# Patient Record
Sex: FEMALE | Race: OTHER | HISPANIC OR LATINO | URBAN - METROPOLITAN AREA
[De-identification: names, ages, dates, MRNs, and addresses within clinical notes are randomized per-mention and may not be internally consistent; named-entity substitution may affect disease eponyms.]

---

## 2018-07-14 ENCOUNTER — EMERGENCY (EMERGENCY)
Facility: HOSPITAL | Age: 73
LOS: 1 days | Discharge: HOME | End: 2018-07-14
Attending: EMERGENCY MEDICINE
Payer: COMMERCIAL

## 2018-07-14 VITALS
DIASTOLIC BLOOD PRESSURE: 106 MMHG | OXYGEN SATURATION: 98 % | TEMPERATURE: 98 F | SYSTOLIC BLOOD PRESSURE: 200 MMHG | HEART RATE: 71 BPM | RESPIRATION RATE: 20 BRPM | WEIGHT: 164.02 LBS | HEIGHT: 61 IN

## 2018-07-14 VITALS
RESPIRATION RATE: 16 BRPM | SYSTOLIC BLOOD PRESSURE: 164 MMHG | HEART RATE: 73 BPM | DIASTOLIC BLOOD PRESSURE: 69 MMHG | OXYGEN SATURATION: 99 %

## 2018-07-14 DIAGNOSIS — Z95.0 PRESENCE OF CARDIAC PACEMAKER: Chronic | ICD-10-CM

## 2018-07-14 DIAGNOSIS — Z90.710 ACQUIRED ABSENCE OF BOTH CERVIX AND UTERUS: Chronic | ICD-10-CM

## 2018-07-14 DIAGNOSIS — Z95.810 PRESENCE OF AUTOMATIC (IMPLANTABLE) CARDIAC DEFIBRILLATOR: Chronic | ICD-10-CM

## 2018-07-14 DIAGNOSIS — Z90.49 ACQUIRED ABSENCE OF OTHER SPECIFIED PARTS OF DIGESTIVE TRACT: Chronic | ICD-10-CM

## 2018-07-14 LAB
ALBUMIN SERPL ELPH-MCNC: 3.4 G/DL — LOW (ref 3.5–5)
ALP SERPL-CCNC: 155 U/L — HIGH (ref 40–120)
ALT FLD-CCNC: 30 U/L DA — SIGNIFICANT CHANGE UP (ref 10–60)
ANION GAP SERPL CALC-SCNC: 6 MMOL/L — SIGNIFICANT CHANGE UP (ref 5–17)
AST SERPL-CCNC: 27 U/L — SIGNIFICANT CHANGE UP (ref 10–40)
BASOPHILS # BLD AUTO: 0 K/UL — SIGNIFICANT CHANGE UP (ref 0–0.2)
BASOPHILS NFR BLD AUTO: 0.3 % — SIGNIFICANT CHANGE UP (ref 0–2)
BILIRUB SERPL-MCNC: 0.4 MG/DL — SIGNIFICANT CHANGE UP (ref 0.2–1.2)
BUN SERPL-MCNC: 28 MG/DL — HIGH (ref 7–18)
CALCIUM SERPL-MCNC: 9.2 MG/DL — SIGNIFICANT CHANGE UP (ref 8.4–10.5)
CHLORIDE SERPL-SCNC: 103 MMOL/L — SIGNIFICANT CHANGE UP (ref 96–108)
CO2 SERPL-SCNC: 29 MMOL/L — SIGNIFICANT CHANGE UP (ref 22–31)
CREAT SERPL-MCNC: 1.27 MG/DL — SIGNIFICANT CHANGE UP (ref 0.5–1.3)
EOSINOPHIL # BLD AUTO: 0.1 K/UL — SIGNIFICANT CHANGE UP (ref 0–0.5)
EOSINOPHIL NFR BLD AUTO: 1.1 % — SIGNIFICANT CHANGE UP (ref 0–6)
GLUCOSE SERPL-MCNC: 210 MG/DL — HIGH (ref 70–99)
HCT VFR BLD CALC: 42.7 % — SIGNIFICANT CHANGE UP (ref 34.5–45)
HGB BLD-MCNC: 14 G/DL — SIGNIFICANT CHANGE UP (ref 11.5–15.5)
LIDOCAIN IGE QN: 172 U/L — SIGNIFICANT CHANGE UP (ref 73–393)
LYMPHOCYTES # BLD AUTO: 23.7 % — SIGNIFICANT CHANGE UP (ref 13–44)
LYMPHOCYTES # BLD AUTO: 3.1 K/UL — SIGNIFICANT CHANGE UP (ref 1–3.3)
MCHC RBC-ENTMCNC: 28.9 PG — SIGNIFICANT CHANGE UP (ref 27–34)
MCHC RBC-ENTMCNC: 32.8 GM/DL — SIGNIFICANT CHANGE UP (ref 32–36)
MCV RBC AUTO: 88 FL — SIGNIFICANT CHANGE UP (ref 80–100)
MONOCYTES # BLD AUTO: 0.7 K/UL — SIGNIFICANT CHANGE UP (ref 0–0.9)
MONOCYTES NFR BLD AUTO: 5.2 % — SIGNIFICANT CHANGE UP (ref 2–14)
NEUTROPHILS # BLD AUTO: 9.1 K/UL — HIGH (ref 1.8–7.4)
NEUTROPHILS NFR BLD AUTO: 69.7 % — SIGNIFICANT CHANGE UP (ref 43–77)
PLATELET # BLD AUTO: 329 K/UL — SIGNIFICANT CHANGE UP (ref 150–400)
POTASSIUM SERPL-MCNC: 4.7 MMOL/L — SIGNIFICANT CHANGE UP (ref 3.5–5.3)
POTASSIUM SERPL-SCNC: 4.7 MMOL/L — SIGNIFICANT CHANGE UP (ref 3.5–5.3)
PROT SERPL-MCNC: 8.4 G/DL — HIGH (ref 6–8.3)
RBC # BLD: 4.85 M/UL — SIGNIFICANT CHANGE UP (ref 3.8–5.2)
RBC # FLD: 12.2 % — SIGNIFICANT CHANGE UP (ref 10.3–14.5)
SODIUM SERPL-SCNC: 138 MMOL/L — SIGNIFICANT CHANGE UP (ref 135–145)
TROPONIN I SERPL-MCNC: <0.015 NG/ML — SIGNIFICANT CHANGE UP (ref 0–0.04)
WBC # BLD: 13 K/UL — HIGH (ref 3.8–10.5)
WBC # FLD AUTO: 13 K/UL — HIGH (ref 3.8–10.5)

## 2018-07-14 PROCEDURE — 99284 EMERGENCY DEPT VISIT MOD MDM: CPT

## 2018-07-14 RX ORDER — HYDRALAZINE HCL 50 MG
10 TABLET ORAL ONCE
Qty: 0 | Refills: 0 | Status: COMPLETED | OUTPATIENT
Start: 2018-07-14 | End: 2018-07-14

## 2018-07-14 RX ORDER — ONDANSETRON 8 MG/1
4 TABLET, FILM COATED ORAL ONCE
Qty: 0 | Refills: 0 | Status: COMPLETED | OUTPATIENT
Start: 2018-07-14 | End: 2018-07-14

## 2018-07-14 RX ADMIN — Medication 10 MILLIGRAM(S): at 23:04

## 2018-07-14 RX ADMIN — ONDANSETRON 4 MILLIGRAM(S): 8 TABLET, FILM COATED ORAL at 23:04

## 2018-07-14 NOTE — ED PROVIDER NOTE - PSH
Artificial cardiac pacemaker    Cardiac defibrillator in place    H/O: hysterectomy    History of cholecystectomy

## 2018-07-14 NOTE — ED ADULT TRIAGE NOTE - CHIEF COMPLAINT QUOTE
pt stated her blood pressure is high denies dizziness c/o abd pain with nausea and vomiting and diarrhea

## 2018-07-14 NOTE — ED PROVIDER NOTE - OBJECTIVE STATEMENT
71 y/o F pt with a PMHx of CAD, CHF, DM, and HTN and a PSHX of a Hysterectomy, Cholecystectomy, Pacemaker, and Defibrillator presents to ED c/o n/v/d and abdominal pain x3 days. Pt also notes chills and dizziness on the first day of onset. Per pt, pt had a salad at home and felt abdominal pain afterwards and vomited. Pt denies CP, dysuria, hematuria, HA or any other symptoms. Pt is currently taking: Aspirin 81mg, Atorvastatin 40mg, Citalopram 10mg, Clopidogrel 75mg, Docusate Sodium 50mg, Felodipin 5mg, Furosemide 20 mg, Glimepiride 4mg, Insulin Aspart 100unit/ml, Ipratropium 17mcg, Lisinopril 20mg, and Metoprolol 100mg., Allergies: Penicillin (rash) & Codeine (rash)

## 2018-07-14 NOTE — ED PROVIDER NOTE - MEDICAL DECISION MAKING DETAILS
73 y/o F pt presents to ED c/o n/v/d and abdominal pain x3 days. Abdomen tender on exam. Will check labs, EKG, CT A/P, UA. Pt also with severely elevated blood pressure, will give IV Hydralazine.

## 2018-07-14 NOTE — ED ADULT NURSE NOTE - OBJECTIVE STATEMENT
Patient complain of elevated blood pressure, ate a salad on Thursday and had vomiting and diarrhea, no diarrhea today, experiencing abdominal pain with some relief with Maalox

## 2018-07-14 NOTE — ED PROVIDER NOTE - PMH
CAD (coronary artery disease)    CHF (congestive heart failure)    DM (diabetes mellitus)    HTN (hypertension)

## 2018-07-15 LAB
APPEARANCE UR: CLEAR — SIGNIFICANT CHANGE UP
BILIRUB UR-MCNC: NEGATIVE — SIGNIFICANT CHANGE UP
COLOR SPEC: YELLOW — SIGNIFICANT CHANGE UP
DIFF PNL FLD: ABNORMAL
GLUCOSE UR QL: NEGATIVE — SIGNIFICANT CHANGE UP
KETONES UR-MCNC: NEGATIVE — SIGNIFICANT CHANGE UP
LEUKOCYTE ESTERASE UR-ACNC: ABNORMAL
NITRITE UR-MCNC: POSITIVE
PH UR: 7 — SIGNIFICANT CHANGE UP (ref 5–8)
PROT UR-MCNC: 100
SP GR SPEC: 1.01 — SIGNIFICANT CHANGE UP (ref 1.01–1.02)
UROBILINOGEN FLD QL: NEGATIVE — SIGNIFICANT CHANGE UP

## 2018-07-15 PROCEDURE — 96374 THER/PROPH/DIAG INJ IV PUSH: CPT | Mod: XU

## 2018-07-15 PROCEDURE — 93005 ELECTROCARDIOGRAM TRACING: CPT

## 2018-07-15 PROCEDURE — 80053 COMPREHEN METABOLIC PANEL: CPT

## 2018-07-15 PROCEDURE — 99284 EMERGENCY DEPT VISIT MOD MDM: CPT | Mod: 25

## 2018-07-15 PROCEDURE — 96375 TX/PRO/DX INJ NEW DRUG ADDON: CPT

## 2018-07-15 PROCEDURE — 74177 CT ABD & PELVIS W/CONTRAST: CPT | Mod: 26

## 2018-07-15 PROCEDURE — 87086 URINE CULTURE/COLONY COUNT: CPT

## 2018-07-15 PROCEDURE — 82962 GLUCOSE BLOOD TEST: CPT

## 2018-07-15 PROCEDURE — 81001 URINALYSIS AUTO W/SCOPE: CPT

## 2018-07-15 PROCEDURE — 84484 ASSAY OF TROPONIN QUANT: CPT

## 2018-07-15 PROCEDURE — 74177 CT ABD & PELVIS W/CONTRAST: CPT

## 2018-07-15 PROCEDURE — 83690 ASSAY OF LIPASE: CPT

## 2018-07-15 PROCEDURE — 85027 COMPLETE CBC AUTOMATED: CPT

## 2018-07-15 RX ORDER — CEFUROXIME AXETIL 250 MG
1 TABLET ORAL
Qty: 14 | Refills: 0
Start: 2018-07-15 | End: 2018-07-21

## 2018-07-15 RX ORDER — ONDANSETRON 8 MG/1
1 TABLET, FILM COATED ORAL
Qty: 6 | Refills: 0
Start: 2018-07-15 | End: 2018-07-16

## 2018-07-15 RX ORDER — METOCLOPRAMIDE HCL 10 MG
10 TABLET ORAL ONCE
Qty: 0 | Refills: 0 | Status: COMPLETED | OUTPATIENT
Start: 2018-07-15 | End: 2018-07-15

## 2018-07-15 RX ORDER — CEFTRIAXONE 500 MG/1
1 INJECTION, POWDER, FOR SOLUTION INTRAMUSCULAR; INTRAVENOUS ONCE
Qty: 0 | Refills: 0 | Status: COMPLETED | OUTPATIENT
Start: 2018-07-15 | End: 2018-07-15

## 2018-07-15 RX ADMIN — CEFTRIAXONE 100 GRAM(S): 500 INJECTION, POWDER, FOR SOLUTION INTRAMUSCULAR; INTRAVENOUS at 03:03

## 2018-07-15 RX ADMIN — Medication 10 MILLIGRAM(S): at 03:06

## 2018-07-16 LAB
CULTURE RESULTS: SIGNIFICANT CHANGE UP
SPECIMEN SOURCE: SIGNIFICANT CHANGE UP

## 2019-10-12 ENCOUNTER — EMERGENCY (EMERGENCY)
Facility: HOSPITAL | Age: 74
LOS: 1 days | Discharge: HOME | End: 2019-10-12
Attending: EMERGENCY MEDICINE
Payer: MEDICARE

## 2019-10-12 VITALS
TEMPERATURE: 98 F | OXYGEN SATURATION: 98 % | HEART RATE: 66 BPM | SYSTOLIC BLOOD PRESSURE: 166 MMHG | RESPIRATION RATE: 20 BRPM | DIASTOLIC BLOOD PRESSURE: 64 MMHG

## 2019-10-12 VITALS
SYSTOLIC BLOOD PRESSURE: 203 MMHG | HEART RATE: 90 BPM | WEIGHT: 134.92 LBS | OXYGEN SATURATION: 99 % | TEMPERATURE: 98 F | HEIGHT: 61 IN | DIASTOLIC BLOOD PRESSURE: 93 MMHG | RESPIRATION RATE: 18 BRPM

## 2019-10-12 DIAGNOSIS — Z95.810 PRESENCE OF AUTOMATIC (IMPLANTABLE) CARDIAC DEFIBRILLATOR: Chronic | ICD-10-CM

## 2019-10-12 DIAGNOSIS — Z95.0 PRESENCE OF CARDIAC PACEMAKER: Chronic | ICD-10-CM

## 2019-10-12 DIAGNOSIS — Z90.710 ACQUIRED ABSENCE OF BOTH CERVIX AND UTERUS: Chronic | ICD-10-CM

## 2019-10-12 DIAGNOSIS — Z90.49 ACQUIRED ABSENCE OF OTHER SPECIFIED PARTS OF DIGESTIVE TRACT: Chronic | ICD-10-CM

## 2019-10-12 PROBLEM — I50.9 HEART FAILURE, UNSPECIFIED: Chronic | Status: ACTIVE | Noted: 2018-07-27

## 2019-10-12 PROBLEM — I10 ESSENTIAL (PRIMARY) HYPERTENSION: Chronic | Status: ACTIVE | Noted: 2018-07-27

## 2019-10-12 LAB
ANION GAP SERPL CALC-SCNC: 5 MMOL/L — SIGNIFICANT CHANGE UP (ref 5–17)
APTT BLD: 41.7 SEC — HIGH (ref 27.5–36.3)
BASOPHILS # BLD AUTO: 0.05 K/UL — SIGNIFICANT CHANGE UP (ref 0–0.2)
BASOPHILS NFR BLD AUTO: 0.4 % — SIGNIFICANT CHANGE UP (ref 0–2)
BUN SERPL-MCNC: 32 MG/DL — HIGH (ref 7–18)
CALCIUM SERPL-MCNC: 9.6 MG/DL — SIGNIFICANT CHANGE UP (ref 8.4–10.5)
CHLORIDE SERPL-SCNC: 102 MMOL/L — SIGNIFICANT CHANGE UP (ref 96–108)
CO2 SERPL-SCNC: 28 MMOL/L — SIGNIFICANT CHANGE UP (ref 22–31)
CREAT SERPL-MCNC: 1.26 MG/DL — SIGNIFICANT CHANGE UP (ref 0.5–1.3)
EOSINOPHIL # BLD AUTO: 0.24 K/UL — SIGNIFICANT CHANGE UP (ref 0–0.5)
EOSINOPHIL NFR BLD AUTO: 2 % — SIGNIFICANT CHANGE UP (ref 0–6)
GLUCOSE SERPL-MCNC: 225 MG/DL — HIGH (ref 70–99)
HCT VFR BLD CALC: 38.5 % — SIGNIFICANT CHANGE UP (ref 34.5–45)
HGB BLD-MCNC: 12.8 G/DL — SIGNIFICANT CHANGE UP (ref 11.5–15.5)
IMM GRANULOCYTES NFR BLD AUTO: 0.8 % — SIGNIFICANT CHANGE UP (ref 0–1.5)
INR BLD: 1.06 RATIO — SIGNIFICANT CHANGE UP (ref 0.88–1.16)
LYMPHOCYTES # BLD AUTO: 18.3 % — SIGNIFICANT CHANGE UP (ref 13–44)
LYMPHOCYTES # BLD AUTO: 2.16 K/UL — SIGNIFICANT CHANGE UP (ref 1–3.3)
MCHC RBC-ENTMCNC: 29.4 PG — SIGNIFICANT CHANGE UP (ref 27–34)
MCHC RBC-ENTMCNC: 33.2 GM/DL — SIGNIFICANT CHANGE UP (ref 32–36)
MCV RBC AUTO: 88.3 FL — SIGNIFICANT CHANGE UP (ref 80–100)
MONOCYTES # BLD AUTO: 0.79 K/UL — SIGNIFICANT CHANGE UP (ref 0–0.9)
MONOCYTES NFR BLD AUTO: 6.7 % — SIGNIFICANT CHANGE UP (ref 2–14)
NEUTROPHILS # BLD AUTO: 8.48 K/UL — HIGH (ref 1.8–7.4)
NEUTROPHILS NFR BLD AUTO: 71.8 % — SIGNIFICANT CHANGE UP (ref 43–77)
NRBC # BLD: 0 /100 WBCS — SIGNIFICANT CHANGE UP (ref 0–0)
PLATELET # BLD AUTO: 343 K/UL — SIGNIFICANT CHANGE UP (ref 150–400)
POTASSIUM SERPL-MCNC: 4.4 MMOL/L — SIGNIFICANT CHANGE UP (ref 3.5–5.3)
POTASSIUM SERPL-SCNC: 4.4 MMOL/L — SIGNIFICANT CHANGE UP (ref 3.5–5.3)
PROTHROM AB SERPL-ACNC: 11.8 SEC — SIGNIFICANT CHANGE UP (ref 10–12.9)
RBC # BLD: 4.36 M/UL — SIGNIFICANT CHANGE UP (ref 3.8–5.2)
RBC # FLD: 14 % — SIGNIFICANT CHANGE UP (ref 10.3–14.5)
SODIUM SERPL-SCNC: 135 MMOL/L — SIGNIFICANT CHANGE UP (ref 135–145)
TROPONIN I SERPL-MCNC: <0.015 NG/ML — SIGNIFICANT CHANGE UP (ref 0–0.04)
WBC # BLD: 11.81 K/UL — HIGH (ref 3.8–10.5)
WBC # FLD AUTO: 11.81 K/UL — HIGH (ref 3.8–10.5)

## 2019-10-12 PROCEDURE — 84484 ASSAY OF TROPONIN QUANT: CPT

## 2019-10-12 PROCEDURE — 85730 THROMBOPLASTIN TIME PARTIAL: CPT

## 2019-10-12 PROCEDURE — 70450 CT HEAD/BRAIN W/O DYE: CPT

## 2019-10-12 PROCEDURE — 99284 EMERGENCY DEPT VISIT MOD MDM: CPT

## 2019-10-12 PROCEDURE — 70450 CT HEAD/BRAIN W/O DYE: CPT | Mod: 26

## 2019-10-12 PROCEDURE — 85610 PROTHROMBIN TIME: CPT

## 2019-10-12 PROCEDURE — 93005 ELECTROCARDIOGRAM TRACING: CPT

## 2019-10-12 PROCEDURE — 85027 COMPLETE CBC AUTOMATED: CPT

## 2019-10-12 PROCEDURE — 80048 BASIC METABOLIC PNL TOTAL CA: CPT

## 2019-10-12 PROCEDURE — 99053 MED SERV 10PM-8AM 24 HR FAC: CPT

## 2019-10-12 PROCEDURE — 71046 X-RAY EXAM CHEST 2 VIEWS: CPT | Mod: 26

## 2019-10-12 PROCEDURE — 99284 EMERGENCY DEPT VISIT MOD MDM: CPT | Mod: 25

## 2019-10-12 PROCEDURE — 36415 COLL VENOUS BLD VENIPUNCTURE: CPT

## 2019-10-12 PROCEDURE — 71046 X-RAY EXAM CHEST 2 VIEWS: CPT

## 2019-10-12 RX ORDER — ACETAMINOPHEN 500 MG
975 TABLET ORAL ONCE
Refills: 0 | Status: COMPLETED | OUTPATIENT
Start: 2019-10-12 | End: 2019-10-12

## 2019-10-12 RX ORDER — OXYMETAZOLINE HYDROCHLORIDE 0.5 MG/ML
1 SPRAY NASAL ONCE
Refills: 0 | Status: COMPLETED | OUTPATIENT
Start: 2019-10-12 | End: 2019-10-12

## 2019-10-12 RX ADMIN — OXYMETAZOLINE HYDROCHLORIDE 1 SPRAY(S): 0.5 SPRAY NASAL at 09:19

## 2019-10-12 RX ADMIN — Medication 975 MILLIGRAM(S): at 08:33

## 2019-10-12 NOTE — ED ADULT NURSE NOTE - OBJECTIVE STATEMENT
nose bleed from 0430- 0730, with c/o HTN chest pain radiating to upper back. Received metoprolol 100mg and hydralazine 50mg from daughter.

## 2019-10-12 NOTE — ED PROVIDER NOTE - CARE PROVIDER_API CALL
Dejuan Pearson)  Otolaryngology  [address] [place] [address], [address]  [place] [address]  Phone: [phone]  Fax: [phone]  Follow Up Time:

## 2019-10-12 NOTE — ED PROVIDER NOTE - OBJECTIVE STATEMENT
73 y/o female with PMHx of DM, CAD (on Plavix and ASA), HTN, pacemaker, defibrillator presents to the ED c/o epistaxis x this morning. Pt notes sudden onset of epistaxis from mamadou nares, L greater than R. Pt in ED with bleeding now resolved. Pt last took Plavix and ASA x yesterday. Pt in ED also with diffuse HA and concern for elevated BP; pt last took metoprolol and hydralazine x this morning. Pt also with chest pain near pacemaker x 1 hour ago, now resolved in ED. Pt denies fever, shortness of breath, or any other complaints. Pt allergic to penicillin and codeine (rash).

## 2019-10-12 NOTE — ED PROVIDER NOTE - PATIENT PORTAL LINK FT
You can access the FollowMyHealth Patient Portal offered by [facility] by registering at the following website: [email or web address]. By joining [facility]’s FollowMyHealth portal, you will also be able to view your health information using other applications (apps) compatible with our system.

## 2019-10-12 NOTE — ED ADULT TRIAGE NOTE - CHIEF COMPLAINT QUOTE
pt started with bleeding from left nose this morning . pt on Plavix and aspirin . pt c/o left back pain

## 2019-10-12 NOTE — ED PROVIDER NOTE - NSFOLLOWUPINSTRUCTIONS_ED_ALL_ED_FT
Nosebleed, Adult  A nosebleed is when blood comes out of the nose. Nosebleeds are common. Usually, they are not a sign of a serious condition.  Nosebleeds can happen if a small blood vessel in your nose starts to bleed or if the lining of your nose (mucous membrane) cracks. They are commonly caused by:  Allergies.Colds.Picking your nose.Blowing your nose too hard.An injury from sticking an object into your nose or getting hit in the nose.Dry or cold air.Less common causes of nosebleeds include:  Toxic fumes.Something abnormal in the nose or in the air-filled spaces in the bones of the face (sinuses).Growths in the nose, such as polyps.Medicines or conditions that cause blood to clot slowly.Certain illnesses or procedures that irritate or dry out the nasal passages.Follow these instructions at home:  When you have a nosebleed:     Image   Sit down and tilt your head slightly forward.Use a clean towel or tissue to pinch your nostrils under the bony part of your nose. After 10 minutes, let go of your nose and see if bleeding starts again. Do not release pressure before that time. If there is still bleeding, repeat the pinching and holding for 10 minutes until the bleeding stops.Do not place tissues or gauze in the nose to stop bleeding.Avoid lying down and avoid tilting your head backward. That may make blood collect in the throat and cause gagging or coughing.Use a nasal spray decongestant to help with a nosebleed as told by your health care provider.Do not use petroleum jelly or mineral oil in your nose. It can drip into your lungs.After a nosebleed:     Avoid blowing your nose or sniffing for a number of hours.Avoid straining, lifting, or bending at the waist for several days. You may resume other normal activities as you are able.Use saline spray or a humidifier as told by your health care provider.Aspirin and blood thinners make bleeding more likely. If you are prescribed these medicines and you suffer from nosebleeds:  Ask your health care provider if you should stop taking the medicines or if you should adjust the dose.Do not stop taking medicines that your health care provider has recommended unless told by your health care provider.If your nosebleed was caused by dry mucous membranes, use over-the-counter saline nasal spray or gel. This will keep the mucous membranes moist and allow them to heal. If you must use a lubricant:  Choose one that is water-soluble.Use only as much as you need and use it only as often as needed.Do not lie down until several hours after you use it.Contact a health care provider if:  You have a fever.You get nosebleeds often or more often than usual.You bruise very easily.You have a nosebleed from having something stuck in your nose.You have bleeding in your mouth.You vomit or cough up brown material.You have a nosebleed after you start a new medicine.Get help right away if:  You have a nosebleed after a fall or a head injury.Your nosebleed does not go away after 20 minutes.You feel dizzy or weak.You have unusual bleeding from other parts of your body.You have unusual bruising on other parts of your body.You become sweaty.You vomit blood.This information is not intended to replace advice given to you by your health care provider. Make sure you discuss any questions you have with your health care provider.

## 2019-10-12 NOTE — ED PROVIDER NOTE - PROGRESS NOTE DETAILS
NAD, resting in bed, states headache resolved, BP improved, labs wnl, to contnue prn vaseline to nares, may use Afrin, not more than 3d explained,  return prec for worsening bleeding if not subsided after holding pressure, fu with ENT.

## 2019-10-12 NOTE — ED PROVIDER NOTE - CLINICAL SUMMARY MEDICAL DECISION MAKING FREE TEXT BOX
74 F on Plavix/ASA with epistaxis x today. Pt also with HA and chest pain. In ED, pt well appearing and bleeding resolved. Will eval with labs, CT head, treat pain and reassess.

## 2019-10-12 NOTE — ED PROVIDER NOTE - CRANIAL NERVE AND PUPILLARY EXAM
central vision intact/gag reflex intact/tongue is midline/cranial nerves 2-12 intact/corneal reflex intact/central and peripheral vision intact/extra-ocular movements intact/peripheral vision intact/cough reflex intact

## 2020-05-30 ENCOUNTER — INPATIENT (INPATIENT)
Facility: HOSPITAL | Age: 75
LOS: 19 days | Discharge: HOME | DRG: 981 | End: 2020-06-19
Attending: INTERNAL MEDICINE | Admitting: INTERNAL MEDICINE
Payer: MEDICARE

## 2020-05-30 VITALS
SYSTOLIC BLOOD PRESSURE: 187 MMHG | WEIGHT: 147.05 LBS | TEMPERATURE: 97 F | DIASTOLIC BLOOD PRESSURE: 109 MMHG | HEART RATE: 114 BPM | RESPIRATION RATE: 19 BRPM

## 2020-05-30 DIAGNOSIS — R10.9 UNSPECIFIED ABDOMINAL PAIN: ICD-10-CM

## 2020-05-30 DIAGNOSIS — Z29.9 ENCOUNTER FOR PROPHYLACTIC MEASURES, UNSPECIFIED: ICD-10-CM

## 2020-05-30 DIAGNOSIS — I25.10 ATHEROSCLEROTIC HEART DISEASE OF NATIVE CORONARY ARTERY WITHOUT ANGINA PECTORIS: ICD-10-CM

## 2020-05-30 DIAGNOSIS — I50.9 HEART FAILURE, UNSPECIFIED: ICD-10-CM

## 2020-05-30 DIAGNOSIS — N32.89 OTHER SPECIFIED DISORDERS OF BLADDER: ICD-10-CM

## 2020-05-30 DIAGNOSIS — E11.9 TYPE 2 DIABETES MELLITUS WITHOUT COMPLICATIONS: ICD-10-CM

## 2020-05-30 DIAGNOSIS — Z95.0 PRESENCE OF CARDIAC PACEMAKER: Chronic | ICD-10-CM

## 2020-05-30 DIAGNOSIS — R11.10 VOMITING, UNSPECIFIED: ICD-10-CM

## 2020-05-30 DIAGNOSIS — Z90.49 ACQUIRED ABSENCE OF OTHER SPECIFIED PARTS OF DIGESTIVE TRACT: Chronic | ICD-10-CM

## 2020-05-30 DIAGNOSIS — I73.9 PERIPHERAL VASCULAR DISEASE, UNSPECIFIED: ICD-10-CM

## 2020-05-30 DIAGNOSIS — Z90.710 ACQUIRED ABSENCE OF BOTH CERVIX AND UTERUS: Chronic | ICD-10-CM

## 2020-05-30 DIAGNOSIS — K29.00 ACUTE GASTRITIS WITHOUT BLEEDING: ICD-10-CM

## 2020-05-30 DIAGNOSIS — I10 ESSENTIAL (PRIMARY) HYPERTENSION: ICD-10-CM

## 2020-05-30 DIAGNOSIS — Z95.5 PRESENCE OF CORONARY ANGIOPLASTY IMPLANT AND GRAFT: Chronic | ICD-10-CM

## 2020-05-30 DIAGNOSIS — Z95.810 PRESENCE OF AUTOMATIC (IMPLANTABLE) CARDIAC DEFIBRILLATOR: Chronic | ICD-10-CM

## 2020-05-30 LAB
ALBUMIN SERPL ELPH-MCNC: 3 G/DL — LOW (ref 3.5–5)
ALP SERPL-CCNC: 311 U/L — HIGH (ref 40–120)
ALT FLD-CCNC: 75 U/L DA — HIGH (ref 10–60)
ANION GAP SERPL CALC-SCNC: 7 MMOL/L — SIGNIFICANT CHANGE UP (ref 5–17)
APPEARANCE UR: CLEAR — SIGNIFICANT CHANGE UP
AST SERPL-CCNC: 47 U/L — HIGH (ref 10–40)
BASOPHILS # BLD AUTO: 0.04 K/UL — SIGNIFICANT CHANGE UP (ref 0–0.2)
BASOPHILS NFR BLD AUTO: 0.3 % — SIGNIFICANT CHANGE UP (ref 0–2)
BILIRUB SERPL-MCNC: 0.4 MG/DL — SIGNIFICANT CHANGE UP (ref 0.2–1.2)
BILIRUB UR-MCNC: NEGATIVE — SIGNIFICANT CHANGE UP
BUN SERPL-MCNC: 30 MG/DL — HIGH (ref 7–18)
CALCIUM SERPL-MCNC: 9.8 MG/DL — SIGNIFICANT CHANGE UP (ref 8.4–10.5)
CHLORIDE SERPL-SCNC: 101 MMOL/L — SIGNIFICANT CHANGE UP (ref 96–108)
CO2 SERPL-SCNC: 28 MMOL/L — SIGNIFICANT CHANGE UP (ref 22–31)
COLOR SPEC: YELLOW — SIGNIFICANT CHANGE UP
CREAT SERPL-MCNC: 1.11 MG/DL — SIGNIFICANT CHANGE UP (ref 0.5–1.3)
DIFF PNL FLD: ABNORMAL
EOSINOPHIL # BLD AUTO: 0.03 K/UL — SIGNIFICANT CHANGE UP (ref 0–0.5)
EOSINOPHIL NFR BLD AUTO: 0.2 % — SIGNIFICANT CHANGE UP (ref 0–6)
GLUCOSE BLDC GLUCOMTR-MCNC: 196 MG/DL — HIGH (ref 70–99)
GLUCOSE BLDC GLUCOMTR-MCNC: 243 MG/DL — HIGH (ref 70–99)
GLUCOSE SERPL-MCNC: 206 MG/DL — HIGH (ref 70–99)
GLUCOSE UR QL: 100 MG/DL
HCT VFR BLD CALC: 35.9 % — SIGNIFICANT CHANGE UP (ref 34.5–45)
HGB BLD-MCNC: 11.5 G/DL — SIGNIFICANT CHANGE UP (ref 11.5–15.5)
IMM GRANULOCYTES NFR BLD AUTO: 0.7 % — SIGNIFICANT CHANGE UP (ref 0–1.5)
KETONES UR-MCNC: ABNORMAL
LEUKOCYTE ESTERASE UR-ACNC: NEGATIVE — SIGNIFICANT CHANGE UP
LIDOCAIN IGE QN: 75 U/L — SIGNIFICANT CHANGE UP (ref 73–393)
LYMPHOCYTES # BLD AUTO: 1.54 K/UL — SIGNIFICANT CHANGE UP (ref 1–3.3)
LYMPHOCYTES # BLD AUTO: 12.6 % — LOW (ref 13–44)
MCHC RBC-ENTMCNC: 28.8 PG — SIGNIFICANT CHANGE UP (ref 27–34)
MCHC RBC-ENTMCNC: 32 GM/DL — SIGNIFICANT CHANGE UP (ref 32–36)
MCV RBC AUTO: 89.8 FL — SIGNIFICANT CHANGE UP (ref 80–100)
MONOCYTES # BLD AUTO: 0.62 K/UL — SIGNIFICANT CHANGE UP (ref 0–0.9)
MONOCYTES NFR BLD AUTO: 5.1 % — SIGNIFICANT CHANGE UP (ref 2–14)
NEUTROPHILS # BLD AUTO: 9.88 K/UL — HIGH (ref 1.8–7.4)
NEUTROPHILS NFR BLD AUTO: 81.1 % — HIGH (ref 43–77)
NITRITE UR-MCNC: NEGATIVE — SIGNIFICANT CHANGE UP
NRBC # BLD: 0 /100 WBCS — SIGNIFICANT CHANGE UP (ref 0–0)
PH UR: 6 — SIGNIFICANT CHANGE UP (ref 5–8)
PLATELET # BLD AUTO: 381 K/UL — SIGNIFICANT CHANGE UP (ref 150–400)
POTASSIUM SERPL-MCNC: 4.7 MMOL/L — SIGNIFICANT CHANGE UP (ref 3.5–5.3)
POTASSIUM SERPL-SCNC: 4.7 MMOL/L — SIGNIFICANT CHANGE UP (ref 3.5–5.3)
PROT SERPL-MCNC: 8.9 G/DL — HIGH (ref 6–8.3)
PROT UR-MCNC: 100
RBC # BLD: 4 M/UL — SIGNIFICANT CHANGE UP (ref 3.8–5.2)
RBC # FLD: 13.6 % — SIGNIFICANT CHANGE UP (ref 10.3–14.5)
SARS-COV-2 RNA SPEC QL NAA+PROBE: SIGNIFICANT CHANGE UP
SODIUM SERPL-SCNC: 136 MMOL/L — SIGNIFICANT CHANGE UP (ref 135–145)
SP GR SPEC: 1.01 — SIGNIFICANT CHANGE UP (ref 1.01–1.02)
UROBILINOGEN FLD QL: NEGATIVE — SIGNIFICANT CHANGE UP
WBC # BLD: 12.2 K/UL — HIGH (ref 3.8–10.5)
WBC # FLD AUTO: 12.2 K/UL — HIGH (ref 3.8–10.5)

## 2020-05-30 PROCEDURE — 74177 CT ABD & PELVIS W/CONTRAST: CPT | Mod: 26

## 2020-05-30 PROCEDURE — 99285 EMERGENCY DEPT VISIT HI MDM: CPT

## 2020-05-30 RX ORDER — LIDOCAINE 4 G/100G
10 CREAM TOPICAL ONCE
Refills: 0 | Status: COMPLETED | OUTPATIENT
Start: 2020-05-30 | End: 2020-05-30

## 2020-05-30 RX ORDER — LISINOPRIL 2.5 MG/1
40 TABLET ORAL DAILY
Refills: 0 | Status: DISCONTINUED | OUTPATIENT
Start: 2020-05-30 | End: 2020-06-02

## 2020-05-30 RX ORDER — ATORVASTATIN CALCIUM 80 MG/1
40 TABLET, FILM COATED ORAL AT BEDTIME
Refills: 0 | Status: DISCONTINUED | OUTPATIENT
Start: 2020-05-30 | End: 2020-06-09

## 2020-05-30 RX ORDER — ONDANSETRON 8 MG/1
4 TABLET, FILM COATED ORAL THREE TIMES A DAY
Refills: 0 | Status: DISCONTINUED | OUTPATIENT
Start: 2020-05-30 | End: 2020-05-31

## 2020-05-30 RX ORDER — ASPIRIN/CALCIUM CARB/MAGNESIUM 324 MG
81 TABLET ORAL DAILY
Refills: 0 | Status: DISCONTINUED | OUTPATIENT
Start: 2020-05-30 | End: 2020-06-04

## 2020-05-30 RX ORDER — FAMOTIDINE 10 MG/ML
20 INJECTION INTRAVENOUS ONCE
Refills: 0 | Status: COMPLETED | OUTPATIENT
Start: 2020-05-30 | End: 2020-05-30

## 2020-05-30 RX ORDER — METOPROLOL TARTRATE 50 MG
50 TABLET ORAL
Refills: 0 | Status: DISCONTINUED | OUTPATIENT
Start: 2020-05-30 | End: 2020-05-31

## 2020-05-30 RX ORDER — DEXTROSE 50 % IN WATER 50 %
25 SYRINGE (ML) INTRAVENOUS ONCE
Refills: 0 | Status: DISCONTINUED | OUTPATIENT
Start: 2020-05-30 | End: 2020-05-30

## 2020-05-30 RX ORDER — DEXTROSE 50 % IN WATER 50 %
15 SYRINGE (ML) INTRAVENOUS ONCE
Refills: 0 | Status: DISCONTINUED | OUTPATIENT
Start: 2020-05-30 | End: 2020-05-30

## 2020-05-30 RX ORDER — SODIUM CHLORIDE 9 MG/ML
1000 INJECTION INTRAMUSCULAR; INTRAVENOUS; SUBCUTANEOUS ONCE
Refills: 0 | Status: COMPLETED | OUTPATIENT
Start: 2020-05-30 | End: 2020-05-30

## 2020-05-30 RX ORDER — SODIUM CHLORIDE 9 MG/ML
1000 INJECTION, SOLUTION INTRAVENOUS
Refills: 0 | Status: DISCONTINUED | OUTPATIENT
Start: 2020-05-30 | End: 2020-05-30

## 2020-05-30 RX ORDER — LORATADINE 10 MG/1
10 TABLET ORAL DAILY
Refills: 0 | Status: DISCONTINUED | OUTPATIENT
Start: 2020-05-30 | End: 2020-06-03

## 2020-05-30 RX ORDER — FUROSEMIDE 40 MG
20 TABLET ORAL
Refills: 0 | Status: DISCONTINUED | OUTPATIENT
Start: 2020-05-30 | End: 2020-05-31

## 2020-05-30 RX ORDER — HYDRALAZINE HCL 50 MG
50 TABLET ORAL THREE TIMES A DAY
Refills: 0 | Status: DISCONTINUED | OUTPATIENT
Start: 2020-05-30 | End: 2020-06-01

## 2020-05-30 RX ORDER — SODIUM CHLORIDE 9 MG/ML
1000 INJECTION, SOLUTION INTRAVENOUS
Refills: 0 | Status: DISCONTINUED | OUTPATIENT
Start: 2020-05-30 | End: 2020-06-01

## 2020-05-30 RX ORDER — METOCLOPRAMIDE HCL 10 MG
10 TABLET ORAL ONCE
Refills: 0 | Status: COMPLETED | OUTPATIENT
Start: 2020-05-30 | End: 2020-05-30

## 2020-05-30 RX ORDER — PANTOPRAZOLE SODIUM 20 MG/1
40 TABLET, DELAYED RELEASE ORAL
Refills: 0 | Status: DISCONTINUED | OUTPATIENT
Start: 2020-05-30 | End: 2020-05-31

## 2020-05-30 RX ORDER — DEXTROSE 50 % IN WATER 50 %
12.5 SYRINGE (ML) INTRAVENOUS ONCE
Refills: 0 | Status: DISCONTINUED | OUTPATIENT
Start: 2020-05-30 | End: 2020-05-30

## 2020-05-30 RX ORDER — ENOXAPARIN SODIUM 100 MG/ML
40 INJECTION SUBCUTANEOUS DAILY
Refills: 0 | Status: DISCONTINUED | OUTPATIENT
Start: 2020-05-30 | End: 2020-06-04

## 2020-05-30 RX ORDER — INSULIN LISPRO 100/ML
VIAL (ML) SUBCUTANEOUS
Refills: 0 | Status: DISCONTINUED | OUTPATIENT
Start: 2020-05-30 | End: 2020-06-04

## 2020-05-30 RX ORDER — ONDANSETRON 8 MG/1
4 TABLET, FILM COATED ORAL ONCE
Refills: 0 | Status: COMPLETED | OUTPATIENT
Start: 2020-05-30 | End: 2020-05-30

## 2020-05-30 RX ORDER — CLOPIDOGREL BISULFATE 75 MG/1
75 TABLET, FILM COATED ORAL DAILY
Refills: 0 | Status: DISCONTINUED | OUTPATIENT
Start: 2020-05-30 | End: 2020-06-04

## 2020-05-30 RX ORDER — GLUCAGON INJECTION, SOLUTION 0.5 MG/.1ML
1 INJECTION, SOLUTION SUBCUTANEOUS ONCE
Refills: 0 | Status: DISCONTINUED | OUTPATIENT
Start: 2020-05-30 | End: 2020-05-30

## 2020-05-30 RX ORDER — INSULIN GLARGINE 100 [IU]/ML
20 INJECTION, SOLUTION SUBCUTANEOUS AT BEDTIME
Refills: 0 | Status: DISCONTINUED | OUTPATIENT
Start: 2020-05-30 | End: 2020-06-04

## 2020-05-30 RX ADMIN — SODIUM CHLORIDE 1000 MILLILITER(S): 9 INJECTION INTRAMUSCULAR; INTRAVENOUS; SUBCUTANEOUS at 07:54

## 2020-05-30 RX ADMIN — SODIUM CHLORIDE 1000 MILLILITER(S): 9 INJECTION INTRAMUSCULAR; INTRAVENOUS; SUBCUTANEOUS at 10:00

## 2020-05-30 RX ADMIN — Medication 10 MILLIGRAM(S): at 17:54

## 2020-05-30 RX ADMIN — ONDANSETRON 4 MILLIGRAM(S): 8 TABLET, FILM COATED ORAL at 07:53

## 2020-05-30 RX ADMIN — LISINOPRIL 40 MILLIGRAM(S): 2.5 TABLET ORAL at 15:00

## 2020-05-30 RX ADMIN — Medication 30 MILLILITER(S): at 14:05

## 2020-05-30 RX ADMIN — Medication 50 MILLIGRAM(S): at 22:33

## 2020-05-30 RX ADMIN — ONDANSETRON 4 MILLIGRAM(S): 8 TABLET, FILM COATED ORAL at 10:48

## 2020-05-30 RX ADMIN — Medication 20 MILLIGRAM(S): at 17:18

## 2020-05-30 RX ADMIN — LIDOCAINE 10 MILLILITER(S): 4 CREAM TOPICAL at 08:53

## 2020-05-30 RX ADMIN — Medication 2: at 17:17

## 2020-05-30 RX ADMIN — ATORVASTATIN CALCIUM 40 MILLIGRAM(S): 80 TABLET, FILM COATED ORAL at 22:33

## 2020-05-30 RX ADMIN — INSULIN GLARGINE 20 UNIT(S): 100 INJECTION, SOLUTION SUBCUTANEOUS at 22:33

## 2020-05-30 RX ADMIN — FAMOTIDINE 20 MILLIGRAM(S): 10 INJECTION INTRAVENOUS at 08:52

## 2020-05-30 RX ADMIN — Medication 50 MILLIGRAM(S): at 17:18

## 2020-05-30 RX ADMIN — Medication 30 MILLILITER(S): at 08:53

## 2020-05-30 RX ADMIN — ONDANSETRON 4 MILLIGRAM(S): 8 TABLET, FILM COATED ORAL at 17:46

## 2020-05-30 RX ADMIN — SODIUM CHLORIDE 55 MILLILITER(S): 9 INJECTION, SOLUTION INTRAVENOUS at 22:35

## 2020-05-30 NOTE — H&P ADULT - ASSESSMENT
75 YO female from home, H/O CAD/DM/HTN/PVD/hysterectomy/cholecystectomy ,with AICD has come to ED with intractable nausea and vomiting for 2 days, likely due to pill induced esophagitis/gastritis. Patient reports that it was sudden in onset, 7/10, burning & pressure like pain, non-radiating , associated with nausea and projectile vomiting. Patient also reports significant weight( not sure how much) recently and decrease in appetite. CT abdomen/pelvis shows bladder wall thickening and pt has history of Stage1 endometrial cancer,   Patient also has high blood pressure because she missed her medications  Admitting patient for management of  Acute Gastritis  Intractable abdominal pain  hypertension  weight loss/ suspected malignancy workup

## 2020-05-30 NOTE — CONSULT NOTE ADULT - ASSESSMENT
74 y.o. F with incidental finding of bladder wall irregularity    -No acute urological intervention indicated at present time  -Recommend urine cytology  -Outpatient f/u with Dr. Taylor after discharge for further workup 74 y.o. F with incidental finding of bladder wall irregularity    -No acute urological intervention indicated at present time  -Recommend urine cytology  -Recommend CT urogram while inpatient  -Outpatient f/u with Dr. Taylor after discharge for further workup 74 y.o. F with incidental finding of bladder wall irregularity  -CT images reviewed  -labs reviewed  -Discussed with PA staff  -Urine cytology  -CT urogram  -Outpatient f/u with Dr. Taylor after discharge for further workup with cystoscopy  -Discussed the importance of follow up as well as possibility of malignancy with the patient  -Follow up with Dr. Taylor after discharge by calling [phone] or [phone] to schedule an appointment.   [address] [facility] [address]  [place] [address]

## 2020-05-30 NOTE — H&P ADULT - PROBLEM SELECTOR PLAN 5
CT abdomen shows bladder wall thickening  reports weight loss and loss of appetite recently, Patient has history of Stage 1 endometrial cancer and hysterectomy  brother also had cancer and she is not sure about which one CT abdomen shows bladder wall thickening  reports weight loss and loss of appetite recently, Patient has history of Stage 1 endometrial cancer and hysterectomy  brother also had cancer and she is not sure about which one  patient will benefit from cystoscopy  will consult urology for their recommendations

## 2020-05-30 NOTE — H&P ADULT - PROBLEM SELECTOR PLAN 2
patient endorses moderate to severe abdominal pain  partially relieved with oral Protonix  UA negative, No fevers  More lilely GI etiology  No fevers  Will f/u UCx  PT evaluation

## 2020-05-30 NOTE — H&P ADULT - PROBLEM SELECTOR PLAN 4
Patient takes Levemir 30mg at night and 10Units premeals  Blood glucose is high  Will start sliding scale and Lantus 20Units  Will increase as needed  Diabetic diet  f/u A1C

## 2020-05-30 NOTE — H&P ADULT - NSHPPHYSICALEXAM_GEN_ALL_CORE
ICU Vital Signs Last 24 Hrs  T(C): 36.7 (30 May 2020 13:44), Max: 37.1 (30 May 2020 11:21)  T(F): 98.1 (30 May 2020 13:44), Max: 98.8 (30 May 2020 11:21)  HR: 100 (30 May 2020 14:28) (94 - 114)  BP: 191/88 (30 May 2020 14:28) (157/81 - 191/88)  BP(mean): --  ABP: --  ABP(mean): --  RR: 19 (30 May 2020 13:44) (19 - 27)  SpO2: 94% (30 May 2020 13:44) (94% - 99%)

## 2020-05-30 NOTE — H&P ADULT - PROBLEM SELECTOR PLAN 7
H/o CAD and PCI in 2009,  patient has both AICD and pacemaker  EKG shows paced Rythm  No active issues  Will continue aspirin , Plavix

## 2020-05-30 NOTE — H&P ADULT - NSICDXPASTMEDICALHX_GEN_ALL_CORE_FT
PAST MEDICAL HISTORY:  CAD (coronary artery disease)     CHF (congestive heart failure)     DM (diabetes mellitus)     HTN (hypertension)     PVD (peripheral vascular disease)

## 2020-05-30 NOTE — CONSULT NOTE ADULT - SUBJECTIVE AND OBJECTIVE BOX
Patient is a 74y old  Female who presents with a chief complaint of Intractable nausea and vomiting (30 May 2020 14:56)      HPI  Called see and eval 74y.o. Female w/PMH significant for as below for bladder irregularity on CT. Pt admitted for gastritis secondary to abx usage. Pt was started on abx outpt after getting wound infection s/p L hallux toe amp 2020. Pt was complaining on abd pain associated with nausea and vomiting. CT abd/pelvis was performed which showed incidental finding of multiple bladder irregularity, suggesting TCC. Pt denies any current and history of urinary issues, such as dysuria, hematuria, kidney stones. Does admit to recent weight loss and NOELLE.     PAST MEDICAL & SURGICAL HISTORY:  PVD (peripheral vascular disease)  CAD (coronary artery disease)  CHF (congestive heart failure)  HTN (hypertension)  DM (diabetes mellitus)  Status post coronary artery stent placement  H/O: hysterectomy  Cardiac defibrillator in place  Artificial cardiac pacemaker  History of cholecystectomy      MEDICATIONS  (STANDING):  aspirin  chewable 81 milliGRAM(s) Oral daily  atorvastatin 40 milliGRAM(s) Oral at bedtime  clopidogrel Tablet 75 milliGRAM(s) Oral daily  dextrose 5%. 1000 milliLiter(s) (50 mL/Hr) IV Continuous <Continuous>  dextrose 50% Injectable 12.5 Gram(s) IV Push once  dextrose 50% Injectable 25 Gram(s) IV Push once  dextrose 50% Injectable 25 Gram(s) IV Push once  enoxaparin Injectable 40 milliGRAM(s) SubCutaneous daily  furosemide    Tablet 20 milliGRAM(s) Oral two times a day  hydrALAZINE 50 milliGRAM(s) Oral three times a day  insulin glargine Injectable (LANTUS) 20 Unit(s) SubCutaneous at bedtime  insulin lispro (HumaLOG) corrective regimen sliding scale   SubCutaneous three times a day before meals  levoFLOXacin IVPB      lisinopril 40 milliGRAM(s) Oral daily  loratadine 10 milliGRAM(s) Oral daily  metoprolol tartrate 50 milliGRAM(s) Oral two times a day  pantoprazole    Tablet 40 milliGRAM(s) Oral before breakfast    MEDICATIONS  (PRN):  aluminum hydroxide/magnesium hydroxide/simethicone Suspension 30 milliLiter(s) Oral every 4 hours PRN Dyspepsia  dextrose 40% Gel 15 Gram(s) Oral once PRN Blood Glucose LESS THAN 70 milliGRAM(s)/deciliter  glucagon  Injectable 1 milliGRAM(s) IntraMuscular once PRN Glucose LESS THAN 70 milligrams/deciliter  ondansetron Injectable 4 milliGRAM(s) IV Push three times a day PRN Nausea and/or Vomiting      Allergies    codeine (Rash)  penicillin (Rash)    Vital Signs Last 24 Hrs  T(C): 37.2 (30 May 2020 15:47), Max: 37.2 (30 May 2020 15:47)  T(F): 98.9 (30 May 2020 15:47), Max: 98.9 (30 May 2020 15:47)  HR: 94 (30 May 2020 15:47) (94 - 114)  BP: 159/78 (30 May 2020 15:47) (157/81 - 191/88)  BP(mean): --  RR: 18 (30 May 2020 15:47) (18 - 27)  SpO2: 94% (30 May 2020 15:47) (94% - 99%)    Physical:  Gen: A&Ox3. NAD  Abd: Soft ND, NT. No masses palpated. Well healed kocher and pfannenstiel scars.    LABS:                        11.5   12.20 )-----------( 381      ( 30 May 2020 07:50 )             35.9              05-30    136  |  101  |  30<H>  ----------------------------<  206<H>  4.7   |  28  |  1.11    Ca    9.8      30 May 2020 07:50    TPro  8.9<H>  /  Alb  3.0<L>  /  TBili  0.4  /  DBili  x   /  AST  47<H>  /  ALT  75<H>  /  AlkPhos  311<H>                Urinalysis Basic - ( 30 May 2020 11:45 )    Color: Yellow / Appearance: Clear / S.010 / pH: x  Gluc: x / Ketone: Trace  / Bili: Negative / Urobili: Negative   Blood: x / Protein: 100 / Nitrite: Negative   Leuk Esterase: Negative / RBC: 0-2 /HPF / WBC 0-2 /HPF   Sq Epi: x / Non Sq Epi: Few /HPF / Bacteria: Few /HPF    RADIOLOGY & ADDITIONAL STUDIES:  < from: CT Abdomen and Pelvis w/ IV Cont (20 @ 09:23) >  Both kidneys show normal morphology without urolithiasis or hydronephrosis.     THE URINARY BLADDER SHOWS MULTIPLE PERIPHERAL SOFT TISSUE NODULES/CONCERNING FOR TCC MALIGNANCY. FURTHER INVESTIGATION RECOMMENDED. NODULES WERE NOT PRESENT ON PRIOR CT SCAN 7/15/2018.    Status post hysterectomy.    There are no retroperitoneal masses or abnormal lymphadenopathy.  The retroperitoneal vascular structures are normal.     < end of copied text > Patient is a 74y old  Female who presents with a chief complaint of Intractable nausea and vomiting (30 May 2020 14:56)      HPI  Called to see and eval 74y.o. Female w/PMH significant for as below for bladder irregularity on CT. Pt admitted for gastritis secondary to abx usage. Pt was started on abx outpt after getting wound infection s/p L hallux toe amp 2020. Pt was complaining of abd pain associated with nausea and vomiting. CT abd/pelvis was performed which showed incidental finding of multiple bladder irregularities, suggesting TCC. Pt denies any current and history of urinary issues, such as dysuria, hematuria, kidney stones. Does admit to recent weight loss and NOELLE.     PAST MEDICAL & SURGICAL HISTORY:  PVD (peripheral vascular disease)  CAD (coronary artery disease)  CHF (congestive heart failure)  HTN (hypertension)  DM (diabetes mellitus)  Status post coronary artery stent placement  H/O: hysterectomy  Cardiac defibrillator in place  Artificial cardiac pacemaker  History of cholecystectomy    Family History: No family history of  malignancy  Social History: Does not smoke    ROS: All others negative except as noted above.      MEDICATIONS  (STANDING):  aspirin  chewable 81 milliGRAM(s) Oral daily  atorvastatin 40 milliGRAM(s) Oral at bedtime  clopidogrel Tablet 75 milliGRAM(s) Oral daily  dextrose 5%. 1000 milliLiter(s) (50 mL/Hr) IV Continuous <Continuous>  dextrose 50% Injectable 12.5 Gram(s) IV Push once  dextrose 50% Injectable 25 Gram(s) IV Push once  dextrose 50% Injectable 25 Gram(s) IV Push once  enoxaparin Injectable 40 milliGRAM(s) SubCutaneous daily  furosemide    Tablet 20 milliGRAM(s) Oral two times a day  hydrALAZINE 50 milliGRAM(s) Oral three times a day  insulin glargine Injectable (LANTUS) 20 Unit(s) SubCutaneous at bedtime  insulin lispro (HumaLOG) corrective regimen sliding scale   SubCutaneous three times a day before meals  levoFLOXacin IVPB      lisinopril 40 milliGRAM(s) Oral daily  loratadine 10 milliGRAM(s) Oral daily  metoprolol tartrate 50 milliGRAM(s) Oral two times a day  pantoprazole    Tablet 40 milliGRAM(s) Oral before breakfast    MEDICATIONS  (PRN):  aluminum hydroxide/magnesium hydroxide/simethicone Suspension 30 milliLiter(s) Oral every 4 hours PRN Dyspepsia  dextrose 40% Gel 15 Gram(s) Oral once PRN Blood Glucose LESS THAN 70 milliGRAM(s)/deciliter  glucagon  Injectable 1 milliGRAM(s) IntraMuscular once PRN Glucose LESS THAN 70 milligrams/deciliter  ondansetron Injectable 4 milliGRAM(s) IV Push three times a day PRN Nausea and/or Vomiting      Allergies    codeine (Rash)  penicillin (Rash)    Vital Signs Last 24 Hrs  T(C): 37.2 (30 May 2020 15:47), Max: 37.2 (30 May 2020 15:47)  T(F): 98.9 (30 May 2020 15:47), Max: 98.9 (30 May 2020 15:47)  HR: 94 (30 May 2020 15:47) (94 - 114)  BP: 159/78 (30 May 2020 15:47) (157/81 - 191/88)  BP(mean): --  RR: 18 (30 May 2020 15:47) (18 - 27)  SpO2: 94% (30 May 2020 15:47) (94% - 99%)    Physical:  Gen: A&Ox3. NAD  Abd: Soft ND, NT. No masses palpated. Well healed kocher and pfannenstiel scars.    LABS:                        11.5   12.20 )-----------( 381      ( 30 May 2020 07:50 )             35.9              05-30    136  |  101  |  30<H>  ----------------------------<  206<H>  4.7   |  28  |  1.11    Ca    9.8      30 May 2020 07:50    TPro  8.9<H>  /  Alb  3.0<L>  /  TBili  0.4  /  DBili  x   /  AST  47<H>  /  ALT  75<H>  /  AlkPhos  311<H>  05-30              Urinalysis Basic - ( 30 May 2020 11:45 )    Color: Yellow / Appearance: Clear / S.010 / pH: x  Gluc: x / Ketone: Trace  / Bili: Negative / Urobili: Negative   Blood: x / Protein: 100 / Nitrite: Negative   Leuk Esterase: Negative / RBC: 0-2 /HPF / WBC 0-2 /HPF   Sq Epi: x / Non Sq Epi: Few /HPF / Bacteria: Few /HPF    RADIOLOGY & ADDITIONAL STUDIES:  < from: CT Abdomen and Pelvis w/ IV Cont (20 @ 09:23) >  Both kidneys show normal morphology without urolithiasis or hydronephrosis.     THE URINARY BLADDER SHOWS MULTIPLE PERIPHERAL SOFT TISSUE NODULES/CONCERNING FOR TCC MALIGNANCY. FURTHER INVESTIGATION RECOMMENDED. NODULES WERE NOT PRESENT ON PRIOR CT SCAN 7/15/2018.    Status post hysterectomy.    There are no retroperitoneal masses or abnormal lymphadenopathy.  The retroperitoneal vascular structures are normal.     < end of copied text >

## 2020-05-30 NOTE — H&P ADULT - PROBLEM SELECTOR PLAN 9
IMPROVE VTE Individual Risk Assessment  RISK                                                                Points  [  ] Previous VTE                                                  3  [  ] Thrombophilia                                               2  [  ] Lower limb paralysis                                      2        (unable to hold up >15 seconds)    [  ] Current Cancer                                              2         (within 6 months)  [x  ] Immobilization > 24 hrs                                1  [  ] ICU/CCU stay > 24 hours                              1  [  x] Age > 60                                                      1    IMPROVE VTE Score 2  Lovenox 40mg SQ geoffrey;y

## 2020-05-30 NOTE — H&P ADULT - PROBLEM SELECTOR PLAN 8
H/o CHF but clinically patient is not in fluid overload  No peripheral edema  Patient takes furosemide 20mg daily twice  will follow BNP regardless H/o CHF but clinically patient is not in fluid overload  No peripheral edema  Patient takes furosemide 20mg daily twice  will follow BNP   f/u echo

## 2020-05-30 NOTE — H&P ADULT - PROBLEM SELECTOR PLAN 6
H/o PVD and popliteal stent  s/p left big toe amputation in May  recently antibiotics changed from clindamycin to Levaquin that led to epigastric pain.  Will continue IV antibiotics as WBC count is slightly elevated and patient has to complete antibiotic course yet  WBC count slightly elevated  wound is dry and clean  f/u outpatient with vascular surgery  PT evaluation

## 2020-05-30 NOTE — ED PROVIDER NOTE - CLINICAL SUMMARY MEDICAL DECISION MAKING FREE TEXT BOX
73yo F w hx of CAD/HTN/DM, s/p hysterectomy and cholecystectomy, in the ER for upper abdominal pains and vomiting since last night. States she had a L toe amputation beginning of May, was treated w Clindamycin, changed yesterday to Levaquin as oper Vasc Sx. States stx started 1 hr after taking Levaquin and Oxycodone. Exam w mild TTP to upper abdomen, mild mucosae, min erythema around base of L toe, packing in place. Will get CT abdomen, GI cocktail, labs, reevaluate. If w/u neg, likely DC

## 2020-05-30 NOTE — H&P ADULT - PROBLEM SELECTOR PLAN 3
Pt takes Lisinopril 40mg, toprol Xl 100mg, hydralazine 50mg TID   BP is running on higher side as patient missed her medications and also received a bolus in ED  Will start home medications with parameters   f/u lipid profile, TG  Dash diet

## 2020-05-30 NOTE — H&P ADULT - PROBLEM SELECTOR PLAN 1
- patient comes with acute onset of nausea, epigastric pain and vomiting x 2 days  - Non radiating, 7/10 ,burning and pressure like pain  - She has been taking antibiotics for almost three weeks  - Symptoms aggravate with food and partially relieved with omeprazole  - patient has also history of diabetes  - DDx include acute pill induced esophagitis & gastritis or gastropathy(uncontrolled DM)  - s/p Famotidine IV, zofran and Malox in Ed  - pt also received 1 bolus in ED  - Will start PPI, zofran Q4 PRN , can also add reglan as it increase motility (in case of gastroparesis) and helps with nausea  - Avoid NSAIDs and spicy food

## 2020-05-30 NOTE — ED PROVIDER NOTE - CARE PLAN
Principal Discharge DX:	Intractable vomiting, presence of nausea not specified, unspecified vomiting type  Secondary Diagnosis:	Intractable abdominal pain

## 2020-05-30 NOTE — H&P ADULT - HISTORY OF PRESENT ILLNESS
73 YO female from home, H/O CAD/DM/HTN/PVD/hysterectomy/cholecystectomy ,with AICD comes to ED with intractable nausea and vomiting for 2 days. Patient states that she recently had left big toe amputation in the beginning of May and she was taking antibiotics. On Friday, her antibiotics were changed from clindamycin to Levaquin and after she took antibiotics along with percocet, she started having severe pain in epigastric region. Patient reports that it was sudden in onset, 7/10, burning & pressure like pain, non-radiating , associated with nausea,, bitter taste in mouth and projectile vomiting. She was not able to tolerate food and it aggravated her symptoms. Patient also reports significant weight( not sure how much) recently and decrease in appetite.   patient denies fever, cough, SOB, constipation, dysuria, headache, chest pain, orthopnea, back pain, burning sensation in extremities.    ED course; Patient is in mild distress due to nausea but otherwise stable  Vitals:     /88  SpO2 99% on RA    CT abdomen/pelvis:   NEW MULTI BLADDER WALL FOCAL AREAS OF SOFT TISSUE THICKENING CONCERNING FOR BLADDER CARCINOMA/TRANSITIONAL CELL CARCINOMA. No hydronephrosis.  Stomach not fully distended and gastric pathology cannot be excluded.  Status post cholecystectomy and hysterectomy.  Remaining abdominal pelvic viscera unremarkable.   No evidence of colitis..

## 2020-05-30 NOTE — H&P ADULT - NSHPSOCIALHISTORY_GEN_ALL_CORE
Used to smoke 1 cigarette a day 36 years ago  No Alcohol or drugs  Retired and lives with family, No sick contacts

## 2020-05-30 NOTE — H&P ADULT - NSHPREVIEWOFSYSTEMS_GEN_ALL_CORE
.  CONSTITUTIONAL: No weakness, fevers or chills  EYES/ENT: No visual changes;  No vertigo or throat pain   NECK: No pain or stiffness  RESPIRATORY: No cough, wheezing, hemoptysis; No shortness of breath  CARDIOVASCULAR: No chest pain or palpitations  GASTROINTESTINAL: epigastric pain. nausea & vomiting, No hematemesis; No diarrhea or constipation. No melena or hematochezia.  GENITOURINARY: No dysuria, frequency or hematuria  NEUROLOGICAL: No numbness or weakness  SKIN: No itching, burning, rashes, or lesions   All other review of systems is negative unless indicated above.

## 2020-05-30 NOTE — ED PROVIDER NOTE - PHYSICAL EXAMINATION
Well appearing, in midl distress from pain/nausea  Moist mucosae  Pink conjunctivae  Lungs clear  Cardiac w RRR, no JVD  Abdomen soft/mild TTP to epigastric area and LUQ  No CVAT  No pedal edema, no calf TTP  AAOx3, no gross neuro deficits

## 2020-05-30 NOTE — ED PROVIDER NOTE - PROGRESS NOTE DETAILS
Pt still symptomatic, mild tachy, nauseous. CT w poss bladder CA, will admit for symptomatic treatment and further w/u Pt reexamined at bedside, still symptomatic, nauseous, mild abdom pains. CT w thickening of the urinary bladder wall, possible malignancy. Will admit her for intractable pain and nausea, possible further w/u r/o bladder CA. Spoke w Dr Robertson/MAR, agree w plan

## 2020-05-30 NOTE — H&P ADULT - NSICDXPASTSURGICALHX_GEN_ALL_CORE_FT
PAST SURGICAL HISTORY:  Artificial cardiac pacemaker     Cardiac defibrillator in place     H/O: hysterectomy     History of cholecystectomy     Status post coronary artery stent placement

## 2020-05-30 NOTE — ED ADULT NURSE NOTE - OBJECTIVE STATEMENT
presents with c/o generalized abd pain nausea and vomiting sense this morning no fever/chills noted. Pt noted with 1st L toe amputation dry ulcer to wound bed. presents with c/o generalized abd pain nausea and vomiting sense this morning no fever/chills noted. Pt noted with 1st L toe amputation  sutures and necrotic crust  wound bed.

## 2020-05-30 NOTE — ED PROVIDER NOTE - OBJECTIVE STATEMENT
75yo F w hx of CAD/HTN/DM, s/p hysterectomy and cholecystectomy, in the ER for upper abdominal pains and vomiting since last night. States she had a L toe amputation beginning of May, was treated w Clindamycin, changed yesterday to Levaquin as per Vasc Sx. States stx started 1 hr after taking Levaquin and Oxycodone. Denies fevers/chills, any other stx.

## 2020-05-31 LAB
24R-OH-CALCIDIOL SERPL-MCNC: 20.6 NG/ML — LOW (ref 30–80)
A1C WITH ESTIMATED AVERAGE GLUCOSE RESULT: 8.1 % — HIGH (ref 4–5.6)
ALBUMIN SERPL ELPH-MCNC: 3 G/DL — LOW (ref 3.5–5)
ALP SERPL-CCNC: 294 U/L — HIGH (ref 40–120)
ALT FLD-CCNC: 114 U/L DA — HIGH (ref 10–60)
ANION GAP SERPL CALC-SCNC: 9 MMOL/L — SIGNIFICANT CHANGE UP (ref 5–17)
AST SERPL-CCNC: 64 U/L — HIGH (ref 10–40)
BASOPHILS # BLD AUTO: 0.05 K/UL — SIGNIFICANT CHANGE UP (ref 0–0.2)
BASOPHILS NFR BLD AUTO: 0.3 % — SIGNIFICANT CHANGE UP (ref 0–2)
BILIRUB SERPL-MCNC: 0.4 MG/DL — SIGNIFICANT CHANGE UP (ref 0.2–1.2)
BUN SERPL-MCNC: 27 MG/DL — HIGH (ref 7–18)
CALCIUM SERPL-MCNC: 9.6 MG/DL — SIGNIFICANT CHANGE UP (ref 8.4–10.5)
CHLORIDE SERPL-SCNC: 101 MMOL/L — SIGNIFICANT CHANGE UP (ref 96–108)
CHOLEST SERPL-MCNC: 169 MG/DL — SIGNIFICANT CHANGE UP (ref 10–199)
CO2 SERPL-SCNC: 27 MMOL/L — SIGNIFICANT CHANGE UP (ref 22–31)
CREAT SERPL-MCNC: 1.3 MG/DL — SIGNIFICANT CHANGE UP (ref 0.5–1.3)
CULTURE RESULTS: SIGNIFICANT CHANGE UP
EOSINOPHIL # BLD AUTO: 0.01 K/UL — SIGNIFICANT CHANGE UP (ref 0–0.5)
EOSINOPHIL NFR BLD AUTO: 0.1 % — SIGNIFICANT CHANGE UP (ref 0–6)
ESTIMATED AVERAGE GLUCOSE: 186 MG/DL — HIGH (ref 68–114)
FOLATE SERPL-MCNC: 12.9 NG/ML — SIGNIFICANT CHANGE UP
GLUCOSE BLDC GLUCOMTR-MCNC: 143 MG/DL — HIGH (ref 70–99)
GLUCOSE BLDC GLUCOMTR-MCNC: 157 MG/DL — HIGH (ref 70–99)
GLUCOSE BLDC GLUCOMTR-MCNC: 167 MG/DL — HIGH (ref 70–99)
GLUCOSE BLDC GLUCOMTR-MCNC: 167 MG/DL — HIGH (ref 70–99)
GLUCOSE SERPL-MCNC: 142 MG/DL — HIGH (ref 70–99)
HCT VFR BLD CALC: 33.2 % — LOW (ref 34.5–45)
HCV AB S/CO SERPL IA: 0.17 S/CO — SIGNIFICANT CHANGE UP (ref 0–0.99)
HCV AB SERPL-IMP: SIGNIFICANT CHANGE UP
HDLC SERPL-MCNC: 59 MG/DL — SIGNIFICANT CHANGE UP
HGB BLD-MCNC: 10.6 G/DL — LOW (ref 11.5–15.5)
IMM GRANULOCYTES NFR BLD AUTO: 0.7 % — SIGNIFICANT CHANGE UP (ref 0–1.5)
LIPID PNL WITH DIRECT LDL SERPL: 93 MG/DL — SIGNIFICANT CHANGE UP
LYMPHOCYTES # BLD AUTO: 1.97 K/UL — SIGNIFICANT CHANGE UP (ref 1–3.3)
LYMPHOCYTES # BLD AUTO: 13 % — SIGNIFICANT CHANGE UP (ref 13–44)
MAGNESIUM SERPL-MCNC: 2.3 MG/DL — SIGNIFICANT CHANGE UP (ref 1.6–2.6)
MCHC RBC-ENTMCNC: 29.2 PG — SIGNIFICANT CHANGE UP (ref 27–34)
MCHC RBC-ENTMCNC: 31.9 GM/DL — LOW (ref 32–36)
MCV RBC AUTO: 91.5 FL — SIGNIFICANT CHANGE UP (ref 80–100)
MONOCYTES # BLD AUTO: 1.14 K/UL — HIGH (ref 0–0.9)
MONOCYTES NFR BLD AUTO: 7.5 % — SIGNIFICANT CHANGE UP (ref 2–14)
NEUTROPHILS # BLD AUTO: 11.86 K/UL — HIGH (ref 1.8–7.4)
NEUTROPHILS NFR BLD AUTO: 78.4 % — HIGH (ref 43–77)
NRBC # BLD: 0 /100 WBCS — SIGNIFICANT CHANGE UP (ref 0–0)
NT-PROBNP SERPL-SCNC: 9383 PG/ML — HIGH (ref 0–125)
PHOSPHATE SERPL-MCNC: 2.6 MG/DL — SIGNIFICANT CHANGE UP (ref 2.5–4.5)
PLATELET # BLD AUTO: 367 K/UL — SIGNIFICANT CHANGE UP (ref 150–400)
POTASSIUM SERPL-MCNC: 4.6 MMOL/L — SIGNIFICANT CHANGE UP (ref 3.5–5.3)
POTASSIUM SERPL-SCNC: 4.6 MMOL/L — SIGNIFICANT CHANGE UP (ref 3.5–5.3)
PROT SERPL-MCNC: 7.8 G/DL — SIGNIFICANT CHANGE UP (ref 6–8.3)
RBC # BLD: 3.63 M/UL — LOW (ref 3.8–5.2)
RBC # FLD: 14.2 % — SIGNIFICANT CHANGE UP (ref 10.3–14.5)
SODIUM SERPL-SCNC: 137 MMOL/L — SIGNIFICANT CHANGE UP (ref 135–145)
SPECIMEN SOURCE: SIGNIFICANT CHANGE UP
TOTAL CHOLESTEROL/HDL RATIO MEASUREMENT: 2.9 RATIO — LOW (ref 3.3–7.1)
TRIGL SERPL-MCNC: 87 MG/DL — SIGNIFICANT CHANGE UP (ref 10–149)
VIT B12 SERPL-MCNC: 533 PG/ML — SIGNIFICANT CHANGE UP (ref 232–1245)
WBC # BLD: 15.14 K/UL — HIGH (ref 3.8–10.5)
WBC # FLD AUTO: 15.14 K/UL — HIGH (ref 3.8–10.5)

## 2020-05-31 RX ORDER — METOCLOPRAMIDE HCL 10 MG
5 TABLET ORAL EVERY 6 HOURS
Refills: 0 | Status: DISCONTINUED | OUTPATIENT
Start: 2020-05-31 | End: 2020-06-01

## 2020-05-31 RX ORDER — METOPROLOL TARTRATE 50 MG
5 TABLET ORAL EVERY 6 HOURS
Refills: 0 | Status: DISCONTINUED | OUTPATIENT
Start: 2020-05-31 | End: 2020-06-01

## 2020-05-31 RX ORDER — PANTOPRAZOLE SODIUM 20 MG/1
40 TABLET, DELAYED RELEASE ORAL
Refills: 0 | Status: DISCONTINUED | OUTPATIENT
Start: 2020-05-31 | End: 2020-06-01

## 2020-05-31 RX ORDER — FUROSEMIDE 40 MG
20 TABLET ORAL
Refills: 0 | Status: DISCONTINUED | OUTPATIENT
Start: 2020-05-31 | End: 2020-06-01

## 2020-05-31 RX ORDER — HYDROMORPHONE HYDROCHLORIDE 2 MG/ML
0.5 INJECTION INTRAMUSCULAR; INTRAVENOUS; SUBCUTANEOUS ONCE
Refills: 0 | Status: DISCONTINUED | OUTPATIENT
Start: 2020-05-31 | End: 2020-05-31

## 2020-05-31 RX ADMIN — ONDANSETRON 4 MILLIGRAM(S): 8 TABLET, FILM COATED ORAL at 06:45

## 2020-05-31 RX ADMIN — Medication 50 MILLIGRAM(S): at 04:45

## 2020-05-31 RX ADMIN — ONDANSETRON 4 MILLIGRAM(S): 8 TABLET, FILM COATED ORAL at 09:54

## 2020-05-31 RX ADMIN — PANTOPRAZOLE SODIUM 40 MILLIGRAM(S): 20 TABLET, DELAYED RELEASE ORAL at 05:56

## 2020-05-31 RX ADMIN — Medication 20 MILLIGRAM(S): at 05:56

## 2020-05-31 RX ADMIN — Medication 30 MILLILITER(S): at 09:55

## 2020-05-31 RX ADMIN — Medication 5 MILLIGRAM(S): at 22:50

## 2020-05-31 RX ADMIN — Medication 30 MILLILITER(S): at 23:44

## 2020-05-31 RX ADMIN — Medication 5 MILLIGRAM(S): at 18:24

## 2020-05-31 RX ADMIN — LISINOPRIL 40 MILLIGRAM(S): 2.5 TABLET ORAL at 05:56

## 2020-05-31 RX ADMIN — Medication 1: at 12:04

## 2020-05-31 RX ADMIN — INSULIN GLARGINE 20 UNIT(S): 100 INJECTION, SOLUTION SUBCUTANEOUS at 22:29

## 2020-05-31 RX ADMIN — Medication 20 MILLIGRAM(S): at 18:04

## 2020-05-31 RX ADMIN — PANTOPRAZOLE SODIUM 40 MILLIGRAM(S): 20 TABLET, DELAYED RELEASE ORAL at 17:30

## 2020-05-31 RX ADMIN — Medication 5 MILLIGRAM(S): at 13:26

## 2020-05-31 RX ADMIN — HYDROMORPHONE HYDROCHLORIDE 0.5 MILLIGRAM(S): 2 INJECTION INTRAMUSCULAR; INTRAVENOUS; SUBCUTANEOUS at 09:52

## 2020-05-31 RX ADMIN — Medication 1: at 17:30

## 2020-05-31 NOTE — PROGRESS NOTE ADULT - SUBJECTIVE AND OBJECTIVE BOX
INTERVAL HPI/OVERNIGHT EVENTS:  Patient  seen at bedside,events noticed for overnight  VITAL SIGNS:  T(F): 99.2 (20 @ 05:01)  HR: 87 (20 @ 10:25)  BP: 139/83 (20 @ 10:25)  RR: 18 (20 @ 05:01)  SpO2: 92% (20 @ 10:25)  Wt(kg): --    PHYSICAL EXAM:  awake,no acute issues  Constitutional:  Eyes:  ENMT:perrla  Neck:  Respiratory:clear  Cardiovascular:s1 s2,m-none  Gastrointestinal:soft,bs pos  Extremities:mild edema,varicosity  Vascular:  Neurological:no focal deficit  Musculoskeletal:    MEDICATIONS  (STANDING):  aspirin  chewable 81 milliGRAM(s) Oral daily  atorvastatin 40 milliGRAM(s) Oral at bedtime  clopidogrel Tablet 75 milliGRAM(s) Oral daily  enoxaparin Injectable 40 milliGRAM(s) SubCutaneous daily  furosemide    Tablet 20 milliGRAM(s) Oral two times a day  hydrALAZINE 50 milliGRAM(s) Oral three times a day  insulin glargine Injectable (LANTUS) 20 Unit(s) SubCutaneous at bedtime  insulin lispro (HumaLOG) corrective regimen sliding scale   SubCutaneous three times a day before meals  lactated ringers. 1000 milliLiter(s) (55 mL/Hr) IV Continuous <Continuous>  levoFLOXacin IVPB 500 milliGRAM(s) IV Intermittent every 24 hours  levoFLOXacin IVPB      lisinopril 40 milliGRAM(s) Oral daily  loratadine 10 milliGRAM(s) Oral daily  metoprolol tartrate 50 milliGRAM(s) Oral two times a day  pantoprazole  Injectable 40 milliGRAM(s) IV Push two times a day    MEDICATIONS  (PRN):  aluminum hydroxide/magnesium hydroxide/simethicone Suspension 30 milliLiter(s) Oral every 4 hours PRN Dyspepsia  metoclopramide Injectable 5 milliGRAM(s) IV Push every 6 hours PRN nausea  ondansetron Injectable 4 milliGRAM(s) IV Push three times a day PRN Nausea and/or Vomiting      Allergies    codeine (Rash)  penicillin (Rash)    Intolerances        LABS:                        10.6   15.14 )-----------( 367      ( 31 May 2020 07:02 )             33.2         137  |  101  |  27<H>  ----------------------------<  142<H>  4.6   |  27  |  1.30    Ca    9.6      31 May 2020 07:02  Phos  2.6       Mg     2.3         TPro  7.8  /  Alb  3.0<L>  /  TBili  0.4  /  DBili  x   /  AST  64<H>  /  ALT  114<H>  /  AlkPhos  294<H>        Urinalysis Basic - ( 30 May 2020 11:45 )    Color: Yellow / Appearance: Clear / S.010 / pH: x  Gluc: x / Ketone: Trace  / Bili: Negative / Urobili: Negative   Blood: x / Protein: 100 / Nitrite: Negative   Leuk Esterase: Negative / RBC: 0-2 /HPF / WBC 0-2 /HPF   Sq Epi: x / Non Sq Epi: Few /HPF / Bacteria: Few /HPF       Assessment:  · Assessment		  75 YO female from home, H/O CAD/DM/HTN/PVD/hysterectomy/cholecystectomy ,with AICD has come to ED with intractable nausea and vomiting for 2 days, likely due to pill induced esophagitis/gastritis. Patient reports that it was sudden in onset, 7/10, burning & pressure like pain, non-radiating , associated with nausea and projectile vomiting. Patient also reports significant weight( not sure how much) recently and decrease in appetite. CT abdomen/pelvis shows bladder wall thickening and pt has history of Stage1 endometrial cancer,   Patient also has high blood pressure because she missed her medications  Admitting patient for management of  Acute Gastritis  Intractable abdominal pain  hypertension  weight loss/ suspected malignancy workup       Problem/Plan - 1:  ·  Problem: Gastritis, acute.  Plan: - patient comes with acute onset of nausea, epigastric pain and vomiting x 2 day  - DDx include acute pill induced esophagitis & gastritis or gastropathy(uncontrolled DM)  - s/p Famotidine IV, zofran  - Will start PPI, zofran Q4 PRN , can also add reglan as it increase motility (in case of gastroparesis) and helps with nausea  - Avoid NSAIDs and spicy food.      Problem/Plan - 2:  ·  Problem: Intractable abdominal pain.  Plan: patient endorses moderate to severe abdominal pain  partially relieved with oral Protonix  More lilely GI etiology  No fevers  Will f/u UCx  PT evaluation.      Problem/Plan - 3:  ·  Problem: HTN (hypertension).  Plan: Pt takes Lisinopril 40mg, toprol Xl 100mg, hydralazine 50mg TID   BP is running on higher side as patient missed her medications and also received a bolus in ED  Will start home medications with parameters   f/u lipid profile, TG  Dash diet.      Problem/Plan - 4:  ·  Problem: DM (diabetes mellitus).  Plan: Patient takes Levemir 30mg at night and 10Units premeals  Blood glucose is high  Will start sliding scale and Lantus 20Units  Will increase as needed  Diabetic diet  f/u A1C.      Problem/Plan - 5:  ·  Problem: Bladder wall thickening.  Plan: CT abdomen shows bladder wall thickening  reports weight loss and loss of appetite recently, Patient has history of Stage 1 endometrial cancer and hysterectomy  brother also had cancer and she is not sure about which one  patient will benefit from cystoscopy  will consult urology for their recommendations.      Problem/Plan - 6:  Problem: PVD (peripheral vascular disease). Plan: H/o PVD and popliteal stent  s/p left big toe amputation in May  recently antibiotics changed from clindamycin to Levaquin that led to epigastric pain.  Will continue IV antibiotics as WBC count is slightly elevated and patient has to complete antibiotic course yet  WBC count slightly elevated  wound is dry and clean  f/u outpatient with vascular surgery  PT evaluation.     Problem/Plan - 7:  ·  Problem: CAD (coronary artery disease).  Plan: H/o CAD and PCI in ,  patient has both AICD and pacemaker  EKG shows paced Rythm  No active issues  Will continue aspirin , Plavix.      Problem/Plan - 8:  ·  Problem: CHF (congestive heart failure).  Plan: H/o CHF but clinically patient is not in fluid overload  No peripheral edema  Patient takes furosemide 20mg daily twice  will follow BNP   f/u echo.     Problem/Plan - 9:  ·  Problem: Prophylactic measure.  Plan: IMPROVE VTE Individual Risk Assessment  RISK                                                                Points  [  ] Previous VTE                                                  3  [  ] Thrombophilia                                               2  [  ] Lower limb paralysis                                      2        (unable to hold up >15 seconds)    [  ] Current Cancer                                              2         (within 6 months)  [x  ] Immobilization > 24 hrs                                1  [  ] ICU/CCU stay > 24 hours                              1  [  x] Age > 60                                                      1    IMPROVE VTE Score 2  Lovenox 40mg SQ geoffrey;y.

## 2020-06-01 ENCOUNTER — TRANSCRIPTION ENCOUNTER (OUTPATIENT)
Age: 75
End: 2020-06-01

## 2020-06-01 ENCOUNTER — RESULT REVIEW (OUTPATIENT)
Age: 75
End: 2020-06-01

## 2020-06-01 LAB
ALBUMIN SERPL ELPH-MCNC: 2.6 G/DL — LOW (ref 3.5–5)
ALP SERPL-CCNC: 326 U/L — HIGH (ref 40–120)
ALT FLD-CCNC: 129 U/L DA — HIGH (ref 10–60)
ANION GAP SERPL CALC-SCNC: 10 MMOL/L — SIGNIFICANT CHANGE UP (ref 5–17)
AST SERPL-CCNC: 57 U/L — HIGH (ref 10–40)
BASOPHILS # BLD AUTO: 0.02 K/UL — SIGNIFICANT CHANGE UP (ref 0–0.2)
BASOPHILS NFR BLD AUTO: 0.1 % — SIGNIFICANT CHANGE UP (ref 0–2)
BILIRUB SERPL-MCNC: 0.6 MG/DL — SIGNIFICANT CHANGE UP (ref 0.2–1.2)
BUN SERPL-MCNC: 28 MG/DL — HIGH (ref 7–18)
CALCIUM SERPL-MCNC: 9.3 MG/DL — SIGNIFICANT CHANGE UP (ref 8.4–10.5)
CHLORIDE SERPL-SCNC: 101 MMOL/L — SIGNIFICANT CHANGE UP (ref 96–108)
CO2 SERPL-SCNC: 26 MMOL/L — SIGNIFICANT CHANGE UP (ref 22–31)
CREAT SERPL-MCNC: 1.18 MG/DL — SIGNIFICANT CHANGE UP (ref 0.5–1.3)
EOSINOPHIL # BLD AUTO: 0 K/UL — SIGNIFICANT CHANGE UP (ref 0–0.5)
EOSINOPHIL NFR BLD AUTO: 0 % — SIGNIFICANT CHANGE UP (ref 0–6)
GLUCOSE BLDC GLUCOMTR-MCNC: 139 MG/DL — HIGH (ref 70–99)
GLUCOSE BLDC GLUCOMTR-MCNC: 179 MG/DL — HIGH (ref 70–99)
GLUCOSE BLDC GLUCOMTR-MCNC: 191 MG/DL — HIGH (ref 70–99)
GLUCOSE BLDC GLUCOMTR-MCNC: 242 MG/DL — HIGH (ref 70–99)
GLUCOSE SERPL-MCNC: 146 MG/DL — HIGH (ref 70–99)
HCT VFR BLD CALC: 32.9 % — LOW (ref 34.5–45)
HGB BLD-MCNC: 10.5 G/DL — LOW (ref 11.5–15.5)
IMM GRANULOCYTES NFR BLD AUTO: 0.8 % — SIGNIFICANT CHANGE UP (ref 0–1.5)
LYMPHOCYTES # BLD AUTO: 13.8 % — SIGNIFICANT CHANGE UP (ref 13–44)
LYMPHOCYTES # BLD AUTO: 2.15 K/UL — SIGNIFICANT CHANGE UP (ref 1–3.3)
MCHC RBC-ENTMCNC: 29 PG — SIGNIFICANT CHANGE UP (ref 27–34)
MCHC RBC-ENTMCNC: 31.9 GM/DL — LOW (ref 32–36)
MCV RBC AUTO: 90.9 FL — SIGNIFICANT CHANGE UP (ref 80–100)
MONOCYTES # BLD AUTO: 1.24 K/UL — HIGH (ref 0–0.9)
MONOCYTES NFR BLD AUTO: 8 % — SIGNIFICANT CHANGE UP (ref 2–14)
NEUTROPHILS # BLD AUTO: 12.01 K/UL — HIGH (ref 1.8–7.4)
NEUTROPHILS NFR BLD AUTO: 77.3 % — HIGH (ref 43–77)
NRBC # BLD: 0 /100 WBCS — SIGNIFICANT CHANGE UP (ref 0–0)
PLATELET # BLD AUTO: 376 K/UL — SIGNIFICANT CHANGE UP (ref 150–400)
POTASSIUM SERPL-MCNC: 3.8 MMOL/L — SIGNIFICANT CHANGE UP (ref 3.5–5.3)
POTASSIUM SERPL-SCNC: 3.8 MMOL/L — SIGNIFICANT CHANGE UP (ref 3.5–5.3)
PROT SERPL-MCNC: 8.2 G/DL — SIGNIFICANT CHANGE UP (ref 6–8.3)
RBC # BLD: 3.62 M/UL — LOW (ref 3.8–5.2)
RBC # FLD: 14.2 % — SIGNIFICANT CHANGE UP (ref 10.3–14.5)
SODIUM SERPL-SCNC: 137 MMOL/L — SIGNIFICANT CHANGE UP (ref 135–145)
WBC # BLD: 15.54 K/UL — HIGH (ref 3.8–10.5)
WBC # FLD AUTO: 15.54 K/UL — HIGH (ref 3.8–10.5)

## 2020-06-01 PROCEDURE — 99223 1ST HOSP IP/OBS HIGH 75: CPT

## 2020-06-01 PROCEDURE — 74177 CT ABD & PELVIS W/CONTRAST: CPT | Mod: 26

## 2020-06-01 PROCEDURE — 99222 1ST HOSP IP/OBS MODERATE 55: CPT

## 2020-06-01 PROCEDURE — 88112 CYTOPATH CELL ENHANCE TECH: CPT | Mod: 26

## 2020-06-01 RX ORDER — PANTOPRAZOLE SODIUM 20 MG/1
40 TABLET, DELAYED RELEASE ORAL
Refills: 0 | Status: DISCONTINUED | OUTPATIENT
Start: 2020-06-01 | End: 2020-06-01

## 2020-06-01 RX ORDER — METOCLOPRAMIDE HCL 10 MG
10 TABLET ORAL EVERY 8 HOURS
Refills: 0 | Status: COMPLETED | OUTPATIENT
Start: 2020-06-01 | End: 2020-06-02

## 2020-06-01 RX ORDER — LANOLIN ALCOHOL/MO/W.PET/CERES
5 CREAM (GRAM) TOPICAL AT BEDTIME
Refills: 0 | Status: DISCONTINUED | OUTPATIENT
Start: 2020-06-01 | End: 2020-06-03

## 2020-06-01 RX ORDER — HYDRALAZINE HCL 50 MG
75 TABLET ORAL THREE TIMES A DAY
Refills: 0 | Status: DISCONTINUED | OUTPATIENT
Start: 2020-06-01 | End: 2020-06-02

## 2020-06-01 RX ORDER — PANTOPRAZOLE SODIUM 20 MG/1
40 TABLET, DELAYED RELEASE ORAL DAILY
Refills: 0 | Status: DISCONTINUED | OUTPATIENT
Start: 2020-06-01 | End: 2020-06-09

## 2020-06-01 RX ORDER — METOCLOPRAMIDE HCL 10 MG
10 TABLET ORAL EVERY 8 HOURS
Refills: 0 | Status: DISCONTINUED | OUTPATIENT
Start: 2020-06-01 | End: 2020-06-01

## 2020-06-01 RX ORDER — CEFEPIME 1 G/1
2000 INJECTION, POWDER, FOR SOLUTION INTRAMUSCULAR; INTRAVENOUS EVERY 12 HOURS
Refills: 0 | Status: DISCONTINUED | OUTPATIENT
Start: 2020-06-02 | End: 2020-06-04

## 2020-06-01 RX ORDER — CEFEPIME 1 G/1
2000 INJECTION, POWDER, FOR SOLUTION INTRAMUSCULAR; INTRAVENOUS ONCE
Refills: 0 | Status: COMPLETED | OUTPATIENT
Start: 2020-06-01 | End: 2020-06-01

## 2020-06-01 RX ORDER — SODIUM CHLORIDE 9 MG/ML
1000 INJECTION, SOLUTION INTRAVENOUS
Refills: 0 | Status: DISCONTINUED | OUTPATIENT
Start: 2020-06-01 | End: 2020-06-02

## 2020-06-01 RX ORDER — CHOLECALCIFEROL (VITAMIN D3) 125 MCG
2000 CAPSULE ORAL DAILY
Refills: 0 | Status: DISCONTINUED | OUTPATIENT
Start: 2020-06-01 | End: 2020-06-03

## 2020-06-01 RX ORDER — CEFEPIME 1 G/1
INJECTION, POWDER, FOR SOLUTION INTRAMUSCULAR; INTRAVENOUS
Refills: 0 | Status: DISCONTINUED | OUTPATIENT
Start: 2020-06-01 | End: 2020-06-04

## 2020-06-01 RX ORDER — METOPROLOL TARTRATE 50 MG
50 TABLET ORAL
Refills: 0 | Status: DISCONTINUED | OUTPATIENT
Start: 2020-06-01 | End: 2020-06-01

## 2020-06-01 RX ORDER — METOPROLOL TARTRATE 50 MG
5 TABLET ORAL EVERY 6 HOURS
Refills: 0 | Status: DISCONTINUED | OUTPATIENT
Start: 2020-06-01 | End: 2020-06-02

## 2020-06-01 RX ORDER — METOPROLOL TARTRATE 50 MG
25 TABLET ORAL
Refills: 0 | Status: DISCONTINUED | OUTPATIENT
Start: 2020-06-01 | End: 2020-06-01

## 2020-06-01 RX ADMIN — Medication 50 MILLIGRAM(S): at 05:47

## 2020-06-01 RX ADMIN — PANTOPRAZOLE SODIUM 40 MILLIGRAM(S): 20 TABLET, DELAYED RELEASE ORAL at 05:48

## 2020-06-01 RX ADMIN — Medication 30 MILLILITER(S): at 06:47

## 2020-06-01 RX ADMIN — Medication 10 MILLIGRAM(S): at 20:01

## 2020-06-01 RX ADMIN — Medication 110 MILLIGRAM(S): at 18:31

## 2020-06-01 RX ADMIN — INSULIN GLARGINE 20 UNIT(S): 100 INJECTION, SOLUTION SUBCUTANEOUS at 21:40

## 2020-06-01 RX ADMIN — Medication 10 MILLIGRAM(S): at 10:56

## 2020-06-01 RX ADMIN — Medication 75 MILLIGRAM(S): at 15:02

## 2020-06-01 RX ADMIN — Medication 20 MILLIGRAM(S): at 05:47

## 2020-06-01 RX ADMIN — Medication 5 MILLIGRAM(S): at 11:43

## 2020-06-01 RX ADMIN — Medication 30 MILLILITER(S): at 20:01

## 2020-06-01 RX ADMIN — PANTOPRAZOLE SODIUM 40 MILLIGRAM(S): 20 TABLET, DELAYED RELEASE ORAL at 11:41

## 2020-06-01 RX ADMIN — Medication 10 MILLIGRAM(S): at 17:22

## 2020-06-01 RX ADMIN — CLOPIDOGREL BISULFATE 75 MILLIGRAM(S): 75 TABLET, FILM COATED ORAL at 11:42

## 2020-06-01 RX ADMIN — Medication 5 MILLIGRAM(S): at 17:23

## 2020-06-01 RX ADMIN — LISINOPRIL 40 MILLIGRAM(S): 2.5 TABLET ORAL at 05:47

## 2020-06-01 RX ADMIN — CEFEPIME 25 MILLIGRAM(S): 1 INJECTION, POWDER, FOR SOLUTION INTRAMUSCULAR; INTRAVENOUS at 17:22

## 2020-06-01 RX ADMIN — Medication 1: at 17:23

## 2020-06-01 RX ADMIN — Medication 5 MILLIGRAM(S): at 05:48

## 2020-06-01 NOTE — PROGRESS NOTE ADULT - ASSESSMENT
73 YO female from home, H/O CAD/DM/HTN/PVD/hysterectomy/cholecystectomy ,with AICD has come to ED with intractable nausea and vomiting for 2 days, likely due to pill induced esophagitis/gastritis. Patient reports that it was sudden in onset, 7/10, burning & pressure like pain, non-radiating , associated with nausea and projectile vomiting. Patient also reports significant weight( not sure how much) recently and decrease in appetite. CT abdomen/pelvis shows bladder wall thickening and pt has history of Stage1 endometrial cancer,   Patient also has high blood pressure because she missed her medications  Admitting patient for management of  Acute Gastritis  Intractable abdominal pain  hypertension  weight loss/ suspected malignancy workup

## 2020-06-01 NOTE — PROGRESS NOTE ADULT - PROBLEM SELECTOR PLAN 5
Patient takes Levemir 30mg at night and 10Units premeals  Blood glucose is high  c/w sliding scale and Lantus 20Units HS  Diabetic diet  Hb A1C: 8.1%

## 2020-06-01 NOTE — CHART NOTE - NSCHARTNOTEFT_GEN_A_CORE
pt's daughter Ankita Keating ([place]) [phone] called the unit. I updated CT abd/pelvis w/ IV cont result, still there is a suspicion for bladder cancer but definitive diagnosis cannot be given until cystoscopy and biopsy done.   The daughter is concerned about her wound care as gauze is inserted in the amputation area and the pt was supposed to visit her vascular Dr. Melgar, [place] [phone] tomorrow for removal of gauze.  Podiatry Dr. Yo is aware and awaiting for her evaluation.

## 2020-06-01 NOTE — DISCHARGE NOTE PROVIDER - NSDCCPCAREPLAN_GEN_ALL_CORE_FT
PRINCIPAL DISCHARGE DIAGNOSIS  Diagnosis: Stroke  Assessment and Plan of Treatment: Stroke      SECONDARY DISCHARGE DIAGNOSES  Diagnosis: Osteomyelitis of left foot, unspecified type  Assessment and Plan of Treatment: Osteomyelitis of left foot, unspecified type    Diagnosis: PVD (peripheral vascular disease)  Assessment and Plan of Treatment: PVD (peripheral vascular disease)    Diagnosis: Abnormal CT of the abdomen  Assessment and Plan of Treatment: CT abdomen showed bladder wall thickening concern for bladder cancer. Urine cytology was negative for malignancy cells. Please follow up with urologist Dr. Taylor and get cystoscopy.    Diagnosis: Gastritis, acute  Assessment and Plan of Treatment: Gastritis, acute    Diagnosis: CHF (congestive heart failure)  Assessment and Plan of Treatment: CHF (congestive heart failure)    Diagnosis: CAD (coronary artery disease)  Assessment and Plan of Treatment: CAD (coronary artery disease)    Diagnosis: Intractable abdominal pain  Assessment and Plan of Treatment: PRINCIPAL DISCHARGE DIAGNOSIS  Diagnosis: Stroke  Assessment and Plan of Treatment:   Pt kept having n/v, neuro Dr. Moncada was consulted and CT Angio Head and neck w/ IV Cont was performed. It showed focal mild (less than 50%) narrowing at the origin of right internal carotid artery,   Short segment Moderate (50-70%) narrowing distal left common carotid artery. Also severe narrowing of the terminal segment of distal left internal carotid artery.  The intracranial vertebral and basilar arteries are patent. Calcified atherosclerotic plaques results in narrowing of bilateral V4 segments of distal vertebral arteries.   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 Pt was placed on heparin drip.      SECONDARY DISCHARGE DIAGNOSES  Diagnosis: Osteomyelitis of left foot, unspecified type  Assessment and Plan of Treatment: ID Dr. Reagan was consulted, IVF was started as leukocytosis can be from dehydration. Reglan IV, zofran IV PRN and benadryl IV PRN was started. Podiatry Dr. Yo was consulted for wound care after amputation site.  Leukocytosis got worse and CT foot left was performed as pt cannot get MRI due to AICD. It showed soft tissue ulcer at the stump extending to the head of the 1st metatarsal, with cortical irregularity and   destructive changes at the head of the 1st metatarsal consistent with osteomyelitis. and abscess. IV abx was changed to cefepime, then doxy + zosyn, then doxy + meropenem. Pt went to surgery for I&D on 6/9.  Vascular was consulted for PVD and pt went for surgery for stent replacement and possible more proximal amputation on 6/9.    Diagnosis: PVD (peripheral vascular disease)  Assessment and Plan of Treatment: PVD (peripheral vascular disease)    Diagnosis: Abnormal CT of the abdomen  Assessment and Plan of Treatment: CT abdomen showed bladder wall thickening concern for bladder cancer. Urine cytology was negative for malignancy cells. Please follow up with urologist Dr. Taylor and get cystoscopy.    Diagnosis: Gastritis, acute  Assessment and Plan of Treatment: Please continue with pantoprazole daily in the morning and follow up with your primary care doctor and GI doctor.    Diagnosis: CHF (congestive heart failure)  Assessment and Plan of Treatment: Transthoracic Echocardiogram EF 50-55%, Diastolic function incompletely assessed. There is mild tricuspid regurgitation and mild pulmonic regurgitation. Please continue with statin and beta blocker and follow up with your cardiologist.    Diagnosis: CAD (coronary artery disease)  Assessment and Plan of Treatment: CAD (coronary artery disease)    Diagnosis: Intractable abdominal pain  Assessment and Plan of Treatment: PRINCIPAL DISCHARGE DIAGNOSIS  Diagnosis: Stroke  Assessment and Plan of Treatment: CT Angio Head and neck performed.   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   You were started on anticoagulation.   Continue with Eliquis as prescribed by your doctor.  Maintain safe environment.        SECONDARY DISCHARGE DIAGNOSES  Diagnosis: Abnormal CT of the abdomen  Assessment and Plan of Treatment: CT abdomen showed bladder wall thickening concern for bladder cancer. Urine cytology was negative for malignancy cells. Please follow up with urologist Dr. Taylor and get cystoscopy.    Diagnosis: Gastritis, acute  Assessment and Plan of Treatment: Please continue with pantoprazole daily in the morning and follow up with your primary care doctor and GI doctor.    Diagnosis: PVD (peripheral vascular disease)  Assessment and Plan of Treatment: You had angiogram .and debridement of left foot wound.   Continue with medications as prescribed.   Keep wound clean , dry, and intact.  Follow-up with Outpatient Podiatrist Dr. Yo. Call for appointment.    Diagnosis: CAD (coronary artery disease)  Assessment and Plan of Treatment: Coronary artery disease is a condition where the arteries the supply the heart muscle gets clogged with fatty deposits & puts you at risk for a heart attack. Continue with medications as prescribed.   Call your doctor if you have any new pain, pressure, or discomfort in the center of your chest, pain, tingling or discomfort in arms, back, neck, jaw, or stomach, shortness of breath, nausea, vomiting, burping or heartburn, sweating, cold and clammy skin, racing or abnormal heartbeat for more than 10 minutes or if they keep coming & going.  Call 911 and do not tr to get to hospital by care  You can help yourself with lifestyle changes (quitting smoking if you smoke), eat lots of fruits & vegetables & low fat dairy products, not a lot of meat & fatty foods, walk or some form of physical activity most days of the week, lose weight if you are overweight  Take your cardiac medication as prescribed to lower cholesterol, to lower blood pressure, aspirin to prevent blood clots, and diabetes control  Make sure to keep appointments with doctor for cardiac follow up care      Diagnosis: CHF (congestive heart failure)  Assessment and Plan of Treatment: Transthoracic Echocardiogram EF 50-55%, Diastolic function incompletely assessed. There is mild tricuspid regurgitation and mild pulmonic regurgitation. Please continue with statin and beta blocker and follow up with your cardiologist.    Diagnosis: Osteomyelitis of left foot, unspecified type  Assessment and Plan of Treatment: Continue with Rocephin and doxycycline as prescribed.  Keep wound dressing clean, dry, intact.   Follow-up with your PMD within 1 week.  Follow-up outpatient with Podiatrist Dr. Yo within 1week. . Call for appointment.    Diagnosis: DM (diabetes mellitus)  Assessment and Plan of Treatment: HgA1C this admission 8.1  Make sure you get your HgA1c checked every three months.  If you take oral diabetes medications, check your blood glucose two times a day.  If you take insulin, check your blood glucose before meals and at bedtime.  It's important not to skip any meals.  Keep a log of your blood glucose results and always take it with you to your doctor appointments.  Keep a list of your current medications including injectables and over the counter medications and bring this medication list with you to all your doctor appointments.  If you have not seen your ophthalmologist this year call for appointment.  Check your feet daily for redness, sores, or openings. Do not self treat. If no improvement in two days call your primary care physician for an appointment.  Low blood sugar (hypoglycemia) is a blood sugar below 70mg/dl. Check your blood sugar if you feel signs/symptoms of hypoglycemia. If your blood sugar is below 70 take 15 grams of carbohydrates (ex 4 oz of apple juice, 3-4 glucose tablets, or 4-6 oz of regular soda) wait 15 minutes and repeat blood sugar to make sure it comes up above 70.  If your blood sugar is above 70 and you are due for a meal, have a meal.  If you are not due for a meal have a snack.  This snack helps keeps your blood sugar at a safe range.      Diagnosis: HTN (hypertension)  Assessment and Plan of Treatment: Low salt diet  Activity as tolerated.  Take all medication as prescribed.  Follow up with your medical doctor for routine blood pressure monitoring at your next visit.  Notify your doctor if you have any of the following symptoms:   Dizziness, Lightheadedness, Blurry vision, Headache, Chest pain, Shortness of breath      Diagnosis: Intractable abdominal pain  Assessment and Plan of Treatment: due to mild gastritis.  Continue with medication as prescribed .  Avoid spicy and fried foods.  Follow-up with your primary care physician within 1 week. Call for appointment. PRINCIPAL DISCHARGE DIAGNOSIS  Diagnosis: Stroke  Assessment and Plan of Treatment: CT Angio Head and neck performed.   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   You were started on anticoagulation.   Continue with Eliquis as prescribed by your doctor.  Maintain safe environment.   Continue medications and  physical therapy as instructed.   Follow-up with your primary care physician within 1 week. Call for appointment.        SECONDARY DISCHARGE DIAGNOSES  Diagnosis: Osteomyelitis of left foot, unspecified type  Assessment and Plan of Treatment: Continue with Rocephin and doxycycline as prescribed. Have your liver function enzymes monitored weekly while on these antibiotics.  Keep wound dressing clean, dry, intact.   Follow-up with your PMD within 1 week.  Follow-up outpatient with Podiatrist Dr. Yo within 1week. . Call for appointment.    Diagnosis: Gastritis, acute  Assessment and Plan of Treatment: Please continue with pantoprazole daily in the morning and follow up with your primary care doctor and GI doctor.    Diagnosis: PVD (peripheral vascular disease)  Assessment and Plan of Treatment: You had angiogram .and debridement of left foot wound.   Continue with medications as prescribed.   Keep wound clean , dry, and intact.  Follow-up with Outpatient Podiatrist Dr. Yo. Call for appointment.    Diagnosis: Abnormal CT of the abdomen  Assessment and Plan of Treatment: CT abdomen showed bladder wall thickening concern for bladder cancer. Urine cytology was negative for malignancy cells. Please follow up with urologist Dr. Taylor and get cystoscopy.    Diagnosis: CAD (coronary artery disease)  Assessment and Plan of Treatment: Coronary artery disease is a condition where the arteries the supply the heart muscle gets clogged with fatty deposits & puts you at risk for a heart attack. Continue with medications as prescribed.   Call your doctor if you have any new pain, pressure, or discomfort in the center of your chest, pain, tingling or discomfort in arms, back, neck, jaw, or stomach, shortness of breath, nausea, vomiting, burping or heartburn, sweating, cold and clammy skin, racing or abnormal heartbeat for more than 10 minutes or if they keep coming & going.  Call 911 and do not tr to get to hospital by care  You can help yourself with lifestyle changes (quitting smoking if you smoke), eat lots of fruits & vegetables & low fat dairy products, not a lot of meat & fatty foods, walk or some form of physical activity most days of the week, lose weight if you are overweight  Take your cardiac medication as prescribed to lower cholesterol, to lower blood pressure, aspirin to prevent blood clots, and diabetes control  Make sure to keep appointments with doctor for cardiac follow up care      Diagnosis: CHF (congestive heart failure)  Assessment and Plan of Treatment: Transthoracic Echocardiogram EF 50-55%, Diastolic function incompletely assessed. There is mild tricuspid regurgitation and mild pulmonic regurgitation. Please continue with statin and beta blocker and follow up with your cardiologist.    Diagnosis: DM (diabetes mellitus)  Assessment and Plan of Treatment: HgA1C this admission 8.1  Make sure you get your HgA1c checked every three months.  If you take oral diabetes medications, check your blood glucose two times a day.  If you take insulin, check your blood glucose before meals and at bedtime.  It's important not to skip any meals.  Keep a log of your blood glucose results and always take it with you to your doctor appointments.  Keep a list of your current medications including injectables and over the counter medications and bring this medication list with you to all your doctor appointments.  If you have not seen your ophthalmologist this year call for appointment.  Check your feet daily for redness, sores, or openings. Do not self treat. If no improvement in two days call your primary care physician for an appointment.  Low blood sugar (hypoglycemia) is a blood sugar below 70mg/dl. Check your blood sugar if you feel signs/symptoms of hypoglycemia. If your blood sugar is below 70 take 15 grams of carbohydrates (ex 4 oz of apple juice, 3-4 glucose tablets, or 4-6 oz of regular soda) wait 15 minutes and repeat blood sugar to make sure it comes up above 70.  If your blood sugar is above 70 and you are due for a meal, have a meal.  If you are not due for a meal have a snack.  This snack helps keeps your blood sugar at a safe range.      Diagnosis: HTN (hypertension)  Assessment and Plan of Treatment: Low salt diet  Activity as tolerated.  Take all medication as prescribed.  Follow up with your medical doctor for routine blood pressure monitoring at your next visit.  Notify your doctor if you have any of the following symptoms:   Dizziness, Lightheadedness, Blurry vision, Headache, Chest pain, Shortness of breath      Diagnosis: Intractable abdominal pain  Assessment and Plan of Treatment: due to mild gastritis.  Continue with medication as prescribed .  Avoid spicy and fried foods.  Follow-up with your primary care physician within 1 week. Call for appointment.

## 2020-06-01 NOTE — PROGRESS NOTE ADULT - PROBLEM SELECTOR PLAN 1
- patient comes with acute onset of nausea, epigastric pain and vomiting x 2 days  - Non radiating, 7/10 ,burning and pressure like pain  - She has been taking antibiotics for almost three weeks  - Symptoms aggravate with food and partially relieved with omeprazole  - patient has also history of diabetes  - DDx include acute pill induced esophagitis & gastritis or gastropathy(uncontrolled DM)  - s/p Famotidine IV, zofran and Malox in Ed  - pt also received 1 bolus in ED  - c/w PPI, zofran Q4 PRN , reglan PRN for nausea  - Avoid NSAIDs and spicy food

## 2020-06-01 NOTE — DISCHARGE NOTE PROVIDER - HOSPITAL COURSE
75 YO female from home, H/O CAD/DM/HTN/PVD/hysterectomy/cholecystectomy ,with AICD comes to ED with intractable nausea and vomiting for 2 days. Patient states that she recently had left big toe amputation in the beginning of May and she was taking antibiotics. On Friday, her antibiotics were changed from clindamycin to Levaquin and after she took antibiotics along with percocet, she started having severe pain in epigastric region. Patient reports that it was sudden in onset, 7/10, burning & pressure like pain, non-radiating , associated with nausea,, bitter taste in mouth and projectile vomiting. She was not able to tolerate food and it aggravated her symptoms. Patient also reports significant weight( not sure how much) recently and decrease in appetite.     patient denies fever, cough, SOB, constipation, dysuria, headache, chest pain, orthopnea, back pain, burning sensation in extremities.    ED course; Patient is in mild distress due to nausea but otherwise stable    Vitals: , /88, SpO2 99% on RA        CT abdomen/pelvis showed  NEW MULTI BLADDER WALL FOCAL AREAS OF SOFT TISSUE THICKENING CONCERNING FOR BLADDER CARCINOMA/TRANSITIONAL CELL CARCINOMA. Stomach not fully distended and gastric pathology cannot be excluded.     Pt was admitted for gastritis and malignancy work up.        Urology was consulted and urine cytology was sent and CT urogram ( CT Abdomen and Pelvis w/ IV Cont) was performed, which showed Small bilateral pleural effusions, Multiple nodular peripheral filling defect in the urinary bladder measuring up to 1.3 cm, suspicious for transitional cell carcinomas, stable mild dilatation of the appendix measuring up to 8 mm in caliber. No evidence for periappendiceal stranding. Given its stability, mucocele of the appendix is considered. Acute appendicitis is possible but less likely.        Transthoracic Echocardiogram EF 50-55%, Diastolic function incompletely assessed. There is mild tricuspid regurgitation and mild pulmonic regurgitation.        Pt was started on levaquine but WBC went up, ID Dr. Reagan was consulted and IVF was started as leukocytosis can be from dehydration. Reglan IV was started. Podiatry Dr. Yo was consulted for wound care after amputation site. 73 YO female from home, H/O CAD/DM/HTN/PVD/hysterectomy/cholecystectomy ,with AICD comes to ED with intractable nausea and vomiting for 2 days. Patient states that she recently had left big toe amputation in the beginning of May and she was taking antibiotics. On Friday, her antibiotics were changed from clindamycin to Levaquin and after she took antibiotics along with percocet, she started having severe pain in epigastric region. Patient reports that it was sudden in onset, 7/10, burning & pressure like pain, non-radiating , associated with nausea,, bitter taste in mouth and projectile vomiting. She was not able to tolerate food and it aggravated her symptoms. Patient also reports significant weight( not sure how much) recently and decrease in appetite.     patient denies fever, cough, SOB, constipation, dysuria, headache, chest pain, orthopnea, back pain, burning sensation in extremities.    ED course; Patient is in mild distress due to nausea but otherwise stable    Vitals: , /88, SpO2 99% on RA        CT abdomen/pelvis showed  NEW MULTI BLADDER WALL FOCAL AREAS OF SOFT TISSUE THICKENING CONCERNING FOR BLADDER CARCINOMA/TRANSITIONAL CELL CARCINOMA. Stomach not fully distended and gastric pathology cannot be excluded.     Pt was admitted for n/v and malignancy work up.        Urology was consulted and urine cytology was sent and CT urogram ( CT Abdomen and Pelvis w/ IV Cont) was performed, which showed Small bilateral pleural effusions, Multiple nodular peripheral filling defect in the urinary bladder measuring up to 1.3 cm, suspicious for transitional cell carcinomas, stable mild dilatation of the appendix measuring up to 8 mm in caliber. No evidence for periappendiceal stranding. Given its stability, mucocele of the appendix is considered. Acute appendicitis is possible but less likely. Urine cytology was negative for malignancy cells.        Transthoracic Echocardiogram EF 50-55%, Diastolic function incompletely assessed. There is mild tricuspid regurgitation and mild pulmonic regurgitation.        Pt was started on levaquine but WBC went up, ID Dr. Reagan was consulted, IVF was started as leukocytosis can be from dehydration. Reglan IV, zofran IV PRN and benadryl IV PRN was started. Podiatry Dr. Yo was consulted for wound care after amputation site.    Leukocytosis got worse and CT foot left was performed as pt cannot get MRI due to AICD. It showed soft tissue ulcer at the stump extending to the head of the 1st metatarsal, with cortical irregularity and     destructive changes at the head of the 1st metatarsal consistent with osteomyelitis. and abscess. IV abx was changed to cefepime, then doxy + zosyn. Pt went to surgery for I&D on --------.        Vascular was consulted for PVD and pt went for surgery for stent replacement and possible more proximal amputation on 6/9. 73 YO female from home, H/O CAD/DM/HTN/PVD/hysterectomy/cholecystectomy ,with AICD comes to ED with intractable nausea and vomiting for 2 days. Patient states that she recently had left big toe amputation in the beginning of May and she was taking antibiotics. On Friday, her antibiotics were changed from clindamycin to Levaquin and after she took antibiotics along with percocet, she started having severe pain in epigastric region. Patient reports that it was sudden in onset, 7/10, burning & pressure like pain, non-radiating , associated with nausea,, bitter taste in mouth and projectile vomiting. She was not able to tolerate food and it aggravated her symptoms. Patient also reports significant weight( not sure how much) recently and decrease in appetite.     patient denies fever, cough, SOB, constipation, dysuria, headache, chest pain, orthopnea, back pain, burning sensation in extremities.    ED course; Patient is in mild distress due to nausea but otherwise stable    Vitals: , /88, SpO2 99% on RA        CT abdomen/pelvis showed  NEW MULTI BLADDER WALL FOCAL AREAS OF SOFT TISSUE THICKENING CONCERNING FOR BLADDER CARCINOMA/TRANSITIONAL CELL CARCINOMA. Stomach not fully distended and gastric pathology cannot be excluded.     Pt was admitted for n/v and malignancy work up.        Urology was consulted and urine cytology was sent and CT urogram ( CT Abdomen and Pelvis w/ IV Cont) was performed, which showed Small bilateral pleural effusions, Multiple nodular peripheral filling defect in the urinary bladder measuring up to 1.3 cm, suspicious for transitional cell carcinomas, stable mild dilatation of the appendix measuring up to 8 mm in caliber. No evidence for periappendiceal stranding. Given its stability, mucocele of the appendix is considered. Acute appendicitis is possible but less likely. Urine cytology was negative for malignancy cells.        Transthoracic Echocardiogram EF 50-55%, Diastolic function incompletely assessed. There is mild tricuspid regurgitation and mild pulmonic regurgitation.        Pt was started on levaquine but WBC went up, ID Dr. Raegan was consulted, IVF was started as leukocytosis can be from dehydration. Reglan IV, zofran IV PRN and benadryl IV PRN was started. Podiatry Dr. Yo was consulted for wound care after amputation site.    Leukocytosis got worse and CT foot left was performed as pt cannot get MRI due to AICD. It showed soft tissue ulcer at the stump extending to the head of the 1st metatarsal, with cortical irregularity and     destructive changes at the head of the 1st metatarsal consistent with osteomyelitis. and abscess. IV abx was changed to cefepime, then doxy + zosyn, then doxy + meropenem. Pt went to surgery for I&D on 6/9.    Vascular was consulted for PVD and pt went for surgery for stent replacement and possible more proximal amputation on 6/9.         Pt kept having n/v, neuro Dr. Moncada was consulted and CT Angio Head and neck w/ IV Cont was performed. It showed focal mild (less than 50%) narrowing at the origin of right internal carotid artery,     Short segment Moderate (50-70%) narrowing distal left common carotid artery. Also severe narrowing of the terminal segment of distal left internal carotid artery.    The intracranial vertebral and basilar arteries are patent. Calcified atherosclerotic plaques results in narrowing of bilateral V4 segments of distal vertebral arteries.     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 Pt was placed on heparin drip.            ---Last update 6/9/20---- 73 YO female from home, H/O CAD/DM/HTN/PVD/hysterectomy/cholecystectomy ,with AICD comes to ED with intractable nausea and vomiting for 2 days. Patient states that she recently had left big toe amputation in the beginning of May and she was taking antibiotics. On Friday, her antibiotics were changed from clindamycin to Levaquin and after she took antibiotics along with percocet, she started having severe pain in epigastric region. Patient reports that it was sudden in onset, 7/10, burning & pressure like pain, non-radiating , associated with nausea,, bitter taste in mouth and projectile vomiting. She was not able to tolerate food and it aggravated her symptoms. Patient also reports significant weight( not sure how much) recently and decrease in appetite.     patient denies fever, cough, SOB, constipation, dysuria, headache, chest pain, orthopnea, back pain, burning sensation in extremities.    ED course; Patient is in mild distress due to nausea but otherwise stable    Vitals: , /88, SpO2 99% on RA        CT abdomen/pelvis showed  NEW MULTI BLADDER WALL FOCAL AREAS OF SOFT TISSUE THICKENING CONCERNING FOR BLADDER CARCINOMA/TRANSITIONAL CELL CARCINOMA. Stomach not fully distended and gastric pathology cannot be excluded.     Pt was admitted for n/v and malignancy work up.        Urology was consulted and urine cytology was sent and CT urogram ( CT Abdomen and Pelvis w/ IV Cont) was performed, which showed Small bilateral pleural effusions, Multiple nodular peripheral filling defect in the urinary bladder measuring up to 1.3 cm, suspicious for transitional cell carcinomas, stable mild dilatation of the appendix measuring up to 8 mm in caliber. No evidence for periappendiceal stranding. Given its stability, mucocele of the appendix is considered. Acute appendicitis is possible but less likely. Urine cytology was negative for malignancy cells.        Transthoracic Echocardiogram EF 50-55%, Diastolic function incompletely assessed. There is mild tricuspid regurgitation and mild pulmonic regurgitation.        Pt was started on levaquine but WBC went up, ID Dr. Reagan was consulted, IVF was started as leukocytosis can be from dehydration. Reglan IV, zofran IV PRN and benadryl IV PRN was started. Podiatry Dr. Yo was consulted for wound care after amputation site.    Leukocytosis got worse and CT foot left was performed as pt cannot get MRI due to AICD. It showed soft tissue ulcer at the stump extending to the head of the 1st metatarsal, with cortical irregularity and     destructive changes at the head of the 1st metatarsal consistent with osteomyelitis. and abscess. IV abx was changed to cefepime, then doxy + zosyn, then doxy + meropenem. Pt went to surgery for I&D on 6/9.    Vascular was consulted for PVD . Pt underwent surgery for stent replacement.             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 Neuro Dr. Moncada was consulted and CT Angio Head and neck w/ IV Cont was performed. It showed focal mild (less than 50%) narrowing at the origin of right internal carotid artery, Short segment Moderate (50-70%) narrowing distal left common carotid artery. Also severe narrowing of the terminal segment of distal left internal carotid artery.    The intracranial vertebral and basilar arteries are patent. Calcified atherosclerotic plaques results in narrowing of bilateral V4 segments of distal vertebral arteries.     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 Pt was placed on heparin drip and changed to Eliquis.     Gastritis                 ---Last update 6/9/20---- 75 YO female from home, H/O CAD/DM/HTN/PVD/hysterectomy/cholecystectomy ,with AICD comes to ED with intractable nausea and vomiting for 2 days. Patient states that she recently had left big toe amputation in the beginning of May and she was taking antibiotics. On Friday, her antibiotics were changed from clindamycin to Levaquin and after she took antibiotics along with percocet, she started having severe pain in epigastric region. Patient reports that it was sudden in onset, 7/10, burning & pressure like pain, non-radiating , associated with nausea,, bitter taste in mouth and projectile vomiting. She was not able to tolerate food and it aggravated her symptoms. Patient also reports significant weight( not sure how much) recently and decrease in appetite.     patient denies fever, cough, SOB, constipation, dysuria, headache, chest pain, orthopnea, back pain, burning sensation in extremities.    ED course; Patient is in mild distress due to nausea but otherwise stable    Vitals: , /88, SpO2 99% on RA        CT abdomen/pelvis showed  NEW MULTI BLADDER WALL FOCAL AREAS OF SOFT TISSUE THICKENING CONCERNING FOR BLADDER CARCINOMA/TRANSITIONAL CELL CARCINOMA. Stomach not fully distended and gastric pathology cannot be excluded.     Pt was admitted for n/v and malignancy work up.        Urology was consulted and urine cytology was sent and CT urogram ( CT Abdomen and Pelvis w/ IV Cont) was performed, which showed Small bilateral pleural effusions, Multiple nodular peripheral filling defect in the urinary bladder measuring up to 1.3 cm, suspicious for transitional cell carcinomas, stable mild dilatation of the appendix measuring up to 8 mm in caliber. No evidence for periappendiceal stranding. Given its stability, mucocele of the appendix is considered. Acute appendicitis is possible but less likely. Urine cytology was negative for malignancy cells.        Transthoracic Echocardiogram EF 50-55%, Diastolic function incompletely assessed. There is mild tricuspid regurgitation and mild pulmonic regurgitation.        Pt was started on levaquine but WBC went up, ID Dr. Reagan was consulted, IVF was started as leukocytosis can be from dehydration. Reglan IV, zofran IV PRN and benadryl IV PRN was started. Podiatry Dr. Yo was consulted for wound care after amputation site.    Leukocytosis got worse and CT foot left was performed as pt cannot get MRI due to AICD. It showed soft tissue ulcer at the stump extending to the head of the 1st metatarsal, with cortical irregularity and     destructive changes at the head of the 1st metatarsal consistent with osteomyelitis. and abscess. IV abx was changed to cefepime, then doxy + zosyn, then doxy + meropenem. Pt went to surgery for I&D on 6/9.    Vascular was consulted for PVD . Pt underwent surgery for stent replacement. Pt s/p amptuation of left great toe for gangrene with postop collection and overlying cellulitis as wel as OM. Followed by Podiatry. Keep dressing clean, dry and intact. Wound culture grew Staph aureus(MSSA) s/p left stent. Pt with underlying PVD. 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 s/p I & D of left foot as wool as partial amputation of left first MT. Pathology consistent with acute OM. Antibiotics changed to Rocephin and Doxy for 6 weeks (thru July 26th.) Pt s/p PICC placement on 6/18.         Pt found with new onset AF . Cardiology on board.   Neuro Dr. Moncada was consulted and CT Angio Head and neck w/ IV Cont was performed. It showed focal mild (less than 50%) narrowing at the origin of right internal carotid artery, Short segment Moderate (50-70%) narrowing distal left common carotid artery. Also severe narrowing of the terminal segment of distal left internal carotid artery.    The intracranial vertebral and basilar arteries are patent. Calcified atherosclerotic plaques results in narrowing of bilateral V4 segments of distal vertebral arteries.     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 Pt was placed on heparin drip and changed to Eliquis.     Gastritis for which EGD performed on 6/15 which showed mild gastritits and healing duodenal ulcer , no active bleeding. GI on board.        Pt is cleared by attending for discharge to home. Continue with antibiotics as prescribed.  Follow-up with PMD within 1 week. F/u with Podiatrist Dr. Yo.         Please refer to pt's medical record for full hospital course. 73 YO female from home, H/O CAD/DM/HTN/PVD/hysterectomy/cholecystectomy ,with AICD comes to ED with intractable nausea and vomiting for 2 days. Patient states that she recently had left big toe amputation in the beginning of May and she was taking antibiotics. On Friday, her antibiotics were changed from clindamycin to Levaquin and after she took antibiotics along with percocet, she started having severe pain in epigastric region. Patient reports that it was sudden in onset, 7/10, burning & pressure like pain, non-radiating , associated with nausea,, bitter taste in mouth and projectile vomiting. She was not able to tolerate food and it aggravated her symptoms. Patient also reports significant weight( not sure how much) recently and decrease in appetite.     patient denies fever, cough, SOB, constipation, dysuria, headache, chest pain, orthopnea, back pain, burning sensation in extremities.    ED course; Patient is in mild distress due to nausea but otherwise stable    Vitals: , /88, SpO2 99% on RA        CT abdomen/pelvis showed  NEW MULTI BLADDER WALL FOCAL AREAS OF SOFT TISSUE THICKENING CONCERNING FOR BLADDER CARCINOMA/TRANSITIONAL CELL CARCINOMA. Stomach not fully distended and gastric pathology cannot be excluded.     Pt was admitted for n/v and malignancy work up.        Urology was consulted and urine cytology was sent and CT urogram ( CT Abdomen and Pelvis w/ IV Cont) was performed, which showed Small bilateral pleural effusions, Multiple nodular peripheral filling defect in the urinary bladder measuring up to 1.3 cm, suspicious for transitional cell carcinomas, stable mild dilatation of the appendix measuring up to 8 mm in caliber. No evidence for periappendiceal stranding. Given its stability, mucocele of the appendix is considered. Acute appendicitis is possible but less likely. Urine cytology was negative for malignancy cells.        Transthoracic Echocardiogram EF 50-55%, Diastolic function incompletely assessed. There is mild tricuspid regurgitation and mild pulmonic regurgitation.        Pt was started on levaquine but WBC went up, ID Dr. Reagan was consulted, IVF was started as leukocytosis can be from dehydration. Reglan IV, zofran IV PRN and benadryl IV PRN was started. Podiatry Dr. Yo was consulted for wound care after amputation site.    Leukocytosis got worse and CT foot left was performed as pt cannot get MRI due to AICD. It showed soft tissue ulcer at the stump extending to the head of the 1st metatarsal, with cortical irregularity and     destructive changes at the head of the 1st metatarsal consistent with osteomyelitis. and abscess. IV abx was changed to cefepime, then doxy + zosyn, then doxy + meropenem. Pt went to surgery for I&D on 6/9.    Vascular was consulted for PVD . Pt underwent surgery for stent replacement. Pt s/p amptuation of left great toe for gangrene with postop collection and overlying cellulitis as wel as OM. Followed by Podiatry. Keep dressing clean, dry and intact. Wound culture grew Staph aureus(MSSA) s/p left stent. Pt with underlying PVD. 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 s/p I & D of left foot as wool as partial amputation of left first MT. Pathology consistent with acute OM. Antibiotics changed to Rocephin and Doxy for 6 weeks (thru July 26th.) Pt s/p PICC placement on 6/18.         Pt found with new onset AF . Cardiology on board.   Neuro Dr. Moncada was consulted and CT Angio Head and neck w/ IV Cont was performed. It showed focal mild (less than 50%) narrowing at the origin of right internal carotid artery, Short segment Moderate (50-70%) narrowing distal left common carotid artery. Also severe narrowing of the terminal segment of distal left internal carotid artery.    The intracranial vertebral and basilar arteries are patent. Calcified atherosclerotic plaques results in narrowing of bilateral V4 segments of distal vertebral arteries.     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 Pt was placed on heparin drip and changed to Eliquis.     Gastritis for which EGD performed on 6/15 which showed mild gastritits and healing duodenal ulcer , no active bleeding. GI on board.        Pt is cleared by attending for discharge to home. Continue with antibiotics as prescribed.  Follow-up with PMD within 1 week. F/u with Podiatrist Dr. Yo . Follow-up with Podiatrist reqarding hyperbaric treatment.         Please refer to pt's medical record for full hospital course. 75 YO female from home, H/O CAD/DM/HTN/PVD/hysterectomy/cholecystectomy ,with AICD comes to ED with intractable nausea and vomiting for 2 days. Patient states that she recently had left big toe amputation in the beginning of May and she was taking antibiotics. On Friday, her antibiotics were changed from clindamycin to Levaquin and after she took antibiotics along with percocet, she started having severe pain in epigastric region. Patient reports that it was sudden in onset, 7/10, burning & pressure like pain, non-radiating , associated with nausea,, bitter taste in mouth and projectile vomiting. She was not able to tolerate food and it aggravated her symptoms. Patient also reports significant weight( not sure how much) recently and decrease in appetite.     patient denies fever, cough, SOB, constipation, dysuria, headache, chest pain, orthopnea, back pain, burning sensation in extremities.    ED course; Patient is in mild distress due to nausea but otherwise stable    Vitals: , /88, SpO2 99% on RA        CT abdomen/pelvis showed  NEW MULTI BLADDER WALL FOCAL AREAS OF SOFT TISSUE THICKENING CONCERNING FOR BLADDER CARCINOMA/TRANSITIONAL CELL CARCINOMA. Stomach not fully distended and gastric pathology cannot be excluded.     Pt was admitted for n/v and malignancy work up.        Urology was consulted and urine cytology was sent and CT urogram ( CT Abdomen and Pelvis w/ IV Cont) was performed, which showed Small bilateral pleural effusions, Multiple nodular peripheral filling defect in the urinary bladder measuring up to 1.3 cm, suspicious for transitional cell carcinomas, stable mild dilatation of the appendix measuring up to 8 mm in caliber. No evidence for periappendiceal stranding. Given its stability, mucocele of the appendix is considered. Acute appendicitis is possible but less likely. Urine cytology was negative for malignancy cells.        Transthoracic Echocardiogram EF 50-55%, Diastolic function incompletely assessed. There is mild tricuspid regurgitation and mild pulmonic regurgitation.        Pt was started on levaquine but WBC went up, ID Dr. Reagan was consulted, IVF was started as leukocytosis can be from dehydration. Reglan IV, zofran IV PRN and benadryl IV PRN was started. Podiatry Dr. Yo was consulted for wound care after amputation site.    Leukocytosis got worse and CT foot left was performed as pt cannot get MRI due to AICD. It showed soft tissue ulcer at the stump extending to the head of the 1st metatarsal, with cortical irregularity and     destructive changes at the head of the 1st metatarsal consistent with osteomyelitis. and abscess. IV abx was changed to cefepime, then doxy + zosyn, then doxy + meropenem. Pt went to surgery for I&D on 6/9.    Vascular was consulted for PVD . Pt underwent surgery for stent replacement. Pt s/p amptuation of left great toe for gangrene with postop collection and overlying cellulitis as wel as OM. Followed by Podiatry. Keep dressing clean, dry and intact. Wound culture grew Staph aureus(MSSA) s/p left stent. Pt with underlying PVD. 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 s/p I & D of left foot as wool as partial amputation of left first MT. Pathology consistent with acute OM. Antibiotics changed to Rocephin and Doxy for 6 weeks (thru July 26th.) Pt s/p PICC placement on 6/18.         Pt found with new onset AF . Cardiology on board.   Neuro Dr. Moncada was consulted and CT Angio Head and neck w/ IV Cont was performed. It showed focal mild (less than 50%) narrowing at the origin of right internal carotid artery, Short segment Moderate (50-70%) narrowing distal left common carotid artery. Also severe narrowing of the terminal segment of distal left internal carotid artery.    The intracranial vertebral and basilar arteries are patent. Calcified atherosclerotic plaques results in narrowing of bilateral V4 segments of distal vertebral arteries.     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 Pt was placed on heparin drip and changed to Eliquis.     Gastritis for which EGD performed on 6/15 which showed mild gastritits and healing duodenal ulcer , no active bleeding. GI on board.        Pt developed uncontrolled HTN with -190's, without chest pain, SOB. HTN now controlled after increasing Metoprolol  ER to  100mg once daily. Follow-up with PMD.     Pt with leukocytosis , uptrended to 13.14 from 11.54 , likely reactive from previous day  when PICC inserted. Pt without symptoms of infection. Follow-up with PMD.         Pt is cleared by attending for discharge to home. Continue with IV rocephin thru July 26th. Follow-up with PMD , Podiatrist Dr. Yo. Hyperbaric treatment as per Dr. Yo.            Pt is cleared by attending for discharge to home. Continue with antibiotics as prescribed.  Follow-up with PMD within 1 week. F/u with Podiatrist Dr. Yo . Follow-up with Podiatrist reqarding hyperbaric treatment.         Please refer to pt's medical record for full hospital course.

## 2020-06-01 NOTE — PROGRESS NOTE ADULT - PROBLEM SELECTOR PLAN 6
H/o PVD and popliteal stent  s/p left big toe amputation in May 2020  recently antibiotics changed from clindamycin to Levaquin that led to epigastric pain.  c/w IV antibiotics as WBC count is slightly elevated and patient has to complete antibiotic course yet  WBC count slightly elevated  wound is dry and clean  f/u outpatient with vascular surgery  PT: home PT

## 2020-06-01 NOTE — DISCHARGE NOTE PROVIDER - NSDCMRMEDTOKEN_GEN_ALL_CORE_FT
aspirin 81 mg oral tablet, chewable: 1 tab(s) orally once a day  atorvastatin 40 mg oral tablet: 1 tab(s) orally once a day  Ceftin 250 mg oral tablet: 1 tab(s) orally 2 times a day   clindamycin 300 mg oral capsule: 1 cap(s) orally every 6 hours  clopidogrel 75 mg oral tablet: 1 tab(s) orally once a day  fexofenadine 180 mg oral tablet: 1 tab(s) orally once a day  furosemide 20 mg oral tablet: 1 tab(s) orally 2 times a day  glimepiride 4 mg oral tablet: 1 tab(s) orally 2 times a day  hydrALAZINE 50 mg oral tablet: 1 tab(s) orally 3 times a day  Levemir FlexPen 100 units/mL subcutaneous solution: 30 unit(s) subcutaneous once a day (at bedtime)  lisinopril 40 mg oral tablet: 1 tab(s) orally once a day  Metoprolol Succinate  mg oral tablet, extended release: 1 tab(s) orally once a day  NovoLOG FlexPen 100 units/mL injectable solution: 10 unit(s) injectable 3 times a day with meals  omeprazole 40 mg oral delayed release capsule: 1 cap(s) orally once a day  oxycodone-acetaminophen 5 mg-325 mg oral tablet: 1 tab(s) orally every 8 hours, As Needed  Zofran 4 mg oral tablet: 1 tab(s) orally 3 times a day aspirin 81 mg oral tablet, chewable: 1 tab(s) orally once a day  atorvastatin 40 mg oral tablet: 1 tab(s) orally once a day  Ceftin 250 mg oral tablet: 1 tab(s) orally 2 times a day   cefTRIAXone 2 g intravenous injection: 2 gram(s) intravenous every 24 hours  PLEASE ADMINISTER LAST DOSE ON 7/11.  PLEASE REMOVE PICC LINE UPON COMPLETION OF ANTIBIOTICS.  clindamycin 300 mg oral capsule: 1 cap(s) orally every 6 hours  clopidogrel 75 mg oral tablet: 1 tab(s) orally once a day  doxycycline hyclate 100 mg oral capsule: 1 cap(s) orally 2 times a day   fexofenadine 180 mg oral tablet: 1 tab(s) orally once a day  furosemide 20 mg oral tablet: 1 tab(s) orally 2 times a day  glimepiride 4 mg oral tablet: 1 tab(s) orally 2 times a day  hydrALAZINE 50 mg oral tablet: 1 tab(s) orally 3 times a day  Levemir FlexPen 100 units/mL subcutaneous solution: 30 unit(s) subcutaneous once a day (at bedtime)  lisinopril 40 mg oral tablet: 1 tab(s) orally once a day  Metoprolol Succinate  mg oral tablet, extended release: 1 tab(s) orally once a day  Normal Saline Flush 0.9% injectable solution: 10 milliliter(s) injectable 2 times a day MDD:PLEASE FLUSH PICC LINE PRE AND POST INFUSION  NovoLOG FlexPen 100 units/mL injectable solution: 10 unit(s) injectable 3 times a day with meals  omeprazole 40 mg oral delayed release capsule: 1 cap(s) orally once a day  oxycodone-acetaminophen 5 mg-325 mg oral tablet: 1 tab(s) orally every 8 hours, As Needed  Zofran 4 mg oral tablet: 1 tab(s) orally 3 times a day acetaminophen 325 mg oral tablet: 2 tab(s) orally every 6 hours, As needed, Mild Pain (1 - 3), Moderate Pain (4 - 6)  aspirin 81 mg oral tablet, chewable: 1 tab(s) orally once a day  atorvastatin 40 mg oral tablet: 1 tab(s) orally once a day  cefTRIAXone 2 g intravenous injection: 2 gram(s) intravenous every 24 hours  PLEASE ADMINISTER LAST DOSE ON 7/11.  PLEASE REMOVE PICC LINE UPON COMPLETION OF ANTIBIOTICS.  clopidogrel 75 mg oral tablet: 1 tab(s) orally once a day  doxycycline hyclate 100 mg oral capsule: 1 cap(s) orally 2 times a day   fexofenadine 180 mg oral tablet: 1 tab(s) orally once a day  furosemide 20 mg oral tablet: 1 tab(s) orally 2 times a day  gabapentin 100 mg oral capsule: 1 cap(s) orally every 12 hours  glimepiride 4 mg oral tablet: 1 tab(s) orally 2 times a day  hydrALAZINE 100 mg oral tablet: 1 tab(s) orally 3 times a day  lactobacillus acidophilus oral capsule: 1 cap(s) orally 3 times a day   Levemir FlexPen 100 units/mL subcutaneous solution: 30 unit(s) subcutaneous once a day (at bedtime)  lisinopril 40 mg oral tablet: 1 tab(s) orally once a day  Metoprolol Succinate  mg oral tablet, extended release: 1 tab(s) orally once a day  Normal Saline Flush 0.9% injectable solution: 10 milliliter(s) injectable 2 times a day MDD:PLEASE FLUSH PICC LINE PRE AND POST INFUSION  NovoLOG FlexPen 100 units/mL injectable solution: 10 unit(s) injectable 3 times a day with meals  omeprazole 40 mg oral delayed release capsule: 1 cap(s) orally once a day  oxycodone-acetaminophen 5 mg-325 mg oral tablet: 1 tab(s) orally every 8 hours, As Needed  senna oral tablet: 2 tab(s) orally once a day (at bedtime)  Zofran 4 mg oral tablet: 1 tab(s) orally 3 times a day acetaminophen 325 mg oral tablet: 2 tab(s) orally every 6 hours, As needed, Mild Pain (1 - 3), Moderate Pain (4 - 6)  apixaban 5 mg oral tablet: 1 tab(s) orally every 12 hours  atorvastatin 40 mg oral tablet: 1 tab(s) orally once a day  cefTRIAXone 2 g intravenous injection: 2 gram(s) intravenous every 24 hours  PLEASE ADMINISTER LAST DOSE ON 7/26.  PLEASE REMOVE PICC LINE UPON COMPLETION OF ANTIBIOTICS.  doxycycline hyclate 100 mg oral capsule: 1 cap(s) orally 2 times a day   fexofenadine 180 mg oral tablet: 1 tab(s) orally once a day  furosemide 20 mg oral tablet: 1 tab(s) orally 2 times a day  gabapentin 100 mg oral capsule: 1 cap(s) orally every 12 hours  glimepiride 4 mg oral tablet: 1 tab(s) orally 2 times a day  hydrALAZINE 100 mg oral tablet: 1 tab(s) orally 3 times a day  lactobacillus acidophilus oral capsule: 1 cap(s) orally 3 times a day   Levemir FlexPen 100 units/mL subcutaneous solution: 30 unit(s) subcutaneous once a day (at bedtime)  lisinopril 40 mg oral tablet: 1 tab(s) orally once a day  Metoprolol Succinate  mg oral tablet, extended release: 1 tab(s) orally once a day  Normal Saline Flush 0.9% injectable solution: 10 milliliter(s) injectable 2 times a day MDD:PLEASE FLUSH PICC LINE PRE AND POST INFUSION  NovoLOG FlexPen 100 units/mL injectable solution: 10 unit(s) injectable 3 times a day with meals  omeprazole 40 mg oral delayed release capsule: 1 cap(s) orally once a day  oxycodone-acetaminophen 5 mg-325 mg oral tablet: 1 tab(s) orally every 8 hours, As Needed  senna oral tablet: 2 tab(s) orally once a day (at bedtime)  Zofran 4 mg oral tablet: 1 tab(s) orally 3 times a day acetaminophen 325 mg oral tablet: 2 tab(s) orally every 6 hours, As needed, Mild Pain (1 - 3), Moderate Pain (4 - 6)  apixaban 5 mg oral tablet: 1 tab(s) orally every 12 hours  atorvastatin 40 mg oral tablet: 1 tab(s) orally once a day  cefTRIAXone 2 g intravenous injection: 2 gram(s) intravenous every 24 hours  PLEASE ADMINISTER LAST DOSE ON 7/26.  PLEASE REMOVE PICC LINE UPON COMPLETION OF ANTIBIOTICS.  doxycycline hyclate 100 mg oral capsule: 1 cap(s) orally 2 times a day   fexofenadine 180 mg oral tablet: 1 tab(s) orally once a day  furosemide 20 mg oral tablet: 1 tab(s) orally 2 times a day  gabapentin 100 mg oral capsule: 1 cap(s) orally every 12 hours  glimepiride 4 mg oral tablet: 1 tab(s) orally 2 times a day  hydrALAZINE 100 mg oral tablet: 1 tab(s) orally 3 times a day  lactobacillus acidophilus oral capsule: 1 cap(s) orally once a day   lactobacillus acidophilus oral capsule: 1 cap(s) orally 3 times a day   Levemir FlexPen 100 units/mL subcutaneous solution: 30 unit(s) subcutaneous once a day (at bedtime)  lisinopril 40 mg oral tablet: 1 tab(s) orally once a day  Metoprolol Succinate  mg oral tablet, extended release: 1 tab(s) orally once a day  Normal Saline Flush 0.9% injectable solution: 10 milliliter(s) injectable 2 times a day MDD:PLEASE FLUSH PICC LINE PRE AND POST INFUSION  NovoLOG FlexPen 100 units/mL injectable solution: 10 unit(s) injectable 3 times a day with meals  omeprazole 40 mg oral delayed release capsule: 1 cap(s) orally once a day  senna oral tablet: 2 tab(s) orally once a day (at bedtime)  traMADol 50 mg oral tablet: 0.5 tab(s) orally every 8 hours, As Needed -Severe Pain (7 - 10) MDD:1.5 tab daily  Weekly bloodwork: liver function test. : Results to Infectious Disease Dr. Reagan (O) [phone]., (Fax) [phone]   Zofran 4 mg oral tablet: 1 tab(s) orally 3 times a day acetaminophen 325 mg oral tablet: 2 tab(s) orally every 6 hours, As needed, Mild Pain (1 - 3), Moderate Pain (4 - 6)  apixaban 5 mg oral tablet: 1 tab(s) orally every 12 hours  atorvastatin 40 mg oral tablet: 1 tab(s) orally once a day  cefTRIAXone 2 g intravenous injection: 2 gram(s) intravenous every 24 hours  PLEASE ADMINISTER LAST DOSE ON 7/26.  PLEASE REMOVE PICC LINE UPON COMPLETION OF ANTIBIOTICS.  doxycycline hyclate 100 mg oral capsule: 1 cap(s) orally 2 times a day   fexofenadine 180 mg oral tablet: 1 tab(s) orally once a day  furosemide 20 mg oral tablet: 1 tab(s) orally 2 times a day  gabapentin 100 mg oral capsule: 1 cap(s) orally every 12 hours  glimepiride 4 mg oral tablet: 1 tab(s) orally 2 times a day  hydrALAZINE 100 mg oral tablet: 1 tab(s) orally 3 times a day  lactobacillus acidophilus oral capsule: 1 cap(s) orally once a day   lactobacillus acidophilus oral capsule: 1 cap(s) orally 3 times a day   Levemir FlexPen 100 units/mL subcutaneous solution: 30 unit(s) subcutaneous once a day (at bedtime)  lisinopril 40 mg oral tablet: 1 tab(s) orally once a day  Metoprolol Succinate  mg oral tablet, extended release: 1 tab(s) orally once a day  Normal Saline Flush 0.9% injectable solution: 10 milliliter(s) injectable 2 times a day MDD:PLEASE FLUSH PICC LINE PRE AND POST INFUSION  NovoLOG FlexPen 100 units/mL injectable solution: 10 unit(s) injectable 3 times a day with meals  omeprazole 40 mg oral delayed release capsule: 1 cap(s) orally once a day  senna oral tablet: 2 tab(s) orally once a day (at bedtime)  traMADol 50 mg oral tablet: 0.5 tab(s) orally every 8 hours, As Needed -Severe Pain (7 - 10) MDD:1.5 tab daily  Weekly bloodwork: liver function test. : Results to Infectious Disease Dr. Reagan (O) [phone]., (Fax) [phone]   Zofran 4 mg oral tablet: 1 tab(s) orally 3 times a day acetaminophen 325 mg oral tablet: 2 tab(s) orally every 6 hours, As needed, Mild Pain (1 - 3), Moderate Pain (4 - 6)  apixaban 5 mg oral tablet: 1 tab(s) orally every 12 hours  atorvastatin 40 mg oral tablet: 1 tab(s) orally once a day  cefTRIAXone 2 g intravenous injection: 2 gram(s) intravenous every 24 hours  PLEASE ADMINISTER LAST DOSE ON 7/26.  PLEASE REMOVE PICC LINE UPON COMPLETION OF ANTIBIOTICS.  doxycycline hyclate 100 mg oral capsule: 1 cap(s) orally 2 times a day   fexofenadine 180 mg oral tablet: 1 tab(s) orally once a day  furosemide 20 mg oral tablet: 1 tab(s) orally 2 times a day  gabapentin 100 mg oral capsule: 1 cap(s) orally every 12 hours  glimepiride 4 mg oral tablet: 1 tab(s) orally 2 times a day  hydrALAZINE 100 mg oral tablet: 1 tab(s) orally 3 times a day  lactobacillus acidophilus oral capsule: 1 cap(s) orally once a day   lactobacillus acidophilus oral capsule: 1 cap(s) orally 3 times a day   Levemir FlexPen 100 units/mL subcutaneous solution: 30 unit(s) subcutaneous once a day (at bedtime)  lisinopril 40 mg oral tablet: 1 tab(s) orally once a day  Metoprolol Succinate  mg oral tablet, extended release: 1 tab(s) orally once a day  Normal Saline Flush 0.9% injectable solution: 10 milliliter(s) injectable 2 times a day MDD:PLEASE FLUSH PICC LINE PRE AND POST INFUSION  NovoLOG FlexPen 100 units/mL injectable solution: 10 unit(s) injectable 3 times a day with meals  omeprazole 40 mg oral delayed release capsule: 1 cap(s) orally once a day  senna oral tablet: 2 tab(s) orally once a day (at bedtime)  traMADol 50 mg oral tablet: 0.5 tab(s) orally every 8 hours, As Needed -Severe Pain (7 - 10) MDD:1.5 tab daily  Weekly bloodwork: liver function test. : Results to Infectious Disease Dr. Reagan (O) [phone]., (Fax) [phone]   Zofran 4 mg oral tablet: 1 tab(s) orally 3 times a day

## 2020-06-01 NOTE — DISCHARGE NOTE PROVIDER - NSDCFUADDAPPT_GEN_ALL_CORE_FT
Follow-up with your primary care physician within 1 week. Call for appointment.  Follow-up outpatient with Podiatrist Dr. Yo within 1 week. Call for appointment. Follow-up with your primary care physician within 1 week. Call for appointment.  Follow-up outpatient with Podiatrist Dr. Yo within 1 week. Follow-up with Podiatrist regarding hyperbaric treatment. Call for appointment. Follow-up with your primary care physician within 1 week. Call for appointment.  Follow-up outpatient with Podiatrist Dr. Yo within 1 week. Follow-up with Podiatrist regarding hyperbaric treatment. Call for appointment.    Have your liver function enzymes monitored weekly while antibiotics. Results to Infectious Disease Dr. Reagan.

## 2020-06-01 NOTE — PROGRESS NOTE ADULT - SUBJECTIVE AND OBJECTIVE BOX
Patient is a 74y old  Female who presents with a chief complaint of Intractable nausea and vomiting (31 May 2020 12:19)      INTERVAL HPI/OVERNIGHT EVENTS:    - pt still has n/v, vomited this morning. No appetite.  - Pt denied seeing blood in urine before admission  - pt has post nasal drip sensation after having cataract surgery. When she sleeps on right side down, she feels liquid coming out from R ear.      REVIEW OF SYSTEMS:  CONSTITUTIONAL: No fever, (+)weight loss, no fatigue  EYES: No eye pain, visual disturbances, or discharge  ENMT:  No difficulty hearing, tinnitus, vertigo; No sinus or throat pain (+) liquid drip sensation in R ear  NECK: No pain or stiffness  BREASTS: No pain, masses, or nipple discharge  RESPIRATORY: No cough, wheezing, chills or hemoptysis; No shortness of breath  CARDIOVASCULAR: No chest pain, palpitations, dizziness, or leg swelling  GASTROINTESTINAL: No abdominal (+) epigastric pain, better than before.  (+) nausea, vomiting, or hematemesis; No diarrhea or constipation. No melena or hematochezia.  GENITOURINARY: No dysuria, frequency, hematuria, or incontinence  NEUROLOGICAL: No headaches, memory loss, loss of strength, numbness, or tremors  SKIN: No itching, burning, rashes, or lesions   LYMPH NODES: No enlarged glands  ENDOCRINE: No heat or cold intolerance; No hair loss  MUSCULOSKELETAL: No joint pain or swelling; No muscle, back, or extremity pain  HEME/LYMPH: No easy bruising, or bleeding gums  ALLERY AND IMMUNOLOGIC: No hives or eczema    FAMILY HISTORY:    Vital Signs Last 24 Hrs  T(C): 37.1 (01 Jun 2020 05:02), Max: 37.1 (01 Jun 2020 05:02)  T(F): 98.7 (01 Jun 2020 05:02), Max: 98.7 (01 Jun 2020 05:02)  HR: 115 (01 Jun 2020 05:02) (87 - 115)  BP: 169/90 (01 Jun 2020 05:02) (139/83 - 169/90)  BP(mean): --  RR: 18 (01 Jun 2020 05:02) (18 - 18)  SpO2: 93% (01 Jun 2020 05:02) (92% - 97%)      PHYSICAL EXAM:  GENERAL: NAD, well-groomed  HEAD:  Atraumatic, Normocephalic  EYES: EOMI, PERRLA, conjunctiva and sclera clear, (+) conjunctiva anemic   ENMT: No tonsillar erythema, exudates, or enlargement; Moist mucous membranes, No lesions  NECK: Supple, No JVD, Normal thyroid  NERVOUS SYSTEM:  Alert & Oriented X3, Good concentration; Motor Strength 5/5 B/L upper and lower extremities  CHEST/LUNG: Clear to percussion bilaterally; No rales, rhonchi, wheezing, or rubs  HEART: Regular rate, tachycardia, No murmurs, rubs, or gallops  ABDOMEN: Soft, (+)tender on palpation to epigastric area, Nondistended; Bowel sounds present  EXTREMITIES:  2+ Peripheral Pulses, No clubbing, cyanosis, or edema (+) s/p L toe amputation  LYMPH: No lymphadenopathy noted  SKIN: No rashes or lesions    Consultant(s) Notes Reviewed:  [x ] YES  [ ] NO  Care Discussed with Consultants/Other Providers [ x] YES  [ ] NO    LABS:        RADIOLOGY & ADDITIONAL TESTS:    Imaging Personally Reviewed:  [ ] YES  [ ] NO  aluminum hydroxide/magnesium hydroxide/simethicone Suspension 30 milliLiter(s) Oral every 4 hours PRN  aspirin  chewable 81 milliGRAM(s) Oral daily  atorvastatin 40 milliGRAM(s) Oral at bedtime  cholecalciferol 2000 Unit(s) Oral daily  clopidogrel Tablet 75 milliGRAM(s) Oral daily  enoxaparin Injectable 40 milliGRAM(s) SubCutaneous daily  furosemide   Injectable 20 milliGRAM(s) IV Push two times a day  hydrALAZINE 50 milliGRAM(s) Oral three times a day  insulin glargine Injectable (LANTUS) 20 Unit(s) SubCutaneous at bedtime  insulin lispro (HumaLOG) corrective regimen sliding scale   SubCutaneous three times a day before meals  lactated ringers. 1000 milliLiter(s) IV Continuous <Continuous>  levoFLOXacin IVPB 500 milliGRAM(s) IV Intermittent every 24 hours  levoFLOXacin IVPB      lisinopril 40 milliGRAM(s) Oral daily  loratadine 10 milliGRAM(s) Oral daily  metoclopramide Injectable 5 milliGRAM(s) IV Push every 6 hours PRN  metoprolol tartrate Injectable 5 milliGRAM(s) IV Push every 6 hours PRN  pantoprazole  Injectable 40 milliGRAM(s) IV Push two times a day      HEALTH ISSUES - PROBLEM Dx:  Prophylactic measure: Prophylactic measure  CHF (congestive heart failure): CHF (congestive heart failure)  CAD (coronary artery disease): CAD (coronary artery disease)  PVD (peripheral vascular disease): PVD (peripheral vascular disease)  Bladder wall thickening: Bladder wall thickening  DM (diabetes mellitus): DM (diabetes mellitus)  HTN (hypertension): HTN (hypertension)  Intractable abdominal pain: Intractable abdominal pain  Gastritis, acute: Gastritis, acute Patient is a 74y old  Female who presents with a chief complaint of Intractable nausea and vomiting (31 May 2020 12:19)      INTERVAL HPI/OVERNIGHT EVENTS:    - pt still has n/v, vomited this morning. No appetite.  - Pt denied seeing blood in urine before admission  - pt has post nasal drip sensation after having cataract surgery. When she sleeps on right side down, she feels liquid coming out from R ear.      REVIEW OF SYSTEMS:  CONSTITUTIONAL: No fever, (+)weight loss, no fatigue  EYES: No eye pain, visual disturbances, or discharge  ENMT:  No difficulty hearing, tinnitus, vertigo; No sinus or throat pain (+) liquid drip sensation in R ear  NECK: No pain or stiffness  BREASTS: No pain, masses, or nipple discharge  RESPIRATORY: No cough, wheezing, chills or hemoptysis; No shortness of breath  CARDIOVASCULAR: No chest pain, palpitations, dizziness, or leg swelling  GASTROINTESTINAL: No abdominal (+) epigastric pain, better than before.  (+) nausea, vomiting, or hematemesis; No diarrhea or constipation. No melena or hematochezia.  GENITOURINARY: No dysuria, frequency, hematuria, or incontinence  NEUROLOGICAL: No headaches, memory loss, loss of strength, numbness, or tremors  SKIN: No itching, burning, rashes, or lesions   LYMPH NODES: No enlarged glands  ENDOCRINE: No heat or cold intolerance; No hair loss  MUSCULOSKELETAL: No joint pain or swelling; No muscle, back, or extremity pain  HEME/LYMPH: No easy bruising, or bleeding gums  ALLERY AND IMMUNOLOGIC: No hives or eczema    FAMILY HISTORY:    Vital Signs Last 24 Hrs  T(C): 37.1 (01 Jun 2020 05:02), Max: 37.1 (01 Jun 2020 05:02)  T(F): 98.7 (01 Jun 2020 05:02), Max: 98.7 (01 Jun 2020 05:02)  HR: 115 (01 Jun 2020 05:02) (87 - 115)  BP: 169/90 (01 Jun 2020 05:02) (139/83 - 169/90)  BP(mean): --  RR: 18 (01 Jun 2020 05:02) (18 - 18)  SpO2: 93% (01 Jun 2020 05:02) (92% - 97%)      PHYSICAL EXAM:  GENERAL: NAD, well-groomed  HEAD:  Atraumatic, Normocephalic  EYES: EOMI, PERRLA, conjunctiva and sclera clear, (+) conjunctiva anemic   ENMT: No tonsillar erythema, exudates, or enlargement; Moist mucous membranes, No lesions  NECK: Supple, No JVD, Normal thyroid  NERVOUS SYSTEM:  Alert & Oriented X3, Good concentration; Motor Strength 5/5 B/L upper and lower extremities  CHEST/LUNG: Clear to percussion bilaterally; No rales, rhonchi, wheezing, or rubs  HEART: Regular rate, tachycardia, No murmurs, rubs, or gallops  ABDOMEN: Soft, (+)tender on palpation to epigastric area, Nondistended; Bowel sounds present  EXTREMITIES:  2+ Peripheral Pulses, No clubbing, cyanosis, or edema (+) s/p L toe amputation  LYMPH: No lymphadenopathy noted  SKIN: No rashes or lesions    Consultant(s) Notes Reviewed:  [x ] YES  [ ] NO  Care Discussed with Consultants/Other Providers [ x] YES  [ ] NO    LABS:        RADIOLOGY & ADDITIONAL TESTS:    < from: Transthoracic Echocardiogram (06.01.20 @ 09:12) >  CONCLUSIONS:  1. Mitral annular calcification, and calcified mitral  leaflets with normal diastolic opening. Moderate mitral  regurgitation.  2. Calcified trileaflet aortic valve with normal opening.  3. Aortic Root: 2.6 cm.  4. Mildlydilated left atrium.  LA volume index = 37 cc/m2.  5. Normal left ventricular internal dimensions and wall  thicknesses.  6. Normal Left Ventricular Systolic Function,  (EF =  50-55%)  7. Diastolic function incompletely assessed.  8. Normal right atrium.  9. Normal right ventricular size with decreased RV systolic  function (TAPSE 1.3cm, tissue doppler .08m/s) . A device  lead is visualized in the right heart.  10. Unable to estimate RVSP.  11. There is mild tricuspid regurgitation.  12. There ismild pulmonic regurgitation.  13. Normal pericardium with no pericardial effusion.    < end of copied text >    < from: CT Abdomen and Pelvis w/ IV Cont (06.01.20 @ 10:35) >  Findings: Limited sections through the lung bases demonstrate small bilateral pleural effusions, new on the right and slightly increased on the left. Small bilateral atelectasis.    No evidence for a calculus in the kidneys or ureters. No hydronephrosis. Small hypodense lesion in the left kidney, too small to characterize. No definite enhancing renal mass is identified. No suspicious filling defect is identified in the renal pelvises or ureters. Multiple nodular peripheral filling defect are seen in the urinary bladder measuring upto 1.3 cm, suspicious for transitional cell carcinomas. Cystoscopy is recommended for further evaluation.    Stable cholecystectomy clips. Small hypodense area in the left hepatic lobe adjacent to the falciform ligament, likely due to focal fatty infiltration. Allowing for the noncontrast technique, the common bile duct, pancreas, spleen and adrenals appear unremarkable.    Stable mild dilatation of the appendix measuring up to 8 mm in caliber. No evidence for periappendiceal stranding. No bowel obstruction.    Trace perihepatic ascites. No evidence for free air, or enlarged lymph node.    The uterus is not visualized, probably surgically absent.    Impression:    Small bilateral pleural effusions.    Multiple nodular peripheral filling defect in the urinary bladder measuring up to 1.3 cm, suspicious for transitional cell carcinomas. Cystoscopy is recommended for further evaluation.    Stable mild dilatation of the appendix measuring up to 8 mm in caliber. No evidence for periappendiceal stranding. Given its stability, mucocele of the appendix is considered. Acute appendicitis is possible but less likely due to no interval development of periappendiceal stranding.    Trace perihepatic ascites.      < end of copied text >      Imaging Personally Reviewed:  [ ] YES  [ ] NO  aluminum hydroxide/magnesium hydroxide/simethicone Suspension 30 milliLiter(s) Oral every 4 hours PRN  aspirin  chewable 81 milliGRAM(s) Oral daily  atorvastatin 40 milliGRAM(s) Oral at bedtime  cholecalciferol 2000 Unit(s) Oral daily  clopidogrel Tablet 75 milliGRAM(s) Oral daily  enoxaparin Injectable 40 milliGRAM(s) SubCutaneous daily  furosemide   Injectable 20 milliGRAM(s) IV Push two times a day  hydrALAZINE 50 milliGRAM(s) Oral three times a day  insulin glargine Injectable (LANTUS) 20 Unit(s) SubCutaneous at bedtime  insulin lispro (HumaLOG) corrective regimen sliding scale   SubCutaneous three times a day before meals  lactated ringers. 1000 milliLiter(s) IV Continuous <Continuous>  levoFLOXacin IVPB 500 milliGRAM(s) IV Intermittent every 24 hours  levoFLOXacin IVPB      lisinopril 40 milliGRAM(s) Oral daily  loratadine 10 milliGRAM(s) Oral daily  metoclopramide Injectable 5 milliGRAM(s) IV Push every 6 hours PRN  metoprolol tartrate Injectable 5 milliGRAM(s) IV Push every 6 hours PRN  pantoprazole  Injectable 40 milliGRAM(s) IV Push two times a day      HEALTH ISSUES - PROBLEM Dx:  Prophylactic measure: Prophylactic measure  CHF (congestive heart failure): CHF (congestive heart failure)  CAD (coronary artery disease): CAD (coronary artery disease)  PVD (peripheral vascular disease): PVD (peripheral vascular disease)  Bladder wall thickening: Bladder wall thickening  DM (diabetes mellitus): DM (diabetes mellitus)  HTN (hypertension): HTN (hypertension)  Intractable abdominal pain: Intractable abdominal pain  Gastritis, acute: Gastritis, acute

## 2020-06-01 NOTE — PROGRESS NOTE ADULT - PROBLEM SELECTOR PLAN 3
CT abdomen shows bladder wall thickening  reports weight loss and loss of appetite recently, Patient has history of Stage 1 endometrial cancer and hysterectomy  brother also had cancer and she is not sure about which one  urology consulted: juan f f/u w/   - f/u urine cytology

## 2020-06-01 NOTE — PROGRESS NOTE ADULT - PROBLEM SELECTOR PLAN 7
H/o CAD and PCI in 2009,  patient has both AICD and pacemaker  EKG shows paced Rythm  No active issues  continue aspirin , Plavix, BB, statin, ACE

## 2020-06-01 NOTE — PROGRESS NOTE ADULT - PROBLEM SELECTOR PLAN 2
patient endorses moderate to severe abdominal pain  partially relieved with oral Protonix  UA negative, No fevers  More likely GI etiology  UCx: -ve  F/u CT abd w/ IV cont, CT liver and pancrea

## 2020-06-01 NOTE — DISCHARGE NOTE PROVIDER - PROVIDER TOKENS
PROVIDER:[TOKEN:[[id number]],FOLLOWUP:[2 weeks]] PROVIDER:[TOKEN:[[id number]],FOLLOWUP:[2 weeks]],PROVIDER:[TOKEN:[[id number]]],PROVIDER:[TOKEN:[[id number]]] PROVIDER:[TOKEN:[[id number]],FOLLOWUP:[2 weeks]],PROVIDER:[TOKEN:[[id number]]],PROVIDER:[TOKEN:[[id number]]],FREE:[LAST:[Dr. Chico Jeffries],FIRST:[PMD],PHONE:[phone]],FAX:[(   )    -]]

## 2020-06-01 NOTE — CONSULT NOTE ADULT - ASSESSMENT
A/P  PVD /PAD  DM  ulcer   dehiscence surgical wound   history of gangrene left hallux   positive probe to bone     P  pt seen and eval   treatment options discussed with pt and her daughter Ankita Keating ([place]) [phone]   contacted pts vascular surgeon  Dr. Tripp [phone] -waiting for call back   explained to daughter pt has non healed wound which is infected and with possible OM   and long term wound care/HBO vs revisional surgery discussed   wound cleaned with NS , gauze packing changed   pt will need full work up   will discuss with daughter and primary team tomorrow to finalize the plan of treatment A/P  PVD /PAD  DM  ulcer   dehiscence surgical wound   history of gangrene left hallux   positive probe to bone   leukocytosis   P  pt seen and eval   treatment options discussed with pt and her daughter Ankita Keating ([facility]) [phone]   contacted pts vascular surgeon  Dr. Tripp [phone] -waiting for call back   explained to daughter pt has non healed wound which is infected and with possible OM /abscess   and long term wound care/HBO vs revisional surgery discussed   wound cleaned with NS , gauze packing changed   pt will need full work up   vascular-follow   ESTRELLA/PVR   CT (can not have MRI ?) r/o abscess/OM  will discuss with daughter and primary team tomorrow to finalize the plan of treatment   thank you for this referral please contact with any patient related questions @[phone]

## 2020-06-01 NOTE — CONSULT NOTE ADULT - ASSESSMENT
73 YO female from home, H/O CAD/DM/HTN/PVD/hysterectomy/cholecystectomy ,with AICD comes to ED with intractable nausea and vomiting for 2 days. Patient states that she recently had left big toe amputation in the beginning of May and she was taking antibiotics. On Friday, her antibiotics were changed from clindamycin to Levaquin and after she took antibiotics along with percocet, she started having severe pain in epigastric region. Patient reports that it was sudden in onset, 7/10, burning & pressure like pain, non-radiating , associated with nausea,, bitter taste in mouth and projectile vomiting. She was not able to tolerate food and it aggravated her symptoms. Patient also reports significant weight( not sure how much) recently and decrease in appetite. patient denies fever, cough, SOB, constipation, dysuria, headache, chest pain, orthopnea, back pain, burning sensation in extremities.c/o pain over left big toe stump which is not resolving despite pain meds . afebrile on adm , covid neg. ID called for persistant leucocytosis and appr ab     # leucocytosis multifactorial ? left first toe stump underlying collection vs sec to transitional bladder ca /covid neg     # nausea/vomiting/abd discomfort most likley sec to oral ab she was taking     plan  blood cx times 2   rpt covid testing   ESR/CRP  ct of left first stump since cannot get MRI   cont doxy and cefipime   podiatry f/u   arterial dopplers /pvr/sandra   add probiotics   monitor for diarrhea       thnx will f/u   d/w intern dr diaz

## 2020-06-01 NOTE — PROGRESS NOTE ADULT - PROBLEM SELECTOR PLAN 8
H/o CHF but clinically patient is not in fluid overload  No peripheral edema  Patient takes furosemide 20mg daily twice  BNP: elevated at 9383  f/u echo

## 2020-06-01 NOTE — DISCHARGE NOTE PROVIDER - NSDCHHCONTACT_GEN_ALL_CORE_FT
CBC As certified below, I, or a nurse practitioner or physician assistant working with me, had a face-to-face encounter that meets the physician face-to-face encounter requirements.

## 2020-06-01 NOTE — PROGRESS NOTE ADULT - PROBLEM SELECTOR PLAN 4
Pt takes Lisinopril 40mg, toprol Xl 100mg, hydralazine 50mg TID   BP is running on higher side as patient missed her medications and also received a bolus in ED  c/w home medications with parameters   lipid profile, TG WNL  Dash diet  - monitor BP

## 2020-06-01 NOTE — DISCHARGE NOTE PROVIDER - NSDCHHNEEDSERVICE_GEN_ALL_CORE
Central venous access care/Rehabilitation services/Wound care and assessment/Medication teaching and assessment/Observation and assessment/Teaching and training

## 2020-06-01 NOTE — CONSULT NOTE ADULT - SUBJECTIVE AND OBJECTIVE BOX
HPI:  73 YO female from home, H/O CAD/DM/HTN/PVD/hysterectomy/cholecystectomy ,with AICD comes to ED with intractable nausea and vomiting for 2 days. Patient states that she recently had left big toe amputation in the beginning of May and she was taking antibiotics. On Friday, her antibiotics were changed from clindamycin to Levaquin and after she took antibiotics along with percocet, she started having severe pain in epigastric region. Patient reports that it was sudden in onset, 7/10, burning & pressure like pain, non-radiating , associated with nausea,, bitter taste in mouth and projectile vomiting. She was not able to tolerate food and it aggravated her symptoms. Patient also reports significant weight( not sure how much) recently and decrease in appetite. patient denies fever, cough, SOB, constipation, dysuria, headache, chest pain, orthopnea, back pain, burning sensation in extremities.c/o pain over left big toe stump which is not resolving despite pain meds . afebrile on adm , covid neg. ID called for persistant leucocytosis and appr ab     ED course; Patient is in mild distress due to nausea but otherwise stable  Vitals:     /88  SpO2 99% on RA    CT abdomen/pelvis:   NEW MULTI BLADDER WALL FOCAL AREAS OF SOFT TISSUE THICKENING CONCERNING FOR BLADDER CARCINOMA/TRANSITIONAL CELL CARCINOMA. No hydronephrosis.  Stomach not fully distended and gastric pathology cannot be excluded.  Status post cholecystectomy and hysterectomy.  Remaining abdominal pelvic viscera unremarkable.   No evidence of colitis.. (30 May 2020 14:56)      PAST MEDICAL & SURGICAL HISTORY:  PVD (peripheral vascular disease)  CAD (coronary artery disease)  CHF (congestive heart failure)  HTN (hypertension)  DM (diabetes mellitus)  Status post coronary artery stent placement  H/O: hysterectomy  Cardiac defibrillator in place  Artificial cardiac pacemaker  History of cholecystectomy      codeine (Rash)  penicillin (Rash) 40 yrs ago     meds  aluminum hydroxide/magnesium hydroxide/simethicone Suspension 30 milliLiter(s) Oral every 4 hours PRN  aspirin  chewable 81 milliGRAM(s) Oral daily  atorvastatin 40 milliGRAM(s) Oral at bedtime  cefepime   IVPB      cholecalciferol 2000 Unit(s) Oral daily  clopidogrel Tablet 75 milliGRAM(s) Oral daily  doxycycline IVPB 100 milliGRAM(s) IV Intermittent every 12 hours  enoxaparin Injectable 40 milliGRAM(s) SubCutaneous daily  hydrALAZINE 75 milliGRAM(s) Oral three times a day  insulin glargine Injectable (LANTUS) 20 Unit(s) SubCutaneous at bedtime  insulin lispro (HumaLOG) corrective regimen sliding scale   SubCutaneous three times a day before meals  lactated ringers. 1000 milliLiter(s) IV Continuous <Continuous>  lisinopril 40 milliGRAM(s) Oral daily  loratadine 10 milliGRAM(s) Oral daily  metoclopramide Injectable 10 milliGRAM(s) IV Push every 8 hours  metoprolol tartrate Injectable 5 milliGRAM(s) IV Push every 6 hours  pantoprazole  Injectable 40 milliGRAM(s) IV Push daily      Social Hx: no smoking no etoh     FAMILY HISTORY: non contributory         ROS  [  ] UNABLE TO ELICIT    General:  [--  ] Fever   [ -] Malaise    Skin: swelling of left first stump with pain     HEENT:  [ - ] Sore Throat  [-] Photophobia	    Chest: [ - ] SOB  [ - ] Cough     Cardiovascular: [ - ] CP	no pnd no orthopnea     Gastrointestinal: [ - ] Abd pain   [ x ] N    [ x ] V   [ - ] Diarrhea	    Genitourinary: [-  ] Polyuria   [ - ] Urgency   [ - ] Dysuria    [ - ]  Hematuria	    Musculoskeletal:  [ - ] Back Pain	    Neurological: [-  ]Dizziness  [ - ]Visual Disturbance  [ - ]Headaches      PHYSICAL EXAM:    Vital Signs Last 24 Hrs  T(C): 36.9 (01 Jun 2020 13:45), Max: 37.1 (01 Jun 2020 05:02)  T(F): 98.5 (01 Jun 2020 13:45), Max: 98.7 (01 Jun 2020 05:02)  HR: 105 (01 Jun 2020 17:40) (97 - 115)  BP: 156/83 (01 Jun 2020 17:40) (156/83 - 189/93)  BP(mean): --  RR: 18 (01 Jun 2020 13:45) (18 - 18)  SpO2: 95% (01 Jun 2020 13:45) (93% - 95%)    Constitutional: awake alert oriented times 3   [place] SCLERA anicteric EOMI   LUNGS clear   CVS s1 s2 aud no murmurs   ABDOMEN soft non tender no HSM   NEUROLOGY  no defecits   SKIN left first toe stump swelling tender no d/c /dried wound   EXTREMITIES no edema no cyanosis         LABS/DIAGNOSTIC TESTS                          10.5   15.54 )-----------( 376      ( 01 Jun 2020 05:53 )             32.9     WBC Count: 15.54 K/uL (06-01 @ 05:53)  WBC Count: 15.14 K/uL (05-31 @ 07:02)  WBC Count: 12.20 K/uL (05-30 @ 07:50)      06-01    137  |  101  |  28<H>  ----------------------------<  146<H>  3.8   |  26  |  1.18    Ca    9.3      01 Jun 2020 05:53  Phos  2.6     05-31  Mg     2.3     05-31    TPro  8.2  /  Alb  2.6<L>  /  TBili  0.6  /  DBili  x   /  AST  57<H>  /  ALT  129<H>  /  AlkPhos  326<H>  06-01          LIVER FUNCTIONS - ( 01 Jun 2020 05:53 )  Alb: 2.6 g/dL / Pro: 8.2 g/dL / ALK PHOS: 326 U/L / ALT: 129 U/L DA / AST: 57 U/L / GGT: x                 LACTATE: not done       Culture Results:   <10,000 CFU/mL Normal Urogenital Shannan (05-30 @ 16:36)        RADIOLOGY  < from: CT Abdomen and Pelvis w/ IV Cont (06.01.20 @ 10:35) >  Impression:    Small bilateral pleural effusions.    Multiple nodular peripheral filling defect in the urinary bladder measuring up to 1.3 cm, suspicious for transitional cell carcinomas. Cystoscopy is recommended for further evaluation.    Stable mild dilatation of the appendix measuring up to 8 mm in caliber. No evidence for periappendiceal stranding. Given its stability, mucocele of the appendix is considered. Acute appendicitis is possible but less likely due to no interval development of periappendiceal stranding.    Trace perihepatic ascites.                    OPAL BENAVIDES M.D., ATTENDING RADIOLOGIST  This document has been electronically signed. Jun 1 2020 11:10AM    < end of copied text >

## 2020-06-01 NOTE — DISCHARGE NOTE PROVIDER - CARE PROVIDER_API CALL
Jose Taylor J  UROLOGY  [address] [address] [place] [address]  Phone: [phone]  Fax: [phone]  Follow Up Time: 2 weeks Jose Taylor J  UROLOGY  [address] [address] [place] [address]  Phone: [phone]  Fax: [phone]  Follow Up Time: 2 weeks    Rylee Yo  PODIATRIC MEDICINE  [address] [place] [address]   [place] [address]  Phone: [phone]  Fax: [phone]  Follow Up Time:     Sidra Reagan  INTERNAL MEDICINE  [address] [place] [address]  Phone: [phone]  Fax: [phone]  Follow Up Time: Jose Taylor J  UROLOGY  [address] [address] [place] [address]  Phone: [phone]  Fax: [phone]  Follow Up Time: 2 weeks    Rylee Yo  PODIATRIC MEDICINE  [address] [place] [address]   [place] [address]  Phone: [phone]  Fax: [phone]  Follow Up Time:     Sidra Reagan  INTERNAL MEDICINE  [address] [place] [address]  Phone: [phone]  Fax: [phone]  Follow Up Time:     Dr. Chico Jeffries, PMD  Phone: [phone]  Fax: (   )    -  Follow Up Time:

## 2020-06-01 NOTE — CONSULT NOTE ADULT - SUBJECTIVE AND OBJECTIVE BOX
History of Present Illness: 	  73 YO female from home, H/O CAD/DM/HTN/PVD/hysterectomy/cholecystectomy ,with AICD comes to ED with intractable nausea and vomiting for 2 days. Patient states that she recently had left big toe amputation in the beginning of May and she was taking antibiotics. On Friday, her antibiotics were changed from clindamycin to Levaquin and after she took antibiotics along with percocet, she started having severe pain in epigastric region. Patient reports that it was sudden in onset, 7/10, burning & pressure like pain, non-radiating , associated with nausea,, bitter taste in mouth and projectile vomiting. She was not able to tolerate food and it aggravated her symptoms. Patient also reports significant weight( not sure how much) recently and decrease in appetite.   patient denies fever, cough, SOB, constipation, dysuria, headache, chest pain, orthopnea, back pain, burning sensation in extremities.  Vital Signs Last 24 Hrs  T(C): 36.9 (01 Jun 2020 13:45), Max: 37.1 (01 Jun 2020 05:02)  T(F): 98.5 (01 Jun 2020 13:45), Max: 98.7 (01 Jun 2020 05:02)  HR: 107 (01 Jun 2020 13:45) (97 - 115)  BP: 175/82 (01 Jun 2020 13:45) (156/85 - 189/93)  BP(mean): --  RR: 18 (01 Jun 2020 13:45) (18 - 18)  SpO2: 95% (01 Jun 2020 13:45) (93% - 97%)                        10.5   15.54 )-----------( 376      ( 01 Jun 2020 05:53 )             32.9   WBC Count: 15.54 K/uL (06-01 @ 05:53)  WBC Count: 15.14 K/uL (05-31 @ 07:02)  WBC Count: 12.20 K/uL (05-30 @ 07:50)  MEDICATIONS  (STANDING):  aspirin  chewable 81 milliGRAM(s) Oral daily  atorvastatin 40 milliGRAM(s) Oral at bedtime  cefepime   IVPB      cholecalciferol 2000 Unit(s) Oral daily  clopidogrel Tablet 75 milliGRAM(s) Oral daily  doxycycline IVPB 100 milliGRAM(s) IV Intermittent every 12 hours  enoxaparin Injectable 40 milliGRAM(s) SubCutaneous daily  hydrALAZINE 75 milliGRAM(s) Oral three times a day  insulin glargine Injectable (LANTUS) 20 Unit(s) SubCutaneous at bedtime  insulin lispro (HumaLOG) corrective regimen sliding scale   SubCutaneous three times a day before meals  lactated ringers. 1000 milliLiter(s) (60 mL/Hr) IV Continuous <Continuous>  lisinopril 40 milliGRAM(s) Oral daily  loratadine 10 milliGRAM(s) Oral daily  metoclopramide Injectable 10 milliGRAM(s) IV Push every 8 hours  metoprolol tartrate Injectable 5 milliGRAM(s) IV Push every 6 hours  pantoprazole  Injectable 40 milliGRAM(s) IV Push daily    MEDICATIONS  (PRN):  aluminum hydroxide/magnesium hydroxide/simethicone Suspension 30 milliLiter(s) Oral every 4 hours PRN Dyspepsia    pPAST MEDICAL & SURGICAL HISTORY:  PVD (peripheral vascular disease)  CAD (coronary artery disease)  CHF (congestive heart failure)  HTN (hypertension)  DM (diabetes mellitus)  Status post coronary artery stent placement  H/O: hysterectomy  Cardiac defibrillator in place  Artificial cardiac pacemaker  History of cholecystectomy  Allergies    codeine (Rash)  penicillin (Rash)    Intolerances    exam focused LE   left foot sp halux History of Present Illness: 	  75 YO female from home, H/O CAD/DM/HTN/PVD/hysterectomy/cholecystectomy ,with AICD comes to ED with intractable nausea and vomiting for 2 days. Patient states that she recently had left big toe amputation in the beginning of May and she was taking antibiotics. On Friday, her antibiotics were changed from clindamycin to Levaquin and after she took antibiotics along with percocet, she started having severe pain in epigastric region. Patient reports that it was sudden in onset, 7/10, burning & pressure like pain, non-radiating , associated with nausea,, bitter taste in mouth and projectile vomiting. She was not able to tolerate food and it aggravated her symptoms. Patient also reports significant weight( not sure how much) recently and decrease in appetite.   patient denies fever, cough, SOB, constipation, dysuria, headache, chest pain, orthopnea, back pain, burning sensation in extremities.  Vital Signs Last 24 Hrs  T(C): 36.9 (01 Jun 2020 13:45), Max: 37.1 (01 Jun 2020 05:02)  T(F): 98.5 (01 Jun 2020 13:45), Max: 98.7 (01 Jun 2020 05:02)  HR: 107 (01 Jun 2020 13:45) (97 - 115)  BP: 175/82 (01 Jun 2020 13:45) (156/85 - 189/93)  BP(mean): --  RR: 18 (01 Jun 2020 13:45) (18 - 18)  SpO2: 95% (01 Jun 2020 13:45) (93% - 97%)                        10.5   15.54 )-----------( 376      ( 01 Jun 2020 05:53 )             32.9   WBC Count: 15.54 K/uL (06-01 @ 05:53)  WBC Count: 15.14 K/uL (05-31 @ 07:02)  WBC Count: 12.20 K/uL (05-30 @ 07:50)  MEDICATIONS  (STANDING):  aspirin  chewable 81 milliGRAM(s) Oral daily  atorvastatin 40 milliGRAM(s) Oral at bedtime  cefepime   IVPB      cholecalciferol 2000 Unit(s) Oral daily  clopidogrel Tablet 75 milliGRAM(s) Oral daily  doxycycline IVPB 100 milliGRAM(s) IV Intermittent every 12 hours  enoxaparin Injectable 40 milliGRAM(s) SubCutaneous daily  hydrALAZINE 75 milliGRAM(s) Oral three times a day  insulin glargine Injectable (LANTUS) 20 Unit(s) SubCutaneous at bedtime  insulin lispro (HumaLOG) corrective regimen sliding scale   SubCutaneous three times a day before meals  lactated ringers. 1000 milliLiter(s) (60 mL/Hr) IV Continuous <Continuous>  lisinopril 40 milliGRAM(s) Oral daily  loratadine 10 milliGRAM(s) Oral daily  metoclopramide Injectable 10 milliGRAM(s) IV Push every 8 hours  metoprolol tartrate Injectable 5 milliGRAM(s) IV Push every 6 hours  pantoprazole  Injectable 40 milliGRAM(s) IV Push daily    MEDICATIONS  (PRN):  aluminum hydroxide/magnesium hydroxide/simethicone Suspension 30 milliLiter(s) Oral every 4 hours PRN Dyspepsia    pPAST MEDICAL & SURGICAL HISTORY:  PVD (peripheral vascular disease)  CAD (coronary artery disease)  CHF (congestive heart failure)  HTN (hypertension)  DM (diabetes mellitus)  Status post coronary artery stent placement  H/O: hysterectomy  Cardiac defibrillator in place  Artificial cardiac pacemaker  History of cholecystectomy  Allergies    codeine (Rash)  penicillin (Rash)    Intolerances  pt seen and evaluated at bedside , nervous about pain in her foot , but tolerated exam and dressing change very well   exam focused LE   left foot sp halux amputation march 3 ? ( as per patient )  there is an open on the distal surgical site in the area of 1 st met head    wound packed with gauze strip, no active drainage , no odor     wound is deep , probes to bone +,  mostly fibrotic base , hyperkeratotic border   DP/PT -0-1/2 out of 4   TG -WNL   CRF -1-2 sec x4   there is slight erythema ascending to the level of the 1st met base   over all skin is very dry/shiny, no pedal hair growth   senastion to light touch +  pt is able yo move her lower extremities

## 2020-06-02 DIAGNOSIS — T87.89 OTHER COMPLICATIONS OF AMPUTATION STUMP: ICD-10-CM

## 2020-06-02 LAB
ALBUMIN SERPL ELPH-MCNC: 2.6 G/DL — LOW (ref 3.5–5)
ALP SERPL-CCNC: 336 U/L — HIGH (ref 40–120)
ALT FLD-CCNC: 112 U/L DA — HIGH (ref 10–60)
ANION GAP SERPL CALC-SCNC: 11 MMOL/L — SIGNIFICANT CHANGE UP (ref 5–17)
AST SERPL-CCNC: 41 U/L — HIGH (ref 10–40)
BASOPHILS # BLD AUTO: 0.02 K/UL — SIGNIFICANT CHANGE UP (ref 0–0.2)
BASOPHILS NFR BLD AUTO: 0.1 % — SIGNIFICANT CHANGE UP (ref 0–2)
BILIRUB SERPL-MCNC: 0.7 MG/DL — SIGNIFICANT CHANGE UP (ref 0.2–1.2)
BUN SERPL-MCNC: 29 MG/DL — HIGH (ref 7–18)
CALCIUM SERPL-MCNC: 9.2 MG/DL — SIGNIFICANT CHANGE UP (ref 8.4–10.5)
CANCER AG125 SERPL-ACNC: 50 U/ML — HIGH
CANCER AG19-9 SERPL-ACNC: 23 U/ML — SIGNIFICANT CHANGE UP
CEA SERPL-MCNC: <0.6 NG/ML — SIGNIFICANT CHANGE UP (ref 0–3.8)
CHLORIDE SERPL-SCNC: 96 MMOL/L — SIGNIFICANT CHANGE UP (ref 96–108)
CO2 SERPL-SCNC: 29 MMOL/L — SIGNIFICANT CHANGE UP (ref 22–31)
CREAT SERPL-MCNC: 1.18 MG/DL — SIGNIFICANT CHANGE UP (ref 0.5–1.3)
CRP SERPL-MCNC: 11.22 MG/DL — HIGH (ref 0–0.4)
EOSINOPHIL # BLD AUTO: 0.02 K/UL — SIGNIFICANT CHANGE UP (ref 0–0.5)
EOSINOPHIL NFR BLD AUTO: 0.1 % — SIGNIFICANT CHANGE UP (ref 0–6)
ERYTHROCYTE [SEDIMENTATION RATE] IN BLOOD: 86 MM/HR — HIGH (ref 0–20)
FERRITIN SERPL-MCNC: 268 NG/ML — HIGH (ref 15–150)
GLUCOSE BLDC GLUCOMTR-MCNC: 141 MG/DL — HIGH (ref 70–99)
GLUCOSE BLDC GLUCOMTR-MCNC: 142 MG/DL — HIGH (ref 70–99)
GLUCOSE BLDC GLUCOMTR-MCNC: 179 MG/DL — HIGH (ref 70–99)
GLUCOSE BLDC GLUCOMTR-MCNC: 186 MG/DL — HIGH (ref 70–99)
GLUCOSE SERPL-MCNC: 172 MG/DL — HIGH (ref 70–99)
HCT VFR BLD CALC: 33.8 % — LOW (ref 34.5–45)
HGB BLD-MCNC: 10.7 G/DL — LOW (ref 11.5–15.5)
IMM GRANULOCYTES NFR BLD AUTO: 0.8 % — SIGNIFICANT CHANGE UP (ref 0–1.5)
IRON SATN MFR SERPL: 14 % — LOW (ref 15–50)
IRON SATN MFR SERPL: 33 UG/DL — LOW (ref 40–150)
LACTATE SERPL-SCNC: 2.4 MMOL/L — HIGH (ref 0.7–2)
LACTATE SERPL-SCNC: 3.2 MMOL/L — HIGH (ref 0.7–2)
LYMPHOCYTES # BLD AUTO: 14.6 % — SIGNIFICANT CHANGE UP (ref 13–44)
LYMPHOCYTES # BLD AUTO: 2.25 K/UL — SIGNIFICANT CHANGE UP (ref 1–3.3)
MAGNESIUM SERPL-MCNC: 2.5 MG/DL — SIGNIFICANT CHANGE UP (ref 1.6–2.6)
MCHC RBC-ENTMCNC: 28.8 PG — SIGNIFICANT CHANGE UP (ref 27–34)
MCHC RBC-ENTMCNC: 31.7 GM/DL — LOW (ref 32–36)
MCV RBC AUTO: 90.9 FL — SIGNIFICANT CHANGE UP (ref 80–100)
MONOCYTES # BLD AUTO: 1.28 K/UL — HIGH (ref 0–0.9)
MONOCYTES NFR BLD AUTO: 8.3 % — SIGNIFICANT CHANGE UP (ref 2–14)
NEUTROPHILS # BLD AUTO: 11.75 K/UL — HIGH (ref 1.8–7.4)
NEUTROPHILS NFR BLD AUTO: 76.1 % — SIGNIFICANT CHANGE UP (ref 43–77)
NON-GYNECOLOGICAL CYTOLOGY STUDY: SIGNIFICANT CHANGE UP
NRBC # BLD: 0 /100 WBCS — SIGNIFICANT CHANGE UP (ref 0–0)
PHOSPHATE SERPL-MCNC: 2.6 MG/DL — SIGNIFICANT CHANGE UP (ref 2.5–4.5)
PLATELET # BLD AUTO: 308 K/UL — SIGNIFICANT CHANGE UP (ref 150–400)
POTASSIUM SERPL-MCNC: 3.4 MMOL/L — LOW (ref 3.5–5.3)
POTASSIUM SERPL-SCNC: 3.4 MMOL/L — LOW (ref 3.5–5.3)
PROCALCITONIN SERPL-MCNC: 0.17 NG/ML — HIGH (ref 0.02–0.1)
PROT SERPL-MCNC: 7.8 G/DL — SIGNIFICANT CHANGE UP (ref 6–8.3)
RBC # BLD: 3.72 M/UL — LOW (ref 3.8–5.2)
RBC # FLD: 14.5 % — SIGNIFICANT CHANGE UP (ref 10.3–14.5)
SODIUM SERPL-SCNC: 136 MMOL/L — SIGNIFICANT CHANGE UP (ref 135–145)
TIBC SERPL-MCNC: 234 UG/DL — LOW (ref 250–450)
UIBC SERPL-MCNC: 201 UG/DL — SIGNIFICANT CHANGE UP (ref 110–370)
WBC # BLD: 15.44 K/UL — HIGH (ref 3.8–10.5)
WBC # FLD AUTO: 15.44 K/UL — HIGH (ref 3.8–10.5)

## 2020-06-02 PROCEDURE — 99233 SBSQ HOSP IP/OBS HIGH 50: CPT

## 2020-06-02 PROCEDURE — 73700 CT LOWER EXTREMITY W/O DYE: CPT | Mod: 26,LT

## 2020-06-02 RX ORDER — HYDRALAZINE HCL 50 MG
5 TABLET ORAL ONCE
Refills: 0 | Status: COMPLETED | OUTPATIENT
Start: 2020-06-02 | End: 2020-06-02

## 2020-06-02 RX ORDER — METOPROLOL TARTRATE 50 MG
5 TABLET ORAL EVERY 6 HOURS
Refills: 0 | Status: DISCONTINUED | OUTPATIENT
Start: 2020-06-02 | End: 2020-06-03

## 2020-06-02 RX ORDER — HYDROMORPHONE HYDROCHLORIDE 2 MG/ML
0.5 INJECTION INTRAMUSCULAR; INTRAVENOUS; SUBCUTANEOUS EVERY 6 HOURS
Refills: 0 | Status: DISCONTINUED | OUTPATIENT
Start: 2020-06-02 | End: 2020-06-04

## 2020-06-02 RX ORDER — POTASSIUM CHLORIDE 20 MEQ
10 PACKET (EA) ORAL EVERY 4 HOURS
Refills: 0 | Status: COMPLETED | OUTPATIENT
Start: 2020-06-02 | End: 2020-06-02

## 2020-06-02 RX ORDER — HYDRALAZINE HCL 50 MG
5 TABLET ORAL EVERY 8 HOURS
Refills: 0 | Status: DISCONTINUED | OUTPATIENT
Start: 2020-06-02 | End: 2020-06-03

## 2020-06-02 RX ORDER — SODIUM CHLORIDE 9 MG/ML
1000 INJECTION INTRAMUSCULAR; INTRAVENOUS; SUBCUTANEOUS
Refills: 0 | Status: DISCONTINUED | OUTPATIENT
Start: 2020-06-02 | End: 2020-06-04

## 2020-06-02 RX ORDER — SODIUM CHLORIDE 9 MG/ML
500 INJECTION INTRAMUSCULAR; INTRAVENOUS; SUBCUTANEOUS ONCE
Refills: 0 | Status: COMPLETED | OUTPATIENT
Start: 2020-06-02 | End: 2020-06-02

## 2020-06-02 RX ORDER — METOCLOPRAMIDE HCL 10 MG
10 TABLET ORAL EVERY 8 HOURS
Refills: 0 | Status: DISCONTINUED | OUTPATIENT
Start: 2020-06-02 | End: 2020-06-02

## 2020-06-02 RX ORDER — VANCOMYCIN HCL 1 G
1000 VIAL (EA) INTRAVENOUS EVERY 12 HOURS
Refills: 0 | Status: DISCONTINUED | OUTPATIENT
Start: 2020-06-02 | End: 2020-06-04

## 2020-06-02 RX ORDER — ONDANSETRON 8 MG/1
4 TABLET, FILM COATED ORAL EVERY 8 HOURS
Refills: 0 | Status: DISCONTINUED | OUTPATIENT
Start: 2020-06-02 | End: 2020-06-03

## 2020-06-02 RX ORDER — ACETAMINOPHEN 500 MG
1000 TABLET ORAL ONCE
Refills: 0 | Status: COMPLETED | OUTPATIENT
Start: 2020-06-02 | End: 2020-06-02

## 2020-06-02 RX ORDER — HYDROMORPHONE HYDROCHLORIDE 2 MG/ML
0.25 INJECTION INTRAMUSCULAR; INTRAVENOUS; SUBCUTANEOUS EVERY 6 HOURS
Refills: 0 | Status: DISCONTINUED | OUTPATIENT
Start: 2020-06-02 | End: 2020-06-04

## 2020-06-02 RX ORDER — LACTOBACILLUS ACIDOPHILUS 100MM CELL
1 CAPSULE ORAL
Refills: 0 | Status: DISCONTINUED | OUTPATIENT
Start: 2020-06-02 | End: 2020-06-09

## 2020-06-02 RX ADMIN — PANTOPRAZOLE SODIUM 40 MILLIGRAM(S): 20 TABLET, DELAYED RELEASE ORAL at 11:09

## 2020-06-02 RX ADMIN — CEFEPIME 25 MILLIGRAM(S): 1 INJECTION, POWDER, FOR SOLUTION INTRAMUSCULAR; INTRAVENOUS at 05:43

## 2020-06-02 RX ADMIN — SODIUM CHLORIDE 60 MILLILITER(S): 9 INJECTION, SOLUTION INTRAVENOUS at 09:35

## 2020-06-02 RX ADMIN — Medication 5 MILLIGRAM(S): at 07:46

## 2020-06-02 RX ADMIN — Medication 10 MILLIGRAM(S): at 10:39

## 2020-06-02 RX ADMIN — Medication 5 MILLIGRAM(S): at 11:09

## 2020-06-02 RX ADMIN — Medication 400 MILLIGRAM(S): at 10:39

## 2020-06-02 RX ADMIN — SODIUM CHLORIDE 60 MILLILITER(S): 9 INJECTION INTRAMUSCULAR; INTRAVENOUS; SUBCUTANEOUS at 10:50

## 2020-06-02 RX ADMIN — Medication 250 MILLIGRAM(S): at 17:40

## 2020-06-02 RX ADMIN — Medication 5 MILLIGRAM(S): at 22:16

## 2020-06-02 RX ADMIN — CEFEPIME 25 MILLIGRAM(S): 1 INJECTION, POWDER, FOR SOLUTION INTRAMUSCULAR; INTRAVENOUS at 17:01

## 2020-06-02 RX ADMIN — Medication 5 MILLIGRAM(S): at 17:01

## 2020-06-02 RX ADMIN — Medication 10 MILLIGRAM(S): at 05:08

## 2020-06-02 RX ADMIN — Medication 1 TABLET(S): at 17:01

## 2020-06-02 RX ADMIN — INSULIN GLARGINE 20 UNIT(S): 100 INJECTION, SOLUTION SUBCUTANEOUS at 22:12

## 2020-06-02 RX ADMIN — HYDROMORPHONE HYDROCHLORIDE 0.25 MILLIGRAM(S): 2 INJECTION INTRAMUSCULAR; INTRAVENOUS; SUBCUTANEOUS at 19:03

## 2020-06-02 RX ADMIN — Medication 5 MILLIGRAM(S): at 13:07

## 2020-06-02 RX ADMIN — Medication 30 MILLILITER(S): at 09:29

## 2020-06-02 RX ADMIN — ATORVASTATIN CALCIUM 40 MILLIGRAM(S): 80 TABLET, FILM COATED ORAL at 22:11

## 2020-06-02 RX ADMIN — ONDANSETRON 4 MILLIGRAM(S): 8 TABLET, FILM COATED ORAL at 13:40

## 2020-06-02 RX ADMIN — Medication 5 MILLIGRAM(S): at 05:01

## 2020-06-02 RX ADMIN — Medication 110 MILLIGRAM(S): at 05:02

## 2020-06-02 RX ADMIN — Medication 100 MILLIEQUIVALENT(S): at 09:35

## 2020-06-02 RX ADMIN — SODIUM CHLORIDE 1000 MILLILITER(S): 9 INJECTION INTRAMUSCULAR; INTRAVENOUS; SUBCUTANEOUS at 09:26

## 2020-06-02 RX ADMIN — Medication 5 MILLIGRAM(S): at 22:11

## 2020-06-02 RX ADMIN — Medication 1: at 08:00

## 2020-06-02 RX ADMIN — SODIUM CHLORIDE 75 MILLILITER(S): 9 INJECTION INTRAMUSCULAR; INTRAVENOUS; SUBCUTANEOUS at 16:30

## 2020-06-02 RX ADMIN — ONDANSETRON 4 MILLIGRAM(S): 8 TABLET, FILM COATED ORAL at 23:15

## 2020-06-02 RX ADMIN — Medication 30 MILLILITER(S): at 17:01

## 2020-06-02 RX ADMIN — Medication 100 MILLIEQUIVALENT(S): at 08:18

## 2020-06-02 NOTE — PROGRESS NOTE ADULT - SUBJECTIVE AND OBJECTIVE BOX
Patient is a 74y old  Female who presents with a chief complaint of Intractable nausea and vomiting (01 Jun 2020 21:11)      INTERVAL HPI/OVERNIGHT EVENTS:      REVIEW OF SYSTEMS:  CONSTITUTIONAL: No fever, weight loss, or fatigue  EYES: No eye pain, visual disturbances, or discharge  ENMT:  No difficulty hearing, tinnitus, vertigo; No sinus or throat pain  NECK: No pain or stiffness  BREASTS: No pain, masses, or nipple discharge  RESPIRATORY: No cough, wheezing, chills or hemoptysis; No shortness of breath  CARDIOVASCULAR: No chest pain, palpitations, dizziness, or leg swelling  GASTROINTESTINAL: No abdominal or epigastric pain. No nausea, vomiting, or hematemesis; No diarrhea or constipation. No melena or hematochezia.  GENITOURINARY: No dysuria, frequency, hematuria, or incontinence  NEUROLOGICAL: No headaches, memory loss, loss of strength, numbness, or tremors  SKIN: No itching, burning, rashes, or lesions   LYMPH NODES: No enlarged glands  ENDOCRINE: No heat or cold intolerance; No hair loss  MUSCULOSKELETAL: No joint pain or swelling; No muscle, back, or extremity pain  HEME/LYMPH: No easy bruising, or bleeding gums  ALLERY AND IMMUNOLOGIC: No hives or eczema    FAMILY HISTORY:    Vital Signs Last 24 Hrs  T(C): 36.7 (02 Jun 2020 05:04), Max: 36.9 (01 Jun 2020 13:45)  T(F): 98 (02 Jun 2020 05:04), Max: 98.5 (01 Jun 2020 13:45)  HR: 95 (02 Jun 2020 05:04) (88 - 107)  BP: 170/94 (02 Jun 2020 05:04) (156/83 - 189/93)  BP(mean): --  RR: 16 (02 Jun 2020 05:04) (16 - 18)  SpO2: 95% (02 Jun 2020 05:04) (93% - 95%)    06-01-20 @ 07:01  -  06-02-20 @ 07:00  --------------------------------------------------------  IN: 0 mL / OUT: 2 mL / NET: -2 mL        PHYSICAL EXAM:  GENERAL: NAD, well-groomed, well-developed  HEAD:  Atraumatic, Normocephalic  EYES: EOMI, PERRLA, conjunctiva and sclera clear  ENMT: No tonsillar erythema, exudates, or enlargement; Moist mucous membranes, Good dentition, No lesions  NECK: Supple, No JVD, Normal thyroid  NERVOUS SYSTEM:  Alert & Oriented X3, Good concentration; Motor Strength 5/5 B/L upper and lower extremities; DTRs 2+ intact and symmetric  CHEST/LUNG: Clear to percussion bilaterally; No rales, rhonchi, wheezing, or rubs  HEART: Regular rate and rhythm; No murmurs, rubs, or gallops  ABDOMEN: Soft, Nontender, Nondistended; Bowel sounds present  EXTREMITIES:  2+ Peripheral Pulses, No clubbing, cyanosis, or edema  LYMPH: No lymphadenopathy noted  SKIN: No rashes or lesions    Consultant(s) Notes Reviewed:  [x ] YES  [ ] NO  Care Discussed with Consultants/Other Providers [ x] YES  [ ] NO    LABS:        RADIOLOGY & ADDITIONAL TESTS:    Imaging Personally Reviewed:  [ ] YES  [ ] NO  aluminum hydroxide/magnesium hydroxide/simethicone Suspension 30 milliLiter(s) Oral every 4 hours PRN  aspirin  chewable 81 milliGRAM(s) Oral daily  atorvastatin 40 milliGRAM(s) Oral at bedtime  cefepime   IVPB 2000 milliGRAM(s) IV Intermittent every 12 hours  cefepime   IVPB      cholecalciferol 2000 Unit(s) Oral daily  clopidogrel Tablet 75 milliGRAM(s) Oral daily  doxycycline IVPB 100 milliGRAM(s) IV Intermittent every 12 hours  enoxaparin Injectable 40 milliGRAM(s) SubCutaneous daily  hydrALAZINE 75 milliGRAM(s) Oral three times a day  insulin glargine Injectable (LANTUS) 20 Unit(s) SubCutaneous at bedtime  insulin lispro (HumaLOG) corrective regimen sliding scale   SubCutaneous three times a day before meals  lactated ringers. 1000 milliLiter(s) IV Continuous <Continuous>  lisinopril 40 milliGRAM(s) Oral daily  loratadine 10 milliGRAM(s) Oral daily  melatonin 5 milliGRAM(s) Oral at bedtime  metoprolol tartrate Injectable 5 milliGRAM(s) IV Push every 6 hours  pantoprazole  Injectable 40 milliGRAM(s) IV Push daily  potassium chloride  10 mEq/100 mL IVPB 10 milliEquivalent(s) IV Intermittent every 4 hours      HEALTH ISSUES - PROBLEM Dx:  Prophylactic measure: Prophylactic measure  CHF (congestive heart failure): CHF (congestive heart failure)  CAD (coronary artery disease): CAD (coronary artery disease)  PVD (peripheral vascular disease): PVD (peripheral vascular disease)  Bladder wall thickening: Bladder wall thickening  DM (diabetes mellitus): DM (diabetes mellitus)  HTN (hypertension): HTN (hypertension)  Intractable abdominal pain: Intractable abdominal pain  Gastritis, acute: Gastritis, acute Patient is a 74y old  Female who presents with a chief complaint of Intractable nausea and vomiting (01 Jun 2020 21:11)      INTERVAL HPI/OVERNIGHT EVENTS:    - pt is still having nausea, states having vomiting a bit, but it's better. Pt is not tolerated for any PO meds or diet.  - Pt has pain on stump site on L great toe  - Explained that she needs CT of the foot, pt agreed.      REVIEW OF SYSTEMS:  CONSTITUTIONAL: No fever, (+)weight loss, no fatigue  EYES: No eye pain, visual disturbances, or discharge  ENMT:  No difficulty hearing, tinnitus, vertigo; No sinus or throat pain (+) liquid drip sensation in R ear  NECK: No pain or stiffness  BREASTS: No pain, masses, or nipple discharge  RESPIRATORY: No cough, wheezing, chills or hemoptysis; No shortness of breath  CARDIOVASCULAR: No chest pain, palpitations, dizziness, or leg swelling  GASTROINTESTINAL: No abdominal (+) epigastric pain, better than before.  (+) nausea, vomiting, or hematemesis; No diarrhea or constipation. No melena or hematochezia.  GENITOURINARY: No dysuria, frequency, hematuria, or incontinence  NEUROLOGICAL: No headaches, memory loss, loss of strength, numbness, or tremors  SKIN: No itching, burning, rashes, or lesions   LYMPH NODES: No enlarged glands  ENDOCRINE: No heat or cold intolerance; No hair loss  MUSCULOSKELETAL: No joint pain or swelling; No muscle, back, or extremity pain  HEME/LYMPH: No easy bruising, or bleeding gums  ALLERY AND IMMUNOLOGIC: No hives or eczema    FAMILY HISTORY:    Vital Signs Last 24 Hrs  T(C): 36.7 (02 Jun 2020 05:04), Max: 36.9 (01 Jun 2020 13:45)  T(F): 98 (02 Jun 2020 05:04), Max: 98.5 (01 Jun 2020 13:45)  HR: 95 (02 Jun 2020 05:04) (88 - 107)  BP: 170/94 (02 Jun 2020 05:04) (156/83 - 189/93)  BP(mean): --  RR: 16 (02 Jun 2020 05:04) (16 - 18)  SpO2: 95% (02 Jun 2020 05:04) (93% - 95%)    06-01-20 @ 07:01  -  06-02-20 @ 07:00  --------------------------------------------------------  IN: 0 mL / OUT: 2 mL / NET: -2 mL      PHYSICAL EXAM:  GENERAL: NAD, well-groomed  HEAD:  Atraumatic, Normocephalic  EYES: EOMI, PERRLA, conjunctiva and sclera clear, (+) conjunctiva anemic   ENMT: No tonsillar erythema, exudates, or enlargement; Moist mucous membranes, No lesions  NECK: Supple, No JVD, Normal thyroid  NERVOUS SYSTEM:  Alert & Oriented X3, Good concentration; Motor Strength 5/5 B/L upper and lower extremities  CHEST/LUNG: Clear to percussion bilaterally; No rales, rhonchi, wheezing, or rubs  HEART: Regular rate, tachycardia, No murmurs, rubs, or gallops  ABDOMEN: Soft, (+)tender on palpation to epigastric area, Nondistended; Bowel sounds present  EXTREMITIES:  2+ Peripheral Pulses, No clubbing, cyanosis, or edema (+) s/p L toe amputation  LYMPH: No lymphadenopathy noted  SKIN: No rashes or lesions      Consultant(s) Notes Reviewed:  [x ] YES  [ ] NO  Care Discussed with Consultants/Other Providers [ x] YES  [ ] NO    LABS:        RADIOLOGY & ADDITIONAL TESTS:    < from: CT Abdomen and Pelvis w/ IV Cont (06.01.20 @ 10:35) >  Findings: Limited sections through the lung bases demonstrate small bilateral pleural effusions, new on the right and slightly increased on the left. Small bilateral atelectasis.    No evidence for a calculus in the kidneys or ureters. No hydronephrosis. Small hypodense lesion in the left kidney, too small to characterize. No definite enhancing renal mass is identified. No suspicious filling defect is identified in the renal pelvises or ureters. Multiple nodular peripheral filling defect are seen in the urinary bladder measuring upto 1.3 cm, suspicious for transitional cell carcinomas. Cystoscopy is recommended for further evaluation.    Stable cholecystectomy clips. Small hypodense area in the left hepatic lobe adjacent to the falciform ligament, likely due to focal fatty infiltration. Allowing for the noncontrast technique, the common bile duct, pancreas, spleen and adrenals appear unremarkable.    Stable mild dilatation of the appendix measuring up to 8 mm in caliber. No evidence for periappendiceal stranding. No bowel obstruction.    Trace perihepatic ascites. No evidence for free air, or enlarged lymph node.    The uterus is not visualized, probably surgically absent.    Impression:    Small bilateral pleural effusions.    Multiple nodular peripheral filling defect in the urinary bladder measuring up to 1.3 cm, suspicious for transitional cell carcinomas. Cystoscopy is recommended for further evaluation.    Stable mild dilatation of the appendix measuring up to 8 mm in caliber. No evidence for periappendiceal stranding. Given its stability, mucocele of the appendix is considered. Acute appendicitis is possible but less likely due to no interval development of periappendiceal stranding.    Trace perihepatic ascites.      < end of copied text >    < from: Transthoracic Echocardiogram (06.01.20 @ 09:12) >  CONCLUSIONS:  1. Mitral annular calcification, and calcified mitral  leaflets with normal diastolic opening. Moderate mitral  regurgitation.  2. Calcified trileaflet aortic valve with normal opening.  3. Aortic Root: 2.6 cm.  4. Mildlydilated left atrium.  LA volume index = 37 cc/m2.  5. Normal left ventricular internal dimensions and wall  thicknesses.  6. Normal Left Ventricular Systolic Function,  (EF =  50-55%)  7. Diastolic function incompletely assessed.  8. Normal right atrium.  9. Normal right ventricular size with decreased RV systolic  function (TAPSE 1.3cm, tissue doppler .08m/s) . A device  lead is visualized in the right heart.  10. Unable to estimate RVSP.  11. There is mild tricuspid regurgitation.  12. There ismild pulmonic regurgitation.  13. Normal pericardium with no pericardial effusion.      < end of copied text >        Imaging Personally Reviewed:  [ ] YES  [ ] NO  aluminum hydroxide/magnesium hydroxide/simethicone Suspension 30 milliLiter(s) Oral every 4 hours PRN  aspirin  chewable 81 milliGRAM(s) Oral daily  atorvastatin 40 milliGRAM(s) Oral at bedtime  cefepime   IVPB 2000 milliGRAM(s) IV Intermittent every 12 hours  cefepime   IVPB      cholecalciferol 2000 Unit(s) Oral daily  clopidogrel Tablet 75 milliGRAM(s) Oral daily  doxycycline IVPB 100 milliGRAM(s) IV Intermittent every 12 hours  enoxaparin Injectable 40 milliGRAM(s) SubCutaneous daily  hydrALAZINE 75 milliGRAM(s) Oral three times a day  insulin glargine Injectable (LANTUS) 20 Unit(s) SubCutaneous at bedtime  insulin lispro (HumaLOG) corrective regimen sliding scale   SubCutaneous three times a day before meals  lactated ringers. 1000 milliLiter(s) IV Continuous <Continuous>  lisinopril 40 milliGRAM(s) Oral daily  loratadine 10 milliGRAM(s) Oral daily  melatonin 5 milliGRAM(s) Oral at bedtime  metoprolol tartrate Injectable 5 milliGRAM(s) IV Push every 6 hours  pantoprazole  Injectable 40 milliGRAM(s) IV Push daily  potassium chloride  10 mEq/100 mL IVPB 10 milliEquivalent(s) IV Intermittent every 4 hours      HEALTH ISSUES - PROBLEM Dx:  Prophylactic measure: Prophylactic measure  CHF (congestive heart failure): CHF (congestive heart failure)  CAD (coronary artery disease): CAD (coronary artery disease)  PVD (peripheral vascular disease): PVD (peripheral vascular disease)  Bladder wall thickening: Bladder wall thickening  DM (diabetes mellitus): DM (diabetes mellitus)  HTN (hypertension): HTN (hypertension)  Intractable abdominal pain: Intractable abdominal pain  Gastritis, acute: Gastritis, acute

## 2020-06-02 NOTE — CHART NOTE - NSCHARTNOTEFT_GEN_A_CORE
Upon Nutritional Assessment by the Registered Dietitian your patient was determined to meet criteria / has evidence of the following diagnosis/diagnoses:          [ ]  Mild Protein Calorie Malnutrition        [ ]  Moderate Protein Calorie Malnutrition        [ X ] Severe Protein Calorie Malnutrition        [ ] Unspecified Protein Calorie Malnutrition        [ ] Underweight / BMI <19        [ ] Morbid Obesity / BMI > 40      Findings as based on:  •  Comprehensive nutrition assessment and consultation  •  Calorie counts (nutrient intake analysis)  •  Food acceptance and intake status from observations by staff  •  Follow up  •  Patient education  •  Intervention secondary to interdisciplinary rounds  •   concerns      Treatment:    The following diet has been recommended: Advance diet or consider nutritional supplement if Clear liquid diet prolonged as medically feasible       PROVIDER Section:     By signing this assessment you are acknowledging and agree with the diagnosis/diagnoses assigned by the Registered Dietitian    Comments:

## 2020-06-02 NOTE — PROGRESS NOTE ADULT - SUBJECTIVE AND OBJECTIVE BOX
Vital Signs Last 24 Hrs  T(C): 36.8 (02 Jun 2020 10:45), Max: 36.8 (02 Jun 2020 10:45)  T(F): 98.2 (02 Jun 2020 10:45), Max: 98.2 (02 Jun 2020 10:45)  HR: 106 (02 Jun 2020 16:37) (88 - 106)  BP: 165/95 (02 Jun 2020 16:37) (156/83 - 182/74)  BP(mean): --  RR: 20 (02 Jun 2020 12:51) (16 - 20)  SpO2: 95% (02 Jun 2020 12:51) (95% - 95%)                        10.7   15.44 )-----------( 308      ( 02 Jun 2020 05:46 )             33.8   C-Reactive Protein, Serum: 11.22 mg/dL (06-02-20 @ 15:05)  WBC Count: 15.44 K/uL (06-02 @ 05:46)  WBC Count: 15.54 K/uL (06-01 @ 05:53)  WBC Count: 15.14 K/uL (05-31 @ 07:02)  WBC Count: 12.20 K/uL (05-30 @ 07:50)  Ferritin, Serum: 268 ng/mL (06-02-20 @ 10:02)  COVID-19 PCR: NotDetec (30 May 2020 11:51)  06-02    136  |  96  |  29<H>  ----------------------------<  172<H>  3.4<L>   |  29  |  1.18    Ca    9.2      02 Jun 2020 05:46  Phos  2.6     06-02  Mg     2.5     06-02    TPro  7.8  /  Alb  2.6<L>  /  TBili  0.7  /  DBili  x   /  AST  41<H>  /  ALT  112<H>  /  AlkPhos  336<H>  06-02 follow up on left foot wound     Vital Signs Last 24 Hrs  T(C): 36.8 (02 Jun 2020 10:45), Max: 36.8 (02 Jun 2020 10:45)  T(F): 98.2 (02 Jun 2020 10:45), Max: 98.2 (02 Jun 2020 10:45)  HR: 106 (02 Jun 2020 16:37) (88 - 106)  BP: 165/95 (02 Jun 2020 16:37) (156/83 - 182/74)  BP(mean): --  RR: 20 (02 Jun 2020 12:51) (16 - 20)  SpO2: 95% (02 Jun 2020 12:51) (95% - 95%)                        10.7   15.44 )-----------( 308      ( 02 Jun 2020 05:46 )             33.8   C-Reactive Protein, Serum: 11.22 mg/dL (06-02-20 @ 15:05)  WBC Count: 15.44 K/uL (06-02 @ 05:46)  WBC Count: 15.54 K/uL (06-01 @ 05:53)  WBC Count: 15.14 K/uL (05-31 @ 07:02)  WBC Count: 12.20 K/uL (05-30 @ 07:50)  Ferritin, Serum: 268 ng/mL (06-02-20 @ 10:02)  COVID-19 PCR: NotDetec (30 May 2020 11:51)  06-02    136  |  96  |  29<H>  ----------------------------<  172<H>  3.4<L>   |  29  |  1.18    Ca    9.2      02 Jun 2020 05:46  Phos  2.6     06-02  Mg     2.5     06-02    TPro  7.8  /  Alb  2.6<L>  /  TBili  0.7  /  DBili  x   /  AST  41<H>  /  ALT  112<H>  /  AlkPhos  336<H>  06-02  Sedimentation Rate, Erythrocyte: 86 mm/Hr (06-02-20 @ 13:48)  CAPILLARY BLOOD GLUCOSE      POCT Blood Glucose.: 179 mg/dL (02 Jun 2020 22:06)  POCT Blood Glucose.: 142 mg/dL (02 Jun 2020 17:03)  POCT Blood Glucose.: 141 mg/dL (02 Jun 2020 11:18)  POCT Blood Glucose.: 186 mg/dL (02 Jun 2020 07:51)    MEDICATIONS  (STANDING):  aluminum hydroxide/magnesium hydroxide/simethicone Suspension 30 milliLiter(s) Oral every 6 hours  aspirin  chewable 81 milliGRAM(s) Oral daily  atorvastatin 40 milliGRAM(s) Oral at bedtime  cefepime   IVPB 2000 milliGRAM(s) IV Intermittent every 12 hours  cefepime   IVPB      cholecalciferol 2000 Unit(s) Oral daily  clopidogrel Tablet 75 milliGRAM(s) Oral daily  enoxaparin Injectable 40 milliGRAM(s) SubCutaneous daily  hydrALAZINE Injectable 5 milliGRAM(s) IV Push every 8 hours  insulin glargine Injectable (LANTUS) 20 Unit(s) SubCutaneous at bedtime  insulin lispro (HumaLOG) corrective regimen sliding scale   SubCutaneous three times a day before meals  lactobacillus acidophilus 1 Tablet(s) Oral three times a day with meals  loratadine 10 milliGRAM(s) Oral daily  melatonin 5 milliGRAM(s) Oral at bedtime  metoprolol tartrate Injectable 5 milliGRAM(s) IV Push every 6 hours  pantoprazole  Injectable 40 milliGRAM(s) IV Push daily  sodium chloride 0.9%. 1000 milliLiter(s) (75 mL/Hr) IV Continuous <Continuous>  vancomycin  IVPB 1000 milliGRAM(s) IV Intermittent every 12 hours    MEDICATIONS  (PRN):  HYDROmorphone  Injectable 0.25 milliGRAM(s) IV Push every 6 hours PRN Moderate Pain (4 - 6)  HYDROmorphone  Injectable 0.5 milliGRAM(s) IV Push every 6 hours PRN Severe Pain (7 - 10)  ondansetron Injectable 4 milliGRAM(s) IV Push every 8 hours PRN Nausea and/or Vomiting    Allergies    codeine (Rash)  penicillin (Rash)    Intolerances      PAST MEDICAL & SURGICAL HISTORY:  PVD (peripheral vascular disease)  CAD (coronary artery disease)  CHF (congestive heart failure)  HTN (hypertension)  DM (diabetes mellitus)  Status post coronary artery stent placement  H/O: hysterectomy  Cardiac defibrillator in place  Artificial cardiac pacemaker  History of cholecystectomy  < from: CT Foot No Cont, Left (06.02.20 @ 19:00) >  ****PRELIMINARY REPORT******    ******PRELIMINARY REPORT******          EXAM:  CT FOOT ONLY LT                            PROCEDURE DATE:  06/02/2020    ******PRELIMINARY REPORT******    ******PRELIMINARY REPORT******              INTERPRETATION: [facility] RADIOLOGIST PRELIMINARY REPORT    PROCEDURE INFORMATION:   Exam: CT Left Lower Extremity Without Contrast, Foot   Exam date and time: 6/2/2020 6:59 PM   Age: 74 years old   Clinical indication: Other: R/O om     TECHNIQUE:   Imaging protocol: CT of the Left lower extremity without contrast was   performed. Exam focused on the foot.   3D rendering: MIP and/or 3D reconstructed images were created by the   technologist.     COMPARISON:   No relevant prior studies available.     FINDINGS:   Bones/joints: Previous amputation 1st toe at the MTP joint. Soft tissue ulcer   at the stump extending to the head of the 1st metatarsal, with cortical   irregularity and destructive changes at the head of the 1st metatarsal   consistent with osteomyelitis.   Soft tissues: Ill-defined possible developing fluid collection or abscess   plantar surface of the foot superficial to the sesamoids, and adjacent to the   soft tissue ulcer approximately 19 x 8 by 28 mm.   Vasculature: Small vessel atherosclerotic calcification.     IMPRESSION:   1. Previous amputation 1st toe at the MTP joint. Soft tissue ulcer at the stump   extending to the head of the 1st metatarsal, with cortical irregularity and   destructive changes at the head of the 1st metatarsal consistent with   osteomyelitis.   2. Ill-defined possible developing fluid collection or abscess plantar surface   of the foot superficial to the sesamoids, and adjacent to the soft tissue ulcer   approximately 19 x 8 by 28 mm.          ******PRELIMINARY REPORT******    ******PRELIMINARY REPORT******              SANJAY MAYS M.D.;[facility] RADIOLOGIST          < end of copied text >            Intolerances  pt seen and evaluated at bedside , reports nausea , not feeling well   less nervous about pain in her foot , but tolerated exam and dressing change very well   exam focused LE   left foot sp halux amputation May 3  ( as per patient )  there is an open on the distal surgical site in the area of 1 st met head    wound packed with gauze strip, no active drainage , no odor     wound is deep , probes to bone +,  mostly fibrotic base exposed tendons with possible calcifications /bone fragments ??  , hyperkeratotic border , there is no bleeding during exam an d wound exploration /cleaning   DP/PT -0-1/2 out of 4   TG -WNL   CRF -1-2 sec x4   there is slight erythema ascending to the level of the 1st met base , and plantar submet 1 head , the area is slightly tender , pt also reports throbbing pain in this area   over all skin is very dry/shiny, no pedal hair growth   senastion to light touch +  pt is able yo move her lower extremities

## 2020-06-02 NOTE — PROGRESS NOTE ADULT - PROBLEM SELECTOR PLAN 4
Pt takes Lisinopril 40mg, toprol Xl 100mg, hydralazine 50mg TID   BP is running on higher side as patient missed her medications and also received a bolus in ED  c/w home medications with parameters   lipid profile, TG WNL  Dash diet  - monitor BP recently antibiotics changed from clindamycin to Levaquin that led to epigastric pain.  WBC remains high 15k, possible infection on stump site  - ABX switched to cefepime + doxycycline 6/1-  - f/u CT L foot to r/o OM ( pt cannot get MRI dur to AICD)  Podiatry Dr. Yo  ID Dr. Reagan recently antibiotics changed from clindamycin to Levaquin that led to epigastric pain.  WBC remains high 15k, possible infection on stump site  - ABX switched to cefepime + doxycycline 6/1-  - lactate 2.4, procalcitonin 0.17  - f/u CT L foot to r/o OM ( pt cannot get MRI dur to AICD)   f/u repeat lactate  Podiatry Dr. Yo  ID Dr. Reagan

## 2020-06-02 NOTE — PROGRESS NOTE ADULT - PROBLEM SELECTOR PLAN 8
H/o CHF but clinically patient is not in fluid overload  No peripheral edema  Patient takes furosemide 20mg daily twice  BNP: elevated at 9383  f/u echo H/o CAD and PCI in 2009,  patient has both AICD and pacemaker  EKG shows paced Rythm  No active issues  continue aspirin , Plavix, BB, statin, ACE - pt is not tolerated to PO meds

## 2020-06-02 NOTE — PROGRESS NOTE ADULT - PROBLEM SELECTOR PLAN 1
- patient comes with acute onset of nausea, epigastric pain and vomiting x 2 days  - Non radiating, 7/10 ,burning and pressure like pain  - She has been taking antibiotics for almost three weeks  - Symptoms aggravate with food and partially relieved with omeprazole  - patient has also history of diabetes  - DDx include acute pill induced esophagitis & gastritis or gastropathy(uncontrolled DM)  - s/p Famotidine IV, zofran and Malox in Ed  - pt also received 1 bolus in ED  - c/w PPI, zofran Q4 PRN , reglan PRN for nausea  - Avoid NSAIDs and spicy food - patient comes with acute onset of nausea, epigastric pain and vomiting x 2 days  - Non radiating, 7/10 ,burning and pressure like pain  - She has been taking antibiotics for almost three weeks  - Symptoms aggravate with food and partially relieved with omeprazole  - patient has also history of diabetes  - DDx include acute pill induced esophagitis & gastritis or gastropathy(uncontrolled DM)  - s/p Famotidine IV, zofran and Malox in Ed  - pt also received 1 bolus in ED  - c/w PPI, Reglan q8h , maalox q6h  - Avoid NSAIDs and spicy food

## 2020-06-02 NOTE — PROGRESS NOTE ADULT - PROBLEM SELECTOR PLAN 9
IMPROVE VTE Individual Risk Assessment  RISK                                                                Points  [  ] Previous VTE                                                  3  [  ] Thrombophilia                                               2  [  ] Lower limb paralysis                                      2        (unable to hold up >15 seconds)    [  ] Current Cancer                                              2         (within 6 months)  [x  ] Immobilization > 24 hrs                                1  [  ] ICU/CCU stay > 24 hours                              1  [  x] Age > 60                                                      1    IMPROVE VTE Score 2  Lovenox 40mg SQ geoffrey;y H/o CHF but clinically patient is not in fluid overload  No peripheral edema  Patient takes furosemide 20mg daily twice  BNP: elevated at 9383  echo: EF 50-55%, mild TR, UT  - c/w lasix 20mg IV BID

## 2020-06-02 NOTE — PROGRESS NOTE ADULT - SUBJECTIVE AND OBJECTIVE BOX
Subjective: still with some nausea and vomiting but is improving. pt does not want rpt covid test since she has had the tests before her surgery which was neg . no fevers . for ct of left first stump      PHYSICAL EXAM:    Vital Signs Last 24 Hrs  T(C): 36.8 (02 Jun 2020 10:45), Max: 36.8 (02 Jun 2020 10:45)  T(F): 98.2 (02 Jun 2020 10:45), Max: 98.2 (02 Jun 2020 10:45)  HR: 97 (02 Jun 2020 19:08) (88 - 106)  BP: 135/67 (02 Jun 2020 19:08) (135/67 - 182/74)  BP(mean): --  RR: 20 (02 Jun 2020 12:51) (16 - 20)  SpO2: 95% (02 Jun 2020 12:51) (95% - 95%)    Constitutional: awake alert oriented times 3   [place] SCLERA anicteric EOMI   LUNGS clear   CVS s1 s2 aud no murmurs   ABDOMEN soft non tender no HSM   NEUROLOGY  no defecits   SKIN left first toe stump swelling tender no d/c /dried wound   EXTREMITIES no edema no cyanosis         LABS/DIAGNOSTIC TESTS                        10.7   15.44 )-----------( 308      ( 02 Jun 2020 05:46 )             33.8     06-02    136  |  96  |  29<H>  ----------------------------<  172<H>  3.4<L>   |  29  |  1.18    Ca    9.2      02 Jun 2020 05:46  Phos  2.6     06-02  Mg     2.5     06-02    TPro  7.8  /  Alb  2.6<L>  /  TBili  0.7  /  DBili  x   /  AST  41<H>  /  ALT  112<H>  /  AlkPhos  336<H>  06-02    Sedimentation Rate, Erythrocyte (06.02.20 @ 13:48)    Sedimentation Rate, Erythrocyte: 86 mm/Hr    C-Reactive Protein, Serum (06.02.20 @ 15:05)    C-Reactive Protein, Serum: 11.22 mg/dL          meds  aluminum hydroxide/magnesium hydroxide/simethicone Suspension 30 milliLiter(s) Oral every 6 hours  aspirin  chewable 81 milliGRAM(s) Oral daily  atorvastatin 40 milliGRAM(s) Oral at bedtime  cefepime   IVPB 2000 milliGRAM(s) IV Intermittent every 12 hours  cefepime   IVPB      cholecalciferol 2000 Unit(s) Oral daily  clopidogrel Tablet 75 milliGRAM(s) Oral daily  enoxaparin Injectable 40 milliGRAM(s) SubCutaneous daily  hydrALAZINE Injectable 5 milliGRAM(s) IV Push every 8 hours  HYDROmorphone  Injectable 0.25 milliGRAM(s) IV Push every 6 hours PRN  HYDROmorphone  Injectable 0.5 milliGRAM(s) IV Push every 6 hours PRN  insulin glargine Injectable (LANTUS) 20 Unit(s) SubCutaneous at bedtime  insulin lispro (HumaLOG) corrective regimen sliding scale   SubCutaneous three times a day before meals  lactobacillus acidophilus 1 Tablet(s) Oral three times a day with meals  loratadine 10 milliGRAM(s) Oral daily  melatonin 5 milliGRAM(s) Oral at bedtime  metoprolol tartrate Injectable 5 milliGRAM(s) IV Push every 6 hours  ondansetron Injectable 4 milliGRAM(s) IV Push every 8 hours PRN  pantoprazole  Injectable 40 milliGRAM(s) IV Push daily  sodium chloride 0.9%. 1000 milliLiter(s) IV Continuous <Continuous>  vancomycin  IVPB 1000 milliGRAM(s) IV Intermittent every 12 hours        CULTURES  Culture Results:   <10,000 CFU/mL Normal Urogenital Shannan (05-30 @ 16:36)        RADIOLOGY  no new xrays

## 2020-06-02 NOTE — PROGRESS NOTE ADULT - PROBLEM SELECTOR PLAN 2
patient endorses moderate to severe abdominal pain  partially relieved with oral Protonix  UA negative, No fevers  More likely GI etiology  UCx: -ve  F/u CT abd w/ IV cont, CT liver and pancrea patient endorses moderate to severe abdominal pain  partially relieved with oral Protonix  UA negative, No fevers  More likely GI etiology  UCx: -ve  CT abd w/ IV cont: Small bilateral pleural effusions. Multiple nodular peripheral filling defect in the urinary bladder measuring up to 1.3 cm, suspicious for transitional cell carcinomas.

## 2020-06-02 NOTE — PROGRESS NOTE ADULT - PROBLEM SELECTOR PLAN 7
H/o CAD and PCI in 2009,  patient has both AICD and pacemaker  EKG shows paced Rythm  No active issues  continue aspirin , Plavix, BB, statin, ACE Patient takes Levemir 30mg at night and 10Units pre-meals  c/w sliding scale and Lantus 20Units HS  Diabetic diet  Hb A1C: 8.1%  - monitor BS

## 2020-06-02 NOTE — DIETITIAN INITIAL EVALUATION ADULT. - DIET TYPE
Advance diet or consider nutritional supplement if Clear liquid diet prolonged as medically feasible/renal replacement pts:no protein restr,no conc K & phos, low sodium Advance diet or consider nutritional supplement if Clear liquid diet prolonged as medically feasible

## 2020-06-02 NOTE — DIETITIAN INITIAL EVALUATION ADULT. - PERTINENT LABORATORY DATA
06-02 Na136 mmol/L Glu 172 mg/dL<H> K+ 3.4 mmol/L<L> Cr  1.18 mg/dL BUN 29 mg/dL<H>   06-02 Phos 2.6 mg/dL   06-02 Alb 2.6 g/dL<L>       05-31 Chol 169 mg/dL LDL 93 mg/dL HDL 59 mg/dL Trig 87 mg/dL

## 2020-06-02 NOTE — PROGRESS NOTE ADULT - PROBLEM SELECTOR PLAN 6
H/o PVD and popliteal stent  s/p left big toe amputation in May 2020  recently antibiotics changed from clindamycin to Levaquin that led to epigastric pain.  c/w IV antibiotics as WBC count is slightly elevated and patient has to complete antibiotic course yet  WBC count slightly elevated  wound is dry and clean  f/u outpatient with vascular surgery  PT: home PT Pt takes Lisinopril 40mg, toprol Xl 100mg, hydralazine 50mg TID   BP is running on higher side as patient missed her medications and also received a bolus in ED  lipid profile, TG WNL  - pt is not tolerated for PO med.   - c/w hydralazine 5mg IV TID until pt can take PO meds  - monitor BP

## 2020-06-02 NOTE — PROGRESS NOTE ADULT - PROBLEM SELECTOR PLAN 5
Patient takes Levemir 30mg at night and 10Units premeals  Blood glucose is high  c/w sliding scale and Lantus 20Units HS  Diabetic diet  Hb A1C: 8.1% H/o PVD and popliteal stent  s/p left big toe amputation in May 2020  f/u ESTRELLA  f/u outpatient with vascular surgery  PT: home PT

## 2020-06-02 NOTE — DIETITIAN INITIAL EVALUATION ADULT. - NS FNS REASON FOR WEIGHT CHANG
fluid loss/altered GI function with acute gastritis, nausea/vomiting, intractable abdominal pain/decreased po intake

## 2020-06-02 NOTE — DIETITIAN INITIAL EVALUATION ADULT. - PROBLEM/PLAN-3
Shirley Breaux Patient Age: 42 year old  MESSAGE:   Patient states she has burning and urgency while urinating. Patient would like to know if a UTI screen can be ordered. Please advise.     WEIGHT AND HEIGHT:   Wt Readings from Last 1 Encounters:   08/14/17 60.8 kg (134 lb)     Ht Readings from Last 1 Encounters:   04/27/16 5' 3\" (1.6 m)     BMI Readings from Last 1 Encounters:   08/14/17 23.74 kg/m²       ALLERGIES: not on file.  No current outpatient medications on file.     No current facility-administered medications for this visit.      PHARMACY to use: n/a          Pharmacy preference(s) on file: No Pharmacies Listed    CALL BACK INFO: Ok to leave response (including medical information) on answering machine  ROUTING: Patient's physician/staff        PCP: No primary care provider on file.         INS: No billing information found for this encounter.   PATIENT ADDRESS:  [address] [place] [address]   DISPLAY PLAN FREE TEXT

## 2020-06-02 NOTE — PROGRESS NOTE ADULT - ASSESSMENT
A/P  PVD /PAD  DM  ulcer   dehiscence surgical wound   history of gangrene left hallux   positive probe to bone   leukocytosis   OM with possible abscess formation   P  pt seen and eval   treatment options discussed with pt   contacted pts vascular surgeon  Dr. Tripp [phone] -still waiting for call back   explained to daughter yesterday :  pt has non healed wound which is infected and with possible OM /abscess   wound cleaned with NS , gauze packing changed   pt will need full work up   vascular-follow   ESTRELLA/PVR   recommended vascular consult   CT reports supports clinical findings   pt needs wound debridement /  more proximal amputation   when medically optimized/stable and evaluated by vascular   deep cultures send -follow up   continue antibiotics as per id -id consult appreciated   continue wound packing with gauze strip   flash with Dakin's   thank you for this referral please contact with any patient related questions @[phone] A/P  PVD /PAD  DM  ulcer   dehiscence surgical wound   history of gangrene left hallux   positive probe to bone   leukocytosis   OM with possible abscess formation   P  pt seen and eval   treatment options discussed with pt   contacted pts vascular surgeon  Dr. Tripp [phone] -still waiting for call back   explained to daughter yesterday :  pt has non healed wound which is infected and with possible OM /abscess   wound cleaned with NS , gauze packing changed   pt will need full work up   vascular-follow   ESTRELLA/PVR   recommended vascular consult   CT reports supports clinical findings   pt needs wound debridement /  more proximal amputation   when medically optimized/stable and evaluated by vascular /cleared by medicine and cardiology   deep cultures send -follow up   continue antibiotics as per id -id consult appreciated   continue wound packing with gauze strip   flash with Dakin's   thank you for this referral please contact with any patient related questions @[phone]

## 2020-06-02 NOTE — DIETITIAN INITIAL EVALUATION ADULT. - ETIOLOGY
inadequate intake with altered GI function of acute gastritis, nausea/vomiting, intractable abdominal pain

## 2020-06-02 NOTE — DIETITIAN INITIAL EVALUATION ADULT. - PERTINENT MEDS FT
MEDICATIONS  (STANDING):  aspirin  chewable 81 milliGRAM(s) Oral daily  atorvastatin 40 milliGRAM(s) Oral at bedtime  cefepime   IVPB 2000 milliGRAM(s) IV Intermittent every 12 hours  cefepime   IVPB      cholecalciferol 2000 Unit(s) Oral daily  clopidogrel Tablet 75 milliGRAM(s) Oral daily  doxycycline IVPB 100 milliGRAM(s) IV Intermittent every 12 hours  enoxaparin Injectable 40 milliGRAM(s) SubCutaneous daily  hydrALAZINE 75 milliGRAM(s) Oral three times a day  insulin glargine Injectable (LANTUS) 20 Unit(s) SubCutaneous at bedtime  insulin lispro (HumaLOG) corrective regimen sliding scale   SubCutaneous three times a day before meals  lactated ringers. 1000 milliLiter(s) (60 mL/Hr) IV Continuous <Continuous>  lisinopril 40 milliGRAM(s) Oral daily  loratadine 10 milliGRAM(s) Oral daily  melatonin 5 milliGRAM(s) Oral at bedtime  metoprolol tartrate Injectable 5 milliGRAM(s) IV Push every 6 hours  pantoprazole  Injectable 40 milliGRAM(s) IV Push daily

## 2020-06-02 NOTE — DIETITIAN INITIAL EVALUATION ADULT. - NS FNS WEIGHT CHANGE REASON
unintentional/quantitity of wt loss questionable; Wt change in-house also may due to scale/fluid changes   Wt kfqv=990.2 lb 5/31/2020; 134.7 lb 6/1/2020; 125.8 lb 6/2/2020

## 2020-06-02 NOTE — PROGRESS NOTE ADULT - ASSESSMENT
75 YO female from home, H/O CAD/DM/HTN/PVD/hysterectomy/cholecystectomy ,with AICD comes to ED with intractable nausea and vomiting for 2 days. Patient states that she recently had left big toe amputation in the beginning of May and she was taking antibiotics. On Friday, her antibiotics were changed from clindamycin to Levaquin and after she took antibiotics along with percocet, she started having severe pain in epigastric region. Patient reports that it was sudden in onset, 7/10, burning & pressure like pain, non-radiating , associated with nausea,, bitter taste in mouth and projectile vomiting. She was not able to tolerate food and it aggravated her symptoms. Patient also reports significant weight( not sure how much) recently and decrease in appetite. patient denies fever, cough, SOB, constipation, dysuria, headache, chest pain, orthopnea, back pain, burning sensation in extremities.c/o pain over left big toe stump which is not resolving despite pain meds . afebrile on adm , covid neg. ID called for persistant leucocytosis and appr ab     # leucocytosis multifactorial ? left first toe stump underlying collection vs sec to transitional bladder ca /covid neg     # nausea/vomiting/abd discomfort most likley sec to oral ab she was taking     # elevated LFT ? unclear etiology    plan  blood cx times 2   rpt covid testing ( pt does not want to have it )  ct of left first stump since cannot get MRI   cont cefipime . d/c doxy as LFT elevated . change to vancomycin   vanco levels and adjust   podiatry f/u   arterial dopplers /pvr/sandra   add probiotics   monitor for diarrhea   GI EVAL OF ELEVATED LFT       thnx will f/u   d/w intern dr diaz

## 2020-06-02 NOTE — DIETITIAN INITIAL EVALUATION ADULT. - OTHER INFO
Pt alert, oriented, lives home PTA, high suspicious for COVID19, in airborne/contact isolation room, Unable to conduct a face to face interview due to limited contact restrictions related to pt's medical condition and isolation precautions, spoke to pt over phone ( bedside # [id number]), well communicated, vomiting/spitting up after taking some liquid breakfast today per nursing; nausea/vomiting x 2d PTA, unable to tolerate food, significant wt loss ( unclear details), decreased appetite recently per H&P; NPO/Clear Liquid diet x 2 to 3d in-house; s/p big toe amputation 05/2020 Pt alert, oriented, lives home PTA, high suspicious for COVID19, in airborne/contact isolation room, Unable to conduct a face to face interview due to limited contact restrictions related to pt's medical condition and isolation precautions, spoke to pt over phone ( bedside # [id number]), well communicated;  s/p big toe amputation 05/2020, nausea/vomiting x 2d PTA, unable to tolerate food, significant wt loss x 2 to 3 wks from 147 lb to current hh=103.8 lb 6/2/2020 ( see below ), decreased appetite with nausea/vomiting; NPO/Clear Liquid diet x 2 to 3d in-house; vomiting/spitting up after taking some liquid breakfast today per nursing; Discussed with RN Pt alert, oriented, lives home PTA, high suspicious for COVID19, in airborne/contact isolation room, Unable to conduct a face to face interview due to limited contact restrictions related to pt's medical condition and isolation precautions, spoke to pt over phone ( bedside # [id number]), well communicated, but interpreted after some information obtained as pt had to talk to RN, attempted to re-contact pt for a couple of times via phone, pt not answering;  s/p big toe amputation 05/2020, nausea/vomiting x 2d PTA, unable to tolerate food, significant wt loss x 2 to 3 wks from 147 lb to current vo=002.8 lb 6/2/2020 ( see below ), decreased appetite with nausea/vomiting; NPO/Clear Liquid diet x 2 to 3d in-house; vomiting/spitting up after taking some liquid breakfast today per nursing; Discussed with RN Pt alert, oriented, lives home PTA, high suspicious for COVID19, in airborne/contact isolation room, Unable to conduct a face to face interview due to limited contact restrictions related to pt's medical condition and isolation precautions, spoke to pt over phone ( bedside # [id number]), well communicated, but interpreted after some information obtained as pt had to talk to RN, attempted to re-contact pt for a couple of times via phone, pt not answering;  s/p big toe amputation 05/2020, nausea/vomiting x 2d PTA, unable to tolerate food, significant wt loss x 2 to 3 wks from 147 lb to current ik=482.8 lb 6/2/2020 ( see below ), decreased appetite with nausea/vomiting; NPO/Clear Liquid diet x 2 to 3d in-house; vomiting/spitting up after taking some liquid breakfast today per nursing; Discussed with RN/MD

## 2020-06-03 DIAGNOSIS — M86.9 OSTEOMYELITIS, UNSPECIFIED: ICD-10-CM

## 2020-06-03 DIAGNOSIS — R11.2 NAUSEA WITH VOMITING, UNSPECIFIED: ICD-10-CM

## 2020-06-03 LAB
ALBUMIN SERPL ELPH-MCNC: 2.7 G/DL — LOW (ref 3.5–5)
ALP SERPL-CCNC: 314 U/L — HIGH (ref 40–120)
ALT FLD-CCNC: 100 U/L DA — HIGH (ref 10–60)
ANION GAP SERPL CALC-SCNC: 11 MMOL/L — SIGNIFICANT CHANGE UP (ref 5–17)
AST SERPL-CCNC: 35 U/L — SIGNIFICANT CHANGE UP (ref 10–40)
BASOPHILS # BLD AUTO: 0.02 K/UL — SIGNIFICANT CHANGE UP (ref 0–0.2)
BASOPHILS NFR BLD AUTO: 0.1 % — SIGNIFICANT CHANGE UP (ref 0–2)
BILIRUB SERPL-MCNC: 0.7 MG/DL — SIGNIFICANT CHANGE UP (ref 0.2–1.2)
BUN SERPL-MCNC: 32 MG/DL — HIGH (ref 7–18)
CALCIUM SERPL-MCNC: 9.1 MG/DL — SIGNIFICANT CHANGE UP (ref 8.4–10.5)
CHLORIDE SERPL-SCNC: 99 MMOL/L — SIGNIFICANT CHANGE UP (ref 96–108)
CO2 SERPL-SCNC: 26 MMOL/L — SIGNIFICANT CHANGE UP (ref 22–31)
CREAT SERPL-MCNC: 1.12 MG/DL — SIGNIFICANT CHANGE UP (ref 0.5–1.3)
EOSINOPHIL # BLD AUTO: 0.01 K/UL — SIGNIFICANT CHANGE UP (ref 0–0.5)
EOSINOPHIL NFR BLD AUTO: 0.1 % — SIGNIFICANT CHANGE UP (ref 0–6)
GLUCOSE BLDC GLUCOMTR-MCNC: 125 MG/DL — HIGH (ref 70–99)
GLUCOSE BLDC GLUCOMTR-MCNC: 130 MG/DL — HIGH (ref 70–99)
GLUCOSE BLDC GLUCOMTR-MCNC: 135 MG/DL — HIGH (ref 70–99)
GLUCOSE BLDC GLUCOMTR-MCNC: 207 MG/DL — HIGH (ref 70–99)
GLUCOSE SERPL-MCNC: 136 MG/DL — HIGH (ref 70–99)
HCT VFR BLD CALC: 32.2 % — LOW (ref 34.5–45)
HGB BLD-MCNC: 10.3 G/DL — LOW (ref 11.5–15.5)
IMM GRANULOCYTES NFR BLD AUTO: 1 % — SIGNIFICANT CHANGE UP (ref 0–1.5)
LACTATE SERPL-SCNC: 1.4 MMOL/L — SIGNIFICANT CHANGE UP (ref 0.7–2)
LYMPHOCYTES # BLD AUTO: 1.66 K/UL — SIGNIFICANT CHANGE UP (ref 1–3.3)
LYMPHOCYTES # BLD AUTO: 11.6 % — LOW (ref 13–44)
MAGNESIUM SERPL-MCNC: 2.6 MG/DL — SIGNIFICANT CHANGE UP (ref 1.6–2.6)
MCHC RBC-ENTMCNC: 28.9 PG — SIGNIFICANT CHANGE UP (ref 27–34)
MCHC RBC-ENTMCNC: 32 GM/DL — SIGNIFICANT CHANGE UP (ref 32–36)
MCV RBC AUTO: 90.4 FL — SIGNIFICANT CHANGE UP (ref 80–100)
MONOCYTES # BLD AUTO: 0.97 K/UL — HIGH (ref 0–0.9)
MONOCYTES NFR BLD AUTO: 6.8 % — SIGNIFICANT CHANGE UP (ref 2–14)
NEUTROPHILS # BLD AUTO: 11.51 K/UL — HIGH (ref 1.8–7.4)
NEUTROPHILS NFR BLD AUTO: 80.4 % — HIGH (ref 43–77)
NRBC # BLD: 0 /100 WBCS — SIGNIFICANT CHANGE UP (ref 0–0)
PHOSPHATE SERPL-MCNC: 2.7 MG/DL — SIGNIFICANT CHANGE UP (ref 2.5–4.5)
PLATELET # BLD AUTO: 416 K/UL — HIGH (ref 150–400)
POTASSIUM SERPL-MCNC: 3.4 MMOL/L — LOW (ref 3.5–5.3)
POTASSIUM SERPL-SCNC: 3.4 MMOL/L — LOW (ref 3.5–5.3)
PROT SERPL-MCNC: 7.9 G/DL — SIGNIFICANT CHANGE UP (ref 6–8.3)
RBC # BLD: 3.56 M/UL — LOW (ref 3.8–5.2)
RBC # FLD: 14.8 % — HIGH (ref 10.3–14.5)
SODIUM SERPL-SCNC: 136 MMOL/L — SIGNIFICANT CHANGE UP (ref 135–145)
WBC # BLD: 14.32 K/UL — HIGH (ref 3.8–10.5)
WBC # FLD AUTO: 14.32 K/UL — HIGH (ref 3.8–10.5)

## 2020-06-03 PROCEDURE — 99232 SBSQ HOSP IP/OBS MODERATE 35: CPT

## 2020-06-03 PROCEDURE — 99222 1ST HOSP IP/OBS MODERATE 55: CPT

## 2020-06-03 RX ORDER — ONDANSETRON 8 MG/1
4 TABLET, FILM COATED ORAL EVERY 6 HOURS
Refills: 0 | Status: DISCONTINUED | OUTPATIENT
Start: 2020-06-03 | End: 2020-06-04

## 2020-06-03 RX ORDER — METOPROLOL TARTRATE 50 MG
7.5 TABLET ORAL EVERY 6 HOURS
Refills: 0 | Status: DISCONTINUED | OUTPATIENT
Start: 2020-06-03 | End: 2020-06-07

## 2020-06-03 RX ORDER — METOCLOPRAMIDE HCL 10 MG
10 TABLET ORAL EVERY 8 HOURS
Refills: 0 | Status: DISCONTINUED | OUTPATIENT
Start: 2020-06-03 | End: 2020-06-04

## 2020-06-03 RX ORDER — METOCLOPRAMIDE HCL 10 MG
10 TABLET ORAL ONCE
Refills: 0 | Status: COMPLETED | OUTPATIENT
Start: 2020-06-03 | End: 2020-06-03

## 2020-06-03 RX ORDER — SODIUM HYPOCHLORITE 0.125 %
1 SOLUTION, NON-ORAL MISCELLANEOUS DAILY
Refills: 0 | Status: DISCONTINUED | OUTPATIENT
Start: 2020-06-03 | End: 2020-06-09

## 2020-06-03 RX ORDER — POTASSIUM CHLORIDE 20 MEQ
10 PACKET (EA) ORAL
Refills: 0 | Status: COMPLETED | OUTPATIENT
Start: 2020-06-03 | End: 2020-06-03

## 2020-06-03 RX ORDER — HYDRALAZINE HCL 50 MG
10 TABLET ORAL EVERY 8 HOURS
Refills: 0 | Status: DISCONTINUED | OUTPATIENT
Start: 2020-06-03 | End: 2020-06-08

## 2020-06-03 RX ADMIN — Medication 5 MILLIGRAM(S): at 12:06

## 2020-06-03 RX ADMIN — Medication 5 MILLIGRAM(S): at 05:34

## 2020-06-03 RX ADMIN — Medication 250 MILLIGRAM(S): at 17:12

## 2020-06-03 RX ADMIN — Medication 10 MILLIGRAM(S): at 16:08

## 2020-06-03 RX ADMIN — CEFEPIME 25 MILLIGRAM(S): 1 INJECTION, POWDER, FOR SOLUTION INTRAMUSCULAR; INTRAVENOUS at 17:12

## 2020-06-03 RX ADMIN — Medication 5 MILLIGRAM(S): at 00:54

## 2020-06-03 RX ADMIN — CEFEPIME 25 MILLIGRAM(S): 1 INJECTION, POWDER, FOR SOLUTION INTRAMUSCULAR; INTRAVENOUS at 05:34

## 2020-06-03 RX ADMIN — Medication 5 MILLIGRAM(S): at 05:36

## 2020-06-03 RX ADMIN — Medication 7.5 MILLIGRAM(S): at 17:17

## 2020-06-03 RX ADMIN — Medication 30 MILLILITER(S): at 00:54

## 2020-06-03 RX ADMIN — PANTOPRAZOLE SODIUM 40 MILLIGRAM(S): 20 TABLET, DELAYED RELEASE ORAL at 12:06

## 2020-06-03 RX ADMIN — Medication 1 APPLICATION(S): at 15:52

## 2020-06-03 RX ADMIN — Medication 10 MILLIGRAM(S): at 15:51

## 2020-06-03 RX ADMIN — INSULIN GLARGINE 20 UNIT(S): 100 INJECTION, SOLUTION SUBCUTANEOUS at 21:58

## 2020-06-03 RX ADMIN — Medication 10 MILLIGRAM(S): at 00:54

## 2020-06-03 RX ADMIN — Medication 250 MILLIGRAM(S): at 05:34

## 2020-06-03 RX ADMIN — ENOXAPARIN SODIUM 40 MILLIGRAM(S): 100 INJECTION SUBCUTANEOUS at 12:06

## 2020-06-03 RX ADMIN — Medication 10 MILLIGRAM(S): at 21:57

## 2020-06-03 RX ADMIN — Medication 2: at 08:16

## 2020-06-03 RX ADMIN — Medication 10 MILLIGRAM(S): at 21:58

## 2020-06-03 NOTE — CONSULT NOTE ADULT - SUBJECTIVE AND OBJECTIVE BOX
Pt with hx left toe amp now with osteo at left foot surgical site.  Pt states she has had Left lower extremity stents placed recently at [facility].  Pt states she doesnt ambulate much, uses wheelchair and walker.  Presently no pain.  Denies claudication and rest pain.    ICU Vital Signs Last 24 Hrs  T(C): 36.9 (03 Jun 2020 14:20), Max: 36.9 (02 Jun 2020 21:20)  T(F): 98.5 (03 Jun 2020 14:20), Max: 98.5 (02 Jun 2020 21:20)  HR: 87 (03 Jun 2020 14:20) (87 - 106)  BP: 175/77 (03 Jun 2020 14:20) (135/67 - 179/90)  BP(mean): --  ABP: --  ABP(mean): --  RR: 18 (03 Jun 2020 14:20) (18 - 18)  SpO2: 98% (03 Jun 2020 14:20) (96% - 99%)                                         10.3   14.32 )-----------( 416      ( 03 Jun 2020 05:51 )             32.2   06-03    136  |  99  |  32<H>  ----------------------------<  136<H>  3.4<L>   |  26  |  1.12    Ca    9.1      03 Jun 2020 05:51  Phos  2.7     06-03  Mg     2.6     06-03    TPro  7.9  /  Alb  2.7<L>  /  TBili  0.7  /  DBili  x   /  AST  35  /  ALT  100<H>  /  AlkPhos  314<H>  06-03      MEDICATIONS  (STANDING):  aluminum hydroxide/magnesium hydroxide/simethicone Suspension 30 milliLiter(s) Oral every 6 hours  aspirin  chewable 81 milliGRAM(s) Oral daily  atorvastatin 40 milliGRAM(s) Oral at bedtime  cefepime   IVPB 2000 milliGRAM(s) IV Intermittent every 12 hours  cefepime   IVPB      cholecalciferol 2000 Unit(s) Oral daily  clopidogrel Tablet 75 milliGRAM(s) Oral daily  enoxaparin Injectable 40 milliGRAM(s) SubCutaneous daily  hydrALAZINE Injectable 5 milliGRAM(s) IV Push every 8 hours  insulin glargine Injectable (LANTUS) 20 Unit(s) SubCutaneous at bedtime  insulin lispro (HumaLOG) corrective regimen sliding scale   SubCutaneous three times a day before meals  lactobacillus acidophilus 1 Tablet(s) Oral three times a day with meals  loratadine 10 milliGRAM(s) Oral daily  melatonin 5 milliGRAM(s) Oral at bedtime  metoprolol tartrate Injectable 5 milliGRAM(s) IV Push every 6 hours  pantoprazole  Injectable 40 milliGRAM(s) IV Push daily  sodium chloride 0.9%. 1000 milliLiter(s) (75 mL/Hr) IV Continuous <Continuous>  vancomycin  IVPB 1000 milliGRAM(s) IV Intermittent every 12 hours    MEDICATIONS  (PRN):  HYDROmorphone  Injectable 0.25 milliGRAM(s) IV Push every 6 hours PRN Moderate Pain (4 - 6)  HYDROmorphone  Injectable 0.5 milliGRAM(s) IV Push every 6 hours PRN Severe Pain (7 - 10)  ondansetron Injectable 4 milliGRAM(s) IV Push every 8 hours PRN Nausea and/or Vomiting    Allergies    codeine (Rash)  penicillin (Rash)    Intolerances      PAST MEDICAL & SURGICAL HISTORY:  PVD (peripheral vascular disease)  CAD (coronary artery disease)  CHF (congestive heart failure)  HTN (hypertension)  DM (diabetes mellitus)  Status post coronary artery stent placement  H/O: hysterectomy  Cardiac defibrillator in place  Artificial cardiac pacemaker  History of cholecystectomy  < from: CT Foot No Cont, Left (06.02.20 @ 19:00) >  ****PRELIMINARY REPORT******    ******PRELIMINARY REPORT******          EXAM:  CT FOOT ONLY LT                            PROCEDURE DATE:  06/02/2020    ******PRELIMINARY REPORT******    ******PRELIMINARY REPORT******              INTERPRETATION: [facility] RADIOLOGIST PRELIMINARY REPORT    PROCEDURE INFORMATION:   Exam: CT Left Lower Extremity Without Contrast, Foot   Exam date and time: 6/2/2020 6:59 PM   Age: 74 years old   Clinical indication: Other: R/O om     TECHNIQUE:   Imaging protocol: CT of the Left lower extremity without contrast was   performed. Exam focused on the foot.   3D rendering: MIP and/or 3D reconstructed images were created by the   technologist.     COMPARISON:   No relevant prior studies available.     FINDINGS:   Bones/joints: Previous amputation 1st toe at the MTP joint. Soft tissue ulcer   at the stump extending to the head of the 1st metatarsal, with cortical   irregularity and destructive changes at the head of the 1st metatarsal   consistent with osteomyelitis.   Soft tissues: Ill-defined possible developing fluid collection or abscess   plantar surface of the foot superficial to the sesamoids, and adjacent to the   soft tissue ulcer approximately 19 x 8 by 28 mm.   Vasculature: Small vessel atherosclerotic calcification.     IMPRESSION:   1. Previous amputation 1st toe at the MTP joint. Soft tissue ulcer at the stump   extending to the head of the 1st metatarsal, with cortical irregularity and   destructive changes at the head of the 1st metatarsal consistent with   osteomyelitis.   2. Ill-defined possible developing fluid collection or abscess plantar surface   of the foot superficial to the sesamoids, and adjacent to the soft tissue ulcer   approximately 19 x 8 by 28 mm.          ******PRELIMINARY REPORT******    ******PRELIMINARY REPORT******              SANJAY MAYS M.D.;[facility] RADIOLOGIST          < end of copied text >       left foot sp halux amputation May 3  ( as per patient )  open on the distal surgical site in the area of 1 st met head    wound packed with gauze strip, no active drainage , no odor     wound is deep , probes to bone +,  mostly fibrotic base exposed tendons with possible calcifications /bone fragments ??  , hyperkeratotic border , there is no bleeding during exam an d wound exploration /cleaning   DP/PT -non-palpable  TG -WNL   CRF -1-2 sec x4   there is slight erythema ascending to the level of the 1st met base , and plantar submet 1 head , the area is slightly tender , pt also reports throbbing pain in this area   over all skin is very dry/shiny, no pedal hair growth   senastion to light touch +  pt is able yo move her lower extremities     Assessment and Plan:   · Assessment		  A/P  PVD /PAD  DM  ulcer   dehiscence surgical wound   history of gangrene left hallux   leukocytosis   OM with possible abscess formation   recent vascular stent? LLE at [facility]?    ESTRELLA/PVR pending,   pt planned for wound debridement /  more proximal amputation   when medically optimized/stable and evaluated by vascular /cleared by medicine and cardiology   continue antibiotics as per id -id consult appreciated   local wound care per podiatry  please obtain records from [facility] vascular surgeon  will determine need for further imaging after above info obtained

## 2020-06-03 NOTE — PROGRESS NOTE ADULT - PROBLEM SELECTOR PLAN 4
recently antibiotics changed from clindamycin to Levaquin that led to epigastric pain.  WBC remains high 15k, possible infection on stump site  - ABX switched to cefepime + doxycycline 6/1-  - lactate 2.4, procalcitonin 0.17  - f/u CT L foot to r/o OM ( pt cannot get MRI dur to AICD)   f/u repeat lactate  Podiatry Dr. Yo  ID Dr. Reagan H/o PVD and popliteal stent  s/p left big toe amputation in May 2020  f/u ESTRELLA  f/u outpatient with vascular surgery  PT: home PT  Vascular is following

## 2020-06-03 NOTE — PROGRESS NOTE ADULT - PROBLEM SELECTOR PLAN 6
Pt takes Lisinopril 40mg, toprol Xl 100mg, hydralazine 50mg TID   BP is running on higher side as patient missed her medications and also received a bolus in ED  lipid profile, TG WNL  - pt is not tolerated for PO med.   - c/w hydralazine 5mg IV TID until pt can take PO meds  - monitor BP Pt takes Lisinopril 40mg, toprol Xl 100mg, hydralazine 50mg TID   BP is running on higher side as patient missed her medications and also received a bolus in ED  lipid profile, TG WNL  - pt is not tolerated for PO med.   - c/w hydralazine increased to 10mg IV TID until pt can take PO meds  - monitor BP Pt takes Lisinopril 40mg, toprol Xl 100mg, hydralazine 50mg TID   BP is running on higher side as patient missed her medications and also received a bolus in ED  lipid profile, TG WNL  - pt is not tolerated for PO med.   - c/w hydralazine increased to 10mg IV TID, lopressor IV 7.5mg q6h until pt can take PO meds  - monitor BP

## 2020-06-03 NOTE — CONSULT NOTE ADULT - SUBJECTIVE AND OBJECTIVE BOX
Patient is a 74y old  Female who presents with a chief complaint of Intractable nausea and vomiting (2020 15:37)    75 YO female from home, H/O CAD/DM/HTN/PVD/hysterectomy/cholecystectomy ,with AICD comes to ED with intractable nausea and vomiting for 2 days. Patient states that she recently had left big toe amputation in the beginning of May and she was taking antibiotics. On Friday, her antibiotics were changed from clindamycin to Levaquin and after she took antibiotics along with percocet, she started having severe pain in epigastric region. Patient reports that it was sudden in onset, 7/10, burning & pressure like pain, non-radiating , associated with nausea,, bitter taste in mouth and projectile vomiting. She was not able to tolerate food and it aggravated her symptoms. Patient also reports significant weight( not sure how much) recently and decrease in appetite.     REVIEW OF SYSTEMS  Constitutional:   No fever, no fatigue, no pallor, no night sweats, no weight loss.  HEENT:   No eye pain, no vision changes, no icterus, no mouth ulcers.  Respiratory:   No shortness of breath, no cough, no respiratory distress.   Cardiovascular:   No chest pain, no palpitations.   Gastrointestinal: No abdominal pain, + nausea, no vomiting , no diarrheal no constipation, no hematochezia, no melena.  Skin:   No rashes, no jaundice, no eczema.   Musculoskeletal:   No joint pain, no swelling, no myalgia.   Neurologic:   No headache, no seizure, no weakness.   Genitourinary:   No dysuria, no decreased urine output.  Psychiatric:  No depression, no anxiety,   Endocrine:   No thyroid disease, no diabetes.  Heme/Lymphatic:   No anemia, no blood transfusions, no lymph node enlargement, no bleeding, no bruising.  ___________________________________________________________________________________________  Allergies    codeine (Rash)  penicillin (Rash)    Intolerances      MEDICATIONS  (STANDING):  aluminum hydroxide/magnesium hydroxide/simethicone Suspension 30 milliLiter(s) Oral every 6 hours  aspirin  chewable 81 milliGRAM(s) Oral daily  atorvastatin 40 milliGRAM(s) Oral at bedtime  cefepime   IVPB 2000 milliGRAM(s) IV Intermittent every 12 hours  cefepime   IVPB      cholecalciferol 2000 Unit(s) Oral daily  clopidogrel Tablet 75 milliGRAM(s) Oral daily  Dakins Solution - 1/4 Strength 1 Application(s) Topical daily  enoxaparin Injectable 40 milliGRAM(s) SubCutaneous daily  hydrALAZINE Injectable 10 milliGRAM(s) IV Push every 8 hours  insulin glargine Injectable (LANTUS) 20 Unit(s) SubCutaneous at bedtime  insulin lispro (HumaLOG) corrective regimen sliding scale   SubCutaneous three times a day before meals  lactobacillus acidophilus 1 Tablet(s) Oral three times a day with meals  loratadine 10 milliGRAM(s) Oral daily  melatonin 5 milliGRAM(s) Oral at bedtime  metoclopramide Injectable 10 milliGRAM(s) IV Push every 8 hours  metoprolol tartrate Injectable 5 milliGRAM(s) IV Push every 6 hours  pantoprazole  Injectable 40 milliGRAM(s) IV Push daily  sodium chloride 0.9%. 1000 milliLiter(s) (75 mL/Hr) IV Continuous <Continuous>  vancomycin  IVPB 1000 milliGRAM(s) IV Intermittent every 12 hours    MEDICATIONS  (PRN):  HYDROmorphone  Injectable 0.25 milliGRAM(s) IV Push every 6 hours PRN Moderate Pain (4 - 6)  HYDROmorphone  Injectable 0.5 milliGRAM(s) IV Push every 6 hours PRN Severe Pain (7 - 10)  ondansetron Injectable 4 milliGRAM(s) IV Push every 6 hours PRN Nausea and/or Vomiting      PAST MEDICAL & SURGICAL HISTORY:  PVD (peripheral vascular disease)  CAD (coronary artery disease)  CHF (congestive heart failure)  HTN (hypertension)  DM (diabetes mellitus)  Status post coronary artery stent placement  H/O: hysterectomy  Cardiac defibrillator in place  Artificial cardiac pacemaker  History of cholecystectomy    FAMILY HISTORY:    Social History: No hsitory of : Tobacco use, IVDA, EToH  ______________________________________________________________________________________    PHYSICAL EXAM    Daily     Daily Weight in k.5 (2020 05:34)  BMI: 27.8 (05-30 @ 06:59)  Change in Weight:  Vital Signs Last 24 Hrs  T(C): 36.9 (2020 14:20), Max: 36.9 (2020 21:20)  T(F): 98.5 (2020 14:20), Max: 98.5 (2020 21:20)  HR: 87 (2020 14:20) (87 - 106)  BP: 175/77 (2020 14:20) (135/67 - 179/90)  BP(mean): --  RR: 18 (2020 14:20) (18 - 18)  SpO2: 98% (2020 14:20) (96% - 99%)    General:  Well developed, well nourished, alert and active, no pallor, NAD.  HEENT:    Normal appearance of conjunctiva, ears, nose, lips, oropharynx, and oral mucosa, anicteric.  Neck:  No masses, no asymmetry.  Lymph Nodes:  No lymphadenopathy.   Cardiovascular:  RRR normal S1/S2, no murmur.  Respiratory:  CTA B/L, normal respiratory effort.   Abdominal:   soft, no masses or tenderness, normoactive BS, NT/ND, no HSM.  Extremities:   No clubbing or cyanosis, normal capillary refill, no edema.   Skin:   No rash, jaundice, lesions, eczema.   Musculoskeletal:  No joint swelling, erythema or tenderness.   Neuro: No focal deficits.   Other:   _______________________________________________________________________________________________  Lab Results:                          10.3   14.32 )-----------( 416      ( 2020 05:51 )             32.2     06-03    136  |  99  |  32<H>  ----------------------------<  136<H>  3.4<L>   |  26  |  1.12    Ca    9.1      2020 05:51  Phos  2.7       Mg     2.6         TPro  7.9  /  Alb  2.7<L>  /  TBili  0.7  /  DBili  x   /  AST  35  /  ALT  100<H>  /  AlkPhos  314<H>      LIVER FUNCTIONS - ( 2020 05:51 )  Alb: 2.7 g/dL / Pro: 7.9 g/dL / ALK PHOS: 314 U/L / ALT: 100 U/L DA / AST: 35 U/L / GGT: x                   Stool Results:          RADIOLOGY RESULTS:  < from: CT Abdomen and Pelvis w/ IV Cont (20 @ 10:35) >    EXAM:  CT ABDOMEN AND PELVIS IC                            PROCEDURE DATE:  2020          INTERPRETATION:  CT of the abdomen and pelvis without and with IV contrast (CT urogram)    Indication: Urinary bladder wall thickening.    Technique: Axial multidetector CT images of the abdomen and pelvis are acquired without and with IV contrast (90 cc Omnipaque-350 administered, 10 cc discarded) according to our CT urogram protocol.     Comparison: 2020.    Findings: Limited sections through the lung bases demonstrate small bilateral pleural effusions, new on the right and slightly increased on the left. Small bilateral atelectasis.    No evidence for a calculus in the kidneys or ureters. No hydronephrosis. Small hypodense lesion in the left kidney, too small to characterize. No definite enhancing renal mass is identified. No suspicious filling defect is identified in the renal pelvises or ureters. Multiple nodular peripheral filling defect are seen in the urinary bladder measuring upto 1.3 cm, suspicious for transitional cell carcinomas. Cystoscopy is recommended for further evaluation.    Stable cholecystectomy clips. Small hypodense area in the left hepatic lobe adjacent to the falciform ligament, likely due to focal fatty infiltration. Allowing for the noncontrast technique, the common bile duct, pancreas, spleen and adrenals appear unremarkable.    Stable mild dilatation of the appendix measuring up to 8 mm in caliber. No evidence for periappendiceal stranding. No bowel obstruction.    Trace perihepatic ascites. No evidence for free air, or enlarged lymph node.    The uterus is not visualized, probably surgically absent.    Impression:    Small bilateral pleural effusions.    Multiple nodular peripheral filling defect in the urinary bladder measuring up to 1.3 cm, suspicious for transitional cell carcinomas. Cystoscopy is recommended for further evaluation.    Stable mild dilatation of the appendix measuring up to 8 mm in caliber. No evidence for periappendiceal stranding. Given its stability, mucocele of the appendix is considered. Acute appendicitis is possible but less likely due to no interval development of periappendiceal stranding.    Trace perihepatic ascites.                    OPAL BENAVIDES M.D., ATTENDING RADIOLOGIST  This document has been electronically signed. 2020 11:10AM                < end of copied text >    SURGICAL PATHOLOGY:

## 2020-06-03 NOTE — PROGRESS NOTE ADULT - PROBLEM SELECTOR PLAN 2
patient endorses moderate to severe abdominal pain  partially relieved with oral Protonix  UA negative, No fevers  More likely GI etiology  UCx: -ve  CT abd w/ IV cont: Small bilateral pleural effusions. Multiple nodular peripheral filling defect in the urinary bladder measuring up to 1.3 cm, suspicious for transitional cell carcinomas. recently antibiotics changed from clindamycin to Levaquin that led to epigastric pain.  WBC remains high 15k, possible infection on stump site  - ABX switched to cefepime + doxycycline 6/1-  - lactate 2.4, procalcitonin 0.17  - CT L foot shows OM ( pt cannot get MRI dur to AICD)   repeat lactate: trended down  Podiatry Dr. Yo  ID Dr. Reagan

## 2020-06-03 NOTE — PROGRESS NOTE ADULT - ASSESSMENT
73 YO female from home, H/O CAD/DM/HTN/PVD/hysterectomy/cholecystectomy ,with AICD comes to ED with intractable nausea and vomiting for 2 days. Patient states that she recently had left big toe amputation in the beginning of May and she was taking antibiotics. On Friday, her antibiotics were changed from clindamycin to Levaquin and after she took antibiotics along with percocet, she started having severe pain in epigastric region. Patient reports that it was sudden in onset, 7/10, burning & pressure like pain, non-radiating , associated with nausea,, bitter taste in mouth and projectile vomiting. She was not able to tolerate food and it aggravated her symptoms. Patient also reports significant weight( not sure how much) recently and decrease in appetite. patient denies fever, cough, SOB, constipation, dysuria, headache, chest pain, orthopnea, back pain, burning sensation in extremities.c/o pain over left big toe stump which is not resolving despite pain meds . afebrile on adm , covid neg. ID called for persistant leucocytosis and appr ab     # s/p amputation of left great toe for gangrene with post op collection and overlying cellulits as well as underlying OM /wound cx pos for staph aureus /s/p left leg stent at [facility] as per pt     # nausea/vomiting/abd discomfort most likley sec to oral ab she was taking     # elevated LFT ? unclear etiology    plan  blood cx times 2   rpt covid testing ( pt does not want to have it )  cont cefipime and  vancomycin   vanco levels and adjust   podiatry f/u   arterial dopplers /pvr/sandra   add probiotics   monitor for diarrhea   GI f/u /u/s of abdomen       thnx will f/u   d/w intern dr diaz

## 2020-06-03 NOTE — PROGRESS NOTE ADULT - SUBJECTIVE AND OBJECTIVE BOX
f/u for left foot ulcer   73 YO female from home, H/O CAD/DM/HTN/PVD/hysterectomy/cholecystectomy ,with AICD comes to ED with intractable nausea and vomiting for 2 days. Patient states that she recently had left big toe amputation in the beginning of May and she was taking antibiotics. On Friday, her antibiotics were changed from clindamycin to Levaquin and after she took antibiotics along with percocet, she started having severe pain in epigastric region. Patient reports that it was sudden in onset, 7/10, burning & pressure like pain, non-radiating , associated with nausea,, bitter taste in mouth and projectile vomiting. She was not able to tolerate food and it aggravated her symptoms. Patient also reports significant weight( not sure how much) recently and decrease in appetite.     f/u for left foot wound   Vital Signs Last 24 Hrs  T(C): 36.9 (03 Jun 2020 14:20), Max: 36.9 (02 Jun 2020 21:20)  T(F): 98.5 (03 Jun 2020 14:20), Max: 98.5 (02 Jun 2020 21:20)  HR: 87 (03 Jun 2020 14:20) (87 - 100)  BP: 175/77 (03 Jun 2020 14:20) (169/92 - 179/90)  BP(mean): --  RR: 18 (03 Jun 2020 14:20) (18 - 18)  SpO2: 98% (03 Jun 2020 14:20) (96% - 99%)                        10.3   14.32 )-----------( 416      ( 03 Jun 2020 05:51 )             32.2   WBC Count: 14.32 K/uL (06-03 @ 05:51)  WBC Count: 15.44 K/uL (06-02 @ 05:46)  WBC Count: 15.54 K/uL (06-01 @ 05:53)  WBC Count: 15.14 K/uL (05-31 @ 07:02)  WBC Count: 12.20 K/uL (05-30 @ 07:50)    Procalcitonin, Serum (06.02.20 @ 09:59)    Procalcitonin, Serum: 0.17: Procalcitonin (PCT) Interpretation (ng/mL) - Diagnosis of systemic  bacterial infection/sepsis  PCT < 0.5: Systemic infection (sepsis) is not likely and risk for  progression to severe systemic infection is low. Local bacterial  infection is possible. If early sepsis is suspected clinically, PCT  should be re-assessed in 6-24 hours.  PCT >/= 0.5 but < 2.0: Systemic infection (sepsis) is possible, but other  conditions are known to elevate PCT as well. Moderate risk for  progression to severe systemic infection. The patient should be closely  monitored both clinically and by re-assessing PCT within 6-24 hours.  PCT >/= 2.0 but < 10.0: Systemic infection (sepsis) is likely, unless  other causes are known. High risk of progression to severe systemic  infection (severe sepsis/septic shock).  PCT >/= 10.0: Important systemic inflammatory response, almost  exclusively due to severe bacterial sepsis or septic shock. High  likelihood of severe sepsis or septic shock. ng/mL  < from: CT Foot No Cont, Left (06.02.20 @ 19:00) >  EXAM:  CT FOOT ONLY LT                            PROCEDURE DATE:  06/02/2020          INTERPRETATION:      Exam: CT Left Lower Extremity Without Contrast, Foot   Exam date and time: 6/2/2020 6:59 PM   Age: 74 years old   Clinical indication: Status post amputation of left big toe, wound, evaluate for osteomyelitis.    TECHNIQUE:   Imaging protocol: CT of the Left lower extremity without contrast was   performed. Exam focused on the foot.   3D rendering: 3-D reformatted images were performed concepcion independent workstation.  COMPARISON:   No relevant prior studies available.     FINDINGS:   Bones/joints: Previous amputation of the 1st toe at the MTP joint. Soft tissue ulcer overlying the distal aspect of the first metatarsal  extending to thehead of the 1st metatarsal, with cortical   irregularity and destructive changes at the head of the 1st metatarsal   consistent with osteomyelitis. Small corticated ossific densities adjacent to the distal aspect of the medial malleolus, likely related to old trauma.  Soft tissues: Ill-defined possible developing fluid collection or abscess at the  plantar surface of the foot superficial to the sesamoids, and adjacent to the   soft tissue ulcer measuring approximately 19 x 8 by 28 mm., evaluationlimited without intravenous contrast. Small fluid around the flexor hallucis longus tendon.  Vasculature: Small vessel atherosclerotic calcification.     IMPRESSION:   1. Previous amputation 1st toe at the MTP joint. Soft tissue ulcer at the stump   extending to the head of the 1st metatarsal, with cortical irregularity and   destructive changes at the head of the 1st metatarsal consistent with   osteomyelitis.   2. Ill-defined possible developing fluid collection or abscess plantar surface   of the foot superficial to the sesamoids, and adjacent to the soft tissue ulcer   approximately 19 x 8 by 28 mm., evaluation limited without intravenous contrast.                ALMA CASTANO M.D., ATTENDING RADIOLOGIST  This document has been electronically signed. Uli  3 2020  8:59AM          < end of copied text >    t seen and evaluated at bedside , reports nausea , not feeling well   seems very lethargic , tolerated exam and dressing change very well   exam focused LE   left foot sp halux amputation May 3  ( as per patient )  there is an open on the distal surgical site in the area of 1 st met head    wound packed with gauze strip, no active drainage , no odor     wound is deep, tracking along flexor tendon? probes to abscess area as per ct , but no pus or active drainage  , probes to bone +,  mostly fibrotic base exposed tendons with possible calcifications /bone fragments ??  , hyperkeratotic border , with minimal area of darker dry  skin,   there is absolutely no bleeding during exam and wound exploration /cleaning   DP/PT -0-1/2 out of 4   TG -WNL   CRF -1-2 sec x4   there is slight erythema ascending to the level of the 1st met base , and plantar submet 1 head-looks more wide spread , the area is slightly tender , pt also reports throbbing pain in this area   over all skin is very dry/shiny, no pedal hair growth   sensation to light touch +   pt is able yo move her lower extremities

## 2020-06-03 NOTE — PROGRESS NOTE ADULT - PROBLEM SELECTOR PLAN 7
Patient takes Levemir 30mg at night and 10Units pre-meals  c/w sliding scale and Lantus 20Units HS  Diabetic diet  Hb A1C: 8.1%  - monitor BS

## 2020-06-03 NOTE — PROGRESS NOTE ADULT - PROBLEM SELECTOR PLAN 3
CT abdomen shows bladder wall thickening  reports weight loss and loss of appetite recently, Patient has history of Stage 1 endometrial cancer and hysterectomy  brother also had cancer and she is not sure about which one  urology consulted: juan f f/u w/   - f/u urine cytology - patient comes with acute onset of nausea, epigastric pain and vomiting x 2 days  - Non radiating, 7/10 ,burning and pressure like pain  - She has been taking antibiotics for almost three weeks  - Symptoms aggravate with food and partially relieved with omeprazole  - patient has also history of diabetes  - DDx include acute pill induced esophagitis & gastritis or gastropathy(uncontrolled DM)  - s/p Famotidine IV, zofran and Malox in Ed  - pt also received 1 bolus in ED  - c/w PPI, Reglan q8h , maalox q6h, zofran q6h PRN  - Avoid NSAIDs and spicy food  GI Dr. Matute

## 2020-06-03 NOTE — PROGRESS NOTE ADULT - SUBJECTIVE AND OBJECTIVE BOX
Patient is a 74y old  Female who presents with a chief complaint of Intractable nausea and vomiting (03 Jun 2020 11:10)      INTERVAL HPI/OVERNIGHT EVENTS:      REVIEW OF SYSTEMS:  CONSTITUTIONAL: No fever, weight loss, or fatigue  EYES: No eye pain, visual disturbances, or discharge  ENMT:  No difficulty hearing, tinnitus, vertigo; No sinus or throat pain  NECK: No pain or stiffness  BREASTS: No pain, masses, or nipple discharge  RESPIRATORY: No cough, wheezing, chills or hemoptysis; No shortness of breath  CARDIOVASCULAR: No chest pain, palpitations, dizziness, or leg swelling  GASTROINTESTINAL: No abdominal or epigastric pain. No nausea, vomiting, or hematemesis; No diarrhea or constipation. No melena or hematochezia.  GENITOURINARY: No dysuria, frequency, hematuria, or incontinence  NEUROLOGICAL: No headaches, memory loss, loss of strength, numbness, or tremors  SKIN: No itching, burning, rashes, or lesions   LYMPH NODES: No enlarged glands  ENDOCRINE: No heat or cold intolerance; No hair loss  MUSCULOSKELETAL: No joint pain or swelling; No muscle, back, or extremity pain  HEME/LYMPH: No easy bruising, or bleeding gums  ALLERY AND IMMUNOLOGIC: No hives or eczema    FAMILY HISTORY:    Vital Signs Last 24 Hrs  T(C): 36.4 (03 Jun 2020 05:34), Max: 36.9 (02 Jun 2020 21:20)  T(F): 97.6 (03 Jun 2020 05:34), Max: 98.5 (02 Jun 2020 21:20)  HR: 89 (03 Jun 2020 05:34) (89 - 106)  BP: 169/92 (03 Jun 2020 05:34) (135/67 - 179/90)  BP(mean): --  RR: 18 (03 Jun 2020 05:34) (18 - 20)  SpO2: 96% (03 Jun 2020 05:34) (95% - 99%)      PHYSICAL EXAM:  GENERAL: NAD, well-groomed, well-developed  HEAD:  Atraumatic, Normocephalic  EYES: EOMI, PERRLA, conjunctiva and sclera clear  ENMT: No tonsillar erythema, exudates, or enlargement; Moist mucous membranes, Good dentition, No lesions  NECK: Supple, No JVD, Normal thyroid  NERVOUS SYSTEM:  Alert & Oriented X3, Good concentration; Motor Strength 5/5 B/L upper and lower extremities; DTRs 2+ intact and symmetric  CHEST/LUNG: Clear to percussion bilaterally; No rales, rhonchi, wheezing, or rubs  HEART: Regular rate and rhythm; No murmurs, rubs, or gallops  ABDOMEN: Soft, Nontender, Nondistended; Bowel sounds present  EXTREMITIES:  2+ Peripheral Pulses, No clubbing, cyanosis, or edema  LYMPH: No lymphadenopathy noted  SKIN: No rashes or lesions    Consultant(s) Notes Reviewed:  [x ] YES  [ ] NO  Care Discussed with Consultants/Other Providers [ x] YES  [ ] NO    LABS:        RADIOLOGY & ADDITIONAL TESTS:    Imaging Personally Reviewed:  [ ] YES  [ ] NO  aluminum hydroxide/magnesium hydroxide/simethicone Suspension 30 milliLiter(s) Oral every 6 hours  aspirin  chewable 81 milliGRAM(s) Oral daily  atorvastatin 40 milliGRAM(s) Oral at bedtime  cefepime   IVPB 2000 milliGRAM(s) IV Intermittent every 12 hours  cefepime   IVPB      cholecalciferol 2000 Unit(s) Oral daily  clopidogrel Tablet 75 milliGRAM(s) Oral daily  Dakins Solution - 1/4 Strength 1 Application(s) Topical daily  enoxaparin Injectable 40 milliGRAM(s) SubCutaneous daily  hydrALAZINE Injectable 10 milliGRAM(s) IV Push every 8 hours  HYDROmorphone  Injectable 0.25 milliGRAM(s) IV Push every 6 hours PRN  HYDROmorphone  Injectable 0.5 milliGRAM(s) IV Push every 6 hours PRN  insulin glargine Injectable (LANTUS) 20 Unit(s) SubCutaneous at bedtime  insulin lispro (HumaLOG) corrective regimen sliding scale   SubCutaneous three times a day before meals  lactobacillus acidophilus 1 Tablet(s) Oral three times a day with meals  loratadine 10 milliGRAM(s) Oral daily  melatonin 5 milliGRAM(s) Oral at bedtime  metoclopramide Injectable 10 milliGRAM(s) IV Push every 8 hours  metoprolol tartrate Injectable 5 milliGRAM(s) IV Push every 6 hours  pantoprazole  Injectable 40 milliGRAM(s) IV Push daily  potassium chloride  10 mEq/100 mL IVPB 10 milliEquivalent(s) IV Intermittent every 2 hours  sodium chloride 0.9%. 1000 milliLiter(s) IV Continuous <Continuous>  vancomycin  IVPB 1000 milliGRAM(s) IV Intermittent every 12 hours      HEALTH ISSUES - PROBLEM Dx:  Stump pain: Stump pain  Prophylactic measure: Prophylactic measure  CHF (congestive heart failure): CHF (congestive heart failure)  CAD (coronary artery disease): CAD (coronary artery disease)  PVD (peripheral vascular disease): PVD (peripheral vascular disease)  Bladder wall thickening: Bladder wall thickening  DM (diabetes mellitus): DM (diabetes mellitus)  HTN (hypertension): HTN (hypertension)  Intractable abdominal pain: Intractable abdominal pain  Gastritis, acute: Gastritis, acute Patient is a 74y old  Female who presents with a chief complaint of Intractable nausea and vomiting (03 Jun 2020 11:10)      INTERVAL HPI/OVERNIGHT EVENTS:    - pt is having n/v, not improving  - Pt denied L foot pain at this time  - Talked with her daughter Sahara, explained about CT foot result and told that we are awaiting for GI recommendation.      REVIEW OF SYSTEMS:  CONSTITUTIONAL: No fever, (+)weight loss, no fatigue  EYES: No eye pain, visual disturbances, or discharge  ENMT:  No difficulty hearing, tinnitus, vertigo; No sinus or throat pain (+) liquid drip sensation in R ear  NECK: No pain or stiffness  BREASTS: No pain, masses, or nipple discharge  RESPIRATORY: No cough, wheezing, chills or hemoptysis; No shortness of breath  CARDIOVASCULAR: No chest pain, palpitations, dizziness, or leg swelling  GASTROINTESTINAL: No abdominal (+) epigastric pain, better than before.  (+) nausea, vomiting, or hematemesis; No diarrhea or constipation. No melena or hematochezia.  GENITOURINARY: No dysuria, frequency, hematuria, or incontinence  NEUROLOGICAL: No headaches, memory loss, loss of strength, numbness, or tremors  SKIN: No itching, burning, rashes, or lesions   LYMPH NODES: No enlarged glands  ENDOCRINE: No heat or cold intolerance; No hair loss  MUSCULOSKELETAL: No joint pain or swelling; No muscle, back, or extremity pain  HEME/LYMPH: No easy bruising, or bleeding gums  ALLERY AND IMMUNOLOGIC: No hives or eczema    FAMILY HISTORY:    Vital Signs Last 24 Hrs  T(C): 36.4 (03 Jun 2020 05:34), Max: 36.9 (02 Jun 2020 21:20)  T(F): 97.6 (03 Jun 2020 05:34), Max: 98.5 (02 Jun 2020 21:20)  HR: 89 (03 Jun 2020 05:34) (89 - 106)  BP: 169/92 (03 Jun 2020 05:34) (135/67 - 179/90)  BP(mean): --  RR: 18 (03 Jun 2020 05:34) (18 - 20)  SpO2: 96% (03 Jun 2020 05:34) (95% - 99%)      PHYSICAL EXAM:  GENERAL: NAD, well-groomed  HEAD:  Atraumatic, Normocephalic  EYES: EOMI, PERRLA, conjunctiva and sclera clear, (+) conjunctiva anemic   ENMT: No tonsillar erythema, exudates, or enlargement; Moist mucous membranes, No lesions  NECK: Supple, No JVD, Normal thyroid  NERVOUS SYSTEM:  Alert & Oriented X3, Good concentration; Motor Strength 5/5 B/L upper and lower extremities  CHEST/LUNG: Clear to percussion bilaterally; No rales, rhonchi, wheezing, or rubs  HEART: Regular rate, tachycardia, No murmurs, rubs, or gallops  ABDOMEN: Soft, (+)tender on palpation to epigastric area, Nondistended; Bowel sounds present  EXTREMITIES:  2+ Peripheral Pulses, No clubbing, cyanosis, or edema (+) s/p L toe amputation  LYMPH: No lymphadenopathy noted  SKIN: No rashes or lesions    Consultant(s) Notes Reviewed:  [x ] YES  [ ] NO  Care Discussed with Consultants/Other Providers [ x] YES  [ ] NO    LABS:        RADIOLOGY & ADDITIONAL TESTS:    < from: CT Foot No Cont, Left (06.02.20 @ 19:00) >  FINDINGS:   Bones/joints: Previous amputation of the 1st toe at the MTP joint. Soft tissue ulcer overlying the distal aspect of the first metatarsal  extending to thehead of the 1st metatarsal, with cortical   irregularity and destructive changes at the head of the 1st metatarsal   consistent with osteomyelitis. Small corticated ossific densities adjacent to the distal aspect of the medial malleolus, likely related to old trauma.  Soft tissues: Ill-defined possible developing fluid collection or abscess at the  plantar surface of the foot superficial to the sesamoids, and adjacent to the   soft tissue ulcer measuring approximately 19 x 8 by 28 mm., evaluationlimited without intravenous contrast. Small fluid around the flexor hallucis longus tendon.  Vasculature: Small vessel atherosclerotic calcification.     IMPRESSION:   1. Previous amputation 1st toe at the MTP joint. Soft tissue ulcer at the stump   extending to the head of the 1st metatarsal, with cortical irregularity and   destructive changes at the head of the 1st metatarsal consistent with   osteomyelitis.   2. Ill-defined possible developing fluid collection or abscess plantar surface   of the foot superficial to the sesamoids, and adjacent to the soft tissue ulcer   approximately 19 x 8 by 28 mm., evaluation limited without intravenous contrast.      < end of copied text >    Cytopathology - Non Gyn Report (06.01.20 @ 15:36)    Cytopathology - Non Gyn Report:   ACCESSION No:  [id number]    DARRION MELENDEZ                      1        Cytopathology Report            Specimen(s) Submitted  URINE      Clinical History  CT abdominal shows bladder nodules/soft tissue thickness; concern  for malignancy.      Gross Description  Received: 110 ml of yellow fluid in CytoLyt  Prepared: 1 ThinPrep slide      Final Diagnosis  URINE  NEGATIVE FOR HIGH GRADE UROTHELIAL CARCINOMA    Specimen consists mainly of mature squamous epithelial cells and  rare benign urothelial cells in a background of  mild acute inflammation.    Screened by: Kandi Torres CT(ASCP)  Verified by: Candy Thompson MD  (Electronic Signature)  Reported on: 06/02/20 14:54 EDT, [facility]. [address] [address]  Phone: [phone]   Fax: [phone]  Cytology technical processing performed at [address] [place] [id number]  _________________________________________________________________          Imaging Personally Reviewed:  [ ] YES  [ ] NO  aluminum hydroxide/magnesium hydroxide/simethicone Suspension 30 milliLiter(s) Oral every 6 hours  aspirin  chewable 81 milliGRAM(s) Oral daily  atorvastatin 40 milliGRAM(s) Oral at bedtime  cefepime   IVPB 2000 milliGRAM(s) IV Intermittent every 12 hours  cefepime   IVPB      cholecalciferol 2000 Unit(s) Oral daily  clopidogrel Tablet 75 milliGRAM(s) Oral daily  Dakins Solution - 1/4 Strength 1 Application(s) Topical daily  enoxaparin Injectable 40 milliGRAM(s) SubCutaneous daily  hydrALAZINE Injectable 10 milliGRAM(s) IV Push every 8 hours  HYDROmorphone  Injectable 0.25 milliGRAM(s) IV Push every 6 hours PRN  HYDROmorphone  Injectable 0.5 milliGRAM(s) IV Push every 6 hours PRN  insulin glargine Injectable (LANTUS) 20 Unit(s) SubCutaneous at bedtime  insulin lispro (HumaLOG) corrective regimen sliding scale   SubCutaneous three times a day before meals  lactobacillus acidophilus 1 Tablet(s) Oral three times a day with meals  loratadine 10 milliGRAM(s) Oral daily  melatonin 5 milliGRAM(s) Oral at bedtime  metoclopramide Injectable 10 milliGRAM(s) IV Push every 8 hours  metoprolol tartrate Injectable 5 milliGRAM(s) IV Push every 6 hours  pantoprazole  Injectable 40 milliGRAM(s) IV Push daily  potassium chloride  10 mEq/100 mL IVPB 10 milliEquivalent(s) IV Intermittent every 2 hours  sodium chloride 0.9%. 1000 milliLiter(s) IV Continuous <Continuous>  vancomycin  IVPB 1000 milliGRAM(s) IV Intermittent every 12 hours      HEALTH ISSUES - PROBLEM Dx:  Stump pain: Stump pain  Prophylactic measure: Prophylactic measure  CHF (congestive heart failure): CHF (congestive heart failure)  CAD (coronary artery disease): CAD (coronary artery disease)  PVD (peripheral vascular disease): PVD (peripheral vascular disease)  Bladder wall thickening: Bladder wall thickening  DM (diabetes mellitus): DM (diabetes mellitus)  HTN (hypertension): HTN (hypertension)  Intractable abdominal pain: Intractable abdominal pain  Gastritis, acute: Gastritis, acute

## 2020-06-03 NOTE — PROGRESS NOTE ADULT - ASSESSMENT
A/P  PVD /PAD  DM  ulcer   dehiscence surgical wound   history of gangrene left hallux   positive probe to bone   leukocytosis   OM with possible abscess formation left     pt seen and eval   treatment options discussed with pt   contacted pts vascular surgeon  Dr. Ramón Chery [phone] -discuss pt with him today  call back   explained to daughter:  pt has non healed wound which is infected and with possible OM /abscess   wound cleaned with Dakin's  , gauze packing changed   CT scan  reports supports clinical findings   pt needs wound debridement /  more proximal amputation   when medically optimized/stable and evaluated by vascular /cleared by medicine and cardiology   deep cultures send -follow up   continue antibiotics as per id -id   continue wound packing with gauze strip   vascular consult appreciated follow up testes

## 2020-06-03 NOTE — PROGRESS NOTE ADULT - ASSESSMENT
75 YO female from home, H/O CAD/DM/HTN/PVD/hysterectomy/cholecystectomy ,with AICD has come to ED with intractable nausea and vomiting for 2 days, likely due to pill induced esophagitis/gastritis. Patient reports that it was sudden in onset, 7/10, burning & pressure like pain, non-radiating , associated with nausea and projectile vomiting. Patient also reports significant weight( not sure how much) recently and decrease in appetite. CT abdomen/pelvis shows bladder wall thickening and pt has history of Stage1 endometrial cancer,   Patient also has high blood pressure because she missed her medications  Admitting patient for management of  Acute Gastritis  Intractable abdominal pain  hypertension  weight loss/ suspected malignancy workup 73 YO female from home, H/O CAD/DM/HTN/PVD/hysterectomy/cholecystectomy ,with AICD has come to ED with intractable nausea and vomiting for 2 days, likely due to pill induced esophagitis/gastritis. Patient reports that it was sudden in onset, 7/10, burning & pressure like pain, non-radiating , associated with nausea and projectile vomiting. Patient also reports significant weight( not sure how much) recently and decrease in appetite. CT abdomen/pelvis shows bladder wall thickening and pt has history of Stage1 endometrial cancer,   Patient also has high blood pressure because she missed her medications  Admitting patient for management of  Acute Gastritis  Intractable abdominal pain  hypertension  weight loss/ suspected malignancy workup    DCd vholecalciferol, melatonin, loratadine until pt can tolerate PO meds

## 2020-06-03 NOTE — CONSULT NOTE ADULT - ASSESSMENT
Intractable Nausea and vomiting  Many things can be contributing.   ? Antibiotics induced   ?  2/2 to blood pressure     Plan:  Nausea and vomiting   Continue antiemetics    Reglan would appear ok to continue despite prolonged QTC given history of AICD and pacemaker (seems to be helping)   Better BP control   Consider CT head (patient is a vasculopath with elevated BP ?  a intracranial process)  US of the abdomen   Consider surgical consult for appendicial findings on CT scan   Will follow closely   Advanced care planning was discussed with patient and family.  Advanced care planning forms were reviewed and discussed.  Risks, benefits and alternatives of gastroenterologic procedures were discussed in detail and all questions were answered.

## 2020-06-03 NOTE — PROGRESS NOTE ADULT - SUBJECTIVE AND OBJECTIVE BOX
Subjective: says she still has nausea and vomiting . seen by vascular, who says she may further vascular intervention. also seen by podiatry , wound cx done which shows staph, for further debridemnt of wound and may need further amputation . ct of foot pos for collection over plantar aspect of stump       PHYSICAL EXAM:    Vital Signs Last 24 Hrs  T(C): 36.7 (03 Jun 2020 21:34), Max: 36.9 (03 Jun 2020 14:20)  T(F): 98.1 (03 Jun 2020 21:34), Max: 98.5 (03 Jun 2020 14:20)  HR: 110 (03 Jun 2020 21:34) (87 - 110)  BP: 152/75 (03 Jun 2020 21:34) (152/75 - 175/77)  BP(mean): --  RR: 18 (03 Jun 2020 21:34) (18 - 18)  SpO2: 96% (03 Jun 2020 21:34) (96% - 98%)    Constitutional: awake alert oriented times 3   [place] SCLERA anicteric EOMI   LUNGS clear   CVS s1 s2 aud no murmurs   ABDOMEN soft non tender no HSM   NEUROLOGY  no defecits   SKIN left first toe stump swelling tender with some open wound probing to bone as per podiatry   /dried wound /erythema plantar aspect   EXTREMITIES no edema no cyanosis         LABS/DIAGNOSTIC TESTS                        10.3   14.32 )-----------( 416      ( 03 Jun 2020 05:51 )             32.2     06-03    136  |  99  |  32<H>  ----------------------------<  136<H>  3.4<L>   |  26  |  1.12    Ca    9.1      03 Jun 2020 05:51  Phos  2.7     06-03  Mg     2.6     06-03    TPro  7.9  /  Alb  2.7<L>  /  TBili  0.7  /  DBili  x   /  AST  35  /  ALT  100<H>  /  AlkPhos  314<H>  06-03          meds   aluminum hydroxide/magnesium hydroxide/simethicone Suspension 30 milliLiter(s) Oral every 6 hours  aspirin  chewable 81 milliGRAM(s) Oral daily  atorvastatin 40 milliGRAM(s) Oral at bedtime  cefepime   IVPB 2000 milliGRAM(s) IV Intermittent every 12 hours  cefepime   IVPB      clopidogrel Tablet 75 milliGRAM(s) Oral daily  Dakins Solution - 1/4 Strength 1 Application(s) Topical daily  enoxaparin Injectable 40 milliGRAM(s) SubCutaneous daily  hydrALAZINE Injectable 10 milliGRAM(s) IV Push every 8 hours  HYDROmorphone  Injectable 0.25 milliGRAM(s) IV Push every 6 hours PRN  HYDROmorphone  Injectable 0.5 milliGRAM(s) IV Push every 6 hours PRN  insulin glargine Injectable (LANTUS) 20 Unit(s) SubCutaneous at bedtime  insulin lispro (HumaLOG) corrective regimen sliding scale   SubCutaneous three times a day before meals  lactobacillus acidophilus 1 Tablet(s) Oral three times a day with meals  metoclopramide Injectable 10 milliGRAM(s) IV Push every 8 hours  metoprolol tartrate Injectable 7.5 milliGRAM(s) IV Push every 6 hours  ondansetron Injectable 4 milliGRAM(s) IV Push every 6 hours PRN  pantoprazole  Injectable 40 milliGRAM(s) IV Push daily  sodium chloride 0.9%. 1000 milliLiter(s) IV Continuous <Continuous>  vancomycin  IVPB 1000 milliGRAM(s) IV Intermittent every 12 hours        CULTURES  Culture Results:   Few Staphylococcus aureus (06-03 @ 00:34)  Culture Results: blood  No growth to date. (06-02 @ 14:30)  Culture Results: blood  No growth to date. (06-02 @ 14:30)  Culture Results: urine   <10,000 CFU/mL Normal Urogenital Shannan (05-30 @ 16:36)        RADIOLOGY  < from: CT Foot No Cont, Left (06.02.20 @ 19:00) >  IMPRESSION:   1. Previous amputation 1st toe at the MTP joint. Soft tissue ulcer at the stump   extending to the head of the 1st metatarsal, with cortical irregularity and   destructive changes at the head of the 1st metatarsal consistent with   osteomyelitis.   2. Ill-defined possible developing fluid collection or abscess plantar surface   of the foot superficial to the sesamoids, and adjacent to the soft tissue ulcer   approximately 19 x 8 by 28 mm., evaluation limited without intravenous contrast.      < from: CT Abdomen and Pelvis w/ IV Cont (06.01.20 @ 10:35) >  Impression:    Small bilateral pleural effusions.    Multiple nodular peripheral filling defect in the urinary bladder measuring up to 1.3 cm, suspicious for transitional cell carcinomas. Cystoscopy is recommended for further evaluation.    Stable mild dilatation of the appendix measuring up to 8 mm in caliber. No evidence for periappendiceal stranding. Given its stability, mucocele of the appendix is considered. Acute appendicitis is possible but less likely due to no interval development of periappendiceal stranding.    Trace perihepatic ascites.                    OPAL BENAVIDES M.D., ATTENDING RADIOLOGIST  This document has been electronically signed. Jun 1 2020 11:10AM    < end of copied text >            ALMA CASTANO M.D., ATTENDING RADIOLOGIST  This document has been electronically signed. Uli  3 2020  8:59AM    < end of copied text >

## 2020-06-03 NOTE — PROGRESS NOTE ADULT - PROBLEM SELECTOR PLAN 9
H/o CHF but clinically patient is not in fluid overload  No peripheral edema  Patient takes furosemide 20mg daily twice  BNP: elevated at 9383  echo: EF 50-55%, mild TR, [place]  - c/w lasix 20mg IV BID H/o CHF but clinically patient is not in fluid overload  No peripheral edema  Patient takes furosemide 20mg daily twice  BNP: elevated at 9383  echo: EF 50-55%, mild TR, [place]  - holding lasix 20mg IV as pt is not eating and dehydrated

## 2020-06-03 NOTE — PROGRESS NOTE ADULT - PROBLEM SELECTOR PLAN 1
- patient comes with acute onset of nausea, epigastric pain and vomiting x 2 days  - Non radiating, 7/10 ,burning and pressure like pain  - She has been taking antibiotics for almost three weeks  - Symptoms aggravate with food and partially relieved with omeprazole  - patient has also history of diabetes  - DDx include acute pill induced esophagitis & gastritis or gastropathy(uncontrolled DM)  - s/p Famotidine IV, zofran and Malox in Ed  - pt also received 1 bolus in ED  - c/w PPI, Reglan q8h , maalox q6h  - Avoid NSAIDs and spicy food patient endorses moderate to severe abdominal pain, n/v since 5/29 Fri  partially relieved with oral Protonix  UA negative, No fevers, UCx: -ve  More likely GI etiology  CT abd w/ IV cont: Small bilateral pleural effusions. Multiple nodular peripheral filling defect in the urinary bladder measuring up to 1.3 cm, suspicious for transitional cell carcinomas.  GI Dr. Matute patient endorses moderate to severe abdominal pain, n/v since 5/29 Fri  partially relieved with oral Protonix  UA negative, No fevers, UCx: -ve  More likely GI etiology  CT abd w/ IV cont: Small bilateral pleural effusions. Multiple nodular peripheral filling defect in the urinary bladder measuring up to 1.3 cm, suspicious for transitional cell carcinomas.  - c/w PPI, Reglan q8h (pt had QTc prolongation, but will continue as pt has AICD and it works better) , maalox q6h, zofran q6h PRN  GI Dr. Matute

## 2020-06-03 NOTE — PROGRESS NOTE ADULT - PROBLEM SELECTOR PLAN 8
H/o CAD and PCI in 2009,  patient has both AICD and pacemaker  EKG shows paced Rythm  No active issues  continue aspirin , Plavix, BB, statin, ACE - pt is not tolerated to PO meds

## 2020-06-03 NOTE — PROGRESS NOTE ADULT - PROBLEM SELECTOR PLAN 5
H/o PVD and popliteal stent  s/p left big toe amputation in May 2020  f/u ESTRELLA  f/u outpatient with vascular surgery  PT: home PT CT abdomen shows bladder wall thickening  reports weight loss and loss of appetite recently, Patient has history of Stage 1 endometrial cancer and hysterectomy  brother also had cancer and she is not sure about which one  - urine cytology: NEGATIVE FOR HIGH GRADE UROTHELIAL CARCINOMA  urology consulted: outpt f/u w/

## 2020-06-03 NOTE — CHART NOTE - NSCHARTNOTEFT_GEN_A_CORE
Assessment:       Nutrition reassessment for follow-up of pt re-contact. Initial Nutrition Assessment done 2020, unable to interview further ( see document). Chart reviewed, pt visited today, no more on isolation, feeling nausea, s/p vomiting/spitting up, GI consult pending;     Factors impacting intake: [ ] none [ ] nausea  [ ] vomiting [ ] diarrhea [ ] constipation  [ ]chewing problems [ ] swallowing issues  [ X ] other: altered GI function with acute gastritis, intractable nausea/vomiting    Diet Prescription: Diet, Mechanical Soft:   Consistent Carbohydrate {Evening Snacks}  DASH/TLC {Sodium & Cholesterol Restricted}  Supplement Feeding Modality:  Oral  Ensure Enlive Cans or Servings Per Day:  1       Frequency:  Three Times a day (20 @ 15:52)    Intake: diet advanced to solid by MD, only able to tolerate small amount of food, with nausea/vomiting; limited information obtained on food tolerance/intolerance or preferences    Daily Weight in k.5 (2020 05:34)  Weight in k.6 (2020 09:49)  Weight in k.1 (2020 05:04)  Weight in k.1 (2020 05:02)  Weight in k.9 (31 May 2020 00:34)    % Weight Change: fluctuated, may due to scale/fluid variance     Pertinent Medications: MEDICATIONS  (STANDING):  aluminum hydroxide/magnesium hydroxide/simethicone Suspension 30 milliLiter(s) Oral every 6 hours  aspirin  chewable 81 milliGRAM(s) Oral daily  atorvastatin 40 milliGRAM(s) Oral at bedtime  cefepime   IVPB 2000 milliGRAM(s) IV Intermittent every 12 hours  cefepime   IVPB      cholecalciferol 2000 Unit(s) Oral daily  clopidogrel Tablet 75 milliGRAM(s) Oral daily  Dakins Solution - 1/4 Strength 1 Application(s) Topical daily  enoxaparin Injectable 40 milliGRAM(s) SubCutaneous daily  hydrALAZINE Injectable 10 milliGRAM(s) IV Push every 8 hours  insulin glargine Injectable (LANTUS) 20 Unit(s) SubCutaneous at bedtime  insulin lispro (HumaLOG) corrective regimen sliding scale   SubCutaneous three times a day before meals  lactobacillus acidophilus 1 Tablet(s) Oral three times a day with meals  loratadine 10 milliGRAM(s) Oral daily  melatonin 5 milliGRAM(s) Oral at bedtime  metoclopramide Injectable 10 milliGRAM(s) IV Push every 8 hours  metoprolol tartrate Injectable 5 milliGRAM(s) IV Push every 6 hours  pantoprazole  Injectable 40 milliGRAM(s) IV Push daily  potassium chloride  10 mEq/100 mL IVPB 10 milliEquivalent(s) IV Intermittent every 2 hours  sodium chloride 0.9%. 1000 milliLiter(s) (75 mL/Hr) IV Continuous <Continuous>  vancomycin  IVPB 1000 milliGRAM(s) IV Intermittent every 12 hours    MEDICATIONS  (PRN):  HYDROmorphone  Injectable 0.25 milliGRAM(s) IV Push every 6 hours PRN Moderate Pain (4 - 6)  HYDROmorphone  Injectable 0.5 milliGRAM(s) IV Push every 6 hours PRN Severe Pain (7 - 10)    Pertinent Labs:  Na136 mmol/L Glu 136 mg/dL<H> K+ 3.4 mmol/L<L> Cr  1.12 mg/dL BUN 32 mg/dL<H>  Phos 2.7 mg/dL  Alb 2.7 g/dL<L>  Chol 169 mg/dL LDL 93 mg/dL HDL 59 mg/dL Trig 87 mg/dL     CAPILLARY BLOOD GLUCOSE    POCT Blood Glucose.: 130 mg/dL (2020 11:17)  POCT Blood Glucose.: 207 mg/dL (2020 08:03)  POCT Blood Glucose.: 179 mg/dL (2020 22:06)  POCT Blood Glucose.: 142 mg/dL (2020 17:03)    Skin: skin wounds     Estimated Needs:   [ X ] no change since previous assessment  [ ] recalculated:     Previous Nutrition Diagnosis:   [ X ] Malnutrition ( SEVERE )    Nutrition Diagnosis is [ X ] ongoing  [ ] Improving   [ ] resolved [ ] not applicable     New Nutrition Diagnosis: [ X ] not applicable       Interventions:   Recommend  [ ] Change Diet To:  [ X ] Nutrition Supplement: change supplement to Glucerna Shake 1can tid as medically feasible (660kcal, 30g protein)  d/c Ensure Enlive   [ ] Nutrition Support  [ X ] Other: Discussed with MD/RN, pending GI consult                    Provide food choices within diet Rx as available/updated     Monitoring and Evaluation:   [ X ] PO intake [ x ] Tolerance to diet prescription [ x ] weights [ x ] labs[ x ] follow up per protocol  [ ] other: Assessment:       Nutrition reassessment for follow-up of pt re-contact. Initial Nutrition Assessment done 2020, unable to interview further ( see document). Chart reviewed, pt visited today, no more on isolation, feeling nausea, s/p vomiting/spitting up, GI consult pending;     Factors impacting intake: [ ] none [ ] nausea  [ ] vomiting [ ] diarrhea [ ] constipation  [ ]chewing problems [ ] swallowing issues  [ X ] other: altered GI function with acute gastritis, intractable nausea/vomiting    Diet Prescription: Diet, Mechanical Soft:   Consistent Carbohydrate {Evening Snacks}  DASH/TLC {Sodium & Cholesterol Restricted}  Supplement Feeding Modality:  Oral  Ensure Enlive Cans or Servings Per Day:  1       Frequency:  Three Times a day (20 @ 15:52)    Intake: diet advanced to solid by MD, only able to tolerate small amount of food, with nausea/vomiting on/off, "Service Recovery" provided, no specific food tolerance/intolerance or preferences at present, willing to try Glucerna Shake     Daily Weight in k.5 (2020 05:34)  Weight in k.6 (2020 09:49)  Weight in k.1 (2020 05:04)  Weight in k.1 (2020 05:02)  Weight in k.9 (31 May 2020 00:34)    % Weight Change: fluctuated, may due to scale/fluid variance     Pertinent Medications: MEDICATIONS  (STANDING):  aluminum hydroxide/magnesium hydroxide/simethicone Suspension 30 milliLiter(s) Oral every 6 hours  aspirin  chewable 81 milliGRAM(s) Oral daily  atorvastatin 40 milliGRAM(s) Oral at bedtime  cefepime   IVPB 2000 milliGRAM(s) IV Intermittent every 12 hours  cefepime   IVPB      cholecalciferol 2000 Unit(s) Oral daily  clopidogrel Tablet 75 milliGRAM(s) Oral daily  Dakins Solution - 1/4 Strength 1 Application(s) Topical daily  enoxaparin Injectable 40 milliGRAM(s) SubCutaneous daily  hydrALAZINE Injectable 10 milliGRAM(s) IV Push every 8 hours  insulin glargine Injectable (LANTUS) 20 Unit(s) SubCutaneous at bedtime  insulin lispro (HumaLOG) corrective regimen sliding scale   SubCutaneous three times a day before meals  lactobacillus acidophilus 1 Tablet(s) Oral three times a day with meals  loratadine 10 milliGRAM(s) Oral daily  melatonin 5 milliGRAM(s) Oral at bedtime  metoclopramide Injectable 10 milliGRAM(s) IV Push every 8 hours  metoprolol tartrate Injectable 5 milliGRAM(s) IV Push every 6 hours  pantoprazole  Injectable 40 milliGRAM(s) IV Push daily  potassium chloride  10 mEq/100 mL IVPB 10 milliEquivalent(s) IV Intermittent every 2 hours  sodium chloride 0.9%. 1000 milliLiter(s) (75 mL/Hr) IV Continuous <Continuous>  vancomycin  IVPB 1000 milliGRAM(s) IV Intermittent every 12 hours    MEDICATIONS  (PRN):  HYDROmorphone  Injectable 0.25 milliGRAM(s) IV Push every 6 hours PRN Moderate Pain (4 - 6)  HYDROmorphone  Injectable 0.5 milliGRAM(s) IV Push every 6 hours PRN Severe Pain (7 - 10)    Pertinent Labs:  Na136 mmol/L Glu 136 mg/dL<H> K+ 3.4 mmol/L<L> Cr  1.12 mg/dL BUN 32 mg/dL<H>  Phos 2.7 mg/dL  Alb 2.7 g/dL<L> 05-31 Chol 169 mg/dL LDL 93 mg/dL HDL 59 mg/dL Trig 87 mg/dL     CAPILLARY BLOOD GLUCOSE    POCT Blood Glucose.: 130 mg/dL (2020 11:17)  POCT Blood Glucose.: 207 mg/dL (2020 08:03)  POCT Blood Glucose.: 179 mg/dL (2020 22:06)  POCT Blood Glucose.: 142 mg/dL (2020 17:03)    Skin: skin wounds     Estimated Needs:   [ X ] no change since previous assessment  [ ] recalculated:     Previous Nutrition Diagnosis:   [ X ] Malnutrition ( SEVERE )    Nutrition Diagnosis is [ X ] ongoing  [ ] Improving   [ ] resolved [ ] not applicable     New Nutrition Diagnosis: [ X ] not applicable       Interventions:   Recommend  [ ] Change Diet To:  [ X ] Nutrition Supplement: change supplement to Glucerna Shake 1can tid as medically feasible (660kcal, 30g protein)  d/c Ensure Enlive   [ ] Nutrition Support  [ X ] Other: Discussed with MD/RN, pending GI consult                    Provide food choices within diet Rx as available/updated     Monitoring and Evaluation:   [ X ] PO intake [ x ] Tolerance to diet prescription [ x ] weights [ x ] labs[ x ] follow up per protocol  [ ] other:

## 2020-06-04 LAB
-  AMPICILLIN/SULBACTAM: SIGNIFICANT CHANGE UP
-  CEFAZOLIN: SIGNIFICANT CHANGE UP
-  CLINDAMYCIN: SIGNIFICANT CHANGE UP
-  ERYTHROMYCIN: SIGNIFICANT CHANGE UP
-  GENTAMICIN: SIGNIFICANT CHANGE UP
-  OXACILLIN: SIGNIFICANT CHANGE UP
-  PENICILLIN: SIGNIFICANT CHANGE UP
-  RIFAMPIN: SIGNIFICANT CHANGE UP
-  TETRACYCLINE: SIGNIFICANT CHANGE UP
-  TRIMETHOPRIM/SULFAMETHOXAZOLE: SIGNIFICANT CHANGE UP
-  VANCOMYCIN: SIGNIFICANT CHANGE UP
ALBUMIN SERPL ELPH-MCNC: 2.5 G/DL — LOW (ref 3.5–5)
ALP SERPL-CCNC: 301 U/L — HIGH (ref 40–120)
ALT FLD-CCNC: 79 U/L DA — HIGH (ref 10–60)
ANION GAP SERPL CALC-SCNC: 8 MMOL/L — SIGNIFICANT CHANGE UP (ref 5–17)
APTT BLD: 32.6 SEC — SIGNIFICANT CHANGE UP (ref 27.5–36.3)
AST SERPL-CCNC: 24 U/L — SIGNIFICANT CHANGE UP (ref 10–40)
BASOPHILS # BLD AUTO: 0.03 K/UL — SIGNIFICANT CHANGE UP (ref 0–0.2)
BASOPHILS NFR BLD AUTO: 0.2 % — SIGNIFICANT CHANGE UP (ref 0–2)
BILIRUB SERPL-MCNC: 0.6 MG/DL — SIGNIFICANT CHANGE UP (ref 0.2–1.2)
BUN SERPL-MCNC: 29 MG/DL — HIGH (ref 7–18)
CALCIUM SERPL-MCNC: 8.9 MG/DL — SIGNIFICANT CHANGE UP (ref 8.4–10.5)
CHLORIDE SERPL-SCNC: 99 MMOL/L — SIGNIFICANT CHANGE UP (ref 96–108)
CO2 SERPL-SCNC: 28 MMOL/L — SIGNIFICANT CHANGE UP (ref 22–31)
CREAT SERPL-MCNC: 1.26 MG/DL — SIGNIFICANT CHANGE UP (ref 0.5–1.3)
EOSINOPHIL # BLD AUTO: 0.1 K/UL — SIGNIFICANT CHANGE UP (ref 0–0.5)
EOSINOPHIL NFR BLD AUTO: 0.6 % — SIGNIFICANT CHANGE UP (ref 0–6)
GLUCOSE BLDC GLUCOMTR-MCNC: 116 MG/DL — HIGH (ref 70–99)
GLUCOSE BLDC GLUCOMTR-MCNC: 124 MG/DL — HIGH (ref 70–99)
GLUCOSE BLDC GLUCOMTR-MCNC: 154 MG/DL — HIGH (ref 70–99)
GLUCOSE BLDC GLUCOMTR-MCNC: 89 MG/DL — SIGNIFICANT CHANGE UP (ref 70–99)
GLUCOSE SERPL-MCNC: 87 MG/DL — SIGNIFICANT CHANGE UP (ref 70–99)
HCT VFR BLD CALC: 33 % — LOW (ref 34.5–45)
HGB BLD-MCNC: 10.5 G/DL — LOW (ref 11.5–15.5)
IMM GRANULOCYTES NFR BLD AUTO: 1.1 % — SIGNIFICANT CHANGE UP (ref 0–1.5)
INR BLD: 1.38 RATIO — HIGH (ref 0.88–1.16)
LYMPHOCYTES # BLD AUTO: 12.6 % — LOW (ref 13–44)
LYMPHOCYTES # BLD AUTO: 2.27 K/UL — SIGNIFICANT CHANGE UP (ref 1–3.3)
MAGNESIUM SERPL-MCNC: 2.7 MG/DL — HIGH (ref 1.6–2.6)
MCHC RBC-ENTMCNC: 28.8 PG — SIGNIFICANT CHANGE UP (ref 27–34)
MCHC RBC-ENTMCNC: 31.8 GM/DL — LOW (ref 32–36)
MCV RBC AUTO: 90.4 FL — SIGNIFICANT CHANGE UP (ref 80–100)
METHOD TYPE: SIGNIFICANT CHANGE UP
MONOCYTES # BLD AUTO: 1.27 K/UL — HIGH (ref 0–0.9)
MONOCYTES NFR BLD AUTO: 7 % — SIGNIFICANT CHANGE UP (ref 2–14)
NEUTROPHILS # BLD AUTO: 14.21 K/UL — HIGH (ref 1.8–7.4)
NEUTROPHILS NFR BLD AUTO: 78.5 % — HIGH (ref 43–77)
NRBC # BLD: 0 /100 WBCS — SIGNIFICANT CHANGE UP (ref 0–0)
PHOSPHATE SERPL-MCNC: 2.2 MG/DL — LOW (ref 2.5–4.5)
PLATELET # BLD AUTO: 486 K/UL — HIGH (ref 150–400)
POTASSIUM SERPL-MCNC: 3.4 MMOL/L — LOW (ref 3.5–5.3)
POTASSIUM SERPL-SCNC: 3.4 MMOL/L — LOW (ref 3.5–5.3)
PROT SERPL-MCNC: 7.7 G/DL — SIGNIFICANT CHANGE UP (ref 6–8.3)
PROTHROM AB SERPL-ACNC: 15.7 SEC — HIGH (ref 10–12.9)
RBC # BLD: 3.65 M/UL — LOW (ref 3.8–5.2)
RBC # FLD: 14.7 % — HIGH (ref 10.3–14.5)
SODIUM SERPL-SCNC: 135 MMOL/L — SIGNIFICANT CHANGE UP (ref 135–145)
VANCOMYCIN TROUGH SERPL-MCNC: 17.5 UG/ML — SIGNIFICANT CHANGE UP (ref 10–20)
WBC # BLD: 18.07 K/UL — HIGH (ref 3.8–10.5)
WBC # FLD AUTO: 18.07 K/UL — HIGH (ref 3.8–10.5)

## 2020-06-04 PROCEDURE — 70498 CT ANGIOGRAPHY NECK: CPT | Mod: 26

## 2020-06-04 PROCEDURE — 99233 SBSQ HOSP IP/OBS HIGH 50: CPT

## 2020-06-04 PROCEDURE — 99223 1ST HOSP IP/OBS HIGH 75: CPT

## 2020-06-04 PROCEDURE — 70450 CT HEAD/BRAIN W/O DYE: CPT | Mod: 26,59

## 2020-06-04 PROCEDURE — 73552 X-RAY EXAM OF FEMUR 2/>: CPT | Mod: 26,LT

## 2020-06-04 PROCEDURE — 70496 CT ANGIOGRAPHY HEAD: CPT | Mod: 26

## 2020-06-04 PROCEDURE — 76705 ECHO EXAM OF ABDOMEN: CPT | Mod: 26

## 2020-06-04 RX ORDER — ONDANSETRON 8 MG/1
4 TABLET, FILM COATED ORAL EVERY 8 HOURS
Refills: 0 | Status: DISCONTINUED | OUTPATIENT
Start: 2020-06-04 | End: 2020-06-09

## 2020-06-04 RX ORDER — DIPHENHYDRAMINE HCL 50 MG
15 CAPSULE ORAL EVERY 6 HOURS
Refills: 0 | Status: DISCONTINUED | OUTPATIENT
Start: 2020-06-04 | End: 2020-06-04

## 2020-06-04 RX ORDER — CEFAZOLIN SODIUM 1 G
2000 VIAL (EA) INJECTION ONCE
Refills: 0 | Status: COMPLETED | OUTPATIENT
Start: 2020-06-04 | End: 2020-06-04

## 2020-06-04 RX ORDER — POTASSIUM CHLORIDE 20 MEQ
10 PACKET (EA) ORAL ONCE
Refills: 0 | Status: COMPLETED | OUTPATIENT
Start: 2020-06-04 | End: 2020-06-04

## 2020-06-04 RX ORDER — DIPHENHYDRAMINE HCL 50 MG
20 CAPSULE ORAL ONCE
Refills: 0 | Status: COMPLETED | OUTPATIENT
Start: 2020-06-04 | End: 2020-06-04

## 2020-06-04 RX ORDER — INSULIN GLARGINE 100 [IU]/ML
10 INJECTION, SOLUTION SUBCUTANEOUS AT BEDTIME
Refills: 0 | Status: DISCONTINUED | OUTPATIENT
Start: 2020-06-04 | End: 2020-06-09

## 2020-06-04 RX ORDER — HEPARIN SODIUM 5000 [USP'U]/ML
INJECTION INTRAVENOUS; SUBCUTANEOUS
Qty: 25000 | Refills: 0 | Status: DISCONTINUED | OUTPATIENT
Start: 2020-06-04 | End: 2020-06-09

## 2020-06-04 RX ORDER — CEFAZOLIN SODIUM 1 G
2000 VIAL (EA) INJECTION EVERY 8 HOURS
Refills: 0 | Status: DISCONTINUED | OUTPATIENT
Start: 2020-06-05 | End: 2020-06-05

## 2020-06-04 RX ORDER — POTASSIUM PHOSPHATE, MONOBASIC POTASSIUM PHOSPHATE, DIBASIC 236; 224 MG/ML; MG/ML
30 INJECTION, SOLUTION INTRAVENOUS ONCE
Refills: 0 | Status: COMPLETED | OUTPATIENT
Start: 2020-06-04 | End: 2020-06-04

## 2020-06-04 RX ORDER — SODIUM CHLORIDE 9 MG/ML
1000 INJECTION INTRAMUSCULAR; INTRAVENOUS; SUBCUTANEOUS
Refills: 0 | Status: DISCONTINUED | OUTPATIENT
Start: 2020-06-04 | End: 2020-06-04

## 2020-06-04 RX ORDER — DIPHENHYDRAMINE HCL 50 MG
25 CAPSULE ORAL EVERY 6 HOURS
Refills: 0 | Status: DISCONTINUED | OUTPATIENT
Start: 2020-06-04 | End: 2020-06-04

## 2020-06-04 RX ORDER — CEFAZOLIN SODIUM 1 G
VIAL (EA) INJECTION
Refills: 0 | Status: DISCONTINUED | OUTPATIENT
Start: 2020-06-04 | End: 2020-06-05

## 2020-06-04 RX ORDER — INSULIN LISPRO 100/ML
VIAL (ML) SUBCUTANEOUS EVERY 6 HOURS
Refills: 0 | Status: DISCONTINUED | OUTPATIENT
Start: 2020-06-04 | End: 2020-06-06

## 2020-06-04 RX ADMIN — Medication 7.5 MILLIGRAM(S): at 17:07

## 2020-06-04 RX ADMIN — ENOXAPARIN SODIUM 40 MILLIGRAM(S): 100 INJECTION SUBCUTANEOUS at 11:56

## 2020-06-04 RX ADMIN — Medication 10 MILLIGRAM(S): at 22:24

## 2020-06-04 RX ADMIN — HYDROMORPHONE HYDROCHLORIDE 0.25 MILLIGRAM(S): 2 INJECTION INTRAMUSCULAR; INTRAVENOUS; SUBCUTANEOUS at 08:36

## 2020-06-04 RX ADMIN — HEPARIN SODIUM 1500 UNIT(S)/HR: 5000 INJECTION INTRAVENOUS; SUBCUTANEOUS at 21:17

## 2020-06-04 RX ADMIN — Medication 250 MILLIGRAM(S): at 17:07

## 2020-06-04 RX ADMIN — Medication 10 MILLIGRAM(S): at 14:18

## 2020-06-04 RX ADMIN — Medication 10 MILLIGRAM(S): at 05:49

## 2020-06-04 RX ADMIN — Medication 7.5 MILLIGRAM(S): at 11:57

## 2020-06-04 RX ADMIN — HEPARIN SODIUM 1200 UNIT(S)/HR: 5000 INJECTION INTRAVENOUS; SUBCUTANEOUS at 20:52

## 2020-06-04 RX ADMIN — PANTOPRAZOLE SODIUM 40 MILLIGRAM(S): 20 TABLET, DELAYED RELEASE ORAL at 11:56

## 2020-06-04 RX ADMIN — Medication 250 MILLIGRAM(S): at 07:38

## 2020-06-04 RX ADMIN — Medication 7.5 MILLIGRAM(S): at 23:07

## 2020-06-04 RX ADMIN — INSULIN GLARGINE 10 UNIT(S): 100 INJECTION, SOLUTION SUBCUTANEOUS at 22:00

## 2020-06-04 RX ADMIN — Medication 20 MILLIGRAM(S): at 14:17

## 2020-06-04 RX ADMIN — Medication 100 MILLIEQUIVALENT(S): at 18:33

## 2020-06-04 RX ADMIN — CEFEPIME 25 MILLIGRAM(S): 1 INJECTION, POWDER, FOR SOLUTION INTRAMUSCULAR; INTRAVENOUS at 17:07

## 2020-06-04 RX ADMIN — Medication 7.5 MILLIGRAM(S): at 00:55

## 2020-06-04 RX ADMIN — ATORVASTATIN CALCIUM 40 MILLIGRAM(S): 80 TABLET, FILM COATED ORAL at 22:25

## 2020-06-04 RX ADMIN — CEFEPIME 25 MILLIGRAM(S): 1 INJECTION, POWDER, FOR SOLUTION INTRAMUSCULAR; INTRAVENOUS at 05:50

## 2020-06-04 RX ADMIN — Medication 7.5 MILLIGRAM(S): at 05:49

## 2020-06-04 RX ADMIN — POTASSIUM PHOSPHATE, MONOBASIC POTASSIUM PHOSPHATE, DIBASIC 83.33 MILLIMOLE(S): 236; 224 INJECTION, SOLUTION INTRAVENOUS at 10:25

## 2020-06-04 RX ADMIN — Medication 100 MILLIGRAM(S): at 21:03

## 2020-06-04 NOTE — PROGRESS NOTE ADULT - PROBLEM SELECTOR PLAN 1
patient endorses moderate to severe abdominal pain, n/v since 5/29 Fri  partially relieved with oral Protonix  UA negative, No fevers, UCx: -ve  More likely GI etiology  CT abd w/ IV cont: Small bilateral pleural effusions. Multiple nodular peripheral filling defect in the urinary bladder measuring up to 1.3 cm, suspicious for transitional cell carcinomas.  - c/w PPI, Reglan q8h (pt had QTc prolongation, but will continue as pt has AICD and it works better) , maalox q6h, zofran q6h PRN  GI Dr. Matute patient endorses moderate to severe abdominal pain, n/v since 5/29 Fri  partially relieved with oral Protonix  UA negative, No fevers, UCx: -ve  More likely GI etiology  CT abd w/ IV cont: Small bilateral pleural effusions. Multiple nodular peripheral filling defect in the urinary bladder measuring up to 1.3 cm, suspicious for transitional cell carcinomas.  - c/w PPI, Reglan q8h (pt had QTc prolongation, but will continue as pt has AICD and it works better) , zofran q6h PRN  - starting benadryl 25mg IV q6h PRN for n/v  - plan for EGD tmrw  GI Dr. Matute

## 2020-06-04 NOTE — CONSULT NOTE ADULT - SUBJECTIVE AND OBJECTIVE BOX
Patient is a 74y old  Female who presents with a chief complaint of Intractable nausea and vomiting (04 Jun 2020 16:09)      HPI:  75 YO female from home, H/O CAD/DM/HTN/PVD/hysterectomy/cholecystectomy ,with AICD comes to ED with intractable nausea and vomiting for 2 days. Now has develped intractable vertigo and admits headache in the back of the head   PAST MEDICAL & SURGICAL HISTORY:  PVD (peripheral vascular disease)  CAD (coronary artery disease)  CHF (congestive heart failure)  HTN (hypertension)  DM (diabetes mellitus)  Status post coronary artery stent placement  H/O: hysterectomy  Cardiac defibrillator in place  Artificial cardiac pacemaker  History of cholecystectomy      FAMILY HISTORY:        Social Hx:  Nonsmoker, no drug or alcohol use    MEDICATIONS  (STANDING):  aspirin  chewable 81 milliGRAM(s) Oral daily  atorvastatin 40 milliGRAM(s) Oral at bedtime  cefepime   IVPB 2000 milliGRAM(s) IV Intermittent every 12 hours  cefepime   IVPB      clopidogrel Tablet 75 milliGRAM(s) Oral daily  Dakins Solution - 1/4 Strength 1 Application(s) Topical daily  enoxaparin Injectable 40 milliGRAM(s) SubCutaneous daily  hydrALAZINE Injectable 10 milliGRAM(s) IV Push every 8 hours  insulin glargine Injectable (LANTUS) 20 Unit(s) SubCutaneous at bedtime  insulin lispro (HumaLOG) corrective regimen sliding scale   SubCutaneous three times a day before meals  lactobacillus acidophilus 1 Tablet(s) Oral three times a day with meals  metoprolol tartrate Injectable 7.5 milliGRAM(s) IV Push every 6 hours  pantoprazole  Injectable 40 milliGRAM(s) IV Push daily  sodium chloride 0.9%. 1000 milliLiter(s) (60 mL/Hr) IV Continuous <Continuous>  vancomycin  IVPB 1000 milliGRAM(s) IV Intermittent every 12 hours       Allergies    codeine (Rash)  penicillin (Rash)    Intolerances        ROS: Pertinent positives in HPI, all other ROS were reviewed and are negative.      Vital Signs Last 24 Hrs  T(C): 36.6 (04 Jun 2020 14:25), Max: 36.8 (04 Jun 2020 05:28)  T(F): 97.9 (04 Jun 2020 14:25), Max: 98.2 (04 Jun 2020 05:28)  HR: 100 (04 Jun 2020 15:20) (97 - 113)  BP: 165/80 (04 Jun 2020 15:20) (152/75 - 174/94)  BP(mean): --  RR: 18 (04 Jun 2020 14:25) (18 - 18)  SpO2: 95% (04 Jun 2020 14:25) (95% - 96%)        Constitutional: awake and alert.  HEENT: PERRLA, EOMI,   Neck: Supple.  Respiratory: Breath sounds are clear bilaterally  Cardiovascular: S1 and S2, regular  rhythm  Gastrointestinal: soft, nontender  Extremities:  no edema  Vascular: Carotid Bruit - no  Musculoskeletal: no joint swelling/tenderness, no abnormal movements  Skin: No rashes    Neurological exam:  HF: A x O x 3. Appropriately interactive, normal affect. Speech fluent, No Aphasia or paraphasic errors. Naming /repetition intact   CN: [place], EOMI left gaze induced left beating nystagmus, VFF, facial sensation normal, no NLFD, tongue midline, Palate moves equally, SCM equal bilaterally  Motor: No pronator drift, Strength 5/5 in all 4 ext, normal bulk and tone, no tremor, rigidity or bradykinesia.    Sens: Intact to light touch / PP/ VS/ JS    Reflexes: Symmetric and normal . BJ 2+, BR 2+, KJ 2+, AJ 2+, downgoing toes b/l  Coord: Left FNFA, dysmetria, PRINCESS intact   Gait/Balance: unablet    NIHSS: 2    1A: Level of consciousness       0= Alert; keenly responsive   1B: Ask month and age       0= Both questions right   1C: "Blink eyes" and "Squeeze Hands"       0= Performs both  2: Horizontal EOMs       0= Normal   3: Visual fields       0= No visual loss  4: Facial palsy (use grimace if obtunded)       0= Normal symmetry   5A: Left arm motor drift (count out loud and use fingers to show count)       0= No drift x 10 seconds   5B: Right arm motor drift       0= No drift x 10 seconds  6A: Left leg motor drift       0= No drift x 10 seconds  6B: Right leg motor drift       0= No drift x 10 seconds    7: Limb ataxia (FNF/heel-shin)       +2= Ataxia in 2 limbs  8: Sensation       0= Normal, no sensory loss  9: Language/aphasia- describe the scene (on caridad); name the items; read the sentences (on caridad)       0= Normal, no aphasia  10: Dysarthria- read the words       0= Normal  11: Extinction/inattention       0= No abnormality             Labs:                        10.5   18.07 )-----------( 486      ( 04 Jun 2020 06:44 )             33.0     06-04    135  |  99  |  29<H>  ----------------------------<  87  3.4<L>   |  28  |  1.26    Ca    8.9      04 Jun 2020 06:44  Phos  2.2     06-04  Mg     2.7     06-04    TPro  7.7  /  Alb  2.5<L>  /  TBili  0.6  /  DBili  x   /  AST  24  /  ALT  79<H>  /  AlkPhos  301<H>  06-04      05-31 Chol 169 LDL 93 HDL 59 Trig 87      Radiology report:  - CT head:  < from: CT Head No Cont (06.04.20 @ 15:55) >  XAM:  CT BRAIN                            PROCEDURE DATE:  06/04/2020          INTERPRETATION:  CLINICAL STATEMENT: Nausea vomiting    TECHNIQUE: CT of the head was performed without IV contrast.  RAPID artificial intelligence was utilized for thepreliminary evaluation of intracranial hemorrhage.    COMPARISON: CT head 10/12/2019    FINDINGS:  There is mild diffuse parenchymal volume loss. There are areas of low attenuation in the periventricular white matter likely related to mild chronic microvascular ischemic changes.    There is no acute intracranial hemorrhage, parenchymal mass, mass effect or midline shift. There is no acute territorial infarct. There is no hydrocephalus. Partial empty sella    The cranium is intact. The visualized paranasal sinuses are well-aerated.    IMPRESSION:  No acute intracranial hemorrhage or acute territorial infarct.  If symptoms persist, follow-up MRI exam recommended.                SALOMÓN GARCÍA M.D., ATTENDING RADIOLOGIST  This document has been electronically signed. Jun 4 2020  4:05PM              < end of copied text >    - MRI brain  - MRA head/carotids  - EEG    A/P:    - No IV tpa given because…  - ASA/ PLavix  - Statin  - Dysphagia screen  - DVT prophylaxia  - MRI/A brain-carotids  - PT eval  - Speech/swallow eval    Total Critical Care Time spent:

## 2020-06-04 NOTE — PROGRESS NOTE ADULT - PROBLEM SELECTOR PLAN 9
H/o CHF but clinically patient is not in fluid overload  No peripheral edema  Patient takes furosemide 20mg daily twice  BNP: elevated at 9383  echo: EF 50-55%, mild TR, [place]  - holding lasix 20mg IV as pt is not eating and dehydrated

## 2020-06-04 NOTE — CHART NOTE - NSCHARTNOTEFT_GEN_A_CORE
Pt has LEFT PICA stroke  disscussed with Dr. Moncada and pt started on iv heparin low dose drip with no bolus and   keep APTT between 40-45 as per Dr. Moncada  aspiration precaution, seizure precautions  seizure precaustion  neuro check q 4  tele monitor

## 2020-06-04 NOTE — PROGRESS NOTE ADULT - SUBJECTIVE AND OBJECTIVE BOX
Patient is a 74y old  Female who presents with a chief complaint of Intractable nausea and vomiting (03 Jun 2020 22:06)      INTERVAL HPI/OVERNIGHT EVENTS:      REVIEW OF SYSTEMS:  CONSTITUTIONAL: No fever, weight loss, or fatigue  EYES: No eye pain, visual disturbances, or discharge  ENMT:  No difficulty hearing, tinnitus, vertigo; No sinus or throat pain  NECK: No pain or stiffness  BREASTS: No pain, masses, or nipple discharge  RESPIRATORY: No cough, wheezing, chills or hemoptysis; No shortness of breath  CARDIOVASCULAR: No chest pain, palpitations, dizziness, or leg swelling  GASTROINTESTINAL: No abdominal or epigastric pain. No nausea, vomiting, or hematemesis; No diarrhea or constipation. No melena or hematochezia.  GENITOURINARY: No dysuria, frequency, hematuria, or incontinence  NEUROLOGICAL: No headaches, memory loss, loss of strength, numbness, or tremors  SKIN: No itching, burning, rashes, or lesions   LYMPH NODES: No enlarged glands  ENDOCRINE: No heat or cold intolerance; No hair loss  MUSCULOSKELETAL: No joint pain or swelling; No muscle, back, or extremity pain  HEME/LYMPH: No easy bruising, or bleeding gums  ALLERY AND IMMUNOLOGIC: No hives or eczema    FAMILY HISTORY:    Vital Signs Last 24 Hrs  T(C): 36.8 (04 Jun 2020 05:28), Max: 36.9 (03 Jun 2020 14:20)  T(F): 98.2 (04 Jun 2020 05:28), Max: 98.5 (03 Jun 2020 14:20)  HR: 113 (04 Jun 2020 05:28) (87 - 113)  BP: 166/78 (04 Jun 2020 05:28) (152/75 - 175/77)  BP(mean): --  RR: 18 (04 Jun 2020 05:28) (18 - 18)  SpO2: 95% (04 Jun 2020 05:28) (95% - 98%)      PHYSICAL EXAM:  GENERAL: NAD, well-groomed, well-developed  HEAD:  Atraumatic, Normocephalic  EYES: EOMI, PERRLA, conjunctiva and sclera clear  ENMT: No tonsillar erythema, exudates, or enlargement; Moist mucous membranes, Good dentition, No lesions  NECK: Supple, No JVD, Normal thyroid  NERVOUS SYSTEM:  Alert & Oriented X3, Good concentration; Motor Strength 5/5 B/L upper and lower extremities; DTRs 2+ intact and symmetric  CHEST/LUNG: Clear to percussion bilaterally; No rales, rhonchi, wheezing, or rubs  HEART: Regular rate and rhythm; No murmurs, rubs, or gallops  ABDOMEN: Soft, Nontender, Nondistended; Bowel sounds present  EXTREMITIES:  2+ Peripheral Pulses, No clubbing, cyanosis, or edema  LYMPH: No lymphadenopathy noted  SKIN: No rashes or lesions    Consultant(s) Notes Reviewed:  [x ] YES  [ ] NO  Care Discussed with Consultants/Other Providers [ x] YES  [ ] NO    LABS:        RADIOLOGY & ADDITIONAL TESTS:    Imaging Personally Reviewed:  [ ] YES  [ ] NO  aspirin  chewable 81 milliGRAM(s) Oral daily  atorvastatin 40 milliGRAM(s) Oral at bedtime  cefepime   IVPB 2000 milliGRAM(s) IV Intermittent every 12 hours  cefepime   IVPB      clopidogrel Tablet 75 milliGRAM(s) Oral daily  Dakins Solution - 1/4 Strength 1 Application(s) Topical daily  enoxaparin Injectable 40 milliGRAM(s) SubCutaneous daily  hydrALAZINE Injectable 10 milliGRAM(s) IV Push every 8 hours  HYDROmorphone  Injectable 0.25 milliGRAM(s) IV Push every 6 hours PRN  HYDROmorphone  Injectable 0.5 milliGRAM(s) IV Push every 6 hours PRN  insulin glargine Injectable (LANTUS) 20 Unit(s) SubCutaneous at bedtime  insulin lispro (HumaLOG) corrective regimen sliding scale   SubCutaneous three times a day before meals  lactobacillus acidophilus 1 Tablet(s) Oral three times a day with meals  metoclopramide Injectable 10 milliGRAM(s) IV Push every 8 hours  metoprolol tartrate Injectable 7.5 milliGRAM(s) IV Push every 6 hours  ondansetron Injectable 4 milliGRAM(s) IV Push every 6 hours PRN  pantoprazole  Injectable 40 milliGRAM(s) IV Push daily  sodium chloride 0.9%. 1000 milliLiter(s) IV Continuous <Continuous>  vancomycin  IVPB 1000 milliGRAM(s) IV Intermittent every 12 hours      HEALTH ISSUES - PROBLEM Dx:  Osteomyelitis of left foot, unspecified type: Osteomyelitis of left foot, unspecified type  Intractable nausea and vomiting: Intractable nausea and vomiting  Stump pain: Stump pain  Prophylactic measure: Prophylactic measure  CHF (congestive heart failure): CHF (congestive heart failure)  CAD (coronary artery disease): CAD (coronary artery disease)  PVD (peripheral vascular disease): PVD (peripheral vascular disease)  Bladder wall thickening: Bladder wall thickening  DM (diabetes mellitus): DM (diabetes mellitus)  HTN (hypertension): HTN (hypertension)  Intractable abdominal pain: Intractable abdominal pain  Gastritis, acute: Gastritis, acute Patient is a 74y old  Female who presents with a chief complaint of Intractable nausea and vomiting (03 Jun 2020 22:06)      INTERVAL HPI/OVERNIGHT EVENTS:    - pt is still having n/v.  - daughter called the unit, asking about plan for EGD. Daughter's number was passed to GI doctor and he will call the daughter.  - As per daughter, pt had vertigo for a long time.      REVIEW OF SYSTEMS:  CONSTITUTIONAL: No fever, (+)weight loss, no fatigue  EYES: No eye pain, visual disturbances, or discharge  ENMT:  No difficulty hearing, tinnitus, vertigo; No sinus or throat pain (+) liquid drip sensation in R ear  NECK: No pain or stiffness  BREASTS: No pain, masses, or nipple discharge  RESPIRATORY: No cough, wheezing, chills or hemoptysis; No shortness of breath  CARDIOVASCULAR: No chest pain, palpitations, dizziness, or leg swelling  GASTROINTESTINAL: No abdominal (+) epigastric pain, better than before.  (+) nausea, vomiting, or hematemesis; No diarrhea or constipation. No melena or hematochezia.  GENITOURINARY: No dysuria, frequency, hematuria, or incontinence  NEUROLOGICAL: No headaches, memory loss, loss of strength, numbness, or tremors  SKIN: No itching, burning, rashes, or lesions   LYMPH NODES: No enlarged glands  ENDOCRINE: No heat or cold intolerance; No hair loss  MUSCULOSKELETAL: No joint pain or swelling; No muscle, back, or extremity pain  HEME/LYMPH: No easy bruising, or bleeding gums  ALLERY AND IMMUNOLOGIC: No hives or eczema    FAMILY HISTORY:    Vital Signs Last 24 Hrs  T(C): 36.8 (04 Jun 2020 05:28), Max: 36.9 (03 Jun 2020 14:20)  T(F): 98.2 (04 Jun 2020 05:28), Max: 98.5 (03 Jun 2020 14:20)  HR: 113 (04 Jun 2020 05:28) (87 - 113)  BP: 166/78 (04 Jun 2020 05:28) (152/75 - 175/77)  BP(mean): --  RR: 18 (04 Jun 2020 05:28) (18 - 18)  SpO2: 95% (04 Jun 2020 05:28) (95% - 98%)      PHYSICAL EXAM:  GENERAL: NAD, well-groomed  HEAD:  Atraumatic, Normocephalic  EYES: EOMI, PERRLA, conjunctiva and sclera clear, (+) conjunctiva anemic   ENMT: No tonsillar erythema, exudates, or enlargement; Moist mucous membranes, No lesions  NECK: Supple, No JVD, Normal thyroid  NERVOUS SYSTEM:  Alert & Oriented X3, Good concentration; Motor Strength 5/5 B/L upper and lower extremities  CHEST/LUNG: Clear to percussion bilaterally; No rales, rhonchi, wheezing, or rubs  HEART: Regular rate, tachycardia, No murmurs, rubs, or gallops  ABDOMEN: Soft, (+)tender on palpation to epigastric area, Nondistended; Bowel sounds present  EXTREMITIES:  2+ Peripheral Pulses, No clubbing, cyanosis, or edema (+) s/p L toe amputation  LYMPH: No lymphadenopathy noted  SKIN: No rashes or lesions        Consultant(s) Notes Reviewed:  [x ] YES  [ ] NO  Care Discussed with Consultants/Other Providers [ x] YES  [ ] NO    LABS:        RADIOLOGY & ADDITIONAL TESTS:    Imaging Personally Reviewed:  [ ] YES  [ ] NO  aspirin  chewable 81 milliGRAM(s) Oral daily  atorvastatin 40 milliGRAM(s) Oral at bedtime  cefepime   IVPB 2000 milliGRAM(s) IV Intermittent every 12 hours  cefepime   IVPB      clopidogrel Tablet 75 milliGRAM(s) Oral daily  Dakins Solution - 1/4 Strength 1 Application(s) Topical daily  enoxaparin Injectable 40 milliGRAM(s) SubCutaneous daily  hydrALAZINE Injectable 10 milliGRAM(s) IV Push every 8 hours  HYDROmorphone  Injectable 0.25 milliGRAM(s) IV Push every 6 hours PRN  HYDROmorphone  Injectable 0.5 milliGRAM(s) IV Push every 6 hours PRN  insulin glargine Injectable (LANTUS) 20 Unit(s) SubCutaneous at bedtime  insulin lispro (HumaLOG) corrective regimen sliding scale   SubCutaneous three times a day before meals  lactobacillus acidophilus 1 Tablet(s) Oral three times a day with meals  metoclopramide Injectable 10 milliGRAM(s) IV Push every 8 hours  metoprolol tartrate Injectable 7.5 milliGRAM(s) IV Push every 6 hours  ondansetron Injectable 4 milliGRAM(s) IV Push every 6 hours PRN  pantoprazole  Injectable 40 milliGRAM(s) IV Push daily  sodium chloride 0.9%. 1000 milliLiter(s) IV Continuous <Continuous>  vancomycin  IVPB 1000 milliGRAM(s) IV Intermittent every 12 hours      HEALTH ISSUES - PROBLEM Dx:  Osteomyelitis of left foot, unspecified type: Osteomyelitis of left foot, unspecified type  Intractable nausea and vomiting: Intractable nausea and vomiting  Stump pain: Stump pain  Prophylactic measure: Prophylactic measure  CHF (congestive heart failure): CHF (congestive heart failure)  CAD (coronary artery disease): CAD (coronary artery disease)  PVD (peripheral vascular disease): PVD (peripheral vascular disease)  Bladder wall thickening: Bladder wall thickening  DM (diabetes mellitus): DM (diabetes mellitus)  HTN (hypertension): HTN (hypertension)  Intractable abdominal pain: Intractable abdominal pain  Gastritis, acute: Gastritis, acute

## 2020-06-04 NOTE — PROGRESS NOTE ADULT - SUBJECTIVE AND OBJECTIVE BOX
Subjective: continues with nausea. and some vomiting .seen by GI , for U/S abd with EGD . no fevers , somewhat tachycardic , no fevers       PHYSICAL EXAM:    Vital Signs Last 24 Hrs  T(C): 36.6 (04 Jun 2020 14:25), Max: 36.8 (04 Jun 2020 05:28)  T(F): 97.9 (04 Jun 2020 14:25), Max: 98.2 (04 Jun 2020 05:28)  HR: 100 (04 Jun 2020 15:20) (97 - 113)  BP: 165/80 (04 Jun 2020 15:20) (152/75 - 174/94)  BP(mean): --  RR: 18 (04 Jun 2020 14:25) (18 - 18)  SpO2: 95% (04 Jun 2020 14:25) (95% - 96%)    Constitutional: awake alert oriented times 3   [place] SCLERA anicteric EOMI   LUNGS clear   CVS s1 s2 aud no murmurs   ABDOMEN soft non tender no HSM   NEUROLOGY  no defecits   SKIN left first toe stump swelling tender with some open wound probing to bone as per podiatry   /dried wound /erythema plantar aspect   EXTREMITIES no edema no cyanosis         LABS/DIAGNOSTIC TESTS                        10.5   18.07 )-----------( 486      ( 04 Jun 2020 06:44 )             33.0     06-04    135  |  99  |  29<H>  ----------------------------<  87  3.4<L>   |  28  |  1.26    Ca    8.9      04 Jun 2020 06:44  Phos  2.2     06-04  Mg     2.7     06-04    TPro  7.7  /  Alb  2.5<L>  /  TBili  0.6  /  DBili  x   /  AST  24  /  ALT  79<H>  /  AlkPhos  301<H>  06-04      Vancomycin Level, Trough (06.04.20 @ 06:44)    Vancomycin Level, Trough: 17.5: Vancomycin trough levels should be rapidly reached and maintained at  15-20 ug/ml for life threatening MRSA  infections such as sepsis, endocarditis, osteomyelitis and pneumonia. A  first trough level should be drawn  before the 3rd or 4th dose.  Risk of renal toxicity is increased for levels >15 ug/ml, in patients on  other nephrotoxic drugs, who are  hemodynamically unstable, have unstable renal function, or are on  Vancomycin therapy for >14 days. Renal function with  creatinine levels should be monitored for those patients. ug/mL        meds  aspirin  chewable 81 milliGRAM(s) Oral daily  atorvastatin 40 milliGRAM(s) Oral at bedtime  cefepime   IVPB 2000 milliGRAM(s) IV Intermittent every 12 hours  cefepime   IVPB      clopidogrel Tablet 75 milliGRAM(s) Oral daily  Dakins Solution - 1/4 Strength 1 Application(s) Topical daily  diphenhydrAMINE   Injectable 15 milliGRAM(s) IV Push every 6 hours PRN  enoxaparin Injectable 40 milliGRAM(s) SubCutaneous daily  hydrALAZINE Injectable 10 milliGRAM(s) IV Push every 8 hours  HYDROmorphone  Injectable 0.25 milliGRAM(s) IV Push every 6 hours PRN  HYDROmorphone  Injectable 0.5 milliGRAM(s) IV Push every 6 hours PRN  insulin glargine Injectable (LANTUS) 20 Unit(s) SubCutaneous at bedtime  insulin lispro (HumaLOG) corrective regimen sliding scale   SubCutaneous three times a day before meals  lactobacillus acidophilus 1 Tablet(s) Oral three times a day with meals  metoprolol tartrate Injectable 7.5 milliGRAM(s) IV Push every 6 hours  pantoprazole  Injectable 40 milliGRAM(s) IV Push daily  sodium chloride 0.9%. 1000 milliLiter(s) IV Continuous <Continuous>  vancomycin  IVPB 1000 milliGRAM(s) IV Intermittent every 12 hours        CULTURES  Culture Results: blood  No growth to date. (06-03 @ 08:52)  Culture Results: wound  Few Staphylococcus aureus (06-03 @ 00:34)( MSSA)  Culture Results: blood  No growth to date. (06-02 @ 14:30)  Culture Results: blood  No growth to date. (06-02 @ 14:30)  Culture Results: urine   <10,000 CFU/mL Normal Urogenital Shannan (05-30 @ 16:36)        RADIOLOGY  < from: US Abdomen Limited (06.04.20 @ 16:11) >  IMPRESSION:   Liver demonstrates some areas of increased echogenicity which may represent heterogeneous fatty infiltration.  Status post cholecystectomy    < from: CT Head No Cont (06.04.20 @ 15:55) >  IMPRESSION:  No acute intracranial hemorrhage or acute territorial infarct.  If symptoms persist, follow-up MRI exam recommended.      < from: Xray Femur 2 Views, Left (06.04.20 @ 11:04) >  IMPRESSION: Left popliteal stent. No femoral stent.    Thank you for this referral.    < end of copied text >

## 2020-06-04 NOTE — PROGRESS NOTE ADULT - PROBLEM SELECTOR PLAN 3
- patient comes with acute onset of nausea, epigastric pain and vomiting x 2 days  - Non radiating, 7/10 ,burning and pressure like pain  - She has been taking antibiotics for almost three weeks  - Symptoms aggravate with food and partially relieved with omeprazole  - patient has also history of diabetes  - DDx include acute pill induced esophagitis & gastritis or gastropathy(uncontrolled DM)  - s/p Famotidine IV, zofran and Malox in Ed  - pt also received 1 bolus in ED  - c/w PPI, Reglan q8h , maalox q6h, zofran q6h PRN  - Avoid NSAIDs and spicy food  GI Dr. Matute - patient comes with acute onset of nausea, epigastric pain and vomiting x 2 days  - Non radiating, 7/10 ,burning and pressure like pain  - She has been taking antibiotics for almost three weeks  - Symptoms aggravate with food and partially relieved with omeprazole  - patient has also history of diabetes  - DDx include acute pill induced esophagitis & gastritis or gastropathy(uncontrolled DM)  - s/p Famotidine IV, zofran and Malox in Ed  - pt also received 1 bolus in ED  - c/w PPI, Reglan q8h , benadryl 25mg IV q6h PRN  - Avoid NSAIDs and spicy food  - Plan for EGD tmrw  GI Dr. Matute

## 2020-06-04 NOTE — PROGRESS NOTE ADULT - PROBLEM SELECTOR PLAN 2
recently antibiotics changed from clindamycin to Levaquin that led to epigastric pain.  WBC remains high 15k, possible infection on stump site  - ABX switched to cefepime + doxycycline 6/1-  - lactate 2.4, procalcitonin 0.17  - CT L foot shows OM ( pt cannot get MRI dur to AICD)   repeat lactate: trended down  Podiatry Dr. Yo  ID Dr. Reagan

## 2020-06-04 NOTE — CONSULT NOTE ADULT - ASSESSMENT
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   Discontinue aspirin when heparin infusion is intiated  CT angiogram stroke protocol

## 2020-06-04 NOTE — PROGRESS NOTE ADULT - ASSESSMENT
Intractable Nausea and vomiting  Many things can be contributing.   ? Antibiotics induced   ?  2/2 to blood pressure     Plan:  Nausea and vomiting   Continue antiemetics    Reglan would appear ok to continue despite prolonged QTC given history of AICD and pacemaker (seems to be helping)   Better BP control   Consider CT head (patient is a vasculopath with elevated BP ?  a intracranial process)  US of the abdomen EGD tomorrow to rule out gastric etiology         Will follow closely   Advanced care planning was discussed with patient and family.  Advanced care planning forms were reviewed and discussed.  Risks, benefits and alternatives of gastroenterologic procedures were discussed in detail and all questions were answered.

## 2020-06-04 NOTE — PROGRESS NOTE ADULT - PROBLEM SELECTOR PLAN 5
CT abdomen shows bladder wall thickening  reports weight loss and loss of appetite recently, Patient has history of Stage 1 endometrial cancer and hysterectomy  brother also had cancer and she is not sure about which one  - urine cytology: NEGATIVE FOR HIGH GRADE UROTHELIAL CARCINOMA  urology consulted: outpt f/u w/

## 2020-06-04 NOTE — PROGRESS NOTE ADULT - SUBJECTIVE AND OBJECTIVE BOX
Subjective:   Still with nausea     Objective:    MEDICATIONS  (STANDING):  aspirin  chewable 81 milliGRAM(s) Oral daily  atorvastatin 40 milliGRAM(s) Oral at bedtime  cefepime   IVPB 2000 milliGRAM(s) IV Intermittent every 12 hours  cefepime   IVPB      clopidogrel Tablet 75 milliGRAM(s) Oral daily  Dakins Solution - 1/4 Strength 1 Application(s) Topical daily  enoxaparin Injectable 40 milliGRAM(s) SubCutaneous daily  hydrALAZINE Injectable 10 milliGRAM(s) IV Push every 8 hours  insulin glargine Injectable (LANTUS) 20 Unit(s) SubCutaneous at bedtime  insulin lispro (HumaLOG) corrective regimen sliding scale   SubCutaneous three times a day before meals  lactobacillus acidophilus 1 Tablet(s) Oral three times a day with meals  metoprolol tartrate Injectable 7.5 milliGRAM(s) IV Push every 6 hours  pantoprazole  Injectable 40 milliGRAM(s) IV Push daily  sodium chloride 0.9%. 1000 milliLiter(s) (60 mL/Hr) IV Continuous <Continuous>  vancomycin  IVPB 1000 milliGRAM(s) IV Intermittent every 12 hours    MEDICATIONS  (PRN):  diphenhydrAMINE   Injectable 25 milliGRAM(s) IV Push every 6 hours PRN nausea or vomiting  HYDROmorphone  Injectable 0.25 milliGRAM(s) IV Push every 6 hours PRN Moderate Pain (4 - 6)  HYDROmorphone  Injectable 0.5 milliGRAM(s) IV Push every 6 hours PRN Severe Pain (7 - 10)              Vital Signs Last 24 Hrs  T(C): 36.6 (04 Jun 2020 14:25), Max: 36.8 (04 Jun 2020 05:28)  T(F): 97.9 (04 Jun 2020 14:25), Max: 98.2 (04 Jun 2020 05:28)  HR: 100 (04 Jun 2020 15:20) (97 - 113)  BP: 165/80 (04 Jun 2020 15:20) (152/75 - 174/94)  BP(mean): --  RR: 18 (04 Jun 2020 14:25) (18 - 18)  SpO2: 95% (04 Jun 2020 14:25) (95% - 96%)      General:   NAD.  HEENT:    Normal appearance of conjunctiva, ears, nose, lips, oropharynx, and oral mucosa, anicteric.  Neck:  No masses, no asymmetry.  Lymph Nodes:  No lymphadenopathy.   Cardiovascular:  RRR normal S1/S2, no murmur.  Respiratory:  CTA B/L, normal respiratory effort.   Abdominal:   soft, no masses or tenderness, normoactive BS, NT/ND, no HSM.  Extremities:   No clubbing or cyanosis, normal capillary refill, no edema.   Skin:   No rash, jaundice, lesions, eczema.   Musculoskeletal:  No joint swelling, erythema or tenderness.   Neuro: No focal deficits.   Other:       LABS:                        10.5   18.07 )-----------( 486      ( 04 Jun 2020 06:44 )             33.0     06-04    135  |  99  |  29<H>  ----------------------------<  87  3.4<L>   |  28  |  1.26    Ca    8.9      04 Jun 2020 06:44  Phos  2.2     06-04  Mg     2.7     06-04    TPro  7.7  /  Alb  2.5<L>  /  TBili  0.6  /  DBili  x   /  AST  24  /  ALT  79<H>  /  AlkPhos  301<H>  06-04          RADIOLOGY & ADDITIONAL TESTS:

## 2020-06-04 NOTE — PROGRESS NOTE ADULT - SUBJECTIVE AND OBJECTIVE BOX
Vital Signs Last 24 Hrs  T(C): 36.6 (04 Jun 2020 14:25), Max: 36.8 (04 Jun 2020 05:28)  T(F): 97.9 (04 Jun 2020 14:25), Max: 98.2 (04 Jun 2020 05:28)  HR: 100 (04 Jun 2020 15:20) (97 - 113)  BP: 165/80 (04 Jun 2020 15:20) (152/75 - 174/94)  BP(mean): --  RR: 18 (04 Jun 2020 14:25) (18 - 18)  SpO2: 95% (04 Jun 2020 14:25) (95% - 96%)                        10.5   18.07 )-----------( 486      ( 04 Jun 2020 06:44 )             33.0   WBC Count: 18.07 K/uL (06-04 @ 06:44)  WBC Count: 14.32 K/uL (06-03 @ 05:51)  WBC Count: 15.44 K/uL (06-02 @ 05:46)  WBC Count: 15.54 K/uL (06-01 @ 05:53)  WBC Count: 15.14 K/uL (05-31 @ 07:02)  06-04    135  |  99  |  29<H>  ----------------------------<  87  3.4<L>   |  28  |  1.26    Ca    8.9      04 Jun 2020 06:44  Phos  2.2     06-04  Mg     2.7     06-04    TPro  7.7  /  Alb  2.5<L>  /  TBili  0.6  /  DBili  x   /  AST  24  /  ALT  79<H>  /  AlkPhos  301<H>  06-04 f/u on left foot wound   Vital Signs Last 24 Hrs  T(C): 36.6 (04 Jun 2020 14:25), Max: 36.8 (04 Jun 2020 05:28)  T(F): 97.9 (04 Jun 2020 14:25), Max: 98.2 (04 Jun 2020 05:28)  HR: 100 (04 Jun 2020 15:20) (97 - 113)  BP: 165/80 (04 Jun 2020 15:20) (152/75 - 174/94)  BP(mean): --  RR: 18 (04 Jun 2020 14:25) (18 - 18)  SpO2: 95% (04 Jun 2020 14:25) (95% - 96%)                        10.5   18.07 )-----------( 486      ( 04 Jun 2020 06:44 )             33.0   WBC Count: 18.07 K/uL (06-04 @ 06:44)  WBC Count: 14.32 K/uL (06-03 @ 05:51)  WBC Count: 15.44 K/uL (06-02 @ 05:46)  WBC Count: 15.54 K/uL (06-01 @ 05:53)  WBC Count: 15.14 K/uL (05-31 @ 07:02)  06-04    135  |  99  |  29<H>  ----------------------------<  87  3.4<L>   |  28  |  1.26    Ca    8.9      04 Jun 2020 06:44  Phos  2.2     06-04  Mg     2.7     06-04    TPro  7.7  /  Alb  2.5<L>  /  TBili  0.6  /  DBili  x   /  AST  24  /  ALT  79<H>  /  AlkPhos  301<H>  06-04    Prothrombin Time and INR, Plasma in AM (06.04.20 @ 20:53)    Prothrombin Time, Plasma: 15.7 sec    INR: 1.38: Recommended ranges for therapeutic INR:    2.0-3.0 for most medical and surgical thromboembolic states    2.0-3.0 for atrial fibrillation    2.0-3.0 for bileaflet mechanical valve in aortic position    2.5-3.5 for mechanical heart valves    Chest 2004;[id number]  The presence of direct thrombin inhibitors (argatroban, refludan)  may falsely increase results. ratio    PT/INR - ( 04 Jun 2020 20:53 )   PT: 15.7 sec;   INR: 1.38 ratio    Procalcitonin, Serum (06.02.20 @ 09:59)    Procalcitonin, Serum: 0.17: Procalcitonin (PCT) Interpretation (ng/mL) - Diagnosis of systemic  bacterial infection/sepsis  PCT < 0.5: Systemic infection (sepsis) is not likely and risk for  progression to severe systemic infection is low. Local bacterial  infection is possible. If early sepsis is suspected clinically, PCT  should be re-assessed in 6-24 hours.  PCT >/= 0.5 but < 2.0: Systemic infection (sepsis) is possible, but other  conditions are known to elevate PCT as well. Moderate risk for  progression to severe systemic infection. The patient should be closely  monitored both clinically and by re-assessing PCT within 6-24 hours.  PCT >/= 2.0 but < 10.0: Systemic infection (sepsis) is likely, unless  other causes are known. High risk of progression to severe systemic  infection (severe sepsis/septic shock).  PCT >/= 10.0: Important systemic inflammatory response, almost  exclusively due to severe bacterial sepsis or septic shock. High  likelihood of severe sepsis or septic shock. ng/mL         PTT - ( 04 Jun 2020 20:53 )  PTT:32.6 sec  < from: CT Foot No Cont, Left (06.02.20 @ 19:00) >  XAM:  CT FOOT ONLY LT                            PROCEDURE DATE:  06/02/2020          INTERPRETATION:      Exam: CT Left Lower Extremity Without Contrast, Foot   Exam date and time: 6/2/2020 6:59 PM   Age: 74 years old   Clinical indication: Status post amputation of left big toe, wound, evaluate for osteomyelitis.    TECHNIQUE:   Imaging protocol: CT of the Left lower extremity without contrast was   performed. Exam focused on the foot.   3D rendering: 3-D reformatted images were performed concepcion independent workstation.  COMPARISON:   No relevant prior studies available.     FINDINGS:   Bones/joints: Previous amputation of the 1st toe at the MTP joint. Soft tissue ulcer overlying the distal aspect of the first metatarsal  extending to thehead of the 1st metatarsal, with cortical   irregularity and destructive changes at the head of the 1st metatarsal   consistent with osteomyelitis. Small corticated ossific densities adjacent to the distal aspect of the medial malleolus, likely related to old trauma.  Soft tissues: Ill-defined possible developing fluid collection or abscess at the  plantar surface of the foot superficial to the sesamoids, and adjacent to the   soft tissue ulcer measuring approximately 19 x 8 by 28 mm., evaluationlimited without intravenous contrast. Small fluid around the flexor hallucis longus tendon.  Vasculature: Small vessel atherosclerotic calcification.     IMPRESSION:   1. Previous amputation 1st toe at the MTP joint. Soft tissue ulcer at the stump   extending to the head of the 1st metatarsal, with cortical irregularity and   destructive changes at the head of the 1st metatarsal consistent with   osteomyelitis.   2. Ill-defined possible developing fluid collection or abscess plantar surface   of the foot superficial to the sesamoids, and adjacent to the soft tissue ulcer   approximately 19 x 8 by 28 mm., evaluation limited without intravenous contrast.                ALMA CASTANO M.D., ATTENDING RADIOLOGIST  This document has been electronically signed. Uli  3 2020  8:59AM          < end of copied text >    Culture - Blood in AM (06.03.20 @ 08:52)    Specimen Source: .Blood Blood-Peripheral    Culture Results:   No growth to date.  Culture - Surgical Swab (06.03.20 @ 00:34)    -  Ampicillin/Sulbactam: S <=8/4    -  Cefazolin: S <=4    -  Clindamycin: R >4    -  Erythromycin: R >4    -  Gentamicin: S <=1 Should not be used as monotherapy    -  Oxacillin: S <=0.25    -  Penicillin: R 2    -  RIF- Rifampin: S <=1 Should not be used as monotherapy    -  Tetra/Doxy: S <=1    -  Trimethoprim/Sulfamethoxazole: S <=0.5/9.5    -  Vancomycin: S 1    Specimen Source: .Surgical Swab left foot wound    Culture Results:   Few Staphylococcus aureus    Organism Identification: Staphylococcus aureus    Organism: Staphylococcus aureus    Method Type: LI      MEDICATIONS  (STANDING):  atorvastatin 40 milliGRAM(s) Oral at bedtime  ceFAZolin   IVPB      Dakins Solution - 1/4 Strength 1 Application(s) Topical daily  heparin  Infusion.  Unit(s)/Hr (12 mL/Hr) IV Continuous <Continuous>  hydrALAZINE Injectable 10 milliGRAM(s) IV Push every 8 hours  insulin glargine Injectable (LANTUS) 10 Unit(s) SubCutaneous at bedtime  insulin lispro (HumaLOG) corrective regimen sliding scale   SubCutaneous every 6 hours  lactobacillus acidophilus 1 Tablet(s) Oral three times a day with meals  metoprolol tartrate Injectable 7.5 milliGRAM(s) IV Push every 6 hours  pantoprazole  Injectable 40 milliGRAM(s) IV Push daily    MEDICATIONS  (PRN):        < from: CT Angio Neck w/ IV Cont (06.04.20 @ 20:09) >  EXAM:  CT ANGIO NECK (W)AW IC                          EXAM:  CT ANGIO BRAIN (W)AW IC                            PROCEDURE DATE:  06/04/2020          INTERPRETATION:  CLINICAL STATEMENT: Nausea vomiting vertically    TECHNIQUE: CTA of the head and neck performed with IV contrast.  3-D MIP imaging obtained. Approximately 90 cc of Omnipaque 350 administered.    COMPARISON: None.    FINDINGS:  CTA of the neck:  < from: CT Angio Head w/ IV Cont (06.04.20 @ 20:09) >    EXAM:  CT ANGIO NECK (W)AW IC                          EXAM:  CT ANGIO BRAIN (W)AW IC                            PROCEDURE DATE:  06/04/2020          INTERPRETATION:  CLINICAL STATEMENT: Nausea vomiting vertically    TECHNIQUE: CTA of the head and neck performed with IV contrast.  3-D MIP imaging obtained. Approximately 90 cc of Omnipaque 350 administered.    COMPARISON: None.    FINDINGS:  CTA of the neck:  Evaluation of the origins of the great vessels is limited due to artifacts.    The visualized common carotid and internal carotid arteries are patent. Calcified atherosclerotic plaque at the right carotid bulb results in focal mild narrowing of the origin of the right internal carotid artery.    Atherosclerotic plaque noted resulting inmoderate narrowing distal left common carotid artery.    The visualized extracranial vertebral arteries are patent. Origin of vertebral arteries not evaluated.    Left anterior chest device causes artifacts limiting evaluation of the chest left chestwall device limits evaluation of the chest due to artifacts. Questionable filling defects noted in the upper segmental and subsegmental pulmonary arteries. Possible groundglass opacities. Pleural effusions noted. Partially visualized soft tissue densities noted in the subcarinal region    CTA Head:  Calcified atherosclerotic plaques results in narrowing of the cavernous and supraclinoid bilateral internal carotid arteries. There is severe narrowing of the terminal segment of distal left internal carotid artery    The proximal anterior, middle and posterior cerebral arteries are patent without hemodynamically significant stenosis.    The intracranial vertebral and basilar arteries are patent. Calcified atherosclerotic plaques results in narrowing of bilateral V4 segments of distal vertebral arteries.    There is no intracranial aneurysm.        IMPRESSION:  Focal mild (less than 50%) narrowing at the origin of right internal carotid artery.    Short segment Moderate (50-70%) narrowing distal left common carotid artery    Intracranial narrowing as described above.    Partially visualized pleural effusions and soft tissue density in the subcarinal region. Questionable filling defects in the segmental and subsegmental upper pulmonary arteries. Evaluation significantly limited due to artifacts from left chest wall device. CTA chest recommended for further evaluation                SALOMÓN GARCÍA M.D., ATTENDING RADIOLOGIST  This document has been electronically signed. Jun 4 2020  8:21PM              < end of copied text >  Evaluation of the origins of the great vessels is limited due to artifacts.    The visualized common carotid and internal carotid arteries are patent. Calcified atherosclerotic plaque at the right carotid bulb results in focal mild narrowing of the origin of the right internal carotid artery.    Atherosclerotic plaque noted resulting inmoderate narrowing distal left common carotid artery.    The visualized extracranial vertebral arteries are patent. Origin of vertebral arteries not evaluated.    Left anterior chest device causes artifacts limiting evaluation of the chest left chestwall device limits evaluation of the chest due to artifacts. Questionable filling defects noted in the upper segmental and subsegmental pulmonary arteries. Possible groundglass opacities. Pleural effusions noted. Partially visualized soft tissue densities noted in the subcarinal region    CTA Head:  Calcified atherosclerotic plaques results in narrowing of the cavernous and supraclinoid bilateral internal carotid arteries. There is severe narrowing of the terminal segment of distal left internal carotid artery    The proximal anterior, middle and posterior cerebral arteries are patent without hemodynamically significant stenosis.    The intracranial vertebral and basilar arteries are patent. Calcified atherosclerotic plaques results in narrowing of bilateral V4 segments of distal vertebral arteries.    There is no intracranial aneurysm.        IMPRESSION:  Focal mild (less than 50%) narrowing at the origin of right internal carotid artery.    Short segment Moderate (50-70%) narrowing distal left common carotid artery    Intracranial narrowing as described above.    Partially visualized pleural effusions and soft tissue density in the subcarinal region. Questionable filling defects in the segmental and subsegmental upper pulmonary arteries. Evaluation significantly limited due to artifacts from left chest wall device. CTA chest recommended for further evaluation                SALOMÓN GARCÍA M.D., ATTENDING RADIOLOGIST  This document has been electronically signed. Jun 4 2020  8:21PM    < end of copied text >    < from: CT Head No Cont (06.04.20 @ 15:55) >  EXAM:  CT BRAIN                            PROCEDURE DATE:  06/04/2020          INTERPRETATION:  CLINICAL STATEMENT: Nausea vomiting    TECHNIQUE: CT of the head was performed without IV contrast.  RAPID artificial intelligence was utilized for thepreliminary evaluation of intracranial hemorrhage.    COMPARISON: CT head 10/12/2019    FINDINGS:  There is mild diffuse parenchymal volume loss. There are areas of low attenuation in the periventricular white matter likely related to mild chronic microvascular ischemic changes.    There is no acute intracranial hemorrhage, parenchymal mass, mass effect or midline shift. There is no acute territorial infarct. There is no hydrocephalus. Partial empty sella    The cranium is intact. The visualized paranasal sinuses are well-aerated.    IMPRESSION:  No acute intracranial hemorrhage or acute territorial infarct.  If symptoms persist, follow-up MRI exam recommended.                SALOMÓN GARCÍA M.D., ATTENDING RADIOLOGIST  This document has been electronically signed. Jun 4 2020  4:05PM        < end of copied text >  CAPILLARY BLOOD GLUCOSE      POCT Blood Glucose.: 154 mg/dL (04 Jun 2020 21:14)  POCT Blood Glucose.: 116 mg/dL (04 Jun 2020 16:48)  POCT Blood Glucose.: 124 mg/dL (04 Jun 2020 11:45)  POCT Blood Glucose.: 89 mg/dL (04 Jun 2020 07:41)  pt  seen and evaluated at bedside , reports nausea , not feeling well   seems very lethargic , reports no pain in her foot,  tolerated exam and dressing change very well   exam focused LE   left foot sp halux amputation May 3  ( as per patient )  there is an open on the distal surgical site in the area of 1 st met head    wound packed with gauze strip, no active drainage , no odor     wound is deep, tracking along flexor tendon? probes to abscess area as per ct , but no pus or active drainage  , probes to bone +,  mostly fibrotic base exposed tendons with possible calcifications /bone fragments ??  , hyperkeratotic border , with minimal area of darker dry  skin,   there is absolutely no bleeding during exam and wound exploration /cleaning   DP/PT -0-1/2 out of 4   TG -WNL   CRF -1-2 sec x4   there is increasing erythema ascending to the level of the 1st met base , and plantar submet 1 head-looks more wide spread , the area is slightly tender ,   pt is able yo move her lower extremities

## 2020-06-04 NOTE — PROGRESS NOTE ADULT - ASSESSMENT
73 YO female from home, H/O CAD/DM/HTN/PVD/hysterectomy/cholecystectomy ,with AICD comes to ED with intractable nausea and vomiting for 2 days. Patient states that she recently had left big toe amputation in the beginning of May and she was taking antibiotics. On Friday, her antibiotics were changed from clindamycin to Levaquin and after she took antibiotics along with percocet, she started having severe pain in epigastric region. Patient reports that it was sudden in onset, 7/10, burning & pressure like pain, non-radiating , associated with nausea,, bitter taste in mouth and projectile vomiting. She was not able to tolerate food and it aggravated her symptoms. Patient also reports significant weight( not sure how much) recently and decrease in appetite. patient denies fever, cough, SOB, constipation, dysuria, headache, chest pain, orthopnea, back pain, burning sensation in extremities.c/o pain over left big toe stump which is not resolving despite pain meds . afebrile on adm , covid neg. ID called for persistant leucocytosis and appr ab     # s/p amputation of left great toe for gangrene with post op collection and overlying cellulits as well as underlying OM /wound cx pos for staph aureus ( MSSA)/s/p left leg stent at [facility] as per pt     #persistant  nausea/vomiting/abd discomfort most likley sec to oral ab she was taking on the outside r/o gastritis ? pud     # transaminitis improving     plan  blood cx times 2   rpt covid testing ( pt does not want to have it )  change ab to cefazolin 2 gm q 8   for more debridement and poss more proximal amputation as per podiatry   arterial dopplers /pvr/sandra   add probiotics   monitor for diarrhea   for egd as per gi       thnx will f/u   d/w intern dr diaz

## 2020-06-04 NOTE — PROGRESS NOTE ADULT - ASSESSMENT
75 YO female from home, H/O CAD/DM/HTN/PVD/hysterectomy/cholecystectomy ,with AICD has come to ED with intractable nausea and vomiting for 2 days, likely due to pill induced esophagitis/gastritis. Patient reports that it was sudden in onset, 7/10, burning & pressure like pain, non-radiating , associated with nausea and projectile vomiting. Patient also reports significant weight( not sure how much) recently and decrease in appetite. CT abdomen/pelvis shows bladder wall thickening and pt has history of Stage1 endometrial cancer,   Patient also has high blood pressure because she missed her medications  Admitting patient for management of  Acute Gastritis  Intractable abdominal pain  hypertension  weight loss/ suspected malignancy workup    DCd vholecalciferol, melatonin, loratadine until pt can tolerate PO meds

## 2020-06-04 NOTE — PROGRESS NOTE ADULT - ASSESSMENT
A/P  PVD /PAD  DM  ulcer   dehiscence surgical wound   history of gangrene left hallux   positive probe to bone   leukocytosis   OM with possible abscess formation left   pyogenic tendon   leukocytosis     pt seen and eval   chart reviewed   treatment options discussed with pt   pt deteriorate significantly in last few days    wound cleaned with Dakin's  , gauze packing changed   CT scan  reports supports clinical findings   pt needs wound debridement / I &D  more proximal amputation   so far there is no clinical improvement   pt may need urgent I&D   needs : medically optimized/stable / vascular /cleared by medicine and cardiology -  most probably will need  blood plasma   continue antibiotics as per id -id   continue wound packing with gauze strip   vascular consult appreciated follow up testes   consider other sources   prognosis is very poor considering  vascular status , multiple health problems

## 2020-06-04 NOTE — PROGRESS NOTE ADULT - PROBLEM SELECTOR PLAN 6
Pt takes Lisinopril 40mg, toprol Xl 100mg, hydralazine 50mg TID   BP is running on higher side as patient missed her medications and also received a bolus in ED  lipid profile, TG WNL  - pt is not tolerated for PO med.   - c/w hydralazine increased to 10mg IV TID, lopressor IV 7.5mg q6h until pt can take PO meds  - monitor BP Pt takes Lisinopril 40mg, toprol Xl 100mg, hydralazine 50mg TID   BP is running on higher side as patient missed her medications and also received a bolus in ED  lipid profile, TG WNL  - pt is not tolerated for PO med.   - c/w hydralazine increased to 10mg IV TID, lopressor IV 7.5mg q6h until pt can take PO meds  - monitor BP --running high

## 2020-06-04 NOTE — PROGRESS NOTE ADULT - PROBLEM SELECTOR PLAN 4
H/o PVD and popliteal stent  s/p left big toe amputation in May 2020  f/u ESTRELLA  f/u outpatient with vascular surgery  PT: home PT  Vascular is following H/o PVD and popliteal stent  s/p left big toe amputation in May 2020  f/u ESTRELLA - pt cannot go due to n/v, will re-order when pt feels better  - outpatient with vascular surgery upon DC  PT: home PT  Vascular is following

## 2020-06-05 DIAGNOSIS — I63.9 CEREBRAL INFARCTION, UNSPECIFIED: ICD-10-CM

## 2020-06-05 LAB
ALBUMIN SERPL ELPH-MCNC: 2.5 G/DL — LOW (ref 3.5–5)
ALP SERPL-CCNC: 298 U/L — HIGH (ref 40–120)
ALT FLD-CCNC: 57 U/L DA — SIGNIFICANT CHANGE UP (ref 10–60)
ANION GAP SERPL CALC-SCNC: 12 MMOL/L — SIGNIFICANT CHANGE UP (ref 5–17)
ANION GAP SERPL CALC-SCNC: 9 MMOL/L — SIGNIFICANT CHANGE UP (ref 5–17)
APTT BLD: 179.6 SEC — CRITICAL HIGH (ref 27.5–36.3)
APTT BLD: 180.2 SEC — CRITICAL HIGH (ref 27.5–36.3)
APTT BLD: 95.7 SEC — HIGH (ref 27.5–36.3)
AST SERPL-CCNC: 22 U/L — SIGNIFICANT CHANGE UP (ref 10–40)
BASOPHILS # BLD AUTO: 0.05 K/UL — SIGNIFICANT CHANGE UP (ref 0–0.2)
BASOPHILS NFR BLD AUTO: 0.2 % — SIGNIFICANT CHANGE UP (ref 0–2)
BILIRUB SERPL-MCNC: 0.5 MG/DL — SIGNIFICANT CHANGE UP (ref 0.2–1.2)
BUN SERPL-MCNC: 21 MG/DL — HIGH (ref 7–18)
BUN SERPL-MCNC: 22 MG/DL — HIGH (ref 7–18)
CALCIUM SERPL-MCNC: 8.3 MG/DL — LOW (ref 8.4–10.5)
CALCIUM SERPL-MCNC: 8.5 MG/DL — SIGNIFICANT CHANGE UP (ref 8.4–10.5)
CHLORIDE SERPL-SCNC: 100 MMOL/L — SIGNIFICANT CHANGE UP (ref 96–108)
CHLORIDE SERPL-SCNC: 99 MMOL/L — SIGNIFICANT CHANGE UP (ref 96–108)
CK MB BLD-MCNC: 3 % — SIGNIFICANT CHANGE UP (ref 0–3.5)
CK MB CFR SERPL CALC: 1.1 NG/ML — SIGNIFICANT CHANGE UP (ref 0–3.6)
CK SERPL-CCNC: 37 U/L — SIGNIFICANT CHANGE UP (ref 21–215)
CO2 SERPL-SCNC: 24 MMOL/L — SIGNIFICANT CHANGE UP (ref 22–31)
CO2 SERPL-SCNC: 26 MMOL/L — SIGNIFICANT CHANGE UP (ref 22–31)
CREAT SERPL-MCNC: 1.07 MG/DL — SIGNIFICANT CHANGE UP (ref 0.5–1.3)
CREAT SERPL-MCNC: 1.08 MG/DL — SIGNIFICANT CHANGE UP (ref 0.5–1.3)
EOSINOPHIL # BLD AUTO: 0.13 K/UL — SIGNIFICANT CHANGE UP (ref 0–0.5)
EOSINOPHIL NFR BLD AUTO: 0.6 % — SIGNIFICANT CHANGE UP (ref 0–6)
GLUCOSE BLDC GLUCOMTR-MCNC: 144 MG/DL — HIGH (ref 70–99)
GLUCOSE BLDC GLUCOMTR-MCNC: 145 MG/DL — HIGH (ref 70–99)
GLUCOSE BLDC GLUCOMTR-MCNC: 164 MG/DL — HIGH (ref 70–99)
GLUCOSE BLDC GLUCOMTR-MCNC: 201 MG/DL — HIGH (ref 70–99)
GLUCOSE BLDC GLUCOMTR-MCNC: 202 MG/DL — HIGH (ref 70–99)
GLUCOSE BLDC GLUCOMTR-MCNC: 207 MG/DL — HIGH (ref 70–99)
GLUCOSE SERPL-MCNC: 158 MG/DL — HIGH (ref 70–99)
GLUCOSE SERPL-MCNC: 184 MG/DL — HIGH (ref 70–99)
HCT VFR BLD CALC: 33.7 % — LOW (ref 34.5–45)
HCT VFR BLD CALC: 34.2 % — LOW (ref 34.5–45)
HGB BLD-MCNC: 10.9 G/DL — LOW (ref 11.5–15.5)
HGB BLD-MCNC: 10.9 G/DL — LOW (ref 11.5–15.5)
IMM GRANULOCYTES NFR BLD AUTO: 1.2 % — SIGNIFICANT CHANGE UP (ref 0–1.5)
LYMPHOCYTES # BLD AUTO: 12.6 % — LOW (ref 13–44)
LYMPHOCYTES # BLD AUTO: 2.67 K/UL — SIGNIFICANT CHANGE UP (ref 1–3.3)
MAGNESIUM SERPL-MCNC: 2.7 MG/DL — HIGH (ref 1.6–2.6)
MCHC RBC-ENTMCNC: 29.1 PG — SIGNIFICANT CHANGE UP (ref 27–34)
MCHC RBC-ENTMCNC: 29.1 PG — SIGNIFICANT CHANGE UP (ref 27–34)
MCHC RBC-ENTMCNC: 31.9 GM/DL — LOW (ref 32–36)
MCHC RBC-ENTMCNC: 32.3 GM/DL — SIGNIFICANT CHANGE UP (ref 32–36)
MCV RBC AUTO: 89.9 FL — SIGNIFICANT CHANGE UP (ref 80–100)
MCV RBC AUTO: 91.4 FL — SIGNIFICANT CHANGE UP (ref 80–100)
MONOCYTES # BLD AUTO: 1.39 K/UL — HIGH (ref 0–0.9)
MONOCYTES NFR BLD AUTO: 6.5 % — SIGNIFICANT CHANGE UP (ref 2–14)
NEUTROPHILS # BLD AUTO: 16.75 K/UL — HIGH (ref 1.8–7.4)
NEUTROPHILS NFR BLD AUTO: 78.9 % — HIGH (ref 43–77)
NRBC # BLD: 0 /100 WBCS — SIGNIFICANT CHANGE UP (ref 0–0)
NRBC # BLD: 0 /100 WBCS — SIGNIFICANT CHANGE UP (ref 0–0)
PHOSPHATE SERPL-MCNC: 2.7 MG/DL — SIGNIFICANT CHANGE UP (ref 2.5–4.5)
PLATELET # BLD AUTO: 442 K/UL — HIGH (ref 150–400)
PLATELET # BLD AUTO: 455 K/UL — HIGH (ref 150–400)
POTASSIUM SERPL-MCNC: 3.3 MMOL/L — LOW (ref 3.5–5.3)
POTASSIUM SERPL-MCNC: 3.7 MMOL/L — SIGNIFICANT CHANGE UP (ref 3.5–5.3)
POTASSIUM SERPL-SCNC: 3.3 MMOL/L — LOW (ref 3.5–5.3)
POTASSIUM SERPL-SCNC: 3.7 MMOL/L — SIGNIFICANT CHANGE UP (ref 3.5–5.3)
PROCALCITONIN SERPL-MCNC: 0.11 NG/ML — HIGH (ref 0.02–0.1)
PROT SERPL-MCNC: 7.6 G/DL — SIGNIFICANT CHANGE UP (ref 6–8.3)
RBC # BLD: 3.74 M/UL — LOW (ref 3.8–5.2)
RBC # BLD: 3.75 M/UL — LOW (ref 3.8–5.2)
RBC # FLD: 15.2 % — HIGH (ref 10.3–14.5)
RBC # FLD: 15.3 % — HIGH (ref 10.3–14.5)
SARS-COV-2 RNA SPEC QL NAA+PROBE: SIGNIFICANT CHANGE UP
SODIUM SERPL-SCNC: 135 MMOL/L — SIGNIFICANT CHANGE UP (ref 135–145)
SODIUM SERPL-SCNC: 135 MMOL/L — SIGNIFICANT CHANGE UP (ref 135–145)
TROPONIN I SERPL-MCNC: 0.21 NG/ML — HIGH (ref 0–0.04)
TROPONIN I SERPL-MCNC: 0.24 NG/ML — HIGH (ref 0–0.04)
TROPONIN I SERPL-MCNC: 0.27 NG/ML — HIGH (ref 0–0.04)
WBC # BLD: 20.06 K/UL — HIGH (ref 3.8–10.5)
WBC # BLD: 21.24 K/UL — HIGH (ref 3.8–10.5)
WBC # FLD AUTO: 20.06 K/UL — HIGH (ref 3.8–10.5)
WBC # FLD AUTO: 21.24 K/UL — HIGH (ref 3.8–10.5)

## 2020-06-05 PROCEDURE — 99233 SBSQ HOSP IP/OBS HIGH 50: CPT

## 2020-06-05 PROCEDURE — 99232 SBSQ HOSP IP/OBS MODERATE 35: CPT

## 2020-06-05 PROCEDURE — 71045 X-RAY EXAM CHEST 1 VIEW: CPT | Mod: 26

## 2020-06-05 PROCEDURE — 99231 SBSQ HOSP IP/OBS SF/LOW 25: CPT

## 2020-06-05 RX ORDER — SODIUM CHLORIDE 9 MG/ML
1000 INJECTION, SOLUTION INTRAVENOUS
Refills: 0 | Status: DISCONTINUED | OUTPATIENT
Start: 2020-06-05 | End: 2020-06-06

## 2020-06-05 RX ORDER — POTASSIUM CHLORIDE 20 MEQ
40 PACKET (EA) ORAL ONCE
Refills: 0 | Status: COMPLETED | OUTPATIENT
Start: 2020-06-05 | End: 2020-06-05

## 2020-06-05 RX ORDER — SODIUM CHLORIDE 9 MG/ML
1000 INJECTION, SOLUTION INTRAVENOUS
Refills: 0 | Status: DISCONTINUED | OUTPATIENT
Start: 2020-06-05 | End: 2020-06-05

## 2020-06-05 RX ORDER — ACETAMINOPHEN 500 MG
650 TABLET ORAL EVERY 6 HOURS
Refills: 0 | Status: DISCONTINUED | OUTPATIENT
Start: 2020-06-05 | End: 2020-06-09

## 2020-06-05 RX ORDER — FAMOTIDINE 10 MG/ML
20 INJECTION INTRAVENOUS ONCE
Refills: 0 | Status: COMPLETED | OUTPATIENT
Start: 2020-06-05 | End: 2020-06-05

## 2020-06-05 RX ORDER — ACETAMINOPHEN 500 MG
1000 TABLET ORAL ONCE
Refills: 0 | Status: COMPLETED | OUTPATIENT
Start: 2020-06-05 | End: 2020-06-05

## 2020-06-05 RX ORDER — PIPERACILLIN AND TAZOBACTAM 4; .5 G/20ML; G/20ML
3.38 INJECTION, POWDER, LYOPHILIZED, FOR SOLUTION INTRAVENOUS EVERY 8 HOURS
Refills: 0 | Status: DISCONTINUED | OUTPATIENT
Start: 2020-06-05 | End: 2020-06-05

## 2020-06-05 RX ORDER — METOCLOPRAMIDE HCL 10 MG
10 TABLET ORAL EVERY 8 HOURS
Refills: 0 | Status: COMPLETED | OUTPATIENT
Start: 2020-06-05 | End: 2020-06-08

## 2020-06-05 RX ORDER — MEROPENEM 1 G/30ML
1000 INJECTION INTRAVENOUS EVERY 12 HOURS
Refills: 0 | Status: DISCONTINUED | OUTPATIENT
Start: 2020-06-05 | End: 2020-06-09

## 2020-06-05 RX ORDER — DIPHENHYDRAMINE HCL 50 MG
15 CAPSULE ORAL EVERY 6 HOURS
Refills: 0 | Status: DISCONTINUED | OUTPATIENT
Start: 2020-06-05 | End: 2020-06-09

## 2020-06-05 RX ADMIN — Medication 110 MILLIGRAM(S): at 18:19

## 2020-06-05 RX ADMIN — Medication 1 TABLET(S): at 17:56

## 2020-06-05 RX ADMIN — HEPARIN SODIUM 1300 UNIT(S)/HR: 5000 INJECTION INTRAVENOUS; SUBCUTANEOUS at 04:20

## 2020-06-05 RX ADMIN — PIPERACILLIN AND TAZOBACTAM 25 GRAM(S): 4; .5 INJECTION, POWDER, LYOPHILIZED, FOR SOLUTION INTRAVENOUS at 21:53

## 2020-06-05 RX ADMIN — HEPARIN SODIUM 1300 UNIT(S)/HR: 5000 INJECTION INTRAVENOUS; SUBCUTANEOUS at 12:24

## 2020-06-05 RX ADMIN — Medication 1 APPLICATION(S): at 14:00

## 2020-06-05 RX ADMIN — Medication 7.5 MILLIGRAM(S): at 12:39

## 2020-06-05 RX ADMIN — Medication 100 MILLIGRAM(S): at 15:10

## 2020-06-05 RX ADMIN — FAMOTIDINE 20 MILLIGRAM(S): 10 INJECTION INTRAVENOUS at 07:17

## 2020-06-05 RX ADMIN — Medication 10 MILLIGRAM(S): at 15:14

## 2020-06-05 RX ADMIN — Medication 30 MILLILITER(S): at 03:47

## 2020-06-05 RX ADMIN — Medication 7.5 MILLIGRAM(S): at 23:59

## 2020-06-05 RX ADMIN — Medication 10 MILLIGRAM(S): at 05:18

## 2020-06-05 RX ADMIN — Medication 7.5 MILLIGRAM(S): at 05:18

## 2020-06-05 RX ADMIN — Medication 400 MILLIGRAM(S): at 07:17

## 2020-06-05 RX ADMIN — Medication 10 MILLIGRAM(S): at 21:51

## 2020-06-05 RX ADMIN — SODIUM CHLORIDE 65 MILLILITER(S): 9 INJECTION, SOLUTION INTRAVENOUS at 07:08

## 2020-06-05 RX ADMIN — HEPARIN SODIUM 0 UNIT(S)/HR: 5000 INJECTION INTRAVENOUS; SUBCUTANEOUS at 03:17

## 2020-06-05 RX ADMIN — Medication 100 MILLIGRAM(S): at 05:19

## 2020-06-05 RX ADMIN — Medication 7.5 MILLIGRAM(S): at 17:57

## 2020-06-05 RX ADMIN — ATORVASTATIN CALCIUM 40 MILLIGRAM(S): 80 TABLET, FILM COATED ORAL at 21:51

## 2020-06-05 RX ADMIN — HEPARIN SODIUM 1100 UNIT(S)/HR: 5000 INJECTION INTRAVENOUS; SUBCUTANEOUS at 19:24

## 2020-06-05 RX ADMIN — Medication 10 MILLIGRAM(S): at 21:54

## 2020-06-05 RX ADMIN — INSULIN GLARGINE 10 UNIT(S): 100 INJECTION, SOLUTION SUBCUTANEOUS at 22:36

## 2020-06-05 RX ADMIN — Medication 10 MILLIGRAM(S): at 15:09

## 2020-06-05 RX ADMIN — ONDANSETRON 4 MILLIGRAM(S): 8 TABLET, FILM COATED ORAL at 10:00

## 2020-06-05 RX ADMIN — PANTOPRAZOLE SODIUM 40 MILLIGRAM(S): 20 TABLET, DELAYED RELEASE ORAL at 12:35

## 2020-06-05 RX ADMIN — HEPARIN SODIUM 0 UNIT(S)/HR: 5000 INJECTION INTRAVENOUS; SUBCUTANEOUS at 18:22

## 2020-06-05 RX ADMIN — Medication 15 MILLIGRAM(S): at 08:59

## 2020-06-05 RX ADMIN — Medication 650 MILLIGRAM(S): at 05:52

## 2020-06-05 RX ADMIN — ONDANSETRON 4 MILLIGRAM(S): 8 TABLET, FILM COATED ORAL at 00:04

## 2020-06-05 NOTE — PROGRESS NOTE ADULT - SUBJECTIVE AND OBJECTIVE BOX
Summary:   74y  Female      Subjective:       Objective:    MEDICATIONS  (STANDING):  atorvastatin 40 milliGRAM(s) Oral at bedtime  ceFAZolin   IVPB 2000 milliGRAM(s) IV Intermittent every 8 hours  ceFAZolin   IVPB      Dakins Solution - 1/4 Strength 1 Application(s) Topical daily  dextrose 5% + sodium chloride 0.9% 1000 milliLiter(s) (60 mL/Hr) IV Continuous <Continuous>  heparin  Infusion.  Unit(s)/Hr (12 mL/Hr) IV Continuous <Continuous>  hydrALAZINE Injectable 10 milliGRAM(s) IV Push every 8 hours  insulin glargine Injectable (LANTUS) 10 Unit(s) SubCutaneous at bedtime  insulin lispro (HumaLOG) corrective regimen sliding scale   SubCutaneous every 6 hours  lactobacillus acidophilus 1 Tablet(s) Oral three times a day with meals  metoclopramide Injectable 10 milliGRAM(s) IV Push every 8 hours  metoprolol tartrate Injectable 7.5 milliGRAM(s) IV Push every 6 hours  pantoprazole  Injectable 40 milliGRAM(s) IV Push daily    MEDICATIONS  (PRN):  acetaminophen   Tablet .. 650 milliGRAM(s) Oral every 6 hours PRN Mild Pain (1 - 3), Moderate Pain (4 - 6)  aluminum hydroxide/magnesium hydroxide/simethicone Suspension 30 milliLiter(s) Oral every 6 hours PRN Dyspepsia  diphenhydrAMINE   Injectable 15 milliGRAM(s) IV Push every 6 hours PRN nausea or vomiting  ondansetron Injectable 4 milliGRAM(s) IV Push every 8 hours PRN Nausea and/or Vomiting              Vital Signs Last 24 Hrs  T(C): 36.6 (05 Jun 2020 14:23), Max: 37.1 (04 Jun 2020 21:22)  T(F): 97.8 (05 Jun 2020 14:23), Max: 98.8 (04 Jun 2020 21:22)  HR: 104 (05 Jun 2020 16:09) (96 - 118)  BP: 161/89 (05 Jun 2020 16:09) (146/88 - 170/83)  BP(mean): --  RR: 16 (05 Jun 2020 16:09) (16 - 20)  SpO2: 98% (05 Jun 2020 16:09) (93% - 100%)      General:  Well developed, well nourished, alert and active, no pallor, NAD.  HEENT:    Normal appearance of conjunctiva, ears, nose, lips, oropharynx, and oral mucosa, anicteric.  Neck:  No masses, no asymmetry.  Lymph Nodes:  No lymphadenopathy.   Cardiovascular:  RRR normal S1/S2, no murmur.  Respiratory:  CTA B/L, normal respiratory effort.   Abdominal:   soft, no masses or tenderness, normoactive BS, NT/ND, no HSM.  Extremities:   No clubbing or cyanosis, normal capillary refill, no edema.   Skin:   No rash, jaundice, lesions, eczema.   Musculoskeletal:  No joint swelling, erythema or tenderness.   Neuro: No focal deficits.   Other:       LABS:                        10.9   21.24 )-----------( 455      ( 05 Jun 2020 10:24 )             34.2     06-05    135  |  99  |  22<H>  ----------------------------<  158<H>  3.3<L>   |  24  |  1.07    Ca    8.3<L>      05 Jun 2020 10:24  Phos  2.7     06-05  Mg     2.7     06-05    TPro  7.6  /  Alb  2.5<L>  /  TBili  0.5  /  DBili  x   /  AST  22  /  ALT  57  /  AlkPhos  298<H>  06-05    PT/INR - ( 04 Jun 2020 20:53 )   PT: 15.7 sec;   INR: 1.38 ratio         PTT - ( 05 Jun 2020 10:24 )  PTT:95.7 sec      RADIOLOGY & ADDITIONAL TESTS:  < from: CT Angio Neck w/ IV Cont (06.04.20 @ 20:09) >    EXAM:  CT ANGIO NECK (W)AW IC                          EXAM:  CT ANGIO BRAIN (W)AW IC                            PROCEDURE DATE:  06/04/2020          INTERPRETATION:  CLINICAL STATEMENT: Nausea vomiting vertically    TECHNIQUE: CTA of the head and neck performed with IV contrast.  3-D MIP imaging obtained. Approximately 90 cc of Omnipaque 350 administered.    COMPARISON: None.    FINDINGS:  CTA of the neck:  Evaluation of the origins of the great vessels is limited due to artifacts.    The visualized common carotid and internal carotid arteries are patent. Calcified atherosclerotic plaque at the right carotid bulb results in focal mild narrowing of the origin of the right internal carotid artery.    Atherosclerotic plaque noted resulting inmoderate narrowing distal left common carotid artery.    The visualized extracranial vertebral arteries are patent. Origin of vertebral arteries not evaluated.    Left anterior chest device causes artifacts limiting evaluation of the chest left chestwall device limits evaluation of the chest due to artifacts. Questionable filling defects noted in the upper segmental and subsegmental pulmonary arteries. Possible groundglass opacities. Pleural effusions noted. Partially visualized soft tissue densities noted in the subcarinal region    CTA Head:  Calcified atherosclerotic plaques results in narrowing of the cavernous and supraclinoid bilateral internal carotid arteries. There is severe narrowing of the terminal segment of distal left internal carotid artery    The proximal anterior, middle and posterior cerebral arteries are patent without hemodynamically significant stenosis.    The intracranial vertebral and basilar arteries are patent. Calcified atherosclerotic plaques results in narrowing of bilateral V4 segments of distal vertebral arteries.    There is no intracranial aneurysm.        IMPRESSION:  Focal mild (less than 50%) narrowing at the origin of right internal carotid artery.    Short segment Moderate (50-70%) narrowing distal left common carotid artery    Intracranial narrowing as described above.    Partially visualized pleural effusions and soft tissue density in the subcarinal region. Questionable filling defects in the segmental and subsegmental upper pulmonary arteries. Evaluation significantly limited due to artifacts from left chest wall device. CTA chest recommended for further evaluation                SALOMÓN GARCÍA M.D., ATTENDING RADIOLOGIST  This document has been electronically signed. Jun 4 2020  8:21PM                < end of copied text >

## 2020-06-05 NOTE — PROGRESS NOTE ADULT - SUBJECTIVE AND OBJECTIVE BOX
INTERVAL HPI/OVERNIGHT EVENTS:    HEALTH ISSUES - PROBLEM Dx:  Stroke: Stroke  Osteomyelitis of left foot, unspecified type: Osteomyelitis of left foot, unspecified type  Intractable nausea and vomiting: Intractable nausea and vomiting  Stump pain: Stump pain  Prophylactic measure: Prophylactic measure  CHF (congestive heart failure): CHF (congestive heart failure)  CAD (coronary artery disease): CAD (coronary artery disease)  PVD (peripheral vascular disease): PVD (peripheral vascular disease)  Bladder wall thickening: Bladder wall thickening  DM (diabetes mellitus): DM (diabetes mellitus)  HTN (hypertension): HTN (hypertension)  Intractable abdominal pain: Intractable abdominal pain  Gastritis, acute: Gastritis, acute          MEDICATIONS  (STANDING):  atorvastatin 40 milliGRAM(s) Oral at bedtime  Dakins Solution - 1/4 Strength 1 Application(s) Topical daily  dextrose 5% + sodium chloride 0.9% 1000 milliLiter(s) (60 mL/Hr) IV Continuous <Continuous>  doxycycline IVPB 100 milliGRAM(s) IV Intermittent every 12 hours  heparin  Infusion.  Unit(s)/Hr (12 mL/Hr) IV Continuous <Continuous>  hydrALAZINE Injectable 10 milliGRAM(s) IV Push every 8 hours  insulin glargine Injectable (LANTUS) 10 Unit(s) SubCutaneous at bedtime  insulin lispro (HumaLOG) corrective regimen sliding scale   SubCutaneous every 6 hours  lactobacillus acidophilus 1 Tablet(s) Oral three times a day with meals  metoclopramide Injectable 10 milliGRAM(s) IV Push every 8 hours  metoprolol tartrate Injectable 7.5 milliGRAM(s) IV Push every 6 hours  pantoprazole  Injectable 40 milliGRAM(s) IV Push daily  piperacillin/tazobactam IVPB.. 3.375 Gram(s) IV Intermittent every 8 hours    MEDICATIONS  (PRN):  acetaminophen   Tablet .. 650 milliGRAM(s) Oral every 6 hours PRN Mild Pain (1 - 3), Moderate Pain (4 - 6)  aluminum hydroxide/magnesium hydroxide/simethicone Suspension 30 milliLiter(s) Oral every 6 hours PRN Dyspepsia  diphenhydrAMINE   Injectable 15 milliGRAM(s) IV Push every 6 hours PRN nausea or vomiting  ondansetron Injectable 4 milliGRAM(s) IV Push every 8 hours PRN Nausea and/or Vomiting      Allergies    codeine (Rash)  penicillin (Rash)    Intolerances        REVIEW OF SYSTEMS:    CONSTITUTIONAL: No weakness, fatigue, malaise, fevers or chills, no weight change, appetite change  EYES: No visual changes; No double vision,  No vertigo, eye pain  Ears: no otalgia, no otorhea, no hearing loss, tinnitus  Nose: no epistaxis, rhinorrhea, post-discharge, sinus pressure  Throat: no throat pain, no oral lesions, tooth pain   NECK: No pain or stiffness  RESPIRATORY: No cough (productive or dry), wheezing, hemoptysis; No shortness of breath, orthnopnea, PND, PLASENCIA, snoring,  CARDIOVASCULAR: No chest pain or palpitations, no leg edema, no claudication    GASTROINTESTINAL: No abdominal or epigastric pain. No nausea, vomiting, or hematemesis; No diarrhea or constipation. No melena or hematochezia.  GENITOURINARY: No dysuria, frequency, urgency or hematuria, no pelvic pain, urinary incontinence, urgency  Muscloskeletal: no joints or muscle pain, no swelling in joints or muscles  NEUROLOGICAL: No numbness or weakness, headache, memory loss, seizures,   ++ dizziness, vertigo, syncope, ataxia  SKIN: No pruritis, rashes, lesions or new moles  Psych: No anxiety, sadness, insomnia, suicide thoughts  Endocrine: No Heat or Cold intolerance, polydipsia, polyphagia  Heme/Lymph: no LN enlargement, no easy bruising or bleeding         Vital Signs Last 24 Hrs  T(C): 36.6 (05 Jun 2020 14:23), Max: 37.1 (04 Jun 2020 21:22)  T(F): 97.8 (05 Jun 2020 14:23), Max: 98.8 (04 Jun 2020 21:22)  HR: 104 (05 Jun 2020 16:09) (96 - 118)  BP: 161/89 (05 Jun 2020 16:09) (146/88 - 170/83)  BP(mean): --  RR: 16 (05 Jun 2020 16:09) (16 - 20)  SpO2: 98% (05 Jun 2020 16:09) (93% - 100%)    PHYSICAL EXAM:    Constitutional: awake and alert.  HEENT: PERRLA, EOMI,   Neck: Supple.  Respiratory: Breath sounds are clear bilaterally  Cardiovascular: S1 and S2, regular  rhythm  Gastrointestinal: soft, nontender  Extremities:  no edema  Vascular: Carotid Bruit - no  Musculoskeletal: no joint swelling/tenderness, no abnormal movements  Skin: No rashes    Neurological exam:  HF: A x O x 3. Appropriately interactive, normal affect. Speech fluent, No Aphasia or paraphasic errors. Naming /repetition intact   CN: [place], EOMI left gaze induced left beating nystagmus, VFF, facial sensation normal, no NLFD, tongue midline, Palate moves equally, SCM equal bilaterally  Motor: No pronator drift, Strength 5/5 in all 4 ext, normal bulk and tone, no tremor, rigidity or bradykinesia.    Sens: Intact to light touch / PP/ VS/ JS    Reflexes: Symmetric and normal . BJ 2+, BR 2+, KJ 2+, AJ 2+, downgoing toes b/l  Coord: Left FNFA, dysmetria, PRINCESS intact   Gait/Balance: unable    NIHSS: 2      LABS:                        10.9   21.24 )-----------( 455      ( 05 Jun 2020 10:24 )             34.2     06-05    135  |  99  |  22<H>  ----------------------------<  158<H>  3.3<L>   |  24  |  1.07    Ca    8.3<L>      05 Jun 2020 10:24  Phos  2.7     06-05  Mg     2.7     06-05    TPro  7.6  /  Alb  2.5<L>  /  TBili  0.5  /  DBili  x   /  AST  22  /  ALT  57  /  AlkPhos  298<H>  06-05    PT/INR - ( 04 Jun 2020 20:53 )   PT: 15.7 sec;   INR: 1.38 ratio         PTT - ( 05 Jun 2020 17:41 )  PTT:180.2 sec      RADIOLOGY & ADDITIONAL TESTS:  < from: CT Angio Head w/ IV Cont (06.04.20 @ 20:09) >    EXAM:  CT ANGIO BRAIN (W)AW IC                            PROCEDURE DATE:  06/04/2020          INTERPRETATION:  CLINICAL STATEMENT: Nausea vomiting vertically    TECHNIQUE: CTA of the head and neck performed with IV contrast.  3-D MIP imaging obtained. Approximately 90 cc of Omnipaque 350 administered.    COMPARISON: None.    FINDINGS:  CTA of the neck:  Evaluation of the origins of the great vessels is limited due to artifacts.    The visualized common carotid and internal carotid arteries are patent. Calcified atherosclerotic plaque at the right carotid bulb results in focal mild narrowing of the origin of the right internal carotid artery.    Atherosclerotic plaque noted resulting inmoderate narrowing distal left common carotid artery.    The visualized extracranial vertebral arteries are patent. Origin of vertebral arteries not evaluated.    Left anterior chest device causes artifacts limiting evaluation of the chest left chestwall device limits evaluation of the chest due to artifacts. Questionable filling defects noted in the upper segmental and subsegmental pulmonary arteries. Possible groundglass opacities. Pleural effusions noted. Partially visualized soft tissue densities noted in the subcarinal region    CTA Head:  Calcified atherosclerotic plaques results in narrowing of the cavernous and supraclinoid bilateral internal carotid arteries. There is severe narrowing of the terminal segment of distal left internal carotid artery    The proximal anterior, middle and posterior cerebral arteries are patent without hemodynamically significant stenosis.    The intracranial vertebral and basilar arteries are patent. Calcified atherosclerotic plaques results in narrowing of bilateral V4 segments of distal vertebral arteries.    There is no intracranial aneurysm.        IMPRESSION:  Focal mild (less than 50%) narrowing at the origin of right internal carotid artery.    Short segment Moderate (50-70%) narrowing distal left common carotid artery    Intracranial narrowing as described above.    Partially visualized pleural effusions and soft tissue density in the subcarinal region. Questionable filling defects in the segmental and subsegmental upper pulmonary arteries. Evaluation significantly limited due to artifacts from left chest wall device. CTA chest recommended for further evaluation        SALOMÓN GARCÍA M.D., ATTENDING RADIOLOGIST  This document has been electronically signed. Jun 4 2020  8:21PM    < end of copied text > INTERVAL HPI/OVERNIGHT EVENTS: Improved but persistent nausea and dizziness    HEALTH ISSUES - PROBLEM Dx:  Stroke: Stroke  Osteomyelitis of left foot, unspecified type: Osteomyelitis of left foot, unspecified type  Intractable nausea and vomiting: Intractable nausea and vomiting  Stump pain: Stump pain  Prophylactic measure: Prophylactic measure  CHF (congestive heart failure): CHF (congestive heart failure)  CAD (coronary artery disease): CAD (coronary artery disease)  PVD (peripheral vascular disease): PVD (peripheral vascular disease)  Bladder wall thickening: Bladder wall thickening  DM (diabetes mellitus): DM (diabetes mellitus)  HTN (hypertension): HTN (hypertension)  Intractable abdominal pain: Intractable abdominal pain  Gastritis, acute: Gastritis, acute          MEDICATIONS  (STANDING):  atorvastatin 40 milliGRAM(s) Oral at bedtime  Dakins Solution - 1/4 Strength 1 Application(s) Topical daily  dextrose 5% + sodium chloride 0.9% 1000 milliLiter(s) (60 mL/Hr) IV Continuous <Continuous>  doxycycline IVPB 100 milliGRAM(s) IV Intermittent every 12 hours  heparin  Infusion.  Unit(s)/Hr (12 mL/Hr) IV Continuous <Continuous>  hydrALAZINE Injectable 10 milliGRAM(s) IV Push every 8 hours  insulin glargine Injectable (LANTUS) 10 Unit(s) SubCutaneous at bedtime  insulin lispro (HumaLOG) corrective regimen sliding scale   SubCutaneous every 6 hours  lactobacillus acidophilus 1 Tablet(s) Oral three times a day with meals  metoclopramide Injectable 10 milliGRAM(s) IV Push every 8 hours  metoprolol tartrate Injectable 7.5 milliGRAM(s) IV Push every 6 hours  pantoprazole  Injectable 40 milliGRAM(s) IV Push daily  piperacillin/tazobactam IVPB.. 3.375 Gram(s) IV Intermittent every 8 hours    MEDICATIONS  (PRN):  acetaminophen   Tablet .. 650 milliGRAM(s) Oral every 6 hours PRN Mild Pain (1 - 3), Moderate Pain (4 - 6)  aluminum hydroxide/magnesium hydroxide/simethicone Suspension 30 milliLiter(s) Oral every 6 hours PRN Dyspepsia  diphenhydrAMINE   Injectable 15 milliGRAM(s) IV Push every 6 hours PRN nausea or vomiting  ondansetron Injectable 4 milliGRAM(s) IV Push every 8 hours PRN Nausea and/or Vomiting      Allergies    codeine (Rash)  penicillin (Rash)    Intolerances        REVIEW OF SYSTEMS:    CONSTITUTIONAL: No weakness, fatigue, malaise, fevers or chills, no weight change, appetite change  EYES: No visual changes; No double vision,  No vertigo, eye pain  Ears: no otalgia, no otorhea, no hearing loss, tinnitus  Nose: no epistaxis, rhinorrhea, post-discharge, sinus pressure  Throat: no throat pain, no oral lesions, tooth pain   NECK: No pain or stiffness  RESPIRATORY: No cough (productive or dry), wheezing, hemoptysis; No shortness of breath, orthnopnea, PND, PLASENCIA, snoring,  CARDIOVASCULAR: No chest pain or palpitations, no leg edema, no claudication    GASTROINTESTINAL: GASTROINTESTINAL: No abdominal (+) epigastric pain, better than before.  (+) nausea, vomiting, no hematemesis; No diarrhea or constipation. No melena or hematochezia.  GENITOURINARY: No dysuria, frequency, urgency or hematuria, no pelvic pain, urinary incontinence, urgency  Muscloskeletal: no joints or muscle pain, no swelling in joints or muscles  NEUROLOGICAL: No numbness or weakness, headache, memory loss, seizures,   ++ dizziness, vertigo, syncope, ataxia  SKIN: No pruritis, rashes, lesions or new moles  Psych: No anxiety, sadness, insomnia, suicide thoughts  Endocrine: No Heat or Cold intolerance, polydipsia, polyphagia  Heme/Lymph: no LN enlargement, no easy bruising or bleeding         Vital Signs Last 24 Hrs  T(C): 36.6 (05 Jun 2020 14:23), Max: 37.1 (04 Jun 2020 21:22)  T(F): 97.8 (05 Jun 2020 14:23), Max: 98.8 (04 Jun 2020 21:22)  HR: 104 (05 Jun 2020 16:09) (96 - 118)  BP: 161/89 (05 Jun 2020 16:09) (146/88 - 170/83)  BP(mean): --  RR: 16 (05 Jun 2020 16:09) (16 - 20)  SpO2: 98% (05 Jun 2020 16:09) (93% - 100%)    PHYSICAL EXAM:    Constitutional: awake and alert.  HEENT: PERRLA, EOMI,   Neck: Supple.  Respiratory: Breath sounds are clear bilaterally  Cardiovascular: S1 and S2, regular  rhythm  Gastrointestinal: soft, nontender  Extremities:  no edema  Vascular: Carotid Bruit - no  Musculoskeletal: no joint swelling/tenderness, no abnormal movements  Skin: No rashes    Neurological exam:  HF: A x O x 3. Appropriately interactive, normal affect. Speech fluent, No Aphasia or paraphasic errors. Naming /repetition intact   CN: [place], EOMI left gaze induced left beating nystagmus, VFF, facial sensation normal, no NLFD, tongue midline, Palate moves equally, SCM equal bilaterally  Motor: No pronator drift, Strength 5/5 in all 4 ext, normal bulk and tone, no tremor, rigidity or bradykinesia.    Sens: Intact to light touch / PP/ VS/ JS    Reflexes: Symmetric and normal . BJ 2+, BR 2+, KJ 2+, AJ 2+, downgoing toes b/l  Coord: Left FNFA, dysmetria, PRINCESS intact   Gait/Balance: unable    NIHSS: 2      LABS:                        10.9   21.24 )-----------( 455      ( 05 Jun 2020 10:24 )             34.2     06-05    135  |  99  |  22<H>  ----------------------------<  158<H>  3.3<L>   |  24  |  1.07    Ca    8.3<L>      05 Jun 2020 10:24  Phos  2.7     06-05  Mg     2.7     06-05    TPro  7.6  /  Alb  2.5<L>  /  TBili  0.5  /  DBili  x   /  AST  22  /  ALT  57  /  AlkPhos  298<H>  06-05    PT/INR - ( 04 Jun 2020 20:53 )   PT: 15.7 sec;   INR: 1.38 ratio         PTT - ( 05 Jun 2020 17:41 )  PTT:180.2 sec      RADIOLOGY & ADDITIONAL TESTS:  < from: CT Angio Head w/ IV Cont (06.04.20 @ 20:09) >    EXAM:  CT ANGIO BRAIN (W)AW IC                            PROCEDURE DATE:  06/04/2020          INTERPRETATION:  CLINICAL STATEMENT: Nausea vomiting vertically    TECHNIQUE: CTA of the head and neck performed with IV contrast.  3-D MIP imaging obtained. Approximately 90 cc of Omnipaque 350 administered.    COMPARISON: None.    FINDINGS:  CTA of the neck:  Evaluation of the origins of the great vessels is limited due to artifacts.    The visualized common carotid and internal carotid arteries are patent. Calcified atherosclerotic plaque at the right carotid bulb results in focal mild narrowing of the origin of the right internal carotid artery.    Atherosclerotic plaque noted resulting inmoderate narrowing distal left common carotid artery.    The visualized extracranial vertebral arteries are patent. Origin of vertebral arteries not evaluated.    Left anterior chest device causes artifacts limiting evaluation of the chest left chestwall device limits evaluation of the chest due to artifacts. Questionable filling defects noted in the upper segmental and subsegmental pulmonary arteries. Possible groundglass opacities. Pleural effusions noted. Partially visualized soft tissue densities noted in the subcarinal region    CTA Head:  Calcified atherosclerotic plaques results in narrowing of the cavernous and supraclinoid bilateral internal carotid arteries. There is severe narrowing of the terminal segment of distal left internal carotid artery    The proximal anterior, middle and posterior cerebral arteries are patent without hemodynamically significant stenosis.    The intracranial vertebral and basilar arteries are patent. Calcified atherosclerotic plaques results in narrowing of bilateral V4 segments of distal vertebral arteries.    There is no intracranial aneurysm.        IMPRESSION:  Focal mild (less than 50%) narrowing at the origin of right internal carotid artery.    Short segment Moderate (50-70%) narrowing distal left common carotid artery    Intracranial narrowing as described above.    Partially visualized pleural effusions and soft tissue density in the subcarinal region. Questionable filling defects in the segmental and subsegmental upper pulmonary arteries. Evaluation significantly limited due to artifacts from left chest wall device. CTA chest recommended for further evaluation        SALOMÓN GARCÍA M.D., ATTENDING RADIOLOGIST  This document has been electronically signed. Jun 4 2020  8:21PM    < end of copied text >

## 2020-06-05 NOTE — CONSULT NOTE ADULT - SUBJECTIVE AND OBJECTIVE BOX
HISTORY OF PRESENT ILLNESS: HPI:  73 YO female from home, H/O CAD/DM/HTN/PVD/hysterectomy/cholecystectomy ,with CHF BIVICD, Mild anterior wall ischemia being medically managed  comes to ED with intractable nausea and vomiting for 2 days. Patient states that she recently had left big toe amputation in the beginning of May and she was taking antibiotics. On Friday, her antibiotics were changed from clindamycin to Levaquin and after she took antibiotics along with percocet, she started having severe pain in epigastric region. Patient reports that it was sudden in onset, 7/10, burning & pressure like pain, non-radiating , associated with nausea,, bitter taste in mouth and projectile vomiting. She was not able to tolerate food and it aggravated her symptoms. Patient also reports significant weight( not sure how much) recently and decrease in appetite.   patient denies fever, cough, SOB, constipation, dysuria, headache, chest pain, orthopnea, back pain, burning sensation in extremities.  She is noted with afib of unclear chronicity.  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  No CP, No LOC no ICD shocks     ED course; Patient is in mild distress due to nausea but otherwise stable  Vitals:     /88  SpO2 99% on RA    CT abdomen/pelvis:   NEW MULTI BLADDER WALL FOCAL AREAS OF SOFT TISSUE THICKENING CONCERNING FOR BLADDER CARCINOMA/TRANSITIONAL CELL CARCINOMA. No hydronephrosis.  Stomach not fully distended and gastric pathology cannot be excluded.  Status post cholecystectomy and hysterectomy.  Remaining abdominal pelvic viscera unremarkable.   No evidence of colitis.. (30 May 2020 14:56)      PAST MEDICAL & SURGICAL HISTORY:  PVD (peripheral vascular disease)  CAD (coronary artery disease)  CHF (congestive heart failure)  HTN (hypertension)  DM (diabetes mellitus)  Status post coronary artery stent placement  H/O: hysterectomy  Cardiac defibrillator in place  Artificial cardiac pacemaker  History of cholecystectomy          MEDICATIONS:  MEDICATIONS  (STANDING):  atorvastatin 40 milliGRAM(s) Oral at bedtime  Dakins Solution - 1/4 Strength 1 Application(s) Topical daily  dextrose 5% + sodium chloride 0.9% 1000 milliLiter(s) (60 mL/Hr) IV Continuous <Continuous>  doxycycline IVPB 100 milliGRAM(s) IV Intermittent every 12 hours  heparin  Infusion.  Unit(s)/Hr (12 mL/Hr) IV Continuous <Continuous>  hydrALAZINE Injectable 10 milliGRAM(s) IV Push every 8 hours  insulin glargine Injectable (LANTUS) 10 Unit(s) SubCutaneous at bedtime  insulin lispro (HumaLOG) corrective regimen sliding scale   SubCutaneous every 6 hours  lactobacillus acidophilus 1 Tablet(s) Oral three times a day with meals  metoclopramide Injectable 10 milliGRAM(s) IV Push every 8 hours  metoprolol tartrate Injectable 7.5 milliGRAM(s) IV Push every 6 hours  pantoprazole  Injectable 40 milliGRAM(s) IV Push daily  piperacillin/tazobactam IVPB.. 3.375 Gram(s) IV Intermittent every 8 hours  potassium chloride    Tablet ER 40 milliEquivalent(s) Oral once      Allergies    codeine (Rash)  penicillin (Rash)    Intolerances        FAMILY HISTORY:    Non-contributary for premature coronary disease or sudden cardiac death    SOCIAL HISTORY:    [ X] Non-smoker  [ ] Smoker  [ ] Alcohol        REVIEW OF SYSTEMS:  [ ]chest pain  [  ]shortness of breath  [  ]palpitations  [  ]syncope  [ ]near syncope [ ]upper extremity weakness   [ ] lower extremity weakness  [  ]diplopia  [  ]altered mental status   [  ]fevers  [ ]chills [ ]nausea  [ ]vomitting  [  ]dysphagia    [ ]abdominal pain  [ ]melena  [ ]BRBPR    [  ]epistaxis  [  ]rash    [ ]lower extremity edema        [X ] All others negative	  [ ] Unable to obtain      LABS:	 	    CARDIAC MARKERS:  CARDIAC MARKERS ( 05 Jun 2020 15:24 )  0.270 ng/mL / x     / x     / x     / x      CARDIAC MARKERS ( 05 Jun 2020 10:24 )  0.241 ng/mL / x     / 37 U/L / x     / 1.1 ng/mL                              10.9   21.24 )-----------( 455      ( 05 Jun 2020 10:24 )             34.2     Hb Trend: 10.9<--, 10.9<--    06-05    135  |  99  |  22<H>  ----------------------------<  158<H>  3.3<L>   |  24  |  1.07    Ca    8.3<L>      05 Jun 2020 10:24  Phos  2.7     06-05  Mg     2.7     06-05    TPro  7.6  /  Alb  2.5<L>  /  TBili  0.5  /  DBili  x   /  AST  22  /  ALT  57  /  AlkPhos  298<H>  06-05    Creatinine Trend: 1.07<--, 1.26<--, 1.12<--, 1.18<--, 1.18<--, 1.30<--    Coags:  PTT - ( 05 Jun 2020 17:41 )  PTT:180.2 sec    PHYSICAL EXAM:  T(C): 36.6 (06-05-20 @ 14:23), Max: 37.1 (06-04-20 @ 21:22)  HR: 104 (06-05-20 @ 16:09) (96 - 118)  BP: 161/89 (06-05-20 @ 16:09) (146/88 - 170/83)  RR: 16 (06-05-20 @ 16:09) (16 - 20)  SpO2: 98% (06-05-20 @ 16:09) (93% - 100%)  Wt(kg): --     I&O's Summary    04 Jun 2020 07:01  -  05 Jun 2020 07:00  --------------------------------------------------------  IN: 240 mL / OUT: 0 mL / NET: 240 mL      HEENT:  (-)icterus (-)pallor  CV: N S1 S2 1/6 JAMAAL (+)2 Pulses B/l  Resp:  Clear to ausculatation B/L, normal effort  GI: (+) BS Soft, NT, ND  Lymph:  (-)Edema, (-)obvious lymphadenopathy  Skin: Warm to touch, Normal turgor  Psych: Appropriate mood and affect        TELEMETRY: 	  Afib Vapced    ECG:  	Afib, BiV PAced     RADIOLOGY:         CXR:    Cardiomegaly. Right atrium and biventricular cardiac wire leads in place.. Infrahilar and bibasilar interstitial opacities/infiltrates...          ASSESSMENT/PLAN: 	74y Female  CAD/DM/HTN/PVD/hysterectomy/cholecystectomy ,with CHF BIVICD, Mild anterior wall ischemia being medically managed  comes to ED with intractable nausea and vomiting for 2 days 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 bladder malignancy.    - Start Heparin gtt if ok with neuro and not otherwise contraindicated  - Interrogate [place] Sci ICD  - tx of nausea per primary team  - Plan to restart Cardiac meds when tolerating PO  - will follow with you      I once again thank you for allowing me to participate in the care of your patient.  If you have any questions or concerns please do not hesitate to contact me.    Pipo Segundo MD, [facility]  BEEPER [phone]

## 2020-06-05 NOTE — PROGRESS NOTE ADULT - PROBLEM SELECTOR PLAN 4
H/o PVD and popliteal stent  s/p left big toe amputation in May 2020  f/u ESTRELLA - pt cannot go due to n/v, will re-order when pt feels better  - outpatient with vascular surgery upon DC  PT: home PT  Vascular is following

## 2020-06-05 NOTE — PROGRESS NOTE ADULT - SUBJECTIVE AND OBJECTIVE BOX
Patient seen/examined. Recently diagnosed with stroke and now on heparin gtt. No events overnight    Vital Signs Last 24 Hrs  T(C): 36.7 (05 Jun 2020 05:10), Max: 37.1 (04 Jun 2020 21:22)  T(F): 98 (05 Jun 2020 05:10), Max: 98.8 (04 Jun 2020 21:22)  HR: 109 (05 Jun 2020 05:10) (100 - 118)  BP: 160/79 (05 Jun 2020 05:10) (146/88 - 174/94)  BP(mean): --  RR: 18 (05 Jun 2020 05:10) (18 - 18)  SpO2: 97% (05 Jun 2020 05:10) (95% - 97%)    General: NAD.  Abd: soft. NT/ND                          10.9   20.06 )-----------( 442      ( 05 Jun 2020 02:45 )             33.7     06-04    135  |  99  |  29<H>  ----------------------------<  87  3.4<L>   |  28  |  1.26    Ca    8.9      04 Jun 2020 06:44  Phos  2.2     06-04  Mg     2.7     06-04    TPro  7.7  /  Alb  2.5<L>  /  TBili  0.6  /  DBili  x   /  AST  24  /  ALT  79<H>  /  AlkPhos  301<H>  06-04

## 2020-06-05 NOTE — PROGRESS NOTE ADULT - SUBJECTIVE AND OBJECTIVE BOX
Vital Signs Last 24 Hrs  T(C): 36.6 (05 Jun 2020 14:23), Max: 37.1 (04 Jun 2020 21:22)  T(F): 97.8 (05 Jun 2020 14:23), Max: 98.8 (04 Jun 2020 21:22)  HR: 104 (05 Jun 2020 16:09) (96 - 118)  BP: 161/89 (05 Jun 2020 16:09) (146/88 - 170/83)  BP(mean): --  RR: 16 (05 Jun 2020 16:09) (16 - 20)  SpO2: 98% (05 Jun 2020 16:09) (93% - 100%)                        10.9   21.24 )-----------( 455      ( 05 Jun 2020 10:24 )             34.2   WBC Count: 21.24 K/uL (06-05 @ 10:24)  WBC Count: 20.06 K/uL (06-05 @ 02:45)  WBC Count: 18.07 K/uL (06-04 @ 06:44)  WBC Count: 14.32 K/uL (06-03 @ 05:51)  WBC Count: 15.44 K/uL (06-02 @ 05:46) pt seen for follow up on left foot infection   Vital Signs Last 24 Hrs  T(C): 36.6 (05 Jun 2020 14:23), Max: 37.1 (04 Jun 2020 21:22)  T(F): 97.8 (05 Jun 2020 14:23), Max: 98.8 (04 Jun 2020 21:22)  HR: 104 (05 Jun 2020 16:09) (96 - 118)  BP: 161/89 (05 Jun 2020 16:09) (146/88 - 170/83)  BP(mean): --  RR: 16 (05 Jun 2020 16:09) (16 - 20)  SpO2: 98% (05 Jun 2020 16:09) (93% - 100%)                        10.9   21.24 )-----------( 455      ( 05 Jun 2020 10:24 )             34.2   WBC Count: 21.24 K/uL (06-05 @ 10:24)  WBC Count: 20.06 K/uL (06-05 @ 02:45)  WBC Count: 18.07 K/uL (06-04 @ 06:44)  WBC Count: 14.32 K/uL (06-03 @ 05:51)  WBC Count: 15.44 K/uL (06-02 @ 05:46)      Procalcitonin, Serum (06.05.20 @ 19:14)    Procalcitonin, Serum: 0.11: Procalcitonin (PCT) Interpretation (ng/mL) - Diagnosis of systemic  bacterial infection/sepsis    Procalcitonin, Serum: 0.17: Procalcitonin (PCT) Interpretation (ng/mL) - Diagnosis of systemic  bacterial infection/sepsis  PCT < 0.5: Systemic infection (sepsis) is not likely and risk for  progression to severe systemic infection is low. Local bacterial  infection is possible. If early sepsis is suspected clinically, PCT  should be re-assessed in 6-24 hours.  PCT >/= 0.5 but < 2.0: Systemic infection (sepsis) is possible, but other  conditions are known to elevate PCT as well. Moderate risk for  progression to severe systemic infection. The patient should be closely  monitored both clinically and by re-assessing PCT within 6-24 hours.  PCT >/= 2.0 but < 10.0: Systemic infection (sepsis) is likely, unless  other causes are known. High risk of progression to severe systemic  infection (severe sepsis/septic shock).  PCT >/= 10.0: Important systemic inflammatory response, almost  exclusively due to severe bacterial sepsis or septic shock. High  likelihood of severe sepsis or septic shock. ng/mL (06.02.20 @ 09:59)  < from: CT Foot No Cont, Left (06.02.20 @ 19:00) >    EXAM:  CT FOOT ONLY LT                            PROCEDURE DATE:  06/02/2020          INTERPRETATION:      Exam: CT Left Lower Extremity Without Contrast, Foot   Exam date and time: 6/2/2020 6:59 PM   Age: 74 years old   Clinical indication: Status post amputation of left big toe, wound, evaluate for osteomyelitis.    TECHNIQUE:   Imaging protocol: CT of the Left lower extremity without contrast was   performed. Exam focused on the foot.   3D rendering: 3-D reformatted images were performed concepcion independent workstation.  COMPARISON:   No relevant prior studies available.     FINDINGS:   Bones/joints: Previous amputation of the 1st toe at the MTP joint. Soft tissue ulcer overlying the distal aspect of the first metatarsal  extending to thehead of the 1st metatarsal, with cortical   irregularity and destructive changes at the head of the 1st metatarsal   consistent with osteomyelitis. Small corticated ossific densities adjacent to the distal aspect of the medial malleolus, likely related to old trauma.  Soft tissues: Ill-defined possible developing fluid collection or abscess at the  plantar surface of the foot superficial to the sesamoids, and adjacent to the   soft tissue ulcer measuring approximately 19 x 8 by 28 mm., evaluationlimited without intravenous contrast. Small fluid around the flexor hallucis longus tendon.  Vasculature: Small vessel atherosclerotic calcification.     IMPRESSION:   1. Previous amputation 1st toe at the MTP joint. Soft tissue ulcer at the stump   extending to the head of the 1st metatarsal, with cortical irregularity and   destructive changes at the head of the 1st metatarsal consistent with   osteomyelitis.   2. Ill-defined possible developing fluid collection or abscess plantar surface   of the foot superficial to the sesamoids, and adjacent to the soft tissue ulcer   approximately 19 x 8 by 28 mm., evaluation limited without intravenous contrast.                ALMA CASTANO M.D., ATTENDING RADIOLOGIST  This document has been electronically signed. Uli  3 2020  8:59AM              < end of copied text >    06-05    135  |  100  |  21<H>  ----------------------------<  184<H>  3.7   |  26  |  1.08    Ca    8.5      05 Jun 2020 22:37  Phos  2.7     06-05  Mg     2.7     06-05    TPro  7.6  /  Alb  2.5<L>  /  TBili  0.5  /  DBili  x   /  AST  22  /  ALT  57  /  AlkPhos  298<H>  06-05      < from: Xray Chest 1 View- PORTABLE-Urgent (06.05.20 @ 13:40) >    EXAM:  XR CHEST PORTABLE URGENT 1V                            PROCEDURE DATE:  06/05/2020    MEDICATIONS  (STANDING):  atorvastatin 40 milliGRAM(s) Oral at bedtime  Dakins Solution - 1/4 Strength 1 Application(s) Topical daily  dextrose 5% + sodium chloride 0.9% 1000 milliLiter(s) (60 mL/Hr) IV Continuous <Continuous>  doxycycline IVPB 100 milliGRAM(s) IV Intermittent every 12 hours  heparin  Infusion.  Unit(s)/Hr (12 mL/Hr) IV Continuous <Continuous>  hydrALAZINE Injectable 10 milliGRAM(s) IV Push every 8 hours  insulin glargine Injectable (LANTUS) 10 Unit(s) SubCutaneous at bedtime  insulin lispro (HumaLOG) corrective regimen sliding scale   SubCutaneous every 6 hours  lactobacillus acidophilus 1 Tablet(s) Oral three times a day with meals  metoclopramide Injectable 10 milliGRAM(s) IV Push every 8 hours  metoprolol tartrate Injectable 7.5 milliGRAM(s) IV Push every 6 hours  pantoprazole  Injectable 40 milliGRAM(s) IV Push daily  piperacillin/tazobactam IVPB.. 3.375 Gram(s) IV Intermittent every 8 hours    MEDICATIONS  (PRN):  acetaminophen   Tablet .. 650 milliGRAM(s) Oral every 6 hours PRN Mild Pain (1 - 3), Moderate Pain (4 - 6)  aluminum hydroxide/magnesium hydroxide/simethicone Suspension 30 milliLiter(s) Oral every 6 hours PRN Dyspepsia  diphenhydrAMINE   Injectable 15 milliGRAM(s) IV Push every 6 hours PRN nausea or vomiting  ondansetron Injectable 4 milliGRAM(s) IV Push every 8 hours PRN Nausea and/or Vomiting        INTERPRETATION:  Portable chest radiograph        CLINICAL INFORMATION:   Leukocytosis.    TECHNIQUE:  Portable  AP view of the chest was obtained.    COMPARISON: 10/12/2019 available for review.    FINDINGS:   The lungs  show bilateral lung perihilar and basilar interstitial opacity/infiltrates. Apices of lungs clear.    The  heart is enlarged in transverse diameter. No hilar mass. Trachea midline.   Right atrium and biventricular cardiac wire leads in place.    The heart and mediastinum are within normal limits.    Visualized osseous structures are intact.        IMPRESSION:   Cardiomegaly. Right atrium and biventricular cardiac wire leads in place.. Infrahilar and bibasilar interstitial opacities/infiltrates...                    ISABELLA REYNOLDS M.D., ATTENDING RADIOLOGIST  This document has been electronically signed. Jun 5 2020  2:08PM                < end of copied text >      pt seen and treated at bedside  new  events/tests/consults  noticed   pt  seen and evaluated at bedside , reports nausea , not feeling well   seems less lethargic , reports no pain in her left foot   exam focused LE   left foot sp halux amputation May 3  ( as per patient )  there is an open wound on the distal surgical site  distal to 1 st met head    wound packed with gauze strip, no active drainage , no odor     wound is deep, tracking along flexor tendon? probes to abscess area as per ct , but no active drainage  , probes to bone +,  met head visible , mostly fibrotic base exposed tendons with possible calcifications /bone fragments ??  , hyperkeratotic border , with minimal area of darker dry  skin ,   there is absolutely no bleeding during exam and wound exploration   DP/PT -0-1/2 out of 4   TG -WNL   CRF -1-2 sec x4 , blanching erythema plantar 1st met , CRF -delayed in this area   there is no significant changes in  erythema ascending to the level of the 1st met base , and plantar submet 1 head-looks more wide spread , the area is 2 point of tenderness , submet 1 head and submet 1 base , with palpable  induration    pt is able yo move her lower extremities

## 2020-06-05 NOTE — PROGRESS NOTE ADULT - PROBLEM SELECTOR PLAN 3
- patient comes with acute onset of nausea, epigastric pain and vomiting x 2 days  - Non radiating, 7/10 ,burning and pressure like pain  - She has been taking antibiotics for almost three weeks  - Symptoms aggravate with food and partially relieved with omeprazole  - patient has also history of diabetes  - DDx include acute pill induced esophagitis & gastritis or gastropathy(uncontrolled DM)  - s/p Famotidine IV, zofran and Malox in Ed  - pt also received 1 bolus in ED  - c/w PPI, Reglan q8h , benadryl 25mg IV q6h PRN  - Avoid NSAIDs and spicy food  - Plan for EGD tmrw  GI Dr. Matute recently antibiotics changed from clindamycin to Levaquin that led to epigastric pain.  WBC remains high 15k, possible infection on stump site  - ABX switched to cefepime + doxycycline 6/1-6/4  - started cefazolin 6/5- as per ID  - lactate 2.4, procalcitonin 0.17  - CT L foot shows OM ( pt cannot get MRI dur to AICD)   repeat lactate: trended down  Podiatry Dr. Yo - plan to take the pt to OR tomorrow but need to be medically stabilized  ID Dr. Reagan

## 2020-06-05 NOTE — CHART NOTE - NSCHARTNOTEFT_GEN_A_CORE
Called Dr. Neil Esteban MD at [facility] ([phone]), pt' cardiologist and requested all result to be faxed to [address] [place] [address]. Called Dr. Neil Esteban MD at [facility] ([phone]), who is pt' cardiologist and requested all result to be faxed to [address] [place] [address].  Documents didn't came during my shift, asked nurse to place in the chart when it came.    As per Dr. Esteban, pt had stress test at [facility] in 2016 which showed mild ischemia. Pt has no hx of Afib.  2009 PCI was done up to marginal branch.  2011 stent was placed in proximal and mid RCA, also Bi-ventriclar AICD was inserted in [facility].  Even though pt had many problems, cardiac wise pt was stable recently.

## 2020-06-05 NOTE — CONSULT NOTE ADULT - ASSESSMENT
75 y/o female w/ mildly dilated appendix; Intractable Nausea and vomiting    -No acute intervention re mildly dilated appendix; Intractable Nausea and vomiting not related to dilated appendix   -Antiemetics PRN   -Pain medication PRN   -GI f/u   -Care as per medical team   -Reconsult as needed

## 2020-06-05 NOTE — PROGRESS NOTE ADULT - PROBLEM SELECTOR PLAN 6
Pt takes Lisinopril 40mg, toprol Xl 100mg, hydralazine 50mg TID   BP is running on higher side as patient missed her medications and also received a bolus in ED  lipid profile, TG WNL  - pt is not tolerated for PO med.   - c/w hydralazine increased to 10mg IV TID, lopressor IV 7.5mg q6h until pt can take PO meds  - monitor BP --running high Pt takes Lisinopril 40mg, toprol Xl 100mg, hydralazine 50mg TID   BP is running on higher side as patient missed her medications and also received a bolus in ED  lipid profile, TG WNL  - pt is not tolerated for PO med.   - c/w hydralazine increased to 10mg IV TID, lopressor IV 7.5mg q6h until pt can take PO meds  - monitor BP --running high, but keep MAP around 100 for stroke

## 2020-06-05 NOTE — PROGRESS NOTE ADULT - PROBLEM SELECTOR PLAN 9
H/o CHF but clinically patient is not in fluid overload  No peripheral edema  Patient takes furosemide 20mg daily twice  BNP: elevated at 9383  echo: EF 50-55%, mild TR, [place]  - holding lasix 20mg IV as pt is not eating and dehydrated

## 2020-06-05 NOTE — CONSULT NOTE ADULT - SUBJECTIVE AND OBJECTIVE BOX
Attending:  Dr Ludwig     HPI:  75 YO female from home, H/O CAD/DM/HTN/PVD/hysterectomy/cholecystectomy ,with AICD comes to ED with intractable nausea and vomiting for 2 days. Patient states that she recently had left big toe amputation in the beginning of May and she was taking antibiotics. On Friday, her antibiotics were changed from clindamycin to Levaquin and after she took antibiotics along with percocet, she started having severe pain in epigastric region. Patient reports that it was sudden in onset, 7/10, burning & pressure like pain, non-radiating , associated with nausea,, bitter taste in mouth and projectile vomiting. She was not able to tolerate food and it aggravated her symptoms. Patient also reports significant weight( not sure how much) recently and decrease in appetite.   patient denies fever, cough, SOB, constipation, dysuria, headache, chest pain, orthopnea, back pain, burning sensation in extremities.    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          PAST MEDICAL & SURGICAL HISTORY:  PVD (peripheral vascular disease)  CAD (coronary artery disease)  CHF (congestive heart failure)  HTN (hypertension)  DM (diabetes mellitus)  Status post coronary artery stent placement  H/O: hysterectomy  Cardiac defibrillator in place  Artificial cardiac pacemaker  History of cholecystectomy      MEDICATIONS  (STANDING):  atorvastatin 40 milliGRAM(s) Oral at bedtime  ceFAZolin   IVPB 2000 milliGRAM(s) IV Intermittent every 8 hours  ceFAZolin   IVPB      Dakins Solution - 1/4 Strength 1 Application(s) Topical daily  dextrose 5% + sodium chloride 0.9% 1000 milliLiter(s) (60 mL/Hr) IV Continuous <Continuous>  heparin  Infusion.  Unit(s)/Hr (12 mL/Hr) IV Continuous <Continuous>  hydrALAZINE Injectable 10 milliGRAM(s) IV Push every 8 hours  insulin glargine Injectable (LANTUS) 10 Unit(s) SubCutaneous at bedtime  insulin lispro (HumaLOG) corrective regimen sliding scale   SubCutaneous every 6 hours  lactobacillus acidophilus 1 Tablet(s) Oral three times a day with meals  metoclopramide Injectable 10 milliGRAM(s) IV Push every 8 hours  metoprolol tartrate Injectable 7.5 milliGRAM(s) IV Push every 6 hours  pantoprazole  Injectable 40 milliGRAM(s) IV Push daily    MEDICATIONS  (PRN):  acetaminophen   Tablet .. 650 milliGRAM(s) Oral every 6 hours PRN Mild Pain (1 - 3), Moderate Pain (4 - 6)  aluminum hydroxide/magnesium hydroxide/simethicone Suspension 30 milliLiter(s) Oral every 6 hours PRN Dyspepsia  diphenhydrAMINE   Injectable 15 milliGRAM(s) IV Push every 6 hours PRN nausea or vomiting  ondansetron Injectable 4 milliGRAM(s) IV Push every 8 hours PRN Nausea and/or Vomiting      Allergies    codeine (Rash)  penicillin (Rash)    Intolerances        SOCIAL HISTORY:  Unknown   FAMILY HISTORY:  Unknown     Vital Signs Last 24 Hrs  T(C): 36.6 (05 Jun 2020 14:23), Max: 37.1 (04 Jun 2020 21:22)  T(F): 97.8 (05 Jun 2020 14:23), Max: 98.8 (04 Jun 2020 21:22)  HR: 104 (05 Jun 2020 16:09) (96 - 118)  BP: 161/89 (05 Jun 2020 16:09) (146/88 - 170/83)  BP(mean): --  RR: 16 (05 Jun 2020 16:09) (16 - 20)  SpO2: 98% (05 Jun 2020 16:09) (93% - 100%)    I&O's Summary    04 Jun 2020 07:01  -  05 Jun 2020 07:00  --------------------------------------------------------  IN: 240 mL / OUT: 0 mL / NET: 240 mL        LABS:                        10.9   21.24 )-----------( 455      ( 05 Jun 2020 10:24 )             34.2     06-05    135  |  99  |  22<H>  ----------------------------<  158<H>  3.3<L>   |  24  |  1.07    Ca    8.3<L>      05 Jun 2020 10:24  Phos  2.7     06-05  Mg     2.7     06-05    TPro  7.6  /  Alb  2.5<L>  /  TBili  0.5  /  DBili  x   /  AST  22  /  ALT  57  /  AlkPhos  298<H>  06-05    PT/INR - ( 04 Jun 2020 20:53 )   PT: 15.7 sec;   INR: 1.38 ratio         PTT - ( 05 Jun 2020 10:24 )  PTT:95.7 sec    CAPILLARY BLOOD GLUCOSE      POCT Blood Glucose.: 164 mg/dL (05 Jun 2020 11:37)  POCT Blood Glucose.: 144 mg/dL (05 Jun 2020 06:37)  POCT Blood Glucose.: 202 mg/dL (05 Jun 2020 00:10)  POCT Blood Glucose.: 154 mg/dL (04 Jun 2020 21:14)  POCT Blood Glucose.: 116 mg/dL (04 Jun 2020 16:48)    LIVER FUNCTIONS - ( 05 Jun 2020 10:24 )  Alb: 2.5 g/dL / Pro: 7.6 g/dL / ALK PHOS: 298 U/L / ALT: 57 U/L DA / AST: 22 U/L / GGT: x             Cultures:  Culture - Blood in AM (06.03.20 @ 08:52)    Specimen Source: .Blood Blood-Peripheral    Culture Results:   No growth to date.    RADIOLOGY & ADDITIONAL STUDIES:  < from: CT Abdomen and Pelvis w/ IV Cont (06.01.20 @ 10:35) >  Findings: Limited sections through the lung bases demonstrate small bilateral pleural effusions, new on the right and slightly increased on the left. Small bilateral atelectasis.    No evidence for a calculus in the kidneys or ureters. No hydronephrosis. Small hypodense lesion in the left kidney, too small to characterize. No definite enhancing renal mass is identified. No suspicious filling defect is identified in the renal pelvises or ureters. Multiple nodular peripheral filling defect are seen in the urinary bladder measuring upto 1.3 cm, suspicious for transitional cell carcinomas. Cystoscopy is recommended for further evaluation.    Stable cholecystectomy clips. Small hypodense area in the left hepatic lobe adjacent to the falciform ligament, likely due to focal fatty infiltration. Allowing for the noncontrast technique, the common bile duct, pancreas, spleen and adrenals appear unremarkable.    Stable mild dilatation of the appendix measuring up to 8 mm in caliber. No evidence for periappendiceal stranding. No bowel obstruction.    Trace perihepatic ascites. No evidence for free air, or enlarged lymph node.    The uterus is not visualized, probably surgically absent.    Impression:    Small bilateral pleural effusions.    Multiple nodular peripheral filling defect in the urinary bladder measuring up to 1.3 cm, suspicious for transitional cell carcinomas. Cystoscopy is recommended for further evaluation.    Stable mild dilatation of the appendix measuring up to 8 mm in caliber. No evidence for periappendiceal stranding. Given its stability, mucocele of the appendix is considered. Acute appendicitis is possible but less likely due to no interval development of periappendiceal stranding.    Trace perihepatic ascites.    < end of copied text >      < from: US Abdomen Limited (06.04.20 @ 16:11) >  FINDINGS:  Liver: Somewhat heterogeneous with areas of increased echogenicity.  Bile ducts: Normal caliber. Common bile duct measures 6 mm.   Gallbladder: Surgically absent        Pancreas: Visualized portions are within normal limits.  Right kidney: 10.4 cm. No hydronephrosis.   Ascites: None.  IVC: Visualized portions are within normal limits.    IMPRESSION:   Liver demonstrates some areas of increased echogenicity which may represent heterogeneous fatty infiltration.  Status post cholecystectomy    < end of copied text >

## 2020-06-05 NOTE — PROGRESS NOTE ADULT - ASSESSMENT
A/P  PVD /PAD  DM  ulcer   dehiscence surgical wound   history of gangrene left hallux   positive probe to bone   leukocytosis   OM with  abscess formation left   pyogenic tendon   leukocytosis -increasing     pt seen and eval   chart reviewed   treatment options discussed with pt   pt deteriorate significantly in the last few days    wound cleaned and flushed with copious amount  of 1/4 Dakin's about 3-4 cc of pinkish -yellowish pus expressed , wound packed with sterile gauze   light dressing    CT scan  reports supports clinical findings   pt needs wound debridement / I &D  more proximal amputation   so far there is no clinical improvement   pt may need urgent I&D if not improving   needs : medically optimized/stable / vascular /cleared by medicine and cardiology /neuro? -  most probably will need  blood   continue antibiotics as per id -id   continue wound packing with gauze strip   vascular consult appreciated follow up testes   consider other sources for increasing WBC   prognosis is very poor considering  vascular status , multiple health problems   discussed with patient and her Daughter Sahara Keating , they seems to understand

## 2020-06-05 NOTE — PROGRESS NOTE ADULT - ASSESSMENT
74 y.o. F with incidental finding of bladder wall irregularity  -labs reviewed  -Discussed with PA staff  -Urine cytology reviewed- negative  -UA demonstrates 0-2 RBC/hpf (normal)  -CT urogram reviewed, concerning for multiple nodular lesions in the bladder  -Outpatient f/u with Dr. Taylor after discharge for further workup with cystoscopy  -Discussed the importance of follow up as well as possibility of malignancy with the patient  -Follow up with Dr. Taylor after discharge by calling [phone] or [phone] to schedule an appointment.   [address] [facility] [address] [address]  [place] [address]

## 2020-06-05 NOTE — PROGRESS NOTE ADULT - PROBLEM SELECTOR PLAN 8
H/o CAD and PCI in 2009,  patient has both AICD and pacemaker  EKG shows paced Rythm  No active issues  continue aspirin , Plavix, BB, statin, ACE - pt is not tolerated to PO meds H/o CAD and PCI in 2009,  patient has both AICD and pacemaker  EKG shows paced Rythm  No active issues  continue aspirin , Plavix, BB, statin, ACE - pt is not tolerated to PO meds  -DCd aspirin, plavix and pt is on heparin drip

## 2020-06-05 NOTE — PROGRESS NOTE ADULT - SUBJECTIVE AND OBJECTIVE BOX
Patient is a 74y old  Female who presents with a chief complaint of Intractable nausea and vomiting (05 Jun 2020 09:24)      INTERVAL HPI/OVERNIGHT EVENTS:    - pt looks tired, still feeling n/v    REVIEW OF SYSTEMS:  CONSTITUTIONAL: No fever, (+)weight loss,  fatigue  EYES: No eye pain, visual disturbances, or discharge  ENMT:  No difficulty hearing, tinnitus, vertigo; No sinus or throat pain (+) liquid drip sensation in R ear  NECK: No pain or stiffness  BREASTS: No pain, masses, or nipple discharge  RESPIRATORY: No cough, wheezing, chills or hemoptysis; No shortness of breath  CARDIOVASCULAR: No chest pain, palpitations, dizziness, or leg swelling  GASTROINTESTINAL: No abdominal (+) epigastric pain, better than before.  (+) nausea, vomiting, no hematemesis; No diarrhea or constipation. No melena or hematochezia.  GENITOURINARY: No dysuria, frequency, hematuria, or incontinence  NEUROLOGICAL: (+) dizziness and  headaches, no memory loss, loss of strength, numbness, or tremors  SKIN: No itching, burning, rashes, or lesions   LYMPH NODES: No enlarged glands  ENDOCRINE: No heat or cold intolerance; No hair loss  MUSCULOSKELETAL: No joint pain or swelling; No muscle, back, or extremity pain  HEME/LYMPH: No easy bruising, or bleeding gums  ALLERY AND IMMUNOLOGIC: No hives or eczema    FAMILY HISTORY:    Vital Signs Last 24 Hrs  T(C): 36.5 (05 Jun 2020 10:13), Max: 37.1 (04 Jun 2020 21:22)  T(F): 97.7 (05 Jun 2020 10:13), Max: 98.8 (04 Jun 2020 21:22)  HR: 101 (05 Jun 2020 10:13) (100 - 118)  BP: 170/80 (05 Jun 2020 10:13) (146/88 - 174/94)  BP(mean): --  RR: 20 (05 Jun 2020 10:13) (18 - 20)  SpO2: 98% (05 Jun 2020 10:13) (95% - 100%)    06-04-20 @ 07:01  -  06-05-20 @ 07:00  --------------------------------------------------------  IN: 240 mL / OUT: 0 mL / NET: 240 mL      PHYSICAL EXAM:  GENERAL: moderately distressed by n/v  HEAD:  Atraumatic, Normocephalic  EYES: (+) nystagmus b/l, , conjunctiva and sclera clear, (+) conjunctiva anemic   ENMT: No tonsillar erythema, exudates, or enlargement; Moist mucous membranes, No lesions  NECK: Supple, No JVD, Normal thyroid  NERVOUS SYSTEM:  Alert & Oriented X3; Motor Strength 5/5 B/L upper and lower extremities  CHEST/LUNG: Clear to percussion bilaterally; No rales, rhonchi, wheezing, or rubs  HEART: Regular rate, tachycardia, No murmurs, rubs, or gallops  ABDOMEN: Soft, (+)tender on palpation to epigastric area, Nondistended; Bowel sounds present  EXTREMITIES:  2+ Peripheral Pulses, No clubbing, cyanosis, or edema (+) s/p L toe amputation, has pain  LYMPH: No lymphadenopathy noted  SKIN: (+) s/p L toe amputation          Consultant(s) Notes Reviewed:  [x ] YES  [ ] NO  Care Discussed with Consultants/Other Providers [ x] YES  [ ] NO    LABS:        RADIOLOGY & ADDITIONAL TESTS:    < from: CT Angio Head w/ IV Cont (06.04.20 @ 20:09) >  FINDINGS:  CTA of the neck:  Evaluation of the origins of the great vessels is limited due to artifacts.    The visualized common carotid and internal carotid arteries are patent. Calcified atherosclerotic plaque at the right carotid bulb results in focal mild narrowing of the origin of the right internal carotid artery.    Atherosclerotic plaque noted resulting inmoderate narrowing distal left common carotid artery.    The visualized extracranial vertebral arteries are patent. Origin of vertebral arteries not evaluated.    Left anterior chest device causes artifacts limiting evaluation of the chest left chestwall device limits evaluation of the chest due to artifacts. Questionable filling defects noted in the upper segmental and subsegmental pulmonary arteries. Possible groundglass opacities. Pleural effusions noted. Partially visualized soft tissue densities noted in the subcarinal region    CTA Head:  Calcified atherosclerotic plaques results in narrowing of the cavernous and supraclinoid bilateral internal carotid arteries. There is severe narrowing of the terminal segment of distal left internal carotid artery    The proximal anterior, middle and posterior cerebral arteries are patent without hemodynamically significant stenosis.    The intracranial vertebral and basilar arteries are patent. Calcified atherosclerotic plaques results in narrowing of bilateral V4 segments of distal vertebral arteries.    There is no intracranial aneurysm.        IMPRESSION:  Focal mild (less than 50%) narrowing at the origin of right internal carotid artery.    Short segment Moderate (50-70%) narrowing distal left common carotid artery    Intracranial narrowing as described above.    Partially visualized pleural effusions and soft tissue density in the subcarinal region. Questionable filling defects in the segmental and subsegmental upper pulmonary arteries. Evaluation significantly limited due to artifacts from left chest wall device. CTA chest recommended for further evaluation        < end of copied text >      Imaging Personally Reviewed:  [ ] YES  [ ] NO  acetaminophen   Tablet .. 650 milliGRAM(s) Oral every 6 hours PRN  aluminum hydroxide/magnesium hydroxide/simethicone Suspension 30 milliLiter(s) Oral every 6 hours PRN  atorvastatin 40 milliGRAM(s) Oral at bedtime  ceFAZolin   IVPB 2000 milliGRAM(s) IV Intermittent every 8 hours  ceFAZolin   IVPB      Dakins Solution - 1/4 Strength 1 Application(s) Topical daily  dextrose 5% + sodium chloride 0.9%. 1000 milliLiter(s) IV Continuous <Continuous>  diphenhydrAMINE   Injectable 15 milliGRAM(s) IV Push every 6 hours PRN  heparin  Infusion.  Unit(s)/Hr IV Continuous <Continuous>  hydrALAZINE Injectable 10 milliGRAM(s) IV Push every 8 hours  insulin glargine Injectable (LANTUS) 10 Unit(s) SubCutaneous at bedtime  insulin lispro (HumaLOG) corrective regimen sliding scale   SubCutaneous every 6 hours  lactobacillus acidophilus 1 Tablet(s) Oral three times a day with meals  metoclopramide Injectable 10 milliGRAM(s) IV Push every 8 hours  metoprolol tartrate Injectable 7.5 milliGRAM(s) IV Push every 6 hours  ondansetron Injectable 4 milliGRAM(s) IV Push every 8 hours PRN  pantoprazole  Injectable 40 milliGRAM(s) IV Push daily      HEALTH ISSUES - PROBLEM Dx:  Osteomyelitis of left foot, unspecified type: Osteomyelitis of left foot, unspecified type  Intractable nausea and vomiting: Intractable nausea and vomiting  Stump pain: Stump pain  Prophylactic measure: Prophylactic measure  CHF (congestive heart failure): CHF (congestive heart failure)  CAD (coronary artery disease): CAD (coronary artery disease)  PVD (peripheral vascular disease): PVD (peripheral vascular disease)  Bladder wall thickening: Bladder wall thickening  DM (diabetes mellitus): DM (diabetes mellitus)  HTN (hypertension): HTN (hypertension)  Intractable abdominal pain: Intractable abdominal pain  Gastritis, acute: Gastritis, acute Patient is a 74y old  Female who presents with a chief complaint of Intractable nausea and vomiting (05 Jun 2020 09:24)      INTERVAL HPI/OVERNIGHT EVENTS:    - pt looks tired, still feeling n/v    REVIEW OF SYSTEMS:  CONSTITUTIONAL: No fever, (+)weight loss,  fatigue  EYES: No eye pain, visual disturbances, or discharge  ENMT:  No difficulty hearing, tinnitus, vertigo; No sinus or throat pain (+) liquid drip sensation in R ear  NECK: No pain or stiffness  BREASTS: No pain, masses, or nipple discharge  RESPIRATORY: No cough, wheezing, chills or hemoptysis; No shortness of breath  CARDIOVASCULAR: No chest pain, palpitations, dizziness, or leg swelling  GASTROINTESTINAL: No abdominal (+) epigastric pain, better than before.  (+) nausea, vomiting, no hematemesis; No diarrhea or constipation. No melena or hematochezia.  GENITOURINARY: No dysuria, frequency, hematuria, or incontinence  NEUROLOGICAL: (+) dizziness and  headaches, no memory loss, loss of strength, numbness, or tremors  SKIN: No itching, burning, rashes, or lesions   LYMPH NODES: No enlarged glands  ENDOCRINE: No heat or cold intolerance; No hair loss  MUSCULOSKELETAL: No joint pain or swelling; No muscle, back, or extremity pain  HEME/LYMPH: No easy bruising, or bleeding gums  ALLERY AND IMMUNOLOGIC: No hives or eczema    FAMILY HISTORY:    Vital Signs Last 24 Hrs  T(C): 36.5 (05 Jun 2020 10:13), Max: 37.1 (04 Jun 2020 21:22)  T(F): 97.7 (05 Jun 2020 10:13), Max: 98.8 (04 Jun 2020 21:22)  HR: 101 (05 Jun 2020 10:13) (100 - 118)  BP: 170/80 (05 Jun 2020 10:13) (146/88 - 174/94)  BP(mean): --  RR: 20 (05 Jun 2020 10:13) (18 - 20)  SpO2: 98% (05 Jun 2020 10:13) (95% - 100%)    06-04-20 @ 07:01  -  06-05-20 @ 07:00  --------------------------------------------------------  IN: 240 mL / OUT: 0 mL / NET: 240 mL      PHYSICAL EXAM:  GENERAL: moderately distressed by n/v  HEAD:  Atraumatic, Normocephalic  EYES: (+) nystagmus b/l, pupil constricted and fixed, conjunctiva and sclera clear, (+) conjunctiva anemic   ENMT: No tonsillar erythema, exudates, or enlargement; Moist mucous membranes, No lesions  NECK: Supple, No JVD, Normal thyroid  NERVOUS SYSTEM:  Alert & Oriented X3; Motor Strength 5/5 B/L upper and lower extremities (+) dysmetria on L hand  CHEST/LUNG: Clear to percussion bilaterally; No rales, rhonchi, wheezing, or rubs  HEART: Regular rate, tachycardia, No murmurs, rubs, or gallops  ABDOMEN: Soft, (+)tender on palpation to epigastric area, Nondistended; Bowel sounds present  EXTREMITIES:  2+ Peripheral Pulses, No clubbing, cyanosis, or edema (+) s/p L toe amputation, has pain  LYMPH: No lymphadenopathy noted  SKIN: (+) s/p L toe amputation          Consultant(s) Notes Reviewed:  [x ] YES  [ ] NO  Care Discussed with Consultants/Other Providers [ x] YES  [ ] NO    LABS:        RADIOLOGY & ADDITIONAL TESTS:    < from: CT Angio Head w/ IV Cont (06.04.20 @ 20:09) >  FINDINGS:  CTA of the neck:  Evaluation of the origins of the great vessels is limited due to artifacts.    The visualized common carotid and internal carotid arteries are patent. Calcified atherosclerotic plaque at the right carotid bulb results in focal mild narrowing of the origin of the right internal carotid artery.    Atherosclerotic plaque noted resulting inmoderate narrowing distal left common carotid artery.    The visualized extracranial vertebral arteries are patent. Origin of vertebral arteries not evaluated.    Left anterior chest device causes artifacts limiting evaluation of the chest left chestwall device limits evaluation of the chest due to artifacts. Questionable filling defects noted in the upper segmental and subsegmental pulmonary arteries. Possible groundglass opacities. Pleural effusions noted. Partially visualized soft tissue densities noted in the subcarinal region    CTA Head:  Calcified atherosclerotic plaques results in narrowing of the cavernous and supraclinoid bilateral internal carotid arteries. There is severe narrowing of the terminal segment of distal left internal carotid artery    The proximal anterior, middle and posterior cerebral arteries are patent without hemodynamically significant stenosis.    The intracranial vertebral and basilar arteries are patent. Calcified atherosclerotic plaques results in narrowing of bilateral V4 segments of distal vertebral arteries.    There is no intracranial aneurysm.        IMPRESSION:  Focal mild (less than 50%) narrowing at the origin of right internal carotid artery.    Short segment Moderate (50-70%) narrowing distal left common carotid artery    Intracranial narrowing as described above.    Partially visualized pleural effusions and soft tissue density in the subcarinal region. Questionable filling defects in the segmental and subsegmental upper pulmonary arteries. Evaluation significantly limited due to artifacts from left chest wall device. CTA chest recommended for further evaluation        < end of copied text >      Imaging Personally Reviewed:  [ ] YES  [ ] NO  acetaminophen   Tablet .. 650 milliGRAM(s) Oral every 6 hours PRN  aluminum hydroxide/magnesium hydroxide/simethicone Suspension 30 milliLiter(s) Oral every 6 hours PRN  atorvastatin 40 milliGRAM(s) Oral at bedtime  ceFAZolin   IVPB 2000 milliGRAM(s) IV Intermittent every 8 hours  ceFAZolin   IVPB      Dakins Solution - 1/4 Strength 1 Application(s) Topical daily  dextrose 5% + sodium chloride 0.9%. 1000 milliLiter(s) IV Continuous <Continuous>  diphenhydrAMINE   Injectable 15 milliGRAM(s) IV Push every 6 hours PRN  heparin  Infusion.  Unit(s)/Hr IV Continuous <Continuous>  hydrALAZINE Injectable 10 milliGRAM(s) IV Push every 8 hours  insulin glargine Injectable (LANTUS) 10 Unit(s) SubCutaneous at bedtime  insulin lispro (HumaLOG) corrective regimen sliding scale   SubCutaneous every 6 hours  lactobacillus acidophilus 1 Tablet(s) Oral three times a day with meals  metoclopramide Injectable 10 milliGRAM(s) IV Push every 8 hours  metoprolol tartrate Injectable 7.5 milliGRAM(s) IV Push every 6 hours  ondansetron Injectable 4 milliGRAM(s) IV Push every 8 hours PRN  pantoprazole  Injectable 40 milliGRAM(s) IV Push daily      HEALTH ISSUES - PROBLEM Dx:  Osteomyelitis of left foot, unspecified type: Osteomyelitis of left foot, unspecified type  Intractable nausea and vomiting: Intractable nausea and vomiting  Stump pain: Stump pain  Prophylactic measure: Prophylactic measure  CHF (congestive heart failure): CHF (congestive heart failure)  CAD (coronary artery disease): CAD (coronary artery disease)  PVD (peripheral vascular disease): PVD (peripheral vascular disease)  Bladder wall thickening: Bladder wall thickening  DM (diabetes mellitus): DM (diabetes mellitus)  HTN (hypertension): HTN (hypertension)  Intractable abdominal pain: Intractable abdominal pain  Gastritis, acute: Gastritis, acute

## 2020-06-05 NOTE — CONSULT NOTE ADULT - ATTENDING COMMENTS
Seen ex'ed dw'ed PA  h/o PAD, L toe amp  REported vasc intervention in [facility] ~4wks ago ?stent?details. and toe amp.  Now adm'ed w abd pain/vomitting    Denies LE pain,     ABd: S/NT  LEs: warm, no edema  L 1st toe amp site open, dry (probes to bone per pod note)  +fem pulses, dist pulses NP    CT A/P: Athero. Patent aorta/iliacs/Prox fems.    CT L foot: OM, ?collection    A/P:   PAD/L toe amp site open wound  OM, ?collection  Recent vasc interventions ?details  Limb potentially threatened    DW'ed pt and daughter by tel: mostly concerned w GI problems.    Rec:   Requested pt/daughter to obtain [facility] vasc records  Podiatry fu/ local infection control as indicated  Check L thigh/calf xray to vis stent location.  Check ESTRELLA/PVRs (do not compress L stent)  Art duplex to check stent patency  May need further vasc hernández/intervention
Patient examined yesterday and today - N/V has improved. No abdominal pain.     The borderline dilated appendix on CT x 2 has no associated inflammation and does not look like a mucocele.   In the absence of clinical signs of appendicitis - no surgical intervention is necessary.

## 2020-06-05 NOTE — PROGRESS NOTE ADULT - SUBJECTIVE AND OBJECTIVE BOX
Subjective: cont with n/v. seen by neurology r/o cerebellar cva vs TIA . as per gi , most likley vertigo sec to CVA , egd on hold. also seen by cardio. no fevers but wbc increasing. still with pain over left stump      PHYSICAL EXAM:    Vital Signs Last 24 Hrs  T(C): 36.5 (05 Jun 2020 21:25), Max: 36.7 (05 Jun 2020 05:10)  T(F): 97.7 (05 Jun 2020 21:25), Max: 98 (05 Jun 2020 05:10)  HR: 103 (05 Jun 2020 21:25) (96 - 109)  BP: 165/90 (05 Jun 2020 21:25) (146/88 - 170/83)  BP(mean): --  RR: 16 (05 Jun 2020 21:25) (16 - 20)  SpO2: 94% (05 Jun 2020 21:25) (93% - 100%)    Constitutional: awake alert oriented times 3   [place] SCLERA anicteric EOMI   LUNGS clear   CVS s1 s2 aud no murmurs   ABDOMEN soft non tender no HSM   NEUROLOGY  no defecits   SKIN left first toe stump swelling tender with some open wound probing to bone as per podiatry   /dried wound /erythema plantar aspect   EXTREMITIES no edema no cyanosis         LABS/DIAGNOSTIC TESTS                        10.9   21.24 )-----------( 455      ( 05 Jun 2020 10:24 )             34.2     06-05    135  |  100  |  21<H>  ----------------------------<  184<H>  3.7   |  26  |  1.08    Ca    8.5      05 Jun 2020 22:37  Phos  2.7     06-05  Mg     2.7     06-05    TPro  7.6  /  Alb  2.5<L>  /  TBili  0.5  /  DBili  x   /  AST  22  /  ALT  57  /  AlkPhos  298<H>  06-05    PT/INR - ( 04 Jun 2020 20:53 )   PT: 15.7 sec;   INR: 1.38 ratio         PTT - ( 05 Jun 2020 17:41 )  PTT:180.2 sec      meds  acetaminophen   Tablet .. 650 milliGRAM(s) Oral every 6 hours PRN  aluminum hydroxide/magnesium hydroxide/simethicone Suspension 30 milliLiter(s) Oral every 6 hours PRN  atorvastatin 40 milliGRAM(s) Oral at bedtime  Dakins Solution - 1/4 Strength 1 Application(s) Topical daily  dextrose 5% + sodium chloride 0.9% 1000 milliLiter(s) IV Continuous <Continuous>  diphenhydrAMINE   Injectable 15 milliGRAM(s) IV Push every 6 hours PRN  doxycycline IVPB 100 milliGRAM(s) IV Intermittent every 12 hours  heparin  Infusion.  Unit(s)/Hr IV Continuous <Continuous>  hydrALAZINE Injectable 10 milliGRAM(s) IV Push every 8 hours  insulin glargine Injectable (LANTUS) 10 Unit(s) SubCutaneous at bedtime  insulin lispro (HumaLOG) corrective regimen sliding scale   SubCutaneous every 6 hours  lactobacillus acidophilus 1 Tablet(s) Oral three times a day with meals  metoclopramide Injectable 10 milliGRAM(s) IV Push every 8 hours  metoprolol tartrate Injectable 7.5 milliGRAM(s) IV Push every 6 hours  ondansetron Injectable 4 milliGRAM(s) IV Push every 8 hours PRN  pantoprazole  Injectable 40 milliGRAM(s) IV Push daily  piperacillin/tazobactam IVPB.. 3.375 Gram(s) IV Intermittent every 8 hours        CULTURES  Culture Results: blood  No growth to date. (06-03 @ 08:52)  Culture Results: wound  Few Staphylococcus aureus (06-03 @ 00:34)  Culture Results: blood  No growth to date. (06-02 @ 14:30)  Culture Results: blood  No growth to date. (06-02 @ 14:30)  Culture Results: urine   <10,000 CFU/mL Normal Urogenital Shannan (05-30 @ 16:36)        RADIOLOGY  < from: Xray Chest 1 View- PORTABLE-Urgent (06.05.20 @ 13:40) >    IMPRESSION:   Cardiomegaly. Right atrium and biventricular cardiac wire leads in place.. Infrahilar and bibasilar interstitial opacities/infiltrates...          < from: CT Angio Neck w/ IV Cont (06.04.20 @ 20:09) >  IMPRESSION:  Focal mild (less than 50%) narrowing at the origin of right internal carotid artery.    Short segment Moderate (50-70%) narrowing distal left common carotid artery    Intracranial narrowing as described above.    Partially visualized pleural effusions and soft tissue density in the subcarinal region. Questionable filling defects in the segmental and subsegmental upper pulmonary arteries. Evaluation significantly limited due to artifacts from left chest wall device. CTA chest recommended for further evaluation    < end of copied text > Yes

## 2020-06-05 NOTE — PROGRESS NOTE ADULT - ASSESSMENT
74 year old female with vertigo and persistent nausea and vomiting .     Plan:  Nausea vomiting   Likely 2/2 to vertigo 2/2 to acute stroke   Unclear what utility an EGD would be especially in the setting of acute stroke   Will monitor for now   IV heparin   Advanced care planning was discussed with patient and family.  Advanced care planning forms were reviewed and discussed.  Risks, benefits and alternatives of gastroenterologic procedures were discussed in detail and all questions were answered.

## 2020-06-05 NOTE — PROGRESS NOTE ADULT - PROBLEM SELECTOR PLAN 1
patient endorses moderate to severe abdominal pain, n/v since 5/29 Fri  partially relieved with oral Protonix  UA negative, No fevers, UCx: -ve  More likely GI etiology  CT abd w/ IV cont: Small bilateral pleural effusions. Multiple nodular peripheral filling defect in the urinary bladder measuring up to 1.3 cm, suspicious for transitional cell carcinomas.  - c/w PPI, Reglan q8h (pt had QTc prolongation, but will continue as pt has AICD and it works better) , zofran q6h PRN  - starting benadryl 25mg IV q6h PRN for n/v  - plan for EGD tmrw  GI Dr. Matute CTA head/neck showed LEFT PICA stroke  started on heparin drip  Neuro Dr. Moncada

## 2020-06-05 NOTE — PROGRESS NOTE ADULT - PROBLEM SELECTOR PLAN 2
recently antibiotics changed from clindamycin to Levaquin that led to epigastric pain.  WBC remains high 15k, possible infection on stump site  - ABX switched to cefepime + doxycycline 6/1-  - lactate 2.4, procalcitonin 0.17  - CT L foot shows OM ( pt cannot get MRI dur to AICD)   repeat lactate: trended down  Podiatry Dr. Yo  ID Dr. Reagan patient endorses moderate to severe abdominal pain, n/v since 5/29 Fri  partially relieved with oral Protonix  UA negative, No fevers, UCx: -ve  More likely GI etiology  CT abd w/ IV cont: Small bilateral pleural effusions. Multiple nodular peripheral filling defect in the urinary bladder measuring up to 1.3 cm, suspicious for transitional cell carcinomas.  - c/w PPI, Reglan q8h (pt had QTc prolongation, but will continue as pt has AICD and it works better),  benadryl 25mg IV q6h PRN for n/v  - plan for EGD today but canceled as pt has stroke and anesthesia can drop the BP and can make stroke symptoms worse  GI Dr. Matute

## 2020-06-05 NOTE — PROGRESS NOTE ADULT - ASSESSMENT
No evidence of block in extracranial or intracranial vessels although bilateral non-significant ICA stenosis is recognized - currently asymptomatic.   Plan continue infusion of heparin. Safe for endoscopy in current state. No evidence of block in extracranial or intracranial vessels although bilateral non-significant ICA stenosis is recognized - currently symptomatic.   Cerebellar stroke vs TIA   Plan continue infusion of heparin. Safe for endoscopy in current state.

## 2020-06-06 LAB
ALBUMIN SERPL ELPH-MCNC: 2.4 G/DL — LOW (ref 3.5–5)
ALP SERPL-CCNC: 276 U/L — HIGH (ref 40–120)
ALT FLD-CCNC: 32 U/L DA — SIGNIFICANT CHANGE UP (ref 10–60)
ANION GAP SERPL CALC-SCNC: 10 MMOL/L — SIGNIFICANT CHANGE UP (ref 5–17)
APTT BLD: 160.7 SEC — SIGNIFICANT CHANGE UP (ref 27.5–36.3)
APTT BLD: 90 SEC — HIGH (ref 27.5–36.3)
APTT BLD: 93.1 SEC — HIGH (ref 27.5–36.3)
AST SERPL-CCNC: 18 U/L — SIGNIFICANT CHANGE UP (ref 10–40)
BASOPHILS # BLD AUTO: 0.05 K/UL — SIGNIFICANT CHANGE UP (ref 0–0.2)
BASOPHILS NFR BLD AUTO: 0.2 % — SIGNIFICANT CHANGE UP (ref 0–2)
BILIRUB SERPL-MCNC: 0.6 MG/DL — SIGNIFICANT CHANGE UP (ref 0.2–1.2)
BUN SERPL-MCNC: 19 MG/DL — HIGH (ref 7–18)
CALCIUM SERPL-MCNC: 8.4 MG/DL — SIGNIFICANT CHANGE UP (ref 8.4–10.5)
CHLORIDE SERPL-SCNC: 101 MMOL/L — SIGNIFICANT CHANGE UP (ref 96–108)
CO2 SERPL-SCNC: 23 MMOL/L — SIGNIFICANT CHANGE UP (ref 22–31)
CREAT SERPL-MCNC: 1.08 MG/DL — SIGNIFICANT CHANGE UP (ref 0.5–1.3)
EOSINOPHIL # BLD AUTO: 0.44 K/UL — SIGNIFICANT CHANGE UP (ref 0–0.5)
EOSINOPHIL NFR BLD AUTO: 2.1 % — SIGNIFICANT CHANGE UP (ref 0–6)
GLUCOSE BLDC GLUCOMTR-MCNC: 160 MG/DL — HIGH (ref 70–99)
GLUCOSE BLDC GLUCOMTR-MCNC: 163 MG/DL — HIGH (ref 70–99)
GLUCOSE BLDC GLUCOMTR-MCNC: 230 MG/DL — HIGH (ref 70–99)
GLUCOSE BLDC GLUCOMTR-MCNC: 233 MG/DL — HIGH (ref 70–99)
GLUCOSE SERPL-MCNC: 199 MG/DL — HIGH (ref 70–99)
HCT VFR BLD CALC: 33.2 % — LOW (ref 34.5–45)
HGB BLD-MCNC: 10.4 G/DL — LOW (ref 11.5–15.5)
IMM GRANULOCYTES NFR BLD AUTO: 1.1 % — SIGNIFICANT CHANGE UP (ref 0–1.5)
LYMPHOCYTES # BLD AUTO: 14.9 % — SIGNIFICANT CHANGE UP (ref 13–44)
LYMPHOCYTES # BLD AUTO: 3.08 K/UL — SIGNIFICANT CHANGE UP (ref 1–3.3)
MAGNESIUM SERPL-MCNC: 2.7 MG/DL — HIGH (ref 1.6–2.6)
MCHC RBC-ENTMCNC: 28.7 PG — SIGNIFICANT CHANGE UP (ref 27–34)
MCHC RBC-ENTMCNC: 31.3 GM/DL — LOW (ref 32–36)
MCV RBC AUTO: 91.5 FL — SIGNIFICANT CHANGE UP (ref 80–100)
MONOCYTES # BLD AUTO: 1.32 K/UL — HIGH (ref 0–0.9)
MONOCYTES NFR BLD AUTO: 6.4 % — SIGNIFICANT CHANGE UP (ref 2–14)
NEUTROPHILS # BLD AUTO: 15.52 K/UL — HIGH (ref 1.8–7.4)
NEUTROPHILS NFR BLD AUTO: 75.3 % — SIGNIFICANT CHANGE UP (ref 43–77)
NRBC # BLD: 0 /100 WBCS — SIGNIFICANT CHANGE UP (ref 0–0)
PHOSPHATE SERPL-MCNC: 2.1 MG/DL — LOW (ref 2.5–4.5)
PLATELET # BLD AUTO: 423 K/UL — HIGH (ref 150–400)
POTASSIUM SERPL-MCNC: 3.4 MMOL/L — LOW (ref 3.5–5.3)
POTASSIUM SERPL-SCNC: 3.4 MMOL/L — LOW (ref 3.5–5.3)
PROCALCITONIN SERPL-MCNC: 0.1 NG/ML — SIGNIFICANT CHANGE UP (ref 0.02–0.1)
PROT SERPL-MCNC: 7.1 G/DL — SIGNIFICANT CHANGE UP (ref 6–8.3)
RBC # BLD: 3.63 M/UL — LOW (ref 3.8–5.2)
RBC # FLD: 15.5 % — HIGH (ref 10.3–14.5)
SODIUM SERPL-SCNC: 134 MMOL/L — LOW (ref 135–145)
WBC # BLD: 20.64 K/UL — HIGH (ref 3.8–10.5)
WBC # FLD AUTO: 20.64 K/UL — HIGH (ref 3.8–10.5)

## 2020-06-06 PROCEDURE — 93923 UPR/LXTR ART STDY 3+ LVLS: CPT | Mod: 26

## 2020-06-06 PROCEDURE — 99233 SBSQ HOSP IP/OBS HIGH 50: CPT

## 2020-06-06 RX ORDER — INSULIN LISPRO 100/ML
VIAL (ML) SUBCUTANEOUS
Refills: 0 | Status: DISCONTINUED | OUTPATIENT
Start: 2020-06-06 | End: 2020-06-09

## 2020-06-06 RX ORDER — ACETAMINOPHEN 500 MG
1000 TABLET ORAL ONCE
Refills: 0 | Status: COMPLETED | OUTPATIENT
Start: 2020-06-06 | End: 2020-06-06

## 2020-06-06 RX ORDER — DEXTROSE MONOHYDRATE, SODIUM CHLORIDE, AND POTASSIUM CHLORIDE 50; .745; 4.5 G/1000ML; G/1000ML; G/1000ML
1000 INJECTION, SOLUTION INTRAVENOUS
Refills: 0 | Status: DISCONTINUED | OUTPATIENT
Start: 2020-06-06 | End: 2020-06-07

## 2020-06-06 RX ORDER — SODIUM CHLORIDE 9 MG/ML
1000 INJECTION, SOLUTION INTRAVENOUS
Refills: 0 | Status: DISCONTINUED | OUTPATIENT
Start: 2020-06-06 | End: 2020-06-06

## 2020-06-06 RX ORDER — HYDROMORPHONE HYDROCHLORIDE 2 MG/ML
0.5 INJECTION INTRAMUSCULAR; INTRAVENOUS; SUBCUTANEOUS ONCE
Refills: 0 | Status: DISCONTINUED | OUTPATIENT
Start: 2020-06-06 | End: 2020-06-06

## 2020-06-06 RX ADMIN — Medication 10 MILLIGRAM(S): at 14:08

## 2020-06-06 RX ADMIN — SODIUM CHLORIDE 60 MILLILITER(S): 9 INJECTION, SOLUTION INTRAVENOUS at 00:38

## 2020-06-06 RX ADMIN — Medication 400 MILLIGRAM(S): at 06:13

## 2020-06-06 RX ADMIN — Medication 1: at 06:07

## 2020-06-06 RX ADMIN — HYDROMORPHONE HYDROCHLORIDE 0.5 MILLIGRAM(S): 2 INJECTION INTRAMUSCULAR; INTRAVENOUS; SUBCUTANEOUS at 19:52

## 2020-06-06 RX ADMIN — Medication 400 MILLIGRAM(S): at 17:38

## 2020-06-06 RX ADMIN — Medication 2: at 00:00

## 2020-06-06 RX ADMIN — Medication 1 TABLET(S): at 10:07

## 2020-06-06 RX ADMIN — PANTOPRAZOLE SODIUM 40 MILLIGRAM(S): 20 TABLET, DELAYED RELEASE ORAL at 11:27

## 2020-06-06 RX ADMIN — Medication 400 MILLIGRAM(S): at 12:00

## 2020-06-06 RX ADMIN — Medication 1: at 17:37

## 2020-06-06 RX ADMIN — ATORVASTATIN CALCIUM 40 MILLIGRAM(S): 80 TABLET, FILM COATED ORAL at 22:07

## 2020-06-06 RX ADMIN — Medication 10 MILLIGRAM(S): at 06:05

## 2020-06-06 RX ADMIN — Medication 10 MILLIGRAM(S): at 22:08

## 2020-06-06 RX ADMIN — Medication 1 APPLICATION(S): at 11:27

## 2020-06-06 RX ADMIN — Medication 110 MILLIGRAM(S): at 06:05

## 2020-06-06 RX ADMIN — Medication 1 TABLET(S): at 12:00

## 2020-06-06 RX ADMIN — Medication 7.5 MILLIGRAM(S): at 05:48

## 2020-06-06 RX ADMIN — Medication 2: at 22:08

## 2020-06-06 RX ADMIN — MEROPENEM 100 MILLIGRAM(S): 1 INJECTION INTRAVENOUS at 05:27

## 2020-06-06 RX ADMIN — Medication 7.5 MILLIGRAM(S): at 17:39

## 2020-06-06 RX ADMIN — Medication 10 MILLIGRAM(S): at 22:07

## 2020-06-06 RX ADMIN — HEPARIN SODIUM 900 UNIT(S)/HR: 5000 INJECTION INTRAVENOUS; SUBCUTANEOUS at 10:00

## 2020-06-06 RX ADMIN — Medication 7.5 MILLIGRAM(S): at 11:27

## 2020-06-06 RX ADMIN — HEPARIN SODIUM 0 UNIT(S)/HR: 5000 INJECTION INTRAVENOUS; SUBCUTANEOUS at 02:23

## 2020-06-06 RX ADMIN — Medication 2: at 12:00

## 2020-06-06 RX ADMIN — HEPARIN SODIUM 900 UNIT(S)/HR: 5000 INJECTION INTRAVENOUS; SUBCUTANEOUS at 03:27

## 2020-06-06 RX ADMIN — MEROPENEM 100 MILLIGRAM(S): 1 INJECTION INTRAVENOUS at 18:00

## 2020-06-06 RX ADMIN — INSULIN GLARGINE 10 UNIT(S): 100 INJECTION, SOLUTION SUBCUTANEOUS at 22:08

## 2020-06-06 RX ADMIN — HEPARIN SODIUM 900 UNIT(S)/HR: 5000 INJECTION INTRAVENOUS; SUBCUTANEOUS at 17:00

## 2020-06-06 RX ADMIN — Medication 110 MILLIGRAM(S): at 18:38

## 2020-06-06 RX ADMIN — Medication 1 TABLET(S): at 17:38

## 2020-06-06 RX ADMIN — Medication 10 MILLIGRAM(S): at 05:46

## 2020-06-06 NOTE — PROGRESS NOTE ADULT - SUBJECTIVE AND OBJECTIVE BOX
Pt s- complaints at present, overnight nausea reported, AICD was capturing frequently per chart, Cardio f/u to be obtained  Podiatry eval noted on chart, cosnidering for OR debridement    ICU Vital Signs Last 24 Hrs  T(C): 36.2 (06 Jun 2020 10:28), Max: 36.7 (06 Jun 2020 02:28)  T(F): 97.2 (06 Jun 2020 10:28), Max: 98.1 (06 Jun 2020 04:45)  HR: 104 (06 Jun 2020 10:28) (89 - 109)  BP: 167/95 (06 Jun 2020 10:28) (150/79 - 168/86)  BP(mean): --  ABP: --  ABP(mean): --  RR: 17 (06 Jun 2020 10:28) (16 - 18)  SpO2: 94% (06 Jun 2020 10:28) (93% - 98%)    Left foot: wound dressing cdi: minimal erythema, no crepitus, weak pulses    < from: Xray Femur 2 Views, Left (06.04.20 @ 11:04) >  IMPRESSION: Left popliteal stent. No femoral stent.    Thank you for this referral.    < end of copied text >                          10.4   20.64 )-----------( 423      ( 06 Jun 2020 08:58 )             33.2     06-06    134<L>  |  101  |  19<H>  ----------------------------<  199<H>  3.4<L>   |  23  |  1.08    Ca    8.4      06 Jun 2020 08:58  Phos  2.1     06-06  Mg     2.7     06-06    TPro  7.1  /  Alb  2.4<L>  /  TBili  0.6  /  DBili  x   /  AST  18  /  ALT  32  /  AlkPhos  276<H>  06-06

## 2020-06-06 NOTE — PROGRESS NOTE ADULT - SUBJECTIVE AND OBJECTIVE BOX
Patient denies chest pain or shortness of breath.   Review of systems otherwise (-)  	  MEDICATIONS:  MEDICATIONS  (STANDING):  acetaminophen  IVPB .. 1000 milliGRAM(s) IV Intermittent once  atorvastatin 40 milliGRAM(s) Oral at bedtime  Dakins Solution - 1/4 Strength 1 Application(s) Topical daily  dextrose 5% + sodium chloride 0.9% 1000 milliLiter(s) (60 mL/Hr) IV Continuous <Continuous>  doxycycline IVPB 100 milliGRAM(s) IV Intermittent every 12 hours  heparin  Infusion.  Unit(s)/Hr (12 mL/Hr) IV Continuous <Continuous>  hydrALAZINE Injectable 10 milliGRAM(s) IV Push every 8 hours  insulin glargine Injectable (LANTUS) 10 Unit(s) SubCutaneous at bedtime  insulin lispro (HumaLOG) corrective regimen sliding scale   SubCutaneous every 6 hours  lactobacillus acidophilus 1 Tablet(s) Oral three times a day with meals  meropenem  IVPB 1000 milliGRAM(s) IV Intermittent every 12 hours  metoclopramide Injectable 10 milliGRAM(s) IV Push every 8 hours  metoprolol tartrate Injectable 7.5 milliGRAM(s) IV Push every 6 hours  pantoprazole  Injectable 40 milliGRAM(s) IV Push daily      LABS:	 	    CARDIAC MARKERS:  CARDIAC MARKERS ( 05 Jun 2020 22:37 )  0.206 ng/mL / x     / x     / x     / x      CARDIAC MARKERS ( 05 Jun 2020 15:24 )  0.270 ng/mL / x     / x     / x     / x      CARDIAC MARKERS ( 05 Jun 2020 10:24 )  0.241 ng/mL / x     / 37 U/L / x     / 1.1 ng/mL                                10.4   20.64 )-----------( 423      ( 06 Jun 2020 08:58 )             33.2     Hemoglobin: 10.4 g/dL (06-06 @ 08:58)  Hemoglobin: 10.9 g/dL (06-05 @ 10:24)  Hemoglobin: 10.9 g/dL (06-05 @ 02:45)  Hemoglobin: 10.5 g/dL (06-04 @ 06:44)  Hemoglobin: 10.3 g/dL (06-03 @ 05:51)      06-06    134<L>  |  101  |  19<H>  ----------------------------<  199<H>  3.4<L>   |  23  |  1.08    Ca    8.4      06 Jun 2020 08:58  Phos  2.1     06-06  Mg     2.7     06-06    TPro  7.1  /  Alb  2.4<L>  /  TBili  0.6  /  DBili  x   /  AST  18  /  ALT  32  /  AlkPhos  276<H>  06-06    Creatinine Trend: 1.08<--, 1.08<--, 1.07<--, 1.26<--, 1.12<--, 1.18<--    COAGS:   PTT - ( 06 Jun 2020 09:49 )  PTT:93.1 sec        PHYSICAL EXAM:  T(C): 36.2 (06-06-20 @ 14:02), Max: 36.7 (06-06-20 @ 02:28)  HR: 101 (06-06-20 @ 14:02) (89 - 109)  BP: 153/86 (06-06-20 @ 14:02) (150/79 - 167/95)  RR: 17 (06-06-20 @ 14:02) (16 - 18)  SpO2: 97% (06-06-20 @ 14:02) (94% - 98%)  Wt(kg): --  I&O's Summary    05 Jun 2020 07:01  -  06 Jun 2020 07:00  --------------------------------------------------------  IN: 240 mL / OUT: 0 mL / NET: 240 mL          Gen: Appears well in NAD  HEENT:  (-)icterus (-)pallor  CV: N S1 S2 1/6 JAMAAL (+)2 Pulses B/l  Resp:  Clear to ausculatation B/L, normal effort  GI: (+) BS Soft, NT, ND  Lymph:  (-)Edema, (-)obvious lymphadenopathy  Skin: Warm to touch, Normal turgor  Psych: Appropriate mood and affect      TELEMETRY: 	  afib Vpaced        ASSESSMENT/PLAN: 	74y  Female AD/DM/HTN/PVD/hysterectomy/cholecystectomy ,with CHF BIVICD, Mild anterior wall ischemia being medically managed  comes to ED with intractable nausea and vomiting for 2 days 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 bladder malignancy.    - cont Heparin gtt if ok with neuro and not otherwise contraindicated  - Interrogate [place] Sci ICD  - nausea resolved  - Afib appears to be new, not noted on prior EKG  -  Appears to be tolerating PO, Please change Lopressor to toprol X 50 mg PO daily  - Check TSH      Pipo Segundo MD, [facility]  BEEPER [phone]

## 2020-06-06 NOTE — PROGRESS NOTE ADULT - ASSESSMENT
73 YO female from home, H/O CAD/DM/HTN/PVD/hysterectomy/cholecystectomy ,with AICD comes to ED with intractable nausea and vomiting for 2 days. Patient states that she recently had left big toe amputation in the beginning of May and she was taking antibiotics. On Friday, her antibiotics were changed from clindamycin to Levaquin and after she took antibiotics along with percocet, she started having severe pain in epigastric region. Patient reports that it was sudden in onset, 7/10, burning & pressure like pain, non-radiating , associated with nausea,, bitter taste in mouth and projectile vomiting. She was not able to tolerate food and it aggravated her symptoms. Patient also reports significant weight( not sure how much) recently and decrease in appetite. patient denies fever, cough, SOB, constipation, dysuria, headache, chest pain, orthopnea, back pain, burning sensation in extremities.c/o pain over left big toe stump which is not resolving despite pain meds . afebrile on adm , covid neg. ID called for persistant leucocytosis and appr ab     # s/p amputation of left great toe for gangrene with post op collection and overlying cellulits as well as underlying OM /wound cx pos for staph aureus ( MSSA)/s/p left leg stent at [facility] as per pt /underlying PVD    #persistant  nausea/vomiting , now seems to be vertigo sec to poss cerebellar cva vs tia     # transaminitis improving     # increasing wbc r/o asp pna     plan  blood cx times 2 f/u ( neg)   rpt covid testing ( pt does not want to have it )  cont  doxy and merrem  to cover mssa for stump infection/abscess as well as asp pna d/w intern   for more debridement /I and D and poss more proximal amputation as per podiatry   CONT  probiotics   monitor for diarrhea   EGD ON HOLD AS PER GI   neuro f/u       thnx will f/u   d/w intern dr diaz

## 2020-06-06 NOTE — PROGRESS NOTE ADULT - SUBJECTIVE AND OBJECTIVE BOX
Subjective: says her nausea and vomiting a lot better. has some pain over left foot . no fevers       PHYSICAL EXAM:    Vital Signs Last 24 Hrs  T(C): 36.2 (06 Jun 2020 14:02), Max: 36.7 (06 Jun 2020 02:28)  T(F): 97.2 (06 Jun 2020 14:02), Max: 98.1 (06 Jun 2020 04:45)  HR: 101 (06 Jun 2020 14:02) (89 - 109)  BP: 153/86 (06 Jun 2020 14:02) (150/79 - 167/95)  BP(mean): --  RR: 17 (06 Jun 2020 14:02) (16 - 18)  SpO2: 97% (06 Jun 2020 14:02) (94% - 97%)    Constitutional: awake alert oriented times 3   [place] SCLERA anicteric EOMI   LUNGS clear   CVS s1 s2 aud no murmurs   ABDOMEN soft non tender no HSM   NEUROLOGY  no defecits   SKIN left first toe stump swelling tender with some open wound probing to bone as per podiatry   /dried wound /erythema plantar aspect   EXTREMITIES no edema no cyanosis         LABS/DIAGNOSTIC TESTS                        10.4   20.64 )-----------( 423      ( 06 Jun 2020 08:58 )             33.2     06-06    134<L>  |  101  |  19<H>  ----------------------------<  199<H>  3.4<L>   |  23  |  1.08    Ca    8.4      06 Jun 2020 08:58  Phos  2.1     06-06  Mg     2.7     06-06    TPro  7.1  /  Alb  2.4<L>  /  TBili  0.6  /  DBili  x   /  AST  18  /  ALT  32  /  AlkPhos  276<H>  06-06    PT/INR - ( 04 Jun 2020 20:53 )   PT: 15.7 sec;   INR: 1.38 ratio         PTT - ( 06 Jun 2020 17:43 )  PTT:90.0 sec      meds  acetaminophen   Tablet .. 650 milliGRAM(s) Oral every 6 hours PRN  aluminum hydroxide/magnesium hydroxide/simethicone Suspension 30 milliLiter(s) Oral every 6 hours PRN  atorvastatin 40 milliGRAM(s) Oral at bedtime  Dakins Solution - 1/4 Strength 1 Application(s) Topical daily  diphenhydrAMINE   Injectable 15 milliGRAM(s) IV Push every 6 hours PRN  doxycycline IVPB 100 milliGRAM(s) IV Intermittent every 12 hours  heparin  Infusion.  Unit(s)/Hr IV Continuous <Continuous>  hydrALAZINE Injectable 10 milliGRAM(s) IV Push every 8 hours  insulin glargine Injectable (LANTUS) 10 Unit(s) SubCutaneous at bedtime  insulin lispro (HumaLOG) corrective regimen sliding scale   SubCutaneous every 6 hours  lactobacillus acidophilus 1 Tablet(s) Oral three times a day with meals  meropenem  IVPB 1000 milliGRAM(s) IV Intermittent every 12 hours  metoclopramide Injectable 10 milliGRAM(s) IV Push every 8 hours  metoprolol tartrate Injectable 7.5 milliGRAM(s) IV Push every 6 hours  ondansetron Injectable 4 milliGRAM(s) IV Push every 8 hours PRN  pantoprazole  Injectable 40 milliGRAM(s) IV Push daily  sodium chloride 0.9% with potassium chloride 20 mEq/L 1000 milliLiter(s) IV Continuous <Continuous>        CULTURES  Culture Results: blood  No growth to date. (06-03 @ 08:52)  Culture Results: wound  Few Staphylococcus aureus (06-03 @ 00:34)  Culture Results: blood  No growth to date. (06-02 @ 14:30)  Culture Results: blood  No growth to date. (06-02 @ 14:30)        RADIOLOGY  < from: [facility] Physiol Extremity Lower 3+ Level, BI (06.06.20 @ 12:48) >  Impression:  Right lower extremity: The ankle brachial index is 0.6. The pulse waveforms are diffusely diminished.    Left lower extremity: The ankle brachial index is 0.5. The pulse waveforms are diffusely diminished, however particularly severe in the left foot.    Severe bilateral peripheral artery disease.    Pulse volume recordings are diffusely diminished bilaterally, however severely diminished in the left foot.                  ANIBAL UPTON M.D., ATTENDING RADIOLOGIST  This document has been electronically signed. Jun 6 2020  1:16PM    < end of copied text >    < from: Xray Chest 1 View- PORTABLE-Urgent (06.05.20 @ 13:40) >  EXAM:  XR CHEST PORTABLE URGENT 1V                            PROCEDURE DATE:  06/05/2020          INTERPRETATION:  Portable chest radiograph        CLINICAL INFORMATION:   Leukocytosis.    TECHNIQUE:  Portable  AP view of the chest was obtained.    COMPARISON: 10/12/2019 available for review.    FINDINGS:   The lungs  show bilateral lung perihilar and basilar interstitial opacity/infiltrates. Apices of lungs clear.    The  heart is enlarged in transverse diameter. No hilar mass. Trachea midline.   Right atrium and biventricular cardiac wire leads in place.    The heart and mediastinum are within normal limits.    Visualized osseous structures are intact.    < end of copied text >

## 2020-06-06 NOTE — PROGRESS NOTE ADULT - ASSESSMENT
Left pop stent noted on xray  await ESTRELLA with care not to presure on pop stent, may need further imaging after above    Pt planning for further debridement of infected wound at previous amp site pending medical clearance/optimization

## 2020-06-06 NOTE — CHART NOTE - NSCHARTNOTEFT_GEN_A_CORE
Writer was informed by ABNER Ruff that patient's AICD was inappropriately capturing/pacing.     Patient was asymptomatic on examination.     Primary team may need investigation of the patient's pacemaker in the am.

## 2020-06-06 NOTE — PROGRESS NOTE ADULT - SUBJECTIVE AND OBJECTIVE BOX
INTERVAL HPI/OVERNIGHT EVENTS:  Patient seen at bedside,events noticed for overnight,hemodinamicly stable  VITAL SIGNS:  T(F): 97.2 (06-06-20 @ 14:02)  HR: 101 (06-06-20 @ 14:02)  BP: 153/86 (06-06-20 @ 14:02)  RR: 17 (06-06-20 @ 14:02)  SpO2: 97% (06-06-20 @ 14:02)  Wt(kg): --    PHYSICAL EXAM:  awake  Constitutional:  Eyes:  ENMT:perrla  Neck:  Respiratory:clear  Cardiovascular:s1 s2,m-none  Gastrointestinal:soft,bs pos  Extremities:  Vascular:  Neurological:no focal deficit  Musculoskeletal:    MEDICATIONS  (STANDING):  acetaminophen  IVPB .. 1000 milliGRAM(s) IV Intermittent once  atorvastatin 40 milliGRAM(s) Oral at bedtime  Dakins Solution - 1/4 Strength 1 Application(s) Topical daily  dextrose 5% + sodium chloride 0.9% 1000 milliLiter(s) (60 mL/Hr) IV Continuous <Continuous>  doxycycline IVPB 100 milliGRAM(s) IV Intermittent every 12 hours  heparin  Infusion.  Unit(s)/Hr (12 mL/Hr) IV Continuous <Continuous>  hydrALAZINE Injectable 10 milliGRAM(s) IV Push every 8 hours  insulin glargine Injectable (LANTUS) 10 Unit(s) SubCutaneous at bedtime  insulin lispro (HumaLOG) corrective regimen sliding scale   SubCutaneous every 6 hours  lactobacillus acidophilus 1 Tablet(s) Oral three times a day with meals  meropenem  IVPB 1000 milliGRAM(s) IV Intermittent every 12 hours  metoclopramide Injectable 10 milliGRAM(s) IV Push every 8 hours  metoprolol tartrate Injectable 7.5 milliGRAM(s) IV Push every 6 hours  pantoprazole  Injectable 40 milliGRAM(s) IV Push daily    MEDICATIONS  (PRN):  acetaminophen   Tablet .. 650 milliGRAM(s) Oral every 6 hours PRN Mild Pain (1 - 3), Moderate Pain (4 - 6)  aluminum hydroxide/magnesium hydroxide/simethicone Suspension 30 milliLiter(s) Oral every 6 hours PRN Dyspepsia  diphenhydrAMINE   Injectable 15 milliGRAM(s) IV Push every 6 hours PRN nausea or vomiting  ondansetron Injectable 4 milliGRAM(s) IV Push every 8 hours PRN Nausea and/or Vomiting      Allergies    codeine (Rash)  penicillin (Rash)    Intolerances        LABS:                        10.4   20.64 )-----------( 423      ( 06 Jun 2020 08:58 )             33.2     06-06    134<L>  |  101  |  19<H>  ----------------------------<  199<H>  3.4<L>   |  23  |  1.08    Ca    8.4      06 Jun 2020 08:58  Phos  2.1     06-06  Mg     2.7     06-06    TPro  7.1  /  Alb  2.4<L>  /  TBili  0.6  /  DBili  x   /  AST  18  /  ALT  32  /  AlkPhos  276<H>  06-06    PT/INR - ( 04 Jun 2020 20:53 )   PT: 15.7 sec;   INR: 1.38 ratio         PTT - ( 06 Jun 2020 09:49 )  PTT:93.1 sec        Assessment and Plan:   · Assessment		  73 YO female from home, H/O CAD/DM/HTN/PVD/hysterectomy/cholecystectomy ,with AICD has come to ED with intractable nausea and vomiting for 2 days, likely due to pill induced esophagitis/gastritis. Patient reports that it was sudden in onset, 7/10, burning & pressure like pain, non-radiating , associated with nausea and projectile vomiting. Patient also reports significant weight( not sure how much) recently and decrease in appetite. CT abdomen/pelvis shows bladder wall thickening and pt has history of Stage1 endometrial cancer,   Patient also has high blood pressure because she missed her medications  Admitting patient for management of  Acute Gastritis  Intractable abdominal pain  hypertension  weight loss/ suspected malignancy workup    DCd vholecalciferol, melatonin, loratadine until pt can tolerate PO meds        Problem/Plan - 1:  ·  Problem: Stroke. Plan: CTA head/neck showed LEFT PICA stroke  started on heparin drip  Neuro Dr. Moncada.     Problem/Plan - 2:  ·  Problem: Intractable nausea and vomitin-stable for now  partially relieved with oral Protonix  UA negative, No fevers, UCx: -ve  More likely GI etiology  - plan for EGD today but canceled as pt has stroke and anesthesia can drop the BP and can make stroke symptoms worse  GI Dr. Matute.     Problem/Plan - 3:  ·  Problem: Osteomyelitis of left foot, unspecified type. Plan: recently antibiotics changed from clindamycin to Levaquin that led to epigastric pain.  - ABX switched to cefepime + doxycycline 6/1-6/4  - started cefazolin 6/5- as per ID  - CT L foot shows OM ( pt cannot get MRI dur to AICD)   repeat lactate: trended down  Podiatry Dr. Yo - plan to take the pt to OR tomorrow but need to be medically stabilized  ID Dr. Reagan.     Problem/Plan - 4:  ·  Problem: PVD (peripheral vascular disease)/Severe PVD  f/u [facility]RE[facility] - pt cannot go due to n/v, will re-order when pt feels better  PT: home PT  Vascular is following.   pending fro cardiology clearence for possible surgery     Problem/Plan - 5:  ·  Problem: Bladder wall thickening.  Plan: CT abdomen shows bladder wall thickening  reports weight loss and loss of appetite recently, Patient has history of Stage 1 endometrial cancer and hysterectomy  brother also had cancer and she is not sure about which one  - urine cytology: NEGATIVE FOR HIGH GRADE UROTHELIAL CARCINOMA  urology consulted: outpt f/u w/ .      Problem/Plan - 6:  Problem: HTN (hypertension). Plan: Pt takes Lisinopril 40mg, toprol Xl 100mg, hydralazine 50mg TID   BP is running on higher side as patient missed her medications and also received a bolus in ED  lipid profile, TG WNL  - pt is not tolerated for PO med.   - c/w hydralazine increased to 10mg IV TID, lopressor IV 7.5mg q6h until pt can take PO meds  - monitor BP --running high, but keep MAP around 100 for stroke.     Problem/Plan - 7:  ·  Problem: DM (diabetes mellitus).  Plan: Patient takes Levemir 30mg at night and 10Units pre-meals  c/w sliding scale and Lantus 20Units HS  Diabetic diet  Hb A1C: 8.1%  - monitor BS.      Problem/Plan - 8:  ·  Problem: CAD (coronary artery disease).  Plan: H/o CAD and PCI in 2009,  patient has both AICD and pacemaker  EKG shows paced Rythm  No active issues  continue aspirin , Plavix, BB, statin, ACE - pt is not tolerated to PO meds  -DCd aspirin, plavix and pt is on heparin drip.     Problem/Plan - 9:  ·  Problem: CHF (congestive heart failure).  Plan: H/o CHF but clinically patient is not in fluid overload  No peripheral edema  Patient takes furosemide 20mg daily twice  BNP: elevated at 9383  echo: EF 50-55%, mild TR, TX  - holding lasix 20mg IV as pt is not eating and dehydrated.      Problem/Plan - 10:  Problem: Prophylactic measure. Plan; IMPROVE VTE Individual Risk Assessment  RISK                                                                Points  [  ] Previous VTE                                                  3  [  ] Thrombophilia                                               2  [  ] Lower limb paralysis                                      2        (unable to hold up >15 seconds)    [  ] Current Cancer                                              2         (within 6 months)  [x  ] Immobilization > 24 hrs                                1  [  ] ICU/CCU stay > 24 hours                              1  [  x] Age > 60                                                      1    IMPROVE VTE Score 2  Lovenox 40mg SQ geoffrey;yRosalva

## 2020-06-06 NOTE — PROGRESS NOTE ADULT - SUBJECTIVE AND OBJECTIVE BOX
follow up on left foot infected wound   Vital Signs Last 24 Hrs  T(C): 36.2 (06 Jun 2020 14:02), Max: 36.7 (06 Jun 2020 02:28)  T(F): 97.2 (06 Jun 2020 14:02), Max: 98.1 (06 Jun 2020 04:45)  HR: 101 (06 Jun 2020 14:02) (89 - 109)  BP: 153/86 (06 Jun 2020 14:02) (150/79 - 167/95)  BP(mean): --  RR: 17 (06 Jun 2020 14:02) (16 - 18)  SpO2: 97% (06 Jun 2020 14:02) (94% - 97%)                        10.4   20.64 )-----------( 423      ( 06 Jun 2020 08:58 )             33.2   WBC Count: 20.64 K/uL (06-06 @ 08:58)  WBC Count: 21.24 K/uL (06-05 @ 10:24)  WBC Count: 20.06 K/uL (06-05 @ 02:45)  WBC Count: 18.07 K/uL (06-04 @ 06:44)  WBC Count: 14.32 K/uL (06-03 @ 05:51)  Procalcitonin, Serum (06.06.20 @ 15:06)    Procalcitonin, Serum: 0.10: Procalcitonin (PCT) Interpretation (ng/mL) - Diagnosis of systemic  bacterial infection/sepsis  PCT < 0.5: Systemic infection (sepsis) is not likely and risk for  progression to severe systemic infection is low. Local bacterial  infection is possible. If early sepsis is suspected clinically, PCT  should be re-assessed in 6-24 hours.  PCT >/= 0.5 but < 2.0: Systemic infection (sepsis) is possible, but other  conditions are known to elevate PCT as well. Moderate risk for  progression to severe systemic infection. The patient should be closely  monitored both clinically and by re-assessing PCT within 6-24 hours.  PCT >/= 2.0 but < 10.0: Systemic infection (sepsis) is likely, unless  other causes are known. High risk of progression to severe systemic  infection (severe sepsis/septic shock).  PCT >/= 10.0: Important systemic inflammatory response, almost  exclusively due to severe bacterial sepsis or septic shock. High  likelihood of severe sepsis or septic shock. ng/mL  < from: CT Foot No Cont, Left (06.02.20 @ 19:00) >  < from: [facility] Physiol Extremity Lower 3+ Level, BI (06.06.20 @ 12:48) >  EXAM:  US PHYSIOL LWR EXT 3+ LEV BI                            PROCEDURE DATE:  06/06/2020          INTERPRETATION:  Clinical Information: Peripheral vascular disease. Left toe ulceration.    Technique: Bilateral lower extremity ABIs/PVR    Comparison: None    Findings/  Impression:  Right lower extremity: The ankle brachial index is 0.6. The pulse waveforms are diffusely diminished.    Left lower extremity: The ankle brachial index is 0.5. The pulse waveforms are diffusely diminished, however particularly severe in the left foot.    Severe bilateral peripheral artery disease.    Pulse volume recordings are diffusely diminished bilaterally, however severely diminished in the left foot.                  ANIBAL UPTON M.D., ATTENDING RADIOLOGIST  This document has been electronically signed. Jun 6 2020  1:16PM          < end of copied text >  CAPILLARY BLOOD GLUCOSE      POCT Blood Glucose.: 160 mg/dL (06 Jun 2020 17:11)  POCT Blood Glucose.: 230 mg/dL (06 Jun 2020 11:28)  POCT Blood Glucose.: 163 mg/dL (06 Jun 2020 06:00)  POCT Blood Glucose.: 207 mg/dL (05 Jun 2020 23:44)  POCT Blood Glucose.: 201 mg/dL (05 Jun 2020 22:30)    EXAM:  CT FOOT ONLY LT                            PROCEDURE DATE:  06/02/2020          INTERPRETATION:      Exam: CT Left Lower Extremity Without Contrast, Foot   Exam date and time: 6/2/2020 6:59 PM   Age: 74 years old   Clinical indication: Status post amputation of left big toe, wound, evaluate for osteomyelitis.    TECHNIQUE:   Imaging protocol: CT of the Left lower extremity without contrast was   performed. Exam focused on the foot.   3D rendering: 3-D reformatted images were performed concepcion independent workstation.  COMPARISON:   No relevant prior studies available.     FINDINGS:   Bones/joints: Previous amputation of the 1st toe at the MTP joint. Soft tissue ulcer overlying the distal aspect of the first metatarsal  extending to thehead of the 1st metatarsal, with cortical   irregularity and destructive changes at the head of the 1st metatarsal   consistent with osteomyelitis. Small corticated ossific densities adjacent to the distal aspect of the medial malleolus, likely related to old trauma.  Soft tissues: Ill-defined possible developing fluid collection or abscess at the  plantar surface of the foot superficial to the sesamoids, and adjacent to the   soft tissue ulcer measuring approximately 19 x 8 by 28 mm., evaluationlimited without intravenous contrast. Small fluid around the flexor hallucis longus tendon.  Vasculature: Small vessel atherosclerotic calcification.     IMPRESSION:   1. Previous amputation 1st toe at the MTP joint. Soft tissue ulcer at the stump   extending to the head of the 1st metatarsal, with cortical irregularity and   destructive changes at the head of the 1st metatarsal consistent with   osteomyelitis.   2. Ill-defined possible developing fluid collection or abscess plantar surface   of the foot superficial to the sesamoids, and adjacent to the soft tissue ulcer   approximately 19 x 8 by 28 mm., evaluation limited without intravenous contrast.                ALMA CASTANO M.D., ATTENDING RADIOLOGIST  This document has been electronically signed. Uli  3 2020  8:59AM          Culture - Blood in AM (06.03.20 @ 08:52)    Specimen Source: .Blood Blood-Peripheral    Culture Results:   No growth to date.    Culture - Surgical Swab (06.03.20 @ 00:34)    -  Ampicillin/Sulbactam: S <=8/4    -  Cefazolin: S <=4    -  Clindamycin: R >4    -  Erythromycin: R >4    -  Gentamicin: S <=1 Should not be used as monotherapy    -  Oxacillin: S <=0.25    -  Penicillin: R 2    -  RIF- Rifampin: S <=1 Should not be used as monotherapy    -  Tetra/Doxy: S <=1    -  Trimethoprim/Sulfamethoxazole: S <=0.5/9.5    -  Vancomycin: S 1    Specimen Source: .Surgical Swab left foot wound    Culture Results:   Few Staphylococcus aureus    Organism Identification: Staphylococcus aureus    Organism: Staphylococcus aureus    Method Type: LI          < end of copied text >  MEDICATIONS  (STANDING):  atorvastatin 40 milliGRAM(s) Oral at bedtime  Dakins Solution - 1/4 Strength 1 Application(s) Topical daily  doxycycline IVPB 100 milliGRAM(s) IV Intermittent every 12 hours  heparin  Infusion.  Unit(s)/Hr (12 mL/Hr) IV Continuous <Continuous>  hydrALAZINE Injectable 10 milliGRAM(s) IV Push every 8 hours  insulin glargine Injectable (LANTUS) 10 Unit(s) SubCutaneous at bedtime  insulin lispro (HumaLOG) corrective regimen sliding scale   SubCutaneous every 6 hours  lactobacillus acidophilus 1 Tablet(s) Oral three times a day with meals  meropenem  IVPB 1000 milliGRAM(s) IV Intermittent every 12 hours  metoclopramide Injectable 10 milliGRAM(s) IV Push every 8 hours  metoprolol tartrate Injectable 7.5 milliGRAM(s) IV Push every 6 hours  pantoprazole  Injectable 40 milliGRAM(s) IV Push daily  sodium chloride 0.9% with potassium chloride 20 mEq/L 1000 milliLiter(s) (70 mL/Hr) IV Continuous <Continuous>    MEDICATIONS  (PRN):  acetaminophen   Tablet .. 650 milliGRAM(s) Oral every 6 hours PRN Mild Pain (1 - 3), Moderate Pain (4 - 6)  aluminum hydroxide/magnesium hydroxide/simethicone Suspension 30 milliLiter(s) Oral every 6 hours PRN Dyspepsia  diphenhydrAMINE   Injectable 15 milliGRAM(s) IV Push every 6 hours PRN nausea or vomiting  ondansetron Injectable 4 milliGRAM(s) IV Push every 8 hours PRN Nausea and/or Vomiting  pt  seen and evaluated at bedside , reports nausea is improving  , not feeling well  , reports throbbing   pain in her foot,  tolerated exam and dressing change very well    exam focused LE   left foot sp halux amputation May 3  ( as per patient )  there is an open on the distal surgical site in the area of 1 st met head    wound packed with gauze strip, no active drainage , no odor     wound is deep, tracking along flexor tendon? probes to abscess area as per ct , but no pus or active drainage  , probes to bone +,  mostly fibrotic base exposed tendons with possible calcifications /bone fragments ??  , hyperkeratotic border , with minimal area of darker dry  skin,   there is absolutely no bleeding during exam and wound exploration /cleaning   DP/PT -0- out of 4   TG -WNL   CRF -1-2 sec x4   there is increasing erythema ascending to the level of the 1st met base , and plantar submet 1 head-looks more wide spread , the area is slightly tender ,   pt is able yo move her lower extremities

## 2020-06-06 NOTE — PROGRESS NOTE ADULT - ASSESSMENT
A/P  PVD /PAD  DM  ulcer   dehiscence surgical wound   history of gangrene left hallux   positive probe to bone   leukocytosis   OM with  abscess formation left   pyogenic tendon   leukocytosis -increasing     pt seen and eval   chart reviewed   treatment options discussed with pt   pt deteriorate significantly in the last few days    wound cleaned and flushed with copious amount  of 1/4 Dakin's about 3-4 cc of mostly yellowish pus expressed , wound packed with sterile gauze   light dressing    CT scan  reports supports clinical findings   pt needs wound debridement / I &D  more proximal amputation   so far there is no clinical improvement   pt may need urgent I&D if not improving   needs : medically optimized/stable / vascular /cleared by medicine and cardiology /neuro? -  most probably will need  blood   continue antibiotics as per id -id   continue wound packing with gauze strip   vascular consult appreciated follow up testes   consider other sources for increasing WBC   prognosis is very poor considering  vascular status , multiple health problems   discussed with patient and her Daughter Sahara Keating , they seems to understand

## 2020-06-07 LAB
ALBUMIN SERPL ELPH-MCNC: 2.5 G/DL — LOW (ref 3.5–5)
ALP SERPL-CCNC: 274 U/L — HIGH (ref 40–120)
ALT FLD-CCNC: 25 U/L DA — SIGNIFICANT CHANGE UP (ref 10–60)
ANION GAP SERPL CALC-SCNC: 11 MMOL/L — SIGNIFICANT CHANGE UP (ref 5–17)
APTT BLD: 95.2 SEC — HIGH (ref 27.5–36.3)
AST SERPL-CCNC: 14 U/L — SIGNIFICANT CHANGE UP (ref 10–40)
BASOPHILS # BLD AUTO: 0.06 K/UL — SIGNIFICANT CHANGE UP (ref 0–0.2)
BASOPHILS NFR BLD AUTO: 0.3 % — SIGNIFICANT CHANGE UP (ref 0–2)
BILIRUB SERPL-MCNC: 0.5 MG/DL — SIGNIFICANT CHANGE UP (ref 0.2–1.2)
BUN SERPL-MCNC: 21 MG/DL — HIGH (ref 7–18)
CALCIUM SERPL-MCNC: 8 MG/DL — LOW (ref 8.4–10.5)
CHLORIDE SERPL-SCNC: 100 MMOL/L — SIGNIFICANT CHANGE UP (ref 96–108)
CO2 SERPL-SCNC: 24 MMOL/L — SIGNIFICANT CHANGE UP (ref 22–31)
CREAT SERPL-MCNC: 0.98 MG/DL — SIGNIFICANT CHANGE UP (ref 0.5–1.3)
CULTURE RESULTS: SIGNIFICANT CHANGE UP
EOSINOPHIL # BLD AUTO: 0.47 K/UL — SIGNIFICANT CHANGE UP (ref 0–0.5)
EOSINOPHIL NFR BLD AUTO: 2.5 % — SIGNIFICANT CHANGE UP (ref 0–6)
GLUCOSE BLDC GLUCOMTR-MCNC: 183 MG/DL — HIGH (ref 70–99)
GLUCOSE BLDC GLUCOMTR-MCNC: 191 MG/DL — HIGH (ref 70–99)
GLUCOSE BLDC GLUCOMTR-MCNC: 200 MG/DL — HIGH (ref 70–99)
GLUCOSE BLDC GLUCOMTR-MCNC: 205 MG/DL — HIGH (ref 70–99)
GLUCOSE SERPL-MCNC: 127 MG/DL — HIGH (ref 70–99)
HCT VFR BLD CALC: 32.6 % — LOW (ref 34.5–45)
HGB BLD-MCNC: 10.2 G/DL — LOW (ref 11.5–15.5)
IMM GRANULOCYTES NFR BLD AUTO: 1.2 % — SIGNIFICANT CHANGE UP (ref 0–1.5)
LYMPHOCYTES # BLD AUTO: 15.9 % — SIGNIFICANT CHANGE UP (ref 13–44)
LYMPHOCYTES # BLD AUTO: 3.05 K/UL — SIGNIFICANT CHANGE UP (ref 1–3.3)
MAGNESIUM SERPL-MCNC: 2.5 MG/DL — SIGNIFICANT CHANGE UP (ref 1.6–2.6)
MCHC RBC-ENTMCNC: 28.9 PG — SIGNIFICANT CHANGE UP (ref 27–34)
MCHC RBC-ENTMCNC: 31.3 GM/DL — LOW (ref 32–36)
MCV RBC AUTO: 92.4 FL — SIGNIFICANT CHANGE UP (ref 80–100)
MONOCYTES # BLD AUTO: 1.22 K/UL — HIGH (ref 0–0.9)
MONOCYTES NFR BLD AUTO: 6.4 % — SIGNIFICANT CHANGE UP (ref 2–14)
NEUTROPHILS # BLD AUTO: 14.11 K/UL — HIGH (ref 1.8–7.4)
NEUTROPHILS NFR BLD AUTO: 73.7 % — SIGNIFICANT CHANGE UP (ref 43–77)
NRBC # BLD: 0 /100 WBCS — SIGNIFICANT CHANGE UP (ref 0–0)
ORGANISM # SPEC MICROSCOPIC CNT: SIGNIFICANT CHANGE UP
ORGANISM # SPEC MICROSCOPIC CNT: SIGNIFICANT CHANGE UP
PHOSPHATE SERPL-MCNC: 2 MG/DL — LOW (ref 2.5–4.5)
PLATELET # BLD AUTO: 398 K/UL — SIGNIFICANT CHANGE UP (ref 150–400)
POTASSIUM SERPL-MCNC: 3.3 MMOL/L — LOW (ref 3.5–5.3)
POTASSIUM SERPL-SCNC: 3.3 MMOL/L — LOW (ref 3.5–5.3)
PROT SERPL-MCNC: 7 G/DL — SIGNIFICANT CHANGE UP (ref 6–8.3)
RBC # BLD: 3.53 M/UL — LOW (ref 3.8–5.2)
RBC # FLD: 15.6 % — HIGH (ref 10.3–14.5)
SODIUM SERPL-SCNC: 135 MMOL/L — SIGNIFICANT CHANGE UP (ref 135–145)
SPECIMEN SOURCE: SIGNIFICANT CHANGE UP
TSH SERPL-MCNC: 1.04 UU/ML — SIGNIFICANT CHANGE UP (ref 0.34–4.82)
WBC # BLD: 19.14 K/UL — HIGH (ref 3.8–10.5)
WBC # FLD AUTO: 19.14 K/UL — HIGH (ref 3.8–10.5)

## 2020-06-07 PROCEDURE — 99233 SBSQ HOSP IP/OBS HIGH 50: CPT

## 2020-06-07 RX ORDER — KETOROLAC TROMETHAMINE 30 MG/ML
15 SYRINGE (ML) INJECTION ONCE
Refills: 0 | Status: DISCONTINUED | OUTPATIENT
Start: 2020-06-07 | End: 2020-06-07

## 2020-06-07 RX ORDER — LANOLIN ALCOHOL/MO/W.PET/CERES
5 CREAM (GRAM) TOPICAL AT BEDTIME
Refills: 0 | Status: DISCONTINUED | OUTPATIENT
Start: 2020-06-07 | End: 2020-06-09

## 2020-06-07 RX ORDER — METOPROLOL TARTRATE 50 MG
5 TABLET ORAL EVERY 6 HOURS
Refills: 0 | Status: DISCONTINUED | OUTPATIENT
Start: 2020-06-07 | End: 2020-06-09

## 2020-06-07 RX ORDER — METOPROLOL TARTRATE 50 MG
50 TABLET ORAL DAILY
Refills: 0 | Status: DISCONTINUED | OUTPATIENT
Start: 2020-06-07 | End: 2020-06-09

## 2020-06-07 RX ORDER — DEXTROSE MONOHYDRATE, SODIUM CHLORIDE, AND POTASSIUM CHLORIDE 50; .745; 4.5 G/1000ML; G/1000ML; G/1000ML
1000 INJECTION, SOLUTION INTRAVENOUS
Refills: 0 | Status: DISCONTINUED | OUTPATIENT
Start: 2020-06-07 | End: 2020-06-09

## 2020-06-07 RX ORDER — POTASSIUM CHLORIDE 20 MEQ
10 PACKET (EA) ORAL ONCE
Refills: 0 | Status: DISCONTINUED | OUTPATIENT
Start: 2020-06-07 | End: 2020-06-07

## 2020-06-07 RX ORDER — POTASSIUM PHOSPHATE, MONOBASIC POTASSIUM PHOSPHATE, DIBASIC 236; 224 MG/ML; MG/ML
30 INJECTION, SOLUTION INTRAVENOUS ONCE
Refills: 0 | Status: COMPLETED | OUTPATIENT
Start: 2020-06-07 | End: 2020-06-07

## 2020-06-07 RX ORDER — POTASSIUM CHLORIDE 20 MEQ
10 PACKET (EA) ORAL EVERY 4 HOURS
Refills: 0 | Status: COMPLETED | OUTPATIENT
Start: 2020-06-07 | End: 2020-06-07

## 2020-06-07 RX ADMIN — Medication 10 MILLIGRAM(S): at 21:37

## 2020-06-07 RX ADMIN — INSULIN GLARGINE 10 UNIT(S): 100 INJECTION, SOLUTION SUBCUTANEOUS at 21:35

## 2020-06-07 RX ADMIN — Medication 10 MILLIGRAM(S): at 05:28

## 2020-06-07 RX ADMIN — DEXTROSE MONOHYDRATE, SODIUM CHLORIDE, AND POTASSIUM CHLORIDE 75 MILLILITER(S): 50; .745; 4.5 INJECTION, SOLUTION INTRAVENOUS at 14:09

## 2020-06-07 RX ADMIN — ONDANSETRON 4 MILLIGRAM(S): 8 TABLET, FILM COATED ORAL at 08:46

## 2020-06-07 RX ADMIN — ONDANSETRON 4 MILLIGRAM(S): 8 TABLET, FILM COATED ORAL at 23:59

## 2020-06-07 RX ADMIN — Medication 15 MILLIGRAM(S): at 23:58

## 2020-06-07 RX ADMIN — Medication 650 MILLIGRAM(S): at 15:14

## 2020-06-07 RX ADMIN — Medication 1: at 08:31

## 2020-06-07 RX ADMIN — Medication 1 APPLICATION(S): at 14:20

## 2020-06-07 RX ADMIN — Medication 5 MILLIGRAM(S): at 22:33

## 2020-06-07 RX ADMIN — Medication 1: at 17:56

## 2020-06-07 RX ADMIN — MEROPENEM 100 MILLIGRAM(S): 1 INJECTION INTRAVENOUS at 05:27

## 2020-06-07 RX ADMIN — Medication 15 MILLIGRAM(S): at 12:20

## 2020-06-07 RX ADMIN — HEPARIN SODIUM 900 UNIT(S)/HR: 5000 INJECTION INTRAVENOUS; SUBCUTANEOUS at 06:46

## 2020-06-07 RX ADMIN — PANTOPRAZOLE SODIUM 40 MILLIGRAM(S): 20 TABLET, DELAYED RELEASE ORAL at 14:54

## 2020-06-07 RX ADMIN — MEROPENEM 100 MILLIGRAM(S): 1 INJECTION INTRAVENOUS at 17:53

## 2020-06-07 RX ADMIN — Medication 10 MILLIGRAM(S): at 14:05

## 2020-06-07 RX ADMIN — Medication 110 MILLIGRAM(S): at 05:27

## 2020-06-07 RX ADMIN — Medication 110 MILLIGRAM(S): at 17:53

## 2020-06-07 RX ADMIN — Medication 100 MILLIEQUIVALENT(S): at 14:54

## 2020-06-07 RX ADMIN — Medication 10 MILLIGRAM(S): at 21:38

## 2020-06-07 RX ADMIN — Medication 10 MILLIGRAM(S): at 14:06

## 2020-06-07 RX ADMIN — ATORVASTATIN CALCIUM 40 MILLIGRAM(S): 80 TABLET, FILM COATED ORAL at 21:38

## 2020-06-07 RX ADMIN — Medication 100 MILLIEQUIVALENT(S): at 18:01

## 2020-06-07 RX ADMIN — Medication 2: at 21:34

## 2020-06-07 RX ADMIN — POTASSIUM PHOSPHATE, MONOBASIC POTASSIUM PHOSPHATE, DIBASIC 62.5 MILLIMOLE(S): 236; 224 INJECTION, SOLUTION INTRAVENOUS at 11:12

## 2020-06-07 RX ADMIN — Medication 50 MILLIGRAM(S): at 05:27

## 2020-06-07 RX ADMIN — Medication 1: at 11:38

## 2020-06-07 NOTE — PROGRESS NOTE ADULT - SUBJECTIVE AND OBJECTIVE BOX
Vital Signs Last 24 Hrs  T(C): 36.8 (07 Jun 2020 14:17), Max: 36.9 (07 Jun 2020 05:05)  T(F): 98.3 (07 Jun 2020 14:17), Max: 98.4 (07 Jun 2020 05:05)  HR: 86 (07 Jun 2020 14:17) (86 - 104)  BP: 156/77 (07 Jun 2020 14:17) (118/65 - 156/77)  BP(mean): --  RR: 17 (07 Jun 2020 14:17) (16 - 19)  SpO2: 95% (07 Jun 2020 14:17) (94% - 98%)                        10.2   19.14 )-----------( 398      ( 07 Jun 2020 06:01 )             32.6   WBC Count: 19.14 K/uL (06-07 @ 06:01)  WBC Count: 20.64 K/uL (06-06 @ 08:58)  WBC Count: 21.24 K/uL (06-05 @ 10:24)  WBC Count: 20.06 K/uL (06-05 @ 02:45)  WBC Count: 18.07 K/uL (06-04 @ 06:44) follow up for left foot infected wound   Vital Signs Last 24 Hrs  T(C): 36.8 (07 Jun 2020 14:17), Max: 36.9 (07 Jun 2020 05:05)  T(F): 98.3 (07 Jun 2020 14:17), Max: 98.4 (07 Jun 2020 05:05)  HR: 86 (07 Jun 2020 14:17) (86 - 104)  BP: 156/77 (07 Jun 2020 14:17) (118/65 - 156/77)  BP(mean): --  RR: 17 (07 Jun 2020 14:17) (16 - 19)  SpO2: 95% (07 Jun 2020 14:17) (94% - 98%)                        10.2   19.14 )-----------( 398      ( 07 Jun 2020 06:01 )             32.6   WBC Count: 19.14 K/uL (06-07 @ 06:01)  WBC Count: 20.64 K/uL (06-06 @ 08:58)  WBC Count: 21.24 K/uL (06-05 @ 10:24)  WBC Count: 20.06 K/uL (06-05 @ 02:45)  WBC Count: 18.07 K/uL (06-04 @ 06:44)  06-07    135  |  100  |  21<H>  ----------------------------<  127<H>  3.3<L>   |  24  |  0.98    Ca    8.0<L>      07 Jun 2020 06:01  Phos  2.0     06-07  Mg     2.5     06-07    TPro  7.0  /  Alb  2.5<L>  /  TBili  0.5  /  DBili  x   /  AST  14  /  ALT  25  /  AlkPhos  274<H>  06-07  PTT - ( 07 Jun 2020 06:01 )  PTT:95.2 sec  Procalcitonin, Serum (06.06.20 @ 15:06)    Procalcitonin, Serum: 0.10: Procalcitonin (PCT) Interpretation (ng/mL) - Diagnosis of systemic  bacterial infection/sepsis  PCT < 0.5: Systemic infection (sepsis) is not likely and risk for  progression to severe systemic infection is low. Local bacterial  infection is possible. If early sepsis is suspected clinically, PCT  should be re-assessed in 6-24 hours.  PCT >/= 0.5 but < 2.0: Systemic infection (sepsis) is possible, but other  conditions are known to elevate PCT as well. Moderate risk for  progression to severe systemic infection. The patient should be closely  monitored both clinically and by re-assessing PCT within 6-24 hours.  PCT >/= 2.0 but < 10.0: Systemic infection (sepsis) is likely, unless  other causes are known. High risk of progression to severe systemic  infection (severe sepsis/septic shock).  PCT >/= 10.0: Important systemic inflammatory response, almost  exclusively due to severe bacterial sepsis or septic shock. High  likelihood of severe sepsis or septic shock. ng/mL      < from: CT Foot No Cont, Left (06.02.20 @ 19:00) >  EXAM:  CT FOOT ONLY LT                            PROCEDURE DATE:  06/02/2020          INTERPRETATION:      Exam: CT Left Lower Extremity Without Contrast, Foot   Exam date and time: 6/2/2020 6:59 PM   Age: 74 years old   Clinical indication: Status post amputation of left big toe, wound, evaluate for osteomyelitis.    TECHNIQUE:   Imaging protocol: CT of the Left lower extremity without contrast was   performed. Exam focused on the foot.   3D rendering: 3-D reformatted images were performed concepcion independent workstation.  COMPARISON:   No relevant prior studies available.     FINDINGS:   Bones/joints: Previous amputation of the 1st toe at the MTP joint. Soft tissue ulcer overlying the distal aspect of the first metatarsal  extending to thehead of the 1st metatarsal, with cortical   irregularity and destructive changes at the head of the 1st metatarsal   consistent with osteomyelitis. Small corticated ossific densities adjacent to the distal aspect of the medial malleolus, likely related to old trauma.  Soft tissues: Ill-defined possible developing fluid collection or abscess at the  plantar surface of the foot superficial to the sesamoids, and adjacent to the   soft tissue ulcer measuring approximately 19 x 8 by 28 mm., evaluationlimited without intravenous contrast. Small fluid around the flexor hallucis longus tendon.  Vasculature: Small vessel atherosclerotic calcification.     IMPRESSION:   1. Previous amputation 1st toe at the MTP joint. Soft tissue ulcer at the stump   CAPILLARY BLOOD GLUCOSE      POCT Blood Glucose.: 183 mg/dL (07 Jun 2020 17:03)  POCT Blood Glucose.: 200 mg/dL (07 Jun 2020 11:24)  POCT Blood Glucose.: 191 mg/dL (07 Jun 2020 08:12)  POCT Blood Glucose.: 233 mg/dL (06 Jun 2020 21:15)  extending to the head of the 1st metatarsal, with cortical irregularity and   destructive changes at the head of the 1st metatarsal consistent with   osteomyelitis.   2. Ill-defined possible developing fluid collection or abscess plantar surface   of the foot superficial to the sesamoids, and adjacent to the soft tissue ulcer   approximately 19 x 8 by 28 mm., evaluation limited without intravenous contrast.    < end of copied text >  CAPILLARY BLOOD GLUCOSE      POCT Blood Glucose.: 183 mg/dL (07 Jun 2020 17:03)  POCT Blood Glucose.: 200 mg/dL (07 Jun 2020 11:24)  POCT Blood Glucose.: 191 mg/dL (07 Jun 2020 08:12)  POCT Blood Glucose.: 233 mg/dL (06 Jun 2020 21:15)    MEDICATIONS  (STANDING):  atorvastatin 40 milliGRAM(s) Oral at bedtime  Dakins Solution - 1/4 Strength 1 Application(s) Topical daily  doxycycline IVPB 100 milliGRAM(s) IV Intermittent every 12 hours  heparin  Infusion.  Unit(s)/Hr (12 mL/Hr) IV Continuous <Continuous>  hydrALAZINE Injectable 10 milliGRAM(s) IV Push every 8 hours  insulin glargine Injectable (LANTUS) 10 Unit(s) SubCutaneous at bedtime  insulin lispro (HumaLOG) corrective regimen sliding scale   SubCutaneous Before meals and at bedtime  lactobacillus acidophilus 1 Tablet(s) Oral three times a day with meals  meropenem  IVPB 1000 milliGRAM(s) IV Intermittent every 12 hours  metoclopramide Injectable 10 milliGRAM(s) IV Push every 8 hours  metoprolol succinate ER 50 milliGRAM(s) Oral daily  pantoprazole  Injectable 40 milliGRAM(s) IV Push daily  sodium chloride 0.9% with potassium chloride 20 mEq/L 1000 milliLiter(s) (75 mL/Hr) IV Continuous <Continuous>    MEDICATIONS  (PRN):  acetaminophen   Tablet .. 650 milliGRAM(s) Oral every 6 hours PRN Mild Pain (1 - 3), Moderate Pain (4 - 6)  aluminum hydroxide/magnesium hydroxide/simethicone Suspension 30 milliLiter(s) Oral every 6 hours PRN Dyspepsia  diphenhydrAMINE   Injectable 15 milliGRAM(s) IV Push every 6 hours PRN nausea or vomiting  ondansetron Injectable 4 milliGRAM(s) IV Push every 8 hours PRN Nausea and/or Vomiting    pt  seen and evaluated at bedside , reports nausea is improving  , not feeling well  , reports throbbing pain in her foot,  tolerated exam and dressing change very well    exam focused LE   left foot sp halux amputation May 3  ( as per patient )  there is an open on the distal surgical site in the area of 1 st met head    wound packed with gauze strip, no active drainage , no odor     wound is deep, tracking along flexor tendon? probes to abscess area as per ct , but no pus or active drainage  , probes to bone +,  mostly fibrotic base exposed tendons with possible calcifications /bone fragments ??  , hyperkeratotic border , with minimal area of darker dry  skin,   there is absolutely no bleeding during exam and wound exploration /cleaning   DP/PT -0- out of 4   TG -WNL   CRF -1-2 sec x4   there is increasing erythema ascending to the level of the 1st met base , and plantar submet 1 head-looks more wide spread , the area is slightly tender ,   pt is able yo move her lower extremities follow up for left foot infected wound   Vital Signs Last 24 Hrs  T(C): 36.8 (07 Jun 2020 14:17), Max: 36.9 (07 Jun 2020 05:05)  T(F): 98.3 (07 Jun 2020 14:17), Max: 98.4 (07 Jun 2020 05:05)  HR: 86 (07 Jun 2020 14:17) (86 - 104)  BP: 156/77 (07 Jun 2020 14:17) (118/65 - 156/77)  BP(mean): --  RR: 17 (07 Jun 2020 14:17) (16 - 19)  SpO2: 95% (07 Jun 2020 14:17) (94% - 98%)                        10.2   19.14 )-----------( 398      ( 07 Jun 2020 06:01 )             32.6   WBC Count: 19.14 K/uL (06-07 @ 06:01)  WBC Count: 20.64 K/uL (06-06 @ 08:58)  WBC Count: 21.24 K/uL (06-05 @ 10:24)  WBC Count: 20.06 K/uL (06-05 @ 02:45)  WBC Count: 18.07 K/uL (06-04 @ 06:44)  06-07    135  |  100  |  21<H>  ----------------------------<  127<H>  3.3<L>   |  24  |  0.98    Ca    8.0<L>      07 Jun 2020 06:01  Phos  2.0     06-07  Mg     2.5     06-07    TPro  7.0  /  Alb  2.5<L>  /  TBili  0.5  /  DBili  x   /  AST  14  /  ALT  25  /  AlkPhos  274<H>  06-07  PTT - ( 07 Jun 2020 06:01 )  PTT:95.2 sec  Procalcitonin, Serum (06.06.20 @ 15:06)    Procalcitonin, Serum: 0.10: Procalcitonin (PCT) Interpretation (ng/mL) - Diagnosis of systemic  bacterial infection/sepsis  PCT < 0.5: Systemic infection (sepsis) is not likely and risk for  progression to severe systemic infection is low. Local bacterial  infection is possible. If early sepsis is suspected clinically, PCT  should be re-assessed in 6-24 hours.  PCT >/= 0.5 but < 2.0: Systemic infection (sepsis) is possible, but other  conditions are known to elevate PCT as well. Moderate risk for  progression to severe systemic infection. The patient should be closely  monitored both clinically and by re-assessing PCT within 6-24 hours.  PCT >/= 2.0 but < 10.0: Systemic infection (sepsis) is likely, unless  other causes are known. High risk of progression to severe systemic  infection (severe sepsis/septic shock).  PCT >/= 10.0: Important systemic inflammatory response, almost  exclusively due to severe bacterial sepsis or septic shock. High  likelihood of severe sepsis or septic shock. ng/mL      < from: CT Foot No Cont, Left (06.02.20 @ 19:00) >  EXAM:  CT FOOT ONLY LT                            PROCEDURE DATE:  06/02/2020          INTERPRETATION:      Exam: CT Left Lower Extremity Without Contrast, Foot   Exam date and time: 6/2/2020 6:59 PM   Age: 74 years old   Clinical indication: Status post amputation of left big toe, wound, evaluate for osteomyelitis.    TECHNIQUE:   Imaging protocol: CT of the Left lower extremity without contrast was   performed. Exam focused on the foot.   3D rendering: 3-D reformatted images were performed concepcion independent workstation.  COMPARISON:   No relevant prior studies available.     FINDINGS:   Bones/joints: Previous amputation of the 1st toe at the MTP joint. Soft tissue ulcer overlying the distal aspect of the first metatarsal  extending to thehead of the 1st metatarsal, with cortical   irregularity and destructive changes at the head of the 1st metatarsal   consistent with osteomyelitis. Small corticated ossific densities adjacent to the distal aspect of the medial malleolus, likely related to old trauma.  Soft tissues: Ill-defined possible developing fluid collection or abscess at the  plantar surface of the foot superficial to the sesamoids, and adjacent to the   soft tissue ulcer measuring approximately 19 x 8 by 28 mm., evaluationlimited without intravenous contrast. Small fluid around the flexor hallucis longus tendon.  Vasculature: Small vessel atherosclerotic calcification.     IMPRESSION:   1. Previous amputation 1st toe at the MTP joint. Soft tissue ulcer at the stump   CAPILLARY BLOOD GLUCOSE      POCT Blood Glucose.: 183 mg/dL (07 Jun 2020 17:03)  POCT Blood Glucose.: 200 mg/dL (07 Jun 2020 11:24)  POCT Blood Glucose.: 191 mg/dL (07 Jun 2020 08:12)  POCT Blood Glucose.: 233 mg/dL (06 Jun 2020 21:15)  extending to the head of the 1st metatarsal, with cortical irregularity and   destructive changes at the head of the 1st metatarsal consistent with   osteomyelitis.   2. Ill-defined possible developing fluid collection or abscess plantar surface   of the foot superficial to the sesamoids, and adjacent to the soft tissue ulcer   approximately 19 x 8 by 28 mm., evaluation limited without intravenous contrast.    < end of copied text >  CAPILLARY BLOOD GLUCOSE      POCT Blood Glucose.: 183 mg/dL (07 Jun 2020 17:03)  POCT Blood Glucose.: 200 mg/dL (07 Jun 2020 11:24)  POCT Blood Glucose.: 191 mg/dL (07 Jun 2020 08:12)  POCT Blood Glucose.: 233 mg/dL (06 Jun 2020 21:15)  < from: [facility] Physiol Extremity Lower 3+ Level, BI (06.06.20 @ 12:48) >    EXAM:  US PHYSIOL LWR EXT 3+ LEV BI                            PROCEDURE DATE:  06/06/2020          INTERPRETATION:  Clinical Information: Peripheral vascular disease. Left toe ulceration.    Technique: Bilateral lower extremity ABIs/PVR    Comparison: None    Findings/  Impression:  Right lower extremity: The ankle brachial index is 0.6. The pulse waveforms are diffusely diminished.    Left lower extremity: The ankle brachial index is 0.5. The pulse waveforms are diffusely diminished, however particularly severe in the left foot.    Severe bilateral peripheral artery disease.    Pulse volume recordings are diffusely diminished bilaterally, however severely diminished in the left foot.      < end of copied text >      MEDICATIONS  (STANDING):  atorvastatin 40 milliGRAM(s) Oral at bedtime  Dakins Solution - 1/4 Strength 1 Application(s) Topical daily  doxycycline IVPB 100 milliGRAM(s) IV Intermittent every 12 hours  heparin  Infusion.  Unit(s)/Hr (12 mL/Hr) IV Continuous <Continuous>  hydrALAZINE Injectable 10 milliGRAM(s) IV Push every 8 hours  insulin glargine Injectable (LANTUS) 10 Unit(s) SubCutaneous at bedtime  insulin lispro (HumaLOG) corrective regimen sliding scale   SubCutaneous Before meals and at bedtime  lactobacillus acidophilus 1 Tablet(s) Oral three times a day with meals  meropenem  IVPB 1000 milliGRAM(s) IV Intermittent every 12 hours  metoclopramide Injectable 10 milliGRAM(s) IV Push every 8 hours  metoprolol succinate ER 50 milliGRAM(s) Oral daily  pantoprazole  Injectable 40 milliGRAM(s) IV Push daily  sodium chloride 0.9% with potassium chloride 20 mEq/L 1000 milliLiter(s) (75 mL/Hr) IV Continuous <Continuous>    MEDICATIONS  (PRN):  acetaminophen   Tablet .. 650 milliGRAM(s) Oral every 6 hours PRN Mild Pain (1 - 3), Moderate Pain (4 - 6)  aluminum hydroxide/magnesium hydroxide/simethicone Suspension 30 milliLiter(s) Oral every 6 hours PRN Dyspepsia  diphenhydrAMINE   Injectable 15 milliGRAM(s) IV Push every 6 hours PRN nausea or vomiting  ondansetron Injectable 4 milliGRAM(s) IV Push every 8 hours PRN Nausea and/or Vomiting    pt  seen and evaluated at bedside , reports nausea is improving  , not feeling well  , reports throbbing pain in her foot,  tolerated exam and dressing change very well    exam focused LE   left foot sp halux amputation May 3  ( as per patient )  there is an open on the distal surgical site in the area of 1 st met head    wound packed with gauze strip, no active drainage , no odor     wound is deep, tracking along flexor tendon? probes to abscess area as per ct , but no pus or active drainage  , probes to bone +,  mostly fibrotic base exposed tendons with possible calcifications /bone fragments ??  , hyperkeratotic border , with minimal area of darker dry  skin,   there is absolutely no bleeding during exam and wound exploration /cleaning   DP/PT -0- out of 4   TG -WNL   CRF -1-2 sec x4   there is increasing erythema ascending to the level of the 1st met base , and plantar submet 1 head-looks more wide spread , the area is slightly tender ,   pt is able yo move her lower extremities

## 2020-06-07 NOTE — PROGRESS NOTE ADULT - SUBJECTIVE AND OBJECTIVE BOX
Pt c/o Left toe amp site pain, started this morning.  ESTRELLA's noted on chart. diminished waveforms to ankle and foot, toes with very poor signal.  Podiatry eval noted on chart, plan for OR debridement  ICU Vital Signs Last 24 Hrs  T(C): 36.7 (07 Jun 2020 10:02), Max: 36.9 (07 Jun 2020 05:05)  T(F): 98.1 (07 Jun 2020 10:02), Max: 98.4 (07 Jun 2020 05:05)  HR: 104 (07 Jun 2020 10:02) (88 - 104)  BP: 140/73 (07 Jun 2020 10:02) (118/65 - 155/77)  BP(mean): --  ABP: --  ABP(mean): --  RR: 16 (07 Jun 2020 10:02) (16 - 19)  SpO2: 98% (07 Jun 2020 10:02) (94% - 98%)      Left foot: wound dressing minimal serosanguinous drainage with gauze wick in wound.: minimal erythema, no crepitus,  left foot mild edema, tender at amp site and proximal 4-5cm to amp site medially.  foot cool not cold. cap refill noted to distal digits, non palpable dp,        < from: Xray Femur 2 Views, Left (06.04.20 @ 11:04) >  IMPRESSION: Left popliteal stent. No femoral stent.    Thank you for this referral.    < end of copied text >    < from: [facility] Physiol Extremity Lower 3+ Level, BI (06.06.20 @ 12:48) >    EXAM:  US PHYSIOL LWR EXT 3+ LEV BI                            PROCEDURE DATE:  06/06/2020          INTERPRETATION:  Clinical Information: Peripheral vascular disease. Left toe ulceration.    Technique: Bilateral lower extremity ABIs/PVR    Comparison: None    Findings/  Impression:  Right lower extremity: The ankle brachial index is 0.6. The pulse waveforms are diffusely diminished.    Left lower extremity: The ankle brachial index is 0.5. The pulse waveforms are diffusely diminished, however particularly severe in the left foot.    Severe bilateral peripheral artery disease.    Pulse volume recordings are diffusely diminished bilaterally, however severely diminished in the left foot.    < end of copied text >                                       10.2   19.14 )-----------( 398      ( 07 Jun 2020 06:01 )             32.6     06-07    135  |  100  |  21<H>  ----------------------------<  127<H>  3.3<L>   |  24  |  0.98    Ca    8.0<L>      07 Jun 2020 06:01  Phos  2.0     06-07  Mg     2.5     06-07    TPro  7.0  /  Alb  2.5<L>  /  TBili  0.5  /  DBili  x   /  AST  14  /  ALT  25  /  AlkPhos  274<H>  06-07        Assessment and Plan:   · Assessment		  Left pop stent noted on xray  await ESTRELLA with care not to presure on pop stent, may need further imaging after above    Pt planning for further debridement of infected wound at previous amp site

## 2020-06-07 NOTE — PROGRESS NOTE ADULT - SUBJECTIVE AND OBJECTIVE BOX
INTERVAL HPI/OVERNIGHT EVENTS:  Patient seen at bedside,awake,mild nausea  VITAL SIGNS:  T(F): 98.4 (06-07-20 @ 05:05)  HR: 95 (06-07-20 @ 05:05)  BP: 155/77 (06-07-20 @ 05:05)  RR: 18 (06-07-20 @ 05:05)  SpO2: 97% (06-07-20 @ 05:05)  Wt(kg): --    PHYSICAL EXAM:  awake,  Constitutional:  Eyes:  ENMT:perrla  Neck:  Respiratory:few rales  Cardiovascular:s1 s2,m-none  Gastrointestinal:soft,bs pos  Extremities:l foot with dressing  Vascular:  Neurological:no focal deficit  Musculoskeletal:    MEDICATIONS  (STANDING):  atorvastatin 40 milliGRAM(s) Oral at bedtime  Dakins Solution - 1/4 Strength 1 Application(s) Topical daily  doxycycline IVPB 100 milliGRAM(s) IV Intermittent every 12 hours  heparin  Infusion.  Unit(s)/Hr (12 mL/Hr) IV Continuous <Continuous>  hydrALAZINE Injectable 10 milliGRAM(s) IV Push every 8 hours  insulin glargine Injectable (LANTUS) 10 Unit(s) SubCutaneous at bedtime  insulin lispro (HumaLOG) corrective regimen sliding scale   SubCutaneous Before meals and at bedtime  lactobacillus acidophilus 1 Tablet(s) Oral three times a day with meals  meropenem  IVPB 1000 milliGRAM(s) IV Intermittent every 12 hours  metoclopramide Injectable 10 milliGRAM(s) IV Push every 8 hours  metoprolol succinate ER 50 milliGRAM(s) Oral daily  pantoprazole  Injectable 40 milliGRAM(s) IV Push daily  potassium phosphate IVPB 30 milliMole(s) IV Intermittent once  sodium chloride 0.9% with potassium chloride 20 mEq/L 1000 milliLiter(s) (70 mL/Hr) IV Continuous <Continuous>    MEDICATIONS  (PRN):  acetaminophen   Tablet .. 650 milliGRAM(s) Oral every 6 hours PRN Mild Pain (1 - 3), Moderate Pain (4 - 6)  aluminum hydroxide/magnesium hydroxide/simethicone Suspension 30 milliLiter(s) Oral every 6 hours PRN Dyspepsia  diphenhydrAMINE   Injectable 15 milliGRAM(s) IV Push every 6 hours PRN nausea or vomiting  ondansetron Injectable 4 milliGRAM(s) IV Push every 8 hours PRN Nausea and/or Vomiting      Allergies    codeine (Rash)  penicillin (Rash)    Intolerances        LABS:                        10.2   19.14 )-----------( 398      ( 07 Jun 2020 06:01 )             32.6     06-07    135  |  100  |  21<H>  ----------------------------<  127<H>  3.3<L>   |  24  |  0.98    Ca    8.0<L>      07 Jun 2020 06:01  Phos  2.0     06-07  Mg     2.5     06-07    TPro  7.0  /  Alb  2.5<L>  /  TBili  0.5  /  DBili  x   /  AST  14  /  ALT  25  /  AlkPhos  274<H>  06-07    PTT - ( 07 Jun 2020 06:01 )  PTT:95.2 sec    Assessment and Plan:   · Assessment		  75 YO female from home, H/O CAD/DM/HTN/PVD/hysterectomy/cholecystectomy ,with AICD has come to ED with intractable nausea and vomiting for 2 days, likely due to pill induced esophagitis/gastritis. Patient reports that it was sudden in onset, 7/10, burning & pressure like pain, non-radiating , associated with nausea and projectile vomiting. Patient also reports significant weight( not sure how much) recently and decrease in appetite. CT abdomen/pelvis shows bladder wall thickening and pt has history of Stage1 endometrial cancer,   Patient also has high blood pressure because she missed her medications  Admitting patient for management of  Acute Gastritis  Intractable abdominal pain  hypertension  weight loss/ suspected malignancy workup    DCd vholecalciferol, melatonin, loratadine until pt can tolerate PO meds        Problem/Plan - 1:  ·  Problem: Stroke. Plan: CTA head/neck showed LEFT PICA stroke  started on heparin drip  Neuro Dr. Moncada.     Problem/Plan - 2:  ·  Problem: Intractable nausea and vomitin-stable for now  partially relieved with oral Protonix  UA negative, No fevers, UCx: -ve  More likely GI etiology  egd on hold sec cva  GI Dr. Matute.     Problem/Plan - 3:  ·  Problem: Osteomyelitis of left foot, unspecified type. Plan: recently antibiotics changed from clindamycin to Levaquin that led to epigastric pain.  - ABX switched to cefepime + doxycycline 6/1-6/4  - started cefazolin 6/5- as per ID  - CT L foot shows OM ( pt cannot get MRI dur to AICD)   repeat lactate: trended down  Podiatry Dr. Yo - plan to take the pt to OR ,once get clearence  ID Dr. Reagan.     Problem/Plan - 4:  ·  Problem: PVD (peripheral vascular disease)/Severe PVD  f/u ESTRELLA - pt cannot go due to n/v, will re-order when pt feels better  PT: home PT  Vascular is following.   pending fro cardiology clearence for possible surgery     Problem/Plan - 5:  ·  Problem: Bladder wall thickening.  Plan: CT abdomen shows bladder wall thickening  reports weight loss and loss of appetite recently, Patient has history of Stage 1 endometrial cancer and hysterectomy  brother also had cancer and she is not sure about which one  - urine cytology: NEGATIVE FOR HIGH GRADE UROTHELIAL CARCINOMA  urology consulted: outpt f/u w/ .      Problem/Plan - 6:  Problem: HTN (hypertension). Plan: Pt takes Lisinopril 40mg, toprol Xl 100mg, hydralazine 50mg TID   BP is running on higher side as patient missed her medications and also received a bolus in ED  lipid profile, TG WNL  - pt is not tolerated for PO med.   - c/w hydralazine increased to 10mg IV TID, lopressor IV 7.5mg q6h until pt can take PO meds  - monitor BP --running high, but keep MAP around 100 for stroke.     Problem/Plan - 7:  ·  Problem: DM (diabetes mellitus).  Plan: Patient takes Levemir 30mg at night and 10Units pre-meals  c/w sliding scale and Lantus 20Units HS  Diabetic diet  Hb A1C: 8.1%  - monitor BS.      Problem/Plan - 8:  ·  Problem: CAD (coronary artery disease).  Plan: H/o CAD and PCI in 2009,  patient has both AICD and pacemaker  EKG shows paced Rythm  No active issues  continue aspirin , Plavix, BB, statin, ACE - pt is not tolerated to PO meds  -DCd aspirin, plavix and pt is on heparin drip.     Problem/Plan - 9:  ·  Problem: CHF (congestive heart failure).  Plan: H/o CHF but clinically patient is not in fluid overload  No peripheral edema  Patient takes furosemide 20mg daily twice  BNP: elevated at 9383  echo: EF 50-55%, mild TR, [place]  - holding lasix 20mg IV as pt is not eating and dehydrated.      Problem/Plan - 10:  Problem: Prophylactic measure. Plan; IMPROVE VTE Individual Risk Assessment  RISK                                                                Points  [  ] Previous VTE                                                  3  [  ] Thrombophilia                                               2  [  ] Lower limb paralysis                                      2        (unable to hold up >15 seconds)    [  ] Current Cancer                                              2         (within 6 months)  [x  ] Immobilization > 24 hrs                                1  [  ] ICU/CCU stay > 24 hours                              1  [  x] Age > 60                                                      1    IMPROVE VTE Score 2  Lovenox 40mg SQ geoffrey;y.

## 2020-06-07 NOTE — PROGRESS NOTE ADULT - ATTENDING COMMENTS
ESTRELLA/PVR shows diminished waveforms of the digit.  estrella 0.5  Rec CTA to evaluate vascular anatomy and stent patency.  If pt develops sepsis and requires source control, should proceed.  However, depending on cta findings, revasc may be necessary prior to definitive podiatric procedure

## 2020-06-07 NOTE — PROGRESS NOTE ADULT - SUBJECTIVE AND OBJECTIVE BOX
Patient denies chest pain or shortness of breath.   Review of systems otherwise (-)  	  acetaminophen   Tablet .. 650 milliGRAM(s) Oral every 6 hours PRN  aluminum hydroxide/magnesium hydroxide/simethicone Suspension 30 milliLiter(s) Oral every 6 hours PRN  atorvastatin 40 milliGRAM(s) Oral at bedtime  Dakins Solution - 1/4 Strength 1 Application(s) Topical daily  diphenhydrAMINE   Injectable 15 milliGRAM(s) IV Push every 6 hours PRN  doxycycline IVPB 100 milliGRAM(s) IV Intermittent every 12 hours  heparin  Infusion.  Unit(s)/Hr IV Continuous <Continuous>  hydrALAZINE Injectable 10 milliGRAM(s) IV Push every 8 hours  insulin glargine Injectable (LANTUS) 10 Unit(s) SubCutaneous at bedtime  insulin lispro (HumaLOG) corrective regimen sliding scale   SubCutaneous Before meals and at bedtime  lactobacillus acidophilus 1 Tablet(s) Oral three times a day with meals  meropenem  IVPB 1000 milliGRAM(s) IV Intermittent every 12 hours  metoclopramide Injectable 10 milliGRAM(s) IV Push every 8 hours  metoprolol succinate ER 50 milliGRAM(s) Oral daily  ondansetron Injectable 4 milliGRAM(s) IV Push every 8 hours PRN  pantoprazole  Injectable 40 milliGRAM(s) IV Push daily  potassium chloride  10 mEq/100 mL IVPB 10 milliEquivalent(s) IV Intermittent every 4 hours  sodium chloride 0.9% with potassium chloride 20 mEq/L 1000 milliLiter(s) IV Continuous <Continuous>                            10.2   19.14 )-----------( 398      ( 07 Jun 2020 06:01 )             32.6       Hemoglobin: 10.2 g/dL (06-07 @ 06:01)  Hemoglobin: 10.4 g/dL (06-06 @ 08:58)  Hemoglobin: 10.9 g/dL (06-05 @ 10:24)  Hemoglobin: 10.9 g/dL (06-05 @ 02:45)  Hemoglobin: 10.5 g/dL (06-04 @ 06:44)      06-07    135  |  100  |  21<H>  ----------------------------<  127<H>  3.3<L>   |  24  |  0.98    Ca    8.0<L>      07 Jun 2020 06:01  Phos  2.0     06-07  Mg     2.5     06-07    TPro  7.0  /  Alb  2.5<L>  /  TBili  0.5  /  DBili  x   /  AST  14  /  ALT  25  /  AlkPhos  274<H>  06-07    Creatinine Trend: 0.98<--, 1.08<--, 1.08<--, 1.07<--, 1.26<--, 1.12<--    COAGS: PTT - ( 07 Jun 2020 06:01 )  PTT:95.2 sec    CARDIAC MARKERS ( 05 Jun 2020 22:37 )  0.206 ng/mL / x     / x     / x     / x      CARDIAC MARKERS ( 05 Jun 2020 15:24 )  0.270 ng/mL / x     / x     / x     / x      CARDIAC MARKERS ( 05 Jun 2020 10:24 )  0.241 ng/mL / x     / 37 U/L / x     / 1.1 ng/mL        T(C): 36.8 (06-07-20 @ 14:17), Max: 36.9 (06-07-20 @ 05:05)  HR: 86 (06-07-20 @ 14:17) (86 - 104)  BP: 156/77 (06-07-20 @ 14:17) (118/65 - 156/77)  RR: 17 (06-07-20 @ 14:17) (16 - 19)  SpO2: 95% (06-07-20 @ 14:17) (94% - 98%)  Wt(kg): --    I&O's Summary          Gen: Appears well in NAD  HEENT:  (-)icterus (-)pallor  CV: N S1 S2 1/6 JAMAAL (+)2 Pulses B/l  Resp:  Clear to ausculatation B/L, normal effort  GI: (+) BS Soft, NT, ND  Lymph:  (-)Edema, (-)obvious lymphadenopathy  Skin: Warm to touch, Normal turgor  Psych: Appropriate mood and affect          ASSESSMENT/PLAN: 	74y  Female AD/DM/HTN/PVD/hysterectomy/cholecystectomy ,with CHF BIVICD, Mild anterior wall ischemia being medically managed  comes to ED with intractable nausea and vomiting for 2 days 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 bladder malignancy.    - cont Heparin gtt if ok with neuro and not otherwise contraindicated  - Interrogate [place] [facility] ICD  - nausea resolved  - Afib appears to be new, not noted on prior EKG  -  Appears to be tolerating PO, Please change Lopressor to toprol X 50 mg PO daily  - Check TSH  - Podiatry and vascular f/u noted.  Would need Stress test prior to OR if ok with neuro      Pipo Segundo MD, [facility]  BEEPER [phone]

## 2020-06-07 NOTE — PROGRESS NOTE ADULT - ASSESSMENT
left foot osteomylitis at prior great toe amp site with pain/swellng extending 4-5 cm proximally.  ESTRELLA's noted with signal noted distal to pop stent, however diminished wave forms distally and severely diminished signals to digits left foot    Plan for Podiatry debridement of left foot infection/osteo at prior amp site.  Pt will need CT angio of LLE  Infection source control should not be delayed for CTangio in prep for possible further revascularization in future.  podiatry follow up  id follow up, pt on doxycycline and meropenum  wound care per podiatry

## 2020-06-07 NOTE — PROGRESS NOTE ADULT - ASSESSMENT
note to follow   not improving needs I&D   if no clearance , and no improvement will attempt bedside I&D tomorrow A/P  PVD /PAD  DM  ulcer   dehiscence surgical wound   history of gangrene left hallux   positive probe to bone   leukocytosis   OM with  abscess  left   pyogenic tendon   leukocytosis-stable for now     pt seen and eval   chart reviewed   not improving needs I&D   if no clearance , and no improvement will attempt bedside I&D tomorrow    treatment options discussed with pt   pt deteriorate significantly in the last few days, but today looks more awake/present   wound cleaned and flushed with copious amount  of 1/4 Dakin's , wound packed with sterile gauze   light dressing    CT scan  reports supports clinical findings   pt needs wound debridement / I &D  more proximal amputation   so far there is no clinical improvement   pt may need urgent I&D to control abscess   discussed with vascular BRANDON Villanueva -they agreed with emergent procedure to control source of infection   needs : medically optimized/stable / vascular /cleared by medicine and cardiology /neuro? -  most probably will need  blood   continue antibiotics as per id -id   continue wound packing with gauze strip   vascular consult appreciated    consider other sources for increasing WBC   prognosis is very poor considering  vascular status , multiple health problems   discussed with patient, they seems to understand A/P  PVD /PAD  DM  ulcer   dehiscence surgical wound   history of gangrene left hallux   positive probe to bone   leukocytosis   OM with  abscess  left   pyogenic tendon   leukocytosis-stable for now     pt seen and eval   chart reviewed   not improving needs I&D   if no clearance , and no improvement will attempt  I&D tomorrow    treatment options discussed with pt   pt deteriorate significantly in the last few days, but today looks more awake/present   wound cleaned and flushed with copious amount  of 1/4 Dakin's , wound packed with sterile gauze   light dressing    CT scan  reports supports clinical findings   pt needs wound debridement / I &D  more proximal amputation   so far there is no clinical improvement   pt may need emergency I&D to control abscess   discussed with vascular BRANDON Villanueva -they agreed with emergent procedure to control source of infection   needs : medically optimized/stable / vascular /cleared by medicine and cardiology /neuro? -  most probably will need  blood   continue antibiotics as per id -id   continue wound packing with gauze strip   vascular consult appreciated    consider other sources for increasing WBC   prognosis is very poor considering  vascular status , multiple health problems   discussed with patient, they seems to understand A/P  PVD /PAD  DM  ulcer   dehiscence surgical wound   history of gangrene left hallux   positive probe to bone   leukocytosis   OM with  abscess  left   pyogenic tendon   leukocytosis-stable for now     pt seen and eval   chart reviewed   not improving needs I&D AND DEBRIDEMENT OF INFECTED BONE   treatment options discussed with pt   pt deteriorate significantly in the last few days, but today looks more awake/present   wound cleaned and flushed with copious amount  of 1/4 Dakin's , wound packed with sterile gauze   light dressing    CT scan  reports supports clinical findings   pt needs wound debridement / I &D  more proximal amputation   so far there is no clinical improvement   pt may need emergency I&D to control abscess   discussed with vascular BRANDON Villanueva -they agreed with emergent procedure to control source of infection   needs : medically optimized/stable / vascular /cleared by medicine and cardiology /neuro? -  most probably will need  blood   continue antibiotics as per id -id   continue wound packing with gauze strip   vascular consult appreciated    consider other sources for increasing WBC   prognosis is very poor considering  vascular status , multiple health problems   discussed with patient, they seems to understand

## 2020-06-07 NOTE — PROGRESS NOTE ADULT - ASSESSMENT
74 year old female with vertigo and persistent nausea and vomiting .     Plan:  Nausea vomiting   Likely 2/2 to vertigo 2/2 to acute stroke   Unclear what utility an EGD would be especially in the setting of acute stroke   Will monitor for now   IV heparin   D/W patient and daughter via video conference   Advanced care planning was discussed with patient and family.  Advanced care planning forms were reviewed and discussed.  Risks, benefits and alternatives of gastroenterologic procedures were discussed in detail and all questions were answered.

## 2020-06-07 NOTE — PROGRESS NOTE ADULT - SUBJECTIVE AND OBJECTIVE BOX
Summary:   74y  Female      Subjective:     Nausea is much better   Objective:    MEDICATIONS  (STANDING):  atorvastatin 40 milliGRAM(s) Oral at bedtime  ceFAZolin   IVPB 2000 milliGRAM(s) IV Intermittent every 8 hours  ceFAZolin   IVPB      Dakins Solution - 1/4 Strength 1 Application(s) Topical daily  dextrose 5% + sodium chloride 0.9% 1000 milliLiter(s) (60 mL/Hr) IV Continuous <Continuous>  heparin  Infusion.  Unit(s)/Hr (12 mL/Hr) IV Continuous <Continuous>  hydrALAZINE Injectable 10 milliGRAM(s) IV Push every 8 hours  insulin glargine Injectable (LANTUS) 10 Unit(s) SubCutaneous at bedtime  insulin lispro (HumaLOG) corrective regimen sliding scale   SubCutaneous every 6 hours  lactobacillus acidophilus 1 Tablet(s) Oral three times a day with meals  metoclopramide Injectable 10 milliGRAM(s) IV Push every 8 hours  metoprolol tartrate Injectable 7.5 milliGRAM(s) IV Push every 6 hours  pantoprazole  Injectable 40 milliGRAM(s) IV Push daily    MEDICATIONS  (PRN):  acetaminophen   Tablet .. 650 milliGRAM(s) Oral every 6 hours PRN Mild Pain (1 - 3), Moderate Pain (4 - 6)  aluminum hydroxide/magnesium hydroxide/simethicone Suspension 30 milliLiter(s) Oral every 6 hours PRN Dyspepsia  diphenhydrAMINE   Injectable 15 milliGRAM(s) IV Push every 6 hours PRN nausea or vomiting  ondansetron Injectable 4 milliGRAM(s) IV Push every 8 hours PRN Nausea and/or Vomiting              Vital Signs Last 24 Hrs  T(C): 36.6 (05 Jun 2020 14:23), Max: 37.1 (04 Jun 2020 21:22)  T(F): 97.8 (05 Jun 2020 14:23), Max: 98.8 (04 Jun 2020 21:22)  HR: 104 (05 Jun 2020 16:09) (96 - 118)  BP: 161/89 (05 Jun 2020 16:09) (146/88 - 170/83)  BP(mean): --  RR: 16 (05 Jun 2020 16:09) (16 - 20)  SpO2: 98% (05 Jun 2020 16:09) (93% - 100%)      General:  Well developed, well nourished, alert and active, no pallor, NAD.  HEENT:    Normal appearance of conjunctiva, ears, nose, lips, oropharynx, and oral mucosa, anicteric.  Neck:  No masses, no asymmetry.  Lymph Nodes:  No lymphadenopathy.   Cardiovascular:  RRR normal S1/S2, no murmur.  Respiratory:  CTA B/L, normal respiratory effort.   Abdominal:   soft, no masses or tenderness, normoactive BS, NT/ND, no HSM.  Extremities:   No clubbing or cyanosis, normal capillary refill, no edema.   Skin:   No rash, jaundice, lesions, eczema.   Musculoskeletal:  No joint swelling, erythema or tenderness.   Neuro: No focal deficits.   Other:       LABS:                        10.9   21.24 )-----------( 455      ( 05 Jun 2020 10:24 )             34.2     06-05    135  |  99  |  22<H>  ----------------------------<  158<H>  3.3<L>   |  24  |  1.07    Ca    8.3<L>      05 Jun 2020 10:24  Phos  2.7     06-05  Mg     2.7     06-05    TPro  7.6  /  Alb  2.5<L>  /  TBili  0.5  /  DBili  x   /  AST  22  /  ALT  57  /  AlkPhos  298<H>  06-05    PT/INR - ( 04 Jun 2020 20:53 )   PT: 15.7 sec;   INR: 1.38 ratio         PTT - ( 05 Jun 2020 10:24 )  PTT:95.7 sec      RADIOLOGY & ADDITIONAL TESTS:  < from: CT Angio Neck w/ IV Cont (06.04.20 @ 20:09) >    EXAM:  CT ANGIO NECK (W)AW IC                          EXAM:  CT ANGIO BRAIN (W)AW IC                            PROCEDURE DATE:  06/04/2020          INTERPRETATION:  CLINICAL STATEMENT: Nausea vomiting vertically    TECHNIQUE: CTA of the head and neck performed with IV contrast.  3-D MIP imaging obtained. Approximately 90 cc of Omnipaque 350 administered.    COMPARISON: None.    FINDINGS:  CTA of the neck:  Evaluation of the origins of the great vessels is limited due to artifacts.    The visualized common carotid and internal carotid arteries are patent. Calcified atherosclerotic plaque at the right carotid bulb results in focal mild narrowing of the origin of the right internal carotid artery.    Atherosclerotic plaque noted resulting inmoderate narrowing distal left common carotid artery.    The visualized extracranial vertebral arteries are patent. Origin of vertebral arteries not evaluated.    Left anterior chest device causes artifacts limiting evaluation of the chest left chestwall device limits evaluation of the chest due to artifacts. Questionable filling defects noted in the upper segmental and subsegmental pulmonary arteries. Possible groundglass opacities. Pleural effusions noted. Partially visualized soft tissue densities noted in the subcarinal region    CTA Head:  Calcified atherosclerotic plaques results in narrowing of the cavernous and supraclinoid bilateral internal carotid arteries. There is severe narrowing of the terminal segment of distal left internal carotid artery    The proximal anterior, middle and posterior cerebral arteries are patent without hemodynamically significant stenosis.    The intracranial vertebral and basilar arteries are patent. Calcified atherosclerotic plaques results in narrowing of bilateral V4 segments of distal vertebral arteries.    There is no intracranial aneurysm.        IMPRESSION:  Focal mild (less than 50%) narrowing at the origin of right internal carotid artery.    Short segment Moderate (50-70%) narrowing distal left common carotid artery    Intracranial narrowing as described above.    Partially visualized pleural effusions and soft tissue density in the subcarinal region. Questionable filling defects in the segmental and subsegmental upper pulmonary arteries. Evaluation significantly limited due to artifacts from left chest wall device. CTA chest recommended for further evaluation                SALOMÓN GARCÍA M.D., ATTENDING RADIOLOGIST  This document has been electronically signed. Jun 4 2020  8:21PM                < end of copied text >

## 2020-06-08 ENCOUNTER — TRANSCRIPTION ENCOUNTER (OUTPATIENT)
Age: 75
End: 2020-06-08

## 2020-06-08 DIAGNOSIS — M79.672 PAIN IN LEFT FOOT: ICD-10-CM

## 2020-06-08 LAB
-  AMPICILLIN/SULBACTAM: SIGNIFICANT CHANGE UP
-  CEFAZOLIN: SIGNIFICANT CHANGE UP
-  CLINDAMYCIN: SIGNIFICANT CHANGE UP
-  ERYTHROMYCIN: SIGNIFICANT CHANGE UP
-  GENTAMICIN: SIGNIFICANT CHANGE UP
-  OXACILLIN: SIGNIFICANT CHANGE UP
-  PENICILLIN: SIGNIFICANT CHANGE UP
-  RIFAMPIN: SIGNIFICANT CHANGE UP
-  TETRACYCLINE: SIGNIFICANT CHANGE UP
-  TRIMETHOPRIM/SULFAMETHOXAZOLE: SIGNIFICANT CHANGE UP
-  VANCOMYCIN: SIGNIFICANT CHANGE UP
ALBUMIN SERPL ELPH-MCNC: 2.4 G/DL — LOW (ref 3.5–5)
ALP SERPL-CCNC: 303 U/L — HIGH (ref 40–120)
ALT FLD-CCNC: 31 U/L DA — SIGNIFICANT CHANGE UP (ref 10–60)
ANION GAP SERPL CALC-SCNC: 9 MMOL/L — SIGNIFICANT CHANGE UP (ref 5–17)
APTT BLD: 153.3 SEC — CRITICAL HIGH (ref 27.5–36.3)
APTT BLD: 53.9 SEC — HIGH (ref 27.5–36.3)
APTT BLD: 74.4 SEC — HIGH (ref 27.5–36.3)
AST SERPL-CCNC: 25 U/L — SIGNIFICANT CHANGE UP (ref 10–40)
BASOPHILS # BLD AUTO: 0.04 K/UL — SIGNIFICANT CHANGE UP (ref 0–0.2)
BASOPHILS NFR BLD AUTO: 0.2 % — SIGNIFICANT CHANGE UP (ref 0–2)
BILIRUB SERPL-MCNC: 0.5 MG/DL — SIGNIFICANT CHANGE UP (ref 0.2–1.2)
BUN SERPL-MCNC: 25 MG/DL — HIGH (ref 7–18)
CALCIUM SERPL-MCNC: 8.1 MG/DL — LOW (ref 8.4–10.5)
CHLORIDE SERPL-SCNC: 103 MMOL/L — SIGNIFICANT CHANGE UP (ref 96–108)
CO2 SERPL-SCNC: 23 MMOL/L — SIGNIFICANT CHANGE UP (ref 22–31)
CREAT SERPL-MCNC: 1.27 MG/DL — SIGNIFICANT CHANGE UP (ref 0.5–1.3)
CULTURE RESULTS: SIGNIFICANT CHANGE UP
EOSINOPHIL # BLD AUTO: 0.41 K/UL — SIGNIFICANT CHANGE UP (ref 0–0.5)
EOSINOPHIL NFR BLD AUTO: 2.5 % — SIGNIFICANT CHANGE UP (ref 0–6)
GLUCOSE BLDC GLUCOMTR-MCNC: 152 MG/DL — HIGH (ref 70–99)
GLUCOSE BLDC GLUCOMTR-MCNC: 179 MG/DL — HIGH (ref 70–99)
GLUCOSE BLDC GLUCOMTR-MCNC: 182 MG/DL — HIGH (ref 70–99)
GLUCOSE BLDC GLUCOMTR-MCNC: 187 MG/DL — HIGH (ref 70–99)
GLUCOSE SERPL-MCNC: 122 MG/DL — HIGH (ref 70–99)
HCT VFR BLD CALC: 30.9 % — LOW (ref 34.5–45)
HGB BLD-MCNC: 9.5 G/DL — LOW (ref 11.5–15.5)
IMM GRANULOCYTES NFR BLD AUTO: 1.4 % — SIGNIFICANT CHANGE UP (ref 0–1.5)
LYMPHOCYTES # BLD AUTO: 15.9 % — SIGNIFICANT CHANGE UP (ref 13–44)
LYMPHOCYTES # BLD AUTO: 2.57 K/UL — SIGNIFICANT CHANGE UP (ref 1–3.3)
MAGNESIUM SERPL-MCNC: 2.1 MG/DL — SIGNIFICANT CHANGE UP (ref 1.6–2.6)
MCHC RBC-ENTMCNC: 28.5 PG — SIGNIFICANT CHANGE UP (ref 27–34)
MCHC RBC-ENTMCNC: 30.7 GM/DL — LOW (ref 32–36)
MCV RBC AUTO: 92.8 FL — SIGNIFICANT CHANGE UP (ref 80–100)
METHOD TYPE: SIGNIFICANT CHANGE UP
MONOCYTES # BLD AUTO: 1.11 K/UL — HIGH (ref 0–0.9)
MONOCYTES NFR BLD AUTO: 6.9 % — SIGNIFICANT CHANGE UP (ref 2–14)
NEUTROPHILS # BLD AUTO: 11.79 K/UL — HIGH (ref 1.8–7.4)
NEUTROPHILS NFR BLD AUTO: 73.1 % — SIGNIFICANT CHANGE UP (ref 43–77)
NRBC # BLD: 0 /100 WBCS — SIGNIFICANT CHANGE UP (ref 0–0)
PHOSPHATE SERPL-MCNC: 3.8 MG/DL — SIGNIFICANT CHANGE UP (ref 2.5–4.5)
PLATELET # BLD AUTO: 363 K/UL — SIGNIFICANT CHANGE UP (ref 150–400)
POTASSIUM SERPL-MCNC: 4.6 MMOL/L — SIGNIFICANT CHANGE UP (ref 3.5–5.3)
POTASSIUM SERPL-SCNC: 4.6 MMOL/L — SIGNIFICANT CHANGE UP (ref 3.5–5.3)
PROCALCITONIN SERPL-MCNC: 0.17 NG/ML — HIGH (ref 0.02–0.1)
PROT SERPL-MCNC: 6.9 G/DL — SIGNIFICANT CHANGE UP (ref 6–8.3)
RBC # BLD: 3.33 M/UL — LOW (ref 3.8–5.2)
RBC # FLD: 15.9 % — HIGH (ref 10.3–14.5)
SODIUM SERPL-SCNC: 135 MMOL/L — SIGNIFICANT CHANGE UP (ref 135–145)
SPECIMEN SOURCE: SIGNIFICANT CHANGE UP
WBC # BLD: 16.14 K/UL — HIGH (ref 3.8–10.5)
WBC # FLD AUTO: 16.14 K/UL — HIGH (ref 3.8–10.5)

## 2020-06-08 PROCEDURE — 99233 SBSQ HOSP IP/OBS HIGH 50: CPT

## 2020-06-08 PROCEDURE — 99221 1ST HOSP IP/OBS SF/LOW 40: CPT

## 2020-06-08 PROCEDURE — 99231 SBSQ HOSP IP/OBS SF/LOW 25: CPT | Mod: GC,57

## 2020-06-08 RX ORDER — TRAMADOL HYDROCHLORIDE 50 MG/1
25 TABLET ORAL EVERY 6 HOURS
Refills: 0 | Status: DISCONTINUED | OUTPATIENT
Start: 2020-06-08 | End: 2020-06-09

## 2020-06-08 RX ORDER — CHLORHEXIDINE GLUCONATE 213 G/1000ML
1 SOLUTION TOPICAL ONCE
Refills: 0 | Status: COMPLETED | OUTPATIENT
Start: 2020-06-08 | End: 2020-06-09

## 2020-06-08 RX ORDER — HYDRALAZINE HCL 50 MG
50 TABLET ORAL THREE TIMES A DAY
Refills: 0 | Status: DISCONTINUED | OUTPATIENT
Start: 2020-06-08 | End: 2020-06-09

## 2020-06-08 RX ADMIN — Medication 650 MILLIGRAM(S): at 15:58

## 2020-06-08 RX ADMIN — ATORVASTATIN CALCIUM 40 MILLIGRAM(S): 80 TABLET, FILM COATED ORAL at 21:51

## 2020-06-08 RX ADMIN — MEROPENEM 100 MILLIGRAM(S): 1 INJECTION INTRAVENOUS at 17:13

## 2020-06-08 RX ADMIN — MEROPENEM 100 MILLIGRAM(S): 1 INJECTION INTRAVENOUS at 05:03

## 2020-06-08 RX ADMIN — Medication 10 MILLIGRAM(S): at 05:37

## 2020-06-08 RX ADMIN — Medication 50 MILLIGRAM(S): at 21:51

## 2020-06-08 RX ADMIN — TRAMADOL HYDROCHLORIDE 25 MILLIGRAM(S): 50 TABLET ORAL at 17:52

## 2020-06-08 RX ADMIN — Medication 1: at 16:31

## 2020-06-08 RX ADMIN — HEPARIN SODIUM 0 UNIT(S)/HR: 5000 INJECTION INTRAVENOUS; SUBCUTANEOUS at 06:24

## 2020-06-08 RX ADMIN — Medication 1: at 07:59

## 2020-06-08 RX ADMIN — Medication 110 MILLIGRAM(S): at 05:36

## 2020-06-08 RX ADMIN — HEPARIN SODIUM 700 UNIT(S)/HR: 5000 INJECTION INTRAVENOUS; SUBCUTANEOUS at 07:27

## 2020-06-08 RX ADMIN — Medication 5 MILLIGRAM(S): at 21:51

## 2020-06-08 RX ADMIN — Medication 1: at 21:57

## 2020-06-08 RX ADMIN — Medication 10 MILLIGRAM(S): at 13:29

## 2020-06-08 RX ADMIN — PANTOPRAZOLE SODIUM 40 MILLIGRAM(S): 20 TABLET, DELAYED RELEASE ORAL at 11:19

## 2020-06-08 RX ADMIN — Medication 1 APPLICATION(S): at 11:19

## 2020-06-08 RX ADMIN — Medication 50 MILLIGRAM(S): at 06:06

## 2020-06-08 RX ADMIN — Medication 1 TABLET(S): at 11:51

## 2020-06-08 RX ADMIN — Medication 1 TABLET(S): at 07:59

## 2020-06-08 RX ADMIN — Medication 1 TABLET(S): at 16:32

## 2020-06-08 RX ADMIN — Medication 1: at 11:52

## 2020-06-08 RX ADMIN — INSULIN GLARGINE 10 UNIT(S): 100 INJECTION, SOLUTION SUBCUTANEOUS at 21:59

## 2020-06-08 RX ADMIN — Medication 110 MILLIGRAM(S): at 17:13

## 2020-06-08 RX ADMIN — HEPARIN SODIUM 800 UNIT(S)/HR: 5000 INJECTION INTRAVENOUS; SUBCUTANEOUS at 20:20

## 2020-06-08 RX ADMIN — Medication 10 MILLIGRAM(S): at 05:36

## 2020-06-08 RX ADMIN — HEPARIN SODIUM 800 UNIT(S)/HR: 5000 INJECTION INTRAVENOUS; SUBCUTANEOUS at 13:46

## 2020-06-08 NOTE — CONSULT NOTE ADULT - CONSULT REQUESTED BY NAME
----
Dr. Oseguera
Dr. Robertson
Dr. Robertson
MD
Ron Robertson MD
dr mccollum
md Girish
Dr. Robertson

## 2020-06-08 NOTE — PROGRESS NOTE ADULT - PROBLEM SELECTOR PLAN 3
recently antibiotics changed from clindamycin to Levaquin that led to epigastric pain.  WBC remains high 15k, possible infection on stump site  - ABX switched to cefepime + doxycycline 6/1-6/4  - started cefazolin 6/5- as per ID  - lactate 2.4, procalcitonin 0.17  - CT L foot shows OM ( pt cannot get MRI dur to AICD)   repeat lactate: trended down  Podiatry Dr. Yo - plan to take the pt to OR tomorrow but need to be medically stabilized  ID Dr. Reagan recently antibiotics changed from clindamycin to Levaquin that led to epigastric pain.  WBC remains high, possible infection on stump site  - ABX: cefepime + doxycycline 6/1-6/4,  cefazolin 6/5  - c/w doxy + nicole 6/6- as per ID  - lactate 2.4 trended down, procalcitonin high 0.17  - CT L foot shows OM ( pt cannot get MRI dur to AICD)  Podiatry Dr. Yo - Need I & D  ID Dr. Reagan

## 2020-06-08 NOTE — PROGRESS NOTE ADULT - ASSESSMENT
74 year old female with vertigo and persistent nausea and vomiting .     Plan:  Nausea vomiting (resolved)   Likely 2/2 to vertigo 2/2 to acute stroke   ok for AC if needed   No indication for EGD at this time   D/W patient and daughter via video conference   Advanced care planning was discussed with patient and family.  Advanced care planning forms were reviewed and discussed.  Risks, benefits and alternatives of gastroenterologic procedures were discussed in detail and all questions were answered.

## 2020-06-08 NOTE — PROGRESS NOTE ADULT - PROBLEM SELECTOR PLAN 5
CT abdomen shows bladder wall thickening  reports weight loss and loss of appetite recently, Patient has history of Stage 1 endometrial cancer and hysterectomy  brother also had cancer and she is not sure about which one  - urine cytology: NEGATIVE FOR HIGH GRADE UROTHELIAL CARCINOMA  urology consulted: outpt f/u w/  CT abdomen shows bladder wall thickening  reports weight loss and loss of appetite recently, Patient has history of Stage 1 endometrial cancer and hysterectomy  brother also had cancer and she is not sure about which one  - urine cytology: NEGATIVE FOR HIGH GRADE UROTHELIAL CARCINOMA  urology consulted: outpt f/u w/ , need cystoscopy

## 2020-06-08 NOTE — PROGRESS NOTE ADULT - SUBJECTIVE AND OBJECTIVE BOX
Patient is a 74y old  Female who presents with a chief complaint of Intractable nausea and vomiting (08 Jun 2020 07:43)      INTERVAL HPI/OVERNIGHT EVENTS:      REVIEW OF SYSTEMS:  CONSTITUTIONAL: No fever, weight loss, or fatigue  EYES: No eye pain, visual disturbances, or discharge  ENMT:  No difficulty hearing, tinnitus, vertigo; No sinus or throat pain  NECK: No pain or stiffness  BREASTS: No pain, masses, or nipple discharge  RESPIRATORY: No cough, wheezing, chills or hemoptysis; No shortness of breath  CARDIOVASCULAR: No chest pain, palpitations, dizziness, or leg swelling  GASTROINTESTINAL: No abdominal or epigastric pain. No nausea, vomiting, or hematemesis; No diarrhea or constipation. No melena or hematochezia.  GENITOURINARY: No dysuria, frequency, hematuria, or incontinence  NEUROLOGICAL: No headaches, memory loss, loss of strength, numbness, or tremors  SKIN: No itching, burning, rashes, or lesions   LYMPH NODES: No enlarged glands  ENDOCRINE: No heat or cold intolerance; No hair loss  MUSCULOSKELETAL: No joint pain or swelling; No muscle, back, or extremity pain  HEME/LYMPH: No easy bruising, or bleeding gums  ALLERY AND IMMUNOLOGIC: No hives or eczema    FAMILY HISTORY:    Vital Signs Last 24 Hrs  T(C): 36.8 (08 Jun 2020 10:31), Max: 37.1 (08 Jun 2020 04:50)  T(F): 98.2 (08 Jun 2020 10:31), Max: 98.7 (08 Jun 2020 04:50)  HR: 89 (08 Jun 2020 10:31) (82 - 122)  BP: 149/66 (08 Jun 2020 10:31) (126/100 - 159/78)  BP(mean): --  RR: 18 (08 Jun 2020 10:31) (16 - 19)  SpO2: 99% (08 Jun 2020 10:31) (95% - 100%)      PHYSICAL EXAM:  GENERAL: NAD, well-groomed, well-developed  HEAD:  Atraumatic, Normocephalic  EYES: EOMI, PERRLA, conjunctiva and sclera clear  ENMT: No tonsillar erythema, exudates, or enlargement; Moist mucous membranes, Good dentition, No lesions  NECK: Supple, No JVD, Normal thyroid  NERVOUS SYSTEM:  Alert & Oriented X3, Good concentration; Motor Strength 5/5 B/L upper and lower extremities; DTRs 2+ intact and symmetric  CHEST/LUNG: Clear to percussion bilaterally; No rales, rhonchi, wheezing, or rubs  HEART: Regular rate and rhythm; No murmurs, rubs, or gallops  ABDOMEN: Soft, Nontender, Nondistended; Bowel sounds present  EXTREMITIES:  2+ Peripheral Pulses, No clubbing, cyanosis, or edema  LYMPH: No lymphadenopathy noted  SKIN: No rashes or lesions    Consultant(s) Notes Reviewed:  [x ] YES  [ ] NO  Care Discussed with Consultants/Other Providers [ x] YES  [ ] NO    LABS:        RADIOLOGY & ADDITIONAL TESTS:    Imaging Personally Reviewed:  [ ] YES  [ ] NO  acetaminophen   Tablet .. 650 milliGRAM(s) Oral every 6 hours PRN  aluminum hydroxide/magnesium hydroxide/simethicone Suspension 30 milliLiter(s) Oral every 6 hours PRN  atorvastatin 40 milliGRAM(s) Oral at bedtime  Dakins Solution - 1/4 Strength 1 Application(s) Topical daily  diphenhydrAMINE   Injectable 15 milliGRAM(s) IV Push every 6 hours PRN  doxycycline IVPB 100 milliGRAM(s) IV Intermittent every 12 hours  heparin  Infusion.  Unit(s)/Hr IV Continuous <Continuous>  hydrALAZINE Injectable 10 milliGRAM(s) IV Push every 8 hours  insulin glargine Injectable (LANTUS) 10 Unit(s) SubCutaneous at bedtime  insulin lispro (HumaLOG) corrective regimen sliding scale   SubCutaneous Before meals and at bedtime  lactobacillus acidophilus 1 Tablet(s) Oral three times a day with meals  melatonin 5 milliGRAM(s) Oral at bedtime  meropenem  IVPB 1000 milliGRAM(s) IV Intermittent every 12 hours  metoprolol succinate ER 50 milliGRAM(s) Oral daily  metoprolol tartrate Injectable 5 milliGRAM(s) IV Push every 6 hours PRN  ondansetron Injectable 4 milliGRAM(s) IV Push every 8 hours PRN  pantoprazole  Injectable 40 milliGRAM(s) IV Push daily  sodium chloride 0.9% with potassium chloride 20 mEq/L 1000 milliLiter(s) IV Continuous <Continuous>      HEALTH ISSUES - PROBLEM Dx:  Stroke: Stroke  Osteomyelitis of left foot, unspecified type: Osteomyelitis of left foot, unspecified type  Intractable nausea and vomiting: Intractable nausea and vomiting  Stump pain: Stump pain  Prophylactic measure: Prophylactic measure  CHF (congestive heart failure): CHF (congestive heart failure)  CAD (coronary artery disease): CAD (coronary artery disease)  PVD (peripheral vascular disease): PVD (peripheral vascular disease)  Bladder wall thickening: Bladder wall thickening  DM (diabetes mellitus): DM (diabetes mellitus)  HTN (hypertension): HTN (hypertension)  Intractable abdominal pain: Intractable abdominal pain  Gastritis, acute: Gastritis, acute Patient is a 74y old  Female who presents with a chief complaint of Intractable nausea and vomiting (08 Jun 2020 07:43)      INTERVAL HPI/OVERNIGHT EVENTS:    - pt stated pt feels better. As per RN, pt had n/v overnight and zofran was given.  - pt agreed for vacular surgery tmrw.  - Discussed with Dr. Robertson        REVIEW OF SYSTEMS:  CONSTITUTIONAL: No fever, (+)weight loss,  fatigue  EYES: No eye pain, visual disturbances, or discharge  ENMT:  No difficulty hearing, tinnitus, vertigo; No sinus or throat pain (+) liquid drip sensation in R ear  NECK: No pain or stiffness  BREASTS: No pain, masses, or nipple discharge  RESPIRATORY: No cough, wheezing, chills or hemoptysis; No shortness of breath  CARDIOVASCULAR: No chest pain, palpitations, dizziness, or leg swelling  GASTROINTESTINAL: No abdominal (+) epigastric pain, better than before.  (+) nausea, vomiting better than before, no hematemesis; No diarrhea or constipation. No melena or hematochezia.  GENITOURINARY: No dysuria, frequency, hematuria, or incontinence  NEUROLOGICAL: (+) dizziness and  headaches, no memory loss, loss of strength, numbness, or tremors  SKIN: No itching, burning, rashes, or lesions   LYMPH NODES: No enlarged glands  ENDOCRINE: No heat or cold intolerance; No hair loss  MUSCULOSKELETAL: No joint pain or swelling; No muscle, back, or extremity pain  HEME/LYMPH: No easy bruising, or bleeding gums  ALLERY AND IMMUNOLOGIC: No hives or eczema    FAMILY HISTORY:    Vital Signs Last 24 Hrs  T(C): 36.8 (08 Jun 2020 10:31), Max: 37.1 (08 Jun 2020 04:50)  T(F): 98.2 (08 Jun 2020 10:31), Max: 98.7 (08 Jun 2020 04:50)  HR: 89 (08 Jun 2020 10:31) (82 - 122)  BP: 149/66 (08 Jun 2020 10:31) (126/100 - 159/78)  BP(mean): --  RR: 18 (08 Jun 2020 10:31) (16 - 19)  SpO2: 99% (08 Jun 2020 10:31) (95% - 100%)      PHYSICAL EXAM:  GENERAL: moderately distressed by n/v  HEAD:  Atraumatic, Normocephalic  EYES: (+) nystagmus b/l, pupil constricted and fixed, conjunctiva and sclera clear, (+) conjunctiva anemic   ENMT: No tonsillar erythema, exudates, or enlargement; Moist mucous membranes, No lesions  NECK: Supple, No JVD, Normal thyroid  NERVOUS SYSTEM:  Alert & Oriented X3; Motor Strength 5/5 B/L upper and lower extremities (+) dysmetria on L hand  CHEST/LUNG: Clear to percussion bilaterally; No rales, rhonchi, wheezing, or rubs  HEART: Regular rate, tachycardia, No murmurs, rubs, or gallops  ABDOMEN: Soft, (+)tender on palpation to epigastric area, Nondistended; Bowel sounds present  EXTREMITIES:  2+ Peripheral Pulses, No clubbing, cyanosis, or edema (+) s/p L toe amputation, has pain  LYMPH: No lymphadenopathy noted  SKIN: (+) s/p L toe amputation, has dressing          Consultant(s) Notes Reviewed:  [x ] YES  [ ] NO  Care Discussed with Consultants/Other Providers [ x] YES  [ ] NO    LABS:        RADIOLOGY & ADDITIONAL TESTS:    < from: [facility] Physiol Extremity Lower 3+ Level, BI (06.06.20 @ 12:48) >  Findings/  Impression:  Right lower extremity: The ankle brachial index is 0.6. The pulse waveforms are diffusely diminished.    Left lower extremity: The ankle brachial index is 0.5. The pulse waveforms are diffusely diminished, however particularly severe in the left foot.    Severe bilateral peripheral artery disease.    Pulse volume recordings are diffusely diminished bilaterally, however severely diminished in the left foot.      < end of copied text >      Imaging Personally Reviewed:  [ ] YES  [ ] NO  acetaminophen   Tablet .. 650 milliGRAM(s) Oral every 6 hours PRN  aluminum hydroxide/magnesium hydroxide/simethicone Suspension 30 milliLiter(s) Oral every 6 hours PRN  atorvastatin 40 milliGRAM(s) Oral at bedtime  Dakins Solution - 1/4 Strength 1 Application(s) Topical daily  diphenhydrAMINE   Injectable 15 milliGRAM(s) IV Push every 6 hours PRN  doxycycline IVPB 100 milliGRAM(s) IV Intermittent every 12 hours  heparin  Infusion.  Unit(s)/Hr IV Continuous <Continuous>  hydrALAZINE Injectable 10 milliGRAM(s) IV Push every 8 hours  insulin glargine Injectable (LANTUS) 10 Unit(s) SubCutaneous at bedtime  insulin lispro (HumaLOG) corrective regimen sliding scale   SubCutaneous Before meals and at bedtime  lactobacillus acidophilus 1 Tablet(s) Oral three times a day with meals  melatonin 5 milliGRAM(s) Oral at bedtime  meropenem  IVPB 1000 milliGRAM(s) IV Intermittent every 12 hours  metoprolol succinate ER 50 milliGRAM(s) Oral daily  metoprolol tartrate Injectable 5 milliGRAM(s) IV Push every 6 hours PRN  ondansetron Injectable 4 milliGRAM(s) IV Push every 8 hours PRN  pantoprazole  Injectable 40 milliGRAM(s) IV Push daily  sodium chloride 0.9% with potassium chloride 20 mEq/L 1000 milliLiter(s) IV Continuous <Continuous>      HEALTH ISSUES - PROBLEM Dx:  Stroke: Stroke  Osteomyelitis of left foot, unspecified type: Osteomyelitis of left foot, unspecified type  Intractable nausea and vomiting: Intractable nausea and vomiting  Stump pain: Stump pain  Prophylactic measure: Prophylactic measure  CHF (congestive heart failure): CHF (congestive heart failure)  CAD (coronary artery disease): CAD (coronary artery disease)  PVD (peripheral vascular disease): PVD (peripheral vascular disease)  Bladder wall thickening: Bladder wall thickening  DM (diabetes mellitus): DM (diabetes mellitus)  HTN (hypertension): HTN (hypertension)  Intractable abdominal pain: Intractable abdominal pain  Gastritis, acute: Gastritis, acute

## 2020-06-08 NOTE — PROGRESS NOTE ADULT - SUBJECTIVE AND OBJECTIVE BOX
INTERVAL HPI/OVERNIGHT EVENTS:  No acute events overnight. Pt resting comfortably. Reports nausea but no vomiting. With supplemental O2 via NC.    MEDICATIONS  (STANDING):  atorvastatin 40 milliGRAM(s) Oral at bedtime  Dakins Solution - 1/4 Strength 1 Application(s) Topical daily  doxycycline IVPB 100 milliGRAM(s) IV Intermittent every 12 hours  heparin  Infusion.  Unit(s)/Hr (12 mL/Hr) IV Continuous <Continuous>  hydrALAZINE Injectable 10 milliGRAM(s) IV Push every 8 hours  insulin glargine Injectable (LANTUS) 10 Unit(s) SubCutaneous at bedtime  insulin lispro (HumaLOG) corrective regimen sliding scale   SubCutaneous Before meals and at bedtime  lactobacillus acidophilus 1 Tablet(s) Oral three times a day with meals  melatonin 5 milliGRAM(s) Oral at bedtime  meropenem  IVPB 1000 milliGRAM(s) IV Intermittent every 12 hours  metoprolol succinate ER 50 milliGRAM(s) Oral daily  pantoprazole  Injectable 40 milliGRAM(s) IV Push daily  sodium chloride 0.9% with potassium chloride 20 mEq/L 1000 milliLiter(s) (75 mL/Hr) IV Continuous <Continuous>    MEDICATIONS  (PRN):  acetaminophen   Tablet .. 650 milliGRAM(s) Oral every 6 hours PRN Mild Pain (1 - 3), Moderate Pain (4 - 6)  aluminum hydroxide/magnesium hydroxide/simethicone Suspension 30 milliLiter(s) Oral every 6 hours PRN Dyspepsia  diphenhydrAMINE   Injectable 15 milliGRAM(s) IV Push every 6 hours PRN nausea or vomiting  metoprolol tartrate Injectable 5 milliGRAM(s) IV Push every 6 hours PRN HR greater than 120  ondansetron Injectable 4 milliGRAM(s) IV Push every 8 hours PRN Nausea and/or Vomiting      Vital Signs Last 24 Hrs  T(C): 37.1 (08 Jun 2020 04:50), Max: 37.1 (08 Jun 2020 04:50)  T(F): 98.7 (08 Jun 2020 04:50), Max: 98.7 (08 Jun 2020 04:50)  HR: 87 (08 Jun 2020 04:50) (82 - 122)  BP: 152/75 (08 Jun 2020 04:50) (126/100 - 159/78)  RR: 18 (08 Jun 2020 04:50) (16 - 19)  SpO2: 99% (08 Jun 2020 04:50) (95% - 100%)    Physical:  General: awake, alert, NAD  Respiratory: no resp distress, no accessory muscle use, saturating 99% with 2L NC  Left lower extremity: left foot dressed by podiatry; with prior hallux amputation, dressing clean/dry/intact, no LLE erythema noted beyond dressing; no crepitus, tender at amp site and proximal 4-5cm to amp site medially, foot cool, + cap refill noted to distal digits, non palpable DP      LABS:                      9.5    16.14 )-----------( 363      ( 08 Jun 2020 05:42 )             30.9             06-08    135  |  103  |  25<H>  ----------------------------<  122<H>  4.6   |  23  |  1.27    Ca    8.1<L>      08 Jun 2020 05:42  Phos  3.8     06-08  Mg     2.1     06-08    TPro  6.9  /  Alb  2.4<L>  /  TBili  0.5  /  DBili  x   /  AST  25  /  ALT  31  /  AlkPhos  303<H>  06-08

## 2020-06-08 NOTE — PROGRESS NOTE ADULT - SUBJECTIVE AND OBJECTIVE BOX
Summary:   74y  Female      Subjective:   Eating a full tray   Nausea is much better   Objective:    MEDICATIONS  (STANDING):  atorvastatin 40 milliGRAM(s) Oral at bedtime  ceFAZolin   IVPB 2000 milliGRAM(s) IV Intermittent every 8 hours  ceFAZolin   IVPB      Dakins Solution - 1/4 Strength 1 Application(s) Topical daily  dextrose 5% + sodium chloride 0.9% 1000 milliLiter(s) (60 mL/Hr) IV Continuous <Continuous>  heparin  Infusion.  Unit(s)/Hr (12 mL/Hr) IV Continuous <Continuous>  hydrALAZINE Injectable 10 milliGRAM(s) IV Push every 8 hours  insulin glargine Injectable (LANTUS) 10 Unit(s) SubCutaneous at bedtime  insulin lispro (HumaLOG) corrective regimen sliding scale   SubCutaneous every 6 hours  lactobacillus acidophilus 1 Tablet(s) Oral three times a day with meals  metoclopramide Injectable 10 milliGRAM(s) IV Push every 8 hours  metoprolol tartrate Injectable 7.5 milliGRAM(s) IV Push every 6 hours  pantoprazole  Injectable 40 milliGRAM(s) IV Push daily    MEDICATIONS  (PRN):  acetaminophen   Tablet .. 650 milliGRAM(s) Oral every 6 hours PRN Mild Pain (1 - 3), Moderate Pain (4 - 6)  aluminum hydroxide/magnesium hydroxide/simethicone Suspension 30 milliLiter(s) Oral every 6 hours PRN Dyspepsia  diphenhydrAMINE   Injectable 15 milliGRAM(s) IV Push every 6 hours PRN nausea or vomiting  ondansetron Injectable 4 milliGRAM(s) IV Push every 8 hours PRN Nausea and/or Vomiting              Vital Signs Last 24 Hrs  T(C): 36.6 (05 Jun 2020 14:23), Max: 37.1 (04 Jun 2020 21:22)  T(F): 97.8 (05 Jun 2020 14:23), Max: 98.8 (04 Jun 2020 21:22)  HR: 104 (05 Jun 2020 16:09) (96 - 118)  BP: 161/89 (05 Jun 2020 16:09) (146/88 - 170/83)  BP(mean): --  RR: 16 (05 Jun 2020 16:09) (16 - 20)  SpO2: 98% (05 Jun 2020 16:09) (93% - 100%)      General:  Well developed, well nourished, alert and active, no pallor, NAD.  HEENT:    Normal appearance of conjunctiva, ears, nose, lips, oropharynx, and oral mucosa, anicteric.  Neck:  No masses, no asymmetry.  Lymph Nodes:  No lymphadenopathy.   Cardiovascular:  RRR normal S1/S2, no murmur.  Respiratory:  CTA B/L, normal respiratory effort.   Abdominal:   soft, no masses or tenderness, normoactive BS, NT/ND, no HSM.  Extremities:   No clubbing or cyanosis, normal capillary refill, no edema.   Skin:   No rash, jaundice, lesions, eczema.   Musculoskeletal:  No joint swelling, erythema or tenderness.   Neuro: No focal deficits.   Other:       LABS:                        10.9   21.24 )-----------( 455      ( 05 Jun 2020 10:24 )             34.2     06-05    135  |  99  |  22<H>  ----------------------------<  158<H>  3.3<L>   |  24  |  1.07    Ca    8.3<L>      05 Jun 2020 10:24  Phos  2.7     06-05  Mg     2.7     06-05    TPro  7.6  /  Alb  2.5<L>  /  TBili  0.5  /  DBili  x   /  AST  22  /  ALT  57  /  AlkPhos  298<H>  06-05    PT/INR - ( 04 Jun 2020 20:53 )   PT: 15.7 sec;   INR: 1.38 ratio         PTT - ( 05 Jun 2020 10:24 )  PTT:95.7 sec      RADIOLOGY & ADDITIONAL TESTS:  < from: CT Angio Neck w/ IV Cont (06.04.20 @ 20:09) >    EXAM:  CT ANGIO NECK (W)AW IC                          EXAM:  CT ANGIO BRAIN (W)AW IC                            PROCEDURE DATE:  06/04/2020          INTERPRETATION:  CLINICAL STATEMENT: Nausea vomiting vertically    TECHNIQUE: CTA of the head and neck performed with IV contrast.  3-D MIP imaging obtained. Approximately 90 cc of Omnipaque 350 administered.    COMPARISON: None.    FINDINGS:  CTA of the neck:  Evaluation of the origins of the great vessels is limited due to artifacts.    The visualized common carotid and internal carotid arteries are patent. Calcified atherosclerotic plaque at the right carotid bulb results in focal mild narrowing of the origin of the right internal carotid artery.    Atherosclerotic plaque noted resulting inmoderate narrowing distal left common carotid artery.    The visualized extracranial vertebral arteries are patent. Origin of vertebral arteries not evaluated.    Left anterior chest device causes artifacts limiting evaluation of the chest left chestwall device limits evaluation of the chest due to artifacts. Questionable filling defects noted in the upper segmental and subsegmental pulmonary arteries. Possible groundglass opacities. Pleural effusions noted. Partially visualized soft tissue densities noted in the subcarinal region    CTA Head:  Calcified atherosclerotic plaques results in narrowing of the cavernous and supraclinoid bilateral internal carotid arteries. There is severe narrowing of the terminal segment of distal left internal carotid artery    The proximal anterior, middle and posterior cerebral arteries are patent without hemodynamically significant stenosis.    The intracranial vertebral and basilar arteries are patent. Calcified atherosclerotic plaques results in narrowing of bilateral V4 segments of distal vertebral arteries.    There is no intracranial aneurysm.        IMPRESSION:  Focal mild (less than 50%) narrowing at the origin of right internal carotid artery.    Short segment Moderate (50-70%) narrowing distal left common carotid artery    Intracranial narrowing as described above.    Partially visualized pleural effusions and soft tissue density in the subcarinal region. Questionable filling defects in the segmental and subsegmental upper pulmonary arteries. Evaluation significantly limited due to artifacts from left chest wall device. CTA chest recommended for further evaluation                SALOMÓN GARCÍA M.D., ATTENDING RADIOLOGIST  This document has been electronically signed. Jun 4 2020  8:21PM                < end of copied text >

## 2020-06-08 NOTE — PROGRESS NOTE ADULT - ASSESSMENT
74F with left foot OM at prior hallux amp site   ESTRELLA signal noted distal to pop stent, but diminished wave forms distally and severely diminished signals to left toes    - Plan for Podiatry debridement of left foot infection/osteo at prior amp site when cardio/medically cleared  - Will need stress test as per cardio Dr. Segundo  - Will need CT angio of LLE to evaluate vascular anatomy and stent patency  - Infection source control should not be delayed for CTA in prep for possible further revascularization in future  - Cont local wound care and dressing changes as per podiatry  - Podiatry follow-up; Dr. Carpenter will f/u  - ID f/u, on doxycycline and meropenem  - To discuss with Dr. Grove

## 2020-06-08 NOTE — PROGRESS NOTE ADULT - SUBJECTIVE AND OBJECTIVE BOX
Subjective says her nausea and vomiting has abated. seen by vascular and podiatry for angio and debridement of foot tomorrow. cleared by cardio and neuro . no fevers       PHYSICAL EXAM:    Vital Signs Last 24 Hrs  T(C): 36.6 (08 Jun 2020 13:57), Max: 37.1 (08 Jun 2020 04:50)  T(F): 97.8 (08 Jun 2020 13:57), Max: 98.7 (08 Jun 2020 04:50)  HR: 90 (08 Jun 2020 19:22) (84 - 122)  BP: 168/65 (08 Jun 2020 19:22) (146/81 - 174/62)  BP(mean): --  RR: 18 (08 Jun 2020 19:22) (16 - 18)  SpO2: 92% (08 Jun 2020 19:22) (92% - 100%)    Constitutional: awake alert oriented times 3   [place] SCLERA anicteric EOMI   LUNGS clear   CVS s1 s2 aud no murmurs   ABDOMEN soft non tender no HSM   NEUROLOGY  no defecits   SKIN left first toe stump swelling tender with some open wound probing to bone as per podiatry   /dried wound /erythema plantar aspect   EXTREMITIES no edema no cyanosis         LABS/DIAGNOSTIC TESTS                        9.5    16.14 )-----------( 363      ( 08 Jun 2020 05:42 )             30.9     06-08    135  |  103  |  25<H>  ----------------------------<  122<H>  4.6   |  23  |  1.27    Ca    8.1<L>      08 Jun 2020 05:42  Phos  3.8     06-08  Mg     2.1     06-08    TPro  6.9  /  Alb  2.4<L>  /  TBili  0.5  /  DBili  x   /  AST  25  /  ALT  31  /  AlkPhos  303<H>  06-08    PTT - ( 08 Jun 2020 13:25 )  PTT:53.9 sec      meds  acetaminophen   Tablet .. 650 milliGRAM(s) Oral every 6 hours PRN  aluminum hydroxide/magnesium hydroxide/simethicone Suspension 30 milliLiter(s) Oral every 6 hours PRN  atorvastatin 40 milliGRAM(s) Oral at bedtime  chlorhexidine 4% Liquid 1 Application(s) Topical once  Dakins Solution - 1/4 Strength 1 Application(s) Topical daily  diphenhydrAMINE   Injectable 15 milliGRAM(s) IV Push every 6 hours PRN  doxycycline IVPB 100 milliGRAM(s) IV Intermittent every 12 hours  heparin  Infusion.  Unit(s)/Hr IV Continuous <Continuous>  hydrALAZINE 50 milliGRAM(s) Oral three times a day  insulin glargine Injectable (LANTUS) 10 Unit(s) SubCutaneous at bedtime  insulin lispro (HumaLOG) corrective regimen sliding scale   SubCutaneous Before meals and at bedtime  lactobacillus acidophilus 1 Tablet(s) Oral three times a day with meals  melatonin 5 milliGRAM(s) Oral at bedtime  meropenem  IVPB 1000 milliGRAM(s) IV Intermittent every 12 hours  metoprolol succinate ER 50 milliGRAM(s) Oral daily  metoprolol tartrate Injectable 5 milliGRAM(s) IV Push every 6 hours PRN  ondansetron Injectable 4 milliGRAM(s) IV Push every 8 hours PRN  pantoprazole  Injectable 40 milliGRAM(s) IV Push daily  sodium chloride 0.9% with potassium chloride 20 mEq/L 1000 milliLiter(s) IV Continuous <Continuous>  traMADol 25 milliGRAM(s) Oral every 6 hours PRN        CULTURES  Culture Results: wound  Moderate Staphylococcus aureus (06-06 @ 03:05)( mssa)  Culture Results: blood  No Growth Final (06-03 @ 08:52)  Culture Results: wound   Few Staphylococcus aureus (06-03 @ 00:34)( mssa)  Culture Results: blood  No Growth Final (06-02 @ 14:30)  Culture Results: blood  No Growth Final (06-02 @ 14:30)        RADIOLOGY  no new xrays

## 2020-06-08 NOTE — PROGRESS NOTE ADULT - PROBLEM SELECTOR PLAN 6
Pt takes Lisinopril 40mg, toprol Xl 100mg, hydralazine 50mg TID   BP is running on higher side as patient missed her medications and also received a bolus in ED  lipid profile, TG WNL  - pt is not tolerated for PO med.   - c/w hydralazine increased to 10mg IV TID, lopressor IV 7.5mg q6h until pt can take PO meds  - monitor BP --running high, but keep MAP around 100 for stroke Pt takes Lisinopril 40mg, toprol Xl 100mg, hydralazine 50mg TID   BP is running on higher side as patient missed her medications and also received a bolus in ED  lipid profile, TG WNL  - now pt can tolerate for PO med.   - hydralazine 10mg IV TID switched to PO 50mg TID  - lopressor IV 7.5mg q6h switched to toprol XL 50mg  - monitor BP --running high, but keep MAP around 100 for stroke

## 2020-06-08 NOTE — CONSULT NOTE ADULT - CONSULT REASON
Appendix Mucocele
Bladder wall irregularity on CT
CHF
Nausea vomiting and vertigo
___
left foot wound
left foot wound
leucocytosis/ab rx
left toe pain

## 2020-06-08 NOTE — CHART NOTE - NSCHARTNOTEFT_GEN_A_CORE
Assessment:       Nutrition reassessment for follow-up of pt re-contact. Initial Nutrition Assessment done 2020, unable to interview further ( see document). Chart reviewed, pt visited today, no more on isolation, feeling nausea, s/p vomiting/spitting up, GI consult pending;     Factors impacting intake: [ ] none [ ] nausea  [ ] vomiting [ ] diarrhea [ ] constipation  [ ]chewing problems [ ] swallowing issues  [ X ] other: altered GI function with acute gastritis, intractable nausea/vomiting  Diet Presciption: Diet, Soft:   Consistent Carbohydrate {Evening Snacks}  DASH/TLC {Sodium & Cholesterol Restricted}  Supplement Feeding Modality:  Oral  Glucerna Shake Cans or Servings Per Day:  1       Frequency:  Three Times a day (20 @ 14:29)    Intake: diet advanced to solid by MD, only able to tolerate small amount of food, with nausea/vomiting on/off, "Service Recovery" provided, no specific food tolerance/intolerance or preferences at present, willing to try Glucerna Shake     Daily Weight in k.7 (2020 04:45)  Weight in k.5 (2020 05:10)  Weight in k.5 (2020 05:34)  Weight in k.6 (2020 09:49)  Weight in k.1 (2020 05:04)    % Weight Change    Pertinent Medications: MEDICATIONS  (STANDING):  atorvastatin 40 milliGRAM(s) Oral at bedtime  Dakins Solution - 1/4 Strength 1 Application(s) Topical daily  doxycycline IVPB 100 milliGRAM(s) IV Intermittent every 12 hours  heparin  Infusion.  Unit(s)/Hr (12 mL/Hr) IV Continuous <Continuous>  hydrALAZINE Injectable 10 milliGRAM(s) IV Push every 8 hours  insulin glargine Injectable (LANTUS) 10 Unit(s) SubCutaneous at bedtime  insulin lispro (HumaLOG) corrective regimen sliding scale   SubCutaneous Before meals and at bedtime  lactobacillus acidophilus 1 Tablet(s) Oral three times a day with meals  melatonin 5 milliGRAM(s) Oral at bedtime  meropenem  IVPB 1000 milliGRAM(s) IV Intermittent every 12 hours  metoprolol succinate ER 50 milliGRAM(s) Oral daily  pantoprazole  Injectable 40 milliGRAM(s) IV Push daily  sodium chloride 0.9% with potassium chloride 20 mEq/L 1000 milliLiter(s) (75 mL/Hr) IV Continuous <Continuous>    MEDICATIONS  (PRN):  acetaminophen   Tablet .. 650 milliGRAM(s) Oral every 6 hours PRN Mild Pain (1 - 3), Moderate Pain (4 - 6)  aluminum hydroxide/magnesium hydroxide/simethicone Suspension 30 milliLiter(s) Oral every 6 hours PRN Dyspepsia  diphenhydrAMINE   Injectable 15 milliGRAM(s) IV Push every 6 hours PRN nausea or vomiting  metoprolol tartrate Injectable 5 milliGRAM(s) IV Push every 6 hours PRN HR greater than 120  ondansetron Injectable 4 milliGRAM(s) IV Push every 8 hours PRN Nausea and/or Vomiting    Pertinent Labs:  Na135 mmol/L Glu 122 mg/dL<H> K+ 4.6 mmol/L Cr  1.27 mg/dL BUN 25 mg/dL<H>  Phos 3.8 mg/dL  Alb 2.4 g/dL<L>  Chol 169 mg/dL LDL 93 mg/dL HDL 59 mg/dL Trig 87 mg/dL     CAPILLARY BLOOD GLUCOSE      POCT Blood Glucose.: 152 mg/dL (2020 07:43)  POCT Blood Glucose.: 205 mg/dL (2020 21:14)  POCT Blood Glucose.: 183 mg/dL (2020 17:03)  POCT Blood Glucose.: 200 mg/dL (2020 11:24)      Skin:     Estimated Needs:   [ ] no change since previous assessment  [ ] recalculated:     Previous Nutrition Diagnosis:   [ ] Inadequate Energy Intake [ ]Inadequate Oral Intake [ ] Excessive Energy Intake   [ ] Underweight [ ] Increased Nutrient Needs [ ] Overweight/Obesity  [ ] Swallowing Difficult   [ ] Altered GI Function [ ] Unintended Weight Loss [ ] Food & Nutrition Related Knowledge Deficit [ ] Malnutrition   [ ] Not Ready for Diet/Life Style Changes     Nutrition Diagnosis is [ ] ongoing  [ ] Improving   [ ] resolved [ ] not applicable     New Nutrition Diagnosis: [ ] not applicable       Interventions:   Recommend  [ ] Change Diet To:  [ ] Nutrition Supplement  [ ] Nutrition Support  [ ] Other:     Monitoring and Evaluation:   [ ] PO intake [ x ] Tolerance to diet prescription [ x ] weights [ x ] labs[ x ] follow up per protocol  [ ] other: Assessment:       Nutrition reassessment for follow-up. Chart reviewed, pt visited, no more on isolation, feeling better; GI consult for intractable nausea/vomiting, due to vertigo from acute stroke per MD; Pending swallowing evaluation, ordered by MD due to pt is found to have stroke, having n/v, on diet but not eating.   Factors impacting intake: [ ] none [ ] nausea  [ ] vomiting [ ] diarrhea [ ] constipation  [ ]chewing problems [ ] swallowing issues  [ X ] other: altered GI function with acute gastritis, intractable nausea/vomiting    Diet Prescription: Diet, Soft:   Consistent Carbohydrate {Evening Snacks}  DASH/TLC {Sodium & Cholesterol Restricted}  Supplement Feeding Modality:  Oral  Glucerna Shake Cans or Servings Per Day:  1       Frequency:  Three Times a day (20 @ 14:29)    Intake: diet advanced to solid by MD, tolerating small amount of food, no GI distress, no swallowing/chewing problem reported at present per pt , "Service Recovery" provided, no specific food tolerance/intolerance or preferences at present, willing to try Glucerna Shake     Daily Weight in k.7 (2020 04:45)  Weight in k.5 (2020 05:10)  Weight in k.5 (2020 05:34)  Weight in k.6 (2020 09:49)  Weight in k.1 (2020 05:04)    % Weight Change    Pertinent Medications: MEDICATIONS  (STANDING):  atorvastatin 40 milliGRAM(s) Oral at bedtime  Dakins Solution - 1/4 Strength 1 Application(s) Topical daily  doxycycline IVPB 100 milliGRAM(s) IV Intermittent every 12 hours  heparin  Infusion.  Unit(s)/Hr (12 mL/Hr) IV Continuous <Continuous>  hydrALAZINE Injectable 10 milliGRAM(s) IV Push every 8 hours  insulin glargine Injectable (LANTUS) 10 Unit(s) SubCutaneous at bedtime  insulin lispro (HumaLOG) corrective regimen sliding scale   SubCutaneous Before meals and at bedtime  lactobacillus acidophilus 1 Tablet(s) Oral three times a day with meals  melatonin 5 milliGRAM(s) Oral at bedtime  meropenem  IVPB 1000 milliGRAM(s) IV Intermittent every 12 hours  metoprolol succinate ER 50 milliGRAM(s) Oral daily  pantoprazole  Injectable 40 milliGRAM(s) IV Push daily  sodium chloride 0.9% with potassium chloride 20 mEq/L 1000 milliLiter(s) (75 mL/Hr) IV Continuous <Continuous>    MEDICATIONS  (PRN):  acetaminophen   Tablet .. 650 milliGRAM(s) Oral every 6 hours PRN Mild Pain (1 - 3), Moderate Pain (4 - 6)  aluminum hydroxide/magnesium hydroxide/simethicone Suspension 30 milliLiter(s) Oral every 6 hours PRN Dyspepsia  diphenhydrAMINE   Injectable 15 milliGRAM(s) IV Push every 6 hours PRN nausea or vomiting  metoprolol tartrate Injectable 5 milliGRAM(s) IV Push every 6 hours PRN HR greater than 120  ondansetron Injectable 4 milliGRAM(s) IV Push every 8 hours PRN Nausea and/or Vomiting    Pertinent Labs:  Na135 mmol/L Glu 122 mg/dL<H> K+ 4.6 mmol/L Cr  1.27 mg/dL BUN 25 mg/dL<H>  Phos 3.8 mg/dL  Alb 2.4 g/dL<L>  Chol 169 mg/dL LDL 93 mg/dL HDL 59 mg/dL Trig 87 mg/dL     CAPILLARY BLOOD GLUCOSE      POCT Blood Glucose.: 152 mg/dL (2020 07:43)  POCT Blood Glucose.: 205 mg/dL (2020 21:14)  POCT Blood Glucose.: 183 mg/dL (2020 17:03)  POCT Blood Glucose.: 200 mg/dL (2020 11:24)      Skin: skin wounds     Estimated Needs:   [ X ] no change since previous assessment  [ ] recalculated:     Previous Nutrition Diagnosis:   [ X ] Malnutrition ( SEVERE )    Nutrition Diagnosis is [ X ] ongoing  [ ] Improving   [ ] resolved [ ] not applicable     New Nutrition Diagnosis: [ X ] not applicable       Interventions:   Recommend  [ ] Change Diet To:  [ X ] Nutrition Supplement: change supplement to Glucerna Shake 1can tid as medically feasible (660kcal, 30g protein)  d/c Ensure Enlive   [ ] Nutrition Support  [ X ] Other: Discussed with MD/RN, pending GI consult                    Provide food choices within diet Rx as available/updated     Monitoring and Evaluation:   [ X ] PO intake [ x ] Tolerance to diet prescription [ x ] weights [ x ] labs[ x ] follow up per protocol  [ ] other:er: Assessment:       Nutrition reassessment for follow-up. Chart reviewed, pt visited, no more on isolation, feeling better, able to communicate well; GI consult for intractable nausea/vomiting, due to vertigo from acute stroke per MD; Pending swallowing evaluation, ordered by MD due to pt is found to have stroke, having n/v, on diet but not eating.   Factors impacting intake: [ ] none [ ] nausea  [ ] vomiting [ ] diarrhea [ ] constipation  [ ]chewing problems [ ] swallowing issues  [ X ] other: altered GI function with acute gastritis, intractable nausea/vomiting    Diet Prescription: Diet, Soft:   Consistent Carbohydrate {Evening Snacks}  DASH/TLC {Sodium & Cholesterol Restricted}  Supplement Feeding Modality:  Oral  Glucerna Shake Cans or Servings Per Day:  1       Frequency:  Three Times a day (20 @ 14:29)    Intake: diet advanced to solid by MD, tolerating small amount of food, 25% intake at breakfast observed, no GI distress, no swallowing/chewing problem reported at present per pt, , food choices updated and forwarded to dietary, "Service Recovery" provided, taking and tolerating Glucerna Shake per pt     Daily Weight in k.7 (2020 04:45)  Weight in k.5 (2020 05:10)  Weight in k.5 (2020 05:34)  Weight in k.6 (2020 09:49)  Weight in k.1 (2020 05:04)    % Weight Change: fluctuated may due to scale/fluid variance     Pertinent Medications: MEDICATIONS  (STANDING):  atorvastatin 40 milliGRAM(s) Oral at bedtime  Dakins Solution - 1/4 Strength 1 Application(s) Topical daily  doxycycline IVPB 100 milliGRAM(s) IV Intermittent every 12 hours  heparin  Infusion.  Unit(s)/Hr (12 mL/Hr) IV Continuous <Continuous>  hydrALAZINE Injectable 10 milliGRAM(s) IV Push every 8 hours  insulin glargine Injectable (LANTUS) 10 Unit(s) SubCutaneous at bedtime  insulin lispro (HumaLOG) corrective regimen sliding scale   SubCutaneous Before meals and at bedtime  lactobacillus acidophilus 1 Tablet(s) Oral three times a day with meals  melatonin 5 milliGRAM(s) Oral at bedtime  meropenem  IVPB 1000 milliGRAM(s) IV Intermittent every 12 hours  metoprolol succinate ER 50 milliGRAM(s) Oral daily  pantoprazole  Injectable 40 milliGRAM(s) IV Push daily  sodium chloride 0.9% with potassium chloride 20 mEq/L 1000 milliLiter(s) (75 mL/Hr) IV Continuous <Continuous>    MEDICATIONS  (PRN):  acetaminophen   Tablet .. 650 milliGRAM(s) Oral every 6 hours PRN Mild Pain (1 - 3), Moderate Pain (4 - 6)  aluminum hydroxide/magnesium hydroxide/simethicone Suspension 30 milliLiter(s) Oral every 6 hours PRN Dyspepsia  diphenhydrAMINE   Injectable 15 milliGRAM(s) IV Push every 6 hours PRN nausea or vomiting  metoprolol tartrate Injectable 5 milliGRAM(s) IV Push every 6 hours PRN HR greater than 120  ondansetron Injectable 4 milliGRAM(s) IV Push every 8 hours PRN Nausea and/or Vomiting    Pertinent Labs:  Na135 mmol/L Glu 122 mg/dL<H> K+ 4.6 mmol/L Cr  1.27 mg/dL BUN 25 mg/dL<H>  Phos 3.8 mg/dL  Alb 2.4 g/dL<L>  Chol 169 mg/dL LDL 93 mg/dL HDL 59 mg/dL Trig 87 mg/dL     CAPILLARY BLOOD GLUCOSE    POCT Blood Glucose.: 152 mg/dL (2020 07:43)  POCT Blood Glucose.: 205 mg/dL (2020 21:14)  POCT Blood Glucose.: 183 mg/dL (2020 17:03)  POCT Blood Glucose.: 200 mg/dL (2020 11:24)    Skin: skin wounds     Estimated Needs:   [ X ] no change since previous assessment  [ ] recalculated:     Previous Nutrition Diagnosis:   [ X ] Malnutrition ( SEVERE )    Nutrition Diagnosis is [ X ] ongoing  [ ] Improving   [ ] resolved [ ] not applicable     New Nutrition Diagnosis: [ X ] not applicable       Interventions:   Recommend  [ ] Change Diet To:  [ X ] Nutrition Supplement: Continue Glucerna Shake 1can tid as ordered (660kcal, 30g protein)   [ ] Nutrition Support  [ X ] Other: Discussed with RN                   Provide food choices within diet Rx as available/updated                    Pending Swallow evaluation per MD     Monitoring and Evaluation:   [ X ] PO intake [ x ] Tolerance to diet prescription [ x ] weights [ x ] labs[ x ] follow up per protocol  [ ] other:er:

## 2020-06-08 NOTE — CONSULT NOTE ADULT - SUBJECTIVE AND OBJECTIVE BOX
Source of information: DARRION MELENDEZ, Chart review  Patient language: English  : n/a    HPI:  73 YO female from home, H/O CAD/DM/HTN/PVD/hysterectomy/cholecystectomy ,with AICD comes to ED with intractable nausea and vomiting for 2 days. Patient states that she recently had left big toe amputation in the beginning of May and she was taking antibiotics. On Friday, her antibiotics were changed from clindamycin to Levaquin and after she took antibiotics along with percocet, she started having severe pain in epigastric region. Patient reports that it was sudden in onset, 7/10, burning & pressure like pain, non-radiating , associated with nausea,, bitter taste in mouth and projectile vomiting. She was not able to tolerate food and it aggravated her symptoms. Patient also reports significant weight( not sure how much) recently and decrease in appetite.   patient denies fever, cough, SOB, constipation, dysuria, headache, chest pain, orthopnea, back pain, burning sensation in extremities.    ED course; Patient is in mild distress due to nausea but otherwise stable  Vitals:     /88  SpO2 99% on RA    CT abdomen/pelvis:   NEW MULTI BLADDER WALL FOCAL AREAS OF SOFT TISSUE THICKENING CONCERNING FOR BLADDER CARCINOMA/TRANSITIONAL CELL CARCINOMA. No hydronephrosis.  Stomach not fully distended and gastric pathology cannot be excluded.  Status post cholecystectomy and hysterectomy.  Remaining abdominal pelvic viscera unremarkable.   No evidence of colitis.. (30 May 2020 14:56)      Patient is a 74y old  Female who presents with a chief complaint of Intractable nausea and vomiting found to have gastritis. Pt found to have OM of left foot. Recent left big toe amputation in May 2020. Pt was on Percocet 10mg PO at home 2x/day PRN. Plan is for I&D  tomorrow with podiatry.   Pt seen and examined at bedside. Reports pain score ***  SCALE USED: (1-10 VNRS). Pt describes pain as .... radiating to... alleviated by pain medication... exacerbated by movement... Pt tolerating PO diet. Denies lethargy, nausea, vomiting, constipation, itchiness. Reports last BM ***. Patient stated goal for pain control: to be able to take deep breaths, utilize incentive spirometer, get out of bed to chair and ambulate with tolerable pain control. Pt denies taking medications for pain at home.     PAST MEDICAL & SURGICAL HISTORY:  PVD (peripheral vascular disease)  CAD (coronary artery disease)  CHF (congestive heart failure)  HTN (hypertension)  DM (diabetes mellitus)  Status post coronary artery stent placement  H/O: hysterectomy  Cardiac defibrillator in place  Artificial cardiac pacemaker  History of cholecystectomy    Social History:  Used to smoke 1 cigarette a day 36 years ago  No Alcohol or drugs  Retired and lives with family, No sick contacts (30 May 2020 14:56)    Allergies    codeine (Rash)  penicillin (Rash)      MEDICATIONS  (STANDING):  atorvastatin 40 milliGRAM(s) Oral at bedtime  Dakins Solution - 1/4 Strength 1 Application(s) Topical daily  doxycycline IVPB 100 milliGRAM(s) IV Intermittent every 12 hours  heparin  Infusion.  Unit(s)/Hr (12 mL/Hr) IV Continuous <Continuous>  hydrALAZINE 50 milliGRAM(s) Oral three times a day  insulin glargine Injectable (LANTUS) 10 Unit(s) SubCutaneous at bedtime  insulin lispro (HumaLOG) corrective regimen sliding scale   SubCutaneous Before meals and at bedtime  lactobacillus acidophilus 1 Tablet(s) Oral three times a day with meals  melatonin 5 milliGRAM(s) Oral at bedtime  meropenem  IVPB 1000 milliGRAM(s) IV Intermittent every 12 hours  metoprolol succinate ER 50 milliGRAM(s) Oral daily  pantoprazole  Injectable 40 milliGRAM(s) IV Push daily  sodium chloride 0.9% with potassium chloride 20 mEq/L 1000 milliLiter(s) (75 mL/Hr) IV Continuous <Continuous>    MEDICATIONS  (PRN):  acetaminophen   Tablet .. 650 milliGRAM(s) Oral every 6 hours PRN Mild Pain (1 - 3), Moderate Pain (4 - 6)  aluminum hydroxide/magnesium hydroxide/simethicone Suspension 30 milliLiter(s) Oral every 6 hours PRN Dyspepsia  diphenhydrAMINE   Injectable 15 milliGRAM(s) IV Push every 6 hours PRN nausea or vomiting  metoprolol tartrate Injectable 5 milliGRAM(s) IV Push every 6 hours PRN HR greater than 120  ondansetron Injectable 4 milliGRAM(s) IV Push every 8 hours PRN Nausea and/or Vomiting      Vital Signs Last 24 Hrs  T(C): 36.6 (08 Jun 2020 13:57), Max: 37.1 (08 Jun 2020 04:50)  T(F): 97.8 (08 Jun 2020 13:57), Max: 98.7 (08 Jun 2020 04:50)  HR: 93 (08 Jun 2020 13:57) (82 - 122)  BP: 174/62 (08 Jun 2020 13:57) (126/100 - 174/62)  BP(mean): --  RR: 18 (08 Jun 2020 13:57) (16 - 19)  SpO2: 94% (08 Jun 2020 13:57) (94% - 100%)    LABS: Reviewed.                          9.5    16.14 )-----------( 363      ( 08 Jun 2020 05:42 )             30.9     06-08    135  |  103  |  25<H>  ----------------------------<  122<H>  4.6   |  23  |  1.27    Ca    8.1<L>      08 Jun 2020 05:42  Phos  3.8     06-08  Mg     2.1     06-08    TPro  6.9  /  Alb  2.4<L>  /  TBili  0.5  /  DBili  x   /  AST  25  /  ALT  31  /  AlkPhos  303<H>  06-08    PTT - ( 08 Jun 2020 13:25 )  PTT:53.9 sec  LIVER FUNCTIONS - ( 08 Jun 2020 05:42 )  Alb: 2.4 g/dL / Pro: 6.9 g/dL / ALK PHOS: 303 U/L / ALT: 31 U/L DA / AST: 25 U/L / GGT: x             CAPILLARY BLOOD GLUCOSE      POCT Blood Glucose.: 187 mg/dL (08 Jun 2020 11:36)  POCT Blood Glucose.: 152 mg/dL (08 Jun 2020 07:43)  POCT Blood Glucose.: 205 mg/dL (07 Jun 2020 21:14)  POCT Blood Glucose.: 183 mg/dL (07 Jun 2020 17:03)    COVID-19 PCR: NotDetec (04 Jun 2020 17:04)      Radiology:    ORT Score -   Family Hx of substance abuse	Female	      Male  Alcohol 	                                           1                     3  Illegal drugs	                                   2                     3  Rx drugs                                           4 	                  4  Personal Hx of substance abuse		  Alcohol 	                                          3	                  3  Illegal drugs                                     4	                  4  Rx drugs                                            5 	                  5  Age between 16- 45 years	           1                     1  hx preadolescent sexual abuse	   3 	                  0  Psychological disease		  ADD, OCD, bipolar, schizophrenia   2	          2  Depression                                           1 	          1  Total: 0    a score of 3 or lower indicates low risk for opioid abuse		  a score of 4-7 indicates moderate risk for opioid abuse		  a score of 8 or higher indicates high risk for opioid abuse  	  4M's:   mental status: AAOx3 now. no suicidal/homicidal ideation. No delirium at this time.   medications: age-appropriate  mobility: community ambulatory, uses NO assisted device at home   Matters most-goals/GOC: to be able to take deep breaths, utilize incentive spirometer, get out of bed to chair and ambulate with tolerable pain control    REVIEW OF SYSTEMS:  CONSTITUTIONAL: No fever or fatigue  RESPIRATORY: No cough, wheezing, chills or hemoptysis; No shortness of breath  CARDIOVASCULAR: No chest pain, palpitations, dizziness, or leg swelling  GASTROINTESTINAL: No abdominal or epigastric pain. No nausea, vomiting; No diarrhea or constipation.   GENITOURINARY: No dysuria, frequency, hematuria, retention or incontinence  MUSCULOSKELETAL: No joint pain or swelling; No muscle, back, or extremity pain, no upper or lower motor strength weakness, no saddle anesthesia, bowel/bladder incontinence  NEURO: No numbness/ tingling in b/l LE   PSYCHIATRIC: No depression, anxiety, mood swings, or difficulty sleeping    PHYSICAL EXAM:  GENERAL:  Alert & Oriented X3, NAD, Good concentration  CHEST/LUNG: Clear to auscultation bilaterally; No rales, rhonchi, wheezing, or rubs  HEART: Regular rate and rhythm; No murmurs, rubs, or gallops  ABDOMEN: Soft, Nontender, Nondistended; Bowel sounds present  GENITOURINARY:   EXTREMITIES:  2+ Peripheral Pulses, No cyanosis, or edema  MUSCULOSKELETAL: Motor Strength 5/5 B/L upper and lower extremities; moves all extremities equally against gravity; ROM intact; negative SLR  SKIN: No rashes or lesions    Risk factors associated with adverse outcomes related to opioid treatment  [ ]  Concurrent benzodiazepine use  [ ]  History/ Active substance use or alcohol use disorder  [ ] Psychiatric co-morbidity  [ ] Sleep apnea  [ ] COPD  [ ] BMI> 35  [ ] Liver dysfunction  [ ] Renal dysfunction  [X ] CHF  [ ] Smoker  [X ]  Age > 60 years    [X ]  [place]  Reviewed and Copied to Chart. See below.    Plan of care and goal oriented pain management treatment options were discussed with patient and /or primary care giver; all questions and concerns were addressed and care was aligned with patient's wishes. Source of information: DARRION MELENDEZ, Chart review  Patient language: English  : n/a    HPI:  75 YO female from home, H/O CAD/DM/HTN/PVD/hysterectomy/cholecystectomy ,with AICD comes to ED with intractable nausea and vomiting for 2 days. Patient states that she recently had left big toe amputation in the beginning of May and she was taking antibiotics. On Friday, her antibiotics were changed from clindamycin to Levaquin and after she took antibiotics along with percocet, she started having severe pain in epigastric region. Patient reports that it was sudden in onset, 7/10, burning & pressure like pain, non-radiating , associated with nausea,, bitter taste in mouth and projectile vomiting. She was not able to tolerate food and it aggravated her symptoms. Patient also reports significant weight( not sure how much) recently and decrease in appetite.   patient denies fever, cough, SOB, constipation, dysuria, headache, chest pain, orthopnea, back pain, burning sensation in extremities.    ED course; Patient is in mild distress due to nausea but otherwise stable  Vitals:     /88  SpO2 99% on RA    CT abdomen/pelvis:   NEW MULTI BLADDER WALL FOCAL AREAS OF SOFT TISSUE THICKENING CONCERNING FOR BLADDER CARCINOMA/TRANSITIONAL CELL CARCINOMA. No hydronephrosis.  Stomach not fully distended and gastric pathology cannot be excluded.  Status post cholecystectomy and hysterectomy.  Remaining abdominal pelvic viscera unremarkable.   No evidence of colitis.. (30 May 2020 14:56)      Patient is a 74y old  Female who presents with a chief complaint of Intractable nausea and vomiting found to have gastritis. Pt found to have OM of left foot. Recent left big toe amputation in May 2020. Pt was on Percocet 10mg PO at home 2x/day PRN. Plan is for I&D  tomorrow with podiatry.   Pt seen and examined at bedside. Reports pain score 6/10 and tolerable SCALE USED: (1-10 VNRS). Pt reports she recently received Tylenol which is helping with her pain. Describes pain as aching, sharp around left medial aspect of foot. Pain is non-radiating, alleviated by pain medication, exacerbated by movement. Pt also reports Rt foot tightness sensation due to edema in right foot. Pt reports she noticed Rt foot edema today. Pt tolerating PO diet. Denies lethargy, nausea, vomiting, constipation, itchiness. Reports last BM this morning. Patient stated goal for pain control: to be able to take deep breaths, utilize incentive spirometer, get out of bed to chair and ambulate with tolerable pain control. Pt reports taking Tylenol for pain at home as needed. Pt states she was prescribed Percocet but does not want to take it as she experienced nausea after taking it. Pt reports Tylenol is managing her pain effectively at this time and does want anything stronger. Pt reports she never tried gabapentin at home. Pt states she was out of bed in the chair with PT and ambulated to the bathroom today.     PAST MEDICAL & SURGICAL HISTORY:  PVD (peripheral vascular disease)  CAD (coronary artery disease)  CHF (congestive heart failure)  HTN (hypertension)  DM (diabetes mellitus)  Status post coronary artery stent placement  H/O: hysterectomy  Cardiac defibrillator in place  Artificial cardiac pacemaker  History of cholecystectomy    Social History:  Used to smoke 1 cigarette a day 36 years ago  No Alcohol or drugs  Retired and lives with family, No sick contacts (30 May 2020 14:56)    Allergies    codeine (Rash)  penicillin (Rash)      MEDICATIONS  (STANDING):  atorvastatin 40 milliGRAM(s) Oral at bedtime  Dakins Solution - 1/4 Strength 1 Application(s) Topical daily  doxycycline IVPB 100 milliGRAM(s) IV Intermittent every 12 hours  heparin  Infusion.  Unit(s)/Hr (12 mL/Hr) IV Continuous <Continuous>  hydrALAZINE 50 milliGRAM(s) Oral three times a day  insulin glargine Injectable (LANTUS) 10 Unit(s) SubCutaneous at bedtime  insulin lispro (HumaLOG) corrective regimen sliding scale   SubCutaneous Before meals and at bedtime  lactobacillus acidophilus 1 Tablet(s) Oral three times a day with meals  melatonin 5 milliGRAM(s) Oral at bedtime  meropenem  IVPB 1000 milliGRAM(s) IV Intermittent every 12 hours  metoprolol succinate ER 50 milliGRAM(s) Oral daily  pantoprazole  Injectable 40 milliGRAM(s) IV Push daily  sodium chloride 0.9% with potassium chloride 20 mEq/L 1000 milliLiter(s) (75 mL/Hr) IV Continuous <Continuous>    MEDICATIONS  (PRN):  acetaminophen   Tablet .. 650 milliGRAM(s) Oral every 6 hours PRN Mild Pain (1 - 3), Moderate Pain (4 - 6)  aluminum hydroxide/magnesium hydroxide/simethicone Suspension 30 milliLiter(s) Oral every 6 hours PRN Dyspepsia  diphenhydrAMINE   Injectable 15 milliGRAM(s) IV Push every 6 hours PRN nausea or vomiting  metoprolol tartrate Injectable 5 milliGRAM(s) IV Push every 6 hours PRN HR greater than 120  ondansetron Injectable 4 milliGRAM(s) IV Push every 8 hours PRN Nausea and/or Vomiting      Vital Signs Last 24 Hrs  T(C): 36.6 (08 Jun 2020 13:57), Max: 37.1 (08 Jun 2020 04:50)  T(F): 97.8 (08 Jun 2020 13:57), Max: 98.7 (08 Jun 2020 04:50)  HR: 93 (08 Jun 2020 13:57) (82 - 122)  BP: 174/62 (08 Jun 2020 13:57) (126/100 - 174/62)  BP(mean): --  RR: 18 (08 Jun 2020 13:57) (16 - 19)  SpO2: 94% (08 Jun 2020 13:57) (94% - 100%)    LABS: Reviewed.                          9.5    16.14 )-----------( 363      ( 08 Jun 2020 05:42 )             30.9     06-08    135  |  103  |  25<H>  ----------------------------<  122<H>  4.6   |  23  |  1.27    Ca    8.1<L>      08 Jun 2020 05:42  Phos  3.8     06-08  Mg     2.1     06-08    TPro  6.9  /  Alb  2.4<L>  /  TBili  0.5  /  DBili  x   /  AST  25  /  ALT  31  /  AlkPhos  303<H>  06-08    PTT - ( 08 Jun 2020 13:25 )  PTT:53.9 sec  LIVER FUNCTIONS - ( 08 Jun 2020 05:42 )  Alb: 2.4 g/dL / Pro: 6.9 g/dL / ALK PHOS: 303 U/L / ALT: 31 U/L DA / AST: 25 U/L / GGT: x             CAPILLARY BLOOD GLUCOSE      POCT Blood Glucose.: 187 mg/dL (08 Jun 2020 11:36)  POCT Blood Glucose.: 152 mg/dL (08 Jun 2020 07:43)  POCT Blood Glucose.: 205 mg/dL (07 Jun 2020 21:14)  POCT Blood Glucose.: 183 mg/dL (07 Jun 2020 17:03)    COVID-19 PCR: NotDetec (04 Jun 2020 17:04)      Radiology:     < from: [facility] Physiol Extremity Lower 3+ Level, BI (06.06.20 @ 12:48) >    EXAM:  US PHYSIOL LWR EXT 3+ LEV BI                            PROCEDURE DATE:  06/06/2020          INTERPRETATION:  Clinical Information: Peripheral vascular disease. Left toe ulceration.    Technique: Bilateral lower extremity ABIs/PVR    Comparison: None    Findings/  Impression:  Right lower extremity: The ankle brachial index is 0.6. The pulse waveforms are diffusely diminished.    Left lower extremity: The ankle brachial index is 0.5. The pulse waveforms are diffusely diminished, however particularly severe in the left foot.    Severe bilateral peripheral artery disease.    Pulse volume recordings are diffusely diminished bilaterally, however severely diminished in the left foot.                  ANIBAL UPTON M.D., ATTENDING RADIOLOGIST  This document has been electronically signed. Jun 6 2020  1:16PM                < end of copied text >    < from: Xray Chest 1 View- PORTABLE-Urgent (06.05.20 @ 13:40) >    EXAM:  XR CHEST PORTABLE URGENT 1V                            PROCEDURE DATE:  06/05/2020          INTERPRETATION:  Portable chest radiograph        CLINICAL INFORMATION:   Leukocytosis.    TECHNIQUE:  Portable  AP view of the chest was obtained.    COMPARISON: 10/12/2019 available for review.    FINDINGS:   The lungs  show bilateral lung perihilar and basilar interstitial opacity/infiltrates. Apices of lungs clear.    The  heart is enlarged in transverse diameter. No hilar mass. Trachea midline.   Right atrium and biventricular cardiac wire leads in place.    The heart and mediastinum are within normal limits.    Visualized osseous structures are intact.        IMPRESSION:   Cardiomegaly. Right atrium and biventricular cardiac wire leads in place.. Infrahilar and bibasilar interstitial opacities/infiltrates...      < end of copied text >      ORT Score -   Family Hx of substance abuse	Female	      Male  Alcohol 	                                           1                     3  Illegal drugs	                                   2                     3  Rx drugs                                           4 	                  4  Personal Hx of substance abuse		  Alcohol 	                                          3	                  3  Illegal drugs                                     4	                  4  Rx drugs                                            5 	                  5  Age between 16- 45 years	           1                     1  hx preadolescent sexual abuse	   3 	                  0  Psychological disease		  ADD, OCD, bipolar, schizophrenia   2	          2  Depression                                           1 	          1  Total: 0    a score of 3 or lower indicates low risk for opioid abuse		  a score of 4-7 indicates moderate risk for opioid abuse		  a score of 8 or higher indicates high risk for opioid abuse    	  4M's:   mental status: AAOx3. No delirium at this time.   medications: age-appropriate  mobility: community ambulatory, uses walker at home  Matters most-goals/GOC: to be able to take deep breaths, utilize incentive spirometer, get out of bed to chair and ambulate with tolerable pain control    REVIEW OF SYSTEMS:  CONSTITUTIONAL: No fever or fatigue  RESPIRATORY: No cough, wheezing, chills or hemoptysis; No shortness of breath  CARDIOVASCULAR: No chest pain, palpitations, dizziness, or leg swelling  GASTROINTESTINAL: No abdominal or epigastric pain. No nausea, vomiting; No diarrhea or constipation.   GENITOURINARY: No dysuria, frequency, hematuria, retention or incontinence  MUSCULOSKELETAL: Positive left foot pain. Positive Rt foot swelling.  No muscle, back, or extremity pain, no upper or lower motor strength weakness, no saddle anesthesia, bowel/bladder incontinence  NEURO: No numbness/ tingling in b/l LE   PSYCHIATRIC: Positive anxiety (does not take medications at home) No depression, mood swings, or difficulty sleeping    PHYSICAL EXAM:  GENERAL:  Alert & Oriented X3, NAD, Good concentration  CHEST/LUNG: Even and unlabored on room air.   HEART: Regular rate and rhythm  ABDOMEN: Obese, Soft, Nontender, Nondistended  EXTREMITIES:  Positive Rt foot moderate edema. 2+ Peripheral Pulses in Rt foot. Unable to assess pulse in Left foot due to ace wrap and inner dressing. Pt is able to wiggle toes on b/l feet.  MUSCULOSKELETAL: Motor Strength 5/5 B/L upper and 4/4 b/l lower extremities; moves all extremities equally against gravity  SKIN: No rashes or lesions. left foot ace wrap dressing c/d/i.     Risk factors associated with adverse outcomes related to opioid treatment  [ ]  Concurrent benzodiazepine use  [ ]  History/ Active substance use or alcohol use disorder  [X ] Psychiatric co-morbidity - anxiety  [ ] Sleep apnea  [ ] COPD  [ ] BMI> 35  [ ] Liver dysfunction  [ ] Renal dysfunction  [X ] CHF  [X ] Former Smoker  [X ]  Age > 60 years    [X ]  [place]  Reviewed and Copied to Chart. See below.    Plan of care and goal oriented pain management treatment options were discussed with patient and /or primary care giver; all questions and concerns were addressed and care was aligned with patient's wishes.

## 2020-06-08 NOTE — PROGRESS NOTE ADULT - PROBLEM SELECTOR PLAN 9
H/o CHF but clinically patient is not in fluid overload  No peripheral edema  Patient takes furosemide 20mg daily twice  BNP: elevated at 9383  echo: EF 50-55%, mild TR, UT  - holding lasix 20mg IV as pt is not eating and dehydrated H/o CHF but clinically patient is not in fluid overload  No peripheral edema  Patient takes furosemide 20mg daily twice  BNP: elevated at 9383  echo: EF 50-55%, mild TR, LA  - holding lasix 20mg IV as pt is not eating and dehydrated  - c/w statin and BB

## 2020-06-08 NOTE — PROGRESS NOTE ADULT - SUBJECTIVE AND OBJECTIVE BOX
Vital Signs Last 24 Hrs  T(C): 36.6 (08 Jun 2020 13:57), Max: 37.1 (08 Jun 2020 04:50)  T(F): 97.8 (08 Jun 2020 13:57), Max: 98.7 (08 Jun 2020 04:50)  HR: 93 (08 Jun 2020 13:57) (82 - 122)  BP: 174/62 (08 Jun 2020 13:57) (126/100 - 174/62)  BP(mean): --  RR: 18 (08 Jun 2020 13:57) (16 - 19)  SpO2: 94% (08 Jun 2020 13:57) (94% - 100%)                        9.5    16.14 )-----------( 363      ( 08 Jun 2020 05:42 )             30.9   06-08  WBC Count: 16.14 K/uL (06-08 @ 05:42)  WBC Count: 19.14 K/uL (06-07 @ 06:01)  WBC Count: 20.64 K/uL (06-06 @ 08:58)  WBC Count: 21.24 K/uL (06-05 @ 10:24)  WBC Count: 20.06 K/uL (06-05 @ 02:45)      135  |  103  |  25<H>  ----------------------------<  122<H>  4.6   |  23  |  1.27    Ca    8.1<L>      08 Jun 2020 05:42  Phos  3.8     06-08  Mg     2.1     06-08    TPro  6.9  /  Alb  2.4<L>  /  TBili  0.5  /  DBili  x   /  AST  25  /  ALT  31  /  AlkPhos  303<H>  06-08  CAPILLARY BLOOD GLUCOSE      POCT Blood Glucose.: 182 mg/dL (08 Jun 2020 16:20)  POCT Blood Glucose.: 187 mg/dL (08 Jun 2020 11:36)  POCT Blood Glucose.: 152 mg/dL (08 Jun 2020 07:43)  POCT Blood Glucose.: 205 mg/dL (07 Jun 2020 21:14)  Procalcitonin, Serum (06.08.20 @ 10:08)    Procalcitonin, Serum: 0.17: Procalcitonin (PCT) Interpretation (ng/mL) - Diagnosis of systemic  bacterial infection/sepsis  PCT < 0.5: Systemic infection (sepsis) is not likely and risk for  progression to severe systemic infection is low. Local bacterial  infection is possible. If early sepsis is suspected clinically, PCT  should be re-assessed in 6-24 hours.  PCT >/= 0.5 but < 2.0: Systemic infection (sepsis) is possible, but other  conditions are known to elevate PCT as well. Moderate risk for  progression to severe systemic infection. The patient should be closely  monitored both clinically and by re-assessing PCT within 6-24 hours.  PCT >/= 2.0 but < 10.0: Systemic infection (sepsis) is likely, unless  other causes are known. High risk of progression to severe systemic  infection (severe sepsis/septic shock).  PCT >/= 10.0: Important systemic inflammatory response, almost  exclusively due to severe bacterial sepsis or septic shock. High  likelihood of severe sepsis or septic shock. ng/mL follow up on left foot infection   Vital Signs Last 24 Hrs  T(C): 36.6 (08 Jun 2020 13:57), Max: 37.1 (08 Jun 2020 04:50)  T(F): 97.8 (08 Jun 2020 13:57), Max: 98.7 (08 Jun 2020 04:50)  HR: 93 (08 Jun 2020 13:57) (82 - 122)  BP: 174/62 (08 Jun 2020 13:57) (126/100 - 174/62)  BP(mean): --  RR: 18 (08 Jun 2020 13:57) (16 - 19)  SpO2: 94% (08 Jun 2020 13:57) (94% - 100%)                        9.5    16.14 )-----------( 363      ( 08 Jun 2020 05:42 )             30.9   06-08  WBC Count: 16.14 K/uL (06-08 @ 05:42)  WBC Count: 19.14 K/uL (06-07 @ 06:01)  WBC Count: 20.64 K/uL (06-06 @ 08:58)  WBC Count: 21.24 K/uL (06-05 @ 10:24)  WBC Count: 20.06 K/uL (06-05 @ 02:45)      135  |  103  |  25<H>  ----------------------------<  122<H>  4.6   |  23  |  1.27    Ca    8.1<L>      08 Jun 2020 05:42  Phos  3.8     06-08  Mg     2.1     06-08    TPro  6.9  /  Alb  2.4<L>  /  TBili  0.5  /  DBili  x   /  AST  25  /  ALT  31  /  AlkPhos  303<H>  06-08  CAPILLARY BLOOD GLUCOSE      POCT Blood Glucose.: 182 mg/dL (08 Jun 2020 16:20)  POCT Blood Glucose.: 187 mg/dL (08 Jun 2020 11:36)  POCT Blood Glucose.: 152 mg/dL (08 Jun 2020 07:43)  POCT Blood Glucose.: 205 mg/dL (07 Jun 2020 21:14)  Procalcitonin, Serum (06.08.20 @ 10:08)    Procalcitonin, Serum: 0.17: Procalcitonin (PCT) Interpretation (ng/mL) - Diagnosis of systemic  bacterial infection/sepsis  PCT < 0.5: Systemic infection (sepsis) is not likely and risk for  progression to severe systemic infection is low. Local bacterial  infection is possible. If early sepsis is suspected clinically, PCT  should be re-assessed in 6-24 hours.  PCT >/= 0.5 but < 2.0: Systemic infection (sepsis) is possible, but other  conditions are known to elevate PCT as well. Moderate risk for  progression to severe systemic infection. The patient should be closely  monitored both clinically and by re-assessing PCT within 6-24 hours.  PCT >/= 2.0 but < 10.0: Systemic infection (sepsis) is likely, unless  other causes are known. High risk of progression to severe systemic  infection (severe sepsis/septic shock).  PCT >/= 10.0: Important systemic inflammatory response, almost  exclusively due to severe bacterial sepsis or septic shock. High  likelihood of severe sepsis or septic shock. ng/mL  Culture - Abscess with Gram Stain (06.06.20 @ 03:05)    -  Gentamicin: S <=1 Should not be used as monotherapy    -  Oxacillin: S <=0.25    -  Penicillin: R 2    -  RIF- Rifampin: S <=1 Should not be used as monotherapy    -  Tetra/Doxy: S <=1    -  Trimethoprim/Sulfamethoxazole: S <=0.5/9.5    -  Vancomycin: S 1    -  Cefazolin: S <=4    -  Ampicillin/Sulbactam: S <=8/4    -  Clindamycin: R >4    -  Erythromycin: R >4    Specimen Source: .Abscess left foot    Culture Results:   Moderate Staphylococcus aureus    Organism Identification: Staphylococcus aureus    Organism: Staphylococcus aureus    Method Type: LI    < from: [facility] Physiol Extremity Lower 3+ Level, BI (06.06.20 @ 12:48) >  XAM:  US PHYSIOL LWR EXT 3+ LEV BI                            PROCEDURE DATE:  06/06/2020          INTERPRETATION:  Clinical Information: Peripheral vascular disease. Left toe ulceration.    Technique: Bilateral lower extremity ABIs/PVR    Comparison: None    Findings/  Impression:  Right lower extremity: The ankle brachial index is 0.6. The pulse waveforms are diffusely diminished.    Left lower extremity: The ankle brachial index is 0.5. The pulse waveforms are diffusely diminished, however particularly severe in the left foot.    Severe bilateral peripheral artery disease.    Pulse volume recordings are diffusely diminished bilaterally, however severely diminished in the left foot.            < end of copied text >      MEDICATIONS  (STANDING):  atorvastatin 40 milliGRAM(s) Oral at bedtime  chlorhexidine 4% Liquid 1 Application(s) Topical once  Dakins Solution - 1/4 Strength 1 Application(s) Topical daily  doxycycline IVPB 100 milliGRAM(s) IV Intermittent every 12 hours  heparin  Infusion.  Unit(s)/Hr (12 mL/Hr) IV Continuous <Continuous>  hydrALAZINE 50 milliGRAM(s) Oral three times a day  insulin glargine Injectable (LANTUS) 10 Unit(s) SubCutaneous at bedtime  insulin lispro (HumaLOG) corrective regimen sliding scale   SubCutaneous Before meals and at bedtime  lactobacillus acidophilus 1 Tablet(s) Oral three times a day with meals  melatonin 5 milliGRAM(s) Oral at bedtime  meropenem  IVPB 1000 milliGRAM(s) IV Intermittent every 12 hours  metoprolol succinate ER 50 milliGRAM(s) Oral daily  pantoprazole  Injectable 40 milliGRAM(s) IV Push daily  sodium chloride 0.9% with potassium chloride 20 mEq/L 1000 milliLiter(s) (75 mL/Hr) IV Continuous <Continuous>    MEDICATIONS  (PRN):  acetaminophen   Tablet .. 650 milliGRAM(s) Oral every 6 hours PRN Mild Pain (1 - 3), Moderate Pain (4 - 6)  aluminum hydroxide/magnesium hydroxide/simethicone Suspension 30 milliLiter(s) Oral every 6 hours PRN Dyspepsia  diphenhydrAMINE   Injectable 15 milliGRAM(s) IV Push every 6 hours PRN nausea or vomiting  metoprolol tartrate Injectable 5 milliGRAM(s) IV Push every 6 hours PRN HR greater than 120  ondansetron Injectable 4 milliGRAM(s) IV Push every 8 hours PRN Nausea and/or Vomiting  traMADol 25 milliGRAM(s) Oral every 6 hours PRN Severe Pain (7 - 10)    pt  seen and evaluated at bedside , reports nausea is improving  , feeling better today ,  AAOX3  reports throbbing pain in her foot ,  tolerated exam and dressing change very well    exam focused LE   left foot sp halux amputation May 3  in [facility] ( as per patient )  there is an open on the distal surgical site in the area of 1 st met head    wound packed with gauze strip, no active drainage , no odor     wound is deep, tracking along flexor tendon? probing  to abscess area as per ct , small amount of  yellow pus expressed, no active drainage  , probes to bone +,  mostly fibrotic base exposed tendons with possible calcifications /bone fragments ??  , hyperkeratotic border , with minimal area of darker dry  skin,   mild bleeding on dressing Probably after walking   DP/PT -0- out of 4   TG -WNL   CRF -1-2 sec x4   there is  erythema ascending to the level of the midfoot  , and plantar submet 1 head-looks more wide spread , the area is slightly tender ,   pt is able yo move her lower extremities

## 2020-06-08 NOTE — PROGRESS NOTE ADULT - ASSESSMENT
73 YO female from home, H/O CAD/DM/HTN/PVD/hysterectomy/cholecystectomy ,with AICD comes to ED with intractable nausea and vomiting for 2 days. Patient states that she recently had left big toe amputation in the beginning of May and she was taking antibiotics. On Friday, her antibiotics were changed from clindamycin to Levaquin and after she took antibiotics along with percocet, she started having severe pain in epigastric region. Patient reports that it was sudden in onset, 7/10, burning & pressure like pain, non-radiating , associated with nausea,, bitter taste in mouth and projectile vomiting. She was not able to tolerate food and it aggravated her symptoms. Patient also reports significant weight( not sure how much) recently and decrease in appetite. patient denies fever, cough, SOB, constipation, dysuria, headache, chest pain, orthopnea, back pain, burning sensation in extremities.c/o pain over left big toe stump which is not resolving despite pain meds . afebrile on adm , covid neg. ID called for persistant leucocytosis and appr ab     # s/p amputation of left great toe for gangrene with post op collection and overlying cellulits as well as underlying OM /wound cx pos for staph aureus ( MSSA)/s/p left leg stent at [facility] as per pt /underlying PVD    #persistant  nausea/vomiting , now seems to be vertigo sec to poss cerebellar cva vs tia     # transaminitis improving     # increasing wbc r/o asp pna     plan  blood cx times 2 f/u ( neg)   f/u covid testing preop   cont  doxy and merrem  to cover mssa for stump infection/abscess as well as asp pna d/w intern   for more debridement /I and D and poss more proximal amputation as per podiatry   for angio tomorrow   CONT  probiotics   EGD ON HOLD AS PER GI   neuro f/u       thnx will f/u   d/w intern dr diaz

## 2020-06-08 NOTE — PROGRESS NOTE ADULT - PROBLEM SELECTOR PLAN 8
H/o CAD and PCI in 2009,  patient has both AICD and pacemaker  EKG shows paced Rythm  No active issues  continue aspirin , Plavix, BB, statin, ACE - pt is not tolerated to PO meds  -DCd aspirin, plavix and pt is on heparin drip H/o CAD and PCI in 2009,  patient has both AICD and pacemaker  EKG shows paced Rythm  No active issues  continue aspirin , Plavix, BB, statin, ACE - pt is not tolerated to PO meds  - c/w heparin drip

## 2020-06-08 NOTE — CHART NOTE - NSCHARTNOTEFT_GEN_A_CORE
Discussed with Dr Robertson.  In view of the serious peripheral vascular disease and the normal CT CTA of the head, THERE IS NO NEUROLOGICAL SAFETY RELATED CONTRAINDICATION TO surgical treatment and cardiology investigations. Continue current medications and discontinue heparin for surgery restarting anticoagulation to prevent emboli, postoperatively, when safe to do so    MEDICATIONS  (STANDING):  atorvastatin 40 milliGRAM(s) Oral at bedtime  Dakins Solution - 1/4 Strength 1 Application(s) Topical daily  doxycycline IVPB 100 milliGRAM(s) IV Intermittent every 12 hours  heparin  Infusion.  Unit(s)/Hr (12 mL/Hr) IV Continuous <Continuous>  hydrALAZINE Injectable 10 milliGRAM(s) IV Push every 8 hours  insulin glargine Injectable (LANTUS) 10 Unit(s) SubCutaneous at bedtime  insulin lispro (HumaLOG) corrective regimen sliding scale   SubCutaneous Before meals and at bedtime  lactobacillus acidophilus 1 Tablet(s) Oral three times a day with meals  melatonin 5 milliGRAM(s) Oral at bedtime  meropenem  IVPB 1000 milliGRAM(s) IV Intermittent every 12 hours  metoprolol succinate ER 50 milliGRAM(s) Oral daily  pantoprazole  Injectable 40 milliGRAM(s) IV Push daily  sodium chloride 0.9% with potassium chloride 20 mEq/L 1000 milliLiter(s) (75 mL/Hr) IV Continuous <Continuous>

## 2020-06-08 NOTE — PROGRESS NOTE ADULT - SUBJECTIVE AND OBJECTIVE BOX
Patient denies chest pain or shortness of breath.   Review of systems otherwise (-)  	  acetaminophen   Tablet .. 650 milliGRAM(s) Oral every 6 hours PRN  aluminum hydroxide/magnesium hydroxide/simethicone Suspension 30 milliLiter(s) Oral every 6 hours PRN  atorvastatin 40 milliGRAM(s) Oral at bedtime  Dakins Solution - 1/4 Strength 1 Application(s) Topical daily  diphenhydrAMINE   Injectable 15 milliGRAM(s) IV Push every 6 hours PRN  doxycycline IVPB 100 milliGRAM(s) IV Intermittent every 12 hours  heparin  Infusion.  Unit(s)/Hr IV Continuous <Continuous>  hydrALAZINE 50 milliGRAM(s) Oral three times a day  insulin glargine Injectable (LANTUS) 10 Unit(s) SubCutaneous at bedtime  insulin lispro (HumaLOG) corrective regimen sliding scale   SubCutaneous Before meals and at bedtime  lactobacillus acidophilus 1 Tablet(s) Oral three times a day with meals  melatonin 5 milliGRAM(s) Oral at bedtime  meropenem  IVPB 1000 milliGRAM(s) IV Intermittent every 12 hours  metoprolol succinate ER 50 milliGRAM(s) Oral daily  metoprolol tartrate Injectable 5 milliGRAM(s) IV Push every 6 hours PRN  ondansetron Injectable 4 milliGRAM(s) IV Push every 8 hours PRN  pantoprazole  Injectable 40 milliGRAM(s) IV Push daily  sodium chloride 0.9% with potassium chloride 20 mEq/L 1000 milliLiter(s) IV Continuous <Continuous>                            9.5    16.14 )-----------( 363      ( 08 Jun 2020 05:42 )             30.9       Hemoglobin: 9.5 g/dL (06-08 @ 05:42)  Hemoglobin: 10.2 g/dL (06-07 @ 06:01)  Hemoglobin: 10.4 g/dL (06-06 @ 08:58)  Hemoglobin: 10.9 g/dL (06-05 @ 10:24)  Hemoglobin: 10.9 g/dL (06-05 @ 02:45)      06-08    135  |  103  |  25<H>  ----------------------------<  122<H>  4.6   |  23  |  1.27    Ca    8.1<L>      08 Jun 2020 05:42  Phos  3.8     06-08  Mg     2.1     06-08    TPro  6.9  /  Alb  2.4<L>  /  TBili  0.5  /  DBili  x   /  AST  25  /  ALT  31  /  AlkPhos  303<H>  06-08    Creatinine Trend: 1.27<--, 0.98<--, 1.08<--, 1.08<--, 1.07<--, 1.26<--    COAGS: PTT - ( 08 Jun 2020 13:25 )  PTT:53.9 sec    CARDIAC MARKERS ( 05 Jun 2020 22:37 )  0.206 ng/mL / x     / x     / x     / x            T(C): 36.6 (06-08-20 @ 13:57), Max: 37.1 (06-08-20 @ 04:50)  HR: 93 (06-08-20 @ 13:57) (82 - 122)  BP: 174/62 (06-08-20 @ 13:57) (126/100 - 174/62)  RR: 18 (06-08-20 @ 13:57) (16 - 19)  SpO2: 94% (06-08-20 @ 13:57) (94% - 100%)  Wt(kg): --    I&O's Summary    HEENT:  (-)icterus (-)pallor  CV: N S1 S2 1/6 JAMAAL (+)2 Pulses B/l  Resp:  Clear to ausculatation B/L, normal effort  GI: (+) BS Soft, NT, ND  Lymph:  (-)Edema, (-)obvious lymphadenopathy  Skin: Warm to touch, Normal turgor  Psych: Appropriate mood and affect          ASSESSMENT/PLAN: 	74y  Female AD/DM/HTN/PVD/hysterectomy/cholecystectomy ,with CHF BIVICD, Mild anterior wall ischemia being medically managed  comes to ED with intractable nausea and vomiting for 2 days 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 bladder malignancy.    - cont Heparin gtt if ok with neuro and not otherwise contraindicated  - Interrogate Fort[employer] Sci ICD  - nausea resolved  - Afib appears to be new, noted on prior EKG  -  cont toprol XL 50 mg PO Daily  - By virtue of having CAD, CVA, DM and hx of CHF she should be treated as high cardiac risk for the proposed low risk procedure  - no need for further cardiac testing prior to Lower extremty angiogram and I+D.   - Will need repeat stress prior to high risk surgery      Pipo Segundo MD, [facility]  BEEPER [phone]

## 2020-06-08 NOTE — CONSULT NOTE ADULT - ASSESSMENT
Confidential Drug Utilization Report  Search Terms: Ankita Keating, 1945   Search Date: 06/08/2020 16:27:30 PM   The Drug Utilization Report below displays all of the controlled substance prescriptions, if any, that your patient has filled in the last twelve months. The information displayed on this report is compiled from pharmacy submissions to the Department, and accurately reflects the information as submitted by the pharmacies.  This report was requested by: Kathya Sinclair | Reference #: [id number]   You have not added a TRAVIS number. Keeping your TRAVIS number(s) up to date on the My TRAVIS Numbers </[email or web address]/applinks/cspnp/myDeaNumbers> page will enable the separation of your prescriptions from others in the search results.   Others' Prescriptions  Patient Name: Ankita Keating   YOB: 1945   Address: [address] [place] [address]   Sex: Female   Rx Written	Rx Dispensed	Drug	Quantity	Days Supply	Prescriber Name	Payment Method	Dispenser	  05/28/2020	05/29/2020	oxycodone-acetaminophen  mg tab 	60	30	Chico Jeffries MD	Insurance	[facility] #[id number]	  05/22/2020	05/22/2020	oxycodone-acetaminophen 5-325 mg tab 	20	5	Gomez Caraballo Jr, MD	Insurance	[facility] #[id number]	  05/08/2020	05/09/2020	oxycodone-acetaminophen 5-325 mg tab 	20	5	Gomez Caraballo Jr, MD	Insurance	[facility] #[id number]	  04/22/2020	04/28/2020	oxycodone-acetaminophen 5-325 mg tab 	20	5	Venita Thompson MD	Insurance	[facility] #[id number]	  * - Drugs marked with an asterisk are compound drugs. If the compound drug is made up of more than one controlled substance, then each controlled substance will be a separate row in the table.

## 2020-06-08 NOTE — CONSULT NOTE ADULT - PROBLEM SELECTOR RECOMMENDATION 9
admitting dx gastritis. Pt found to have OM of left foot. Recent left big toe amputation in May 2020. Pt was on Percocet 10mg PO at home 2x/day PRN. Plan is for I&D  tomorrow with podiatry. admitting dx gastritis. Pt found to have OM of left foot. Recent left big toe amputation in May 2020. Pt was on Percocet 10mg PO at home 2x/day PRN. Plan is for I&D  tomorrow with podiatry.  Pt with pain which is somatic and neuropathic in nature due to.   Opioid pain recommendations   - Do not recommend opioids at this time. Pt reports pain effectively managed with Tylenol PRN  Non-opioid pain recommendations   - Continue Acetaminophen 650mg PO q 6 hours PRN moderate pain. Monitor LFTs  -Avoid NSAIDs. Pt on heparin gtt. Scheduled for I&D tomorrow.  - Bleeding precautions  Bowel Regimen  -Per primary team  Non-pharmacological pain treatment recommendations  - Physical therapy OOB if no contraindications   Mild pain   - Non pharmacological measures   - Warm/ Cool packs PRN   - Repositioning extremity, elevation, PT, imagery, relaxation, distraction.  Recommendations discussed with primary team and RN

## 2020-06-08 NOTE — PROGRESS NOTE ADULT - ASSESSMENT
74y Female with left foot osteomyelitis with PAD     - Pt to go to OR 06/09/20 for left lower extremity angiogram with Dr. Grove and left lower extremity debridement with Podiatry  - NPO past midnight, except medications  - IVF while NPO  - Continue IV abx  - COVID PCR sent stat  - Type and Screen/Coags ordered for 6/9/20 AM  - Chlorhexidene wipes  - Consent obtained and in chart  - Medical clearance for OR  - Cardiology cleared: as high cardiac risk for the proposed low risk procedure with no need for further cardiac testing prior to Lower extremity angiogram and I&D  - Neurologically cleared: with no neurologic contraindication to surgery, c/w heparin until pt called for the OR .

## 2020-06-08 NOTE — PROGRESS NOTE ADULT - ASSESSMENT
A/P  PVD /PAD  DM  ulcer   dehiscence surgical wound   history of gangrene left hallux   positive probe to bone   leukocytosis -trending down today   OM with  abscess  left   pyogenic tendon       pt seen and eval   chart reviewed   not improving needs I&D AND DEBRIDEMENT OF INFECTED BONE   treatment options discussed with pt, she understands and agreed  with procedure   she reports her father had foot amputations, and vascular problems too   as per Ms. Keating -it is running in the family"    pt deteriorate significantly in the last few days, but today looks more awake/present , eating   wound cleaned and flushed with copious amount  of normal saline, wound packed with sterile gauze   light dressing     CT scan  reports supports clinical findings   pt needs wound debridement / I &D  more proximal amputation   so far there is no clinical improvement and pt is at risk of progressing infection and sepsis CT angio of LLE to evaluate vascular anatomy and stent patency  discussed with vascular attending Dr. Grove - he agreed with emergent procedure to control source of infection   before vascular- Left lower extremity angiogram    CT angio of LLE to evaluate vascular anatomy and stent patency ?  cardiology clearance in a chart -pt is high cardiac risk for the proposed low risk procedure with no need for further cardiac testing prior to Lower extremity angiogram and I&D /bone debridement   neurology clearance in the chart  In view of the serious peripheral vascular disease and the normal CT CTA of the head, THERE IS NO NEUROLOGICAL SAFETY RELATED CONTRAINDICATION TO surgical treatment and cardiology investigations. Continue current medications and discontinue heparin for surgery restarting anticoagulation to prevent emboli, postoperatively, when safe to do so  medical clearance   hold heparin 4-6 hours before procedure will confirm time in am   type and screen   2 units   NPO after midnight  except medications   IVF while NPO  continue antibiotics as per id -id   continue wound packing with gauze strip   the surgery is an urgent attempt to control source of infection   prognosis is very poor and she is high risk for complications and non healing  considering  vascular status , multiple health problems   discussed with patient, she seems to understand

## 2020-06-08 NOTE — PROGRESS NOTE ADULT - PROBLEM SELECTOR PLAN 4
H/o PVD and popliteal stent  s/p left big toe amputation in May 2020  f/u [facility] - pt cannot go due to n/v, will re-order when pt feels better  - outpatient with vascular surgery upon DC  PT: home PT  Vascular is following patient endorses moderate to severe abdominal pain, n/v since 5/29 Fri  partially relieved with oral Protonix  UA negative, No fevers, UCx: -ve  More likely GI etiology  CT abd w/ IV cont: Small bilateral pleural effusions. Multiple nodular peripheral filling defect in the urinary bladder measuring up to 1.3 cm, suspicious for transitional cell carcinomas.  - c/w PPI, Reglan q8h (pt had QTc prolongation, but will continue as pt has AICD and it works better),  benadryl 25mg IV q6h PRN for n/v  - No indication for EGD as per GI  - resolving  GI Dr. Matute

## 2020-06-08 NOTE — CONSULT NOTE ADULT - CONSULT REQUESTED DATE/TIME
01-Jun-2020 18:11
01-Jun-2020 21:12
03-Jun-2020 15:37
03-Jun-2020 15:53
04-Jun-2020 16:18
05-Jun-2020
05-Jun-2020 18:15
30-May-2020
08-Jun-2020 16:30

## 2020-06-08 NOTE — PROGRESS NOTE ADULT - PROBLEM SELECTOR PLAN 2
patient endorses moderate to severe abdominal pain, n/v since 5/29 Fri  partially relieved with oral Protonix  UA negative, No fevers, UCx: -ve  More likely GI etiology  CT abd w/ IV cont: Small bilateral pleural effusions. Multiple nodular peripheral filling defect in the urinary bladder measuring up to 1.3 cm, suspicious for transitional cell carcinomas.  - c/w PPI, Reglan q8h (pt had QTc prolongation, but will continue as pt has AICD and it works better),  benadryl 25mg IV q6h PRN for n/v  - plan for EGD today but canceled as pt has stroke and anesthesia can drop the BP and can make stroke symptoms worse  GI Dr. Matute H/o PVD and popliteal stent  s/p left big toe amputation in May 2020  - [facility]: RLE:  0.6. LLE:  0.5. Severe bilateral peripheral artery disease. Pulse volume recordings are diffusely diminished bilaterally, however severely diminished in the left foot.  - plan for surgery tmrw for possible stent placement  PT: home PT  Vascular Dr. Grove

## 2020-06-08 NOTE — PROGRESS NOTE ADULT - ATTENDING COMMENTS
FU PAD, L toe amp breakdown/isch/plantar infection/abscess  Poor perf by non-invasives although doubt accuracy of inflow waveforms since recent CT A/P w IV con showed patent aorta/iliacs/CFA's  Limb threatened  Plan angio tomorrow +/- PTA/stent  Needs I&D by podiatry   Cond, options, risks/beneftis/implications dw'ed pt and daughter.  DW'ed med/cardio/podiatry  Informed consent obtained FU PAD, L toe amp breakdown/isch/plantar infection/abscess  Poor perf by non-invasives although doubt accuracy of inflow waveforms since recent CT A/P w IV con showed patent aorta/iliacs/CFA's  Limb threatened  Plan angio tomorrow +/- PTA/stent  Needs I&D by podiatry   Cond, options, risks/beneftis/implications dw'ed pt and daughter.  DW'ed med/cardio/podiatry  Informed consent obtained  No need for CTA

## 2020-06-08 NOTE — CONSULT NOTE ADULT - REASON FOR ADMISSION
Intractable nausea and vomiting

## 2020-06-08 NOTE — PROGRESS NOTE ADULT - PROBLEM SELECTOR PLAN 1
CTA head/neck showed LEFT PICA stroke  started on heparin drip  Neuro Dr. Moncada CTA head/neck showed LEFT PICA stroke  c/w heparin drip  Neuro Dr. Moncada

## 2020-06-08 NOTE — CONSULT NOTE ADULT - PROVIDER SPECIALTY LIST ADULT
Gastroenterology
Cardiology
Podiatry
Vascular Surgery
Gastroenterology
Infectious Disease
Neurology
Urology
Pain Medicine
Surgery

## 2020-06-08 NOTE — PROGRESS NOTE ADULT - SUBJECTIVE AND OBJECTIVE BOX
Preop Dx: osteomyelitis of left foot  Surgeon: Dr. Grove  Procedure: Left lower extremity angiogram, possible left foot debridement     Vital Signs Last 24 Hrs  T(C): 36.6 (08 Jun 2020 13:57), Max: 37.1 (08 Jun 2020 04:50)  T(F): 97.8 (08 Jun 2020 13:57), Max: 98.7 (08 Jun 2020 04:50)  HR: 93 (08 Jun 2020 13:57) (82 - 122)  BP: 174/62 (08 Jun 2020 13:57) (126/100 - 174/62)  RR: 18 (08 Jun 2020 13:57) (16 - 19)  SpO2: 94% (08 Jun 2020 13:57) (94% - 100%)             LABS:             9.5    16.14 )-----------( 363      ( 08 Jun 2020 05:42 )             30.9     06-08    135  |  103  |  25<H>  ----------------------------<  122<H>  4.6   |  23  |  1.27    Ca    8.1<L>      08 Jun 2020 05:42  Phos  3.8     06-08  Mg     2.1     06-08    TPro  6.9  /  Alb  2.4<L>  /  TBili  0.5  /  DBili  x   /  AST  25  /  ALT  31  /  AlkPhos  303<H>  06-08  PTT - ( 08 Jun 2020 13:25 )  PTT:53.9 sec    EKG:  < from: 12 Lead ECG (06.05.20 @ 16:49) >  Ventricular Rate 100 BPM  Atrial Rate 102 BPM  QRS Duration 138 ms  Q-T Interval 366 ms  QTC Calculation(Bezet) 472 ms  R Axis -74 degrees  T Axis 94 degrees  Diagnosis Line afib with intermitytent v paced  Confirmed by LAVERN POSEY, [facility] ([id number]) on 6/6/2020 9:27:46 AM  < end of copied text >    CXR:   < from: Xray Chest 1 View- PORTABLE-Urgent (06.05.20 @ 13:40) >  FINDINGS:   The lungs  show bilateral lung perihilar and basilar interstitial opacity/infiltrates. Apices of lungs clear.  The  heart is enlarged in transverse diameter. No hilar mass. Trachea midline.   Right atrium and biventricular cardiac wire leads in place.  The heart and mediastinum are within normal limits.  Visualized osseous structures are intact.  IMPRESSION:   Cardiomegaly. Right atrium and biventricular cardiac wire leads in place.. Infrahilar and bibasilar interstitial opacities/infiltrates...  < end of copied text >    PT/INR & PTT ordered 6/9/20 AM  Type and Screen:  ordered for 6/9/20 AM  COVID PCR ordered as STAT

## 2020-06-08 NOTE — PROGRESS NOTE ADULT - ASSESSMENT
73 YO female from home, H/O CAD/DM/HTN/PVD/hysterectomy/cholecystectomy ,with AICD has come to ED with intractable nausea and vomiting for 2 days, likely due to pill induced esophagitis/gastritis. Patient reports that it was sudden in onset, 7/10, burning & pressure like pain, non-radiating , associated with nausea and projectile vomiting. Patient also reports significant weight( not sure how much) recently and decrease in appetite. CT abdomen/pelvis shows bladder wall thickening and pt has history of Stage1 endometrial cancer,   Patient also has high blood pressure because she missed her medications  Admitting patient for management of  Acute Gastritis  Intractable abdominal pain  hypertension  weight loss/ suspected malignancy workup    DCd vholecalciferol, melatonin, loratadine until pt can tolerate PO meds 75 YO female from home, H/O CAD/DM/HTN/PVD/hysterectomy/cholecystectomy ,with AICD has come to ED with intractable nausea and vomiting for 2 days, likely due to pill induced esophagitis/gastritis. Patient reports that it was sudden in onset, 7/10, burning & pressure like pain, non-radiating , associated with nausea and projectile vomiting. Patient also reports significant weight( not sure how much) recently and decrease in appetite. CT abdomen/pelvis shows bladder wall thickening and pt has history of Stage1 endometrial cancer,   Patient also has high blood pressure because she missed her medications  Admitting patient for management of  Acute Gastritis  Intractable abdominal pain  hypertension  weight loss/ suspected malignancy workup    DCd vholecalciferol, melatonin, loratadine until pt can tolerate PO meds     Plan to go to OR 6/9, NPO after midnight

## 2020-06-09 ENCOUNTER — RESULT REVIEW (OUTPATIENT)
Age: 75
End: 2020-06-09

## 2020-06-09 LAB
ABO RH CONFIRMATION: SIGNIFICANT CHANGE UP
ALBUMIN SERPL ELPH-MCNC: 2.6 G/DL — LOW (ref 3.5–5)
ALP SERPL-CCNC: 311 U/L — HIGH (ref 40–120)
ALT FLD-CCNC: 34 U/L DA — SIGNIFICANT CHANGE UP (ref 10–60)
ANION GAP SERPL CALC-SCNC: 9 MMOL/L — SIGNIFICANT CHANGE UP (ref 5–17)
APTT BLD: 32.3 SEC — SIGNIFICANT CHANGE UP (ref 27.5–36.3)
APTT BLD: 68.4 SEC — HIGH (ref 27.5–36.3)
APTT BLD: 79.6 SEC — HIGH (ref 27.5–36.3)
AST SERPL-CCNC: 27 U/L — SIGNIFICANT CHANGE UP (ref 10–40)
BASOPHILS # BLD AUTO: 0.03 K/UL — SIGNIFICANT CHANGE UP (ref 0–0.2)
BASOPHILS NFR BLD AUTO: 0.2 % — SIGNIFICANT CHANGE UP (ref 0–2)
BILIRUB SERPL-MCNC: 0.6 MG/DL — SIGNIFICANT CHANGE UP (ref 0.2–1.2)
BLD GP AB SCN SERPL QL: SIGNIFICANT CHANGE UP
BUN SERPL-MCNC: 24 MG/DL — HIGH (ref 7–18)
CALCIUM SERPL-MCNC: 8.5 MG/DL — SIGNIFICANT CHANGE UP (ref 8.4–10.5)
CHLORIDE SERPL-SCNC: 101 MMOL/L — SIGNIFICANT CHANGE UP (ref 96–108)
CO2 SERPL-SCNC: 24 MMOL/L — SIGNIFICANT CHANGE UP (ref 22–31)
CREAT SERPL-MCNC: 1 MG/DL — SIGNIFICANT CHANGE UP (ref 0.5–1.3)
EOSINOPHIL # BLD AUTO: 0.33 K/UL — SIGNIFICANT CHANGE UP (ref 0–0.5)
EOSINOPHIL NFR BLD AUTO: 2.5 % — SIGNIFICANT CHANGE UP (ref 0–6)
GLUCOSE BLDC GLUCOMTR-MCNC: 115 MG/DL — HIGH (ref 70–99)
GLUCOSE BLDC GLUCOMTR-MCNC: 130 MG/DL — HIGH (ref 70–99)
GLUCOSE BLDC GLUCOMTR-MCNC: 133 MG/DL — HIGH (ref 70–99)
GLUCOSE BLDC GLUCOMTR-MCNC: 94 MG/DL — SIGNIFICANT CHANGE UP (ref 70–99)
GLUCOSE SERPL-MCNC: 119 MG/DL — HIGH (ref 70–99)
HCT VFR BLD CALC: 30.3 % — LOW (ref 34.5–45)
HGB BLD-MCNC: 9.6 G/DL — LOW (ref 11.5–15.5)
IMM GRANULOCYTES NFR BLD AUTO: 1.1 % — SIGNIFICANT CHANGE UP (ref 0–1.5)
INR BLD: 1.34 RATIO — HIGH (ref 0.88–1.16)
LYMPHOCYTES # BLD AUTO: 16.5 % — SIGNIFICANT CHANGE UP (ref 13–44)
LYMPHOCYTES # BLD AUTO: 2.17 K/UL — SIGNIFICANT CHANGE UP (ref 1–3.3)
MAGNESIUM SERPL-MCNC: 2.1 MG/DL — SIGNIFICANT CHANGE UP (ref 1.6–2.6)
MCHC RBC-ENTMCNC: 29.4 PG — SIGNIFICANT CHANGE UP (ref 27–34)
MCHC RBC-ENTMCNC: 31.7 GM/DL — LOW (ref 32–36)
MCV RBC AUTO: 92.9 FL — SIGNIFICANT CHANGE UP (ref 80–100)
METHOD TYPE: SIGNIFICANT CHANGE UP
MONOCYTES # BLD AUTO: 1.18 K/UL — HIGH (ref 0–0.9)
MONOCYTES NFR BLD AUTO: 9 % — SIGNIFICANT CHANGE UP (ref 2–14)
NEUTROPHILS # BLD AUTO: 9.31 K/UL — HIGH (ref 1.8–7.4)
NEUTROPHILS NFR BLD AUTO: 70.7 % — SIGNIFICANT CHANGE UP (ref 43–77)
NRBC # BLD: 0 /100 WBCS — SIGNIFICANT CHANGE UP (ref 0–0)
PHOSPHATE SERPL-MCNC: 2.9 MG/DL — SIGNIFICANT CHANGE UP (ref 2.5–4.5)
PLATELET # BLD AUTO: 328 K/UL — SIGNIFICANT CHANGE UP (ref 150–400)
POTASSIUM SERPL-MCNC: 4.9 MMOL/L — SIGNIFICANT CHANGE UP (ref 3.5–5.3)
POTASSIUM SERPL-SCNC: 4.9 MMOL/L — SIGNIFICANT CHANGE UP (ref 3.5–5.3)
PROT SERPL-MCNC: 7 G/DL — SIGNIFICANT CHANGE UP (ref 6–8.3)
PROTHROM AB SERPL-ACNC: 15.3 SEC — HIGH (ref 10–12.9)
RBC # BLD: 3.26 M/UL — LOW (ref 3.8–5.2)
RBC # FLD: 15.9 % — HIGH (ref 10.3–14.5)
SARS-COV-2 RNA SPEC QL NAA+PROBE: SIGNIFICANT CHANGE UP
SODIUM SERPL-SCNC: 134 MMOL/L — LOW (ref 135–145)
WBC # BLD: 13.16 K/UL — HIGH (ref 3.8–10.5)
WBC # FLD AUTO: 13.16 K/UL — HIGH (ref 3.8–10.5)

## 2020-06-09 PROCEDURE — 76937 US GUIDE VASCULAR ACCESS: CPT | Mod: 26

## 2020-06-09 PROCEDURE — 88304 TISSUE EXAM BY PATHOLOGIST: CPT | Mod: 26

## 2020-06-09 PROCEDURE — 75710 ARTERY X-RAYS ARM/LEG: CPT | Mod: 26

## 2020-06-09 PROCEDURE — 36247 INS CATH ABD/L-EXT ART 3RD: CPT | Mod: GC

## 2020-06-09 PROCEDURE — 75625 CONTRAST EXAM ABDOMINL AORTA: CPT | Mod: 26

## 2020-06-09 PROCEDURE — 88311 DECALCIFY TISSUE: CPT | Mod: 26

## 2020-06-09 PROCEDURE — 88305 TISSUE EXAM BY PATHOLOGIST: CPT | Mod: 26

## 2020-06-09 RX ORDER — LACTOBACILLUS ACIDOPHILUS 100MM CELL
1 CAPSULE ORAL
Refills: 0 | Status: DISCONTINUED | OUTPATIENT
Start: 2020-06-09 | End: 2020-06-19

## 2020-06-09 RX ORDER — SODIUM CHLORIDE 9 MG/ML
1000 INJECTION, SOLUTION INTRAVENOUS
Refills: 0 | Status: DISCONTINUED | OUTPATIENT
Start: 2020-06-09 | End: 2020-06-09

## 2020-06-09 RX ORDER — METOPROLOL TARTRATE 50 MG
5 TABLET ORAL EVERY 6 HOURS
Refills: 0 | Status: DISCONTINUED | OUTPATIENT
Start: 2020-06-09 | End: 2020-06-19

## 2020-06-09 RX ORDER — PANTOPRAZOLE SODIUM 20 MG/1
40 TABLET, DELAYED RELEASE ORAL DAILY
Refills: 0 | Status: DISCONTINUED | OUTPATIENT
Start: 2020-06-09 | End: 2020-06-17

## 2020-06-09 RX ORDER — HYDRALAZINE HCL 50 MG
50 TABLET ORAL THREE TIMES A DAY
Refills: 0 | Status: DISCONTINUED | OUTPATIENT
Start: 2020-06-09 | End: 2020-06-11

## 2020-06-09 RX ORDER — ATORVASTATIN CALCIUM 80 MG/1
40 TABLET, FILM COATED ORAL AT BEDTIME
Refills: 0 | Status: DISCONTINUED | OUTPATIENT
Start: 2020-06-09 | End: 2020-06-19

## 2020-06-09 RX ORDER — TRAMADOL HYDROCHLORIDE 50 MG/1
25 TABLET ORAL EVERY 6 HOURS
Refills: 0 | Status: DISCONTINUED | OUTPATIENT
Start: 2020-06-09 | End: 2020-06-10

## 2020-06-09 RX ORDER — INSULIN LISPRO 100/ML
VIAL (ML) SUBCUTANEOUS
Refills: 0 | Status: DISCONTINUED | OUTPATIENT
Start: 2020-06-09 | End: 2020-06-19

## 2020-06-09 RX ORDER — DIPHENHYDRAMINE HCL 50 MG
15 CAPSULE ORAL EVERY 6 HOURS
Refills: 0 | Status: DISCONTINUED | OUTPATIENT
Start: 2020-06-09 | End: 2020-06-19

## 2020-06-09 RX ORDER — LABETALOL HCL 100 MG
7.5 TABLET ORAL ONCE
Refills: 0 | Status: COMPLETED | OUTPATIENT
Start: 2020-06-09 | End: 2020-06-09

## 2020-06-09 RX ORDER — METOPROLOL TARTRATE 50 MG
50 TABLET ORAL DAILY
Refills: 0 | Status: DISCONTINUED | OUTPATIENT
Start: 2020-06-09 | End: 2020-06-18

## 2020-06-09 RX ORDER — SODIUM HYPOCHLORITE 0.125 %
1 SOLUTION, NON-ORAL MISCELLANEOUS DAILY
Refills: 0 | Status: DISCONTINUED | OUTPATIENT
Start: 2020-06-09 | End: 2020-06-17

## 2020-06-09 RX ORDER — ONDANSETRON 8 MG/1
4 TABLET, FILM COATED ORAL EVERY 8 HOURS
Refills: 0 | Status: DISCONTINUED | OUTPATIENT
Start: 2020-06-09 | End: 2020-06-09

## 2020-06-09 RX ORDER — HYDROMORPHONE HYDROCHLORIDE 2 MG/ML
0.5 INJECTION INTRAMUSCULAR; INTRAVENOUS; SUBCUTANEOUS
Refills: 0 | Status: DISCONTINUED | OUTPATIENT
Start: 2020-06-09 | End: 2020-06-09

## 2020-06-09 RX ORDER — LANOLIN ALCOHOL/MO/W.PET/CERES
5 CREAM (GRAM) TOPICAL AT BEDTIME
Refills: 0 | Status: DISCONTINUED | OUTPATIENT
Start: 2020-06-09 | End: 2020-06-19

## 2020-06-09 RX ORDER — ONDANSETRON 8 MG/1
4 TABLET, FILM COATED ORAL EVERY 8 HOURS
Refills: 0 | Status: DISCONTINUED | OUTPATIENT
Start: 2020-06-09 | End: 2020-06-19

## 2020-06-09 RX ORDER — HEPARIN SODIUM 5000 [USP'U]/ML
INJECTION INTRAVENOUS; SUBCUTANEOUS
Qty: 25000 | Refills: 0 | Status: DISCONTINUED | OUTPATIENT
Start: 2020-06-09 | End: 2020-06-10

## 2020-06-09 RX ORDER — INSULIN GLARGINE 100 [IU]/ML
10 INJECTION, SOLUTION SUBCUTANEOUS AT BEDTIME
Refills: 0 | Status: DISCONTINUED | OUTPATIENT
Start: 2020-06-09 | End: 2020-06-19

## 2020-06-09 RX ORDER — ACETAMINOPHEN 500 MG
650 TABLET ORAL EVERY 6 HOURS
Refills: 0 | Status: DISCONTINUED | OUTPATIENT
Start: 2020-06-09 | End: 2020-06-19

## 2020-06-09 RX ORDER — MEROPENEM 1 G/30ML
1000 INJECTION INTRAVENOUS EVERY 8 HOURS
Refills: 0 | Status: DISCONTINUED | OUTPATIENT
Start: 2020-06-09 | End: 2020-06-12

## 2020-06-09 RX ORDER — HYDRALAZINE HCL 50 MG
5 TABLET ORAL ONCE
Refills: 0 | Status: COMPLETED | OUTPATIENT
Start: 2020-06-09 | End: 2020-06-09

## 2020-06-09 RX ADMIN — INSULIN GLARGINE 10 UNIT(S): 100 INJECTION, SOLUTION SUBCUTANEOUS at 21:23

## 2020-06-09 RX ADMIN — HEPARIN SODIUM 1200 UNIT(S)/HR: 5000 INJECTION INTRAVENOUS; SUBCUTANEOUS at 23:32

## 2020-06-09 RX ADMIN — Medication 5 MILLIGRAM(S): at 08:39

## 2020-06-09 RX ADMIN — HYDROMORPHONE HYDROCHLORIDE 0.5 MILLIGRAM(S): 2 INJECTION INTRAMUSCULAR; INTRAVENOUS; SUBCUTANEOUS at 16:50

## 2020-06-09 RX ADMIN — MEROPENEM 100 MILLIGRAM(S): 1 INJECTION INTRAVENOUS at 22:07

## 2020-06-09 RX ADMIN — Medication 50 MILLIGRAM(S): at 05:23

## 2020-06-09 RX ADMIN — Medication 1.25 MILLIGRAM(S): at 10:34

## 2020-06-09 RX ADMIN — PANTOPRAZOLE SODIUM 40 MILLIGRAM(S): 20 TABLET, DELAYED RELEASE ORAL at 12:01

## 2020-06-09 RX ADMIN — Medication 50 MILLIGRAM(S): at 23:15

## 2020-06-09 RX ADMIN — CHLORHEXIDINE GLUCONATE 1 APPLICATION(S): 213 SOLUTION TOPICAL at 05:47

## 2020-06-09 RX ADMIN — ONDANSETRON 4 MILLIGRAM(S): 8 TABLET, FILM COATED ORAL at 22:56

## 2020-06-09 RX ADMIN — Medication 7.5 MILLIGRAM(S): at 01:08

## 2020-06-09 RX ADMIN — Medication 110 MILLIGRAM(S): at 18:29

## 2020-06-09 RX ADMIN — Medication 110 MILLIGRAM(S): at 05:24

## 2020-06-09 RX ADMIN — MEROPENEM 100 MILLIGRAM(S): 1 INJECTION INTRAVENOUS at 05:24

## 2020-06-09 RX ADMIN — HEPARIN SODIUM 800 UNIT(S)/HR: 5000 INJECTION INTRAVENOUS; SUBCUTANEOUS at 03:32

## 2020-06-09 NOTE — PROGRESS NOTE ADULT - ASSESSMENT
75 YO female from home, H/O CAD/DM/HTN/PVD/hysterectomy/cholecystectomy ,with AICD has come to ED with intractable nausea and vomiting for 2 days, likely due to pill induced esophagitis/gastritis. Patient reports that it was sudden in onset, 7/10, burning & pressure like pain, non-radiating , associated with nausea and projectile vomiting. Patient also reports significant weight( not sure how much) recently and decrease in appetite. CT abdomen/pelvis shows bladder wall thickening and pt has history of Stage1 endometrial cancer,   Patient also has high blood pressure because she missed her medications  Admitting patient for management of  Acute Gastritis  Intractable abdominal pain  hypertension  weight loss/ suspected malignancy workup    DCd vholecalciferol, melatonin, loratadine until pt can tolerate PO meds     Plan to go to OR 6/9, NPO after midnight

## 2020-06-09 NOTE — PROGRESS NOTE ADULT - PROBLEM SELECTOR PLAN 8
H/o CAD and PCI in 2009,  patient has both AICD and pacemaker  EKG shows paced Rythm  No active issues  continue aspirin , Plavix, BB, statin, ACE - pt is not tolerated to PO meds  - c/w heparin drip

## 2020-06-09 NOTE — PROGRESS NOTE ADULT - PROBLEM SELECTOR PLAN 9
H/o CHF but clinically patient is not in fluid overload  No peripheral edema  Patient takes furosemide 20mg daily twice  BNP: elevated at 9383  echo: EF 50-55%, mild TR, TX  - holding lasix 20mg IV as pt is not eating and dehydrated  - c/w statin and BB

## 2020-06-09 NOTE — PROGRESS NOTE ADULT - PROBLEM SELECTOR PLAN 1
CTA head/neck showed LEFT PICA stroke  -heparin drip held for amputation, will restart once cleared by surgery  Neuro Dr. Moncada

## 2020-06-09 NOTE — PROGRESS NOTE ADULT - SUBJECTIVE AND OBJECTIVE BOX
Summary:   74y  Female      Subjective:   Comfortable     Objective:    MEDICATIONS  (STANDING):  atorvastatin 40 milliGRAM(s) Oral at bedtime  Dakins Solution - 1/4 Strength 1 Application(s) Topical daily  doxycycline IVPB 100 milliGRAM(s) IV Intermittent every 12 hours  heparin  Infusion.  Unit(s)/Hr (12 mL/Hr) IV Continuous <Continuous>  hydrALAZINE 50 milliGRAM(s) Oral three times a day  insulin glargine Injectable (LANTUS) 10 Unit(s) SubCutaneous at bedtime  insulin lispro (HumaLOG) corrective regimen sliding scale   SubCutaneous Before meals and at bedtime  lactobacillus acidophilus 1 Tablet(s) Oral three times a day with meals  melatonin 5 milliGRAM(s) Oral at bedtime  meropenem  IVPB 1000 milliGRAM(s) IV Intermittent every 12 hours  metoprolol succinate ER 50 milliGRAM(s) Oral daily  pantoprazole  Injectable 40 milliGRAM(s) IV Push daily    MEDICATIONS  (PRN):  acetaminophen   Tablet .. 650 milliGRAM(s) Oral every 6 hours PRN Mild Pain (1 - 3), Moderate Pain (4 - 6)  aluminum hydroxide/magnesium hydroxide/simethicone Suspension 30 milliLiter(s) Oral every 6 hours PRN Dyspepsia  diphenhydrAMINE   Injectable 15 milliGRAM(s) IV Push every 6 hours PRN nausea or vomiting  metoprolol tartrate Injectable 5 milliGRAM(s) IV Push every 6 hours PRN HR greater than 120  ondansetron Injectable 4 milliGRAM(s) IV Push every 8 hours PRN Nausea and/or Vomiting  traMADol 25 milliGRAM(s) Oral every 6 hours PRN Severe Pain (7 - 10)              Vital Signs Last 24 Hrs  T(C): 36.7 (09 Jun 2020 08:20), Max: 36.7 (09 Jun 2020 02:15)  T(F): 98.1 (09 Jun 2020 08:20), Max: 98.1 (09 Jun 2020 02:15)  HR: 85 (09 Jun 2020 08:50) (85 - 96)  BP: 175/80 (09 Jun 2020 08:50) (158/74 - 185/86)  BP(mean): --  RR: 18 (09 Jun 2020 08:20) (18 - 19)  SpO2: 93% (09 Jun 2020 08:20) (92% - 95%)      General:  Well developed, well nourished, alert and active, no pallor, NAD.  HEENT:    Normal appearance of conjunctiva, ears, nose, lips, oropharynx, and oral mucosa, anicteric.  Neck:  No masses, no asymmetry.  Lymph Nodes:  No lymphadenopathy.   Cardiovascular:  RRR normal S1/S2, no murmur.  Respiratory:  CTA B/L, normal respiratory effort.   Abdominal:   soft, no masses or tenderness, normoactive BS, NT/ND, no HSM.  Extremities:   No clubbing or cyanosis, normal capillary refill, no edema.   Skin:   No rash, jaundice, lesions, eczema.   Musculoskeletal:  No joint swelling, erythema or tenderness.   Neuro: No focal deficits.   Other:       LABS:                        9.6    13.16 )-----------( 328      ( 09 Jun 2020 06:50 )             30.3     06-09    134<L>  |  101  |  24<H>  ----------------------------<  119<H>  4.9   |  24  |  1.00    Ca    8.5      09 Jun 2020 06:50  Phos  2.9     06-09  Mg     2.1     06-09    TPro  7.0  /  Alb  2.6<L>  /  TBili  0.6  /  DBili  x   /  AST  27  /  ALT  34  /  AlkPhos  311<H>  06-09    PT/INR - ( 09 Jun 2020 06:50 )   PT: 15.3 sec;   INR: 1.34 ratio         PTT - ( 09 Jun 2020 06:50 )  PTT:79.6 sec      RADIOLOGY & ADDITIONAL TESTS:

## 2020-06-09 NOTE — PROGRESS NOTE ADULT - SUBJECTIVE AND OBJECTIVE BOX
Subjective: s/p I and D of left foot and partial amputation of left first metatarsal by podiatry . also went for poss angio, and revascularisation , but unfortunately has Severe non-reconstructible occ dz annette pop and tib arteries. as per vascular will ultimately need BKA .      PHYSICAL EXAM:    Vital Signs Last 24 Hrs  T(C): 37.2 (09 Jun 2020 18:43), Max: 37.2 (09 Jun 2020 18:43)  T(F): 98.9 (09 Jun 2020 18:43), Max: 98.9 (09 Jun 2020 18:43)  HR: 76 (09 Jun 2020 18:43) (75 - 96)  BP: 155/77 (09 Jun 2020 18:43) (152/92 - 187/87)  BP(mean): 112 (09 Jun 2020 17:05) (105 - 115)  RR: 16 (09 Jun 2020 18:43) (13 - 20)  SpO2: 94% (09 Jun 2020 18:43) (92% - 100%)    Constitutional: awake alert oriented times 3   [place] SCLERA anicteric EOMI   LUNGS clear   CVS s1 s2 aud no murmurs   ABDOMEN soft non tender no HSM   NEUROLOGY  no defecits   SKIN left foot dressed   EXTREMITIES no edema no cyanosis         LABS/DIAGNOSTIC TESTS                        9.6    13.16 )-----------( 328      ( 09 Jun 2020 06:50 )             30.3     06-09    134<L>  |  101  |  24<H>  ----------------------------<  119<H>  4.9   |  24  |  1.00    Ca    8.5      09 Jun 2020 06:50  Phos  2.9     06-09  Mg     2.1     06-09    TPro  7.0  /  Alb  2.6<L>  /  TBili  0.6  /  DBili  x   /  AST  27  /  ALT  34  /  AlkPhos  311<H>  06-09    PT/INR - ( 09 Jun 2020 06:50 )   PT: 15.3 sec;   INR: 1.34 ratio         PTT - ( 09 Jun 2020 06:50 )  PTT:79.6 sec      meds  acetaminophen   Tablet .. 650 milliGRAM(s) Oral every 6 hours PRN  aluminum hydroxide/magnesium hydroxide/simethicone Suspension 30 milliLiter(s) Oral every 6 hours PRN  atorvastatin 40 milliGRAM(s) Oral at bedtime  Dakins Solution - 1/4 Strength 1 Application(s) Topical daily  diphenhydrAMINE   Injectable 15 milliGRAM(s) IV Push every 6 hours PRN  doxycycline IVPB 100 milliGRAM(s) IV Intermittent every 12 hours  hydrALAZINE 50 milliGRAM(s) Oral three times a day  insulin glargine Injectable (LANTUS) 10 Unit(s) SubCutaneous at bedtime  insulin lispro (HumaLOG) corrective regimen sliding scale   SubCutaneous Before meals and at bedtime  lactobacillus acidophilus 1 Tablet(s) Oral three times a day with meals  melatonin 5 milliGRAM(s) Oral at bedtime  meropenem  IVPB 1000 milliGRAM(s) IV Intermittent every 8 hours  metoprolol succinate ER 50 milliGRAM(s) Oral daily  metoprolol tartrate Injectable 5 milliGRAM(s) IV Push every 6 hours PRN  ondansetron Injectable 4 milliGRAM(s) IV Push every 8 hours PRN  pantoprazole  Injectable 40 milliGRAM(s) IV Push daily  traMADol 25 milliGRAM(s) Oral every 6 hours PRN        CULTURES  Culture Results: wound  Moderate Staphylococcus aureus (06-06 @ 03:05)( MSSA)  Culture Results: blood  No Growth Final (06-03 @ 08:52)  Culture Results: wound  Few Staphylococcus aureus (06-03 @ 00:34)( MSSA)        RADIOLOGY  no new xrays

## 2020-06-09 NOTE — PROGRESS NOTE ADULT - PROBLEM SELECTOR PLAN 2
H/o PVD and popliteal stent  s/p left big toe amputation in May 2020  - ESTRELLA: RLE:  0.6. LLE:  0.5. Severe bilateral peripheral artery disease. Pulse volume recordings are diffusely diminished bilaterally, however severely diminished in the left foot.  - plan for surgery tmrw for possible stent placement  PT: home PT  Vascular Dr. Grove

## 2020-06-09 NOTE — PROGRESS NOTE ADULT - ASSESSMENT
74 year old female with vertigo and persistent nausea and vomiting .     Plan:  Nausea vomiting (resolved)   Likely 2/2 to vertigo 2/2 to acute stroke   Continue  AC if needed   No indication for EGD at this time   D/W patient and daughter via video conference   Advanced care planning was discussed with patient and family.  Advanced care planning forms were reviewed and discussed.  Risks, benefits and alternatives of gastroenterologic procedures were discussed in detail and all questions were answered.

## 2020-06-09 NOTE — PROGRESS NOTE ADULT - SUBJECTIVE AND OBJECTIVE BOX
Angio today:   L LE: Severe non-reconstructible occ dz annette pop and tib arteries.  Occ BK pop/tib stent wo outflow targets for revasc.  Also s/p podiatry debridement/partial amp w findings of infection.    Limb threatened if doesn't heal- will need BKA once foot deemed non-salvageable by podiatry    DW'ed pt daughter

## 2020-06-09 NOTE — PROGRESS NOTE ADULT - SUBJECTIVE AND OBJECTIVE BOX
pt status post left foot I and D, partial amputation of the first metatarsal   pt tolerated procedure well no complications   will follow  up pt status post left foot I and D, partial amputation of the first metatarsal   pt tolerated procedure well no complications   no weightbearing to left foot   reinforce dressing if needed   keep foot elevated   will follow  up

## 2020-06-09 NOTE — CHART NOTE - NSCHARTNOTEFT_GEN_A_CORE
- By virtue of having CAD, CVA, DM and hx of CHF she should be treated as high cardiac risk for the proposed low risk procedure  - no need for further cardiac testing prior to Lower extremty angiogram and I+D.   - patient is medically cleared for surgery .

## 2020-06-09 NOTE — PROGRESS NOTE ADULT - PROBLEM SELECTOR PLAN 3
recently antibiotics changed from clindamycin to Levaquin that led to epigastric pain.  WBC remains high, possible infection on stump site  - ABX: cefepime + doxycycline 6/1-6/4,  cefazolin 6/5  - c/w doxy + nicole 6/6- as per ID  - lactate 2.4 trended down, procalcitonin high 0.17  - CT L foot shows OM ( pt cannot get MRI dur to AICD)  Podiatry Dr. Yo - Need I & D  ID Dr. Reagan

## 2020-06-09 NOTE — PROGRESS NOTE ADULT - SUBJECTIVE AND OBJECTIVE BOX
PGY 1 Note discussed with supervising resident and primary attending    Patient is a 74y old  Female who presents with a chief complaint of Intractable nausea and vomiting (08 Jun 2020 19:44)      INTERVAL HPI/OVERNIGHT EVENTS:   No acute event overnight reported by overnight team and nurses. Pt remained hemodynamically stable.  Pt seen and examined  at bed side. All concerns and questions answered.   Report no new complaint. Pt denies any fever, nausea, vomiting.  Explained all test results and plan.  Appreciate all consults. will go to OR today for stent placement    MEDICATIONS  (STANDING):  atorvastatin 40 milliGRAM(s) Oral at bedtime  Dakins Solution - 1/4 Strength 1 Application(s) Topical daily  doxycycline IVPB 100 milliGRAM(s) IV Intermittent every 12 hours  enalaprilat Injectable 1.25 milliGRAM(s) IV Push once  heparin  Infusion.  Unit(s)/Hr (12 mL/Hr) IV Continuous <Continuous>  hydrALAZINE 50 milliGRAM(s) Oral three times a day  insulin glargine Injectable (LANTUS) 10 Unit(s) SubCutaneous at bedtime  insulin lispro (HumaLOG) corrective regimen sliding scale   SubCutaneous Before meals and at bedtime  lactobacillus acidophilus 1 Tablet(s) Oral three times a day with meals  melatonin 5 milliGRAM(s) Oral at bedtime  meropenem  IVPB 1000 milliGRAM(s) IV Intermittent every 12 hours  metoprolol succinate ER 50 milliGRAM(s) Oral daily  pantoprazole  Injectable 40 milliGRAM(s) IV Push daily    MEDICATIONS  (PRN):  acetaminophen   Tablet .. 650 milliGRAM(s) Oral every 6 hours PRN Mild Pain (1 - 3), Moderate Pain (4 - 6)  aluminum hydroxide/magnesium hydroxide/simethicone Suspension 30 milliLiter(s) Oral every 6 hours PRN Dyspepsia  diphenhydrAMINE   Injectable 15 milliGRAM(s) IV Push every 6 hours PRN nausea or vomiting  metoprolol tartrate Injectable 5 milliGRAM(s) IV Push every 6 hours PRN HR greater than 120  ondansetron Injectable 4 milliGRAM(s) IV Push every 8 hours PRN Nausea and/or Vomiting  traMADol 25 milliGRAM(s) Oral every 6 hours PRN Severe Pain (7 - 10)      __________________________________________________  REVIEW OF SYSTEMS:    CONSTITUTIONAL: No fever,   EYES: no acute visual disturbances  NECK: No pain or stiffness  RESPIRATORY: No cough; No shortness of breath  CARDIOVASCULAR: No chest pain, no palpitations  GASTROINTESTINAL: No pain. No nausea or vomiting; No diarrhea   NEUROLOGICAL: No headache or numbness, no tremors  MUSCULOSKELETAL: No joint pain, no muscle pain  GENITOURINARY: no dysuria, no frequency, no hesitancy  PSYCHIATRY: no depression , no anxiety  ALL OTHER  ROS negative        Vital Signs Last 24 Hrs  T(C): 36.7 (09 Jun 2020 08:20), Max: 36.8 (08 Jun 2020 10:31)  T(F): 98.1 (09 Jun 2020 08:20), Max: 98.2 (08 Jun 2020 10:31)  HR: 85 (09 Jun 2020 08:50) (85 - 96)  BP: 175/80 (09 Jun 2020 08:50) (149/66 - 185/86)  BP(mean): --  RR: 18 (09 Jun 2020 08:20) (18 - 19)  SpO2: 93% (09 Jun 2020 08:20) (92% - 99%)    ________________________________________________  PHYSICAL EXAM:  GENERAL: elderly female  HEENT: Normocephalic;  conjunctivae and sclerae clear; moist mucous membranes;   NECK : supple  CHEST/LUNG: Clear to auscultation bilaterally with good air entry   HEART: S1 S2  regular; no murmurs, gallops or rubs  ABDOMEN: Soft, Nontender, Nondistended; Bowel sounds present  EXTREMITIES: Left amputated toe , left foot ulcer .  SKIN: warm and dry; no rash  NERVOUS SYSTEM:  Awake and alert; Oriented  to place, person and time ; no new deficits    _________________________________________________  LABS:                        9.6    13.16 )-----------( 328      ( 09 Jun 2020 06:50 )             30.3     06-09    134<L>  |  101  |  24<H>  ----------------------------<  119<H>  4.9   |  24  |  1.00    Ca    8.5      09 Jun 2020 06:50  Phos  2.9     06-09  Mg     2.1     06-09    TPro  7.0  /  Alb  2.6<L>  /  TBili  0.6  /  DBili  x   /  AST  27  /  ALT  34  /  AlkPhos  311<H>  06-09    PT/INR - ( 09 Jun 2020 06:50 )   PT: 15.3 sec;   INR: 1.34 ratio         PTT - ( 09 Jun 2020 06:50 )  PTT:79.6 sec    CAPILLARY BLOOD GLUCOSE      POCT Blood Glucose.: 133 mg/dL (09 Jun 2020 08:05)  POCT Blood Glucose.: 179 mg/dL (08 Jun 2020 21:51)  POCT Blood Glucose.: 182 mg/dL (08 Jun 2020 16:20)  POCT Blood Glucose.: 187 mg/dL (08 Jun 2020 11:36)        RADIOLOGY & ADDITIONAL TESTS:    Imaging Personally Reviewed:  YES/NO    Consultant(s) Notes Reviewed:   YES/ No    Care Discussed with Consultants :     Plan of care was discussed with patient and /or primary care giver; all questions and concerns were addressed and care was aligned with patient's wishes.

## 2020-06-09 NOTE — PROGRESS NOTE ADULT - PROBLEM SELECTOR PLAN 5
CT abdomen shows bladder wall thickening  reports weight loss and loss of appetite recently, Patient has history of Stage 1 endometrial cancer and hysterectomy  brother also had cancer and she is not sure about which one  - urine cytology: NEGATIVE FOR HIGH GRADE UROTHELIAL CARCINOMA  urology consulted: outpt f/u w/ , need cystoscopy

## 2020-06-09 NOTE — PROGRESS NOTE ADULT - PROBLEM SELECTOR PLAN 6
Pt takes Lisinopril 40mg, toprol Xl 100mg, hydralazine 50mg TID   BP is running on higher side as patient missed her medications and also received a bolus in ED  lipid profile, TG WNL  - now pt can tolerate for PO med.   - hydralazine 10mg IV TID switched to PO 50mg TID  - lopressor IV 7.5mg q6h switched to toprol XL 50mg  - monitor BP --running high, but keep MAP around 100 for stroke

## 2020-06-09 NOTE — PROGRESS NOTE ADULT - PROBLEM SELECTOR PLAN 10
IMPROVE VTE Individual Risk Assessment  RISK                                                                Points  [  ] Previous VTE                                                  3  [  ] Thrombophilia                                               2  [  ] Lower limb paralysis                                      2        (unable to hold up >15 seconds)    [  ] Current Cancer                                              2         (within 6 months)  [x  ] Immobilization > 24 hrs                                1  [  ] ICU/CCU stay > 24 hours                              1  [  x] Age > 60                                                      1    IMPROVE VTE Score 2  heparin drip

## 2020-06-09 NOTE — PROGRESS NOTE ADULT - SUBJECTIVE AND OBJECTIVE BOX
Patient denies chest pain or shortness of breath.   Review of systems otherwise (-)  	  acetaminophen   Tablet .. 650 milliGRAM(s) Oral every 6 hours PRN  aluminum hydroxide/magnesium hydroxide/simethicone Suspension 30 milliLiter(s) Oral every 6 hours PRN  atorvastatin 40 milliGRAM(s) Oral at bedtime  Dakins Solution - 1/4 Strength 1 Application(s) Topical daily  diphenhydrAMINE   Injectable 15 milliGRAM(s) IV Push every 6 hours PRN  doxycycline IVPB 100 milliGRAM(s) IV Intermittent every 12 hours  hydrALAZINE 50 milliGRAM(s) Oral three times a day  HYDROmorphone  Injectable 0.5 milliGRAM(s) IV Push every 10 minutes PRN  insulin glargine Injectable (LANTUS) 10 Unit(s) SubCutaneous at bedtime  insulin lispro (HumaLOG) corrective regimen sliding scale   SubCutaneous Before meals and at bedtime  lactated ringers. 1000 milliLiter(s) IV Continuous <Continuous>  lactobacillus acidophilus 1 Tablet(s) Oral three times a day with meals  melatonin 5 milliGRAM(s) Oral at bedtime  meropenem  IVPB 1000 milliGRAM(s) IV Intermittent every 8 hours  metoprolol succinate ER 50 milliGRAM(s) Oral daily  metoprolol tartrate Injectable 5 milliGRAM(s) IV Push every 6 hours PRN  ondansetron Injectable 4 milliGRAM(s) IV Push every 8 hours PRN  pantoprazole  Injectable 40 milliGRAM(s) IV Push daily  traMADol 25 milliGRAM(s) Oral every 6 hours PRN                            9.6    13.16 )-----------( 328      ( 09 Jun 2020 06:50 )             30.3       Hemoglobin: 9.6 g/dL (06-09 @ 06:50)  Hemoglobin: 9.5 g/dL (06-08 @ 05:42)  Hemoglobin: 10.2 g/dL (06-07 @ 06:01)  Hemoglobin: 10.4 g/dL (06-06 @ 08:58)  Hemoglobin: 10.9 g/dL (06-05 @ 10:24)      06-09    134<L>  |  101  |  24<H>  ----------------------------<  119<H>  4.9   |  24  |  1.00    Ca    8.5      09 Jun 2020 06:50  Phos  2.9     06-09  Mg     2.1     06-09    TPro  7.0  /  Alb  2.6<L>  /  TBili  0.6  /  DBili  x   /  AST  27  /  ALT  34  /  AlkPhos  311<H>  06-09    Creatinine Trend: 1.00<--, 1.27<--, 0.98<--, 1.08<--, 1.08<--, 1.07<--    COAGS: PT/INR - ( 09 Jun 2020 06:50 )   PT: 15.3 sec;   INR: 1.34 ratio         PTT - ( 09 Jun 2020 06:50 )  PTT:79.6 sec          T(C): 37 (06-09-20 @ 17:20), Max: 37.1 (06-09-20 @ 16:20)  HR: 75 (06-09-20 @ 17:50) (75 - 96)  BP: 178/88 (06-09-20 @ 17:50) (152/92 - 187/87)  RR: 15 (06-09-20 @ 17:50) (13 - 20)  SpO2: 95% (06-09-20 @ 17:50) (92% - 100%)  Wt(kg): --    I&O's Summary    09 Jun 2020 07:01  -  09 Jun 2020 18:26  --------------------------------------------------------  IN: 575 mL / OUT: 30 mL / NET: 545 mL        HEENT:  (-)icterus (-)pallor  CV: N S1 S2 1/6 JAMAAL (+)2 Pulses B/l  Resp:  Clear to ausculatation B/L, normal effort  GI: (+) BS Soft, NT, ND  Lymph:  (-)Edema, (-)obvious lymphadenopathy  Skin: Warm to touch, Normal turgor  Psych: Appropriate mood and affect          ASSESSMENT/PLAN: 	74y  Female AD/DM/HTN/PVD/hysterectomy/cholecystectomy ,with CHF BIVICD, Mild anterior wall ischemia being medically managed  comes to ED with intractable nausea and vomiting for 2 days 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 bladder malignancy.    - cont Heparin gtt   - Interrogate [place] [employer] ICD  - nausea resolved  - Afib appears to be new, noted on prior EKG  -  cont toprol XL 50 mg PO Daily  - By virtue of having CAD, CVA, DM and hx of CHF she should be treated as high cardiac risk for the proposed low risk procedure  - no need for further cardiac testing prior to Lower extremty angiogram and I+D.   - Will need repeat stress prior to high risk surgery      Pipo Segundo MD, [facility]  BEEPER [phone]

## 2020-06-09 NOTE — PROGRESS NOTE ADULT - ASSESSMENT
73 YO female from home, H/O CAD/DM/HTN/PVD/hysterectomy/cholecystectomy ,with AICD comes to ED with intractable nausea and vomiting for 2 days. Patient states that she recently had left big toe amputation in the beginning of May and she was taking antibiotics. On Friday, her antibiotics were changed from clindamycin to Levaquin and after she took antibiotics along with percocet, she started having severe pain in epigastric region. Patient reports that it was sudden in onset, 7/10, burning & pressure like pain, non-radiating , associated with nausea,, bitter taste in mouth and projectile vomiting. She was not able to tolerate food and it aggravated her symptoms. Patient also reports significant weight( not sure how much) recently and decrease in appetite. patient denies fever, cough, SOB, constipation, dysuria, headache, chest pain, orthopnea, back pain, burning sensation in extremities.c/o pain over left big toe stump which is not resolving despite pain meds . afebrile on adm , covid neg. ID called for persistant leucocytosis and appr ab     # s/p amputation of left great toe for gangrene with post op collection and overlying cellulits as well as underlying OM /wound cx pos for staph aureus ( MSSA)/s/p left leg stent at [facility] as per pt /underlying PVD/s/p angio, with severe occlusive popliteal and tibial  dz that is non reconstructable/s/p I and D of left foot as well as partial amputation of left first MT     #persistant  nausea/vomiting , now seems to be vertigo sec to poss cerebellar cva vs tia     # transaminitis improving     # increasing wbc r/o asp pna /wbc decreasing /covid neg     plan  f/u or cx if done   cont  doxy and merrem  to cover mssa for stump infection/abscess as well as asp pna d/w intern   CONT  probiotics   EGD ON HOLD AS PER GI   neuro f/u       thnx will f/u   d/w intern dr diaz

## 2020-06-09 NOTE — PROGRESS NOTE ADULT - PROBLEM SELECTOR PLAN 4
patient endorses moderate to severe abdominal pain, n/v since 5/29 Fri  partially relieved with oral Protonix  UA negative, No fevers, UCx: -ve  More likely GI etiology  CT abd w/ IV cont: Small bilateral pleural effusions. Multiple nodular peripheral filling defect in the urinary bladder measuring up to 1.3 cm, suspicious for transitional cell carcinomas.  - c/w PPI, Reglan q8h (pt had QTc prolongation, but will continue as pt has AICD and it works better),  benadryl 25mg IV q6h PRN for n/v  - No indication for EGD as per GI  - resolving  GI Dr. Matute

## 2020-06-10 LAB
ALBUMIN SERPL ELPH-MCNC: 2.3 G/DL — LOW (ref 3.5–5)
ALP SERPL-CCNC: 363 U/L — HIGH (ref 40–120)
ALT FLD-CCNC: 34 U/L DA — SIGNIFICANT CHANGE UP (ref 10–60)
ANION GAP SERPL CALC-SCNC: 12 MMOL/L — SIGNIFICANT CHANGE UP (ref 5–17)
APTT BLD: 130.9 SEC — CRITICAL HIGH (ref 27.5–36.3)
APTT BLD: 182.9 SEC — CRITICAL HIGH (ref 27.5–36.3)
APTT BLD: 42.9 SEC — HIGH (ref 27.5–36.3)
AST SERPL-CCNC: 27 U/L — SIGNIFICANT CHANGE UP (ref 10–40)
BASOPHILS # BLD AUTO: 0.04 K/UL — SIGNIFICANT CHANGE UP (ref 0–0.2)
BASOPHILS NFR BLD AUTO: 0.3 % — SIGNIFICANT CHANGE UP (ref 0–2)
BILIRUB SERPL-MCNC: 0.7 MG/DL — SIGNIFICANT CHANGE UP (ref 0.2–1.2)
BUN SERPL-MCNC: 19 MG/DL — HIGH (ref 7–18)
CALCIUM SERPL-MCNC: 8.9 MG/DL — SIGNIFICANT CHANGE UP (ref 8.4–10.5)
CHLORIDE SERPL-SCNC: 97 MMOL/L — SIGNIFICANT CHANGE UP (ref 96–108)
CO2 SERPL-SCNC: 24 MMOL/L — SIGNIFICANT CHANGE UP (ref 22–31)
CREAT SERPL-MCNC: 0.84 MG/DL — SIGNIFICANT CHANGE UP (ref 0.5–1.3)
EOSINOPHIL # BLD AUTO: 0.31 K/UL — SIGNIFICANT CHANGE UP (ref 0–0.5)
EOSINOPHIL NFR BLD AUTO: 2.3 % — SIGNIFICANT CHANGE UP (ref 0–6)
GLUCOSE BLDC GLUCOMTR-MCNC: 127 MG/DL — HIGH (ref 70–99)
GLUCOSE BLDC GLUCOMTR-MCNC: 134 MG/DL — HIGH (ref 70–99)
GLUCOSE BLDC GLUCOMTR-MCNC: 184 MG/DL — HIGH (ref 70–99)
GLUCOSE BLDC GLUCOMTR-MCNC: 206 MG/DL — HIGH (ref 70–99)
GLUCOSE SERPL-MCNC: 131 MG/DL — HIGH (ref 70–99)
HCT VFR BLD CALC: 29.6 % — LOW (ref 34.5–45)
HCT VFR BLD CALC: 30.6 % — LOW (ref 34.5–45)
HGB BLD-MCNC: 9.5 G/DL — LOW (ref 11.5–15.5)
HGB BLD-MCNC: 9.5 G/DL — LOW (ref 11.5–15.5)
IMM GRANULOCYTES NFR BLD AUTO: 1.2 % — SIGNIFICANT CHANGE UP (ref 0–1.5)
LYMPHOCYTES # BLD AUTO: 16.7 % — SIGNIFICANT CHANGE UP (ref 13–44)
LYMPHOCYTES # BLD AUTO: 2.27 K/UL — SIGNIFICANT CHANGE UP (ref 1–3.3)
MAGNESIUM SERPL-MCNC: 2.2 MG/DL — SIGNIFICANT CHANGE UP (ref 1.6–2.6)
MCHC RBC-ENTMCNC: 28.5 PG — SIGNIFICANT CHANGE UP (ref 27–34)
MCHC RBC-ENTMCNC: 29.1 PG — SIGNIFICANT CHANGE UP (ref 27–34)
MCHC RBC-ENTMCNC: 31 GM/DL — LOW (ref 32–36)
MCHC RBC-ENTMCNC: 32.1 GM/DL — SIGNIFICANT CHANGE UP (ref 32–36)
MCV RBC AUTO: 90.8 FL — SIGNIFICANT CHANGE UP (ref 80–100)
MCV RBC AUTO: 91.9 FL — SIGNIFICANT CHANGE UP (ref 80–100)
MONOCYTES # BLD AUTO: 1.26 K/UL — HIGH (ref 0–0.9)
MONOCYTES NFR BLD AUTO: 9.3 % — SIGNIFICANT CHANGE UP (ref 2–14)
NEUTROPHILS # BLD AUTO: 9.57 K/UL — HIGH (ref 1.8–7.4)
NEUTROPHILS NFR BLD AUTO: 70.2 % — SIGNIFICANT CHANGE UP (ref 43–77)
NRBC # BLD: 0 /100 WBCS — SIGNIFICANT CHANGE UP (ref 0–0)
NRBC # BLD: 0 /100 WBCS — SIGNIFICANT CHANGE UP (ref 0–0)
PHOSPHATE SERPL-MCNC: 2.9 MG/DL — SIGNIFICANT CHANGE UP (ref 2.5–4.5)
PLATELET # BLD AUTO: 312 K/UL — SIGNIFICANT CHANGE UP (ref 150–400)
PLATELET # BLD AUTO: 340 K/UL — SIGNIFICANT CHANGE UP (ref 150–400)
POTASSIUM SERPL-MCNC: 4.6 MMOL/L — SIGNIFICANT CHANGE UP (ref 3.5–5.3)
POTASSIUM SERPL-SCNC: 4.6 MMOL/L — SIGNIFICANT CHANGE UP (ref 3.5–5.3)
PROT SERPL-MCNC: 7.2 G/DL — SIGNIFICANT CHANGE UP (ref 6–8.3)
RBC # BLD: 3.26 M/UL — LOW (ref 3.8–5.2)
RBC # BLD: 3.33 M/UL — LOW (ref 3.8–5.2)
RBC # FLD: 15.5 % — HIGH (ref 10.3–14.5)
RBC # FLD: 15.7 % — HIGH (ref 10.3–14.5)
SODIUM SERPL-SCNC: 133 MMOL/L — LOW (ref 135–145)
WBC # BLD: 13.62 K/UL — HIGH (ref 3.8–10.5)
WBC # BLD: 14.82 K/UL — HIGH (ref 3.8–10.5)
WBC # FLD AUTO: 13.62 K/UL — HIGH (ref 3.8–10.5)
WBC # FLD AUTO: 14.82 K/UL — HIGH (ref 3.8–10.5)

## 2020-06-10 PROCEDURE — 99231 SBSQ HOSP IP/OBS SF/LOW 25: CPT

## 2020-06-10 PROCEDURE — 99233 SBSQ HOSP IP/OBS HIGH 50: CPT

## 2020-06-10 RX ORDER — HEPARIN SODIUM 5000 [USP'U]/ML
5500 INJECTION INTRAVENOUS; SUBCUTANEOUS ONCE
Refills: 0 | Status: DISCONTINUED | OUTPATIENT
Start: 2020-06-10 | End: 2020-06-10

## 2020-06-10 RX ORDER — HEPARIN SODIUM 5000 [USP'U]/ML
800 INJECTION INTRAVENOUS; SUBCUTANEOUS
Qty: 25000 | Refills: 0 | Status: DISCONTINUED | OUTPATIENT
Start: 2020-06-10 | End: 2020-06-11

## 2020-06-10 RX ORDER — LISINOPRIL 2.5 MG/1
40 TABLET ORAL DAILY
Refills: 0 | Status: DISCONTINUED | OUTPATIENT
Start: 2020-06-10 | End: 2020-06-10

## 2020-06-10 RX ORDER — GABAPENTIN 400 MG/1
100 CAPSULE ORAL EVERY 12 HOURS
Refills: 0 | Status: DISCONTINUED | OUTPATIENT
Start: 2020-06-10 | End: 2020-06-19

## 2020-06-10 RX ORDER — ACETAMINOPHEN 500 MG
1000 TABLET ORAL ONCE
Refills: 0 | Status: COMPLETED | OUTPATIENT
Start: 2020-06-10 | End: 2020-06-12

## 2020-06-10 RX ORDER — KETOROLAC TROMETHAMINE 30 MG/ML
15 SYRINGE (ML) INJECTION ONCE
Refills: 0 | Status: DISCONTINUED | OUTPATIENT
Start: 2020-06-10 | End: 2020-06-10

## 2020-06-10 RX ORDER — HEPARIN SODIUM 5000 [USP'U]/ML
2500 INJECTION INTRAVENOUS; SUBCUTANEOUS EVERY 6 HOURS
Refills: 0 | Status: DISCONTINUED | OUTPATIENT
Start: 2020-06-10 | End: 2020-06-11

## 2020-06-10 RX ORDER — LOSARTAN POTASSIUM 100 MG/1
25 TABLET, FILM COATED ORAL DAILY
Refills: 0 | Status: DISCONTINUED | OUTPATIENT
Start: 2020-06-10 | End: 2020-06-10

## 2020-06-10 RX ORDER — HEPARIN SODIUM 5000 [USP'U]/ML
5500 INJECTION INTRAVENOUS; SUBCUTANEOUS EVERY 6 HOURS
Refills: 0 | Status: DISCONTINUED | OUTPATIENT
Start: 2020-06-10 | End: 2020-06-11

## 2020-06-10 RX ORDER — LISINOPRIL 2.5 MG/1
40 TABLET ORAL DAILY
Refills: 0 | Status: DISCONTINUED | OUTPATIENT
Start: 2020-06-10 | End: 2020-06-19

## 2020-06-10 RX ORDER — TRAMADOL HYDROCHLORIDE 50 MG/1
50 TABLET ORAL ONCE
Refills: 0 | Status: DISCONTINUED | OUTPATIENT
Start: 2020-06-10 | End: 2020-06-10

## 2020-06-10 RX ADMIN — Medication 1 TABLET(S): at 08:04

## 2020-06-10 RX ADMIN — GABAPENTIN 100 MILLIGRAM(S): 400 CAPSULE ORAL at 17:26

## 2020-06-10 RX ADMIN — Medication 2: at 12:24

## 2020-06-10 RX ADMIN — Medication 50 MILLIGRAM(S): at 21:14

## 2020-06-10 RX ADMIN — HEPARIN SODIUM 600 UNIT(S)/HR: 5000 INJECTION INTRAVENOUS; SUBCUTANEOUS at 20:00

## 2020-06-10 RX ADMIN — Medication 5 MILLIGRAM(S): at 21:14

## 2020-06-10 RX ADMIN — MEROPENEM 100 MILLIGRAM(S): 1 INJECTION INTRAVENOUS at 13:47

## 2020-06-10 RX ADMIN — INSULIN GLARGINE 10 UNIT(S): 100 INJECTION, SOLUTION SUBCUTANEOUS at 21:14

## 2020-06-10 RX ADMIN — Medication 50 MILLIGRAM(S): at 13:47

## 2020-06-10 RX ADMIN — TRAMADOL HYDROCHLORIDE 25 MILLIGRAM(S): 50 TABLET ORAL at 10:37

## 2020-06-10 RX ADMIN — Medication 1 TABLET(S): at 17:26

## 2020-06-10 RX ADMIN — Medication 110 MILLIGRAM(S): at 17:26

## 2020-06-10 RX ADMIN — Medication 1.25 MILLIGRAM(S): at 10:13

## 2020-06-10 RX ADMIN — Medication 110 MILLIGRAM(S): at 05:26

## 2020-06-10 RX ADMIN — Medication 1: at 17:26

## 2020-06-10 RX ADMIN — MEROPENEM 100 MILLIGRAM(S): 1 INJECTION INTRAVENOUS at 06:00

## 2020-06-10 RX ADMIN — ATORVASTATIN CALCIUM 40 MILLIGRAM(S): 80 TABLET, FILM COATED ORAL at 21:14

## 2020-06-10 RX ADMIN — Medication 50 MILLIGRAM(S): at 05:27

## 2020-06-10 RX ADMIN — Medication 15 MILLIGRAM(S): at 12:23

## 2020-06-10 RX ADMIN — HEPARIN SODIUM 1000 UNIT(S)/HR: 5000 INJECTION INTRAVENOUS; SUBCUTANEOUS at 09:45

## 2020-06-10 RX ADMIN — Medication 1 TABLET(S): at 12:22

## 2020-06-10 RX ADMIN — TRAMADOL HYDROCHLORIDE 50 MILLIGRAM(S): 50 TABLET ORAL at 21:49

## 2020-06-10 RX ADMIN — TRAMADOL HYDROCHLORIDE 25 MILLIGRAM(S): 50 TABLET ORAL at 20:39

## 2020-06-10 RX ADMIN — PANTOPRAZOLE SODIUM 40 MILLIGRAM(S): 20 TABLET, DELAYED RELEASE ORAL at 12:23

## 2020-06-10 RX ADMIN — Medication 1 APPLICATION(S): at 17:27

## 2020-06-10 RX ADMIN — HEPARIN SODIUM 0 UNIT(S)/HR: 5000 INJECTION INTRAVENOUS; SUBCUTANEOUS at 19:12

## 2020-06-10 RX ADMIN — HEPARIN SODIUM 800 UNIT(S)/HR: 5000 INJECTION INTRAVENOUS; SUBCUTANEOUS at 12:30

## 2020-06-10 RX ADMIN — HEPARIN SODIUM 0 UNIT(S)/HR: 5000 INJECTION INTRAVENOUS; SUBCUTANEOUS at 08:08

## 2020-06-10 NOTE — PROGRESS NOTE ADULT - ASSESSMENT
74 year old female with vertigo and persistent nausea and vomiting .     Plan:  Nausea vomiting (resolved)   Likely 2/2 to vertigo 2/2 to acute stroke   Continue  AC if needed     Advanced care planning was discussed with patient and family.  Advanced care planning forms were reviewed and discussed.  Risks, benefits and alternatives of gastroenterologic procedures were discussed in detail and all questions were answered.

## 2020-06-10 NOTE — PROGRESS NOTE ADULT - ASSESSMENT
Confidential Drug Utilization Report  Search Terms: Ankita Keating, 1945   Search Date: 06/08/2020 16:27:30 PM   The Drug Utilization Report below displays all of the controlled substance prescriptions, if any, that your patient has filled in the last twelve months. The information displayed on this report is compiled from pharmacy submissions to the Department, and accurately reflects the information as submitted by the pharmacies.  This report was requested by: Kathya Sinclair | Reference #: [id number]   You have not added a TRAVIS number. Keeping your TRAVIS number(s) up to date on the My TRAVIS Numbers </[email or web address]/applinks/cspnp/myDeaNumbers> page will enable the separation of your prescriptions from others in the search results.   Others' Prescriptions  Patient Name: Ankita Keating   YOB: 1945   Address: [address] [place] [address]   Sex: Female   Rx Written	Rx Dispensed	Drug	Quantity	Days Supply	Prescriber Name	Payment Method	Dispenser	  05/28/2020	05/29/2020	oxycodone-acetaminophen  mg tab 	60	30	Chico Jeffries MD	Insurance	[facility] #[id number]	  05/22/2020	05/22/2020	oxycodone-acetaminophen 5-325 mg tab 	20	5	Gomez Caraballo Jr, MD	Insurance	[facility] #[id number]	  05/08/2020	05/09/2020	oxycodone-acetaminophen 5-325 mg tab 	20	5	Goemz Caraballo Jr, MD	Insurance	[facility] #[id number]	  04/22/2020	04/28/2020	oxycodone-acetaminophen 5-325 mg tab 	20	5	Venita Thompson MD	Insurance	[facility] #[id number]	  * - Drugs marked with an asterisk are compound drugs. If the compound drug is made up of more than one controlled substance, then each controlled substance will be a separate row in the table.

## 2020-06-10 NOTE — SWALLOW BEDSIDE ASSESSMENT ADULT - SLP PERTINENT HISTORY OF CURRENT PROBLEM
75 YO female from home, H/O CAD/DM/HTN/PVD/hysterectomy/cholecystectomy ,with AICD has come to ED with intractable nausea and vomiting for 2 days, likely due to pill induced esophagitis/gastritis. Patient reports that it was sudden in onset, 7/10, burning & pressure like pain, non-radiating , associated with nausea and projectile vomiting. Patient also reports significant weight( not sure how much) recently and decrease in appetite. CT abdomen/pelvis shows bladder wall thickening and pt has history of Stage1 endometrial cancer, Patient also has high blood pressure because she missed her medications. CTA head/neck showed LEFT PICA stroke

## 2020-06-10 NOTE — SWALLOW BEDSIDE ASSESSMENT ADULT - COMMENTS
Pt AA+Ox3, HOB elevated to 90°. c/o extreme L-foot pain. affecting PO intake, just received meds; counseling provided.

## 2020-06-10 NOTE — PROGRESS NOTE ADULT - SUBJECTIVE AND OBJECTIVE BOX
Subjective: says feels a lot better. pain over her stump controlled by pain meds       PHYSICAL EXAM:    Vital Signs Last 24 Hrs  T(C): 37.2 (10 Uli 2020 15:27), Max: 37.2 (10 Uli 2020 15:27)  T(F): 98.9 (10 Uli 2020 15:27), Max: 98.9 (10 Uli 2020 15:27)  HR: 89 (10 Uli 2020 15:27) (86 - 93)  BP: 101/65 (10 Uli 2020 15:27) (101/65 - 184/73)  BP(mean): --  RR: 18 (10 Uli 2020 15:27) (18 - 19)  SpO2: 93% (10 Uli 2020 15:27) (92% - 99%)    Constitutional: awake alert oriented times 3   [place] SCLERA anicteric EOMI   LUNGS clear   CVS s1 s2 aud no murmurs   ABDOMEN soft non tender no HSM   NEUROLOGY  no defecits   SKIN left foot dressed   EXTREMITIES no edema no cyanosis         LABS/DIAGNOSTIC TESTS                        9.5    14.82 )-----------( 312      ( 10 Uli 2020 18:36 )             29.6     06-10    133<L>  |  97  |  19<H>  ----------------------------<  131<H>  4.6   |  24  |  0.84    Ca    8.9      10 Uli 2020 07:06  Phos  2.9     06-10  Mg     2.2     06-10    TPro  7.2  /  Alb  2.3<L>  /  TBili  0.7  /  DBili  x   /  AST  27  /  ALT  34  /  AlkPhos  363<H>  06-10    PT/INR - ( 09 Jun 2020 06:50 )   PT: 15.3 sec;   INR: 1.34 ratio         PTT - ( 10 Uli 2020 18:36 )  PTT:130.9 sec      meds  acetaminophen   Tablet .. 650 milliGRAM(s) Oral every 6 hours PRN  aluminum hydroxide/magnesium hydroxide/simethicone Suspension 30 milliLiter(s) Oral every 6 hours PRN  atorvastatin 40 milliGRAM(s) Oral at bedtime  Dakins Solution - 1/4 Strength 1 Application(s) Topical daily  diphenhydrAMINE   Injectable 15 milliGRAM(s) IV Push every 6 hours PRN  doxycycline IVPB 100 milliGRAM(s) IV Intermittent every 12 hours  gabapentin 100 milliGRAM(s) Oral every 12 hours  heparin   Injectable 5500 Unit(s) IV Push every 6 hours PRN  heparin   Injectable 2500 Unit(s) IV Push every 6 hours PRN  heparin  Infusion. 800 Unit(s)/Hr IV Continuous <Continuous>  hydrALAZINE 50 milliGRAM(s) Oral three times a day  insulin glargine Injectable (LANTUS) 10 Unit(s) SubCutaneous at bedtime  insulin lispro (HumaLOG) corrective regimen sliding scale   SubCutaneous Before meals and at bedtime  lactobacillus acidophilus 1 Tablet(s) Oral three times a day with meals  lisinopril 40 milliGRAM(s) Oral daily  melatonin 5 milliGRAM(s) Oral at bedtime  meropenem  IVPB 1000 milliGRAM(s) IV Intermittent every 8 hours  metoprolol succinate ER 50 milliGRAM(s) Oral daily  metoprolol tartrate Injectable 5 milliGRAM(s) IV Push every 6 hours PRN  ondansetron Injectable 4 milliGRAM(s) IV Push every 8 hours PRN  pantoprazole  Injectable 40 milliGRAM(s) IV Push daily  traMADol 25 milliGRAM(s) Oral every 6 hours PRN        CULTURES  Culture Results: wound  Moderate Staphylococcus aureus (06-06 @ 03:05)        RADIOLOGY  no new xrays

## 2020-06-10 NOTE — PROGRESS NOTE ADULT - PROBLEM SELECTOR PLAN 1
Pt with acute somatic pain of left foot due to OM. Pt is now POD #1 s/p Angiogram, extremity, left, and I&D per podiatry on 6/9. Pt is on heparin gtt for LEFT PICA stroke.  Opioid pain recommendations   - Continue tramadol 25mg PO q6h PRN severe pain. Monitor for respiratory depression/ sedation  Non-opioid pain recommendations   - Continue Acetaminophen 650mg PO q 6 hours PRN moderate pain. Monitor LFTs  - Gabapentin 100mg PO 2x/day. Monitor renal function.   -Avoid NSAIDs. Pt on heparin gtt and s/p I&D 6/9.  - Bleeding precautions  Bowel Regimen  -Per primary team  Non-pharmacological pain treatment recommendations  - Physical therapy OOB if no contraindications   Mild pain   - Non pharmacological measures   - Warm/ Cool packs PRN   - Repositioning extremity, elevation, PT, imagery, relaxation, distraction.  Recommendations discussed with primary team and RN.

## 2020-06-10 NOTE — PROGRESS NOTE ADULT - ASSESSMENT
73 YO female from home, H/O CAD/DM/HTN/PVD/hysterectomy/cholecystectomy ,with AICD comes to ED with intractable nausea and vomiting for 2 days. Patient states that she recently had left big toe amputation in the beginning of May and she was taking antibiotics. On Friday, her antibiotics were changed from clindamycin to Levaquin and after she took antibiotics along with percocet, she started having severe pain in epigastric region. Patient reports that it was sudden in onset, 7/10, burning & pressure like pain, non-radiating , associated with nausea,, bitter taste in mouth and projectile vomiting. She was not able to tolerate food and it aggravated her symptoms. Patient also reports significant weight( not sure how much) recently and decrease in appetite. patient denies fever, cough, SOB, constipation, dysuria, headache, chest pain, orthopnea, back pain, burning sensation in extremities.c/o pain over left big toe stump which is not resolving despite pain meds . afebrile on adm , covid neg. ID called for persistant leucocytosis and appr ab     # s/p amputation of left great toe for gangrene with post op collection and overlying cellulits as well as underlying OM /wound cx pos for staph aureus ( MSSA)/s/p left leg stent at [facility] as per pt /underlying PVD/s/p angio, with severe occlusive popliteal and tibial  dz that is non reconstructable/s/p I and D of left foot as well as partial amputation of left first MT     #persistant  nausea/vomiting , now seems to be vertigo sec to poss cerebellar cva vs tia     # transaminitis improving     # increasing wbc r/o asp pna /wbc decreasing /covid neg     plan  f/u or cx if done   cont  doxy and merrem  to cover mssa for stump infection/abscess as well as asp pna d/w intern   CONT  probiotics   EGD ON HOLD AS PER GI   neuro f/u   rpt cxr   podiatry f/u of stump to make sure it is healing       thnx will f/u   d/w intern dr diaz

## 2020-06-10 NOTE — PROGRESS NOTE ADULT - PROBLEM SELECTOR PLAN 9
H/o CHF but clinically patient is not in fluid overload  No peripheral edema  Patient takes furosemide 20mg daily twice  BNP: elevated at 9383  echo: EF 50-55%, mild TR, MS  - holding lasix 20mg IV as pt is not eating and dehydrated  - c/w statin and BB

## 2020-06-10 NOTE — PROGRESS NOTE ADULT - SUBJECTIVE AND OBJECTIVE BOX
PGY 1 Note discussed with supervising resident and primary attending    Patient is a 74y old  Female who presents with a chief complaint of Intractable nausea and vomiting (10 Uli 2020 10:09)      INTERVAL HPI/OVERNIGHT EVENTS: Patient got CT angiogram of left leg , popliteal artery was all clogged , patient was restarted on heparin drip.  -complaine dof pain on Iand D site, on pain control.    MEDICATIONS  (STANDING):  atorvastatin 40 milliGRAM(s) Oral at bedtime  Dakins Solution - 1/4 Strength 1 Application(s) Topical daily  doxycycline IVPB 100 milliGRAM(s) IV Intermittent every 12 hours  heparin   Injectable 5500 Unit(s) IV Push once  heparin  Infusion. 800 Unit(s)/Hr (8 mL/Hr) IV Continuous <Continuous>  hydrALAZINE 50 milliGRAM(s) Oral three times a day  insulin glargine Injectable (LANTUS) 10 Unit(s) SubCutaneous at bedtime  insulin lispro (HumaLOG) corrective regimen sliding scale   SubCutaneous Before meals and at bedtime  lactobacillus acidophilus 1 Tablet(s) Oral three times a day with meals  losartan 25 milliGRAM(s) Oral daily  melatonin 5 milliGRAM(s) Oral at bedtime  meropenem  IVPB 1000 milliGRAM(s) IV Intermittent every 8 hours  metoprolol succinate ER 50 milliGRAM(s) Oral daily  pantoprazole  Injectable 40 milliGRAM(s) IV Push daily    MEDICATIONS  (PRN):  acetaminophen   Tablet .. 650 milliGRAM(s) Oral every 6 hours PRN Mild Pain (1 - 3), Moderate Pain (4 - 6)  aluminum hydroxide/magnesium hydroxide/simethicone Suspension 30 milliLiter(s) Oral every 6 hours PRN Dyspepsia  diphenhydrAMINE   Injectable 15 milliGRAM(s) IV Push every 6 hours PRN nausea or vomiting  heparin   Injectable 5500 Unit(s) IV Push every 6 hours PRN For aPTT less than 40  heparin   Injectable 2500 Unit(s) IV Push every 6 hours PRN For aPTT between 40 - 57  metoprolol tartrate Injectable 5 milliGRAM(s) IV Push every 6 hours PRN HR greater than 120  ondansetron Injectable 4 milliGRAM(s) IV Push every 8 hours PRN Nausea and/or Vomiting  traMADol 25 milliGRAM(s) Oral every 6 hours PRN Severe Pain (7 - 10)      __________________________________________________  REVIEW OF SYSTEMS:    CONSTITUTIONAL: No fever,   EYES: no acute visual disturbances  NECK: No pain or stiffness  RESPIRATORY: No cough; No shortness of breath  CARDIOVASCULAR: No chest pain, no palpitations  GASTROINTESTINAL: No pain. No nausea or vomiting; No diarrhea   NEUROLOGICAL: No headache or numbness, no tremors  MUSCULOSKELETAL: PAin on left extrmtyGENITOURINARY: no dysuria, no frequency, no hesitancy  PSYCHIATRY: no depression , no anxiety  ALL OTHER  ROS negative        Vital Signs Last 24 Hrs  T(C): 36.7 (10 Uli 2020 08:16), Max: 37.2 (09 Jun 2020 18:43)  T(F): 98 (10 Uli 2020 08:16), Max: 98.9 (09 Jun 2020 18:43)  HR: 86 (10 Uli 2020 08:16) (75 - 93)  BP: 167/75 (10 Uli 2020 08:16) (152/92 - 193/80)  BP(mean): 112 (09 Jun 2020 17:05) (105 - 115)  RR: 18 (10 Uli 2020 08:16) (13 - 20)  SpO2: 92% (10 Uli 2020 08:16) (92% - 100%)    ________________________________________________  PHYSICAL EXAM:  GENERAL: NAD  HEENT: Normocephalic;  conjunctivae and sclerae clear; moist mucous membranes;   NECK : supple  CHEST/LUNG: Clear to auscultation bilaterally with good air entry   HEART: S1 S2  regular; no murmurs, gallops or rubs  ABDOMEN: Soft, Nontender, Nondistended; Bowel sounds present  EXTREMITIES:Left leg wrapped post amputation of meta tarsal  SKIN: warm and dry; no rash  NERVOUS SYSTEM:  Awake and alert; Oriented  to place, person and time ; no new deficits    _________________________________________________  LABS:                        9.5    13.62 )-----------( 340      ( 10 Uli 2020 07:06 )             30.6     06-10    133<L>  |  97  |  19<H>  ----------------------------<  131<H>  4.6   |  24  |  0.84    Ca    8.9      10 Uli 2020 07:06  Phos  2.9     06-10  Mg     2.2     06-10    TPro  7.2  /  Alb  2.3<L>  /  TBili  0.7  /  DBili  x   /  AST  27  /  ALT  34  /  AlkPhos  363<H>  06-10    PT/INR - ( 09 Jun 2020 06:50 )   PT: 15.3 sec;   INR: 1.34 ratio         PTT - ( 10 Uli 2020 07:06 )  PTT:182.9 sec    CAPILLARY BLOOD GLUCOSE      POCT Blood Glucose.: 134 mg/dL (10 Uli 2020 07:55)  POCT Blood Glucose.: 130 mg/dL (09 Jun 2020 20:58)  POCT Blood Glucose.: 94 mg/dL (09 Jun 2020 16:21)  POCT Blood Glucose.: 115 mg/dL (09 Jun 2020 11:35)        RADIOLOGY & ADDITIONAL TESTS:    Imaging Personally Reviewed:  YES/NO    Consultant(s) Notes Reviewed:   YES/ No    Care Discussed with Consultants :     Plan of care was discussed with patient and /or primary care giver; all questions and concerns were addressed and care was aligned with patient's wishes.

## 2020-06-10 NOTE — PROGRESS NOTE ADULT - PROBLEM SELECTOR PLAN 6
Pt takes Lisinopril 40mg, toprol Xl 100mg, hydralazine 50mg TID   BP is running on higher side .  lipid profile, TG WNL  -

## 2020-06-10 NOTE — SWALLOW BEDSIDE ASSESSMENT ADULT - ASR SWALLOW ASPIRATION MONITOR
cough/oral hygiene/fever/pneumonia/throat clearing/change of breathing pattern/position upright (89Y)

## 2020-06-10 NOTE — PROGRESS NOTE ADULT - SUBJECTIVE AND OBJECTIVE BOX
Patient denies chest pain or shortness of breath.   Review of systems otherwise (-)  	  acetaminophen   Tablet .. 650 milliGRAM(s) Oral every 6 hours PRN  aluminum hydroxide/magnesium hydroxide/simethicone Suspension 30 milliLiter(s) Oral every 6 hours PRN  atorvastatin 40 milliGRAM(s) Oral at bedtime  Dakins Solution - 1/4 Strength 1 Application(s) Topical daily  diphenhydrAMINE   Injectable 15 milliGRAM(s) IV Push every 6 hours PRN  doxycycline IVPB 100 milliGRAM(s) IV Intermittent every 12 hours  gabapentin 100 milliGRAM(s) Oral every 12 hours  heparin   Injectable 5500 Unit(s) IV Push every 6 hours PRN  heparin   Injectable 2500 Unit(s) IV Push every 6 hours PRN  heparin  Infusion. 800 Unit(s)/Hr IV Continuous <Continuous>  hydrALAZINE 50 milliGRAM(s) Oral three times a day  insulin glargine Injectable (LANTUS) 10 Unit(s) SubCutaneous at bedtime  insulin lispro (HumaLOG) corrective regimen sliding scale   SubCutaneous Before meals and at bedtime  lactobacillus acidophilus 1 Tablet(s) Oral three times a day with meals  lisinopril 40 milliGRAM(s) Oral daily  melatonin 5 milliGRAM(s) Oral at bedtime  meropenem  IVPB 1000 milliGRAM(s) IV Intermittent every 8 hours  metoprolol succinate ER 50 milliGRAM(s) Oral daily  metoprolol tartrate Injectable 5 milliGRAM(s) IV Push every 6 hours PRN  ondansetron Injectable 4 milliGRAM(s) IV Push every 8 hours PRN  pantoprazole  Injectable 40 milliGRAM(s) IV Push daily  traMADol 25 milliGRAM(s) Oral every 6 hours PRN                            9.5    13.62 )-----------( 340      ( 10 Uli 2020 07:06 )             30.6       Hemoglobin: 9.5 g/dL (06-10 @ 07:06)  Hemoglobin: 9.6 g/dL (06-09 @ 06:50)  Hemoglobin: 9.5 g/dL (06-08 @ 05:42)  Hemoglobin: 10.2 g/dL (06-07 @ 06:01)  Hemoglobin: 10.4 g/dL (06-06 @ 08:58)      06-10    133<L>  |  97  |  19<H>  ----------------------------<  131<H>  4.6   |  24  |  0.84    Ca    8.9      10 Uli 2020 07:06  Phos  2.9     06-10  Mg     2.2     06-10    TPro  7.2  /  Alb  2.3<L>  /  TBili  0.7  /  DBili  x   /  AST  27  /  ALT  34  /  AlkPhos  363<H>  06-10    Creatinine Trend: 0.84<--, 1.00<--, 1.27<--, 0.98<--, 1.08<--, 1.08<--    COAGS: PTT - ( 10 Uli 2020 11:23 )  PTT:42.9 sec          T(C): 37.2 (06-10-20 @ 15:27), Max: 37.2 (06-09-20 @ 18:43)  HR: 89 (06-10-20 @ 15:27) (76 - 93)  BP: 101/65 (06-10-20 @ 15:27) (101/65 - 193/80)  RR: 18 (06-10-20 @ 15:27) (16 - 19)  SpO2: 93% (06-10-20 @ 15:27) (92% - 99%)  Wt(kg): --    I&O's Summary    09 Jun 2020 07:01  -  10 Uli 2020 07:00  --------------------------------------------------------  IN: 650 mL / OUT: 730 mL / NET: -80 mL          HEENT:  (-)icterus (-)pallor  CV: N S1 S2 1/6 JAMAAL (+)2 Pulses B/l  Resp:  Clear to ausculatation B/L, normal effort  GI: (+) BS Soft, NT, ND  Lymph:  (-)Edema, (-)obvious lymphadenopathy  Skin: Warm to touch, Normal turgor  Psych: Appropriate mood and affect          ASSESSMENT/PLAN: 	74y  Female AD/DM/HTN/PVD/hysterectomy/cholecystectomy ,with CHF BIVICD, Mild anterior wall ischemia being medically managed  comes to ED with intractable nausea and vomiting for 2 days 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 bladder malignancy.    - cont Heparin gtt for new afib and CVA  - Interrogate [employer] ICD  - nausea resolved  -  cont toprol XL 50 mg PO Daily  - Tolerated I+D as well as partial amputation by podiatry  - Vasculary f/u regarding completely occluded popliteal artery  - Will need repeat stress prior to high risk surgery      Pipo Segundo MD, [facility]  BEEPER [phone]

## 2020-06-10 NOTE — PROGRESS NOTE ADULT - SUBJECTIVE AND OBJECTIVE BOX
pt is post op day 1   Vital Signs Last 24 Hrs  T(C): 37.2 (10 Uli 2020 15:27), Max: 37.2 (09 Jun 2020 18:43)  T(F): 98.9 (10 Uli 2020 15:27), Max: 98.9 (09 Jun 2020 18:43)  HR: 89 (10 Uli 2020 15:27) (76 - 93)  BP: 101/65 (10 Uli 2020 15:27) (101/65 - 193/80)  BP(mean): --  RR: 18 (10 Uli 2020 15:27) (16 - 19)  SpO2: 93% (10 Uli 2020 15:27) (92% - 99%)                        9.5    14.82 )-----------( 312      ( 10 Uli 2020 18:36 )             29.6   WBC Count: 14.82 K/uL (06-10 @ 18:36)  WBC Count: 13.62 K/uL (06-10 @ 07:06)  WBC Count: 13.16 K/uL (06-09 @ 06:50)  WBC Count: 16.14 K/uL (06-08 @ 05:42)  WBC Count: 19.14 K/uL (06-07 @ 06:01)  06-10    133<L>  |  97  |  19<H>  ----------------------------<  131<H>  4.6   |  24  |  0.84    Ca    8.9      10 Uli 2020 07:06  Phos  2.9     06-10  Mg     2.2     06-10    TPro  7.2  /  Alb  2.3<L>  /  TBili  0.7  /  DBili  x   /  AST  27  /  ALT  34  /  AlkPhos  363<H>  06-10  Procalcitonin, Serum (06.08.20 @ 10:08)    Procalcitonin, Serum: 0.17: Procalcitonin (PCT) Interpretation (ng/mL) - Diagnosis of systemic  bacterial infection/sepsis  PCT < 0.5: Systemic infection (sepsis) is not likely and risk for  progression to severe systemic infection is low. Local bacterial  infection is possible. If early sepsis is suspected clinically, PCT  should be re-assessed in 6-24 hours.  PCT >/= 0.5 but < 2.0: Systemic infection (sepsis) is possible, but other  conditions are known to elevate PCT as well. Moderate risk for  progression to severe systemic infection. The patient should be closely  monitored both clinically and by re-assessing PCT within 6-24 hours.  PCT >/= 2.0 but < 10.0: Systemic infection (sepsis) is likely, unless  other causes are known. High risk of progression to severe systemic  infection (severe sepsis/septic shock).  PCT >/= 10.0: Important systemic inflammatory response, almost  exclusively due to severe bacterial sepsis or septic shock. High  -  Oxacillin: S <=0.25 (06.06.20 @ 03:05)    likelihood of severe sepsis or septic shock. ng/mL  Culture - Abscess with Gram Stain (06.06.20 @ 03:05)    -  Oxacillin: S <=0.25    -  Penicillin: R 2    -  Gentamicin: S <=1 Should not be used as monotherapy    -  RIF- Rifampin: S <=1 Should not be used as monotherapy    -  Trimethoprim/Sulfamethoxazole: S <=0.5/9.5    -  Vancomycin: S 1    -  Tetra/Doxy: S <=1    -  Ampicillin/Sulbactam: S <=8/4    -  Cefazolin: S <=4    -  Clindamycin: R >4    -  Erythromycin: R >4    Specimen Source: .Abscess left foot    Culture Results:   Moderate Staphylococcus aureus    Organism Identification: Staphylococcus aureus  Staphylococcus aureus    Organism: Staphylococcus aureus    Organism: Staphylococcus aureus    Method Type: LI    Method Type: LI  MEDICATIONS  (STANDING):  atorvastatin 40 milliGRAM(s) Oral at bedtime  Dakins Solution - 1/4 Strength 1 Application(s) Topical daily  doxycycline IVPB 100 milliGRAM(s) IV Intermittent every 12 hours  gabapentin 100 milliGRAM(s) Oral every 12 hours  heparin  Infusion. 800 Unit(s)/Hr (8 mL/Hr) IV Continuous <Continuous>  hydrALAZINE 50 milliGRAM(s) Oral three times a day  insulin glargine Injectable (LANTUS) 10 Unit(s) SubCutaneous at bedtime  insulin lispro (HumaLOG) corrective regimen sliding scale   SubCutaneous Before meals and at bedtime  lactobacillus acidophilus 1 Tablet(s) Oral three times a day with meals  lisinopril 40 milliGRAM(s) Oral daily  melatonin 5 milliGRAM(s) Oral at bedtime  meropenem  IVPB 1000 milliGRAM(s) IV Intermittent every 8 hours  metoprolol succinate ER 50 milliGRAM(s) Oral daily  pantoprazole  Injectable 40 milliGRAM(s) IV Push daily    MEDICATIONS  (PRN):  acetaminophen   Tablet .. 650 milliGRAM(s) Oral every 6 hours PRN Mild Pain (1 - 3), Moderate Pain (4 - 6)  aluminum hydroxide/magnesium hydroxide/simethicone Suspension 30 milliLiter(s) Oral every 6 hours PRN Dyspepsia  diphenhydrAMINE   Injectable 15 milliGRAM(s) IV Push every 6 hours PRN nausea or vomiting  heparin   Injectable 5500 Unit(s) IV Push every 6 hours PRN For aPTT less than 40  heparin   Injectable 2500 Unit(s) IV Push every 6 hours PRN For aPTT between 40 - 57  metoprolol tartrate Injectable 5 milliGRAM(s) IV Push every 6 hours PRN HR greater than 120  ondansetron Injectable 4 milliGRAM(s) IV Push every 8 hours PRN Nausea and/or Vomiting  traMADol 25 milliGRAM(s) Oral every 6 hours PRN Severe Pain (7 - 10)    Allergies    codeine (Rash)  penicillin (Rash)    Intolerances        pt  seen and evaluated at bedside , reports nausea is improving  , feeling better today ,  AAOX3  reports throbbing pain in her foot ,  tolerated exam and dressing change very well    exam focused LE   left foot s/p I&D of abscess and 1first met partial amputation      wound packed with gauze strip, there mild-dark  sanguinous drainage , no odor   CRF -<5sec   DP/PT -0- out of 4   TG -WNL   CRF -1-2 sec x4   there is  erythema ascending to the level of the midfoot  , and plantar submet 1 head-looks more wide spread , the area is slightly tender ,   pt is able to move her lower extremities

## 2020-06-10 NOTE — SWALLOW BEDSIDE ASSESSMENT ADULT - SWALLOW EVAL: DIAGNOSIS
Pt p/w adequate oral & pharygneal phases of chew & swallow for tolerating soft chewable diet: Good labial seal, Functional bolus manipulation & propulsion, Unremarkable oral phase; Intact, efficient oropharyngeal swallow with no overt s/s of laryngeal penetration or aspiration at this exam.

## 2020-06-10 NOTE — PROGRESS NOTE ADULT - ASSESSMENT
[FreeTextEntry1] : TFTs normal, to continue current dose of levothyroxine. A/P  PVD /PAD  DM  ulcer   dehiscence surgical wound   history of gangrene left hallux   positive probe to bone   leukocytosis -mildly increased today possibly reactive after surgery   OM with  abscess  left -P/O day 1  pyogenic tendon       pt seen and eval   chart review  s/p angiogram -pt is very high risk for none healing  due to very poor vascular status as per Dr. Grove   Ms Keating and her daughter want to see if the wound will improve before discussing BKA  pt is at risk of hematoma formation/bleeding due to heparin drip   continue antibiotics as per id -id    follow up post up xray   keep foot slightly elevated   no weight bearing

## 2020-06-10 NOTE — PROGRESS NOTE ADULT - SUBJECTIVE AND OBJECTIVE BOX
Source of information: DARRION MELENDEZ, Chart review  Patient language: English  : n/a    HPI:  73 YO female from home, H/O CAD/DM/HTN/PVD/hysterectomy/cholecystectomy ,with AICD comes to ED with intractable nausea and vomiting for 2 days. Patient states that she recently had left big toe amputation in the beginning of May and she was taking antibiotics. On Friday, her antibiotics were changed from clindamycin to Levaquin and after she took antibiotics along with percocet, she started having severe pain in epigastric region. Patient reports that it was sudden in onset, 7/10, burning & pressure like pain, non-radiating , associated with nausea,, bitter taste in mouth and projectile vomiting. She was not able to tolerate food and it aggravated her symptoms. Patient also reports significant weight( not sure how much) recently and decrease in appetite.   patient denies fever, cough, SOB, constipation, dysuria, headache, chest pain, orthopnea, back pain, burning sensation in extremities.    ED course; Patient is in mild distress due to nausea but otherwise stable  Vitals:     /88  SpO2 99% on RA    CT abdomen/pelvis:   NEW MULTI BLADDER WALL FOCAL AREAS OF SOFT TISSUE THICKENING CONCERNING FOR BLADDER CARCINOMA/TRANSITIONAL CELL CARCINOMA. No hydronephrosis.  Stomach not fully distended and gastric pathology cannot be excluded.  Status post cholecystectomy and hysterectomy.  Remaining abdominal pelvic viscera unremarkable.   No evidence of colitis.. (30 May 2020 14:56)      Patient is a 74y old  Female who presents with a chief complaint of Intractable nausea and vomiting found to have gastritis. Pt found to have OM of left foot. Recent left big toe amputation in May 2020. Pt was on Percocet 10mg PO at home 2x/day PRN. Plan is now POD #1 s/p Angiogram, extremity, left, and I&D per podiatry. Pt is on heparin gtt for LEFT PICA stroke.   Pt seen and examined at bedside. Reports pain score 6/10 and tolerable SCALE USED: (1-10 VNRS). Pt reports she recently received tramadol which is helping with her pain. Describes pain as aching, and throbbing. Pain is non-radiating, alleviated by pain medication, exacerbated by movement. Pt states she recently ambulated to the bathroom with assistance which exacerbated her pain. Pt reports sleeping well overnight. Tolerating PO diet. Denies lethargy, nausea, vomiting, constipation, itchiness. Reports last BM this morning. Patient stated goal for pain control: to be able to take deep breaths, utilize incentive spirometer, get out of bed to chair and ambulate with tolerable pain control. Pt reports taking Tylenol for pain at home as needed. Pt states she was prescribed Percocet but does not want to take it as she experienced nausea after taking it.     PAST MEDICAL & SURGICAL HISTORY:  PVD (peripheral vascular disease)  CAD (coronary artery disease)  CHF (congestive heart failure)  HTN (hypertension)  DM (diabetes mellitus)  Status post coronary artery stent placement  H/O: hysterectomy  Cardiac defibrillator in place  Artificial cardiac pacemaker  History of cholecystectomy    Social History:  Used to smoke 1 cigarette a day 36 years ago  No Alcohol or drugs  Retired and lives with family, No sick contacts (30 May 2020 14:56)    Allergies    codeine (Rash)  penicillin (Rash)      MEDICATIONS  (STANDING):  atorvastatin 40 milliGRAM(s) Oral at bedtime  Dakins Solution - 1/4 Strength 1 Application(s) Topical daily  doxycycline IVPB 100 milliGRAM(s) IV Intermittent every 12 hours  heparin   Injectable 5500 Unit(s) IV Push once  heparin  Infusion. 800 Unit(s)/Hr (8 mL/Hr) IV Continuous <Continuous>  hydrALAZINE 50 milliGRAM(s) Oral three times a day  insulin glargine Injectable (LANTUS) 10 Unit(s) SubCutaneous at bedtime  insulin lispro (HumaLOG) corrective regimen sliding scale   SubCutaneous Before meals and at bedtime  lactobacillus acidophilus 1 Tablet(s) Oral three times a day with meals  lisinopril 40 milliGRAM(s) Oral daily  losartan 25 milliGRAM(s) Oral daily  melatonin 5 milliGRAM(s) Oral at bedtime  meropenem  IVPB 1000 milliGRAM(s) IV Intermittent every 8 hours  metoprolol succinate ER 50 milliGRAM(s) Oral daily  pantoprazole  Injectable 40 milliGRAM(s) IV Push daily    MEDICATIONS  (PRN):  acetaminophen   Tablet .. 650 milliGRAM(s) Oral every 6 hours PRN Mild Pain (1 - 3), Moderate Pain (4 - 6)  aluminum hydroxide/magnesium hydroxide/simethicone Suspension 30 milliLiter(s) Oral every 6 hours PRN Dyspepsia  diphenhydrAMINE   Injectable 15 milliGRAM(s) IV Push every 6 hours PRN nausea or vomiting  heparin   Injectable 5500 Unit(s) IV Push every 6 hours PRN For aPTT less than 40  heparin   Injectable 2500 Unit(s) IV Push every 6 hours PRN For aPTT between 40 - 57  metoprolol tartrate Injectable 5 milliGRAM(s) IV Push every 6 hours PRN HR greater than 120  ondansetron Injectable 4 milliGRAM(s) IV Push every 8 hours PRN Nausea and/or Vomiting  traMADol 25 milliGRAM(s) Oral every 6 hours PRN Severe Pain (7 - 10)      Vital Signs Last 24 Hrs  T(C): 36.7 (10 Uli 2020 08:16), Max: 37.2 (09 Jun 2020 18:43)  T(F): 98 (10 Uli 2020 08:16), Max: 98.9 (09 Jun 2020 18:43)  HR: 86 (10 Uli 2020 08:16) (75 - 93)  BP: 167/75 (10 Uli 2020 08:16) (152/92 - 193/80)  BP(mean): 112 (09 Jun 2020 17:05) (105 - 115)  RR: 18 (10 Uli 2020 08:16) (13 - 20)  SpO2: 92% (10 Uli 2020 08:16) (92% - 100%)  COVID-19 PCR: NotDetec (08 Jun 2020 23:20)    LABS: Reviewed                          9.5    13.62 )-----------( 340      ( 10 Uli 2020 07:06 )             30.6     06-10    133<L>  |  97  |  19<H>  ----------------------------<  131<H>  4.6   |  24  |  0.84    Ca    8.9      10 Uli 2020 07:06  Phos  2.9     06-10  Mg     2.2     06-10    TPro  7.2  /  Alb  2.3<L>  /  TBili  0.7  /  DBili  x   /  AST  27  /  ALT  34  /  AlkPhos  363<H>  06-10    PT/INR - ( 09 Jun 2020 06:50 )   PT: 15.3 sec;   INR: 1.34 ratio         PTT - ( 10 Uli 2020 07:06 )  PTT:182.9 sec  LIVER FUNCTIONS - ( 10 Uli 2020 07:06 )  Alb: 2.3 g/dL / Pro: 7.2 g/dL / ALK PHOS: 363 U/L / ALT: 34 U/L DA / AST: 27 U/L / GGT: x             CAPILLARY BLOOD GLUCOSE      POCT Blood Glucose.: 134 mg/dL (10 Uli 2020 07:55)  POCT Blood Glucose.: 130 mg/dL (09 Jun 2020 20:58)  POCT Blood Glucose.: 94 mg/dL (09 Jun 2020 16:21)  POCT Blood Glucose.: 115 mg/dL (09 Jun 2020 11:35)    COVID-19 PCR: NotDetec (08 Jun 2020 23:20)    Radiology:     < from: [facility] Physiol Extremity Lower 3+ Level, BI (06.06.20 @ 12:48) >    EXAM:  US PHYSIOL LWR EXT 3+ LEV BI                            PROCEDURE DATE:  06/06/2020          INTERPRETATION:  Clinical Information: Peripheral vascular disease. Left toe ulceration.    Technique: Bilateral lower extremity ABIs/PVR    Comparison: None    Findings/  Impression:  Right lower extremity: The ankle brachial index is 0.6. The pulse waveforms are diffusely diminished.    Left lower extremity: The ankle brachial index is 0.5. The pulse waveforms are diffusely diminished, however particularly severe in the left foot.    Severe bilateral peripheral artery disease.    Pulse volume recordings are diffusely diminished bilaterally, however severely diminished in the left foot.                  ANIBAL UPTON M.D., ATTENDING RADIOLOGIST  This document has been electronically signed. Jun 6 2020  1:16PM                < end of copied text >    < from: Xray Chest 1 View- PORTABLE-Urgent (06.05.20 @ 13:40) >    EXAM:  XR CHEST PORTABLE URGENT 1V                            PROCEDURE DATE:  06/05/2020          INTERPRETATION:  Portable chest radiograph        CLINICAL INFORMATION:   Leukocytosis.    TECHNIQUE:  Portable  AP view of the chest was obtained.    COMPARISON: 10/12/2019 available for review.    FINDINGS:   The lungs  show bilateral lung perihilar and basilar interstitial opacity/infiltrates. Apices of lungs clear.    The  heart is enlarged in transverse diameter. No hilar mass. Trachea midline.   Right atrium and biventricular cardiac wire leads in place.    The heart and mediastinum are within normal limits.    Visualized osseous structures are intact.        IMPRESSION:   Cardiomegaly. Right atrium and biventricular cardiac wire leads in place.. Infrahilar and bibasilar interstitial opacities/infiltrates...      < end of copied text >      ORT Score -   Family Hx of substance abuse	Female	      Male  Alcohol 	                                           1                     3  Illegal drugs	                                   2                     3  Rx drugs                                           4 	                  4  Personal Hx of substance abuse		  Alcohol 	                                          3	                  3  Illegal drugs                                     4	                  4  Rx drugs                                            5 	                  5  Age between 16- 45 years	           1                     1  hx preadolescent sexual abuse	   3 	                  0  Psychological disease		  ADD, OCD, bipolar, schizophrenia   2	          2  Depression                                           1 	          1  Total: 0    a score of 3 or lower indicates low risk for opioid abuse		  a score of 4-7 indicates moderate risk for opioid abuse		  a score of 8 or higher indicates high risk for opioid abuse    	  4M's:   mental status: AAOx3.   ]medications: age-appropriate  mobility: community ambulatory, uses walker at home  Matters most-goals/GOC: to be able to take deep breaths, utilize incentive spirometer, get out of bed to chair and ambulate with tolerable pain control    REVIEW OF SYSTEMS:  CONSTITUTIONAL: No fever or fatigue  RESPIRATORY: No cough, wheezing, chills or hemoptysis; No shortness of breath  CARDIOVASCULAR: No chest pain, palpitations, dizziness, or leg swelling  GASTROINTESTINAL: No abdominal or epigastric pain. No nausea, vomiting; No diarrhea or constipation.   GENITOURINARY: No dysuria, frequency, hematuria, retention or incontinence  MUSCULOSKELETAL: Positive left foot pain. Positive Rt foot swelling.  No muscle, back, or extremity pain, no upper or lower motor strength weakness, no saddle anesthesia, bowel/bladder incontinence  NEURO: No numbness/ tingling in b/l LE   PSYCHIATRIC: Positive anxiety (does not take medications at home) No depression, mood swings, or difficulty sleeping    PHYSICAL EXAM:  GENERAL:  Alert & Oriented X3, NAD, Good concentration  CHEST/LUNG: Even and unlabored on room air.   HEART: Regular rate and rhythm  ABDOMEN: Obese, Soft, Nontender, Nondistended  EXTREMITIES:  Positive Rt foot moderate edema. 2+ Peripheral Pulses in Rt foot. Unable to assess pulse in Left foot due to ace wrap and inner dressing. Pt is able to wiggle toes on b/l feet.  MUSCULOSKELETAL: Motor Strength 5/5 B/L upper and 4/4 b/l lower extremities; moves all extremities equally against gravity  SKIN: No rashes or lesions. Left foot ace wrap dressing c/d/i.     Risk factors associated with adverse outcomes related to opioid treatment  [ ]  Concurrent benzodiazepine use  [ ]  History/ Active substance use or alcohol use disorder  [X ] Psychiatric co-morbidity - anxiety  [ ] Sleep apnea  [ ] COPD  [ ] BMI> 35  [ ] Liver dysfunction  [ ] Renal dysfunction  [X ] CHF  [X ] Former Smoker  [X ]  Age > 60 years    [X ]  [place]  Reviewed and Copied to Chart. See below.    Plan of care and goal oriented pain management treatment options were discussed with patient and /or primary care giver; all questions and concerns were addressed and care was aligned with patient's wishes.    Plan of care and goal oriented pain management treatment options were discussed with patient and /or primary care giver; all questions and concerns were addressed and care was aligned with patient's wishes.    Educated patient on goal oriented pain management treatment options     06-10-20 @ 11:29

## 2020-06-10 NOTE — SWALLOW BEDSIDE ASSESSMENT ADULT - SWALLOW EVAL: RECOMMENDED FEEDING/EATING TECHNIQUES
alternate food with liquid/allow for swallow between intakes/oral hygiene/position upright (90 degrees)

## 2020-06-10 NOTE — PROGRESS NOTE ADULT - ASSESSMENT
74 yoF with PVD s/p angio 6/9 POD#1, intraop finding c/w occluded popliteal artery    dressing site c/d/i, L foot s/p I&D per podiatry, dressing in place  afeb vss, mild leuko    - groin dressing can be removed in 48 hours  - recs per podiatry  - pt might need foot amp in the future  - plan per primary team

## 2020-06-10 NOTE — PROGRESS NOTE ADULT - SUBJECTIVE AND OBJECTIVE BOX
INTERVAL HPI/OVERNIGHT EVENTS:    No acute events overnight.   Pt resting comfortably. No acute complaints.   Denies pain, f/c      MEDICATIONS  (STANDING):  atorvastatin 40 milliGRAM(s) Oral at bedtime  Dakins Solution - 1/4 Strength 1 Application(s) Topical daily  doxycycline IVPB 100 milliGRAM(s) IV Intermittent every 12 hours  enalaprilat Injectable 1.25 milliGRAM(s) IV Push every 6 hours  heparin  Infusion.  Unit(s)/Hr (12 mL/Hr) IV Continuous <Continuous>  hydrALAZINE 50 milliGRAM(s) Oral three times a day  insulin glargine Injectable (LANTUS) 10 Unit(s) SubCutaneous at bedtime  insulin lispro (HumaLOG) corrective regimen sliding scale   SubCutaneous Before meals and at bedtime  lactobacillus acidophilus 1 Tablet(s) Oral three times a day with meals  melatonin 5 milliGRAM(s) Oral at bedtime  meropenem  IVPB 1000 milliGRAM(s) IV Intermittent every 8 hours  metoprolol succinate ER 50 milliGRAM(s) Oral daily  pantoprazole  Injectable 40 milliGRAM(s) IV Push daily    MEDICATIONS  (PRN):  acetaminophen   Tablet .. 650 milliGRAM(s) Oral every 6 hours PRN Mild Pain (1 - 3), Moderate Pain (4 - 6)  aluminum hydroxide/magnesium hydroxide/simethicone Suspension 30 milliLiter(s) Oral every 6 hours PRN Dyspepsia  diphenhydrAMINE   Injectable 15 milliGRAM(s) IV Push every 6 hours PRN nausea or vomiting  metoprolol tartrate Injectable 5 milliGRAM(s) IV Push every 6 hours PRN HR greater than 120  ondansetron Injectable 4 milliGRAM(s) IV Push every 8 hours PRN Nausea and/or Vomiting  traMADol 25 milliGRAM(s) Oral every 6 hours PRN Severe Pain (7 - 10)      Vital Signs Last 24 Hrs  T(C): 36.7 (10 Uli 2020 08:16), Max: 37.2 (09 Jun 2020 18:43)  T(F): 98 (10 Uli 2020 08:16), Max: 98.9 (09 Jun 2020 18:43)  HR: 86 (10 Uli 2020 08:16) (75 - 93)  BP: 167/75 (10 Uli 2020 08:16) (152/92 - 193/80)  BP(mean): 112 (09 Jun 2020 17:05) (105 - 115)  RR: 18 (10 Uli 2020 08:16) (13 - 20)  SpO2: 92% (10 Uli 2020 08:16) (92% - 100%)      PHYSICAL EXAM  General: Alert and oriented, not in acute distress  Resp: Breathing unlabored  Ext: L foot with dressing per podiatry, R groin cath site dressing c/d/i no evidence of hematoma, RLE distal s/m intact    I&O's Detail    09 Jun 2020 07:01  -  10 Uli 2020 07:00  --------------------------------------------------------  IN:    Lactated Ringers IV Bolus: 500 mL    lactated ringers.: 75 mL    Oral Fluid: 75 mL  Total IN: 650 mL    OUT:    Estimated Blood Loss: 30 mL    Voided: 700 mL  Total OUT: 730 mL    Total NET: -80 mL          LABS:                        9.5    13.62 )-----------( 340      ( 10 Uli 2020 07:06 )             30.6             06-10    133<L>  |  97  |  19<H>  ----------------------------<  131<H>  4.6   |  24  |  0.84    Ca    8.9      10 Uli 2020 07:06  Phos  2.9     06-10  Mg     2.2     06-10    TPro  7.2  /  Alb  2.3<L>  /  TBili  0.7  /  DBili  x   /  AST  27  /  ALT  34  /  AlkPhos  363<H>  06-10

## 2020-06-11 LAB
ANION GAP SERPL CALC-SCNC: 13 MMOL/L — SIGNIFICANT CHANGE UP (ref 5–17)
APTT BLD: 48.8 SEC — HIGH (ref 27.5–36.3)
APTT BLD: 50 SEC — HIGH (ref 27.5–36.3)
BUN SERPL-MCNC: 24 MG/DL — HIGH (ref 7–18)
CALCIUM SERPL-MCNC: 8.9 MG/DL — SIGNIFICANT CHANGE UP (ref 8.4–10.5)
CHLORIDE SERPL-SCNC: 96 MMOL/L — SIGNIFICANT CHANGE UP (ref 96–108)
CO2 SERPL-SCNC: 22 MMOL/L — SIGNIFICANT CHANGE UP (ref 22–31)
CREAT SERPL-MCNC: 0.83 MG/DL — SIGNIFICANT CHANGE UP (ref 0.5–1.3)
CULTURE RESULTS: SIGNIFICANT CHANGE UP
GLUCOSE BLDC GLUCOMTR-MCNC: 108 MG/DL — HIGH (ref 70–99)
GLUCOSE BLDC GLUCOMTR-MCNC: 112 MG/DL — HIGH (ref 70–99)
GLUCOSE BLDC GLUCOMTR-MCNC: 152 MG/DL — HIGH (ref 70–99)
GLUCOSE BLDC GLUCOMTR-MCNC: 90 MG/DL — SIGNIFICANT CHANGE UP (ref 70–99)
GLUCOSE SERPL-MCNC: 98 MG/DL — SIGNIFICANT CHANGE UP (ref 70–99)
HCT VFR BLD CALC: 30.4 % — LOW (ref 34.5–45)
HGB BLD-MCNC: 9.4 G/DL — LOW (ref 11.5–15.5)
INR BLD: 1.32 RATIO — HIGH (ref 0.88–1.16)
MAGNESIUM SERPL-MCNC: 2 MG/DL — SIGNIFICANT CHANGE UP (ref 1.6–2.6)
MCHC RBC-ENTMCNC: 28.7 PG — SIGNIFICANT CHANGE UP (ref 27–34)
MCHC RBC-ENTMCNC: 30.9 GM/DL — LOW (ref 32–36)
MCV RBC AUTO: 92.7 FL — SIGNIFICANT CHANGE UP (ref 80–100)
NRBC # BLD: 0 /100 WBCS — SIGNIFICANT CHANGE UP (ref 0–0)
ORGANISM # SPEC MICROSCOPIC CNT: SIGNIFICANT CHANGE UP
PHOSPHATE SERPL-MCNC: 3.3 MG/DL — SIGNIFICANT CHANGE UP (ref 2.5–4.5)
PLATELET # BLD AUTO: 356 K/UL — SIGNIFICANT CHANGE UP (ref 150–400)
POTASSIUM SERPL-MCNC: 4.6 MMOL/L — SIGNIFICANT CHANGE UP (ref 3.5–5.3)
POTASSIUM SERPL-SCNC: 4.6 MMOL/L — SIGNIFICANT CHANGE UP (ref 3.5–5.3)
PROTHROM AB SERPL-ACNC: 15 SEC — HIGH (ref 10–12.9)
RBC # BLD: 3.28 M/UL — LOW (ref 3.8–5.2)
RBC # FLD: 15.5 % — HIGH (ref 10.3–14.5)
SODIUM SERPL-SCNC: 131 MMOL/L — LOW (ref 135–145)
SPECIMEN SOURCE: SIGNIFICANT CHANGE UP
WBC # BLD: 17.81 K/UL — HIGH (ref 3.8–10.5)
WBC # FLD AUTO: 17.81 K/UL — HIGH (ref 3.8–10.5)

## 2020-06-11 PROCEDURE — 73630 X-RAY EXAM OF FOOT: CPT | Mod: 26,LT

## 2020-06-11 PROCEDURE — 99231 SBSQ HOSP IP/OBS SF/LOW 25: CPT

## 2020-06-11 PROCEDURE — 99233 SBSQ HOSP IP/OBS HIGH 50: CPT

## 2020-06-11 PROCEDURE — 73590 X-RAY EXAM OF LOWER LEG: CPT | Mod: 26,LT

## 2020-06-11 PROCEDURE — 71045 X-RAY EXAM CHEST 1 VIEW: CPT | Mod: 26

## 2020-06-11 RX ORDER — HEPARIN SODIUM 5000 [USP'U]/ML
1200 INJECTION INTRAVENOUS; SUBCUTANEOUS
Qty: 25000 | Refills: 0 | Status: DISCONTINUED | OUTPATIENT
Start: 2020-06-11 | End: 2020-06-12

## 2020-06-11 RX ORDER — HYDRALAZINE HCL 50 MG
5 TABLET ORAL ONCE
Refills: 0 | Status: COMPLETED | OUTPATIENT
Start: 2020-06-11 | End: 2020-06-11

## 2020-06-11 RX ORDER — ACETAMINOPHEN 500 MG
1000 TABLET ORAL ONCE
Refills: 0 | Status: COMPLETED | OUTPATIENT
Start: 2020-06-11 | End: 2020-06-11

## 2020-06-11 RX ORDER — ONDANSETRON 8 MG/1
4 TABLET, FILM COATED ORAL ONCE
Refills: 0 | Status: COMPLETED | OUTPATIENT
Start: 2020-06-11 | End: 2020-06-11

## 2020-06-11 RX ORDER — TRAMADOL HYDROCHLORIDE 50 MG/1
25 TABLET ORAL EVERY 6 HOURS
Refills: 0 | Status: DISCONTINUED | OUTPATIENT
Start: 2020-06-11 | End: 2020-06-17

## 2020-06-11 RX ORDER — FUROSEMIDE 40 MG
20 TABLET ORAL DAILY
Refills: 0 | Status: DISCONTINUED | OUTPATIENT
Start: 2020-06-11 | End: 2020-06-19

## 2020-06-11 RX ORDER — HYDRALAZINE HCL 50 MG
75 TABLET ORAL EVERY 8 HOURS
Refills: 0 | Status: DISCONTINUED | OUTPATIENT
Start: 2020-06-11 | End: 2020-06-12

## 2020-06-11 RX ORDER — KETOROLAC TROMETHAMINE 30 MG/ML
15 SYRINGE (ML) INJECTION ONCE
Refills: 0 | Status: DISCONTINUED | OUTPATIENT
Start: 2020-06-11 | End: 2020-06-11

## 2020-06-11 RX ORDER — FUROSEMIDE 40 MG
20 TABLET ORAL ONCE
Refills: 0 | Status: COMPLETED | OUTPATIENT
Start: 2020-06-11 | End: 2020-06-11

## 2020-06-11 RX ADMIN — Medication 15 MILLIGRAM(S): at 20:40

## 2020-06-11 RX ADMIN — LISINOPRIL 40 MILLIGRAM(S): 2.5 TABLET ORAL at 06:09

## 2020-06-11 RX ADMIN — Medication 30 MILLILITER(S): at 03:37

## 2020-06-11 RX ADMIN — Medication 5 MILLIGRAM(S): at 18:30

## 2020-06-11 RX ADMIN — PANTOPRAZOLE SODIUM 40 MILLIGRAM(S): 20 TABLET, DELAYED RELEASE ORAL at 11:32

## 2020-06-11 RX ADMIN — Medication 75 MILLIGRAM(S): at 21:35

## 2020-06-11 RX ADMIN — MEROPENEM 100 MILLIGRAM(S): 1 INJECTION INTRAVENOUS at 21:37

## 2020-06-11 RX ADMIN — Medication 20 MILLIGRAM(S): at 16:06

## 2020-06-11 RX ADMIN — Medication 50 MILLIGRAM(S): at 06:09

## 2020-06-11 RX ADMIN — GABAPENTIN 100 MILLIGRAM(S): 400 CAPSULE ORAL at 18:30

## 2020-06-11 RX ADMIN — Medication 1 TABLET(S): at 11:32

## 2020-06-11 RX ADMIN — Medication 110 MILLIGRAM(S): at 06:09

## 2020-06-11 RX ADMIN — Medication 20 MILLIGRAM(S): at 21:35

## 2020-06-11 RX ADMIN — MEROPENEM 100 MILLIGRAM(S): 1 INJECTION INTRAVENOUS at 00:14

## 2020-06-11 RX ADMIN — Medication 1 TABLET(S): at 08:31

## 2020-06-11 RX ADMIN — Medication 50 MILLIGRAM(S): at 06:10

## 2020-06-11 RX ADMIN — ONDANSETRON 4 MILLIGRAM(S): 8 TABLET, FILM COATED ORAL at 03:00

## 2020-06-11 RX ADMIN — Medication 1 TABLET(S): at 18:32

## 2020-06-11 RX ADMIN — Medication 1: at 21:36

## 2020-06-11 RX ADMIN — Medication 110 MILLIGRAM(S): at 18:31

## 2020-06-11 RX ADMIN — MEROPENEM 100 MILLIGRAM(S): 1 INJECTION INTRAVENOUS at 05:18

## 2020-06-11 RX ADMIN — INSULIN GLARGINE 10 UNIT(S): 100 INJECTION, SOLUTION SUBCUTANEOUS at 21:36

## 2020-06-11 RX ADMIN — GABAPENTIN 100 MILLIGRAM(S): 400 CAPSULE ORAL at 06:09

## 2020-06-11 RX ADMIN — HEPARIN SODIUM 700 UNIT(S)/HR: 5000 INJECTION INTRAVENOUS; SUBCUTANEOUS at 03:23

## 2020-06-11 RX ADMIN — Medication 75 MILLIGRAM(S): at 14:45

## 2020-06-11 RX ADMIN — MEROPENEM 100 MILLIGRAM(S): 1 INJECTION INTRAVENOUS at 14:44

## 2020-06-11 RX ADMIN — HEPARIN SODIUM 1200 UNIT(S)/HR: 5000 INJECTION INTRAVENOUS; SUBCUTANEOUS at 22:13

## 2020-06-11 RX ADMIN — Medication 400 MILLIGRAM(S): at 18:24

## 2020-06-11 RX ADMIN — TRAMADOL HYDROCHLORIDE 25 MILLIGRAM(S): 50 TABLET ORAL at 22:07

## 2020-06-11 RX ADMIN — ONDANSETRON 4 MILLIGRAM(S): 8 TABLET, FILM COATED ORAL at 11:32

## 2020-06-11 NOTE — PROGRESS NOTE ADULT - SUBJECTIVE AND OBJECTIVE BOX
Summary:   74y  Female      Subjective:   Vomiting has resumed     Objective:    MEDICATIONS  (STANDING):  atorvastatin 40 milliGRAM(s) Oral at bedtime  Dakins Solution - 1/4 Strength 1 Application(s) Topical daily  doxycycline IVPB 100 milliGRAM(s) IV Intermittent every 12 hours  furosemide    Tablet 20 milliGRAM(s) Oral daily  gabapentin 100 milliGRAM(s) Oral every 12 hours  heparin  Infusion. 800 Unit(s)/Hr (8 mL/Hr) IV Continuous <Continuous>  hydrALAZINE 75 milliGRAM(s) Oral every 8 hours  hydrALAZINE Injectable 5 milliGRAM(s) IV Push once  insulin glargine Injectable (LANTUS) 10 Unit(s) SubCutaneous at bedtime  insulin lispro (HumaLOG) corrective regimen sliding scale   SubCutaneous Before meals and at bedtime  lactobacillus acidophilus 1 Tablet(s) Oral three times a day with meals  lisinopril 40 milliGRAM(s) Oral daily  melatonin 5 milliGRAM(s) Oral at bedtime  meropenem  IVPB 1000 milliGRAM(s) IV Intermittent every 8 hours  metoprolol succinate ER 50 milliGRAM(s) Oral daily  pantoprazole  Injectable 40 milliGRAM(s) IV Push daily    MEDICATIONS  (PRN):  acetaminophen   Tablet .. 650 milliGRAM(s) Oral every 6 hours PRN Mild Pain (1 - 3), Moderate Pain (4 - 6)  acetaminophen  IVPB .. 1000 milliGRAM(s) IV Intermittent once PRN Severe Pain (7 - 10)  acetaminophen  IVPB .. 1000 milliGRAM(s) IV Intermittent once PRN Moderate Pain (4 - 6)  aluminum hydroxide/magnesium hydroxide/simethicone Suspension 30 milliLiter(s) Oral every 6 hours PRN Dyspepsia  diphenhydrAMINE   Injectable 15 milliGRAM(s) IV Push every 6 hours PRN nausea or vomiting  heparin   Injectable 5500 Unit(s) IV Push every 6 hours PRN For aPTT less than 40  heparin   Injectable 2500 Unit(s) IV Push every 6 hours PRN For aPTT between 40 - 57  metoprolol tartrate Injectable 5 milliGRAM(s) IV Push every 6 hours PRN HR greater than 120  ondansetron Injectable 4 milliGRAM(s) IV Push every 8 hours PRN Nausea and/or Vomiting              Vital Signs Last 24 Hrs  T(C): 36.8 (11 Jun 2020 15:15), Max: 36.9 (10 Uli 2020 19:17)  T(F): 98.3 (11 Jun 2020 15:15), Max: 98.5 (10 Uli 2020 19:17)  HR: 80 (11 Jun 2020 15:15) (76 - 88)  BP: 193/76 (11 Jun 2020 15:15) (170/70 - 193/76)  BP(mean): --  RR: 18 (11 Jun 2020 15:15) (18 - 19)  SpO2: 96% (11 Jun 2020 15:15) (96% - 100%)      General:  Well developed, well nourished, alert and active, no pallor, NAD.  HEENT:    Normal appearance of conjunctiva, ears, nose, lips, oropharynx, and oral mucosa, anicteric.  Neck:  No masses, no asymmetry.  Lymph Nodes:  No lymphadenopathy.   Cardiovascular:  RRR normal S1/S2, no murmur.  Respiratory:  CTA B/L, normal respiratory effort.   Abdominal:   soft, no masses or tenderness, normoactive BS, NT/ND, no HSM.  Extremities:   No clubbing or cyanosis, normal capillary refill, no edema.   Skin:   No rash, jaundice, lesions, eczema.   Musculoskeletal:  No joint swelling, erythema or tenderness.   Neuro: No focal deficits.   Other:       LABS:                        9.4    17.81 )-----------( 356      ( 11 Jun 2020 07:26 )             30.4     06-11    131<L>  |  96  |  24<H>  ----------------------------<  98  4.6   |  22  |  0.83    Ca    8.9      11 Jun 2020 07:26  Phos  3.3     06-11  Mg     2.0     06-11    TPro  7.2  /  Alb  2.3<L>  /  TBili  0.7  /  DBili  x   /  AST  27  /  ALT  34  /  AlkPhos  363<H>  06-10    PTT - ( 11 Jun 2020 02:48 )  PTT:48.8 sec      RADIOLOGY & ADDITIONAL TESTS:

## 2020-06-11 NOTE — PROGRESS NOTE ADULT - ASSESSMENT
Confidential Drug Utilization Report  Search Terms: Ankita Keating, 1945   Search Date: 06/08/2020 16:27:30 PM   The Drug Utilization Report below displays all of the controlled substance prescriptions, if any, that your patient has filled in the last twelve months. The information displayed on this report is compiled from pharmacy submissions to the Department, and accurately reflects the information as submitted by the pharmacies.  This report was requested by: Kathya Sinclair | Reference #: [id number]   You have not added a TRAVIS number. Keeping your TRAVIS number(s) up to date on the My TRAVIS Numbers </[email or web address]/[email or web address]/myDeaNumbers> page will enable the separation of your prescriptions from others in the search results.   Others' Prescriptions  Patient Name: Ankita Keating   YOB: 1945   Address: [address] [place] [address]   Sex: Female   Rx Written	Rx Dispensed	Drug	Quantity	Days Supply	Prescriber Name	Payment Method	Dispenser	  05/28/2020	05/29/2020	oxycodone-acetaminophen  mg tab 	60	30	Chico Jeffries MD	Insurance	[facility] #[id number]	  05/22/2020	05/22/2020	oxycodone-acetaminophen 5-325 mg tab 	20	5	Gomez Caraballo Jr, MD	Insurance	[facility] #[id number]	  05/08/2020	05/09/2020	oxycodone-acetaminophen 5-325 mg tab 	20	5	Gomez Caraballo Jr, MD	Insurance	[facility] #[id number]	  04/22/2020	04/28/2020	oxycodone-acetaminophen 5-325 mg tab 	20	5	Venita Thompson MD	Insurance	[facility] #[id number]	  * - Drugs marked with an asterisk are compound drugs. If the compound drug is made up of more than one controlled substance, then each controlled substance will be a separate row in the table.

## 2020-06-11 NOTE — PROGRESS NOTE ADULT - PROBLEM SELECTOR PLAN 1
Pt with acute somatic pain of left foot due to OM. Pt is now POD #2 s/p Angiogram, extremity, left, and I&D per podiatry on 6/9. Pt is on heparin gtt for LEFT PICA stroke.  Opioid pain recommendations   - Continue tramadol 25mg PO q6h PRN severe pain. Monitor for respiratory depression/ sedation  Non-opioid pain recommendations   - Ofirmev 1 G IV PRN moderate pain as pt vomited this morning.  - If tolerates, may continue Acetaminophen 650mg PO q 6 hours PRN moderate pain. Monitor LFTs  - Gabapentin 100mg PO 2x/day. Monitor renal function.   -Avoid NSAIDs. Pt on heparin gtt and s/p I&D 6/9.  - Bleeding precautions  Bowel Regimen  -Per primary team  Non-pharmacological pain treatment recommendations  - Physical therapy OOB if no contraindications   Mild pain   - Non pharmacological measures   - Warm/ Cool packs PRN   - Repositioning extremity, elevation, PT, imagery, relaxation, distraction.  Recommendations discussed with primary team and RN.

## 2020-06-11 NOTE — PROGRESS NOTE ADULT - SUBJECTIVE AND OBJECTIVE BOX
INTERVAL HPI/OVERNIGHT EVENTS:  Patient seen at bedside,events noticed  VITAL SIGNS:  T(F): 98.1 (06-11-20 @ 08:13)  HR: 83 (06-11-20 @ 08:13)  BP: 188/86 (06-11-20 @ 08:13)  RR: 18 (06-11-20 @ 08:13)  SpO2: 97% (06-11-20 @ 08:13)  Wt(kg): --    PHYSICAL EXAM:  awake,events noticed  Constitutional:  Eyes:  ENMT:perrla  Neck:  Respiratory:clear  Cardiovascular:s1 s2,m-none  Gastrointestinal:soft,bs pos  Extremities:l/extem with dressing  Vascular:  Neurological:no focal defciit  Musculoskeletal:    MEDICATIONS  (STANDING):  atorvastatin 40 milliGRAM(s) Oral at bedtime  Dakins Solution - 1/4 Strength 1 Application(s) Topical daily  doxycycline IVPB 100 milliGRAM(s) IV Intermittent every 12 hours  gabapentin 100 milliGRAM(s) Oral every 12 hours  heparin  Infusion. 800 Unit(s)/Hr (8 mL/Hr) IV Continuous <Continuous>  hydrALAZINE 50 milliGRAM(s) Oral three times a day  insulin glargine Injectable (LANTUS) 10 Unit(s) SubCutaneous at bedtime  insulin lispro (HumaLOG) corrective regimen sliding scale   SubCutaneous Before meals and at bedtime  lactobacillus acidophilus 1 Tablet(s) Oral three times a day with meals  lisinopril 40 milliGRAM(s) Oral daily  melatonin 5 milliGRAM(s) Oral at bedtime  meropenem  IVPB 1000 milliGRAM(s) IV Intermittent every 8 hours  metoprolol succinate ER 50 milliGRAM(s) Oral daily  pantoprazole  Injectable 40 milliGRAM(s) IV Push daily    MEDICATIONS  (PRN):  acetaminophen   Tablet .. 650 milliGRAM(s) Oral every 6 hours PRN Mild Pain (1 - 3), Moderate Pain (4 - 6)  acetaminophen  IVPB .. 1000 milliGRAM(s) IV Intermittent once PRN Severe Pain (7 - 10)  aluminum hydroxide/magnesium hydroxide/simethicone Suspension 30 milliLiter(s) Oral every 6 hours PRN Dyspepsia  diphenhydrAMINE   Injectable 15 milliGRAM(s) IV Push every 6 hours PRN nausea or vomiting  heparin   Injectable 5500 Unit(s) IV Push every 6 hours PRN For aPTT less than 40  heparin   Injectable 2500 Unit(s) IV Push every 6 hours PRN For aPTT between 40 - 57  metoprolol tartrate Injectable 5 milliGRAM(s) IV Push every 6 hours PRN HR greater than 120  ondansetron Injectable 4 milliGRAM(s) IV Push every 8 hours PRN Nausea and/or Vomiting  traMADol 25 milliGRAM(s) Oral every 6 hours PRN Severe Pain (7 - 10)      Allergies    codeine (Rash)  penicillin (Rash)    Intolerances        LABS:                        9.4    17.81 )-----------( 356      ( 11 Jun 2020 07:26 )             30.4     06-11    131<L>  |  96  |  24<H>  ----------------------------<  98  4.6   |  22  |  0.83    Ca    8.9      11 Jun 2020 07:26  Phos  3.3     06-11  Mg     2.0     06-11    TPro  7.2  /  Alb  2.3<L>  /  TBili  0.7  /  DBili  x   /  AST  27  /  ALT  34  /  AlkPhos  363<H>  06-10    PTT - ( 11 Jun 2020 02:48 )  PTT:48.8 sec    Assessment and Plan:   · Assessment		  75 YO female from home, H/O CAD/DM/HTN/PVD/hysterectomy/cholecystectomy ,with AICD has come to ED with intractable nausea and vomiting for 2 days, likely due to pill induced esophagitis/gastritis. Patient reports that it was sudden in onset, 7/10, burning & pressure like pain, non-radiating , associated with nausea and projectile vomiting. Patient also reports significant weight( not sure how much) recently and decrease in appetite. CT abdomen/pelvis shows bladder wall thickening and pt has history of Stage1 endometrial cancer,   Patient also has high blood pressure because she missed her medications  Admitting patient for management of  Acute Gastritis  Intractable abdominal pain  hypertension  weight loss/ suspected malignancy workup           Problem/Plan - 1:  ·  Problem: Stroke.  Plan: CTA head/neck showed LEFT PICA stroke  -heparin , will restart once cleared by surgery  Neuro Dr. Moncada.      Problem/Plan - 2:  ·  Problem: PVD (peripheral vascular disease).  Plan: H/o PVD and popliteal stent  s/p left big toe amputation in May 2020  - ESTRELLA: RLE:  0.6. LLE:  0.5. Severe bilateral peripheral artery disease. Pulse volume recordings are diffusely diminished bilaterally, however severely diminished in the left foot.  s/p surgery  f/up vascular daily since high risk of amputation  PT: home PT       Problem/Plan - 3:  ·  Problem: Osteomyelitis of left foot, unspecified type.  Plan: recently antibiotics changed from clindamycin to Levaquin that led to epigastric pain.  WBC remains high, possible infection on stump site  -cont abx as per id  Podiatry Dr. Yo - Need I & D  ID Dr. Reagan.      Problem/Plan - 4:  ·  Problem: Intractable nausea and vomiting-stable lcinically  partially relieved with oral Protonix  CT abd w/ IV cont: Small bilateral pleural effusions. Multiple nodular peripheral filling defect in the urinary bladder measuring up to 1.3 cm, suspicious for transitional cell carcinomas.  - c/w PPI, Reglan q8h (pt had QTc prolongation, but will continue as pt has AICD and it works better),  benadryl 25mg IV q6h PRN for n/v  - No indication for EGD as per GI  - resolving  GI Dr. Matute.      Problem/Plan - 5:  ·  Problem: Bladder wall thickening.  Plan: CT abdomen shows bladder wall thickening  reports weight loss and loss of appetite recently, Patient has history of Stage 1 endometrial cancer and hysterectomy  brother also had cancer and she is not sure about which one  - urine cytology: NEGATIVE FOR HIGH GRADE UROTHELIAL CARCINOMA  urology consulted: outpt f/u w/ , need cystoscopy.      Problem/Plan - 6:  Problem: HTN (hypertension). Plan: Pt takes Lisinopril 40mg, toprol Xl 100mg, hydralazine 50mg TID   BP is running on higher side .  lipid profile, TG WNL  -.     Problem/Plan - 7:  ·  Problem: DM (diabetes mellitus).  Plan: Patient takes Levemir 30mg at night and 10Units pre-meals  c/w sliding scale and Lantus 20Units HS  Diabetic diet  Hb A1C: 8.1%  - monitor BS.      Problem/Plan - 8:  ·  Problem: CAD (coronary artery disease).  Plan: H/o CAD and PCI in 2009,  patient has both AICD and pacemaker  EKG shows paced Rythm  No active issues  continue aspirin , Plavix, BB, statin, ACE - pt is not tolerated to PO meds  - c/w heparin drip.      Problem/Plan - 9:  ·  Problem: CHF (congestive heart failure).  Plan: H/o CHF but clinically patient is not in fluid overload  No peripheral edema  Patient takes furosemide 20mg daily twice  BNP: elevated at 9383  echo: EF 50-55%, mild TR, [place]  - holding lasix 20mg IV as pt is not eating and dehydrated  - c/w statin and BB.      Problem/Plan - 10:  Problem: Prophylactic measure. Plan; IMPROVE VTE Individual Risk Assessment  RISK                                                                Points  [  ] Previous VTE                                                  3  [  ] Thrombophilia                                               2  [  ] Lower limb paralysis                                      2        (unable to hold up >15 seconds)    [  ] Current Cancer                                              2         (within 6 months)  [x  ] Immobilization > 24 hrs                                1  [  ] ICU/CCU stay > 24 hours                              1  [  x] Age > 60                                                      1    IMPROVE VTE Score 2  heparin drip.

## 2020-06-11 NOTE — PROGRESS NOTE ADULT - SUBJECTIVE AND OBJECTIVE BOX
Patient denies chest pain or shortness of breath.  Nauseaus again this AM   Review of systems otherwise (-)  	  acetaminophen   Tablet .. 650 milliGRAM(s) Oral every 6 hours PRN  acetaminophen  IVPB .. 1000 milliGRAM(s) IV Intermittent once PRN  acetaminophen  IVPB .. 1000 milliGRAM(s) IV Intermittent once PRN  aluminum hydroxide/magnesium hydroxide/simethicone Suspension 30 milliLiter(s) Oral every 6 hours PRN  atorvastatin 40 milliGRAM(s) Oral at bedtime  Dakins Solution - 1/4 Strength 1 Application(s) Topical daily  diphenhydrAMINE   Injectable 15 milliGRAM(s) IV Push every 6 hours PRN  doxycycline IVPB 100 milliGRAM(s) IV Intermittent every 12 hours  gabapentin 100 milliGRAM(s) Oral every 12 hours  heparin   Injectable 5500 Unit(s) IV Push every 6 hours PRN  heparin   Injectable 2500 Unit(s) IV Push every 6 hours PRN  heparin  Infusion. 800 Unit(s)/Hr IV Continuous <Continuous>  hydrALAZINE 75 milliGRAM(s) Oral every 8 hours  insulin glargine Injectable (LANTUS) 10 Unit(s) SubCutaneous at bedtime  insulin lispro (HumaLOG) corrective regimen sliding scale   SubCutaneous Before meals and at bedtime  lactobacillus acidophilus 1 Tablet(s) Oral three times a day with meals  lisinopril 40 milliGRAM(s) Oral daily  melatonin 5 milliGRAM(s) Oral at bedtime  meropenem  IVPB 1000 milliGRAM(s) IV Intermittent every 8 hours  metoprolol succinate ER 50 milliGRAM(s) Oral daily  metoprolol tartrate Injectable 5 milliGRAM(s) IV Push every 6 hours PRN  ondansetron Injectable 4 milliGRAM(s) IV Push every 8 hours PRN  ondansetron Injectable 4 milliGRAM(s) IV Push once  pantoprazole  Injectable 40 milliGRAM(s) IV Push daily  traMADol 25 milliGRAM(s) Oral every 6 hours PRN                            9.4    17.81 )-----------( 356      ( 11 Jun 2020 07:26 )             30.4       Hemoglobin: 9.4 g/dL (06-11 @ 07:26)  Hemoglobin: 9.5 g/dL (06-10 @ 18:36)  Hemoglobin: 9.5 g/dL (06-10 @ 07:06)  Hemoglobin: 9.6 g/dL (06-09 @ 06:50)  Hemoglobin: 9.5 g/dL (06-08 @ 05:42)      06-11    131<L>  |  96  |  24<H>  ----------------------------<  98  4.6   |  22  |  0.83    Ca    8.9      11 Jun 2020 07:26  Phos  3.3     06-11  Mg     2.0     06-11    TPro  7.2  /  Alb  2.3<L>  /  TBili  0.7  /  DBili  x   /  AST  27  /  ALT  34  /  AlkPhos  363<H>  06-10    Creatinine Trend: 0.83<--, 0.84<--, 1.00<--, 1.27<--, 0.98<--, 1.08<--    COAGS: PTT - ( 11 Jun 2020 02:48 )  PTT:48.8 sec          T(C): 36.7 (06-11-20 @ 08:13), Max: 37.2 (06-10-20 @ 15:27)  HR: 83 (06-11-20 @ 08:13) (83 - 89)  BP: 188/86 (06-11-20 @ 08:13) (101/65 - 188/86)  RR: 18 (06-11-20 @ 08:13) (18 - 19)  SpO2: 97% (06-11-20 @ 08:13) (93% - 100%)  Wt(kg): --    I&O's Summary    10 Uli 2020 07:01  -  11 Jun 2020 07:00  --------------------------------------------------------  IN: 50 mL / OUT: 350 mL / NET: -300 mL          HEENT:  (-)icterus (-)pallor  CV: N S1 S2 1/6 JAMAAL (+)2 Pulses B/l  Resp:  Clear to ausculatation B/L, normal effort  GI: (+) BS Soft, NT, ND  Lymph:  (-)Edema, (-)obvious lymphadenopathy  Skin: Warm to touch, Normal turgor  Psych: Appropriate mood and affect          ASSESSMENT/PLAN: 	74y  Female AD/DM/HTN/PVD/hysterectomy/cholecystectomy ,with CHF BIVICD, Mild anterior wall ischemia being medically managed  comes to ED with intractable nausea and vomiting for 2 days 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 bladder malignancy.    - cont Heparin gtt for new afib and CVA  - Interrogate [place] Sci ICD  -  cont toprol XL 50 mg PO Daily  - Tolerated I+D as well as partial amputation by podiatry  - Vasculary f/u regarding completely occluded popliteal artery  - Will need repeat stress prior to any  high risk surgery  - Can D/C tele      Pipo Segundo MD, FACC  BEEPER [phone]

## 2020-06-11 NOTE — PROGRESS NOTE ADULT - PROBLEM SELECTOR PLAN 9
H/o CHF but clinically patient is not in fluid overload  No peripheral edema  Patient takes furosemide 20mg daily twice  BNP: elevated at 9383  echo: EF 50-55%, mild TR, [place]  - holding lasix 20mg IV as pt is not eating and dehydrated  - c/w statin and BB

## 2020-06-11 NOTE — PROGRESS NOTE ADULT - ASSESSMENT
Nausea vomiting   Will plan for EGD Monday   Continue IV heparin   Repeat COVID Sunday   Hold Heparin Monday morning at 4 am   I was physically present for the key portions of the evaluation and management (E/M) service provided.  The patient was personally seen and examined at bedside.    Thank you for your consultation and allowing  me to participate in the care of your patients. If you have further questions please contact me at [phone].     Quintin Madrigal M.D.       _________________________________________________________________________________________________  [facility]  [address] [place] [address]  Office: [phone]    Tremaine Culp PA-C  ___________________________________________________________________________________________________

## 2020-06-11 NOTE — PROGRESS NOTE ADULT - PROBLEM SELECTOR PLAN 3
recently antibiotics changed from clindamycin to Levaquin that led to epigastric pain.  WBC remains high, possible infection on stump site  - ABX: cefepime + doxycycline 6/1-6/4,  cefazolin 6/5  - c/w doxy + nicole 6/6- as per ID  - lactate 2.4 trended down, procalcitonin high 0.17  - CT L foot shows OM ( pt cannot get MRI dur to AICD)  Podiatry Dr. Yo - s/p I and D  ID Dr. Reagan

## 2020-06-11 NOTE — PROGRESS NOTE ADULT - SUBJECTIVE AND OBJECTIVE BOX
Source of information: DARRION MELENDEZ, Chart review  Patient language: English  : n/a    HPI:  73 YO female from home, H/O CAD/DM/HTN/PVD/hysterectomy/cholecystectomy ,with AICD comes to ED with intractable nausea and vomiting for 2 days. Patient states that she recently had left big toe amputation in the beginning of May and she was taking antibiotics. On Friday, her antibiotics were changed from clindamycin to Levaquin and after she took antibiotics along with percocet, she started having severe pain in epigastric region. Patient reports that it was sudden in onset, 7/10, burning & pressure like pain, non-radiating , associated with nausea,, bitter taste in mouth and projectile vomiting. She was not able to tolerate food and it aggravated her symptoms. Patient also reports significant weight( not sure how much) recently and decrease in appetite.   patient denies fever, cough, SOB, constipation, dysuria, headache, chest pain, orthopnea, back pain, burning sensation in extremities.    ED course; Patient is in mild distress due to nausea but otherwise stable  Vitals:     /88  SpO2 99% on RA    CT abdomen/pelvis:   NEW MULTI BLADDER WALL FOCAL AREAS OF SOFT TISSUE THICKENING CONCERNING FOR BLADDER CARCINOMA/TRANSITIONAL CELL CARCINOMA. No hydronephrosis.  Stomach not fully distended and gastric pathology cannot be excluded.  Status post cholecystectomy and hysterectomy.  Remaining abdominal pelvic viscera unremarkable.   No evidence of colitis.. (30 May 2020 14:56)      Patient is a 74y old  Female who presents with a chief complaint of Intractable nausea and vomiting found to have gastritis. Pt found to have OM of left foot. Recent left big toe amputation in May 2020. Pt was on Percocet 10mg PO at home 2x/day PRN. Plan is now POD #2 (6/9) s/p Angiogram, extremity, left, and I&D per podiatry. Pt is on heparin gtt for LEFT PICA stroke.   Pt seen and examined at bedside. Patient laying in bed, denies pain at this time. Pt does report poor appetite and feeling nauseous occasionally. During assessment, pt vomited small amount of yellow emesis, RN made aware. Denies lethargy, constipation, itchiness. Reports last BM yesterday. Patient stated goal for pain control: to be able to take deep breaths, utilize incentive spirometer, get out of bed to chair and ambulate with tolerable pain control. Pt reports taking Tylenol for pain at home as needed. Pt states she was prescribed Percocet but does not want to take it as she experienced nausea after taking it.     PAST MEDICAL & SURGICAL HISTORY:  PVD (peripheral vascular disease)  CAD (coronary artery disease)  CHF (congestive heart failure)  HTN (hypertension)  DM (diabetes mellitus)  Status post coronary artery stent placement  H/O: hysterectomy  Cardiac defibrillator in place  Artificial cardiac pacemaker  History of cholecystectomy    Social History:  Used to smoke 1 cigarette a day 36 years ago  No Alcohol or drugs  Retired and lives with family, No sick contacts (30 May 2020 14:56)    Allergies    codeine (Rash)  penicillin (Rash)    MEDICATIONS  (STANDING):  atorvastatin 40 milliGRAM(s) Oral at bedtime  Dakins Solution - 1/4 Strength 1 Application(s) Topical daily  doxycycline IVPB 100 milliGRAM(s) IV Intermittent every 12 hours  gabapentin 100 milliGRAM(s) Oral every 12 hours  heparin  Infusion. 800 Unit(s)/Hr (8 mL/Hr) IV Continuous <Continuous>  hydrALAZINE 50 milliGRAM(s) Oral three times a day  insulin glargine Injectable (LANTUS) 10 Unit(s) SubCutaneous at bedtime  insulin lispro (HumaLOG) corrective regimen sliding scale   SubCutaneous Before meals and at bedtime  lactobacillus acidophilus 1 Tablet(s) Oral three times a day with meals  lisinopril 40 milliGRAM(s) Oral daily  melatonin 5 milliGRAM(s) Oral at bedtime  meropenem  IVPB 1000 milliGRAM(s) IV Intermittent every 8 hours  metoprolol succinate ER 50 milliGRAM(s) Oral daily  ondansetron Injectable 4 milliGRAM(s) IV Push once  pantoprazole  Injectable 40 milliGRAM(s) IV Push daily    MEDICATIONS  (PRN):  acetaminophen   Tablet .. 650 milliGRAM(s) Oral every 6 hours PRN Mild Pain (1 - 3), Moderate Pain (4 - 6)  acetaminophen  IVPB .. 1000 milliGRAM(s) IV Intermittent once PRN Severe Pain (7 - 10)  acetaminophen  IVPB .. 1000 milliGRAM(s) IV Intermittent once PRN Moderate Pain (4 - 6)  aluminum hydroxide/magnesium hydroxide/simethicone Suspension 30 milliLiter(s) Oral every 6 hours PRN Dyspepsia  diphenhydrAMINE   Injectable 15 milliGRAM(s) IV Push every 6 hours PRN nausea or vomiting  heparin   Injectable 5500 Unit(s) IV Push every 6 hours PRN For aPTT less than 40  heparin   Injectable 2500 Unit(s) IV Push every 6 hours PRN For aPTT between 40 - 57  metoprolol tartrate Injectable 5 milliGRAM(s) IV Push every 6 hours PRN HR greater than 120  ondansetron Injectable 4 milliGRAM(s) IV Push every 8 hours PRN Nausea and/or Vomiting  traMADol 25 milliGRAM(s) Oral every 6 hours PRN Severe Pain (7 - 10)      Vital Signs Last 24 Hrs  T(C): 36.7 (11 Jun 2020 08:13), Max: 37.2 (10 Uli 2020 15:27)  T(F): 98.1 (11 Jun 2020 08:13), Max: 98.9 (10 Uli 2020 15:27)  HR: 83 (11 Jun 2020 08:13) (83 - 89)  BP: 188/86 (11 Jun 2020 08:13) (101/65 - 188/86)  BP(mean): --  RR: 18 (11 Jun 2020 08:13) (18 - 19)  SpO2: 97% (11 Jun 2020 08:13) (93% - 100%)  COVID-19 PCR: NotDetec (08 Jun 2020 23:20)    LABS: Reviewed                          9.4    17.81 )-----------( 356      ( 11 Jun 2020 07:26 )             30.4     06-11    131<L>  |  96  |  24<H>  ----------------------------<  98  4.6   |  22  |  0.83    Ca    8.9      11 Jun 2020 07:26  Phos  3.3     06-11  Mg     2.0     06-11    TPro  7.2  /  Alb  2.3<L>  /  TBili  0.7  /  DBili  x   /  AST  27  /  ALT  34  /  AlkPhos  363<H>  06-10    PTT - ( 11 Jun 2020 02:48 )  PTT:48.8 sec  LIVER FUNCTIONS - ( 10 Uli 2020 07:06 )  Alb: 2.3 g/dL / Pro: 7.2 g/dL / ALK PHOS: 363 U/L / ALT: 34 U/L DA / AST: 27 U/L / GGT: x             CAPILLARY BLOOD GLUCOSE      POCT Blood Glucose.: 112 mg/dL (11 Jun 2020 08:05)  POCT Blood Glucose.: 127 mg/dL (10 Uli 2020 20:54)  POCT Blood Glucose.: 184 mg/dL (10 Uli 2020 16:30)  POCT Blood Glucose.: 206 mg/dL (10 Uli 2020 11:49)    COVID-19 PCR: NotDetec (08 Jun 2020 23:20)          Radiology:     < from: [facility] Physiol Extremity Lower 3+ Level, BI (06.06.20 @ 12:48) >    EXAM:  US PHYSIOL LWR EXT 3+ LEV BI                            PROCEDURE DATE:  06/06/2020          INTERPRETATION:  Clinical Information: Peripheral vascular disease. Left toe ulceration.    Technique: Bilateral lower extremity ABIs/PVR    Comparison: None    Findings/  Impression:  Right lower extremity: The ankle brachial index is 0.6. The pulse waveforms are diffusely diminished.    Left lower extremity: The ankle brachial index is 0.5. The pulse waveforms are diffusely diminished, however particularly severe in the left foot.    Severe bilateral peripheral artery disease.    Pulse volume recordings are diffusely diminished bilaterally, however severely diminished in the left foot.                  ANIBAL UPTON M.D., ATTENDING RADIOLOGIST  This document has been electronically signed. Jun 6 2020  1:16PM                < end of copied text >    < from: Xray Chest 1 View- PORTABLE-Urgent (06.05.20 @ 13:40) >    EXAM:  XR CHEST PORTABLE URGENT 1V                            PROCEDURE DATE:  06/05/2020          INTERPRETATION:  Portable chest radiograph        CLINICAL INFORMATION:   Leukocytosis.    TECHNIQUE:  Portable  AP view of the chest was obtained.    COMPARISON: 10/12/2019 available for review.    FINDINGS:   The lungs  show bilateral lung perihilar and basilar interstitial opacity/infiltrates. Apices of lungs clear.    The  heart is enlarged in transverse diameter. No hilar mass. Trachea midline.   Right atrium and biventricular cardiac wire leads in place.    The heart and mediastinum are within normal limits.    Visualized osseous structures are intact.        IMPRESSION:   Cardiomegaly. Right atrium and biventricular cardiac wire leads in place.. Infrahilar and bibasilar interstitial opacities/infiltrates...      < end of copied text >      ORT Score -   Family Hx of substance abuse	Female	      Male  Alcohol 	                                           1                     3  Illegal drugs	                                   2                     3  Rx drugs                                           4 	                  4  Personal Hx of substance abuse		  Alcohol 	                                          3	                  3  Illegal drugs                                     4	                  4  Rx drugs                                            5 	                  5  Age between 16- 45 years	           1                     1  hx preadolescent sexual abuse	   3 	                  0  Psychological disease		  ADD, OCD, bipolar, schizophrenia   2	          2  Depression                                           1 	          1  Total: 0    a score of 3 or lower indicates low risk for opioid abuse		  a score of 4-7 indicates moderate risk for opioid abuse		  a score of 8 or higher indicates high risk for opioid abuse    	  4M's:   mental status: AAOx3.   ]medications: age-appropriate  mobility: community ambulatory, uses walker at home  Matters most-goals/GOC: to be able to take deep breaths, utilize incentive spirometer, get out of bed to chair and ambulate with tolerable pain control    REVIEW OF SYSTEMS:  CONSTITUTIONAL: No fever or fatigue  RESPIRATORY: No cough, wheezing, chills or hemoptysis; No shortness of breath  CARDIOVASCULAR: No chest pain, palpitations, dizziness, or leg swelling  GASTROINTESTINAL: Positive nausea/ vomiting, poor appetite, No abdominal or epigastric pain. No diarrhea or constipation.   GENITOURINARY: No dysuria, frequency, hematuria, retention or incontinence  MUSCULOSKELETAL: Positive left foot pain. Positive Rt foot swelling.  No muscle, back, or extremity pain, no upper or lower motor strength weakness, bowel/bladder incontinence  NEURO: No numbness/ tingling in b/l LE   PSYCHIATRIC: Positive anxiety (does not take medications at home) No depression, mood swings, or difficulty sleeping    PHYSICAL EXAM:  GENERAL:  Alert & Oriented X3, NAD, Good concentration  CHEST/LUNG: Even and unlabored on room air.   HEART: Regular rate and rhythm  ABDOMEN: Pt nauseous vomited during assessment (notified RN), Obese, Soft, Nontender, Nondistended  EXTREMITIES:  Positive Rt foot moderate edema. 2+ Peripheral Pulses in Rt foot. Unable to assess pulse in Left foot due to ace wrap and inner dressing. Pt is able to wiggle toes on b/l feet.  MUSCULOSKELETAL: Motor Strength 5/5 B/L upper and 4/4 b/l lower extremities; moves all extremities equally against gravity  SKIN: No rashes or lesions. Left foot ace wrap dressing c/d/i.     Risk factors associated with adverse outcomes related to opioid treatment  [ ]  Concurrent benzodiazepine use  [ ]  History/ Active substance use or alcohol use disorder  [X ] Psychiatric co-morbidity - anxiety  [ ] Sleep apnea  [ ] COPD  [ ] BMI> 35  [ ] Liver dysfunction  [ ] Renal dysfunction  [X ] CHF  [X ] Former Smoker  [X ]  Age > 60 years    [X ]  [place]  Reviewed and Copied to Chart. See below.    Plan of care and goal oriented pain management treatment options were discussed with patient and /or primary care giver; all questions and concerns were addressed and care was aligned with patient's wishes.    Plan of care and goal oriented pain management treatment options were discussed with patient and /or primary care giver; all questions and concerns were addressed and care was aligned with patient's wishes.    Educated patient on goal oriented pain management treatment options     06-11-20 @ 10:19

## 2020-06-11 NOTE — PROGRESS NOTE ADULT - SUBJECTIVE AND OBJECTIVE BOX
Subjective: says feels ok. no fevers, no diarrhea, pain over foot controlled. no cough or sob       PHYSICAL EXAM:    Vital Signs Last 24 Hrs  T(C): 36.8 (11 Jun 2020 19:03), Max: 36.9 (10 Uli 2020 19:17)  T(F): 98.3 (11 Jun 2020 19:03), Max: 98.5 (10 Uli 2020 19:17)  HR: 81 (11 Jun 2020 19:03) (76 - 88)  BP: 150/66 (11 Jun 2020 19:03) (150/66 - 193/76)  BP(mean): --  RR: 18 (11 Jun 2020 19:03) (18 - 19)  SpO2: 96% (11 Jun 2020 19:03) (96% - 100%)     Constitutional: awake alert oriented times 3   [place] SCLERA anicteric EOMI   LUNGS clear   CVS s1 s2 aud no murmurs /ppm in place  ABDOMEN soft non tender no HSM   NEUROLOGY  no defecits   SKIN left foot dressed   EXTREMITIES no edema no cyanosis /no groin hematoma         LABS/DIAGNOSTIC TESTS                        9.4    17.81 )-----------( 356      ( 11 Jun 2020 07:26 )             30.4     06-11    131<L>  |  96  |  24<H>  ----------------------------<  98  4.6   |  22  |  0.83    Ca    8.9      11 Jun 2020 07:26  Phos  3.3     06-11  Mg     2.0     06-11    TPro  7.2  /  Alb  2.3<L>  /  TBili  0.7  /  DBili  x   /  AST  27  /  ALT  34  /  AlkPhos  363<H>  06-10    PTT - ( 11 Jun 2020 02:48 )  PTT:48.8 sec      meds  acetaminophen   Tablet .. 650 milliGRAM(s) Oral every 6 hours PRN  acetaminophen  IVPB .. 1000 milliGRAM(s) IV Intermittent once PRN  aluminum hydroxide/magnesium hydroxide/simethicone Suspension 30 milliLiter(s) Oral every 6 hours PRN  atorvastatin 40 milliGRAM(s) Oral at bedtime  Dakins Solution - 1/4 Strength 1 Application(s) Topical daily  diphenhydrAMINE   Injectable 15 milliGRAM(s) IV Push every 6 hours PRN  doxycycline IVPB 100 milliGRAM(s) IV Intermittent every 12 hours  furosemide    Tablet 20 milliGRAM(s) Oral daily  gabapentin 100 milliGRAM(s) Oral every 12 hours  heparin   Injectable 5500 Unit(s) IV Push every 6 hours PRN  heparin   Injectable 2500 Unit(s) IV Push every 6 hours PRN  heparin  Infusion. 800 Unit(s)/Hr IV Continuous <Continuous>  hydrALAZINE 75 milliGRAM(s) Oral every 8 hours  insulin glargine Injectable (LANTUS) 10 Unit(s) SubCutaneous at bedtime  insulin lispro (HumaLOG) corrective regimen sliding scale   SubCutaneous Before meals and at bedtime  lactobacillus acidophilus 1 Tablet(s) Oral three times a day with meals  lisinopril 40 milliGRAM(s) Oral daily  melatonin 5 milliGRAM(s) Oral at bedtime  meropenem  IVPB 1000 milliGRAM(s) IV Intermittent every 8 hours  metoprolol succinate ER 50 milliGRAM(s) Oral daily  metoprolol tartrate Injectable 5 milliGRAM(s) IV Push every 6 hours PRN  ondansetron Injectable 4 milliGRAM(s) IV Push every 8 hours PRN  pantoprazole  Injectable 40 milliGRAM(s) IV Push daily        CULTURES  Culture Results: wound  Moderate Staphylococcus aureus (06-06 @ 03:05)        RADIOLOGY  < from: Xray Chest 1 View- PORTABLE-Routine (06.11.20 @ 10:09) >    IMPRESSION:  Findings which may represent worsening pulmonary edema. Infection is not excluded.    Increased left basilar and retrocardiac opacity, possibly an increasing small left pleural effusion with passive atelectasis. Atelectasis of other cause and/or pneumonia is not excluded.                ROOPA GERARD M.D., ATTENDING RADIOLOGIST  This document has been electronically signed. Jun 11 2020 10:34AM    < end of copied text >    < from: Xray Tibia + Fibula 2 Views, Left (06.11.20 @ 10:10) >    EXAM:  LEG AP&LAT - LEFT                            PROCEDURE DATE:  06/11/2020          INTERPRETATION:  Left tib-fib. There is history of popliteal stent. 2 views.    There are rather mild degenerative changes in the knee and there is some spurring of the posterior upper shaft of the tibia.    Popliteal stent starting at the upper tibial plateau level and extends for a length of approximately 10 cm into the trifurcation area.    There is what appears to be an old fracture deformity of the upper shaft of the fibula. There are posterior and inferior calcaneal spurs.    IMPRESSION: As above.                ISABELLA HOWARD M.D., ATTENDING RADIOLOGIST  This document has been electronically signed. Jun 11 2020 10:59AM    < end of copied text >    < from: Xray Foot AP + Lateral + Oblique, Left (06.11.20 @ 10:09) >  EXAM:  FOOT LEFT (MINIMUM 3 VIEWS)                            PROCEDURE DATE:  06/11/2020          INTERPRETATION:  Left foot    HISTORY: Postop      Three views of the left foot show transmetatarsal amputation of the first toe. The joint spaces are maintained.    IMPRESSION: Postoperative changes.    Thank you for this referral.                ELYSSA RECIO M.D., ATTENDING RADIOLOGIST  This document has been electronically signed. Jun 11 2020 10:47AM    < end of copied text > HTN (hypertension)

## 2020-06-11 NOTE — PROGRESS NOTE ADULT - ASSESSMENT
A/P  PVD /PAD  DM  ulcer   dehiscence surgical wound   history of gangrene left hallux   positive probe to bone   leukocytosis -mildly increased today possibly reactive after surgery   OM with  abscess  left -P/O day 1  pyogenic tendon       pt seen and eval   chart review  s/p angiogram -pt is very high risk for none healing  due to very poor vascular status as per Dr. Grove   Ms Keating and her daughter want to see if the wound will improve before discussing BKA  pt is at risk of hematoma formation/bleeding due to heparin drip   continue antibiotics as per id -id    follow up post up xray   keep foot slightly elevated   no weight bearing

## 2020-06-11 NOTE — PROGRESS NOTE ADULT - ASSESSMENT
73 YO female from home, H/O CAD/DM/HTN/PVD/hysterectomy/cholecystectomy ,with AICD has come to ED with intractable nausea and vomiting for 2 days, likely due to pill induced esophagitis/gastritis. Patient reports that it was sudden in onset, 7/10, burning & pressure like pain, non-radiating , associated with nausea and projectile vomiting. Patient also reports significant weight( not sure how much) recently and decrease in appetite. CT abdomen/pelvis shows bladder wall thickening and pt has history of Stage1 endometrial cancer,   Patient also has high blood pressure because she missed her medications  Admitting patient for management of  Acute Gastritis  Intractable abdominal pain  hypertension  weight loss/ suspected malignancy workup    DCd vholecalciferol, melatonin, loratadine until pt can tolerate PO meds     Plan to go to OR 6/9, NPO after midnight

## 2020-06-11 NOTE — PROGRESS NOTE ADULT - SUBJECTIVE AND OBJECTIVE BOX
PGY 1 Note discussed with supervising resident and primary attending    Patient is a 74y old  Female who presents with a chief complaint of Intractable nausea and vomiting (10 Uli 2020 21:48)      INTERVAL HPI/OVERNIGHT EVENTS: Patient was seen and examined at bedside , was complaining of pain at  stump site .  Vascular and podiatry consult appreciated.  -complaining of nausea    MEDICATIONS  (STANDING):  atorvastatin 40 milliGRAM(s) Oral at bedtime  Dakins Solution - 1/4 Strength 1 Application(s) Topical daily  doxycycline IVPB 100 milliGRAM(s) IV Intermittent every 12 hours  gabapentin 100 milliGRAM(s) Oral every 12 hours  heparin  Infusion. 800 Unit(s)/Hr (8 mL/Hr) IV Continuous <Continuous>  hydrALAZINE 50 milliGRAM(s) Oral three times a day  insulin glargine Injectable (LANTUS) 10 Unit(s) SubCutaneous at bedtime  insulin lispro (HumaLOG) corrective regimen sliding scale   SubCutaneous Before meals and at bedtime  lactobacillus acidophilus 1 Tablet(s) Oral three times a day with meals  lisinopril 40 milliGRAM(s) Oral daily  melatonin 5 milliGRAM(s) Oral at bedtime  meropenem  IVPB 1000 milliGRAM(s) IV Intermittent every 8 hours  metoprolol succinate ER 50 milliGRAM(s) Oral daily  pantoprazole  Injectable 40 milliGRAM(s) IV Push daily    MEDICATIONS  (PRN):  acetaminophen   Tablet .. 650 milliGRAM(s) Oral every 6 hours PRN Mild Pain (1 - 3), Moderate Pain (4 - 6)  acetaminophen  IVPB .. 1000 milliGRAM(s) IV Intermittent once PRN Severe Pain (7 - 10)  aluminum hydroxide/magnesium hydroxide/simethicone Suspension 30 milliLiter(s) Oral every 6 hours PRN Dyspepsia  diphenhydrAMINE   Injectable 15 milliGRAM(s) IV Push every 6 hours PRN nausea or vomiting  heparin   Injectable 5500 Unit(s) IV Push every 6 hours PRN For aPTT less than 40  heparin   Injectable 2500 Unit(s) IV Push every 6 hours PRN For aPTT between 40 - 57  metoprolol tartrate Injectable 5 milliGRAM(s) IV Push every 6 hours PRN HR greater than 120  ondansetron Injectable 4 milliGRAM(s) IV Push every 8 hours PRN Nausea and/or Vomiting  traMADol 25 milliGRAM(s) Oral every 6 hours PRN Severe Pain (7 - 10)      __________________________________________________  REVIEW OF SYSTEMS:    CONSTITUTIONAL: No fever,   EYES: no acute visual disturbances  NECK: No pain or stiffness  RESPIRATORY: No cough; No shortness of breath  CARDIOVASCULAR: No chest pain, no palpitations  GASTROINTESTINAL: No pain. No nausea or vomiting; No diarrhea   NEUROLOGICAL: No headache or numbness, no tremors  MUSCULOSKELETAL: No joint pain, no muscle pain  GENITOURINARY: no dysuria, no frequency, no hesitancy  PSYCHIATRY: no depression , no anxiety  ALL OTHER  ROS negative        Vital Signs Last 24 Hrs  T(C): 36.7 (11 Jun 2020 08:13), Max: 37.2 (10 Uli 2020 15:27)  T(F): 98.1 (11 Jun 2020 08:13), Max: 98.9 (10 Uli 2020 15:27)  HR: 83 (11 Jun 2020 08:13) (83 - 89)  BP: 188/86 (11 Jun 2020 08:13) (101/65 - 188/86)  BP(mean): --  RR: 18 (11 Jun 2020 08:13) (18 - 19)  SpO2: 97% (11 Jun 2020 08:13) (93% - 100%)    ________________________________________________  PHYSICAL EXAM:  GENERAL: NAD  HEENT: Normocephalic;  conjunctivae and sclerae clear; moist mucous membranes;   NECK : supple  CHEST/LUNG: Clear to auscultation bilaterally with good air entry   HEART: S1 S2  regular; no murmurs, gallops or rubs  ABDOMEN: Soft, Nontender, Nondistended; Bowel sounds present  EXTREMITIES: no cyanosis; no edema; no calf tenderness  SKIN: warm and dry; no rash  NERVOUS SYSTEM:  Awake and alert; Oriented  to place, person and time ; no new deficits    _________________________________________________  LABS:                        9.4    17.81 )-----------( 356      ( 11 Jun 2020 07:26 )             30.4     06-11    131<L>  |  96  |  24<H>  ----------------------------<  98  4.6   |  22  |  0.83    Ca    8.9      11 Jun 2020 07:26  Phos  3.3     06-11  Mg     2.0     06-11    TPro  7.2  /  Alb  2.3<L>  /  TBili  0.7  /  DBili  x   /  AST  27  /  ALT  34  /  AlkPhos  363<H>  06-10    PTT - ( 11 Jun 2020 02:48 )  PTT:48.8 sec    CAPILLARY BLOOD GLUCOSE      POCT Blood Glucose.: 112 mg/dL (11 Jun 2020 08:05)  POCT Blood Glucose.: 127 mg/dL (10 Uli 2020 20:54)  POCT Blood Glucose.: 184 mg/dL (10 Uli 2020 16:30)  POCT Blood Glucose.: 206 mg/dL (10 Uli 2020 11:49)        RADIOLOGY & ADDITIONAL TESTS:    Imaging Personally Reviewed:  YES/NO    Consultant(s) Notes Reviewed:   YES/ No    Care Discussed with Consultants :     Plan of care was discussed with patient and /or primary care giver; all questions and concerns were addressed and care was aligned with patient's wishes.

## 2020-06-11 NOTE — PROGRESS NOTE ADULT - ASSESSMENT
75 YO female from home, H/O CAD/DM/HTN/PVD/hysterectomy/cholecystectomy ,with AICD comes to ED with intractable nausea and vomiting for 2 days. Patient states that she recently had left big toe amputation in the beginning of May and she was taking antibiotics. On Friday, her antibiotics were changed from clindamycin to Levaquin and after she took antibiotics along with percocet, she started having severe pain in epigastric region. Patient reports that it was sudden in onset, 7/10, burning & pressure like pain, non-radiating , associated with nausea,, bitter taste in mouth and projectile vomiting. She was not able to tolerate food and it aggravated her symptoms. Patient also reports significant weight( not sure how much) recently and decrease in appetite. patient denies fever, cough, SOB, constipation, dysuria, headache, chest pain, orthopnea, back pain, burning sensation in extremities.c/o pain over left big toe stump which is not resolving despite pain meds . afebrile on adm , covid neg. ID called for persistant leucocytosis and appr ab     # s/p amputation of left great toe for gangrene with post op collection and overlying cellulits as well as underlying OM /wound cx pos for staph aureus ( MSSA)/s/p left leg stent at [facility] as per pt /underlying PVD/s/p angio, with severe occlusive popliteal and tibial  dz that is non reconstructable/s/p I and D of left foot as well as partial amputation of left first MT /path consistent with acute osteo     #persistant  nausea/vomiting , now seems to be vertigo sec to poss cerebellar cva vs tia     # transaminitis improving     # increasing wbc most likely reactive postop /cxr more consistent with pulm edema r/o pna /no resp complaints     plan  f/u or cx if done   cont  doxy and merrem  to cover mssa for stump infection/abscess as well as asp pna d/w intern   CONT  probiotics   EGD ON HOLD AS PER GI   neuro f/u   serum procal /pro bnp  podiatry f/u of stump       thnx will f/u   d/w resident

## 2020-06-11 NOTE — PROGRESS NOTE ADULT - SUBJECTIVE AND OBJECTIVE BOX
Post op day 2   s/p I&D and partial first ray amp   s/p angiogram   Vital Signs Last 24 Hrs  T(C): 36.8 (11 Jun 2020 19:03), Max: 36.8 (10 Uli 2020 23:27)  T(F): 98.3 (11 Jun 2020 19:03), Max: 98.3 (10 Uli 2020 23:27)  HR: 81 (11 Jun 2020 19:03) (76 - 88)  BP: 150/66 (11 Jun 2020 19:03) (150/66 - 193/76)  BP(mean): --  RR: 18 (11 Jun 2020 19:03) (18 - 19)  SpO2: 96% (11 Jun 2020 19:03) (96% - 100%)                        9.4    17.81 )-----------( 356      ( 11 Jun 2020 07:26 )             30.4   WBC Count: 17.81 K/uL (06-11 @ 07:26)  WBC Count: 14.82 K/uL (06-10 @ 18:36)  WBC Count: 13.62 K/uL (06-10 @ 07:06)  WBC Count: 13.16 K/uL (06-09 @ 06:50)  WBC Count: 16.14 K/uL (06-08 @ 05:42)  06-11    131<L>  |  96  |  24<H>  ----------------------------<  98  4.6   |  22  |  0.83    Ca    8.9      11 Jun 2020 07:26  Phos  3.3     06-11  Mg     2.0     06-11    TPro  7.2  /  Alb  2.3<L>  /  TBili  0.7  /  DBili  x   /  AST  27  /  ALT  34  /  AlkPhos  363<H>  06-10  < from: Xray Chest 1 View- PORTABLE-Routine (06.11.20 @ 10:09) >  EXAM:  XR CHEST PORTABLE ROUTINE 1V                            PROCEDURE DATE:  06/11/2020          INTERPRETATION:  TIME OF EXAM: June 11, 2020 at 9:33 AM.    CLINICAL INFORMATION: Follow-up of pneumonia.    COMPARISON:  June 5, 2020.    TECHNIQUE:   AP Portable chest x-ray.    INTERPRETATION:     Heart size and the mediastinum cannot be accurately evaluated on this projection. A left chest wall biventricular pacemaker ICD is again seen. The generator obscures part of the left lung.  There is increased accentuation and indistinctness of the val and pulmonary vascular markings as well as some airspace opacity on the right, findings which may be due to worsening pulmonary edema. Infection is not excluded.  There is increased left basilar and retrocardiac opacity with obscuration of the left hemidiaphragm.  No pneumothorax is seen.                IMPRESSION:  Findings which may represent worsening pulmonary edema. Infection is not excluded.    Increased left basilar and retrocardiac opacity, possibly an increasing small left pleural effusion with passive atelectasis. Atelectasis of other cause and/or pneumonia is not excluded.                ROOPA GERARD M.D., ATTENDING RADIOLOGIST  This document has been electronically signed. Jun 11 2020 10:34AM    < end of copied text >    < from: Xray Foot AP + Lateral + Oblique, Left (06.11.20 @ 10:09) >    EXAM:  FOOT LEFT (MINIMUM 3 VIEWS)                            PROCEDURE DATE:  06/11/2020          INTERPRETATION:  Left foot    HISTORY: Postop      Three views of the left foot show transmetatarsal amputation of the first toe. The joint spaces are maintained.    IMPRESSION: Postoperative changes.    Thank you for this referral.                ELYSSA RECIO M.D., ATTENDING RADIOLOGIST  This document has been electronically signed. Jun 11 2020 10:47AM                < end of copied text >  Surgical Pathology Report (06.09.20 @ 13:38)    Surgical Pathology Report:   ACCESSION No:  [id number]    DARRION MELENDEZ                      1        Surgical Final Report          Final Diagnosis    1. Left first metatarsal; amputation:  - Partially necrotic and severely inflamed fibroconnective  tissue containing fibrinopurulent exudate.  - Acute osteomyelitis.    2. Clean margin, left foot; amputation:  - Bone with bone marrow and periosteal fibroconnective tissue  without acute inflammation, consistent with clean  margin.    Verified by: Kath Levin MD  (Electronic Signature)  Reported on: 06/11/20 12:08 EDT, [facility],  [address] [place] [address], [place] [address]  Phone: [phone]   Fax: [phone]  _________________________________________________________________    Clinical History  Abscess, left foot osteomyelitis, right metatarsal  Incision and drainage  Wound debridement, left foot partial first metatarsal amputation    Specimen(s) Submitted  1-Left first metatarsal  2-Clean margin left foot    Gross Description  1.  The specimen is received in formalin and the specimen  container is labeled: Left first metatarsal.  It consists of  multiple fragments of pink red focally hemorrhagic bones and soft  tissue measuring 5 x 4.5 x 2 cm in aggregate.  Representative  sections are submitted in three cassettes after fixation and  decalcification.    2.   The specimen is received in formalin and the specimen  container is labeled: Clean margin left foot.  It consists of two  fragments of tan bones measuring 0.5 x 0.4 x 0.2 cm and 1 x 0.6 x  0.2 cm in greatest dimension.  Entirely submitted.  One cassette  after fixation and decalcification.    In addition to other data that may appear on the specimen  containers, all labels have been inspected to confirm the  presence of the patient's name and date of birth.  Cesar Rea 06/10/2020 10:29    PAST MEDICAL & SURGICAL HISTORY:  PVD (peripheral vascular disease)  CAD (coronary artery disease)  CHF (congestive heart failure)  HTN (hypertension)  DM (diabetes mellitus)  Status post coronary artery stent placement  H/O: hysterectomy  Cardiac defibrillator in place  Artificial cardiac pacemaker  History of cholecystectomy  MEDICATIONS  (STANDING):  atorvastatin 40 milliGRAM(s) Oral at bedtime  Dakins Solution - 1/4 Strength 1 Application(s) Topical daily  doxycycline IVPB 100 milliGRAM(s) IV Intermittent every 12 hours  furosemide    Tablet 20 milliGRAM(s) Oral daily  gabapentin 100 milliGRAM(s) Oral every 12 hours  heparin  Infusion. 800 Unit(s)/Hr (8 mL/Hr) IV Continuous <Continuous>  hydrALAZINE 75 milliGRAM(s) Oral every 8 hours  insulin glargine Injectable (LANTUS) 10 Unit(s) SubCutaneous at bedtime  insulin lispro (HumaLOG) corrective regimen sliding scale   SubCutaneous Before meals and at bedtime  lactobacillus acidophilus 1 Tablet(s) Oral three times a day with meals  lisinopril 40 milliGRAM(s) Oral daily  melatonin 5 milliGRAM(s) Oral at bedtime  meropenem  IVPB 1000 milliGRAM(s) IV Intermittent every 8 hours  metoprolol succinate ER 50 milliGRAM(s) Oral daily  pantoprazole  Injectable 40 milliGRAM(s) IV Push daily    MEDICATIONS  (PRN):  acetaminophen   Tablet .. 650 milliGRAM(s) Oral every 6 hours PRN Mild Pain (1 - 3), Moderate Pain (4 - 6)  acetaminophen  IVPB .. 1000 milliGRAM(s) IV Intermittent once PRN Severe Pain (7 - 10)  aluminum hydroxide/magnesium hydroxide/simethicone Suspension 30 milliLiter(s) Oral every 6 hours PRN Dyspepsia  diphenhydrAMINE   Injectable 15 milliGRAM(s) IV Push every 6 hours PRN nausea or vomiting  heparin   Injectable 5500 Unit(s) IV Push every 6 hours PRN For aPTT less than 40  heparin   Injectable 2500 Unit(s) IV Push every 6 hours PRN For aPTT between 40 - 57  metoprolol tartrate Injectable 5 milliGRAM(s) IV Push every 6 hours PRN HR greater than 120  ondansetron Injectable 4 milliGRAM(s) IV Push every 8 hours PRN Nausea and/or Vomiting  traMADol 25 milliGRAM(s) Oral every 6 hours PRN Severe Pain (7 - 10)    Allergies    codeine (Rash)  penicillin (Rash)    Intolerances        pt  seen and evaluated at bedside , reports nausea is improving  , feeling better today ,  AAOX3  reports throbbing pain in her foot ,  tolerated exam and dressing change very well    exam focused LE   left foot s/p I&D of abscess and 1first met partial amputation      wound packed with gauze strip, there mild-dark  sanguinous drainage , no odor   CRF -<5sec   DP/PT -0- out of 4   TG -WNL   CRF -1-3 sec x4   the skin is slightly discolored   there is  erythema ascending to the level of the midfoot  , and plantar submet 1 head-looks more wide spread , the area is slightly tender ,   pt is able to move her lower extremities

## 2020-06-11 NOTE — CHART NOTE - NSCHARTNOTEFT_GEN_A_CORE
Reassessed pt at bedside. Pt reports pain managed effectively with current pain regimen. Nausea and vomiting resolved. Pt was able to eat lunch and tolerate with no n/v. Pt currently resting comfortably. Questions/ concerns addressed. Of note, /76 hydralazine 5mg IV x1 ordered per primary team.   Vital Signs Last 24 Hrs  T(C): 36.8 (11 Jun 2020 15:15), Max: 36.9 (10 Uli 2020 19:17)  T(F): 98.3 (11 Jun 2020 15:15), Max: 98.5 (10 Uli 2020 19:17)  HR: 80 (11 Jun 2020 15:15) (76 - 88)  BP: 193/76 (11 Jun 2020 15:15) (170/70 - 193/76)  BP(mean): --  RR: 18 (11 Jun 2020 15:15) (18 - 19)  SpO2: 96% (11 Jun 2020 15:15) (96% - 100%)

## 2020-06-12 LAB
ALBUMIN SERPL ELPH-MCNC: 2 G/DL — LOW (ref 3.5–5)
ALP SERPL-CCNC: 333 U/L — HIGH (ref 40–120)
ALT FLD-CCNC: 25 U/L DA — SIGNIFICANT CHANGE UP (ref 10–60)
ANION GAP SERPL CALC-SCNC: 9 MMOL/L — SIGNIFICANT CHANGE UP (ref 5–17)
APTT BLD: 169.2 SEC — CRITICAL HIGH (ref 27.5–36.3)
APTT BLD: 82.9 SEC — HIGH (ref 27.5–36.3)
APTT BLD: >200 SEC — CRITICAL HIGH (ref 27.5–36.3)
AST SERPL-CCNC: 20 U/L — SIGNIFICANT CHANGE UP (ref 10–40)
BASOPHILS # BLD AUTO: 0.04 K/UL — SIGNIFICANT CHANGE UP (ref 0–0.2)
BASOPHILS NFR BLD AUTO: 0.3 % — SIGNIFICANT CHANGE UP (ref 0–2)
BILIRUB SERPL-MCNC: 0.6 MG/DL — SIGNIFICANT CHANGE UP (ref 0.2–1.2)
BUN SERPL-MCNC: 20 MG/DL — HIGH (ref 7–18)
CALCIUM SERPL-MCNC: 8.5 MG/DL — SIGNIFICANT CHANGE UP (ref 8.4–10.5)
CHLORIDE SERPL-SCNC: 91 MMOL/L — LOW (ref 96–108)
CO2 SERPL-SCNC: 30 MMOL/L — SIGNIFICANT CHANGE UP (ref 22–31)
CREAT SERPL-MCNC: 0.97 MG/DL — SIGNIFICANT CHANGE UP (ref 0.5–1.3)
EOSINOPHIL # BLD AUTO: 0.55 K/UL — HIGH (ref 0–0.5)
EOSINOPHIL NFR BLD AUTO: 4.4 % — SIGNIFICANT CHANGE UP (ref 0–6)
GLUCOSE BLDC GLUCOMTR-MCNC: 144 MG/DL — HIGH (ref 70–99)
GLUCOSE BLDC GLUCOMTR-MCNC: 145 MG/DL — HIGH (ref 70–99)
GLUCOSE BLDC GLUCOMTR-MCNC: 158 MG/DL — HIGH (ref 70–99)
GLUCOSE BLDC GLUCOMTR-MCNC: 176 MG/DL — HIGH (ref 70–99)
GLUCOSE BLDC GLUCOMTR-MCNC: 198 MG/DL — HIGH (ref 70–99)
GLUCOSE SERPL-MCNC: 118 MG/DL — HIGH (ref 70–99)
HCT VFR BLD CALC: 29.3 % — LOW (ref 34.5–45)
HCT VFR BLD CALC: 30.5 % — LOW (ref 34.5–45)
HGB BLD-MCNC: 9.5 G/DL — LOW (ref 11.5–15.5)
HGB BLD-MCNC: 9.7 G/DL — LOW (ref 11.5–15.5)
IMM GRANULOCYTES NFR BLD AUTO: 1.2 % — SIGNIFICANT CHANGE UP (ref 0–1.5)
LYMPHOCYTES # BLD AUTO: 2.53 K/UL — SIGNIFICANT CHANGE UP (ref 1–3.3)
LYMPHOCYTES # BLD AUTO: 20.3 % — SIGNIFICANT CHANGE UP (ref 13–44)
MAGNESIUM SERPL-MCNC: 2.1 MG/DL — SIGNIFICANT CHANGE UP (ref 1.6–2.6)
MCHC RBC-ENTMCNC: 28.4 PG — SIGNIFICANT CHANGE UP (ref 27–34)
MCHC RBC-ENTMCNC: 28.4 PG — SIGNIFICANT CHANGE UP (ref 27–34)
MCHC RBC-ENTMCNC: 31.8 GM/DL — LOW (ref 32–36)
MCHC RBC-ENTMCNC: 32.4 GM/DL — SIGNIFICANT CHANGE UP (ref 32–36)
MCV RBC AUTO: 87.7 FL — SIGNIFICANT CHANGE UP (ref 80–100)
MCV RBC AUTO: 89.2 FL — SIGNIFICANT CHANGE UP (ref 80–100)
MONOCYTES # BLD AUTO: 1.22 K/UL — HIGH (ref 0–0.9)
MONOCYTES NFR BLD AUTO: 9.8 % — SIGNIFICANT CHANGE UP (ref 2–14)
NEUTROPHILS # BLD AUTO: 7.96 K/UL — HIGH (ref 1.8–7.4)
NEUTROPHILS NFR BLD AUTO: 64 % — SIGNIFICANT CHANGE UP (ref 43–77)
NRBC # BLD: 0 /100 WBCS — SIGNIFICANT CHANGE UP (ref 0–0)
NRBC # BLD: 0 /100 WBCS — SIGNIFICANT CHANGE UP (ref 0–0)
PHOSPHATE SERPL-MCNC: 3.1 MG/DL — SIGNIFICANT CHANGE UP (ref 2.5–4.5)
PLATELET # BLD AUTO: 332 K/UL — SIGNIFICANT CHANGE UP (ref 150–400)
PLATELET # BLD AUTO: 338 K/UL — SIGNIFICANT CHANGE UP (ref 150–400)
POTASSIUM SERPL-MCNC: 4.1 MMOL/L — SIGNIFICANT CHANGE UP (ref 3.5–5.3)
POTASSIUM SERPL-SCNC: 4.1 MMOL/L — SIGNIFICANT CHANGE UP (ref 3.5–5.3)
PROCALCITONIN SERPL-MCNC: 0.1 NG/ML — SIGNIFICANT CHANGE UP (ref 0.02–0.1)
PROT SERPL-MCNC: 6.7 G/DL — SIGNIFICANT CHANGE UP (ref 6–8.3)
RBC # BLD: 3.34 M/UL — LOW (ref 3.8–5.2)
RBC # BLD: 3.42 M/UL — LOW (ref 3.8–5.2)
RBC # FLD: 15.1 % — HIGH (ref 10.3–14.5)
RBC # FLD: 15.4 % — HIGH (ref 10.3–14.5)
SODIUM SERPL-SCNC: 130 MMOL/L — LOW (ref 135–145)
WBC # BLD: 12.45 K/UL — HIGH (ref 3.8–10.5)
WBC # BLD: 13.52 K/UL — HIGH (ref 3.8–10.5)
WBC # FLD AUTO: 12.45 K/UL — HIGH (ref 3.8–10.5)
WBC # FLD AUTO: 13.52 K/UL — HIGH (ref 3.8–10.5)

## 2020-06-12 PROCEDURE — 99232 SBSQ HOSP IP/OBS MODERATE 35: CPT

## 2020-06-12 RX ORDER — HYDRALAZINE HCL 50 MG
100 TABLET ORAL THREE TIMES A DAY
Refills: 0 | Status: DISCONTINUED | OUTPATIENT
Start: 2020-06-12 | End: 2020-06-19

## 2020-06-12 RX ORDER — HEPARIN SODIUM 5000 [USP'U]/ML
800 INJECTION INTRAVENOUS; SUBCUTANEOUS
Qty: 25000 | Refills: 0 | Status: DISCONTINUED | OUTPATIENT
Start: 2020-06-12 | End: 2020-06-16

## 2020-06-12 RX ORDER — CEFTRIAXONE 500 MG/1
2000 INJECTION, POWDER, FOR SOLUTION INTRAMUSCULAR; INTRAVENOUS EVERY 24 HOURS
Refills: 0 | Status: COMPLETED | OUTPATIENT
Start: 2020-06-13 | End: 2020-06-19

## 2020-06-12 RX ORDER — ACETAMINOPHEN 500 MG
1000 TABLET ORAL ONCE
Refills: 0 | Status: COMPLETED | OUTPATIENT
Start: 2020-06-12 | End: 2020-06-12

## 2020-06-12 RX ORDER — CEFTRIAXONE 500 MG/1
1000 INJECTION, POWDER, FOR SOLUTION INTRAMUSCULAR; INTRAVENOUS EVERY 24 HOURS
Refills: 0 | Status: DISCONTINUED | OUTPATIENT
Start: 2020-06-12 | End: 2020-06-12

## 2020-06-12 RX ADMIN — Medication 400 MILLIGRAM(S): at 12:18

## 2020-06-12 RX ADMIN — Medication 1 TABLET(S): at 12:20

## 2020-06-12 RX ADMIN — MEROPENEM 100 MILLIGRAM(S): 1 INJECTION INTRAVENOUS at 05:26

## 2020-06-12 RX ADMIN — INSULIN GLARGINE 10 UNIT(S): 100 INJECTION, SOLUTION SUBCUTANEOUS at 23:28

## 2020-06-12 RX ADMIN — GABAPENTIN 100 MILLIGRAM(S): 400 CAPSULE ORAL at 18:00

## 2020-06-12 RX ADMIN — HEPARIN SODIUM 800 UNIT(S)/HR: 5000 INJECTION INTRAVENOUS; SUBCUTANEOUS at 23:25

## 2020-06-12 RX ADMIN — Medication 1: at 18:00

## 2020-06-12 RX ADMIN — MEROPENEM 100 MILLIGRAM(S): 1 INJECTION INTRAVENOUS at 15:57

## 2020-06-12 RX ADMIN — Medication 1 TABLET(S): at 18:00

## 2020-06-12 RX ADMIN — Medication 5 MILLIGRAM(S): at 23:26

## 2020-06-12 RX ADMIN — Medication 50 MILLIGRAM(S): at 05:35

## 2020-06-12 RX ADMIN — Medication 20 MILLIGRAM(S): at 05:35

## 2020-06-12 RX ADMIN — PANTOPRAZOLE SODIUM 40 MILLIGRAM(S): 20 TABLET, DELAYED RELEASE ORAL at 12:20

## 2020-06-12 RX ADMIN — Medication 400 MILLIGRAM(S): at 19:37

## 2020-06-12 RX ADMIN — Medication 1 TABLET(S): at 07:56

## 2020-06-12 RX ADMIN — TRAMADOL HYDROCHLORIDE 25 MILLIGRAM(S): 50 TABLET ORAL at 23:24

## 2020-06-12 RX ADMIN — HEPARIN SODIUM 1000 UNIT(S)/HR: 5000 INJECTION INTRAVENOUS; SUBCUTANEOUS at 05:30

## 2020-06-12 RX ADMIN — HEPARIN SODIUM 800 UNIT(S)/HR: 5000 INJECTION INTRAVENOUS; SUBCUTANEOUS at 16:22

## 2020-06-12 RX ADMIN — Medication 1: at 21:59

## 2020-06-12 RX ADMIN — Medication 110 MILLIGRAM(S): at 17:48

## 2020-06-12 RX ADMIN — LISINOPRIL 40 MILLIGRAM(S): 2.5 TABLET ORAL at 05:35

## 2020-06-12 RX ADMIN — Medication 110 MILLIGRAM(S): at 06:25

## 2020-06-12 RX ADMIN — HEPARIN SODIUM 0 UNIT(S)/HR: 5000 INJECTION INTRAVENOUS; SUBCUTANEOUS at 04:29

## 2020-06-12 RX ADMIN — TRAMADOL HYDROCHLORIDE 25 MILLIGRAM(S): 50 TABLET ORAL at 09:21

## 2020-06-12 RX ADMIN — Medication 100 MILLIGRAM(S): at 23:24

## 2020-06-12 RX ADMIN — ATORVASTATIN CALCIUM 40 MILLIGRAM(S): 80 TABLET, FILM COATED ORAL at 23:24

## 2020-06-12 RX ADMIN — CEFTRIAXONE 100 MILLIGRAM(S): 500 INJECTION, POWDER, FOR SOLUTION INTRAMUSCULAR; INTRAVENOUS at 17:46

## 2020-06-12 RX ADMIN — Medication 100 MILLIGRAM(S): at 15:57

## 2020-06-12 RX ADMIN — Medication 75 MILLIGRAM(S): at 05:35

## 2020-06-12 RX ADMIN — GABAPENTIN 100 MILLIGRAM(S): 400 CAPSULE ORAL at 05:35

## 2020-06-12 NOTE — PHYSICAL THERAPY INITIAL EVALUATION ADULT - GAIT DEVIATIONS NOTED, PT EVAL
decreased step length/decreased dav/decreased weight-shifting ability/decreased velocity of limb motion/decreased stride length

## 2020-06-12 NOTE — PHYSICAL THERAPY INITIAL EVALUATION ADULT - GENERAL OBSERVATIONS, REHAB EVAL
pt found seated EOB in NAD +IV +gauze and ACE wrap on RLE
Pt received supine in bed, c/o nausea/dizziness/ab pain, agreeable to attempt PT. Pt A&Ox4.

## 2020-06-12 NOTE — PHYSICAL THERAPY INITIAL EVALUATION ADULT - DIAGNOSIS, PT EVAL
At this time, pt presents with decreased strength and nausea/abdominal pain limiting mobility
pt demonstrates decreased strength and decreased mobility of LLE

## 2020-06-12 NOTE — PROGRESS NOTE ADULT - PROBLEM SELECTOR PLAN 6
Pt takes Lisinopril 40mg, toprol Xl 100mg, hydralazine 50mg TID   BP is running on higher side .  lipid profile, TG WNL  -increased dose of hydralazine

## 2020-06-12 NOTE — PHYSICAL THERAPY INITIAL EVALUATION ADULT - CRITERIA FOR SKILLED THERAPEUTIC INTERVENTIONS
therapy frequency/anticipated discharge recommendation/rehab potential/predicted duration of therapy intervention/functional limitations in following categories/risk reduction/prevention/impairments found
rehab potential/functional limitations in following categories/risk reduction/prevention/therapy frequency/impairments found/predicted duration of therapy intervention

## 2020-06-12 NOTE — CHART NOTE - NSCHARTNOTEFT_GEN_A_CORE
Assessment:       Nutrition reassessment for follow-up. Chart reviewed, pt visited, feeling better, well-communicated; GI consult for intractable nausea/vomiting, due to vertigo from acute stroke per MD; s/p stroke, swallow evaluation with soft food, thin liquid recommended 6/10/2020; Pending discharge planning to skilled nursing facility for rehab when medically ready     Factors impacting intake: [ ] none [ ] nausea  [ ] vomiting [ ] diarrhea [ ] constipation  [ ]chewing problems [ ] swallowing issues  [ X ] other: altered GI function with acute gastritis, intractable nausea/vomiting--resolving     Diet Prescription: Diet, Soft:   Consistent Carbohydrate {Evening Snacks}  DASH/TLC {Sodium & Cholesterol Restricted}  Supplement Feeding Modality:  Oral  Glucerna Shake Cans or Servings Per Day:  1       Frequency:  Three Times a day (20 @ 16:36)    Intake: tolerating better solid meals today, 50% intake at breakfast observed, " Service Recovery" provided to meet food choices, no GI distress, no swallowing/chewing problem reported at present per pt, Pt also seen 2020 for food choices updated and forwarded to dietary,  taking and tolerating Glucerna Shake per pt    Daily Weight in k.1 (2020 04:48)  Weight in k.8 (2020 04:36)  Weight in k.5 (10 Uli 2020 04:41)  Weight in k.1 (2020 04:03)  Weight in k.7 (2020 04:45)    % Weight Change: fluctuated in-house may due to scale/fluid variance       Pertinent Medications: MEDICATIONS  (STANDING):  atorvastatin 40 milliGRAM(s) Oral at bedtime  Dakins Solution - 1/4 Strength 1 Application(s) Topical daily  doxycycline IVPB 100 milliGRAM(s) IV Intermittent every 12 hours  furosemide    Tablet 20 milliGRAM(s) Oral daily  gabapentin 100 milliGRAM(s) Oral every 12 hours  heparin  Infusion. 1200 Unit(s)/Hr (12 mL/Hr) IV Continuous <Continuous>  hydrALAZINE 100 milliGRAM(s) Oral three times a day  insulin glargine Injectable (LANTUS) 10 Unit(s) SubCutaneous at bedtime  insulin lispro (HumaLOG) corrective regimen sliding scale   SubCutaneous Before meals and at bedtime  lactobacillus acidophilus 1 Tablet(s) Oral three times a day with meals  lisinopril 40 milliGRAM(s) Oral daily  melatonin 5 milliGRAM(s) Oral at bedtime  meropenem  IVPB 1000 milliGRAM(s) IV Intermittent every 8 hours  metoprolol succinate ER 50 milliGRAM(s) Oral daily  pantoprazole  Injectable 40 milliGRAM(s) IV Push daily    MEDICATIONS  (PRN):  acetaminophen   Tablet .. 650 milliGRAM(s) Oral every 6 hours PRN Mild Pain (1 - 3), Moderate Pain (4 - 6)  acetaminophen  IVPB .. 1000 milliGRAM(s) IV Intermittent once PRN Severe Pain (7 - 10)  aluminum hydroxide/magnesium hydroxide/simethicone Suspension 30 milliLiter(s) Oral every 6 hours PRN Dyspepsia  diphenhydrAMINE   Injectable 15 milliGRAM(s) IV Push every 6 hours PRN nausea or vomiting  metoprolol tartrate Injectable 5 milliGRAM(s) IV Push every 6 hours PRN HR greater than 120  ondansetron Injectable 4 milliGRAM(s) IV Push every 8 hours PRN Nausea and/or Vomiting  traMADol 25 milliGRAM(s) Oral every 6 hours PRN Severe Pain (7 - 10)    Pertinent Labs:  Na130 mmol/L<L> Glu 118 mg/dL<H> K+ 4.1 mmol/L Cr  0.97 mg/dL BUN 20 mg/dL<H> 12 Phos 3.1 mg/dL  Alb 2.0 g/dL<L> 05-31 Chol 169 mg/dL LDL 93 mg/dL HDL 59 mg/dL Trig 87 mg/dL     CAPILLARY BLOOD GLUCOSE    POCT Blood Glucose.: 144 mg/dL (2020 07:51)  POCT Blood Glucose.: 152 mg/dL (2020 20:56)  POCT Blood Glucose.: 90 mg/dL (2020 16:35)  POCT Blood Glucose.: 108 mg/dL (2020 11:32)    Skin: skin wounds     Estimated Needs:   [ X ] no change since previous assessment  [ ] recalculated:     Previous Nutrition Diagnosis:   [ X ] Malnutrition ( SEVERE )    Nutrition Diagnosis is [ X ] ongoing  [ X ] Improving   [ ] resolved [ ] not applicable     New Nutrition Diagnosis: [ X ] not applicable       Interventions:   Recommend  [ ] Change Diet To:  [ X ] Nutrition Supplement: Continue Glucerna Shake 1can tid as ordered (660kcal, 30g protein)   [ ] Nutrition Support  [ X ] Other: Discussed with RN                   Provide food choices within diet Rx as available/updated                    Pt to be followed by dietitian at skilled nursing facility when medically ready to be discharged     Monitoring and Evaluation:   [ X ] PO intake [ x ] Tolerance to diet prescription [ x ] weights [ x ] labs[ x ] follow up per protocol  [ ] other:

## 2020-06-12 NOTE — PROGRESS NOTE ADULT - PROBLEM SELECTOR PLAN 1
CTA head/neck showed LEFT PICA stroke  -will switch to oral anticoagulation after EGD  Neuro Dr. Moncada

## 2020-06-12 NOTE — PHYSICAL THERAPY INITIAL EVALUATION ADULT - PLANNED THERAPY INTERVENTIONS, PT EVAL
transfer training/balance training/bed mobility training/gait training/strengthening
ROM/strengthening/transfer training/gait training

## 2020-06-12 NOTE — PROGRESS NOTE ADULT - PROBLEM SELECTOR PLAN 4
patient endorses moderate to severe abdominal pain, n/v since 5/29 Fri  partially relieved with oral Protonix  UA negative, No fevers, UCx: -ve  More likely GI etiology  CT abd w/ IV cont: Small bilateral pleural effusions. Multiple nodular peripheral filling defect in the urinary bladder measuring up to 1.3 cm, suspicious for transitional cell carcinomas.  EGD on monday  -. Giovani

## 2020-06-12 NOTE — PHYSICAL THERAPY INITIAL EVALUATION ADULT - MANUAL MUSCLE TESTING RESULTS, REHAB EVAL
grossly assessed due to/overall strength grossly 4-/5, affected by nausea/abdominal pain
BUE 4/5 BLE 3+/5

## 2020-06-12 NOTE — PROGRESS NOTE ADULT - ASSESSMENT
A/P  PVD /PAD  DM  ulcer   dehiscence surgical wound   history of gangrene left hallux   positive probe to bone   leukocytosis -trending down  today possibly reactive after surgery   OM with  abscess  left -P/O day 3  pyogenic tendon       pt seen and eval   chart review  s/p angiogram -pt is very high risk for none healing  due to very poor vascular status as per Dr. Grove   Ms Keating and her daughter want to see if the wound will improve before discussing BKA  pt is at risk of hematoma formation/bleeding due to heparin drip   continue antibiotics as per id -id    dressing change : very light packing   2 more sutures tight   post up xray reviewed  pathology reviewed    keep foot slightly elevated   no weight bearing  -due to risk of bleeding

## 2020-06-12 NOTE — PROVIDER CONTACT NOTE (CRITICAL VALUE NOTIFICATION) - ACTION/TREATMENT ORDERED:
Heparin infusion stopped
Followed nomogram. Hold Heparin drip for 60 min. And will restart at 13ml /hr.
MD notified

## 2020-06-12 NOTE — PROGRESS NOTE ADULT - SUBJECTIVE AND OBJECTIVE BOX
Subjective:       Objective:    MEDICATIONS  (STANDING):  atorvastatin 40 milliGRAM(s) Oral at bedtime  Dakins Solution - 1/4 Strength 1 Application(s) Topical daily  doxycycline IVPB 100 milliGRAM(s) IV Intermittent every 12 hours  furosemide    Tablet 20 milliGRAM(s) Oral daily  gabapentin 100 milliGRAM(s) Oral every 12 hours  heparin  Infusion. 1200 Unit(s)/Hr (12 mL/Hr) IV Continuous <Continuous>  hydrALAZINE 100 milliGRAM(s) Oral three times a day  insulin glargine Injectable (LANTUS) 10 Unit(s) SubCutaneous at bedtime  insulin lispro (HumaLOG) corrective regimen sliding scale   SubCutaneous Before meals and at bedtime  lactobacillus acidophilus 1 Tablet(s) Oral three times a day with meals  lisinopril 40 milliGRAM(s) Oral daily  melatonin 5 milliGRAM(s) Oral at bedtime  meropenem  IVPB 1000 milliGRAM(s) IV Intermittent every 8 hours  metoprolol succinate ER 50 milliGRAM(s) Oral daily  pantoprazole  Injectable 40 milliGRAM(s) IV Push daily    MEDICATIONS  (PRN):  acetaminophen   Tablet .. 650 milliGRAM(s) Oral every 6 hours PRN Mild Pain (1 - 3), Moderate Pain (4 - 6)  aluminum hydroxide/magnesium hydroxide/simethicone Suspension 30 milliLiter(s) Oral every 6 hours PRN Dyspepsia  diphenhydrAMINE   Injectable 15 milliGRAM(s) IV Push every 6 hours PRN nausea or vomiting  metoprolol tartrate Injectable 5 milliGRAM(s) IV Push every 6 hours PRN HR greater than 120  ondansetron Injectable 4 milliGRAM(s) IV Push every 8 hours PRN Nausea and/or Vomiting  traMADol 25 milliGRAM(s) Oral every 6 hours PRN Severe Pain (7 - 10)              Vital Signs Last 24 Hrs  T(C): 36.8 (12 Jun 2020 07:25), Max: 37 (11 Jun 2020 23:10)  T(F): 98.2 (12 Jun 2020 07:25), Max: 98.6 (11 Jun 2020 23:10)  HR: 70 (12 Jun 2020 07:25) (70 - 90)  BP: 165/65 (12 Jun 2020 07:25) (139/91 - 193/76)  BP(mean): --  RR: 18 (12 Jun 2020 07:25) (18 - 19)  SpO2: 100% (12 Jun 2020 07:25) (96% - 100%)      General:  Well developed, well nourished, alert and active, no pallor, NAD.  HEENT:    Normal appearance of conjunctiva, ears, nose, lips, oropharynx, and oral mucosa, anicteric.  Neck:  No masses, no asymmetry.  Lymph Nodes:  No lymphadenopathy.   Cardiovascular:  RRR normal S1/S2, no murmur.  Respiratory:  CTA B/L, normal respiratory effort.   Abdominal:   soft, no masses or tenderness, normoactive BS, NT/ND, no HSM.  Extremities:   No clubbing or cyanosis, normal capillary refill, no edema.   Skin:   No rash, jaundice, lesions, eczema.   Musculoskeletal:  No joint swelling, erythema or tenderness.   Neuro: No focal deficits.   Other:       LABS:                        9.7    12.45 )-----------( 338      ( 12 Jun 2020 07:18 )             30.5     06-12    130<L>  |  91<L>  |  20<H>  ----------------------------<  118<H>  4.1   |  30  |  0.97    Ca    8.5      12 Jun 2020 07:18  Phos  3.1     06-12  Mg     2.1     06-12    TPro  6.7  /  Alb  2.0<L>  /  TBili  0.6  /  DBili  x   /  AST  20  /  ALT  25  /  AlkPhos  333<H>  06-12    PT/INR - ( 11 Jun 2020 21:29 )   PT: 15.0 sec;   INR: 1.32 ratio         PTT - ( 12 Jun 2020 03:56 )  PTT:>200.0 sec      RADIOLOGY & ADDITIONAL TESTS:

## 2020-06-12 NOTE — PHYSICAL THERAPY INITIAL EVALUATION ADULT - ADDITIONAL COMMENTS
Since L great toe amputation pt reports she requires assistance from her daughter for ADLs. Can ambulate with rollator without assistance, however only for short distances.
pt owns RW and wheelchair

## 2020-06-12 NOTE — PROGRESS NOTE ADULT - PROBLEM SELECTOR PLAN 2
H/o PVD and popliteal stent  s/p left big toe amputation in May 2020  - ESTRELLA: RLE:  0.6. LLE:  0.5. Severe bilateral peripheral artery disease. Pulse volume recordings are diffusely diminished bilaterally, however severely diminished in the left foot.  s/p left metatarsal amputation  PT re eval  Vascular Dr. Grove

## 2020-06-12 NOTE — PROGRESS NOTE ADULT - ASSESSMENT
Nausea vomiting   Will plan for EGD Monday   Continue IV heparin   Repeat COVID Sunday   Hold Heparin Monday morning at 4 am   I was physically present for the key portions of the evaluation and management (E/M) service provided.  The patient was personally seen and examined at bedside.    Thank you for your consultation and allowing  me to participate in the care of your patients. If you have further questions please contact me at [phone].     Quintin Madrigal M.D.       _________________________________________________________________________________________________  [facility]  [address] [place] [address]  Office: [phone]    Tremaine Culp PA-C  ___________________________________________________________________________________________________

## 2020-06-12 NOTE — PHYSICAL THERAPY INITIAL EVALUATION ADULT - IMPAIRMENTS FOUND, PT EVAL
gross motor/gait, locomotion, and balance/muscle strength
gait, locomotion, and balance/ROM/muscle strength/posture

## 2020-06-12 NOTE — PROGRESS NOTE ADULT - SUBJECTIVE AND OBJECTIVE BOX
PGY 1 Note discussed with supervising resident and primary attending    Patient is a 74y old  Female who presents with a chief complaint of Intractable nausea and vomiting (11 Jun 2020 21:17)      INTERVAL HPI/OVERNIGHT EVENTS:   Patient seen and examined at bed side , no new complains pain improved, nausea improved.  -EGD on monday .  -Covid on sunday  MEDICATIONS  (STANDING):  atorvastatin 40 milliGRAM(s) Oral at bedtime  Dakins Solution - 1/4 Strength 1 Application(s) Topical daily  doxycycline IVPB 100 milliGRAM(s) IV Intermittent every 12 hours  furosemide    Tablet 20 milliGRAM(s) Oral daily  gabapentin 100 milliGRAM(s) Oral every 12 hours  heparin  Infusion. 1200 Unit(s)/Hr (12 mL/Hr) IV Continuous <Continuous>  hydrALAZINE 100 milliGRAM(s) Oral three times a day  insulin glargine Injectable (LANTUS) 10 Unit(s) SubCutaneous at bedtime  insulin lispro (HumaLOG) corrective regimen sliding scale   SubCutaneous Before meals and at bedtime  lactobacillus acidophilus 1 Tablet(s) Oral three times a day with meals  lisinopril 40 milliGRAM(s) Oral daily  melatonin 5 milliGRAM(s) Oral at bedtime  meropenem  IVPB 1000 milliGRAM(s) IV Intermittent every 8 hours  metoprolol succinate ER 50 milliGRAM(s) Oral daily  pantoprazole  Injectable 40 milliGRAM(s) IV Push daily    MEDICATIONS  (PRN):  acetaminophen   Tablet .. 650 milliGRAM(s) Oral every 6 hours PRN Mild Pain (1 - 3), Moderate Pain (4 - 6)  acetaminophen  IVPB .. 1000 milliGRAM(s) IV Intermittent once PRN Severe Pain (7 - 10)  aluminum hydroxide/magnesium hydroxide/simethicone Suspension 30 milliLiter(s) Oral every 6 hours PRN Dyspepsia  diphenhydrAMINE   Injectable 15 milliGRAM(s) IV Push every 6 hours PRN nausea or vomiting  metoprolol tartrate Injectable 5 milliGRAM(s) IV Push every 6 hours PRN HR greater than 120  ondansetron Injectable 4 milliGRAM(s) IV Push every 8 hours PRN Nausea and/or Vomiting  traMADol 25 milliGRAM(s) Oral every 6 hours PRN Severe Pain (7 - 10)      __________________________________________________  REVIEW OF SYSTEMS:    CONSTITUTIONAL: No fever,   EYES: no acute visual disturbances  NECK: No pain or stiffness  RESPIRATORY: No cough; No shortness of breath  CARDIOVASCULAR: No chest pain, no palpitations  GASTROINTESTINAL: No pain. No nausea or vomiting; No diarrhea   NEUROLOGICAL: No headache or numbness, no tremors  MUSCULOSKELETAL: No joint pain, no muscle pain  GENITOURINARY: no dysuria, no frequency, no hesitancy  PSYCHIATRY: no depression , no anxiety  ALL OTHER  ROS negative        Vital Signs Last 24 Hrs  T(C): 36.8 (12 Jun 2020 07:25), Max: 37 (11 Jun 2020 23:10)  T(F): 98.2 (12 Jun 2020 07:25), Max: 98.6 (11 Jun 2020 23:10)  HR: 70 (12 Jun 2020 07:25) (70 - 90)  BP: 165/65 (12 Jun 2020 07:25) (139/91 - 193/76)  BP(mean): --  RR: 18 (12 Jun 2020 07:25) (18 - 19)  SpO2: 100% (12 Jun 2020 07:25) (96% - 100%)    ________________________________________________  PHYSICAL EXAM:  GENERAL: elderly female  HEENT: Normocephalic;  conjunctivae and sclerae clear; moist mucous membranes;   NECK : supple  CHEST/LUNG: Clear to auscultation bilaterally with good air entry   HEART: S1 S2  regular; no murmurs, gallops or rubs  ABDOMEN: Soft, Nontender, Nondistended; Bowel sounds present  EXTREMITIES: amputated left metatarsal  SKIN: warm and dry; no rash  NERVOUS SYSTEM:  Awake and alert; Oriented  to place, person and time ; no new deficits    _________________________________________________  LABS:                        9.7    12.45 )-----------( 338      ( 12 Jun 2020 07:18 )             30.5     06-12    130<L>  |  91<L>  |  20<H>  ----------------------------<  118<H>  4.1   |  30  |  0.97    Ca    8.5      12 Jun 2020 07:18  Phos  3.1     06-12  Mg     2.1     06-12    TPro  6.7  /  Alb  2.0<L>  /  TBili  0.6  /  DBili  x   /  AST  20  /  ALT  25  /  AlkPhos  333<H>  06-12    PT/INR - ( 11 Jun 2020 21:29 )   PT: 15.0 sec;   INR: 1.32 ratio         PTT - ( 12 Jun 2020 03:56 )  PTT:>200.0 sec    CAPILLARY BLOOD GLUCOSE      POCT Blood Glucose.: 144 mg/dL (12 Jun 2020 07:51)  POCT Blood Glucose.: 152 mg/dL (11 Jun 2020 20:56)  POCT Blood Glucose.: 90 mg/dL (11 Jun 2020 16:35)  POCT Blood Glucose.: 108 mg/dL (11 Jun 2020 11:32)        RADIOLOGY & ADDITIONAL TESTS:    Imaging Personally Reviewed:  YES/NO    Consultant(s) Notes Reviewed:   YES/ No    Care Discussed with Consultants :     Plan of care was discussed with patient and /or primary care giver; all questions and concerns were addressed and care was aligned with patient's wishes.

## 2020-06-12 NOTE — PROGRESS NOTE ADULT - PROBLEM SELECTOR PLAN 9
H/o CHF but clinically patient is not in fluid overload  No peripheral edema  Patient takes furosemide 20mg daily twice  BNP: elevated at 9383  echo: EF 50-55%, mild TR, NH  - holding lasix 20mg IV as pt is not eating and dehydrated  - c/w statin and BB

## 2020-06-12 NOTE — PROGRESS NOTE ADULT - SUBJECTIVE AND OBJECTIVE BOX
Patient denies chest pain or shortness of breath.  Nauseaus again this AM   Review of systems otherwise (-)  	  acetaminophen   Tablet .. 650 milliGRAM(s) Oral every 6 hours PRN  aluminum hydroxide/magnesium hydroxide/simethicone Suspension 30 milliLiter(s) Oral every 6 hours PRN  atorvastatin 40 milliGRAM(s) Oral at bedtime  Dakins Solution - 1/4 Strength 1 Application(s) Topical daily  diphenhydrAMINE   Injectable 15 milliGRAM(s) IV Push every 6 hours PRN  doxycycline IVPB 100 milliGRAM(s) IV Intermittent every 12 hours  furosemide    Tablet 20 milliGRAM(s) Oral daily  gabapentin 100 milliGRAM(s) Oral every 12 hours  heparin  Infusion. 1200 Unit(s)/Hr IV Continuous <Continuous>  hydrALAZINE 100 milliGRAM(s) Oral three times a day  insulin glargine Injectable (LANTUS) 10 Unit(s) SubCutaneous at bedtime  insulin lispro (HumaLOG) corrective regimen sliding scale   SubCutaneous Before meals and at bedtime  lactobacillus acidophilus 1 Tablet(s) Oral three times a day with meals  lisinopril 40 milliGRAM(s) Oral daily  melatonin 5 milliGRAM(s) Oral at bedtime  meropenem  IVPB 1000 milliGRAM(s) IV Intermittent every 8 hours  metoprolol succinate ER 50 milliGRAM(s) Oral daily  metoprolol tartrate Injectable 5 milliGRAM(s) IV Push every 6 hours PRN  ondansetron Injectable 4 milliGRAM(s) IV Push every 8 hours PRN  pantoprazole  Injectable 40 milliGRAM(s) IV Push daily  traMADol 25 milliGRAM(s) Oral every 6 hours PRN                            9.7    12.45 )-----------( 338      ( 12 Jun 2020 07:18 )             30.5       Hemoglobin: 9.7 g/dL (06-12 @ 07:18)  Hemoglobin: 9.4 g/dL (06-11 @ 07:26)  Hemoglobin: 9.5 g/dL (06-10 @ 18:36)  Hemoglobin: 9.5 g/dL (06-10 @ 07:06)  Hemoglobin: 9.6 g/dL (06-09 @ 06:50)      06-12    130<L>  |  91<L>  |  20<H>  ----------------------------<  118<H>  4.1   |  30  |  0.97    Ca    8.5      12 Jun 2020 07:18  Phos  3.1     06-12  Mg     2.1     06-12    TPro  6.7  /  Alb  2.0<L>  /  TBili  0.6  /  DBili  x   /  AST  20  /  ALT  25  /  AlkPhos  333<H>  06-12    Creatinine Trend: 0.97<--, 0.83<--, 0.84<--, 1.00<--, 1.27<--, 0.98<--    COAGS: PT/INR - ( 11 Jun 2020 21:29 )   PT: 15.0 sec;   INR: 1.32 ratio         PTT - ( 12 Jun 2020 12:44 )  PTT:169.2 sec          T(C): 36.8 (06-12-20 @ 11:02), Max: 37 (06-11-20 @ 23:10)  HR: 80 (06-12-20 @ 11:02) (70 - 90)  BP: 160/69 (06-12-20 @ 11:02) (139/91 - 193/76)  RR: 18 (06-12-20 @ 11:02) (18 - 19)  SpO2: 99% (06-12-20 @ 11:02) (96% - 100%)  Wt(kg): --    I&O's Summary    11 Jun 2020 07:01  -  12 Jun 2020 07:00  --------------------------------------------------------  IN: 196 mL / OUT: 400 mL / NET: -204 mL          HEENT:  (-)icterus (-)pallor  CV: N S1 S2 1/6 JAMAAL (+)2 Pulses B/l  Resp:  Clear to ausculatation B/L, normal effort  GI: (+) BS Soft, NT, ND  Lymph:  (-)Edema, (-)obvious lymphadenopathy  Skin: Warm to touch, Normal turgor  Psych: Appropriate mood and affect          ASSESSMENT/PLAN: 	74y  Female AD/DM/HTN/PVD/hysterectomy/cholecystectomy ,with CHF BIVICD, Mild anterior wall ischemia being medically managed  comes to ED with intractable nausea and vomiting for 2 days 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 bladder malignancy.    - cont Heparin gtt for new afib and CVA  - Interrogate [place] Sci ICD  -  cont toprol XL 50 mg PO Daily  - Tolerated I+D as well as partial amputation by podiatry  - Vasculary f/u regarding completely occluded popliteal artery  - Will need repeat stress prior to any  high risk surgery  - Can D/C tele      Pipo Segundo MD, [facility]  BEEPER [phone] Patient denies chest pain or shortness of breath.  Nauseaus again this AM   Review of systems otherwise (-)  	  acetaminophen   Tablet .. 650 milliGRAM(s) Oral every 6 hours PRN  aluminum hydroxide/magnesium hydroxide/simethicone Suspension 30 milliLiter(s) Oral every 6 hours PRN  atorvastatin 40 milliGRAM(s) Oral at bedtime  Dakins Solution - 1/4 Strength 1 Application(s) Topical daily  diphenhydrAMINE   Injectable 15 milliGRAM(s) IV Push every 6 hours PRN  doxycycline IVPB 100 milliGRAM(s) IV Intermittent every 12 hours  furosemide    Tablet 20 milliGRAM(s) Oral daily  gabapentin 100 milliGRAM(s) Oral every 12 hours  heparin  Infusion. 1200 Unit(s)/Hr IV Continuous <Continuous>  hydrALAZINE 100 milliGRAM(s) Oral three times a day  insulin glargine Injectable (LANTUS) 10 Unit(s) SubCutaneous at bedtime  insulin lispro (HumaLOG) corrective regimen sliding scale   SubCutaneous Before meals and at bedtime  lactobacillus acidophilus 1 Tablet(s) Oral three times a day with meals  lisinopril 40 milliGRAM(s) Oral daily  melatonin 5 milliGRAM(s) Oral at bedtime  meropenem  IVPB 1000 milliGRAM(s) IV Intermittent every 8 hours  metoprolol succinate ER 50 milliGRAM(s) Oral daily  metoprolol tartrate Injectable 5 milliGRAM(s) IV Push every 6 hours PRN  ondansetron Injectable 4 milliGRAM(s) IV Push every 8 hours PRN  pantoprazole  Injectable 40 milliGRAM(s) IV Push daily  traMADol 25 milliGRAM(s) Oral every 6 hours PRN                            9.7    12.45 )-----------( 338      ( 12 Jun 2020 07:18 )             30.5       Hemoglobin: 9.7 g/dL (06-12 @ 07:18)  Hemoglobin: 9.4 g/dL (06-11 @ 07:26)  Hemoglobin: 9.5 g/dL (06-10 @ 18:36)  Hemoglobin: 9.5 g/dL (06-10 @ 07:06)  Hemoglobin: 9.6 g/dL (06-09 @ 06:50)      06-12    130<L>  |  91<L>  |  20<H>  ----------------------------<  118<H>  4.1   |  30  |  0.97    Ca    8.5      12 Jun 2020 07:18  Phos  3.1     06-12  Mg     2.1     06-12    TPro  6.7  /  Alb  2.0<L>  /  TBili  0.6  /  DBili  x   /  AST  20  /  ALT  25  /  AlkPhos  333<H>  06-12    Creatinine Trend: 0.97<--, 0.83<--, 0.84<--, 1.00<--, 1.27<--, 0.98<--    COAGS: PT/INR - ( 11 Jun 2020 21:29 )   PT: 15.0 sec;   INR: 1.32 ratio         PTT - ( 12 Jun 2020 12:44 )  PTT:169.2 sec          T(C): 36.8 (06-12-20 @ 11:02), Max: 37 (06-11-20 @ 23:10)  HR: 80 (06-12-20 @ 11:02) (70 - 90)  BP: 160/69 (06-12-20 @ 11:02) (139/91 - 193/76)  RR: 18 (06-12-20 @ 11:02) (18 - 19)  SpO2: 99% (06-12-20 @ 11:02) (96% - 100%)  Wt(kg): --    I&O's Summary    11 Jun 2020 07:01  -  12 Jun 2020 07:00  --------------------------------------------------------  IN: 196 mL / OUT: 400 mL / NET: -204 mL          HEENT:  (-)icterus (-)pallor  CV: N S1 S2 1/6 JAMAAL (+)2 Pulses B/l  Resp:  Clear to ausculatation B/L, normal effort  GI: (+) BS Soft, NT, ND  Lymph:  (-)Edema, (-)obvious lymphadenopathy  Skin: Warm to touch, Normal turgor  Psych: Appropriate mood and affect          ASSESSMENT/PLAN: 	74y  Female AD/DM/HTN/PVD/hysterectomy/cholecystectomy ,with CHF BIVICD, Mild anterior wall ischemia being medically managed  comes to ED with intractable nausea and vomiting for 2 days 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 bladder malignancy.    - cont Heparin gtt for new afib and CVA  - Interrogate [place] Sci ICD  -  cont toprol XL 50 mg PO Daily  - Tolerated I+D as well as partial amputation by podiatry  - Vasculary f/u regarding completely occluded popliteal artery  - Will need repeat stress prior to any  high risk surgery  - nausea improved  - for EGD monday  - Can D/C tele      Pipo Segundo MD, [facility]  BEEPER [phone]

## 2020-06-12 NOTE — PHYSICAL THERAPY INITIAL EVALUATION ADULT - ACTIVE RANGE OF MOTION EXAMINATION, REHAB EVAL
no Active ROM deficits were identified/except L great toe amputated
L foot decreased ROM 2/2 to acuity of surgery; pt can only slightly move it in ACE wrap/mamadou. upper extremity Active ROM was WNL (within normal limits)/Right LE Active ROM was WFL (within functional limits)

## 2020-06-12 NOTE — PROGRESS NOTE ADULT - SUBJECTIVE AND OBJECTIVE BOX
Subjective: says she feels good. no pain over stump. no vomiting or diarrhea . no fevers       PHYSICAL EXAM:    Vital Signs Last 24 Hrs  T(C): 36.8 (12 Jun 2020 15:21), Max: 37 (11 Jun 2020 23:10)  T(F): 98.3 (12 Jun 2020 15:21), Max: 98.6 (11 Jun 2020 23:10)  HR: 72 (12 Jun 2020 15:21) (70 - 90)  BP: 150/81 (12 Jun 2020 15:21) (139/91 - 170/89)  BP(mean): --  RR: 18 (12 Jun 2020 15:21) (18 - 19)  SpO2: 97% (12 Jun 2020 15:21) (96% - 100%)      Constitutional: awake alert oriented times 3   [place] SCLERA anicteric EOMI   LUNGS clear   CVS s1 s2 aud no murmurs /ppm in place  ABDOMEN soft non tender no HSM   NEUROLOGY  no defecits   SKIN left foot dressed   EXTREMITIES no edema no cyanosis /no groin hematoma         LABS/DIAGNOSTIC TESTS                        9.7    12.45 )-----------( 338      ( 12 Jun 2020 07:18 )             30.5     06-12    130<L>  |  91<L>  |  20<H>  ----------------------------<  118<H>  4.1   |  30  |  0.97    Ca    8.5      12 Jun 2020 07:18  Phos  3.1     06-12  Mg     2.1     06-12    TPro  6.7  /  Alb  2.0<L>  /  TBili  0.6  /  DBili  x   /  AST  20  /  ALT  25  /  AlkPhos  333<H>  06-12    PT/INR - ( 11 Jun 2020 21:29 )   PT: 15.0 sec;   INR: 1.32 ratio         PTT - ( 12 Jun 2020 12:44 )  PTT:169.2 sec      meds  acetaminophen   Tablet .. 650 milliGRAM(s) Oral every 6 hours PRN  acetaminophen  IVPB .. 1000 milliGRAM(s) IV Intermittent once  aluminum hydroxide/magnesium hydroxide/simethicone Suspension 30 milliLiter(s) Oral every 6 hours PRN  atorvastatin 40 milliGRAM(s) Oral at bedtime  cefTRIAXone   IVPB 1000 milliGRAM(s) IV Intermittent every 24 hours  Dakins Solution - 1/4 Strength 1 Application(s) Topical daily  diphenhydrAMINE   Injectable 15 milliGRAM(s) IV Push every 6 hours PRN  doxycycline IVPB 100 milliGRAM(s) IV Intermittent every 12 hours  furosemide    Tablet 20 milliGRAM(s) Oral daily  gabapentin 100 milliGRAM(s) Oral every 12 hours  heparin  Infusion. 800 Unit(s)/Hr IV Continuous <Continuous>  hydrALAZINE 100 milliGRAM(s) Oral three times a day  insulin glargine Injectable (LANTUS) 10 Unit(s) SubCutaneous at bedtime  insulin lispro (HumaLOG) corrective regimen sliding scale   SubCutaneous Before meals and at bedtime  lactobacillus acidophilus 1 Tablet(s) Oral three times a day with meals  lisinopril 40 milliGRAM(s) Oral daily  melatonin 5 milliGRAM(s) Oral at bedtime  metoprolol succinate ER 50 milliGRAM(s) Oral daily  metoprolol tartrate Injectable 5 milliGRAM(s) IV Push every 6 hours PRN  ondansetron Injectable 4 milliGRAM(s) IV Push every 8 hours PRN  pantoprazole  Injectable 40 milliGRAM(s) IV Push daily  traMADol 25 milliGRAM(s) Oral every 6 hours PRN        CULTURES  Culture Results: wound  Moderate Staphylococcus aureus (06-06 @ 03:05)        RADIOLOGY  Surgical Pathology Report (06.09.20 @ 13:38)    Surgical Pathology Report:   ACCESSION No:  [id number]    DARRION MELENDEZ                      1        Surgical Final Report          Final Diagnosis    1. Left first metatarsal; amputation:  - Partially necrotic and severely inflamed fibroconnective  tissue containing fibrinopurulent exudate.  - Acute osteomyelitis.    2. Clean margin, left foot; amputation:  - Bone with bone marrow and periosteal fibroconnective tissue  without acute inflammation, consistent with clean  margin.    Verified by: Kath Levin MD  (Electronic Signature)  Reported on: 06/11/20 12:08 ED, [facility],  [address] [place] [address], [place] [address]  Phone: [phone]   Fax: [phone]  _________________________________________________________________    Clinical History  Abscess, left foot osteomyelitis, right metatarsal  Incision and drainage  Wound debridement, left foot partial first metatarsal amputation    Specimen(s) Submitted  1-Left first metatarsal  2-Clean margin left foot    Gross Description  1.  The specimen is received in formalin and the specimen  container is labeled: Left first metatarsal.  It consists of  multiple fragments of pink red focally hemorrhagic bones and soft  tissue measuring 5 x 4.5 x 2 cm in aggregate.  Representative  sections are submitted in three cassettes after fixation and  decalcification.    2.   The specimen is received in formalin and the specimen  container is labeled: Clean margin left foot.  It consists of two  fragments of tan bones measuring 0.5 x 0.4 x 0.2 cm and 1 x 0.6 x  0.2 cm in greatest dimension.  Entirely submitted.  One cassette  after fixation and decalcification.    In addition to other data that may appear on the specimen  containers, all labels have been inspected to confirm the  presence of the patient's name and date of birth.  Cesar Rea 06/10/2020 10:29

## 2020-06-12 NOTE — PROGRESS NOTE ADULT - PROBLEM SELECTOR PLAN 3
recently antibiotics changed from clindamycin to Levaquin that led to epigastric pain.  WBC trended down  - ABX: cefepime + doxycycline 6/1-6/4,  cefazolin 6/5  - c/w doxy + nicole 6/6- as per ID  - lactate 2.4 trended down, procalcitonin high 0.17  - CT L foot shows OM ( pt cannot get MRI dur to AICD)  Podiatry Dr. Yo - s/p I and D  ID Dr. Reagan

## 2020-06-12 NOTE — PROGRESS NOTE ADULT - SUBJECTIVE AND OBJECTIVE BOX
Vital Signs Last 24 Hrs  T(C): 36.6 (12 Jun 2020 19:05), Max: 37 (11 Jun 2020 23:10)  T(F): 97.8 (12 Jun 2020 19:05), Max: 98.6 (11 Jun 2020 23:10)  HR: 68 (12 Jun 2020 19:05) (68 - 90)  BP: 153/63 (12 Jun 2020 19:05) (139/91 - 170/89)  BP(mean): --  RR: 18 (12 Jun 2020 19:05) (18 - 19)  SpO2: 98% (12 Jun 2020 19:05) (96% - 100%)                        9.7    12.45 )-----------( 338      ( 12 Jun 2020 07:18 )             30.5   WBC Count: 12.45 K/uL (06-12 @ 07:18)  WBC Count: 17.81 K/uL (06-11 @ 07:26)  WBC Count: 14.82 K/uL (06-10 @ 18:36)  WBC Count: 13.62 K/uL (06-10 @ 07:06)  WBC Count: 13.16 K/uL (06-09 @ 06:50)  06-12    130<L>  |  91<L>  |  20<H>  ----------------------------<  118<H>  4.1   |  30  |  0.97    Ca    8.5      12 Jun 2020 07:18  Phos  3.1     06-12  Mg     2.1     06-12    TPro  6.7  /  Alb  2.0<L>  /  TBili  0.6  /  DBili  x   /  AST  20  /  ALT  25  /  AlkPhos  333<H>  06-12    Procalcitonin, Serum (06.12.20 @ 09:03)    Procalcitonin, Serum: 0.10: Procalcitonin (PCT) Interpretation (ng/mL) - Diagnosis of systemic  bacterial infection/sepsis  PCT < 0.5: Systemic infection (sepsis) is not likely and risk for  progression to severe systemic infection is low. Local bacterial  infection is possible. If early sepsis is suspected clinically, PCT  should be re-assessed in 6-24 hours.  PCT >/= 0.5 but < 2.0: Systemic infection (sepsis) is possible, but other  conditions are known to elevate PCT as well. Moderate risk for  progression to severe systemic infection. The patient should be closely  monitored both clinically and by re-assessing PCT within 6-24 hours.  PCT >/= 2.0 but < 10.0: Systemic infection (sepsis) is likely, unless  other causes are known. High risk of progression to severe systemic  infection (severe sepsis/septic shock).  PCT >/= 10.0: Important systemic inflammatory response, almost  exclusively due to severe bacterial sepsis or septic shock. High  likelihood of severe sepsis or septic shock. ng/mL    < from: Xray Foot AP + Lateral + Oblique, Left (06.11.20 @ 10:09) >  EXAM:  FOOT LEFT (MINIMUM 3 VIEWS)                            PROCEDURE DATE:  06/11/2020          INTERPRETATION:  Left foot    HISTORY: Postop      Three views of the left foot show transmetatarsal amputation of the first toe. The joint spaces are maintained.    IMPRESSION: Postoperative changes.    Thank you for this referral.                ELYSSA RECIO M.D., ATTENDING RADIOLOGIST  This document has been electronically signed. Jun 11 2020 10:47AM                < end of copied text >  MEDICATIONS  (STANDING):  atorvastatin 40 milliGRAM(s) Oral at bedtime  Dakins Solution - 1/4 Strength 1 Application(s) Topical daily  doxycycline IVPB 100 milliGRAM(s) IV Intermittent every 12 hours  furosemide    Tablet 20 milliGRAM(s) Oral daily  gabapentin 100 milliGRAM(s) Oral every 12 hours  heparin  Infusion. 800 Unit(s)/Hr (8 mL/Hr) IV Continuous <Continuous>  hydrALAZINE 100 milliGRAM(s) Oral three times a day  insulin glargine Injectable (LANTUS) 10 Unit(s) SubCutaneous at bedtime  insulin lispro (HumaLOG) corrective regimen sliding scale   SubCutaneous Before meals and at bedtime  lactobacillus acidophilus 1 Tablet(s) Oral three times a day with meals  lisinopril 40 milliGRAM(s) Oral daily  melatonin 5 milliGRAM(s) Oral at bedtime  metoprolol succinate ER 50 milliGRAM(s) Oral daily  pantoprazole  Injectable 40 milliGRAM(s) IV Push daily    MEDICATIONS  (PRN):  acetaminophen   Tablet .. 650 milliGRAM(s) Oral every 6 hours PRN Mild Pain (1 - 3), Moderate Pain (4 - 6)  aluminum hydroxide/magnesium hydroxide/simethicone Suspension 30 milliLiter(s) Oral every 6 hours PRN Dyspepsia  diphenhydrAMINE   Injectable 15 milliGRAM(s) IV Push every 6 hours PRN nausea or vomiting  metoprolol tartrate Injectable 5 milliGRAM(s) IV Push every 6 hours PRN HR greater than 120  ondansetron Injectable 4 milliGRAM(s) IV Push every 8 hours PRN Nausea and/or Vomiting  traMADol 25 milliGRAM(s) Oral every 6 hours PRN Severe Pain (7 - 10)  Surgical Pathology Report (06.09.20 @ 13:38)    Surgical Pathology Report:   ACCESSION No:  [id number]    DARRION MELENDEZ                      1        Surgical Final Report          Final Diagnosis    1. Left first metatarsal; amputation:  - Partially necrotic and severely inflamed fibroconnective  tissue containing fibrinopurulent exudate.  - Acute osteomyelitis.    2. Clean margin, left foot; amputation:  - Bone with bone marrow and periosteal fibroconnective tissue  without acute inflammation, consistent with clean  margin.    Verified by: Kath Levin MD  (Electronic Signature)  Reported on: 06/11/20 12:08 EDT, [facility],  [address] [place] [address]  Phone: [phone]   Fax: [phone]  _________________________________________________________________    Clinical History  Abscess, left foot osteomyelitis, right metatarsal  Incision and drainage  Wound debridement, left foot partial first metatarsal amputation    Specimen(s) Submitted  1-Left first metatarsal  2-Clean margin left foot    Gross Description  1.  The specimen is received in formalin and the specimen  container is labeled: Left first metatarsal.  It consists of  multiple fragments of pink red focally hemorrhagic bones and soft  tissue measuring 5 x 4.5 x 2 cm in aggregate.  Representative  sections are submitted in three cassettes after fixation and  decalcification.    2.   The specimen is received in formalin and the specimen  container is labeled: Clean margin left foot.  It consists of two  fragments of tan bones measuring 0.5 x 0.4 x 0.2 cm and 1 x 0.6 x  0.2 cm in greatest dimension.  Entirely submitted.  One cassette  after fixation and decalcification.    In addition to other data that may appear on the specimen  containers, all labels have been inspected to confirm the  presence of the patient's name and date of birth.  Cesar Rea 06/10/2020 10:29    Allergies    codeine (Rash)  penicillin (Rash)    Intolerances        pt  seen and evaluated at bedside , reports nausea is improving  , feeling better today ,  AAOX3  reports throbbing pain in her foot ,  tolerated exam and dressing change very well    exam focused LE   left foot s/p I&D of abscess and 1first met partial amputation      wound packed with gauze strip, there mild-dark  sanguinous drainage , no odor   CRF -<5sec   DP/PT -0- out of 4   TG -WNL   CRF -1-2 sec x4   there is  erythema ascending to the level of the midfoot -some improvement  , and plantar submet 1 head-looks more wide spread , the area is slightly tender ,   pt is able to move her lower extremities

## 2020-06-12 NOTE — PHYSICAL THERAPY INITIAL EVALUATION ADULT - LIVES WITH, PROFILE
children/lives in downstairs of two story house, daughter lives upstairs, few steps to enter/spouse
children/pt lives with family in a house with 2 steps to enter/spouse

## 2020-06-12 NOTE — PHYSICAL THERAPY INITIAL EVALUATION ADULT - PERTINENT HX OF CURRENT PROBLEM, REHAB EVAL
s/p L 1st MTP amputation, I&D of L foot
Pt admitted for intractible nausea/vomiting, abdominal pain. Pt received L great toe amputation May 4th, has been following up outpatient. Pt's L great toe wound with stitches exposed and gauze in wound.

## 2020-06-12 NOTE — PROGRESS NOTE ADULT - ASSESSMENT
75 YO female from home, H/O CAD/DM/HTN/PVD/hysterectomy/cholecystectomy ,with AICD comes to ED with intractable nausea and vomiting for 2 days. Patient states that she recently had left big toe amputation in the beginning of May and she was taking antibiotics. On Friday, her antibiotics were changed from clindamycin to Levaquin and after she took antibiotics along with percocet, she started having severe pain in epigastric region. Patient reports that it was sudden in onset, 7/10, burning & pressure like pain, non-radiating , associated with nausea,, bitter taste in mouth and projectile vomiting. She was not able to tolerate food and it aggravated her symptoms. Patient also reports significant weight( not sure how much) recently and decrease in appetite. patient denies fever, cough, SOB, constipation, dysuria, headache, chest pain, orthopnea, back pain, burning sensation in extremities.c/o pain over left big toe stump which is not resolving despite pain meds . afebrile on adm , covid neg. ID called for persistant leucocytosis and appr ab     # s/p amputation of left great toe for gangrene with post op collection and overlying cellulits as well as underlying OM /wound cx pos for staph aureus ( MSSA)/s/p left leg stent at [facility] as per pt /underlying PVD/s/p angio, with severe occlusive popliteal and tibial  dz that is non reconstructable/s/p I and D of left foot as well as partial amputation of left first MT /path consistent with acute osteo     #persistant  nausea/vomiting , now seems to be vertigo sec to poss cerebellar cva vs tia     # transaminitis improving     # increasing wbc most likely reactive postop /cxr more consistent with pulm edema , low procal  /no resp complaints     plan  change ab to iv rocephin 2gm daily   will d/w podiatry course of ab   CONT  probiotics   neuro f/u   podiatry f/u of stump       thnx will f/u   d/w resident

## 2020-06-13 LAB
ANION GAP SERPL CALC-SCNC: 7 MMOL/L — SIGNIFICANT CHANGE UP (ref 5–17)
ANION GAP SERPL CALC-SCNC: 7 MMOL/L — SIGNIFICANT CHANGE UP (ref 5–17)
APTT BLD: 61.5 SEC — HIGH (ref 27.5–36.3)
BUN SERPL-MCNC: 24 MG/DL — HIGH (ref 7–18)
BUN SERPL-MCNC: 26 MG/DL — HIGH (ref 7–18)
CALCIUM SERPL-MCNC: 8.3 MG/DL — LOW (ref 8.4–10.5)
CALCIUM SERPL-MCNC: 8.5 MG/DL — SIGNIFICANT CHANGE UP (ref 8.4–10.5)
CHLORIDE SERPL-SCNC: 92 MMOL/L — LOW (ref 96–108)
CHLORIDE SERPL-SCNC: 93 MMOL/L — LOW (ref 96–108)
CO2 SERPL-SCNC: 28 MMOL/L — SIGNIFICANT CHANGE UP (ref 22–31)
CO2 SERPL-SCNC: 31 MMOL/L — SIGNIFICANT CHANGE UP (ref 22–31)
CREAT SERPL-MCNC: 0.96 MG/DL — SIGNIFICANT CHANGE UP (ref 0.5–1.3)
CREAT SERPL-MCNC: 1.01 MG/DL — SIGNIFICANT CHANGE UP (ref 0.5–1.3)
GLUCOSE BLDC GLUCOMTR-MCNC: 132 MG/DL — HIGH (ref 70–99)
GLUCOSE BLDC GLUCOMTR-MCNC: 147 MG/DL — HIGH (ref 70–99)
GLUCOSE BLDC GLUCOMTR-MCNC: 155 MG/DL — HIGH (ref 70–99)
GLUCOSE BLDC GLUCOMTR-MCNC: 181 MG/DL — HIGH (ref 70–99)
GLUCOSE SERPL-MCNC: 108 MG/DL — HIGH (ref 70–99)
GLUCOSE SERPL-MCNC: 134 MG/DL — HIGH (ref 70–99)
HCT VFR BLD CALC: 29.1 % — LOW (ref 34.5–45)
HGB BLD-MCNC: 9.3 G/DL — LOW (ref 11.5–15.5)
MAGNESIUM SERPL-MCNC: 2 MG/DL — SIGNIFICANT CHANGE UP (ref 1.6–2.6)
MCHC RBC-ENTMCNC: 28.3 PG — SIGNIFICANT CHANGE UP (ref 27–34)
MCHC RBC-ENTMCNC: 32 GM/DL — SIGNIFICANT CHANGE UP (ref 32–36)
MCV RBC AUTO: 88.4 FL — SIGNIFICANT CHANGE UP (ref 80–100)
NRBC # BLD: 0 /100 WBCS — SIGNIFICANT CHANGE UP (ref 0–0)
PHOSPHATE SERPL-MCNC: 3.5 MG/DL — SIGNIFICANT CHANGE UP (ref 2.5–4.5)
PLATELET # BLD AUTO: 342 K/UL — SIGNIFICANT CHANGE UP (ref 150–400)
POTASSIUM SERPL-MCNC: 3.8 MMOL/L — SIGNIFICANT CHANGE UP (ref 3.5–5.3)
POTASSIUM SERPL-MCNC: 5.5 MMOL/L — HIGH (ref 3.5–5.3)
POTASSIUM SERPL-SCNC: 3.8 MMOL/L — SIGNIFICANT CHANGE UP (ref 3.5–5.3)
POTASSIUM SERPL-SCNC: 5.5 MMOL/L — HIGH (ref 3.5–5.3)
RBC # BLD: 3.29 M/UL — LOW (ref 3.8–5.2)
RBC # FLD: 15.5 % — HIGH (ref 10.3–14.5)
SODIUM SERPL-SCNC: 128 MMOL/L — LOW (ref 135–145)
SODIUM SERPL-SCNC: 130 MMOL/L — LOW (ref 135–145)
WBC # BLD: 13.23 K/UL — HIGH (ref 3.8–10.5)
WBC # FLD AUTO: 13.23 K/UL — HIGH (ref 3.8–10.5)

## 2020-06-13 PROCEDURE — 99233 SBSQ HOSP IP/OBS HIGH 50: CPT

## 2020-06-13 RX ORDER — INSULIN HUMAN 100 [IU]/ML
5 INJECTION, SOLUTION SUBCUTANEOUS ONCE
Refills: 0 | Status: DISCONTINUED | OUTPATIENT
Start: 2020-06-13 | End: 2020-06-13

## 2020-06-13 RX ORDER — SODIUM ZIRCONIUM CYCLOSILICATE 10 G/10G
5 POWDER, FOR SUSPENSION ORAL ONCE
Refills: 0 | Status: DISCONTINUED | OUTPATIENT
Start: 2020-06-13 | End: 2020-06-13

## 2020-06-13 RX ADMIN — Medication 110 MILLIGRAM(S): at 06:25

## 2020-06-13 RX ADMIN — LISINOPRIL 40 MILLIGRAM(S): 2.5 TABLET ORAL at 06:25

## 2020-06-13 RX ADMIN — TRAMADOL HYDROCHLORIDE 25 MILLIGRAM(S): 50 TABLET ORAL at 16:05

## 2020-06-13 RX ADMIN — Medication 5 MILLIGRAM(S): at 21:36

## 2020-06-13 RX ADMIN — Medication 1 APPLICATION(S): at 12:11

## 2020-06-13 RX ADMIN — CEFTRIAXONE 100 MILLIGRAM(S): 500 INJECTION, POWDER, FOR SOLUTION INTRAMUSCULAR; INTRAVENOUS at 17:04

## 2020-06-13 RX ADMIN — Medication 100 MILLIGRAM(S): at 06:34

## 2020-06-13 RX ADMIN — TRAMADOL HYDROCHLORIDE 25 MILLIGRAM(S): 50 TABLET ORAL at 07:43

## 2020-06-13 RX ADMIN — Medication 50 MILLIGRAM(S): at 06:25

## 2020-06-13 RX ADMIN — GABAPENTIN 100 MILLIGRAM(S): 400 CAPSULE ORAL at 17:04

## 2020-06-13 RX ADMIN — Medication 100 MILLIGRAM(S): at 21:36

## 2020-06-13 RX ADMIN — Medication 1 TABLET(S): at 16:35

## 2020-06-13 RX ADMIN — Medication 1: at 21:34

## 2020-06-13 RX ADMIN — PANTOPRAZOLE SODIUM 40 MILLIGRAM(S): 20 TABLET, DELAYED RELEASE ORAL at 12:11

## 2020-06-13 RX ADMIN — Medication 100 MILLIGRAM(S): at 13:04

## 2020-06-13 RX ADMIN — Medication 110 MILLIGRAM(S): at 17:04

## 2020-06-13 RX ADMIN — Medication 20 MILLIGRAM(S): at 06:25

## 2020-06-13 RX ADMIN — Medication 1 TABLET(S): at 12:11

## 2020-06-13 RX ADMIN — HEPARIN SODIUM 800 UNIT(S)/HR: 5000 INJECTION INTRAVENOUS; SUBCUTANEOUS at 06:22

## 2020-06-13 RX ADMIN — GABAPENTIN 100 MILLIGRAM(S): 400 CAPSULE ORAL at 06:28

## 2020-06-13 RX ADMIN — ATORVASTATIN CALCIUM 40 MILLIGRAM(S): 80 TABLET, FILM COATED ORAL at 21:33

## 2020-06-13 RX ADMIN — Medication 1: at 16:34

## 2020-06-13 RX ADMIN — Medication 1 TABLET(S): at 08:10

## 2020-06-13 RX ADMIN — INSULIN GLARGINE 10 UNIT(S): 100 INJECTION, SOLUTION SUBCUTANEOUS at 21:36

## 2020-06-13 NOTE — PROGRESS NOTE ADULT - ASSESSMENT
A/P  PVD /PAD  DM  ulcer   dehiscence surgical wound   history of gangrene left hallux   positive probe to bone   leukocytosis -trending down  today possibly reactive after surgery   OM with  abscess  left -P/O day 4  pyogenic tendon       pt seen and eval   chart review  s/p angiogram -pt is very high risk for none healing  due to very poor vascular status as per Dr. Grove   Ms Keating and her daughter want to see if the wound will improve before discussing BKA  pt is at risk of hematoma formation/bleeding due to heparin drip   continue antibiotics as per id -id    dressing change  sutures tight  keep foot slightly elevated   no weight bearing  -due to risk of bleeding   please call with any pt related questions [phone]

## 2020-06-13 NOTE — PROGRESS NOTE ADULT - ASSESSMENT
75 YO female from home, H/O CAD/DM/HTN/PVD/hysterectomy/cholecystectomy ,with AICD comes to ED with intractable nausea and vomiting for 2 days. Patient states that she recently had left big toe amputation in the beginning of May and she was taking antibiotics. On Friday, her antibiotics were changed from clindamycin to Levaquin and after she took antibiotics along with percocet, she started having severe pain in epigastric region. Patient reports that it was sudden in onset, 7/10, burning & pressure like pain, non-radiating , associated with nausea,, bitter taste in mouth and projectile vomiting. She was not able to tolerate food and it aggravated her symptoms. Patient also reports significant weight( not sure how much) recently and decrease in appetite. patient denies fever, cough, SOB, constipation, dysuria, headache, chest pain, orthopnea, back pain, burning sensation in extremities.c/o pain over left big toe stump which is not resolving despite pain meds . afebrile on adm , covid neg. ID called for persistant leucocytosis and appr ab     # s/p amputation of left great toe for gangrene with post op collection and overlying cellulits as well as underlying OM /wound cx pos for staph aureus ( MSSA)/s/p left leg stent at [facility] as per pt /underlying PVD/s/p angio, with severe occlusive popliteal and tibial  dz that is non reconstructable/s/p I and D of left foot as well as partial amputation of left first MT /path consistent with acute osteo     #persistant  nausea/vomiting , now seems to be vertigo sec to poss cerebellar cva vs tia /vomiting has resolved     # transaminitis improving     #  wbc decreasing most likely reactive postop /cxr more consistent with pulm edema , low procal  /no resp complaints     plan  cont  iv rocephin 2gm daily . will d/c doxy   will d/w podiatry course of ab   CONT  probiotics   podiatry f/u of stump       thnx will f/u   d/w resident 73 YO female from home, H/O CAD/DM/HTN/PVD/hysterectomy/cholecystectomy ,with AICD comes to ED with intractable nausea and vomiting for 2 days. Patient states that she recently had left big toe amputation in the beginning of May and she was taking antibiotics. On Friday, her antibiotics were changed from clindamycin to Levaquin and after she took antibiotics along with percocet, she started having severe pain in epigastric region. Patient reports that it was sudden in onset, 7/10, burning & pressure like pain, non-radiating , associated with nausea,, bitter taste in mouth and projectile vomiting. She was not able to tolerate food and it aggravated her symptoms. Patient also reports significant weight( not sure how much) recently and decrease in appetite. patient denies fever, cough, SOB, constipation, dysuria, headache, chest pain, orthopnea, back pain, burning sensation in extremities.c/o pain over left big toe stump which is not resolving despite pain meds . afebrile on adm , covid neg. ID called for persistant leucocytosis and appr ab     # s/p amputation of left great toe for gangrene with post op collection and overlying cellulits as well as underlying OM /wound cx pos for staph aureus ( MSSA)/s/p left leg stent at [facility] as per pt /underlying PVD/s/p angio, with severe occlusive popliteal and tibial  dz that is non reconstructable/s/p I and D of left foot as well as partial amputation of left first MT /path consistent with acute osteo     #persistant  nausea/vomiting , now seems to be vertigo sec to poss cerebellar cva vs tia /vomiting has resolved     # transaminitis improving     #  wbc decreasing most likely reactive postop /cxr more consistent with pulm edema , low procal  /no resp complaints     # hx of endometrial ca s/p hysterectomy /ct with bladder wall thickening /urine cytology neg for uroepithelial ca     plan  cont  iv rocephin 2gm daily . will d/c doxy   will d/w podiatry course of ab   CONT  probiotics   podiatry f/u of stump   for outpt cystoscopy r/o bladder ca       thnx will f/u   d/w resident

## 2020-06-13 NOTE — PROGRESS NOTE ADULT - SUBJECTIVE AND OBJECTIVE BOX
Vital Signs Last 24 Hrs  T(C): 36.8 (13 Jun 2020 14:25), Max: 36.8 (13 Jun 2020 04:55)  T(F): 98.2 (13 Jun 2020 14:25), Max: 98.3 (13 Jun 2020 04:55)  HR: 84 (13 Jun 2020 14:25) (76 - 84)  BP: 124/70 (13 Jun 2020 14:25) (124/70 - 175/75)  BP(mean): --  RR: 18 (13 Jun 2020 14:25) (18 - 18)  SpO2: 94% (13 Jun 2020 14:25) (93% - 95%)                        9.3    13.23 )-----------( 342      ( 13 Jun 2020 05:26 )             29.1   WBC Count: 13.23 K/uL (06-13 @ 05:26)  WBC Count: 13.52 K/uL (06-12 @ 22:25)  WBC Count: 12.45 K/uL (06-12 @ 07:18)  WBC Count: 17.81 K/uL (06-11 @ 07:26)  WBC Count: 14.82 K/uL (06-10 @ 18:36)  06-13    130<L>  |  92<L>  |  24<H>  ----------------------------<  134<H>  3.8   |  31  |  0.96    Ca    8.3<L>      13 Jun 2020 12:06  Phos  3.5     06-13  Mg     2.0     06-13    TPro  6.7  /  Alb  2.0<L>  /  TBili  0.6  /  DBili  x   /  AST  20  /  ALT  25  /  AlkPhos  333<H>  06-12 P/o day 4  HPI:  73 YO female from home, H/O CAD/DM/HTN/PVD/hysterectomy/cholecystectomy ,with AICD comes to ED with intractable nausea and vomiting for 2 days. Patient states that she recently had left big toe amputation in the beginning of May and she was taking antibiotics. On Friday, her antibiotics were changed from clindamycin to Levaquin and after she took antibiotics along with percocet, she started having severe pain in epigastric region. Patient reports that it was sudden in onset, 7/10, burning & pressure like pain, non-radiating , associated with nausea,, bitter taste in mouth and projectile vomiting. She was not able to tolerate food and it aggravated her symptoms. Patient also reports significant weight( not sure how much) recently and decrease in appetite.   patient denies fever, cough, SOB, constipation, dysuria, headache, chest pain, orthopnea, back pain, burning sensation in extremities.    ED course; Patient is in mild distress due to nausea but otherwise stable  Vitals:     /88  SpO2 99% on RA    CT abdomen/pelvis:   NEW MULTI BLADDER WALL FOCAL AREAS OF SOFT TISSUE THICKENING CONCERNING FOR BLADDER CARCINOMA/TRANSITIONAL CELL CARCINOMA. No hydronephrosis.  Stomach not fully distended and gastric pathology cannot be excluded.  Status post cholecystectomy and hysterectomy.  Remaining abdominal pelvic viscera unremarkable.   No evidence of colitis.. (30 May 2020 14:56)    Vital Signs Last 24 Hrs  T(C): 36.8 (13 Jun 2020 14:25), Max: 36.8 (13 Jun 2020 04:55)  T(F): 98.2 (13 Jun 2020 14:25), Max: 98.3 (13 Jun 2020 04:55)  HR: 84 (13 Jun 2020 14:25) (76 - 84)  BP: 124/70 (13 Jun 2020 14:25) (124/70 - 175/75)  BP(mean): --  RR: 18 (13 Jun 2020 14:25) (18 - 18)  SpO2: 94% (13 Jun 2020 14:25) (93% - 95%)                        9.3    13.23 )-----------( 342      ( 13 Jun 2020 05:26 )             29.1   WBC Count: 13.23 K/uL (06-13 @ 05:26)  WBC Count: 13.52 K/uL (06-12 @ 22:25)  WBC Count: 12.45 K/uL (06-12 @ 07:18)  WBC Count: 17.81 K/uL (06-11 @ 07:26)  WBC Count: 14.82 K/uL (06-10 @ 18:36)  06-13    130<L>  |  92<L>  |  24<H>  ----------------------------<  134<H>  3.8   |  31  |  0.96    Ca    8.3<L>      13 Jun 2020 12:06  Phos  3.5     06-13  Mg     2.0     06-13    TPro  6.7  /  Alb  2.0<L>  /  TBili  0.6  /  DBili  x   /  AST  20  /  ALT  25  /  AlkPhos  333<H>  06-12      < from: Xray Foot AP + Lateral + Oblique, Left (06.11.20 @ 10:09) >    EXAM:  FOOT LEFT (MINIMUM 3 VIEWS)                            PROCEDURE DATE:  06/11/2020          INTERPRETATION:  Left foot    HISTORY: Postop      Three views of the left foot show transmetatarsal amputation of the first toe. The joint spaces are maintained.    IMPRESSION: Postoperative changes.    Thank you for this referral.                ELYSSA RECIO M.D., ATTENDING RADIOLOGIST  This document has been electronically signed. Jun 11 2020 10:47AM        < end of copied text >  < from: [facility] Physiol Extremity Lower 3+ Level, BI (06.06.20 @ 12:48) >  EXAM:  US PHYSIOL LWR EXT 3+ LEV BI                            PROCEDURE DATE:  06/06/2020          INTERPRETATION:  Clinical Information: Peripheral vascular disease. Left toe ulceration.    Technique: Bilateral lower extremity ABIs/PVR    Comparison: None    Findings/  Impression:  Right lower extremity: The ankle brachial index is 0.6. The pulse waveforms are diffusely diminished.    Left lower extremity: The ankle brachial index is 0.5. The pulse waveforms are diffusely diminished, however particularly severe in the left foot.    Severe bilateral peripheral artery disease.    Pulse volume recordings are diffusely diminished bilaterally, however severely diminished in the left foot.            < end of copied text >  Culture - Abscess with Gram Stain (06.06.20 @ 03:05)    -  Trimethoprim/Sulfamethoxazole: S <=0.5/9.5    -  Vancomycin: S 1    -  Tetra/Doxy: S <=1    -  RIF- Rifampin: S <=1 Should not be used as monotherapy    -  Ampicillin/Sulbactam: S <=8/4    -  Oxacillin: S <=0.25    -  Penicillin: R 2    -  Gentamicin: S <=1 Should not be used as monotherapy    -  Clindamycin: R >4    -  Erythromycin: R >4    -  Cefazolin: S <=4    Specimen Source: .Abscess left foot    Culture Results:   Moderate Staphylococcus aureus    Organism Identification: Staphylococcus aureus  Staphylococcus aureus    Organism: Staphylococcus aureus    Organism: Staphylococcus aureus    Method Type: LI    Method Type: LI    Surgical Pathology Report (06.09.20 @ 13:38)    Surgical Pathology Report:   ACCESSION No:  [id number]    DARRION MELENDEZ                      1        Surgical Final Report          Final Diagnosis    1. Left first metatarsal; amputation:  - Partially necrotic and severely inflamed fibroconnective  tissue containing fibrinopurulent exudate.  - Acute osteomyelitis.    2. Clean margin, left foot; amputation:  - Bone with bone marrow and periosteal fibroconnective tissue  without acute inflammation, consistent with clean  margin.    Verified by: Kath Levin MD  (Electronic Signature)  Reported on: 06/11/20 12:08 EDT, [facility],  [address] [place] [address], [place] [address]  Phone: [phone]   Fax: [phone]  _________________________________________________________________    Clinical History  Abscess, left foot osteomyelitis, right metatarsal  Incision and drainage  Wound debridement, left foot partial first metatarsal amputation    Specimen(s) Submitted  1-Left first metatarsal  2-Clean margin left foot    Gross Description  1.  The specimen is received in formalin and the specimen  container is labeled: Left first metatarsal.  It consists of  multiple fragments of pink red focally hemorrhagic bones and soft  tissue measuring 5 x 4.5 x 2 cm in aggregate.  Representative  sections are submitted in three cassettes after fixation and  decalcification.    2.   The specimen is received in formalin and the specimen  container is labeled: Clean margin left foot.  It consists of two  fragments of tan bones measuring 0.5 x 0.4 x 0.2 cm and 1 x 0.6 x  0.2 cm in greatest dimension.  Entirely submitted.  One cassette  after fixation and decalcification.    In addition to other data that may appear on the specimen  containers, all labels have been inspected to confirm the  presence of the patient's name and date of birth.  Cesar Rea 06/10/2020 10:29        MEDICATIONS  (STANDING):  atorvastatin 40 milliGRAM(s) Oral at bedtime  cefTRIAXone   IVPB 2000 milliGRAM(s) IV Intermittent every 24 hours  Dakins Solution - 1/4 Strength 1 Application(s) Topical daily  furosemide    Tablet 20 milliGRAM(s) Oral daily  gabapentin 100 milliGRAM(s) Oral every 12 hours  heparin  Infusion. 800 Unit(s)/Hr (8 mL/Hr) IV Continuous <Continuous>  hydrALAZINE 100 milliGRAM(s) Oral three times a day  insulin glargine Injectable (LANTUS) 10 Unit(s) SubCutaneous at bedtime  insulin lispro (HumaLOG) corrective regimen sliding scale   SubCutaneous Before meals and at bedtime  lactobacillus acidophilus 1 Tablet(s) Oral three times a day with meals  lisinopril 40 milliGRAM(s) Oral daily  melatonin 5 milliGRAM(s) Oral at bedtime  metoprolol succinate ER 50 milliGRAM(s) Oral daily  pantoprazole  Injectable 40 milliGRAM(s) IV Push daily    MEDICATIONS  (PRN):  acetaminophen   Tablet .. 650 milliGRAM(s) Oral every 6 hours PRN Mild Pain (1 - 3), Moderate Pain (4 - 6)  aluminum hydroxide/magnesium hydroxide/simethicone Suspension 30 milliLiter(s) Oral every 6 hours PRN Dyspepsia  diphenhydrAMINE   Injectable 15 milliGRAM(s) IV Push every 6 hours PRN nausea or vomiting  metoprolol tartrate Injectable 5 milliGRAM(s) IV Push every 6 hours PRN HR greater than 120  ondansetron Injectable 4 milliGRAM(s) IV Push every 8 hours PRN Nausea and/or Vomiting  traMADol 25 milliGRAM(s) Oral every 6 hours PRN Severe Pain (7 - 10)      Allergies    codeine (Rash)  penicillin (Rash)    Intolerances        pt  seen and evaluated at bedside , reports nausea is improving  , feeling better today ,  AAOX3  reports throbbing pain in her foot sometimes  ,  tolerated exam and dressing change very well    exam focused LE   left foot s/p I&D of abscess and 1first met partial amputation      wound packed with gauze strip, there mild-dark  sanguinous drainage , no odor   less drainage , no crepitus , no fluctuance   CRF -<5sec   DP/PT -0- out of 4   TG -WNL   CRF -1-2 sec x4   there is  erythema ascending to the level of the midfoot -some improvement  ,  , the area is slightly tender ,   pt is able to move her lower extremities

## 2020-06-13 NOTE — PROGRESS NOTE ADULT - SUBJECTIVE AND OBJECTIVE BOX
INTERVAL HPI/OVERNIGHT EVENTS:  Patient seen at bedside,events noticed  VITAL SIGNS:  T(F): 98.2 (06-13-20 @ 14:25)  HR: 84 (06-13-20 @ 14:25)  BP: 124/70 (06-13-20 @ 14:25)  RR: 18 (06-13-20 @ 14:25)  SpO2: 94% (06-13-20 @ 14:25)  Wt(kg): --    PHYSICAL EXAM:  awake,alert,no dystress  Constitutional:  Eyes:  ENMT:perrla  Neck:  Respiratory:clear  Cardiovascular:s1 s2,m-none  Gastrointestinal:soft,bs pos  Extremities:  Vascular:  Neurological:no focal deficit  Musculoskeletal:    MEDICATIONS  (STANDING):  atorvastatin 40 milliGRAM(s) Oral at bedtime  cefTRIAXone   IVPB 2000 milliGRAM(s) IV Intermittent every 24 hours  Dakins Solution - 1/4 Strength 1 Application(s) Topical daily  doxycycline IVPB 100 milliGRAM(s) IV Intermittent every 12 hours  furosemide    Tablet 20 milliGRAM(s) Oral daily  gabapentin 100 milliGRAM(s) Oral every 12 hours  heparin  Infusion. 800 Unit(s)/Hr (8 mL/Hr) IV Continuous <Continuous>  hydrALAZINE 100 milliGRAM(s) Oral three times a day  insulin glargine Injectable (LANTUS) 10 Unit(s) SubCutaneous at bedtime  insulin lispro (HumaLOG) corrective regimen sliding scale   SubCutaneous Before meals and at bedtime  lactobacillus acidophilus 1 Tablet(s) Oral three times a day with meals  lisinopril 40 milliGRAM(s) Oral daily  melatonin 5 milliGRAM(s) Oral at bedtime  metoprolol succinate ER 50 milliGRAM(s) Oral daily  pantoprazole  Injectable 40 milliGRAM(s) IV Push daily    MEDICATIONS  (PRN):  acetaminophen   Tablet .. 650 milliGRAM(s) Oral every 6 hours PRN Mild Pain (1 - 3), Moderate Pain (4 - 6)  aluminum hydroxide/magnesium hydroxide/simethicone Suspension 30 milliLiter(s) Oral every 6 hours PRN Dyspepsia  diphenhydrAMINE   Injectable 15 milliGRAM(s) IV Push every 6 hours PRN nausea or vomiting  metoprolol tartrate Injectable 5 milliGRAM(s) IV Push every 6 hours PRN HR greater than 120  ondansetron Injectable 4 milliGRAM(s) IV Push every 8 hours PRN Nausea and/or Vomiting  traMADol 25 milliGRAM(s) Oral every 6 hours PRN Severe Pain (7 - 10)      Allergies    codeine (Rash)  penicillin (Rash)    Intolerances        LABS:                        9.3    13.23 )-----------( 342      ( 13 Jun 2020 05:26 )             29.1     06-13    130<L>  |  92<L>  |  24<H>  ----------------------------<  134<H>  3.8   |  31  |  0.96    Ca    8.3<L>      13 Jun 2020 12:06  Phos  3.5     06-13  Mg     2.0     06-13    TPro  6.7  /  Alb  2.0<L>  /  TBili  0.6  /  DBili  x   /  AST  20  /  ALT  25  /  AlkPhos  333<H>  06-12    PT/INR - ( 11 Jun 2020 21:29 )   PT: 15.0 sec;   INR: 1.32 ratio         PTT - ( 13 Jun 2020 05:26 )  PTT:61.5 sec      RADIOLOGY & ADDITIONAL TESTS:    Assessment and Plan:   · Assessment		  73 YO female from home, H/O CAD/DM/HTN/PVD/hysterectomy/cholecystectomy ,with AICD has come to ED with intractable nausea and vomiting for 2 days, likely due to pill induced esophagitis/gastritis. Patient reports that it was sudden in onset, 7/10, burning & pressure like pain, non-radiating , associated with nausea and projectile vomiting. Patient also reports significant weight( not sure how much) recently and decrease in appetite. CT abdomen/pelvis shows bladder wall thickening and pt has history of Stage1 endometrial cancer,   Patient also has high blood pressure because she missed her medications  Admitting patient for management of  Acute Gastritis  Intractable abdominal pain  hypertension  weight loss/ suspected malignancy workup         Problem/Plan - 1:  ·  Problem: Stroke.  Plan: CTA head/neck showed LEFT PICA stroke  -will switch to oral anticoagulation after EGD  Neuro Dr. Moncada.      Problem/Plan - 2:  ·  Problem: PVD (peripheral vascular disease).  Plan: H/o PVD and popliteal stent  s/p left big toe amputation in May 2020  - ESTRELLA: RLE:  0.6. LLE:  0.5. Severe bilateral peripheral artery disease. Pulse volume recordings are diffusely diminished bilaterally, however severely diminished in the left foot.  s/p left metatarsal amputation  PT re eval  Vascular Dr. Grove.      Problem/Plan - 3:  ·  Problem: Osteomyelitis of left foot, unspecified type.  Plan: recently antibiotics changed from clindamycin to Levaquin that led to epigastric pain.  WBC trended down  - ABX: cefepime + doxycycline 6/1-6/4,  cefazolin 6/5  - c/w doxy + nicole 6/6- as per ID  - lactate 2.4 trended down, procalcitonin high 0.17  - CT L foot shows OM ( pt cannot get MRI dur to AICD)  Podiatry Dr. Yo - s/p I and D  ID Dr. Reagan.      Problem/Plan - 4:  ·  Problem: Intractable nausea and vomiting.  Plan: patient endorses moderate to severe abdominal pain, n/v since 5/29 Fri  partially relieved with oral Protonix  UA negative, No fevers, UCx: -ve  More likely GI etiology  CT abd w/ IV cont: Small bilateral pleural effusions. Multiple nodular peripheral filling defect in the urinary bladder measuring up to 1.3 cm, suspicious for transitional cell carcinomas.  EGD on monday  -. Giovani.      Problem/Plan - 5:  ·  Problem: Bladder wall thickening.  Plan: CT abdomen shows bladder wall thickening  reports weight loss and loss of appetite recently, Patient has history of Stage 1 endometrial cancer and hysterectomy  brother also had cancer and she is not sure about which one  - urine cytology: NEGATIVE FOR HIGH GRADE UROTHELIAL CARCINOMA  urology consulted: outpt f/u w/ , need cystoscopy.      Problem/Plan - 6:  Problem: HTN (hypertension). Plan: Pt takes Lisinopril 40mg, toprol Xl 100mg, hydralazine 50mg TID   BP is running on higher side .  lipid profile, TG WNL  -increased dose of hydralazine.     Problem/Plan - 7:  ·  Problem: DM (diabetes mellitus).  Plan: Patient takes Levemir 30mg at night and 10Units pre-meals  c/w sliding scale and Lantus 20Units HS  Diabetic diet  Hb A1C: 8.1%  - monitor BS.      Problem/Plan - 8:  ·  Problem: CAD (coronary artery disease).  Plan: H/o CAD and PCI in 2009,  patient has both AICD and pacemaker  EKG shows paced Rythm  No active issues  continue aspirin , Plavix, BB, statin, ACE - pt is not tolerated to PO meds  - c/w heparin drip.      Problem/Plan - 9:  ·  Problem: CHF (congestive heart failure).  Plan: H/o CHF but clinically patient is not in fluid overload  No peripheral edema  Patient takes furosemide 20mg daily twice  BNP: elevated at 9383  echo: EF 50-55%, mild TR, WV  - holding lasix 20mg IV as pt is not eating and dehydrated  - c/w statin and BB.      Problem/Plan - 10:  Problem: Prophylactic measure. Plan; IMPROVE VTE Individual Risk Assessment  RISK                                                                Points  [  ] Previous VTE                                                  3  [  ] Thrombophilia                                               2  [  ] Lower limb paralysis                                      2        (unable to hold up >15 seconds)    [  ] Current Cancer                                              2         (within 6 months)  [x  ] Immobilization > 24 hrs                                1  [  ] ICU/CCU stay > 24 hours                              1  [  x] Age > 60                                                      1    IMPROVE VTE Score 2  heparin drip.

## 2020-06-13 NOTE — PROGRESS NOTE ADULT - SUBJECTIVE AND OBJECTIVE BOX
pt seen and examined, no complaints, ROS - .          acetaminophen   Tablet .. 650 milliGRAM(s) Oral every 6 hours PRN  aluminum hydroxide/magnesium hydroxide/simethicone Suspension 30 milliLiter(s) Oral every 6 hours PRN  atorvastatin 40 milliGRAM(s) Oral at bedtime  cefTRIAXone   IVPB 2000 milliGRAM(s) IV Intermittent every 24 hours  Dakins Solution - 1/4 Strength 1 Application(s) Topical daily  diphenhydrAMINE   Injectable 15 milliGRAM(s) IV Push every 6 hours PRN  doxycycline IVPB 100 milliGRAM(s) IV Intermittent every 12 hours  furosemide    Tablet 20 milliGRAM(s) Oral daily  gabapentin 100 milliGRAM(s) Oral every 12 hours  heparin  Infusion. 800 Unit(s)/Hr IV Continuous <Continuous>  hydrALAZINE 100 milliGRAM(s) Oral three times a day  insulin glargine Injectable (LANTUS) 10 Unit(s) SubCutaneous at bedtime  insulin lispro (HumaLOG) corrective regimen sliding scale   SubCutaneous Before meals and at bedtime  lactobacillus acidophilus 1 Tablet(s) Oral three times a day with meals  lisinopril 40 milliGRAM(s) Oral daily  melatonin 5 milliGRAM(s) Oral at bedtime  metoprolol succinate ER 50 milliGRAM(s) Oral daily  metoprolol tartrate Injectable 5 milliGRAM(s) IV Push every 6 hours PRN  ondansetron Injectable 4 milliGRAM(s) IV Push every 8 hours PRN  pantoprazole  Injectable 40 milliGRAM(s) IV Push daily  traMADol 25 milliGRAM(s) Oral every 6 hours PRN                            9.3    13.23 )-----------( 342      ( 13 Jun 2020 05:26 )             29.1       Hemoglobin: 9.3 g/dL (06-13 @ 05:26)  Hemoglobin: 9.5 g/dL (06-12 @ 22:25)  Hemoglobin: 9.7 g/dL (06-12 @ 07:18)  Hemoglobin: 9.4 g/dL (06-11 @ 07:26)  Hemoglobin: 9.5 g/dL (06-10 @ 18:36)      06-13    128<L>  |  93<L>  |  26<H>  ----------------------------<  108<H>  5.5<H>   |  28  |  1.01    Ca    8.5      13 Jun 2020 05:26  Phos  3.5     06-13  Mg     2.0     06-13    TPro  6.7  /  Alb  2.0<L>  /  TBili  0.6  /  DBili  x   /  AST  20  /  ALT  25  /  AlkPhos  333<H>  06-12    Creatinine Trend: 1.01<--, 0.97<--, 0.83<--, 0.84<--, 1.00<--, 1.27<--    COAGS: PTT - ( 13 Jun 2020 05:26 )  PTT:61.5 sec          T(C): 36.8 (06-13-20 @ 04:55), Max: 36.8 (06-12-20 @ 07:25)  HR: 81 (06-13-20 @ 04:55) (68 - 81)  BP: 175/75 (06-13-20 @ 04:55) (150/81 - 175/75)  RR: 18 (06-13-20 @ 04:55) (18 - 18)  SpO2: 95% (06-13-20 @ 04:55) (93% - 100%)  Wt(kg): --    I&O's Summary    HEENT:  (-)icterus (-)pallor  CV: N S1 S2 1/6 JAMAAL (+)2 Pulses B/l  Resp:  Clear to ausculatation B/L, normal effort  GI: (+) BS Soft, NT, ND  Lymph:  (-)Edema, (-)obvious lymphadenopathy  Skin: Warm to touch, Normal turgor  Psych: Appropriate mood and affect          ASSESSMENT/PLAN: 	74y  Female AD/DM/HTN/PVD/hysterectomy/cholecystectomy ,with CHF BIVICD, Mild anterior wall ischemia being medically managed  comes to ED with intractable nausea and vomiting for 2 days 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 bladder malignancy.    - ABX per medicine   - wound care , POD follow up   - A/c with heparin   - Interrogate [place] [employer] ICD  - tolerating  toprol XL 50 mg PO Daily   - Vasculary f/u regarding completely occluded popliteal artery  - Will need repeat stress prior to any  high risk surgery  - for EGD monday

## 2020-06-13 NOTE — PROGRESS NOTE ADULT - SUBJECTIVE AND OBJECTIVE BOX
Subjective: pain over wound controlled. no nausea or vomiting . no fevers      PHYSICAL EXAM:    Vital Signs Last 24 Hrs  T(C): 36.8 (13 Jun 2020 14:25), Max: 36.8 (13 Jun 2020 04:55)  T(F): 98.2 (13 Jun 2020 14:25), Max: 98.3 (13 Jun 2020 04:55)  HR: 84 (13 Jun 2020 14:25) (76 - 84)  BP: 124/70 (13 Jun 2020 14:25) (124/70 - 175/75)  BP(mean): --  RR: 18 (13 Jun 2020 14:25) (18 - 18)  SpO2: 94% (13 Jun 2020 14:25) (93% - 95%)      Constitutional: awake alert oriented times 3   [place] SCLERA anicteric EOMI   LUNGS clear   CVS s1 s2 aud no murmurs /ppm in place  ABDOMEN soft non tender no HSM   NEUROLOGY  no defecits   SKIN left foot dressed   EXTREMITIES no edema no cyanosis /no groin hematoma           LABS/DIAGNOSTIC TESTS                        9.3    13.23 )-----------( 342      ( 13 Jun 2020 05:26 )             29.1     06-13    130<L>  |  92<L>  |  24<H>  ----------------------------<  134<H>  3.8   |  31  |  0.96    Ca    8.3<L>      13 Jun 2020 12:06  Phos  3.5     06-13  Mg     2.0     06-13    TPro  6.7  /  Alb  2.0<L>  /  TBili  0.6  /  DBili  x   /  AST  20  /  ALT  25  /  AlkPhos  333<H>  06-12    PT/INR - ( 11 Jun 2020 21:29 )   PT: 15.0 sec;   INR: 1.32 ratio         PTT - ( 13 Jun 2020 05:26 )  PTT:61.5 sec      meds  acetaminophen   Tablet .. 650 milliGRAM(s) Oral every 6 hours PRN  aluminum hydroxide/magnesium hydroxide/simethicone Suspension 30 milliLiter(s) Oral every 6 hours PRN  atorvastatin 40 milliGRAM(s) Oral at bedtime  cefTRIAXone   IVPB 2000 milliGRAM(s) IV Intermittent every 24 hours  Dakins Solution - 1/4 Strength 1 Application(s) Topical daily  diphenhydrAMINE   Injectable 15 milliGRAM(s) IV Push every 6 hours PRN  doxycycline IVPB 100 milliGRAM(s) IV Intermittent every 12 hours  furosemide    Tablet 20 milliGRAM(s) Oral daily  gabapentin 100 milliGRAM(s) Oral every 12 hours  heparin  Infusion. 800 Unit(s)/Hr IV Continuous <Continuous>  hydrALAZINE 100 milliGRAM(s) Oral three times a day  insulin glargine Injectable (LANTUS) 10 Unit(s) SubCutaneous at bedtime  insulin lispro (HumaLOG) corrective regimen sliding scale   SubCutaneous Before meals and at bedtime  lactobacillus acidophilus 1 Tablet(s) Oral three times a day with meals  lisinopril 40 milliGRAM(s) Oral daily  melatonin 5 milliGRAM(s) Oral at bedtime  metoprolol succinate ER 50 milliGRAM(s) Oral daily  metoprolol tartrate Injectable 5 milliGRAM(s) IV Push every 6 hours PRN  ondansetron Injectable 4 milliGRAM(s) IV Push every 8 hours PRN  pantoprazole  Injectable 40 milliGRAM(s) IV Push daily  traMADol 25 milliGRAM(s) Oral every 6 hours PRN        CULTURES  no new cx       RADIOLOGY  no new xrays

## 2020-06-14 LAB
ANION GAP SERPL CALC-SCNC: 8 MMOL/L — SIGNIFICANT CHANGE UP (ref 5–17)
APTT BLD: 94.7 SEC — HIGH (ref 27.5–36.3)
BUN SERPL-MCNC: 26 MG/DL — HIGH (ref 7–18)
CALCIUM SERPL-MCNC: 8.8 MG/DL — SIGNIFICANT CHANGE UP (ref 8.4–10.5)
CHLORIDE SERPL-SCNC: 92 MMOL/L — LOW (ref 96–108)
CO2 SERPL-SCNC: 29 MMOL/L — SIGNIFICANT CHANGE UP (ref 22–31)
CREAT SERPL-MCNC: 0.98 MG/DL — SIGNIFICANT CHANGE UP (ref 0.5–1.3)
GLUCOSE BLDC GLUCOMTR-MCNC: 139 MG/DL — HIGH (ref 70–99)
GLUCOSE BLDC GLUCOMTR-MCNC: 164 MG/DL — HIGH (ref 70–99)
GLUCOSE BLDC GLUCOMTR-MCNC: 187 MG/DL — HIGH (ref 70–99)
GLUCOSE BLDC GLUCOMTR-MCNC: 190 MG/DL — HIGH (ref 70–99)
GLUCOSE SERPL-MCNC: 132 MG/DL — HIGH (ref 70–99)
HCT VFR BLD CALC: 29.8 % — LOW (ref 34.5–45)
HGB BLD-MCNC: 9.6 G/DL — LOW (ref 11.5–15.5)
MAGNESIUM SERPL-MCNC: 2 MG/DL — SIGNIFICANT CHANGE UP (ref 1.6–2.6)
MCHC RBC-ENTMCNC: 28.2 PG — SIGNIFICANT CHANGE UP (ref 27–34)
MCHC RBC-ENTMCNC: 32.2 GM/DL — SIGNIFICANT CHANGE UP (ref 32–36)
MCV RBC AUTO: 87.6 FL — SIGNIFICANT CHANGE UP (ref 80–100)
NRBC # BLD: 0 /100 WBCS — SIGNIFICANT CHANGE UP (ref 0–0)
PHOSPHATE SERPL-MCNC: 3.1 MG/DL — SIGNIFICANT CHANGE UP (ref 2.5–4.5)
PLATELET # BLD AUTO: 379 K/UL — SIGNIFICANT CHANGE UP (ref 150–400)
POTASSIUM SERPL-MCNC: 3.7 MMOL/L — SIGNIFICANT CHANGE UP (ref 3.5–5.3)
POTASSIUM SERPL-SCNC: 3.7 MMOL/L — SIGNIFICANT CHANGE UP (ref 3.5–5.3)
RBC # BLD: 3.4 M/UL — LOW (ref 3.8–5.2)
RBC # FLD: 15.3 % — HIGH (ref 10.3–14.5)
SARS-COV-2 RNA SPEC QL NAA+PROBE: SIGNIFICANT CHANGE UP
SODIUM SERPL-SCNC: 129 MMOL/L — LOW (ref 135–145)
WBC # BLD: 13.34 K/UL — HIGH (ref 3.8–10.5)
WBC # FLD AUTO: 13.34 K/UL — HIGH (ref 3.8–10.5)

## 2020-06-14 PROCEDURE — 99233 SBSQ HOSP IP/OBS HIGH 50: CPT

## 2020-06-14 RX ADMIN — TRAMADOL HYDROCHLORIDE 25 MILLIGRAM(S): 50 TABLET ORAL at 00:09

## 2020-06-14 RX ADMIN — GABAPENTIN 100 MILLIGRAM(S): 400 CAPSULE ORAL at 06:13

## 2020-06-14 RX ADMIN — Medication 1 TABLET(S): at 17:23

## 2020-06-14 RX ADMIN — ATORVASTATIN CALCIUM 40 MILLIGRAM(S): 80 TABLET, FILM COATED ORAL at 22:02

## 2020-06-14 RX ADMIN — Medication 100 MILLIGRAM(S): at 05:36

## 2020-06-14 RX ADMIN — Medication 50 MILLIGRAM(S): at 05:36

## 2020-06-14 RX ADMIN — Medication 100 MILLIGRAM(S): at 22:02

## 2020-06-14 RX ADMIN — Medication 20 MILLIGRAM(S): at 05:36

## 2020-06-14 RX ADMIN — GABAPENTIN 100 MILLIGRAM(S): 400 CAPSULE ORAL at 17:25

## 2020-06-14 RX ADMIN — PANTOPRAZOLE SODIUM 40 MILLIGRAM(S): 20 TABLET, DELAYED RELEASE ORAL at 12:57

## 2020-06-14 RX ADMIN — TRAMADOL HYDROCHLORIDE 25 MILLIGRAM(S): 50 TABLET ORAL at 22:13

## 2020-06-14 RX ADMIN — HEPARIN SODIUM 800 UNIT(S)/HR: 5000 INJECTION INTRAVENOUS; SUBCUTANEOUS at 08:45

## 2020-06-14 RX ADMIN — Medication 1 TABLET(S): at 12:57

## 2020-06-14 RX ADMIN — Medication 100 MILLIGRAM(S): at 15:59

## 2020-06-14 RX ADMIN — Medication 1: at 08:07

## 2020-06-14 RX ADMIN — TRAMADOL HYDROCHLORIDE 25 MILLIGRAM(S): 50 TABLET ORAL at 10:29

## 2020-06-14 RX ADMIN — LISINOPRIL 40 MILLIGRAM(S): 2.5 TABLET ORAL at 05:36

## 2020-06-14 RX ADMIN — CEFTRIAXONE 100 MILLIGRAM(S): 500 INJECTION, POWDER, FOR SOLUTION INTRAMUSCULAR; INTRAVENOUS at 17:25

## 2020-06-14 RX ADMIN — Medication 1 APPLICATION(S): at 19:37

## 2020-06-14 RX ADMIN — Medication 1: at 16:56

## 2020-06-14 RX ADMIN — Medication 1 TABLET(S): at 08:07

## 2020-06-14 RX ADMIN — Medication 5 MILLIGRAM(S): at 22:04

## 2020-06-14 RX ADMIN — INSULIN GLARGINE 10 UNIT(S): 100 INJECTION, SOLUTION SUBCUTANEOUS at 22:02

## 2020-06-14 RX ADMIN — Medication 1: at 22:03

## 2020-06-14 NOTE — PROGRESS NOTE ADULT - SUBJECTIVE AND OBJECTIVE BOX
pt seen and examined, no complaints, ROS - .          acetaminophen   Tablet .. 650 milliGRAM(s) Oral every 6 hours PRN  aluminum hydroxide/magnesium hydroxide/simethicone Suspension 30 milliLiter(s) Oral every 6 hours PRN  atorvastatin 40 milliGRAM(s) Oral at bedtime  cefTRIAXone   IVPB 2000 milliGRAM(s) IV Intermittent every 24 hours  Dakins Solution - 1/4 Strength 1 Application(s) Topical daily  diphenhydrAMINE   Injectable 15 milliGRAM(s) IV Push every 6 hours PRN  furosemide    Tablet 20 milliGRAM(s) Oral daily  gabapentin 100 milliGRAM(s) Oral every 12 hours  heparin  Infusion. 800 Unit(s)/Hr IV Continuous <Continuous>  hydrALAZINE 100 milliGRAM(s) Oral three times a day  insulin glargine Injectable (LANTUS) 10 Unit(s) SubCutaneous at bedtime  insulin lispro (HumaLOG) corrective regimen sliding scale   SubCutaneous Before meals and at bedtime  lactobacillus acidophilus 1 Tablet(s) Oral three times a day with meals  lisinopril 40 milliGRAM(s) Oral daily  melatonin 5 milliGRAM(s) Oral at bedtime  metoprolol succinate ER 50 milliGRAM(s) Oral daily  metoprolol tartrate Injectable 5 milliGRAM(s) IV Push every 6 hours PRN  ondansetron Injectable 4 milliGRAM(s) IV Push every 8 hours PRN  pantoprazole  Injectable 40 milliGRAM(s) IV Push daily  traMADol 25 milliGRAM(s) Oral every 6 hours PRN                            9.3    13.23 )-----------( 342      ( 13 Jun 2020 05:26 )             29.1       Hemoglobin: 9.3 g/dL (06-13 @ 05:26)  Hemoglobin: 9.5 g/dL (06-12 @ 22:25)  Hemoglobin: 9.7 g/dL (06-12 @ 07:18)  Hemoglobin: 9.4 g/dL (06-11 @ 07:26)  Hemoglobin: 9.5 g/dL (06-10 @ 18:36)      06-13    130<L>  |  92<L>  |  24<H>  ----------------------------<  134<H>  3.8   |  31  |  0.96    Ca    8.3<L>      13 Jun 2020 12:06  Phos  3.5     06-13  Mg     2.0     06-13      Creatinine Trend: 0.96<--, 1.01<--, 0.97<--, 0.83<--, 0.84<--, 1.00<--    COAGS:           T(C): 36.9 (06-14-20 @ 05:05), Max: 36.9 (06-14-20 @ 05:05)  HR: 73 (06-14-20 @ 05:05) (73 - 86)  BP: 173/75 (06-14-20 @ 05:05) (124/70 - 173/75)  RR: 17 (06-14-20 @ 05:05) (17 - 18)  SpO2: 95% (06-14-20 @ 05:05) (93% - 95%)  Wt(kg): --    I&O's Summary    HEENT:  (-)icterus (-)pallor  CV: N S1 S2 1/6 JAMAAL (+)2 Pulses B/l  Resp:  Clear to ausculatation B/L, normal effort  GI: (+) BS Soft, NT, ND  Lymph:  (-)Edema, (-)obvious lymphadenopathy  Skin: Warm to touch, Normal turgor  Psych: Appropriate mood and affect          ASSESSMENT/PLAN: 	74y  Female AD/DM/HTN/PVD/hysterectomy/cholecystectomy ,with CHF BIVICD, Mild anterior wall ischemia being medically managed  comes to ED with intractable nausea and vomiting for 2 days 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 bladder malignancy.    - ABX per medicine   - wound care , POD follow up   - A/c with heparin   - Interrogate [place] [employer] ICD  - tolerating  toprol XL 50 mg PO Daily   - Vasculary f/u regarding completely occluded popliteal artery  - Will need repeat stress prior to any  high risk surgery  - for EGD monday

## 2020-06-14 NOTE — PROGRESS NOTE ADULT - ASSESSMENT
A/P  PVD /PAD  DM  ulcer   dehiscence surgical wound   history of gangrene left hallux   positive probe to bone   leukocytosis -stable reactive after surgery   OM with  abscess  left -P/O day 5  pyogenic tendon       pt seen and eval   chart review  s/p angiogram -pt is very high risk for none healing  due to very poor vascular status as per Dr. Grove   Ms Keating and her daughter want to see if the wound will improve before discussing BKA  pt is at risk of hematoma formation/bleeding due to heparin drip   continue antibiotics as per id -id    dressing change  sutures tight  keep foot slightly elevated   no weight bearing  -due to risk of bleeding   please call with any pt related questions [phone]

## 2020-06-14 NOTE — PROGRESS NOTE ADULT - SUBJECTIVE AND OBJECTIVE BOX
HPI:  75 YO female from home, H/O CAD/DM/HTN/PVD/hysterectomy/cholecystectomy ,with AICD comes to ED with intractable nausea and vomiting for 2 days. Patient states that she recently had left big toe amputation in the beginning of May and she was taking antibiotics. On Friday, her antibiotics were changed from clindamycin to Levaquin and after she took antibiotics along with percocet, she started having severe pain in epigastric region. Patient reports that it was sudden in onset, 7/10, burning & pressure like pain, non-radiating , associated with nausea,, bitter taste in mouth and projectile vomiting. She was not able to tolerate food and it aggravated her symptoms. Patient also reports significant weight( not sure how much) recently and decrease in appetite.   patient denies fever, cough, SOB, constipation, dysuria, headache, chest pain, orthopnea, back pain, burning sensation in extremities.    ED course; Patient is in mild distress due to nausea but otherwise stable  Vitals:     /88  SpO2 99% on RA    CT abdomen/pelvis:   NEW MULTI BLADDER WALL FOCAL AREAS OF SOFT TISSUE THICKENING CONCERNING FOR BLADDER CARCINOMA/TRANSITIONAL CELL CARCINOMA. No hydronephrosis.  Stomach not fully distended and gastric pathology cannot be excluded.  Status post cholecystectomy and hysterectomy.  Remaining abdominal pelvic viscera unremarkable.   No evidence of colitis.. (30 May 2020 14:56)  Vital Signs Last 24 Hrs  T(C): 36.8 (14 Jun 2020 14:39), Max: 36.9 (14 Jun 2020 05:05)  T(F): 98.3 (14 Jun 2020 14:39), Max: 98.5 (14 Jun 2020 05:05)  HR: 74 (14 Jun 2020 14:39) (73 - 86)  BP: 165/77 (14 Jun 2020 14:39) (140/73 - 173/75)  BP(mean): --  RR: 17 (14 Jun 2020 14:39) (17 - 18)  SpO2: 93% (14 Jun 2020 14:39) (93% - 98%)  WBC Count: 13.34 K/uL (06-14 @ 08:16)  WBC Count: 13.23 K/uL (06-13 @ 05:26)  WBC Count: 13.52 K/uL (06-12 @ 22:25)  WBC Count: 12.45 K/uL (06-12 @ 07:18)  WBC Count: 17.81 K/uL (06-11 @ 07:26)  06-14    129<L>  |  92<L>  |  26<H>  ----------------------------<  132<H>  3.7   |  29  |  0.98    Ca    8.8      14 Jun 2020 08:16  Phos  3.1     06-14  Mg     2.0     06-14    MEDICATIONS  (STANDING):  atorvastatin 40 milliGRAM(s) Oral at bedtime  cefTRIAXone   IVPB 2000 milliGRAM(s) IV Intermittent every 24 hours  Dakins Solution - 1/4 Strength 1 Application(s) Topical daily  furosemide    Tablet 20 milliGRAM(s) Oral daily  gabapentin 100 milliGRAM(s) Oral every 12 hours  heparin  Infusion. 800 Unit(s)/Hr (8 mL/Hr) IV Continuous <Continuous>  hydrALAZINE 100 milliGRAM(s) Oral three times a day  insulin glargine Injectable (LANTUS) 10 Unit(s) SubCutaneous at bedtime  insulin lispro (HumaLOG) corrective regimen sliding scale   SubCutaneous Before meals and at bedtime  lactobacillus acidophilus 1 Tablet(s) Oral three times a day with meals  lisinopril 40 milliGRAM(s) Oral daily  melatonin 5 milliGRAM(s) Oral at bedtime  metoprolol succinate ER 50 milliGRAM(s) Oral daily  pantoprazole  Injectable 40 milliGRAM(s) IV Push daily    < from: Xray Foot AP + Lateral + Oblique, Left (06.11.20 @ 10:09) >    EXAM:  FOOT LEFT (MINIMUM 3 VIEWS)                            PROCEDURE DATE:  06/11/2020          INTERPRETATION:  Left foot    HISTORY: Postop      Three views of the left foot show transmetatarsal amputation of the first toe. The joint spaces are maintained.    IMPRESSION: Postoperative changes.    Thank you for this referral.                ELYSSA RECIO M.D., ATTENDING RADIOLOGIST  This document has been electronically signed. Jun 11 2020 10:47AM        < end of copied text >  < from: [facility] Physiol Extremity Lower 3+ Level, BI (06.06.20 @ 12:48) >  EXAM:  US PHYSIOL LWR EXT 3+ LEV BI                            PROCEDURE DATE:  06/06/2020          INTERPRETATION:  Clinical Information: Peripheral vascular disease. Left toe ulceration.    Technique: Bilateral lower extremity ABIs/PVR    Comparison: None    Findings/  Impression:  Right lower extremity: The ankle brachial index is 0.6. The pulse waveforms are diffusely diminished.    Left lower extremity: The ankle brachial index is 0.5. The pulse waveforms are diffusely diminished, however particularly severe in the left foot.    Severe bilateral peripheral artery disease.    Pulse volume recordings are diffusely diminished bilaterally, however severely diminished in the left foot.            < end of copied text >  Culture - Abscess with Gram Stain (06.06.20 @ 03:05)    -  Trimethoprim/Sulfamethoxazole: S <=0.5/9.5    -  Vancomycin: S 1    -  Tetra/Doxy: S <=1    -  RIF- Rifampin: S <=1 Should not be used as monotherapy    -  Ampicillin/Sulbactam: S <=8/4    -  Oxacillin: S <=0.25    -  Penicillin: R 2    -  Gentamicin: S <=1 Should not be used as monotherapy    -  Clindamycin: R >4    -  Erythromycin: R >4    -  Cefazolin: S <=4    Specimen Source: .Abscess left foot    Culture Results:   Moderate Staphylococcus aureus    Organism Identification: Staphylococcus aureus  Staphylococcus aureus    Organism: Staphylococcus aureus    Organism: Staphylococcus aureus    Method Type: LI    Method Type: LI    Surgical Pathology Report (06.09.20 @ 13:38)    Surgical Pathology Report:   ACCESSION No:  [id number]    DARRION MELENDEZ                      1        Surgical Final Report          Final Diagnosis    1. Left first metatarsal; amputation:  - Partially necrotic and severely inflamed fibroconnective  tissue containing fibrinopurulent exudate.  - Acute osteomyelitis.    2. Clean margin, left foot; amputation:  - Bone with bone marrow and periosteal fibroconnective tissue  without acute inflammation, consistent with clean  margin.    Verified by: Kath Levin MD  (Electronic Signature)  Reported on: 06/11/20 12:08 EDT, [facility],  [address] [place] [address], [place] [address]  Phone: [phone]   Fax: [phone]  _________________________________________________________________    Clinical History  Abscess, left foot osteomyelitis, right metatarsal  Incision and drainage  Wound debridement, left foot partial first metatarsal amputation    Specimen(s) Submitted  1-Left first metatarsal  2-Clean margin left foot    Gross Description  1.  The specimen is received in formalin and the specimen  container is labeled: Left first metatarsal.  It consists of  multiple fragments of pink red focally hemorrhagic bones and soft  tissue measuring 5 x 4.5 x 2 cm in aggregate.  Representative  sections are submitted in three cassettes after fixation and  decalcification.    2.   The specimen is received in formalin and the specimen  container is labeled: Clean margin left foot.  It consists of two  fragments of tan bones measuring 0.5 x 0.4 x 0.2 cm and 1 x 0.6 x  0.2 cm in greatest dimension.  Entirely submitted.  One cassette  after fixation and decalcification.    In addition to other data that may appear on the specimen  containers, all labels have been inspected to confirm the  presence of the patient's name and date of birth.  Cesar Álvaro 06/10/2020 10:29      MEDICATIONS  (PRN):  acetaminophen   Tablet .. 650 milliGRAM(s) Oral every 6 hours PRN Mild Pain (1 - 3), Moderate Pain (4 - 6)  aluminum hydroxide/magnesium hydroxide/simethicone Suspension 30 milliLiter(s) Oral every 6 hours PRN Dyspepsia  diphenhydrAMINE   Injectable 15 milliGRAM(s) IV Push every 6 hours PRN nausea or vomiting  metoprolol tartrate Injectable 5 milliGRAM(s) IV Push every 6 hours PRN HR greater than 120  ondansetron Injectable 4 milliGRAM(s) IV Push every 8 hours PRN Nausea and/or Vomiting  traMADol 25 milliGRAM(s) Oral every 6 hours PRN Severe Pain (7 - 10)      Allergies    codeine (Rash)  penicillin (Rash)    Intolerances        pt  seen and evaluated at bedside , reports nausea is improved  , feeling better today ,  AAOX3  reports very little  pain in her foot sometimes  ,  tolerated exam and dressing change very well    exam focused LE   left foot s/p I&D of abscess and 1first met partial amputation      wound packed with gauze strip, there mild-dark  sanguinous drainage , no odor   less drainage , no crepitus , no fluctuance   CRF -<5sec   DP/PT -0- out of 4   TG -WNL   CRF -1-2 sec x4   there is  erythema ascending to the level of the midfoot -some improvement  ,  , the area is slightly tender -improving   pt is able to move her lower extremities

## 2020-06-14 NOTE — PROGRESS NOTE ADULT - SUBJECTIVE AND OBJECTIVE BOX
INTERVAL HPI/OVERNIGHT EVENTS:  Patient seen at bedside,events noticed,doing better,no dystress  VITAL SIGNS:  T(F): 98.5 (06-14-20 @ 05:05)  HR: 73 (06-14-20 @ 05:05)  BP: 173/75 (06-14-20 @ 05:05)  RR: 17 (06-14-20 @ 05:05)  SpO2: 95% (06-14-20 @ 05:05)  Wt(kg): --    PHYSICAL EXAM:  awake  Constitutional:  Eyes:  ENMT:perrla  Neck:  Respiratory:clear  Cardiovascular:s1 s2,m-none  Gastrointestinal:softmbs pos  Extremities:l/extrem with dressing  Vascular:  Neurological:no focal deficit  Musculoskeletal:    MEDICATIONS  (STANDING):  atorvastatin 40 milliGRAM(s) Oral at bedtime  cefTRIAXone   IVPB 2000 milliGRAM(s) IV Intermittent every 24 hours  Dakins Solution - 1/4 Strength 1 Application(s) Topical daily  furosemide    Tablet 20 milliGRAM(s) Oral daily  gabapentin 100 milliGRAM(s) Oral every 12 hours  heparin  Infusion. 800 Unit(s)/Hr (8 mL/Hr) IV Continuous <Continuous>  hydrALAZINE 100 milliGRAM(s) Oral three times a day  insulin glargine Injectable (LANTUS) 10 Unit(s) SubCutaneous at bedtime  insulin lispro (HumaLOG) corrective regimen sliding scale   SubCutaneous Before meals and at bedtime  lactobacillus acidophilus 1 Tablet(s) Oral three times a day with meals  lisinopril 40 milliGRAM(s) Oral daily  melatonin 5 milliGRAM(s) Oral at bedtime  metoprolol succinate ER 50 milliGRAM(s) Oral daily  pantoprazole  Injectable 40 milliGRAM(s) IV Push daily    MEDICATIONS  (PRN):  acetaminophen   Tablet .. 650 milliGRAM(s) Oral every 6 hours PRN Mild Pain (1 - 3), Moderate Pain (4 - 6)  aluminum hydroxide/magnesium hydroxide/simethicone Suspension 30 milliLiter(s) Oral every 6 hours PRN Dyspepsia  diphenhydrAMINE   Injectable 15 milliGRAM(s) IV Push every 6 hours PRN nausea or vomiting  metoprolol tartrate Injectable 5 milliGRAM(s) IV Push every 6 hours PRN HR greater than 120  ondansetron Injectable 4 milliGRAM(s) IV Push every 8 hours PRN Nausea and/or Vomiting  traMADol 25 milliGRAM(s) Oral every 6 hours PRN Severe Pain (7 - 10)      Allergies    codeine (Rash)  penicillin (Rash)    Intolerances        LABS:                        9.6    13.34 )-----------( 379      ( 14 Jun 2020 08:16 )             29.8     06-14    129<L>  |  92<L>  |  26<H>  ----------------------------<  132<H>  3.7   |  29  |  0.98    Ca    8.8      14 Jun 2020 08:16  Phos  3.1     06-14  Mg     2.0     06-14      PTT - ( 14 Jun 2020 08:16 )  PTT:94.7 sec    Assessment and Plan:   · Assessment		  75 YO female from home, H/O CAD/DM/HTN/PVD/hysterectomy/cholecystectomy ,with AICD has come to ED with intractable nausea and vomiting for 2 days, likely due to pill induced esophagitis/gastritis. Patient reports that it was sudden in onset, 7/10, burning & pressure like pain, non-radiating , associated with nausea and projectile vomiting. Patient also reports significant weight( not sure how much) recently and decrease in appetite. CT abdomen/pelvis shows bladder wall thickening and pt has history of Stage1 endometrial cancer,   Patient also has high blood pressure because she missed her medications  Admitting patient for management of  Acute Gastritis  Intractable abdominal pain  hypertension  weight loss/ suspected malignancy workup         Problem/Plan - 1:  ·  Problem: Stroke.  Plan: CTA head/neck showed LEFT PICA stroke  -will switch to oral anticoagulation after EGD  Neuro Dr. Moncada.      Problem/Plan - 2:  ·  Problem: PVD (peripheral vascular disease)-severe  s/p left big toe amputation in May 2020  - ESTRELLA: RLE:  0.6. LLE:  0.5. Severe bilateral peripheral artery disease. Pulse volume recordings are diffusely diminished bilaterally, however severely diminished in the left foot.  s/p left metatarsal amputation  PT re eval  Vascular Dr. Grove.      Problem/Plan - 3:  ·  Problem: Osteomyelitis of left foot, unspecified type.  Plan: recently antibiotics changed from clindamycin to Levaquin that led to epigastric pain.  Podiatry Dr. Yo - s/p I and D  ID Dr. Reagan.   cont abx as per id     Problem/Plan - 4:  ·  Problem: Intractable nausea and vomiting-improved clinically  partially relieved with oral Protonix  CT abd w/ IV cont: Small bilateral pleural effusions. Multiple nodular peripheral filling defect in the urinary bladder measuring up to 1.3 cm, suspicious for transitional cell carcinomas.  EGD on monday  -. Guttman.      Problem/Plan - 5:  ·  Problem: Bladder wall thickening.  Plan: CT abdomen shows bladder wall thickening  reports weight loss and loss of appetite recently, Patient has history of Stage 1 endometrial cancer and hysterectomy  brother also had cancer and she is not sure about which one  - urine cytology: NEGATIVE FOR HIGH GRADE UROTHELIAL CARCINOMA  urology consulted: outpt f/u w/ , need cystoscopy.      Problem/Plan - 6:  Problem: HTN (hypertension). Plan: Pt takes Lisinopril 40mg, toprol Xl 100mg, hydralazine 50mg TID   BP is running on higher side .  lipid profile, TG WNL  -increased dose of hydralazine.     Problem/Plan - 7:  ·  Problem: DM (diabetes mellitus).  Plan: Patient takes Levemir 30mg at night and 10Units pre-meals  c/w sliding scale and Lantus 20Units HS  Diabetic diet  Hb A1C: 8.1%  - monitor BS.      Problem/Plan - 8:  ·  Problem: CAD (coronary artery disease).  Plan: H/o CAD and PCI in 2009,  patient has both AICD and pacemaker  EKG shows paced Rythm  No active issues  continue aspirin , Plavix, BB, statin, ACE - pt is not tolerated to PO meds  - c/w heparin drip.      Problem/Plan - 9:  ·  Problem: CHF (congestive heart failure).  Plan: H/o CHF but clinically patient is not in fluid overload  No peripheral edema  Patient takes furosemide 20mg daily twice  BNP: elevated at 9383  echo: EF 50-55%, mild TR, NV  - holding lasix 20mg IV as pt is not eating and dehydrated  - c/w statin and BB.      Problem/Plan - 10:  Problem: Prophylactic measure. Plan; IMPROVE VTE Individual Risk Assessment  RISK                                                                Points  [  ] Previous VTE                                                  3  [  ] Thrombophilia                                               2  [  ] Lower limb paralysis                                      2        (unable to hold up >15 seconds)    [  ] Current Cancer                                              2         (within 6 months)  [x  ] Immobilization > 24 hrs                                1  [  ] ICU/CCU stay > 24 hours                              1  [  x] Age > 60                                                      1    IMPROVE VTE Score 2  heparin drip.

## 2020-06-15 ENCOUNTER — RESULT REVIEW (OUTPATIENT)
Age: 75
End: 2020-06-15

## 2020-06-15 LAB
ANION GAP SERPL CALC-SCNC: 8 MMOL/L — SIGNIFICANT CHANGE UP (ref 5–17)
APTT BLD: 38 SEC — HIGH (ref 27.5–36.3)
BUN SERPL-MCNC: 23 MG/DL — HIGH (ref 7–18)
CALCIUM SERPL-MCNC: 8.8 MG/DL — SIGNIFICANT CHANGE UP (ref 8.4–10.5)
CHLORIDE SERPL-SCNC: 93 MMOL/L — LOW (ref 96–108)
CO2 SERPL-SCNC: 31 MMOL/L — SIGNIFICANT CHANGE UP (ref 22–31)
CREAT SERPL-MCNC: 0.88 MG/DL — SIGNIFICANT CHANGE UP (ref 0.5–1.3)
GLUCOSE BLDC GLUCOMTR-MCNC: 121 MG/DL — HIGH (ref 70–99)
GLUCOSE BLDC GLUCOMTR-MCNC: 129 MG/DL — HIGH (ref 70–99)
GLUCOSE BLDC GLUCOMTR-MCNC: 140 MG/DL — HIGH (ref 70–99)
GLUCOSE BLDC GLUCOMTR-MCNC: 209 MG/DL — HIGH (ref 70–99)
GLUCOSE SERPL-MCNC: 110 MG/DL — HIGH (ref 70–99)
HCT VFR BLD CALC: 30.5 % — LOW (ref 34.5–45)
HGB BLD-MCNC: 9.4 G/DL — LOW (ref 11.5–15.5)
INR BLD: 1.24 RATIO — HIGH (ref 0.88–1.16)
MAGNESIUM SERPL-MCNC: 1.9 MG/DL — SIGNIFICANT CHANGE UP (ref 1.6–2.6)
MCHC RBC-ENTMCNC: 27.6 PG — SIGNIFICANT CHANGE UP (ref 27–34)
MCHC RBC-ENTMCNC: 30.8 GM/DL — LOW (ref 32–36)
MCV RBC AUTO: 89.7 FL — SIGNIFICANT CHANGE UP (ref 80–100)
NRBC # BLD: 0 /100 WBCS — SIGNIFICANT CHANGE UP (ref 0–0)
PHOSPHATE SERPL-MCNC: 3.2 MG/DL — SIGNIFICANT CHANGE UP (ref 2.5–4.5)
PLATELET # BLD AUTO: 404 K/UL — HIGH (ref 150–400)
POTASSIUM SERPL-MCNC: 4.1 MMOL/L — SIGNIFICANT CHANGE UP (ref 3.5–5.3)
POTASSIUM SERPL-SCNC: 4.1 MMOL/L — SIGNIFICANT CHANGE UP (ref 3.5–5.3)
PROTHROM AB SERPL-ACNC: 14.1 SEC — HIGH (ref 10–12.9)
RBC # BLD: 3.4 M/UL — LOW (ref 3.8–5.2)
RBC # FLD: 15.3 % — HIGH (ref 10.3–14.5)
SODIUM SERPL-SCNC: 132 MMOL/L — LOW (ref 135–145)
WBC # BLD: 14.39 K/UL — HIGH (ref 3.8–10.5)
WBC # FLD AUTO: 14.39 K/UL — HIGH (ref 3.8–10.5)

## 2020-06-15 PROCEDURE — 88312 SPECIAL STAINS GROUP 1: CPT | Mod: 26

## 2020-06-15 PROCEDURE — 99231 SBSQ HOSP IP/OBS SF/LOW 25: CPT

## 2020-06-15 PROCEDURE — 99233 SBSQ HOSP IP/OBS HIGH 50: CPT

## 2020-06-15 PROCEDURE — ZZZZZ: CPT

## 2020-06-15 PROCEDURE — 88305 TISSUE EXAM BY PATHOLOGIST: CPT | Mod: 26

## 2020-06-15 RX ORDER — SODIUM CHLORIDE 9 MG/ML
1000 INJECTION, SOLUTION INTRAVENOUS
Refills: 0 | Status: DISCONTINUED | OUTPATIENT
Start: 2020-06-15 | End: 2020-06-19

## 2020-06-15 RX ORDER — ACETAMINOPHEN 500 MG
1000 TABLET ORAL ONCE
Refills: 0 | Status: COMPLETED | OUTPATIENT
Start: 2020-06-15 | End: 2020-06-15

## 2020-06-15 RX ORDER — TRAMADOL HYDROCHLORIDE 50 MG/1
25 TABLET ORAL ONCE
Refills: 0 | Status: DISCONTINUED | OUTPATIENT
Start: 2020-06-15 | End: 2020-06-15

## 2020-06-15 RX ADMIN — TRAMADOL HYDROCHLORIDE 25 MILLIGRAM(S): 50 TABLET ORAL at 05:10

## 2020-06-15 RX ADMIN — CEFTRIAXONE 100 MILLIGRAM(S): 500 INJECTION, POWDER, FOR SOLUTION INTRAMUSCULAR; INTRAVENOUS at 17:20

## 2020-06-15 RX ADMIN — LISINOPRIL 40 MILLIGRAM(S): 2.5 TABLET ORAL at 05:11

## 2020-06-15 RX ADMIN — Medication 400 MILLIGRAM(S): at 11:19

## 2020-06-15 RX ADMIN — GABAPENTIN 100 MILLIGRAM(S): 400 CAPSULE ORAL at 05:10

## 2020-06-15 RX ADMIN — GABAPENTIN 100 MILLIGRAM(S): 400 CAPSULE ORAL at 17:22

## 2020-06-15 RX ADMIN — Medication 100 MILLIGRAM(S): at 17:20

## 2020-06-15 RX ADMIN — Medication 5 MILLIGRAM(S): at 21:34

## 2020-06-15 RX ADMIN — PANTOPRAZOLE SODIUM 40 MILLIGRAM(S): 20 TABLET, DELAYED RELEASE ORAL at 11:14

## 2020-06-15 RX ADMIN — Medication 1 APPLICATION(S): at 18:34

## 2020-06-15 RX ADMIN — Medication 20 MILLIGRAM(S): at 05:11

## 2020-06-15 RX ADMIN — Medication 1 TABLET(S): at 17:20

## 2020-06-15 RX ADMIN — ATORVASTATIN CALCIUM 40 MILLIGRAM(S): 80 TABLET, FILM COATED ORAL at 21:34

## 2020-06-15 RX ADMIN — HEPARIN SODIUM 1100 UNIT(S)/HR: 5000 INJECTION INTRAVENOUS; SUBCUTANEOUS at 19:15

## 2020-06-15 RX ADMIN — Medication 100 MILLIGRAM(S): at 05:11

## 2020-06-15 RX ADMIN — Medication 50 MILLIGRAM(S): at 05:11

## 2020-06-15 RX ADMIN — Medication 2: at 21:34

## 2020-06-15 RX ADMIN — INSULIN GLARGINE 10 UNIT(S): 100 INJECTION, SOLUTION SUBCUTANEOUS at 21:34

## 2020-06-15 RX ADMIN — SODIUM CHLORIDE 70 MILLILITER(S): 9 INJECTION, SOLUTION INTRAVENOUS at 11:15

## 2020-06-15 RX ADMIN — TRAMADOL HYDROCHLORIDE 25 MILLIGRAM(S): 50 TABLET ORAL at 01:15

## 2020-06-15 RX ADMIN — Medication 100 MILLIGRAM(S): at 21:33

## 2020-06-15 NOTE — PROGRESS NOTE ADULT - PROBLEM SELECTOR PLAN 3
recently antibiotics changed from clindamycin to Levaquin that led to epigastric pain.  Leukocytosis. afebrile  CT L foot shows OM ( pt cannot get MRI dur to AICD)  Podiatry Dr. Yo - s/p I and D  ID Dr. Reagan  c/w Franky for now. D#3.

## 2020-06-15 NOTE — PROGRESS NOTE ADULT - PROBLEM SELECTOR PLAN 2
H/o PVD and popliteal stent  s/p left big toe amputation in May 2020  s/p ESTRELLA :  RLE:  0.6. LLE:  0.5. Severe bilateral peripheral artery disease. Pulse volume recordings are diffusely diminished bilaterally, however severely diminished in the left foot.  s/p left metatarsal amputation  PT re eval  Vascular Dr. Grove

## 2020-06-15 NOTE — PROGRESS NOTE ADULT - PROBLEM SELECTOR PLAN 4
likely GI etiology  UA negative, No fevers, UCx: -ve  CT abd/pelvis as above.    Gi dr. Matute following  EGD today likely GI etiology  UA negative, No fevers, UCx: -ve  CT abd/pelvis as above.    Gi dr. Matute following  EGD today  noted hyponatremia due to hx nausea with poor intake  started IVF.  repeat BMP in AM

## 2020-06-15 NOTE — PROGRESS NOTE ADULT - SUBJECTIVE AND OBJECTIVE BOX
Patient denies chest pain or shortness of breath.   Review of systems otherwise (-)  	  acetaminophen   Tablet .. 650 milliGRAM(s) Oral every 6 hours PRN  acetaminophen  IVPB .. 1000 milliGRAM(s) IV Intermittent once  aluminum hydroxide/magnesium hydroxide/simethicone Suspension 30 milliLiter(s) Oral every 6 hours PRN  atorvastatin 40 milliGRAM(s) Oral at bedtime  cefTRIAXone   IVPB 2000 milliGRAM(s) IV Intermittent every 24 hours  Dakins Solution - 1/4 Strength 1 Application(s) Topical daily  dextrose 5% + sodium chloride 0.9%. 1000 milliLiter(s) IV Continuous <Continuous>  diphenhydrAMINE   Injectable 15 milliGRAM(s) IV Push every 6 hours PRN  furosemide    Tablet 20 milliGRAM(s) Oral daily  gabapentin 100 milliGRAM(s) Oral every 12 hours  heparin  Infusion. 800 Unit(s)/Hr IV Continuous <Continuous>  hydrALAZINE 100 milliGRAM(s) Oral three times a day  insulin glargine Injectable (LANTUS) 10 Unit(s) SubCutaneous at bedtime  insulin lispro (HumaLOG) corrective regimen sliding scale   SubCutaneous Before meals and at bedtime  lactobacillus acidophilus 1 Tablet(s) Oral three times a day with meals  lisinopril 40 milliGRAM(s) Oral daily  melatonin 5 milliGRAM(s) Oral at bedtime  metoprolol succinate ER 50 milliGRAM(s) Oral daily  metoprolol tartrate Injectable 5 milliGRAM(s) IV Push every 6 hours PRN  ondansetron Injectable 4 milliGRAM(s) IV Push every 8 hours PRN  pantoprazole  Injectable 40 milliGRAM(s) IV Push daily  traMADol 25 milliGRAM(s) Oral every 6 hours PRN                            9.4    14.39 )-----------( 404      ( 15 Uli 2020 06:07 )             30.5       Hemoglobin: 9.4 g/dL (06-15 @ 06:07)  Hemoglobin: 9.6 g/dL (06-14 @ 08:16)  Hemoglobin: 9.3 g/dL (06-13 @ 05:26)  Hemoglobin: 9.5 g/dL (06-12 @ 22:25)  Hemoglobin: 9.7 g/dL (06-12 @ 07:18)      06-15    132<L>  |  93<L>  |  23<H>  ----------------------------<  110<H>  4.1   |  31  |  0.88    Ca    8.8      15 Uli 2020 06:07  Phos  3.2     06-15  Mg     1.9     06-15      Creatinine Trend: 0.88<--, 0.98<--, 0.96<--, 1.01<--, 0.97<--, 0.83<--    COAGS: PT/INR - ( 15 Uli 2020 06:07 )   PT: 14.1 sec;   INR: 1.24 ratio         PTT - ( 15 Uli 2020 06:07 )  PTT:38.0 sec          T(C): 36.9 (06-15-20 @ 05:06), Max: 36.9 (06-15-20 @ 05:06)  HR: 88 (06-15-20 @ 05:06) (73 - 88)  BP: 180/75 (06-15-20 @ 05:06) (140/73 - 180/75)  RR: 16 (06-15-20 @ 05:06) (16 - 17)  SpO2: 95% (06-15-20 @ 05:06) (93% - 98%)  Wt(kg): --    I&O's Summary          HEENT:  (-)icterus (-)pallor  CV: N S1 S2 1/6 JAMAAL (+)2 Pulses B/l  Resp:  Clear to ausculatation B/L, normal effort  GI: (+) BS Soft, NT, ND  Lymph:  (-)Edema, (-)obvious lymphadenopathy  Skin: Warm to touch, Normal turgor  Psych: Appropriate mood and affect          ASSESSMENT/PLAN: 	74y  Female AD/DM/HTN/PVD/hysterectomy/cholecystectomy ,with CHF BIVICD, Mild anterior wall ischemia being medically managed  comes to ED with intractable nausea and vomiting for 2 days 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 bladder malignancy.    - cont Heparin gtt for new afib and CVA  - Interrogate Fanshawe Sci ICD  -  cont toprol XL 50 mg PO Daily  - Tolerated I+D as well as partial amputation by podiatry  - Vasculary f/u regarding completely occluded popliteal artery  - Will need repeat stress prior to any  high risk surgery  - f/u EGD planned for today  - Transition to NOAC if no further invasive procedures     Pipo Segundo MD, [facility]  BEEPER [phone]

## 2020-06-15 NOTE — PROGRESS NOTE ADULT - PROBLEM SELECTOR PLAN 5
CT abdomen shows bladder wall thickening  pt reports weight loss and loss of appetite recently, Patient has history of Stage 1 endometrial cancer and hysterectomy  - urine cytology: NEGATIVE FOR HIGH GRADE UROTHELIAL CARCINOMA  urology consulted: outpt f/u w/ , need cystoscopy

## 2020-06-15 NOTE — PROGRESS NOTE ADULT - PROBLEM SELECTOR PLAN 7
Patient takes Levemir 30mg at night and 10Units pre-meals  c/w sliding scale and Lantus 20Units HS  Diabetic diet  Hb A1C: 8.1%  monitor BS

## 2020-06-15 NOTE — PROGRESS NOTE ADULT - SUBJECTIVE AND OBJECTIVE BOX
Subjective:       PHYSICAL EXAM:    Vital Signs Last 24 Hrs  T(C): 36.6 (15 Uli 2020 14:07), Max: 36.9 (15 Uli 2020 05:06)  T(F): 97.9 (15 Uli 2020 14:07), Max: 98.4 (15 Uli 2020 05:06)  HR: 66 (15 Uli 2020 14:07) (66 - 88)  BP: 144/59 (15 Uli 2020 14:07) (144/59 - 180/75)  BP(mean): --  RR: 16 (15 Uli 2020 14:07) (16 - 17)  SpO2: 97% (15 Uli 2020 14:07) (93% - 97%)    Constitutional:alert nad   CHEST/Respiratory:clear bilaterally   Cardiovascular:s1 s2 aud no murmurs   Gastrointestinal:soft non tender no HSM   Extremities:no edema   Neurological:no defecits   Skin:no rash         LABS/DIAGNOSTIC TESTS                        9.4    14.39 )-----------( 404      ( 15 Uli 2020 06:07 )             30.5     06-15    132<L>  |  93<L>  |  23<H>  ----------------------------<  110<H>  4.1   |  31  |  0.88    Ca    8.8      15 Uli 2020 06:07  Phos  3.2     06-15  Mg     1.9     06-15      PT/INR - ( 15 Uli 2020 06:07 )   PT: 14.1 sec;   INR: 1.24 ratio         PTT - ( 15 Uli 2020 06:07 )  PTT:38.0 sec        acetaminophen   Tablet .. 650 milliGRAM(s) Oral every 6 hours PRN  aluminum hydroxide/magnesium hydroxide/simethicone Suspension 30 milliLiter(s) Oral every 6 hours PRN  atorvastatin 40 milliGRAM(s) Oral at bedtime  cefTRIAXone   IVPB 2000 milliGRAM(s) IV Intermittent every 24 hours  Dakins Solution - 1/4 Strength 1 Application(s) Topical daily  dextrose 5% + sodium chloride 0.9%. 1000 milliLiter(s) IV Continuous <Continuous>  diphenhydrAMINE   Injectable 15 milliGRAM(s) IV Push every 6 hours PRN  furosemide    Tablet 20 milliGRAM(s) Oral daily  gabapentin 100 milliGRAM(s) Oral every 12 hours  heparin  Infusion. 800 Unit(s)/Hr IV Continuous <Continuous>  hydrALAZINE 100 milliGRAM(s) Oral three times a day  insulin glargine Injectable (LANTUS) 10 Unit(s) SubCutaneous at bedtime  insulin lispro (HumaLOG) corrective regimen sliding scale   SubCutaneous Before meals and at bedtime  lactobacillus acidophilus 1 Tablet(s) Oral three times a day with meals  lisinopril 40 milliGRAM(s) Oral daily  melatonin 5 milliGRAM(s) Oral at bedtime  metoprolol succinate ER 50 milliGRAM(s) Oral daily  metoprolol tartrate Injectable 5 milliGRAM(s) IV Push every 6 hours PRN  ondansetron Injectable 4 milliGRAM(s) IV Push every 8 hours PRN  pantoprazole  Injectable 40 milliGRAM(s) IV Push daily  traMADol 25 milliGRAM(s) Oral every 6 hours PRN        CULTURES        RADIOLOGY        IMPRESSION        PLAN Subjective: for egd. offers no new complaints. pain over stump controlled. no fevers       PHYSICAL EXAM:    Vital Signs Last 24 Hrs  T(C): 36.6 (15 Uli 2020 14:07), Max: 36.9 (15 Uli 2020 05:06)  T(F): 97.9 (15 Uli 2020 14:07), Max: 98.4 (15 Uli 2020 05:06)  HR: 66 (15 Uli 2020 14:07) (66 - 88)  BP: 144/59 (15 Uli 2020 14:07) (144/59 - 180/75)  BP(mean): --  RR: 16 (15 Uli 2020 14:07) (16 - 17)  SpO2: 97% (15 Uli 2020 14:07) (93% - 97%)        Constitutional: awake alert oriented times 3   [place] SCLERA anicteric EOMI   LUNGS clear   CVS s1 s2 aud no murmurs /ppm in place  ABDOMEN soft non tender no HSM   NEUROLOGY  no defecits   SKIN left foot dressed   EXTREMITIES no edema no cyanosis /no groin hematoma       LABS/DIAGNOSTIC TESTS                        9.4    14.39 )-----------( 404      ( 15 Uli 2020 06:07 )             30.5     06-15    132<L>  |  93<L>  |  23<H>  ----------------------------<  110<H>  4.1   |  31  |  0.88    Ca    8.8      15 Uli 2020 06:07  Phos  3.2     06-15  Mg     1.9     06-15      PT/INR - ( 15 Uli 2020 06:07 )   PT: 14.1 sec;   INR: 1.24 ratio         PTT - ( 15 Uli 2020 06:07 )  PTT:38.0 sec      meds  acetaminophen   Tablet .. 650 milliGRAM(s) Oral every 6 hours PRN  aluminum hydroxide/magnesium hydroxide/simethicone Suspension 30 milliLiter(s) Oral every 6 hours PRN  atorvastatin 40 milliGRAM(s) Oral at bedtime  cefTRIAXone   IVPB 2000 milliGRAM(s) IV Intermittent every 24 hours  Dakins Solution - 1/4 Strength 1 Application(s) Topical daily  dextrose 5% + sodium chloride 0.9%. 1000 milliLiter(s) IV Continuous <Continuous>  diphenhydrAMINE   Injectable 15 milliGRAM(s) IV Push every 6 hours PRN  furosemide    Tablet 20 milliGRAM(s) Oral daily  gabapentin 100 milliGRAM(s) Oral every 12 hours  heparin  Infusion. 800 Unit(s)/Hr IV Continuous <Continuous>  hydrALAZINE 100 milliGRAM(s) Oral three times a day  insulin glargine Injectable (LANTUS) 10 Unit(s) SubCutaneous at bedtime  insulin lispro (HumaLOG) corrective regimen sliding scale   SubCutaneous Before meals and at bedtime  lactobacillus acidophilus 1 Tablet(s) Oral three times a day with meals  lisinopril 40 milliGRAM(s) Oral daily  melatonin 5 milliGRAM(s) Oral at bedtime  metoprolol succinate ER 50 milliGRAM(s) Oral daily  metoprolol tartrate Injectable 5 milliGRAM(s) IV Push every 6 hours PRN  ondansetron Injectable 4 milliGRAM(s) IV Push every 8 hours PRN  pantoprazole  Injectable 40 milliGRAM(s) IV Push daily  traMADol 25 milliGRAM(s) Oral every 6 hours PRN        CULTURES  no new cultures      RADIOLOGY  no new xrays

## 2020-06-15 NOTE — PROGRESS NOTE ADULT - PROBLEM SELECTOR PLAN 1
Pt with acute somatic pain of left foot due to OM. Pt is now POD #6 s/p Angiogram, extremity, left, and I&D per podiatry on 6/9. Pt is on heparin gtt for LEFT PICA stroke.  Opioid pain recommendations   - Continue tramadol 25mg PO q6h PRN severe pain. Monitor for respiratory depression/ sedation  Non-opioid pain recommendations   -  Acetaminophen 650mg PO q 6 hours PRN moderate pain. Monitor LFTs  - Gabapentin 100mg PO 2x/day. Monitor renal function.   -Avoid NSAIDs. Pt on heparin gtt and s/p I&D 6/9.  - Bleeding precautions  Bowel Regimen  -Per primary team  Non-pharmacological pain treatment recommendations  - Physical therapy OOB if no contraindications   Mild pain   - Non pharmacological measures   - Warm/ Cool packs PRN   - Repositioning extremity, elevation, PT, imagery, relaxation, distraction.  Recommendations discussed with primary team and RN.

## 2020-06-15 NOTE — PROGRESS NOTE ADULT - ASSESSMENT
A/P  PVD /PAD  DM  ulcer   dehiscence surgical wound   history of gangrene left hallux   positive probe to bone   leukocytosis -stable reactive after surgery   OM with  abscess  left -P/O day 5  pyogenic tendon       pt seen and eval   chart review  s/p angiogram -pt is very high risk for none healing  due to very poor vascular status as per Dr. Grove   Ms Keating and her daughter want to see if the wound will improve before discussing BKA  pt is at risk of hematoma formation/bleeding due to heparin drip   continue antibiotics as per id -id    dressing change  keep foot slightly elevated   no weight bearing  -due to risk of bleeding   if stable for d/c will need to follow up with podiatry, will need HBO -if not a candidate for HBO consider topical oxygen     please call with any pt related questions [phone] A/P  PVD /PAD  DM  ulcer   dehiscence surgical wound   history of gangrene left hallux   positive probe to bone   leukocytosis -stable reactive after surgery   OM with  abscess  left -P/O day 6  pyogenic tendon       pt seen and eval   chart review  s/p angiogram -pt is very high risk for none healing  due to very poor vascular status as per Dr. Grove   Ms Keating and her daughter want to see if the wound will improve before discussing BKA  pt is at risk of hematoma formation/bleeding due to heparin drip   continue antibiotics as per id -id    dressing change  keep foot slightly elevated   no weight bearing  -due to risk of bleeding   if stable for d/c will need to follow up with podiatry, will need HBO -if not a candidate for HBO consider topical oxygen     please call with any pt related questions [phone]

## 2020-06-15 NOTE — PROGRESS NOTE ADULT - PROBLEM SELECTOR PLAN 8
H/o CAD and PCI in 2009,  patient has both AICD and pacemaker  EKG shows paced Rythm  No active issues  continue aspirin , Plavix, BB, statin, ACE    c/w heparin drip  monitor PT/INR/Ptt H/o CAD and PCI in 2009,  patient has both AICD and pacemaker  EKG shows paced Rythm  No active issues  continue aspirin , Plavix, BB, statin, ACE    hold heparin drip for EGD today. may resume after procedure  monitor PT/INR/Ptt

## 2020-06-15 NOTE — PROGRESS NOTE ADULT - ASSESSMENT
Confidential Drug Utilization Report  Search Terms: Ankita Keating, 1945   Search Date: 06/08/2020 16:27:30 PM   The Drug Utilization Report below displays all of the controlled substance prescriptions, if any, that your patient has filled in the last twelve months. The information displayed on this report is compiled from pharmacy submissions to the Department, and accurately reflects the information as submitted by the pharmacies.  This report was requested by: Kathya Sinclair | Reference #: [id number]   You have not added a TRAVIS number. Keeping your TRAVIS number(s) up to date on the My TRAVIS Numbers </doh2/applinks/cspnp/myDeaNumbers> page will enable the separation of your prescriptions from others in the search results.   Others' Prescriptions  Patient Name: Ankita Keating   YOB: 1945   Address: [address] [place] [address]   Sex: Female   Rx Written	Rx Dispensed	Drug	Quantity	Days Supply	Prescriber Name	Payment Method	Dispenser	  05/28/2020	05/29/2020	oxycodone-acetaminophen  mg tab 	60	30	Chico Jeffries MD	Insurance	[facility] #[id number]	  05/22/2020	05/22/2020	oxycodone-acetaminophen 5-325 mg tab 	20	5	Gomez Caraballo Jr, MD	Insurance	[facility] #[id number]	  05/08/2020	05/09/2020	oxycodone-acetaminophen 5-325 mg tab 	20	5	Gomez Caraballo Jr, MD	Insurance	[facility] #[id number]	  04/22/2020	04/28/2020	oxycodone-acetaminophen 5-325 mg tab 	20	5	Venita Thompson MD	Insurance	[facility] #[id number]	  * - Drugs marked with an asterisk are compound drugs. If the compound drug is made up of more than one controlled substance, then each controlled substance will be a separate row in the table.

## 2020-06-15 NOTE — PROGRESS NOTE ADULT - PROBLEM SELECTOR PLAN 10
DVT ppx- c/w heparin drip for new CVA, Afib DVT ppx- was on heparin drip for new CVA, Afib. hold for EGD

## 2020-06-15 NOTE — CHART NOTE - NSCHARTNOTEFT_GEN_A_CORE
Reassessment:   74yFemalePatient is a 74y old  Female who presents with a chief complaint of Intractable nausea and vomiting (15 Uli 2020 14:33)  Pt visited. s/p EGD; mild gastritis, healing ulcer. Pt reported no n/v/d at present. 2+edema of left foot per flowsheet on .  Factors impacting intake: [ ] none [ ] nausea  [ ] vomiting [ ] diarrhea [ ] constipation  [ ]chewing problems [ ] swallowing issues  [ ] other:     Diet Prescription: Diet, Consistent Carbohydrate Clear Liquid:   DASH/TLC {Sodium & Cholesterol Restricted} (06-15-20 @ 16:52)    Intake: Reports fair intake ~50% of food and 100% of glucerna. D/w NP regarding clear liquids, solid diet to be reinstated.       Daily Weight in k.1 (2020 04:48)  Weight in k.8 (2020 04:36)  Weight in k.5 (10 Uli 2020 04:41)  Weight in k.1 (2020 04:03)    % Weight Change    Pertinent Medications: MEDICATIONS  (STANDING):  atorvastatin 40 milliGRAM(s) Oral at bedtime  cefTRIAXone   IVPB 2000 milliGRAM(s) IV Intermittent every 24 hours  Dakins Solution - 1/4 Strength 1 Application(s) Topical daily  dextrose 5% + sodium chloride 0.9%. 1000 milliLiter(s) (70 mL/Hr) IV Continuous <Continuous>  furosemide    Tablet 20 milliGRAM(s) Oral daily  gabapentin 100 milliGRAM(s) Oral every 12 hours  heparin  Infusion. 800 Unit(s)/Hr (8 mL/Hr) IV Continuous <Continuous>  hydrALAZINE 100 milliGRAM(s) Oral three times a day  insulin glargine Injectable (LANTUS) 10 Unit(s) SubCutaneous at bedtime  insulin lispro (HumaLOG) corrective regimen sliding scale   SubCutaneous Before meals and at bedtime  lactobacillus acidophilus 1 Tablet(s) Oral three times a day with meals  lisinopril 40 milliGRAM(s) Oral daily  melatonin 5 milliGRAM(s) Oral at bedtime  metoprolol succinate ER 50 milliGRAM(s) Oral daily  pantoprazole  Injectable 40 milliGRAM(s) IV Push daily    MEDICATIONS  (PRN):  acetaminophen   Tablet .. 650 milliGRAM(s) Oral every 6 hours PRN Mild Pain (1 - 3), Moderate Pain (4 - 6)  aluminum hydroxide/magnesium hydroxide/simethicone Suspension 30 milliLiter(s) Oral every 6 hours PRN Dyspepsia  diphenhydrAMINE   Injectable 15 milliGRAM(s) IV Push every 6 hours PRN nausea or vomiting  metoprolol tartrate Injectable 5 milliGRAM(s) IV Push every 6 hours PRN HR greater than 120  ondansetron Injectable 4 milliGRAM(s) IV Push every 8 hours PRN Nausea and/or Vomiting  traMADol 25 milliGRAM(s) Oral every 6 hours PRN Severe Pain (7 - 10)    Pertinent Labs: 06-15 Na132 mmol/L<L> Glu 110 mg/dL<H> K+ 4.1 mmol/L Cr  0.88 mg/dL BUN 23 mg/dL<H> 06-15 Phos 3.2 mg/dL  Alb 2.0 g/dL<L>  Chol 169 mg/dL LDL 93 mg/dL HDL 59 mg/dL Trig 87 mg/dL     CAPILLARY BLOOD GLUCOSE      POCT Blood Glucose.: 140 mg/dL (15 Uli 2020 16:34)  POCT Blood Glucose.: 129 mg/dL (15 Uli 2020 11:43)  POCT Blood Glucose.: 121 mg/dL (15 Uli 2020 07:56)  POCT Blood Glucose.: 187 mg/dL (2020 21:41)    Skin:     Estimated Needs:   [ X] no change since previous assessment  [ ] recalculated:     Previous Nutrition Diagnosis:   [ ] Inadequate Energy Intake [ ]Inadequate Oral Intake [ ] Excessive Energy Intake   [ ] Underweight [ ] Increased Nutrient Needs [ ] Overweight/Obesity   [ ] Altered GI Function [ ] Unintended Weight Loss [ ] Food & Nutrition Related Knowledge Deficit [ X] Malnutrition - severe    Nutrition Diagnosis is [X ] ongoing  [ ] resolved [ ] not applicable     New Nutrition Diagnosis: [ ] not applicable       Interventions:   Recommend  [ ] Change Diet To:  [X ] Nutrition Supplement glucerna TID (660kcal, 30g pro)  [ ] Nutrition Support  [ ] Other: food preferences obtained    Monitoring and Evaluation:   [X ] PO intake [ x ] Tolerance to diet prescription [ x ] weights [ x ] labs[ x ] follow up per protocol  [ ] other:

## 2020-06-15 NOTE — PROGRESS NOTE ADULT - SUBJECTIVE AND OBJECTIVE BOX
HPI:  73 YO female from home, H/O CAD/DM/HTN/PVD/hysterectomy/cholecystectomy ,with AICD comes to ED with intractable nausea and vomiting for 2 days. Patient states that she recently had left big toe amputation in the beginning of May and she was taking antibiotics. On Friday, her antibiotics were changed from clindamycin to Levaquin and after she took antibiotics along with percocet, she started having severe pain in epigastric region. Patient reports that it was sudden in onset, 7/10, burning & pressure like pain, non-radiating , associated with nausea,, bitter taste in mouth and projectile vomiting. She was not able to tolerate food and it aggravated her symptoms. Patient also reports significant weight( not sure how much) recently and decrease in appetite.   patient denies fever, cough, SOB, constipation, dysuria, headache, chest pain, orthopnea, back pain, burning sensation in extremities.    ED course; Patient is in mild distress due to nausea but otherwise stable  Vitals:     /88  SpO2 99% on RA    CT abdomen/pelvis:   NEW MULTI BLADDER WALL FOCAL AREAS OF SOFT TISSUE THICKENING CONCERNING FOR BLADDER CARCINOMA/TRANSITIONAL CELL CARCINOMA. No hydronephrosis.  Stomach not fully distended and gastric pathology cannot be excluded.  Status post cholecystectomy and hysterectomy.  Remaining abdominal pelvic viscera unremarkable.   No evidence of colitis.. (30 May 2020 14:56)  Vital Signs Last 24 Hrs  T(C): 36.7 (15 Uli 2020 16:40), Max: 36.9 (15 Uli 2020 05:06)  T(F): 98 (15 Uli 2020 16:40), Max: 98.4 (15 Uli 2020 05:06)  HR: 92 (15 Uli 2020 16:40) (66 - 92)  BP: 164/77 (15 Uli 2020 16:40) (144/59 - 180/75)  BP(mean): --  RR: 18 (15 Uli 2020 16:40) (16 - 18)  SpO2: 95% (15 Uli 2020 16:40) (93% - 97%)                        9.4    14.39 )-----------( 404      ( 15 Uli 2020 06:07 )             30.5   WBC Count: 14.39 K/uL (06-15 @ 06:07)  WBC Count: 13.34 K/uL (06-14 @ 08:16)  WBC Count: 13.23 K/uL (06-13 @ 05:26)  WBC Count: 13.52 K/uL (06-12 @ 22:25)  WBC Count: 12.45 K/uL (06-12 @ 07:18)  06-15    132<L>  |  93<L>  |  23<H>  ----------------------------<  110<H>  4.1   |  31  |  0.88    Ca    8.8      15 Uli 2020 06:07  Phos  3.2     06-15  Mg     1.9     06-15      MEDICATIONS  (STANDING):  atorvastatin 40 milliGRAM(s) Oral at bedtime  cefTRIAXone   IVPB 2000 milliGRAM(s) IV Intermittent every 24 hours  Dakins Solution - 1/4 Strength 1 Application(s) Topical daily  dextrose 5% + sodium chloride 0.9%. 1000 milliLiter(s) (70 mL/Hr) IV Continuous <Continuous>  furosemide    Tablet 20 milliGRAM(s) Oral daily  gabapentin 100 milliGRAM(s) Oral every 12 hours  heparin  Infusion. 800 Unit(s)/Hr (8 mL/Hr) IV Continuous <Continuous>  hydrALAZINE 100 milliGRAM(s) Oral three times a day  insulin glargine Injectable (LANTUS) 10 Unit(s) SubCutaneous at bedtime  insulin lispro (HumaLOG) corrective regimen sliding scale   SubCutaneous Before meals and at bedtime  lactobacillus acidophilus 1 Tablet(s) Oral three times a day with meals  lisinopril 40 milliGRAM(s) Oral daily  melatonin 5 milliGRAM(s) Oral at bedtime  metoprolol succinate ER 50 milliGRAM(s) Oral daily  pantoprazole  Injectable 40 milliGRAM(s) IV Push daily    MEDICATIONS  (PRN):  acetaminophen   Tablet .. 650 milliGRAM(s) Oral every 6 hours PRN Mild Pain (1 - 3), Moderate Pain (4 - 6)  aluminum hydroxide/magnesium hydroxide/simethicone Suspension 30 milliLiter(s) Oral every 6 hours PRN Dyspepsia  diphenhydrAMINE   Injectable 15 milliGRAM(s) IV Push every 6 hours PRN nausea or vomiting  metoprolol tartrate Injectable 5 milliGRAM(s) IV Push every 6 hours PRN HR greater than 120  ondansetron Injectable 4 milliGRAM(s) IV Push every 8 hours PRN Nausea and/or Vomiting  traMADol 25 milliGRAM(s) Oral every 6 hours PRN Severe Pain (7 - 10)    < from: Xray Foot AP + Lateral + Oblique, Left (06.11.20 @ 10:09) >    EXAM:  FOOT LEFT (MINIMUM 3 VIEWS)                            PROCEDURE DATE:  06/11/2020          INTERPRETATION:  Left foot    HISTORY: Postop      Three views of the left foot show transmetatarsal amputation of the first toe. The joint spaces are maintained.    IMPRESSION: Postoperative changes.    Thank you for this referral.                ELYSSA RECIO M.D., ATTENDING RADIOLOGIST  This document has been electronically signed. Jun 11 2020 10:47AM        < end of copied text >  < from: [facility] Physiol Extremity Lower 3+ Level, BI (06.06.20 @ 12:48) >  EXAM:  US PHYSIOL LWR EXT 3+ LEV BI                            PROCEDURE DATE:  06/06/2020          INTERPRETATION:  Clinical Information: Peripheral vascular disease. Left toe ulceration.    Technique: Bilateral lower extremity ABIs/PVR    Comparison: None    Findings/  Impression:  Right lower extremity: The ankle brachial index is 0.6. The pulse waveforms are diffusely diminished.    Left lower extremity: The ankle brachial index is 0.5. The pulse waveforms are diffusely diminished, however particularly severe in the left foot.    Severe bilateral peripheral artery disease.    Pulse volume recordings are diffusely diminished bilaterally, however severely diminished in the left foot.            < end of copied text >  Culture - Abscess with Gram Stain (06.06.20 @ 03:05)    -  Trimethoprim/Sulfamethoxazole: S <=0.5/9.5    -  Vancomycin: S 1    -  Tetra/Doxy: S <=1    -  RIF- Rifampin: S <=1 Should not be used as monotherapy    -  Ampicillin/Sulbactam: S <=8/4    -  Oxacillin: S <=0.25    -  Penicillin: R 2    -  Gentamicin: S <=1 Should not be used as monotherapy    -  Clindamycin: R >4    -  Erythromycin: R >4    -  Cefazolin: S <=4    Specimen Source: .Abscess left foot    Culture Results:   Moderate Staphylococcus aureus    Organism Identification: Staphylococcus aureus  Staphylococcus aureus    Organism: Staphylococcus aureus    Organism: Staphylococcus aureus    Method Type: LI    Method Type: LI    Surgical Pathology Report (06.09.20 @ 13:38)    Surgical Pathology Report:   ACCESSION No:  [id number]    DARRION MELENDEZ                      1        Surgical Final Report          Final Diagnosis    1. Left first metatarsal; amputation:  - Partially necrotic and severely inflamed fibroconnective  tissue containing fibrinopurulent exudate.  - Acute osteomyelitis.    2. Clean margin, left foot; amputation:  - Bone with bone marrow and periosteal fibroconnective tissue  without acute inflammation, consistent with clean  margin.    Verified by: Kath Levin MD  (Electronic Signature)  Reported on: 06/11/20 12:08 EDT, [facility],  [address] [place] [address], [place] [address]  Phone: [phone]   Fax: [phone]  _________________________________________________________________    Clinical History  Abscess, left foot osteomyelitis, right metatarsal  Incision and drainage  Wound debridement, left foot partial first metatarsal amputation    Specimen(s) Submitted  1-Left first metatarsal  2-Clean margin left foot    Gross Description  1.  The specimen is received in formalin and the specimen  container is labeled: Left first metatarsal.  It consists of  multiple fragments of pink red focally hemorrhagic bones and soft  tissue measuring 5 x 4.5 x 2 cm in aggregate.  Representative  sections are submitted in three cassettes after fixation and  decalcification.    2.   The specimen is received in formalin and the specimen  container is labeled: Clean margin left foot.  It consists of two  fragments of tan bones measuring 0.5 x 0.4 x 0.2 cm and 1 x 0.6 x  0.2 cm in greatest dimension.  Entirely submitted.  One cassette  after fixation and decalcification.    In addition to other data that may appear on the specimen  containers, all labels have been inspected to confirm the  presence of the patient's name and date of birth.  Cesar Rea 06/10/2020 10:29      MEDICATIONS  (PRN):  acetaminophen   Tablet .. 650 milliGRAM(s) Oral every 6 hours PRN Mild Pain (1 - 3), Moderate Pain (4 - 6)  aluminum hydroxide/magnesium hydroxide/simethicone Suspension 30 milliLiter(s) Oral every 6 hours PRN Dyspepsia  diphenhydrAMINE   Injectable 15 milliGRAM(s) IV Push every 6 hours PRN nausea or vomiting  metoprolol tartrate Injectable 5 milliGRAM(s) IV Push every 6 hours PRN HR greater than 120  ondansetron Injectable 4 milliGRAM(s) IV Push every 8 hours PRN Nausea and/or Vomiting  traMADol 25 milliGRAM(s) Oral every 6 hours PRN Severe Pain (7 - 10)      Allergies    codeine (Rash)  penicillin (Rash)    Intolerances        pt  seen and evaluated at bedside , reports nausea is improved  , feeling better today ,  AAOX3  reports very little  pain in her foot sometimes  ,  tolerated exam and dressing change very well    exam focused LE   left foot s/p I&D of abscess and 1first met partial amputation     there is no active  sanguinous drainage , no odor    no crepitus , no fluctuance   CRF -<5sec   DP/PT -0- out of 4   TG -WNL   CRF -1-2 sec x4   there is  erythema ascending to the level of the midfoot -some improvement  , there is area of bluish /purple discoloration -post op changes vs early  wound dehiscence , failing flap ,   the area is slightly tender -improving   pt is able to move her lower extremities , reports no pain when she is moving

## 2020-06-15 NOTE — PROGRESS NOTE ADULT - SUBJECTIVE AND OBJECTIVE BOX
NP Note discussed with  Primary Attending    Patient is a 74y old  Female who presents with a chief complaint of Intractable nausea and vomiting (15 Uli 2020 12:28)      HPI- A 73 YO female from home, H/O CAD/DM/HTN/PVD/hysterectomy/cholecystectomy ,with AICD has come to ED with intractable nausea and vomiting for 2 days, likely due to pill induced esophagitis/gastritis.  CT abdomen/pelvis shows bladder wall thickening and pt has history of Stage1 endometrial cancer.   Pt is admitted for management of acute gastritis w/ intractable abdominal pain, for malignancy workup. Followed by GI dr. Matute.   CTA head/neck showed LEFT PICA stroke. Neurology following. Followed by cardiology for new AF/CVA. Heparin drip started.  Followed by Vascular and podiatry for Left foot OM. s/p angiogram on 6/9. I&D done. on IV abt. ID following.     Pt is scheduled for EGD today. Held heparin prior to procedure.       INTERVAL HPI/OVERNIGHT EVENTS: Seen at bedside. c/o left foot pain likely due to OM. NPO status for EGD.     MEDICATIONS  (STANDING):  atorvastatin 40 milliGRAM(s) Oral at bedtime  cefTRIAXone   IVPB 2000 milliGRAM(s) IV Intermittent every 24 hours  Dakins Solution - 1/4 Strength 1 Application(s) Topical daily  dextrose 5% + sodium chloride 0.9%. 1000 milliLiter(s) (70 mL/Hr) IV Continuous <Continuous>  furosemide    Tablet 20 milliGRAM(s) Oral daily  gabapentin 100 milliGRAM(s) Oral every 12 hours  heparin  Infusion. 800 Unit(s)/Hr (8 mL/Hr) IV Continuous <Continuous>  hydrALAZINE 100 milliGRAM(s) Oral three times a day  insulin glargine Injectable (LANTUS) 10 Unit(s) SubCutaneous at bedtime  insulin lispro (HumaLOG) corrective regimen sliding scale   SubCutaneous Before meals and at bedtime  lactobacillus acidophilus 1 Tablet(s) Oral three times a day with meals  lisinopril 40 milliGRAM(s) Oral daily  melatonin 5 milliGRAM(s) Oral at bedtime  metoprolol succinate ER 50 milliGRAM(s) Oral daily  pantoprazole  Injectable 40 milliGRAM(s) IV Push daily    MEDICATIONS  (PRN):  acetaminophen   Tablet .. 650 milliGRAM(s) Oral every 6 hours PRN Mild Pain (1 - 3), Moderate Pain (4 - 6)  aluminum hydroxide/magnesium hydroxide/simethicone Suspension 30 milliLiter(s) Oral every 6 hours PRN Dyspepsia  diphenhydrAMINE   Injectable 15 milliGRAM(s) IV Push every 6 hours PRN nausea or vomiting  metoprolol tartrate Injectable 5 milliGRAM(s) IV Push every 6 hours PRN HR greater than 120  ondansetron Injectable 4 milliGRAM(s) IV Push every 8 hours PRN Nausea and/or Vomiting  traMADol 25 milliGRAM(s) Oral every 6 hours PRN Severe Pain (7 - 10)      __________________________________________________  REVIEW OF SYSTEMS:    CONSTITUTIONAL: No fever,   EYES: no acute visual disturbances  NECK: No pain or stiffness  RESPIRATORY: No cough; No shortness of breath  CARDIOVASCULAR: No chest pain, no palpitations  GASTROINTESTINAL: No pain. No nausea or vomiting; No diarrhea   NEUROLOGICAL: No headache or numbness, no tremors  MUSCULOSKELETAL: mild pain to left foot.   GENITOURINARY: no dysuria, no frequency, no hesitancy  PSYCHIATRY: no depression , no anxiety  ALL OTHER  ROS negative        Vital Signs Last 24 Hrs  T(C): 36.6 (15 Uli 2020 14:07), Max: 36.9 (15 Uli 2020 05:06)  T(F): 97.9 (15 Uli 2020 14:07), Max: 98.4 (15 Uli 2020 05:06)  HR: 66 (15 Uli 2020 14:07) (66 - 88)  BP: 144/59 (15 Uli 2020 14:07) (144/59 - 180/75)  BP(mean): --  RR: 16 (15 Uli 2020 14:07) (16 - 17)  SpO2: 97% (15 Uli 2020 14:07) (93% - 97%)    ________________________________________________  PHYSICAL EXAM:  GENERAL: NAD. Conversant.   HEENT: Normocephalic;  conjunctivae and sclerae clear; moist mucous membranes;   NECK : supple  CHEST/LUNG: Clear to auscultation bilaterally with good air entry   HEART: S1 S2  regular; no murmurs, gallops or rubs  ABDOMEN: Soft, Nontender, Nondistended; Bowel sounds present  EXTREMITIES: no cyanosis;  left foot dressing in place.   SKIN: warm and dry; no rash  NERVOUS SYSTEM:  Awake and alert; Oriented  to place, person and time ; no new deficits    _________________________________________________  LABS:                        9.4    14.39 )-----------( 404      ( 15 Uli 2020 06:07 )             30.5     06-15    132<L>  |  93<L>  |  23<H>  ----------------------------<  110<H>  4.1   |  31  |  0.88    Ca    8.8      15 Uli 2020 06:07  Phos  3.2     06-15  Mg     1.9     06-15      PT/INR - ( 15 Uli 2020 06:07 )   PT: 14.1 sec;   INR: 1.24 ratio         PTT - ( 15 Uli 2020 06:07 )  PTT:38.0 sec    CAPILLARY BLOOD GLUCOSE      POCT Blood Glucose.: 129 mg/dL (15 Uli 2020 11:43)  POCT Blood Glucose.: 121 mg/dL (15 Uli 2020 07:56)  POCT Blood Glucose.: 187 mg/dL (14 Jun 2020 21:41)  POCT Blood Glucose.: 190 mg/dL (14 Jun 2020 16:34)        RADIOLOGY & ADDITIONAL TESTS:    Imaging  Reviewed:  YES    < from: Xray Tibia + Fibula 2 Views, Left (06.11.20 @ 10:10) >    EXAM:  LEG AP&LAT - LEFT                            PROCEDURE DATE:  06/11/2020          INTERPRETATION:  Left tib-fib. There is history of popliteal stent. 2 views.    There are rather mild degenerative changes in the knee and there is some spurring of the posterior upper shaft of the tibia.    Popliteal stent starting at the upper tibial plateau level and extends for a length of approximately 10 cm into the trifurcation area.    There is what appears to be an old fracture deformity of the upper shaft of the fibula. There are posterior and inferior calcaneal spurs.    IMPRESSION: As above.                ISABELLA HOWARD M.D., ATTENDING RADIOLOGIST  This document has been electronically signed. Jun 11 2020 10:59AM                < end of copied text >    < from: Xray Foot AP + Lateral + Oblique, Left (06.11.20 @ 10:09) >    EXAM:  FOOT LEFT (MINIMUM 3 VIEWS)                            PROCEDURE DATE:  06/11/2020          INTERPRETATION:  Left foot    HISTORY: Postop      Three views of the left foot show transmetatarsal amputation of the first toe. The joint spaces are maintained.    IMPRESSION: Postoperative changes.    Thank you for this referral.                ELYSSA RECIO M.D., ATTENDING RADIOLOGIST  This document has been electronically signed. Jun 11 2020 10:47AM                < end of copied text >    < from: Xray Chest 1 View- PORTABLE-Routine (06.11.20 @ 10:09) >    EXAM:  XR CHEST PORTABLE ROUTINE 1V                            PROCEDURE DATE:  06/11/2020          INTERPRETATION:  TIME OF EXAM: June 11, 2020 at 9:33 AM.    CLINICAL INFORMATION: Follow-up of pneumonia.    COMPARISON:  June 5, 2020.    TECHNIQUE:   AP Portable chest x-ray.    INTERPRETATION:     Heart size and the mediastinum cannot be accurately evaluated on this projection. A left chest wall biventricular pacemaker ICD is again seen. The generator obscures part of the left lung.  There is increased accentuation and indistinctness of the val and pulmonary vascular markings as well as some airspace opacity on the right, findings which may be due to worsening pulmonary edema. Infection is not excluded.  There is increased left basilar and retrocardiac opacity with obscuration of the left hemidiaphragm.  No pneumothorax is seen.                IMPRESSION:  Findings which may represent worsening pulmonary edema. Infection is not excluded.    Increased left basilar and retrocardiac opacity, possibly an increasing small left pleural effusion with passive atelectasis. Atelectasis of other cause and/or pneumonia is not excluded.                ROOPA GERARD M.D., ATTENDING RADIOLOGIST  This document has been electronically signed. Jun 11 2020 10:34AM                < end of copied text >      < from: CT Angio Neck w/ IV Cont (06.04.20 @ 20:09) >    EXAM:  CT ANGIO NECK (W)AW IC                          EXAM:  CT ANGIO BRAIN (W)AW IC                            PROCEDURE DATE:  06/04/2020          INTERPRETATION:  CLINICAL STATEMENT: Nausea vomiting vertically    TECHNIQUE: CTA of the head and neck performed with IV contrast.  3-D MIP imaging obtained. Approximately 90 cc of Omnipaque 350 administered.    COMPARISON: None.    FINDINGS:  CTA of the neck:  Evaluation of the origins of the great vessels is limited due to artifacts.    The visualized common carotid and internal carotid arteries are patent. Calcified atherosclerotic plaque at the right carotid bulb results in focal mild narrowing of the origin of the right internal carotid artery.    Atherosclerotic plaque noted resulting inmoderate narrowing distal left common carotid artery.    The visualized extracranial vertebral arteries are patent. Origin of vertebral arteries not evaluated.    Left anterior chest device causes artifacts limiting evaluation of the chest left chestwall device limits evaluation of the chest due to artifacts. Questionable filling defects noted in the upper segmental and subsegmental pulmonary arteries. Possible groundglass opacities. Pleural effusions noted. Partially visualized soft tissue densities noted in the subcarinal region    CTA Head:  Calcified atherosclerotic plaques results in narrowing of the cavernous and supraclinoid bilateral internal carotid arteries. There is severe narrowing of the terminal segment of distal left internal carotid artery    The proximal anterior, middle and posterior cerebral arteries are patent without hemodynamically significant stenosis.    The intracranial vertebral and basilar arteries are patent. Calcified atherosclerotic plaques results in narrowing of bilateral V4 segments of distal vertebral arteries.    There is no intracranial aneurysm.        IMPRESSION:  Focal mild (less than 50%) narrowing at the origin of right internal carotid artery.    Short segment Moderate (50-70%) narrowing distal left common carotid artery    Intracranial narrowing as described above.    Partially visualized pleural effusions and soft tissue density in the subcarinal region. Questionable filling defects in the segmental and subsegmental upper pulmonary arteries. Evaluation significantly limited due to artifacts from left chest wall device. CTA chest recommended for further evaluation                SALOMÓN GARCÍA M.D., ATTENDING RADIOLOGIST  This document has been electronically signed. Jun 4 2020  8:21PM                < end of copied text >    < from: CT Angio Head w/ IV Cont (06.04.20 @ 20:09) >    EXAM:  CT ANGIO NECK (W)AW IC                          EXAM:  CT ANGIO BRAIN (W)AW IC                            PROCEDURE DATE:  06/04/2020          INTERPRETATION:  CLINICAL STATEMENT: Nausea vomiting vertically    TECHNIQUE: CTA of the head and neck performed with IV contrast.  3-D MIP imaging obtained. Approximately 90 cc of Omnipaque 350 administered.    COMPARISON: None.    FINDINGS:  CTA of the neck:  Evaluation of the origins of the great vessels is limited due to artifacts.    The visualized common carotid and internal carotid arteries are patent. Calcified atherosclerotic plaque at the right carotid bulb results in focal mild narrowing of the origin of the right internal carotid artery.    Atherosclerotic plaque noted resulting inmoderate narrowing distal left common carotid artery.    The visualized extracranial vertebral arteries are patent. Origin of vertebral arteries not evaluated.    Left anterior chest device causes artifacts limiting evaluation of the chest left chestwall device limits evaluation of the chest due to artifacts. Questionable filling defects noted in the upper segmental and subsegmental pulmonary arteries. Possible groundglass opacities. Pleural effusions noted. Partially visualized soft tissue densities noted in the subcarinal region    CTA Head:  Calcified atherosclerotic plaques results in narrowing of the cavernous and supraclinoid bilateral internal carotid arteries. There is severe narrowing of the terminal segment of distal left internal carotid artery    The proximal anterior, middle and posterior cerebral arteries are patent without hemodynamically significant stenosis.    The intracranial vertebral and basilar arteries are patent. Calcified atherosclerotic plaques results in narrowing of bilateral V4 segments of distal vertebral arteries.    There is no intracranial aneurysm.        IMPRESSION:  Focal mild (less than 50%) narrowing at the origin of right internal carotid artery.    Short segment Moderate (50-70%) narrowing distal left common carotid artery    Intracranial narrowing as described above.    Partially visualized pleural effusions and soft tissue density in the subcarinal region. Questionable filling defects in the segmental and subsegmental upper pulmonary arteries. Evaluation significantly limited due to artifacts from left chest wall device. CTA chest recommended for further evaluation                SALOMÓN GARCÍA M.D., ATTENDING RADIOLOGIST  This document has been electronically signed. Jun 4 2020  8:21PM                < end of copied text >    < from: CT Head No Cont (06.04.20 @ 15:55) >    EXAM:  CT BRAIN                            PROCEDURE DATE:  06/04/2020          INTERPRETATION:  CLINICAL STATEMENT: Nausea vomiting    TECHNIQUE: CT of the head was performed without IV contrast.  RAPID artificial intelligence was utilized for thepreliminary evaluation of intracranial hemorrhage.    COMPARISON: CT head 10/12/2019    FINDINGS:  There is mild diffuse parenchymal volume loss. There are areas of low attenuation in the periventricular white matter likely related to mild chronic microvascular ischemic changes.    There is no acute intracranial hemorrhage, parenchymal mass, mass effect or midline shift. There is no acute territorial infarct. There is no hydrocephalus. Partial empty sella    The cranium is intact. The visualized paranasal sinuses are well-aerated.    IMPRESSION:  No acute intracranial hemorrhage or acute territorial infarct.  If symptoms persist, follow-up MRI exam recommended.                SALOMÓN GARCÍA M.D., ATTENDING RADIOLOGIST  This document has been electronically signed. Jun 4 2020  4:05PM                < end of copied text >    < from: US Abdomen Limited (06.04.20 @ 16:11) >    EXAM:  US ABDOMEN LIMITED                            PROCEDURE DATE:  06/04/2020          INTERPRETATION:  CLINICAL INFORMATION: Nausea vomiting and elevated liver enzymes    COMPARISON: CT scan of 6/1/2020.    TECHNIQUE: Sonography of the right upper quadrant.     FINDINGS:  Liver: Somewhat heterogeneous with areas of increased echogenicity.  Bile ducts: Normal caliber. Common bile duct measures 6 mm.   Gallbladder: Surgically absent        Pancreas: Visualized portions are within normal limits.  Right kidney: 10.4 cm. No hydronephrosis.   Ascites: None.  IVC: Visualized portions are within normal limits.    IMPRESSION:   Liver demonstrates some areas of increased echogenicity which may represent heterogeneous fatty infiltration.  Status post cholecystectomy                AGBRIELA MANCILLA M.D.,ATTENDING RADIOLOGIST  This document has been electronically signed. Jun 4 2020  4:18PM                < end of copied text >        Consultant(s) Notes Reviewed:   YES      Plan of care was discussed with patient and /or primary care giver; all questions and concerns were addressed

## 2020-06-15 NOTE — PROGRESS NOTE ADULT - PROBLEM SELECTOR PLAN 1
CTA head/neck showed LEFT PICA stroke  -will switch to oral anticoagulation after EGD  Neuro Dr. Moncada following

## 2020-06-15 NOTE — PROGRESS NOTE ADULT - ASSESSMENT
73 YO female from home, H/O CAD/DM/HTN/PVD/hysterectomy/cholecystectomy ,with AICD comes to ED with intractable nausea and vomiting for 2 days. Patient states that she recently had left big toe amputation in the beginning of May and she was taking antibiotics. On Friday, her antibiotics were changed from clindamycin to Levaquin and after she took antibiotics along with percocet, she started having severe pain in epigastric region. Patient reports that it was sudden in onset, 7/10, burning & pressure like pain, non-radiating , associated with nausea,, bitter taste in mouth and projectile vomiting. She was not able to tolerate food and it aggravated her symptoms. Patient also reports significant weight( not sure how much) recently and decrease in appetite. patient denies fever, cough, SOB, constipation, dysuria, headache, chest pain, orthopnea, back pain, burning sensation in extremities.c/o pain over left big toe stump which is not resolving despite pain meds . afebrile on adm , covid neg. ID called for persistant leucocytosis and appr ab     # s/p amputation of left great toe for gangrene with post op collection and overlying cellulits as well as underlying OM /wound cx pos for staph aureus ( MSSA)/s/p left leg stent at [facility] as per pt /underlying PVD/s/p angio, with severe occlusive popliteal and tibial  dz that is non reconstructable/s/p I and D of left foot as well as partial amputation of left first MT /path consistent with acute osteo     #persistant  nausea/vomiting , now seems to be vertigo sec to poss cerebellar cva vs tia /vomiting has resolved     # transaminitis improving     #  wbc decreasing most likely reactive postop /cxr more consistent with pulm edema , low procal  /no resp complaints     # hx of endometrial ca s/p hysterectomy /ct with bladder wall thickening /urine cytology neg for uroepithelial ca     plan  cont  iv rocephin 2gm daily . since wbc elevated will add back doxy 100mg po bid   will need long term ab   CONT  probiotics   podiatry f/u of stump   for outpt cystoscopy r/o bladder ca   for egd as per gi       thnx will f/u   d/w resident

## 2020-06-15 NOTE — PROGRESS NOTE ADULT - PROBLEM SELECTOR PLAN 9
H/o CHF but clinically patient is not in fluid overload  No peripheral edema  Patient takes furosemide 20mg daily twice at home  Echo: EF 50-55%, mild TR, PA  c/w lasix , statin and BB

## 2020-06-15 NOTE — PROGRESS NOTE ADULT - SUBJECTIVE AND OBJECTIVE BOX
Source of information: DARRION MELENDEZ, Chart review  Patient language: English  : n/a    HPI:  73 YO female from home, H/O CAD/DM/HTN/PVD/hysterectomy/cholecystectomy ,with AICD comes to ED with intractable nausea and vomiting for 2 days. Patient states that she recently had left big toe amputation in the beginning of May and she was taking antibiotics. On Friday, her antibiotics were changed from clindamycin to Levaquin and after she took antibiotics along with percocet, she started having severe pain in epigastric region. Patient reports that it was sudden in onset, 7/10, burning & pressure like pain, non-radiating , associated with nausea,, bitter taste in mouth and projectile vomiting. She was not able to tolerate food and it aggravated her symptoms. Patient also reports significant weight( not sure how much) recently and decrease in appetite.   patient denies fever, cough, SOB, constipation, dysuria, headache, chest pain, orthopnea, back pain, burning sensation in extremities.    ED course; Patient is in mild distress due to nausea but otherwise stable  Vitals:     /88  SpO2 99% on RA    CT abdomen/pelvis:   NEW MULTI BLADDER WALL FOCAL AREAS OF SOFT TISSUE THICKENING CONCERNING FOR BLADDER CARCINOMA/TRANSITIONAL CELL CARCINOMA. No hydronephrosis.  Stomach not fully distended and gastric pathology cannot be excluded.  Status post cholecystectomy and hysterectomy.  Remaining abdominal pelvic viscera unremarkable.   No evidence of colitis.. (30 May 2020 14:56)      Patient is a 74y old  Female who presents with a chief complaint of Intractable nausea and vomiting found to have gastritis. Pt found to have OM of left foot. Recent left big toe amputation in May 2020. Pt was on Percocet 10mg PO at home 2x/day PRN. Plan is now POD #6 (6/9) s/p Angiogram, extremity, left, and I&D per podiatry. Pt is on heparin gtt for LEFT PICA stroke.   Pt seen and examined at bedside. Patient laying in bed, denies pain at this time. Pt is currently NPO for endoscopy today. Denies lethargy, nausea, vomiting, constipation, itchiness. Reports last BM yesterday 6/14. Patient stated goal for pain control: to be able to take deep breaths, get out of bed to chair and ambulate with tolerable pain control. Pt reports she ambulated yesterday with tolerable pain control. Pt reports taking Tylenol for pain at home as needed. Pt states she was prescribed Percocet but does not want to take it as she experienced nausea after taking it.     PAST MEDICAL & SURGICAL HISTORY:  PVD (peripheral vascular disease)  CAD (coronary artery disease)  CHF (congestive heart failure)  HTN (hypertension)  DM (diabetes mellitus)  Status post coronary artery stent placement  H/O: hysterectomy  Cardiac defibrillator in place  Artificial cardiac pacemaker  History of cholecystectomy    Social History:  Used to smoke 1 cigarette a day 36 years ago  No Alcohol or drugs  Retired and lives with family, No sick contacts (30 May 2020 14:56)    Allergies    codeine (Rash)  penicillin (Rash)      MEDICATIONS  (STANDING):  atorvastatin 40 milliGRAM(s) Oral at bedtime  cefTRIAXone   IVPB 2000 milliGRAM(s) IV Intermittent every 24 hours  Dakins Solution - 1/4 Strength 1 Application(s) Topical daily  dextrose 5% + sodium chloride 0.9%. 1000 milliLiter(s) (70 mL/Hr) IV Continuous <Continuous>  furosemide    Tablet 20 milliGRAM(s) Oral daily  gabapentin 100 milliGRAM(s) Oral every 12 hours  heparin  Infusion. 800 Unit(s)/Hr (8 mL/Hr) IV Continuous <Continuous>  hydrALAZINE 100 milliGRAM(s) Oral three times a day  insulin glargine Injectable (LANTUS) 10 Unit(s) SubCutaneous at bedtime  insulin lispro (HumaLOG) corrective regimen sliding scale   SubCutaneous Before meals and at bedtime  lactobacillus acidophilus 1 Tablet(s) Oral three times a day with meals  lisinopril 40 milliGRAM(s) Oral daily  melatonin 5 milliGRAM(s) Oral at bedtime  metoprolol succinate ER 50 milliGRAM(s) Oral daily  pantoprazole  Injectable 40 milliGRAM(s) IV Push daily    MEDICATIONS  (PRN):  acetaminophen   Tablet .. 650 milliGRAM(s) Oral every 6 hours PRN Mild Pain (1 - 3), Moderate Pain (4 - 6)  aluminum hydroxide/magnesium hydroxide/simethicone Suspension 30 milliLiter(s) Oral every 6 hours PRN Dyspepsia  diphenhydrAMINE   Injectable 15 milliGRAM(s) IV Push every 6 hours PRN nausea or vomiting  metoprolol tartrate Injectable 5 milliGRAM(s) IV Push every 6 hours PRN HR greater than 120  ondansetron Injectable 4 milliGRAM(s) IV Push every 8 hours PRN Nausea and/or Vomiting  traMADol 25 milliGRAM(s) Oral every 6 hours PRN Severe Pain (7 - 10)      Vital Signs Last 24 Hrs  T(C): 36.9 (15 Uli 2020 05:06), Max: 36.9 (15 Uli 2020 05:06)  T(F): 98.4 (15 Uli 2020 05:06), Max: 98.4 (15 Uli 2020 05:06)  HR: 88 (15 Uli 2020 05:06) (73 - 88)  BP: 180/75 (15 Uli 2020 05:06) (140/73 - 180/75)  BP(mean): --  RR: 16 (15 Uli 2020 05:06) (16 - 17)  SpO2: 95% (15 Uli 2020 05:06) (93% - 98%)  COVID-19 PCR: NotDetec (14 Jun 2020 09:41)    LABS: Reviewed                          9.4    14.39 )-----------( 404      ( 15 Uli 2020 06:07 )             30.5     06-15    132<L>  |  93<L>  |  23<H>  ----------------------------<  110<H>  4.1   |  31  |  0.88    Ca    8.8      15 Uli 2020 06:07  Phos  3.2     06-15  Mg     1.9     06-15      PT/INR - ( 15 Uli 2020 06:07 )   PT: 14.1 sec;   INR: 1.24 ratio         PTT - ( 15 Uli 2020 06:07 )  PTT:38.0 sec      CAPILLARY BLOOD GLUCOSE      POCT Blood Glucose.: 129 mg/dL (15 Uli 2020 11:43)  POCT Blood Glucose.: 121 mg/dL (15 Uli 2020 07:56)  POCT Blood Glucose.: 187 mg/dL (14 Jun 2020 21:41)  POCT Blood Glucose.: 190 mg/dL (14 Jun 2020 16:34)    COVID-19 PCR: NotDetec (14 Jun 2020 09:41)            Radiology:     < from: [facility] Physiol Extremity Lower 3+ Level, BI (06.06.20 @ 12:48) >    EXAM:  US PHYSIOL LWR EXT 3+ LEV BI                            PROCEDURE DATE:  06/06/2020          INTERPRETATION:  Clinical Information: Peripheral vascular disease. Left toe ulceration.    Technique: Bilateral lower extremity ABIs/PVR    Comparison: None    Findings/  Impression:  Right lower extremity: The ankle brachial index is 0.6. The pulse waveforms are diffusely diminished.    Left lower extremity: The ankle brachial index is 0.5. The pulse waveforms are diffusely diminished, however particularly severe in the left foot.    Severe bilateral peripheral artery disease.    Pulse volume recordings are diffusely diminished bilaterally, however severely diminished in the left foot.                  ANIBAL UPTON M.D., ATTENDING RADIOLOGIST  This document has been electronically signed. Jun 6 2020  1:16PM                < end of copied text >    < from: Xray Chest 1 View- PORTABLE-Urgent (06.05.20 @ 13:40) >    EXAM:  XR CHEST PORTABLE URGENT 1V                            PROCEDURE DATE:  06/05/2020          INTERPRETATION:  Portable chest radiograph        CLINICAL INFORMATION:   Leukocytosis.    TECHNIQUE:  Portable  AP view of the chest was obtained.    COMPARISON: 10/12/2019 available for review.    FINDINGS:   The lungs  show bilateral lung perihilar and basilar interstitial opacity/infiltrates. Apices of lungs clear.    The  heart is enlarged in transverse diameter. No hilar mass. Trachea midline.   Right atrium and biventricular cardiac wire leads in place.    The heart and mediastinum are within normal limits.    Visualized osseous structures are intact.        IMPRESSION:   Cardiomegaly. Right atrium and biventricular cardiac wire leads in place.. Infrahilar and bibasilar interstitial opacities/infiltrates...      < end of copied text >      ORT Score -   Family Hx of substance abuse	Female	      Male  Alcohol 	                                           1                     3  Illegal drugs	                                   2                     3  Rx drugs                                           4 	                  4  Personal Hx of substance abuse		  Alcohol 	                                          3	                  3  Illegal drugs                                     4	                  4  Rx drugs                                            5 	                  5  Age between 16- 45 years	           1                     1  hx preadolescent sexual abuse	   3 	                  0  Psychological disease		  ADD, OCD, bipolar, schizophrenia   2	          2  Depression                                           1 	          1  Total: 0    a score of 3 or lower indicates low risk for opioid abuse		  a score of 4-7 indicates moderate risk for opioid abuse		  a score of 8 or higher indicates high risk for opioid abuse    	  4M's:   mental status: AAOx3.   ]medications: age-appropriate  mobility: community ambulatory, uses walker at home  Matters most-goals/GOC: to be able to take deep breaths, utilize incentive spirometer, get out of bed to chair and ambulate with tolerable pain control    REVIEW OF SYSTEMS:  CONSTITUTIONAL: No fever or fatigue  RESPIRATORY: No cough, wheezing, chills or hemoptysis; No shortness of breath  CARDIOVASCULAR: No chest pain, palpitations, dizziness, or leg swelling  GASTROINTESTINAL: No nausea/ vomiting. No abdominal or epigastric pain. No diarrhea or constipation.   GENITOURINARY: No dysuria, frequency, hematuria, retention or incontinence  MUSCULOSKELETAL: Denies left foot pain at this time. Positive Rt foot swelling. No muscle, back, or extremity pain, no upper or lower motor strength weakness, bowel/bladder incontinence  NEURO: No numbness/ tingling in b/l LE   PSYCHIATRIC: Positive anxiety (does not take medications at home) No depression, mood swings, or difficulty sleeping    PHYSICAL EXAM:  GENERAL:  Alert & Oriented X3, NAD, Good concentration  CHEST/LUNG: Even and unlabored on room air.   HEART: Regular rate and rhythm  ABDOMEN: Obese, Soft, Nontender, Nondistended  EXTREMITIES:  Positive Rt foot moderate edema. 2+ Peripheral Pulses in Rt foot. Unable to assess pulse in Left foot due to ace wrap and inner dressing. Pt is able to wiggle toes on b/l feet.  MUSCULOSKELETAL: Motor Strength 5/5 B/L upper and 4/4 b/l lower extremities; moves all extremities equally against gravity  SKIN: No rashes or lesions. Left foot ace wrap dressing c/d/i.     Risk factors associated with adverse outcomes related to opioid treatment  [ ]  Concurrent benzodiazepine use  [ ]  History/ Active substance use or alcohol use disorder  [X ] Psychiatric co-morbidity - anxiety  [ ] Sleep apnea  [ ] COPD  [ ] BMI> 35  [ ] Liver dysfunction  [ ] Renal dysfunction  [X ] CHF  [X ] Former Smoker  [X ]  Age > 60 years    [X ]  [place]  Reviewed and Copied to Chart. See below.    Plan of care and goal oriented pain management treatment options were discussed with patient and /or primary care giver; all questions and concerns were addressed and care was aligned with patient's wishes.    Educated patient on goal oriented pain management treatment options     06-15-20 @ 12:29

## 2020-06-15 NOTE — CHART NOTE - NSCHARTNOTEFT_GEN_A_CORE
Esophagogastroduodenoscopy Report  Indication:   Referring MD:   Instrument:  #  Anesthesia: MAC  Consent:  Informed consent was obtained from the patient after providing any opportunity for questions  Procedure: The gastroscope was gently passed through the incisoral orifice into the oral cavity and under direct visualization the esophagus was intubated. The endoscope was passed down the esophagus, through the stomach and into proximal jejunum. Color, texture, mucosa and anatomy of the esophagus, stomach, and duodenum were carefully examined with the scope. The patient tolerated the procedure well. After completion of the examination, the patient was transferred to the recovery room.   Preparation: NPO   Findings:   Oropharynx	Normal  Esophagus	Normal  EG-junction	Normal  Cardia	Normal.  Body	Normal   Antrum	Mild gastritis biopsy taken [1]  Pylorus	Normal.  Duodenal Bulb	Superficial clean based ulcer. Non-obstructing , non bleeding.   2nd portion	Normal  3rd portion	Normal.  Date and time:11/27/2019 9:20:48 AM  EBL:0  Impression: 1- Healing duodenal bulb ulcer 2- Mild gastritis 3- No pathology to explain vomiting.       Plan: 1- Advance diet 2- PPI daily 3- Follow up pathology           Procedure Start Time:   Procedure End Time:                                     Attending:       Quintin Wharton M.D.   Date and Time:

## 2020-06-16 LAB
ALBUMIN SERPL ELPH-MCNC: 2.1 G/DL — LOW (ref 3.5–5)
ALP SERPL-CCNC: 330 U/L — HIGH (ref 40–120)
ALT FLD-CCNC: 24 U/L DA — SIGNIFICANT CHANGE UP (ref 10–60)
ANION GAP SERPL CALC-SCNC: 8 MMOL/L — SIGNIFICANT CHANGE UP (ref 5–17)
APTT BLD: 154.6 SEC — CRITICAL HIGH (ref 27.5–36.3)
AST SERPL-CCNC: 22 U/L — SIGNIFICANT CHANGE UP (ref 10–40)
BASOPHILS # BLD AUTO: 0.04 K/UL — SIGNIFICANT CHANGE UP (ref 0–0.2)
BASOPHILS NFR BLD AUTO: 0.3 % — SIGNIFICANT CHANGE UP (ref 0–2)
BILIRUB SERPL-MCNC: 0.4 MG/DL — SIGNIFICANT CHANGE UP (ref 0.2–1.2)
BUN SERPL-MCNC: 22 MG/DL — HIGH (ref 7–18)
CALCIUM SERPL-MCNC: 8.6 MG/DL — SIGNIFICANT CHANGE UP (ref 8.4–10.5)
CHLORIDE SERPL-SCNC: 94 MMOL/L — LOW (ref 96–108)
CO2 SERPL-SCNC: 29 MMOL/L — SIGNIFICANT CHANGE UP (ref 22–31)
CREAT SERPL-MCNC: 0.88 MG/DL — SIGNIFICANT CHANGE UP (ref 0.5–1.3)
EOSINOPHIL # BLD AUTO: 0.32 K/UL — SIGNIFICANT CHANGE UP (ref 0–0.5)
EOSINOPHIL NFR BLD AUTO: 2.4 % — SIGNIFICANT CHANGE UP (ref 0–6)
GLUCOSE BLDC GLUCOMTR-MCNC: 161 MG/DL — HIGH (ref 70–99)
GLUCOSE BLDC GLUCOMTR-MCNC: 177 MG/DL — HIGH (ref 70–99)
GLUCOSE BLDC GLUCOMTR-MCNC: 191 MG/DL — HIGH (ref 70–99)
GLUCOSE BLDC GLUCOMTR-MCNC: 195 MG/DL — HIGH (ref 70–99)
GLUCOSE SERPL-MCNC: 166 MG/DL — HIGH (ref 70–99)
HCT VFR BLD CALC: 29.5 % — LOW (ref 34.5–45)
HGB BLD-MCNC: 9.3 G/DL — LOW (ref 11.5–15.5)
IMM GRANULOCYTES NFR BLD AUTO: 0.7 % — SIGNIFICANT CHANGE UP (ref 0–1.5)
LYMPHOCYTES # BLD AUTO: 19.2 % — SIGNIFICANT CHANGE UP (ref 13–44)
LYMPHOCYTES # BLD AUTO: 2.6 K/UL — SIGNIFICANT CHANGE UP (ref 1–3.3)
MAGNESIUM SERPL-MCNC: 1.8 MG/DL — SIGNIFICANT CHANGE UP (ref 1.6–2.6)
MCHC RBC-ENTMCNC: 28 PG — SIGNIFICANT CHANGE UP (ref 27–34)
MCHC RBC-ENTMCNC: 31.5 GM/DL — LOW (ref 32–36)
MCV RBC AUTO: 88.9 FL — SIGNIFICANT CHANGE UP (ref 80–100)
MONOCYTES # BLD AUTO: 0.82 K/UL — SIGNIFICANT CHANGE UP (ref 0–0.9)
MONOCYTES NFR BLD AUTO: 6 % — SIGNIFICANT CHANGE UP (ref 2–14)
NEUTROPHILS # BLD AUTO: 9.7 K/UL — HIGH (ref 1.8–7.4)
NEUTROPHILS NFR BLD AUTO: 71.4 % — SIGNIFICANT CHANGE UP (ref 43–77)
NRBC # BLD: 0 /100 WBCS — SIGNIFICANT CHANGE UP (ref 0–0)
PLATELET # BLD AUTO: 398 K/UL — SIGNIFICANT CHANGE UP (ref 150–400)
POTASSIUM SERPL-MCNC: 3.7 MMOL/L — SIGNIFICANT CHANGE UP (ref 3.5–5.3)
POTASSIUM SERPL-SCNC: 3.7 MMOL/L — SIGNIFICANT CHANGE UP (ref 3.5–5.3)
PROT SERPL-MCNC: 7.2 G/DL — SIGNIFICANT CHANGE UP (ref 6–8.3)
RBC # BLD: 3.32 M/UL — LOW (ref 3.8–5.2)
RBC # FLD: 15 % — HIGH (ref 10.3–14.5)
SODIUM SERPL-SCNC: 131 MMOL/L — LOW (ref 135–145)
WBC # BLD: 13.57 K/UL — HIGH (ref 3.8–10.5)
WBC # FLD AUTO: 13.57 K/UL — HIGH (ref 3.8–10.5)

## 2020-06-16 PROCEDURE — 99233 SBSQ HOSP IP/OBS HIGH 50: CPT

## 2020-06-16 RX ORDER — APIXABAN 2.5 MG/1
5 TABLET, FILM COATED ORAL EVERY 12 HOURS
Refills: 0 | Status: DISCONTINUED | OUTPATIENT
Start: 2020-06-16 | End: 2020-06-19

## 2020-06-16 RX ADMIN — GABAPENTIN 100 MILLIGRAM(S): 400 CAPSULE ORAL at 17:18

## 2020-06-16 RX ADMIN — HEPARIN SODIUM 900 UNIT(S)/HR: 5000 INJECTION INTRAVENOUS; SUBCUTANEOUS at 03:05

## 2020-06-16 RX ADMIN — INSULIN GLARGINE 10 UNIT(S): 100 INJECTION, SOLUTION SUBCUTANEOUS at 21:16

## 2020-06-16 RX ADMIN — Medication 1: at 17:18

## 2020-06-16 RX ADMIN — Medication 650 MILLIGRAM(S): at 16:21

## 2020-06-16 RX ADMIN — ATORVASTATIN CALCIUM 40 MILLIGRAM(S): 80 TABLET, FILM COATED ORAL at 21:16

## 2020-06-16 RX ADMIN — TRAMADOL HYDROCHLORIDE 25 MILLIGRAM(S): 50 TABLET ORAL at 11:22

## 2020-06-16 RX ADMIN — Medication 1 TABLET(S): at 12:04

## 2020-06-16 RX ADMIN — Medication 110 MILLIGRAM(S): at 17:19

## 2020-06-16 RX ADMIN — Medication 100 MILLIGRAM(S): at 13:21

## 2020-06-16 RX ADMIN — Medication 20 MILLIGRAM(S): at 05:28

## 2020-06-16 RX ADMIN — GABAPENTIN 100 MILLIGRAM(S): 400 CAPSULE ORAL at 05:28

## 2020-06-16 RX ADMIN — Medication 1 APPLICATION(S): at 13:21

## 2020-06-16 RX ADMIN — TRAMADOL HYDROCHLORIDE 25 MILLIGRAM(S): 50 TABLET ORAL at 17:18

## 2020-06-16 RX ADMIN — Medication 100 MILLIGRAM(S): at 21:16

## 2020-06-16 RX ADMIN — LISINOPRIL 40 MILLIGRAM(S): 2.5 TABLET ORAL at 05:29

## 2020-06-16 RX ADMIN — Medication 1: at 21:16

## 2020-06-16 RX ADMIN — PANTOPRAZOLE SODIUM 40 MILLIGRAM(S): 20 TABLET, DELAYED RELEASE ORAL at 11:22

## 2020-06-16 RX ADMIN — Medication 1 TABLET(S): at 17:19

## 2020-06-16 RX ADMIN — Medication 1 TABLET(S): at 08:26

## 2020-06-16 RX ADMIN — Medication 1: at 07:57

## 2020-06-16 RX ADMIN — Medication 1: at 12:03

## 2020-06-16 RX ADMIN — Medication 110 MILLIGRAM(S): at 05:29

## 2020-06-16 RX ADMIN — HEPARIN SODIUM 0 UNIT(S)/HR: 5000 INJECTION INTRAVENOUS; SUBCUTANEOUS at 02:01

## 2020-06-16 RX ADMIN — APIXABAN 5 MILLIGRAM(S): 2.5 TABLET, FILM COATED ORAL at 18:05

## 2020-06-16 RX ADMIN — CEFTRIAXONE 100 MILLIGRAM(S): 500 INJECTION, POWDER, FOR SOLUTION INTRAMUSCULAR; INTRAVENOUS at 17:19

## 2020-06-16 NOTE — PROGRESS NOTE ADULT - SUBJECTIVE AND OBJECTIVE BOX
INTERVAL HPI/OVERNIGHT EVENTS:  Patient seen at bedside,awake,no dystress,events noticed  VITAL SIGNS:  T(F): 98.3 (06-16-20 @ 14:10)  HR: 77 (06-16-20 @ 14:10)  BP: 169/75 (06-16-20 @ 14:10)  RR: 18 (06-16-20 @ 14:10)  SpO2: 95% (06-16-20 @ 14:10)  Wt(kg): --    PHYSICAL EXAM:  awake  Constitutional:  Eyes:  ENMT:perrla  Neck:  Respiratory:clear  Cardiovascular:s1 s2,m-none  Gastrointestinal:soft,bs pos  Extremities:l foot with dressing post amputation  Vascular:  Neurological:no focal deficit  Musculoskeletal:    MEDICATIONS  (STANDING):  apixaban 5 milliGRAM(s) Oral every 12 hours  atorvastatin 40 milliGRAM(s) Oral at bedtime  cefTRIAXone   IVPB 2000 milliGRAM(s) IV Intermittent every 24 hours  Dakins Solution - 1/4 Strength 1 Application(s) Topical daily  dextrose 5% + sodium chloride 0.9%. 1000 milliLiter(s) (70 mL/Hr) IV Continuous <Continuous>  doxycycline IVPB 100 milliGRAM(s) IV Intermittent every 12 hours  furosemide    Tablet 20 milliGRAM(s) Oral daily  gabapentin 100 milliGRAM(s) Oral every 12 hours  hydrALAZINE 100 milliGRAM(s) Oral three times a day  insulin glargine Injectable (LANTUS) 10 Unit(s) SubCutaneous at bedtime  insulin lispro (HumaLOG) corrective regimen sliding scale   SubCutaneous Before meals and at bedtime  lactobacillus acidophilus 1 Tablet(s) Oral three times a day with meals  lisinopril 40 milliGRAM(s) Oral daily  melatonin 5 milliGRAM(s) Oral at bedtime  metoprolol succinate ER 50 milliGRAM(s) Oral daily  pantoprazole  Injectable 40 milliGRAM(s) IV Push daily    MEDICATIONS  (PRN):  acetaminophen   Tablet .. 650 milliGRAM(s) Oral every 6 hours PRN Mild Pain (1 - 3), Moderate Pain (4 - 6)  aluminum hydroxide/magnesium hydroxide/simethicone Suspension 30 milliLiter(s) Oral every 6 hours PRN Dyspepsia  diphenhydrAMINE   Injectable 15 milliGRAM(s) IV Push every 6 hours PRN nausea or vomiting  metoprolol tartrate Injectable 5 milliGRAM(s) IV Push every 6 hours PRN HR greater than 120  ondansetron Injectable 4 milliGRAM(s) IV Push every 8 hours PRN Nausea and/or Vomiting  traMADol 25 milliGRAM(s) Oral every 6 hours PRN Severe Pain (7 - 10)      Allergies    codeine (Rash)  penicillin (Rash)    Intolerances        LABS:                        9.3    13.57 )-----------( 398      ( 16 Jun 2020 06:35 )             29.5     06-16    131<L>  |  94<L>  |  22<H>  ----------------------------<  166<H>  3.7   |  29  |  0.88    Ca    8.6      16 Jun 2020 06:35  Phos  3.2     06-15  Mg     1.8     06-16    TPro  7.2  /  Alb  2.1<L>  /  TBili  0.4  /  DBili  x   /  AST  22  /  ALT  24  /  AlkPhos  330<H>  06-16    PT/INR - ( 15 Uli 2020 06:07 )   PT: 14.1 sec;   INR: 1.24 ratio         Assessment and Plan:    Problem/Plan - 1:  ·  Problem: Stroke.  Plan: CTA head/neck showed LEFT PICA stroke  -will switch to oral anticoagulation   -will start on eliquis 5 mg bid  Neuro Dr. Coral acuna.      Problem/Plan - 2:  ·  Problem: PVD (peripheral vascular disease).  Plan: H/o PVD and popliteal stent  s/p left big toe amputation in May 2020  s/p ESTRELLA :  RLE:  0.6. LLE:  0.5. Severe bilateral peripheral artery disease. Pulse volume recordings are diffusely diminished bilaterally, however severely diminished in the left foot.  s/p left metatarsal amputation  Vascular Dr. Grove.      Problem/Plan - 3:  ·  Problem: Osteomyelitis of left foot, unspecified type  CT L foot shows OM ( pt cannot get MRI dur to AICD)  Podiatry Dr. Yo - s/p I and D  ID Dr. Reagan  c/w Franky for now.   patient needs 6 weeks of abx  pending for pick line placement prior to d/c   d/w patient and daughter plan of care     Problem/Plan - 4:  ·  Problem: Intractable nausea and vomiting-stable ,resolved  s/p egd -stable  CT abd/pelvis as above.    Gi dr. Matute following  EGD today  started IVF.       Problem/Plan - 5:  ·  Problem: Bladder wall thickening.  Plan: CT abdomen shows bladder wall thickening  pt reports weight loss and loss of appetite recently, Patient has history of Stage 1 endometrial cancer and hysterectomy  - urine cytology: NEGATIVE FOR HIGH GRADE UROTHELIAL CARCINOMA  urology consulted: outpt f/u w/ , need cystoscopy.      Problem/Plan - 6:  Problem: HTN (hypertension). Plan: Pt takes Lisinopril 40mg, toprol Xl 100mg, hydralazine 50mg TID   c/w current meds  monitor BP.     Problem/Plan - 7:  ·  Problem: DM (diabetes mellitus).  Plan: Patient takes Levemir 30mg at night and 10Units pre-meals  c/w sliding scale and Lantus 20Units HS  Diabetic diet  Hb A1C: 8.1%  monitor BS.      Problem/Plan - 8:  ·  Problem: CAD (coronary artery disease).  Plan: H/o CAD and PCI in 2009,  patient has both AICD and pacemaker  EKG shows paced Rythm  No active issues  continue aspirin , Plavix, BB, statin, ACE    hold heparin drip for EGD today. may resume after procedure  monitor PT/INR/Ptt.      Problem/Plan - 9:  ·  Problem: CHF (congestive heart failure).  Plan: H/o CHF but clinically patient is not in fluid overload  No peripheral edema  Patient takes furosemide 20mg daily twice at home  Echo: EF 50-55%, mild TR, ND  c/w lasix , statin and BB.      Problem/Plan - 10:  Problem: Prophylactic measure. Plan; DVT ppx-will start on eliquis

## 2020-06-16 NOTE — PROGRESS NOTE ADULT - SUBJECTIVE AND OBJECTIVE BOX
Subjective:   Doing well. Nausea has resolved     Objective:    MEDICATIONS  (STANDING):  atorvastatin 40 milliGRAM(s) Oral at bedtime  cefTRIAXone   IVPB 2000 milliGRAM(s) IV Intermittent every 24 hours  Dakins Solution - 1/4 Strength 1 Application(s) Topical daily  dextrose 5% + sodium chloride 0.9%. 1000 milliLiter(s) (70 mL/Hr) IV Continuous <Continuous>  doxycycline IVPB 100 milliGRAM(s) IV Intermittent every 12 hours  furosemide    Tablet 20 milliGRAM(s) Oral daily  gabapentin 100 milliGRAM(s) Oral every 12 hours  heparin  Infusion. 800 Unit(s)/Hr (8 mL/Hr) IV Continuous <Continuous>  hydrALAZINE 100 milliGRAM(s) Oral three times a day  insulin glargine Injectable (LANTUS) 10 Unit(s) SubCutaneous at bedtime  insulin lispro (HumaLOG) corrective regimen sliding scale   SubCutaneous Before meals and at bedtime  lactobacillus acidophilus 1 Tablet(s) Oral three times a day with meals  lisinopril 40 milliGRAM(s) Oral daily  melatonin 5 milliGRAM(s) Oral at bedtime  metoprolol succinate ER 50 milliGRAM(s) Oral daily  pantoprazole  Injectable 40 milliGRAM(s) IV Push daily    MEDICATIONS  (PRN):  acetaminophen   Tablet .. 650 milliGRAM(s) Oral every 6 hours PRN Mild Pain (1 - 3), Moderate Pain (4 - 6)  aluminum hydroxide/magnesium hydroxide/simethicone Suspension 30 milliLiter(s) Oral every 6 hours PRN Dyspepsia  diphenhydrAMINE   Injectable 15 milliGRAM(s) IV Push every 6 hours PRN nausea or vomiting  metoprolol tartrate Injectable 5 milliGRAM(s) IV Push every 6 hours PRN HR greater than 120  ondansetron Injectable 4 milliGRAM(s) IV Push every 8 hours PRN Nausea and/or Vomiting  traMADol 25 milliGRAM(s) Oral every 6 hours PRN Severe Pain (7 - 10)              Vital Signs Last 24 Hrs  T(C): 36.6 (16 Jun 2020 04:53), Max: 36.7 (15 Uli 2020 16:40)  T(F): 97.9 (16 Jun 2020 04:53), Max: 98 (15 Uli 2020 16:40)  HR: 50 (16 Jun 2020 04:53) (50 - 92)  BP: 168/56 (16 Jun 2020 04:53) (135/87 - 168/56)  BP(mean): --  RR: 18 (16 Jun 2020 04:53) (16 - 18)  SpO2: 95% (16 Jun 2020 04:53) (95% - 97%)      General:  Well developed, well nourished, alert and active, no pallor, NAD.  HEENT:    Normal appearance of conjunctiva, ears, nose, lips, oropharynx, and oral mucosa, anicteric.  Neck:  No masses, no asymmetry.  Lymph Nodes:  No lymphadenopathy.   Cardiovascular:  RRR normal S1/S2, no murmur.  Respiratory:  CTA B/L, normal respiratory effort.   Abdominal:   soft, no masses or tenderness, normoactive BS, NT/ND, no HSM.  Extremities:   No clubbing or cyanosis, normal capillary refill, no edema.      Other:       LABS:                        9.3    13.57 )-----------( 398      ( 16 Jun 2020 06:35 )             29.5     06-16    131<L>  |  94<L>  |  22<H>  ----------------------------<  166<H>  3.7   |  29  |  0.88    Ca    8.6      16 Jun 2020 06:35  Phos  3.2     06-15  Mg     1.8     06-16    TPro  7.2  /  Alb  2.1<L>  /  TBili  0.4  /  DBili  x   /  AST  22  /  ALT  24  /  AlkPhos  330<H>  06-16    PT/INR - ( 15 Uli 2020 06:07 )   PT: 14.1 sec;   INR: 1.24 ratio         PTT - ( 16 Jun 2020 01:21 )  PTT:154.6 sec      RADIOLOGY & ADDITIONAL TESTS:    · Note Type	Event Note  EGD	      Esophagogastroduodenoscopy Report  Anesthesia: MAC  Consent:  Informed consent was obtained from the patient after providing any opportunity for questions  Procedure: The gastroscope was gently passed through the incisoral orifice into the oral cavity and under direct visualization the esophagus was intubated. The endoscope was passed down the esophagus, through the stomach and into proximal jejunum. Color, texture, mucosa and anatomy of the esophagus, stomach, and duodenum were carefully examined with the scope. The patient tolerated the procedure well. After completion of the examination, the patient was transferred to the recovery room.   Preparation: NPO   Findings:   Oropharynx	Normal  Esophagus	Normal  EG-junction	Normal  Cardia	Normal.  Body	Normal   Antrum	Mild gastritis biopsy taken [1]  Pylorus	Normal.  Duodenal Bulb	Superficial clean based ulcer. Non-obstructing , non bleeding.   2nd portion	Normal  3rd portion	Normal.  Date and time:11/27/2019 9:20:48 AM  EBL:0  Impression: 1- Healing duodenal bulb ulcer 2- Mild gastritis 3- No pathology to explain vomiting.     Plan: 1- Advance diet 2- PPI daily 3- Follow up pathology     Quintin Wharton M.D.   Date and Time:      Electronic Signatures:  Quintin Wharton)  (Signed 15-Uli-2020 15:52)  	Authored: Note Type, Note      Last Updated: 15-Uli-2020 15:52 by Quintin Wharton)

## 2020-06-16 NOTE — PROGRESS NOTE ADULT - PROBLEM SELECTOR PLAN 6
Pt takes Lisinopril 40mg, toprol Xl 100mg, hydralazine 50mg TID   c/w current meds  monitor BP H/o CHF but clinically patient is not in fluid overload  Patient takes furosemide 20mg daily twice at home  Echo: EF 50-55%, mild TR, SC  c/w lasix , statin and BB

## 2020-06-16 NOTE — PROGRESS NOTE ADULT - ASSESSMENT
· Assessment		  A/P  PVD /PAD  DM  ulcer   dehiscence surgical wound   history of gangrene left hallux   positive probe to bone   leukocytosis -stable most probably reactive after surgery   OM with  abscess  left -P/O day 7  pyogenic tendon       pt seen and eval   chart review  s/p angiogram -pt is very high risk for none healing  due to very poor vascular status as per Dr. Grove   Ms Keating and her daughter want to see if the wound will improve before discussing BKA  pt is at risk of hematoma formation/bleeding   continue antibiotics as per id -id    dressing changed    no weight bearing  -due to risk of bleeding   if stable for d/c will need to follow up with podiatry, will need HBO -if not a candidate for HBO consider topical oxygen   pt preferred to go home , will discuss with the team     please call with any pt related questions [phone]

## 2020-06-16 NOTE — PROGRESS NOTE ADULT - PROBLEM SELECTOR PLAN 1
CTA head/neck showed LEFT PICA stroke  -will switch to oral anticoagulation after EGD  Neuro Dr. Moncada following CTA head/neck showed LEFT PICA stroke  Neuro Dr. Moncada following  d/c'd heparin drip.   started Eliquis as per attending

## 2020-06-16 NOTE — PROGRESS NOTE ADULT - SUBJECTIVE AND OBJECTIVE BOX
NP Note discussed with  Primary Attending    Patient is a 74y old  Female who presents with a chief complaint of Intractable nausea and vomiting (16 Jun 2020 14:54)    HPI-  A 73 YO female from home, H/O CAD/DM/HTN/PVD/hysterectomy/cholecystectomy ,with AICD has come to ED with intractable nausea and vomiting for 2 days, likely due to pill induced esophagitis/gastritis.  CT abdomen/pelvis shows bladder wall thickening and pt has history of Stage1 endometrial cancer.   Pt is admitted for management of acute gastritis w/ intractable abdominal pain, for malignancy workup. Followed by GI dr. Matute.   CTA head/neck showed LEFT PICA stroke. Neurology following. Followed by cardiology for new AF/CVA. Heparin drip started.  Followed by Vascular and podiatry for Left foot OM. s/p angiogram on 6/9. I&D done. on IV abt. ID following.     Pt is scheduled for EGD today. Held heparin prior to procedure.     INTERVAL HPI/OVERNIGHT EVENTS: no new complaints    MEDICATIONS  (STANDING):  apixaban 5 milliGRAM(s) Oral every 12 hours  atorvastatin 40 milliGRAM(s) Oral at bedtime  cefTRIAXone   IVPB 2000 milliGRAM(s) IV Intermittent every 24 hours  Dakins Solution - 1/4 Strength 1 Application(s) Topical daily  dextrose 5% + sodium chloride 0.9%. 1000 milliLiter(s) (70 mL/Hr) IV Continuous <Continuous>  doxycycline IVPB 100 milliGRAM(s) IV Intermittent every 12 hours  furosemide    Tablet 20 milliGRAM(s) Oral daily  gabapentin 100 milliGRAM(s) Oral every 12 hours  hydrALAZINE 100 milliGRAM(s) Oral three times a day  insulin glargine Injectable (LANTUS) 10 Unit(s) SubCutaneous at bedtime  insulin lispro (HumaLOG) corrective regimen sliding scale   SubCutaneous Before meals and at bedtime  lactobacillus acidophilus 1 Tablet(s) Oral three times a day with meals  lisinopril 40 milliGRAM(s) Oral daily  melatonin 5 milliGRAM(s) Oral at bedtime  metoprolol succinate ER 50 milliGRAM(s) Oral daily  pantoprazole  Injectable 40 milliGRAM(s) IV Push daily    MEDICATIONS  (PRN):  acetaminophen   Tablet .. 650 milliGRAM(s) Oral every 6 hours PRN Mild Pain (1 - 3), Moderate Pain (4 - 6)  aluminum hydroxide/magnesium hydroxide/simethicone Suspension 30 milliLiter(s) Oral every 6 hours PRN Dyspepsia  diphenhydrAMINE   Injectable 15 milliGRAM(s) IV Push every 6 hours PRN nausea or vomiting  metoprolol tartrate Injectable 5 milliGRAM(s) IV Push every 6 hours PRN HR greater than 120  ondansetron Injectable 4 milliGRAM(s) IV Push every 8 hours PRN Nausea and/or Vomiting  traMADol 25 milliGRAM(s) Oral every 6 hours PRN Severe Pain (7 - 10)      __________________________________________________  REVIEW OF SYSTEMS:    CONSTITUTIONAL: No fever,   EYES: no acute visual disturbances  NECK: No pain or stiffness  RESPIRATORY: No cough; No shortness of breath  CARDIOVASCULAR: No chest pain, no palpitations  GASTROINTESTINAL: No pain. No nausea or vomiting; No diarrhea   NEUROLOGICAL: No headache or numbness, no tremors  MUSCULOSKELETAL: No joint pain, no muscle pain  GENITOURINARY: no dysuria, no frequency, no hesitancy  PSYCHIATRY: no depression , no anxiety  ALL OTHER  ROS negative        Vital Signs Last 24 Hrs  T(C): 36.8 (16 Jun 2020 14:10), Max: 36.8 (16 Jun 2020 14:10)  T(F): 98.3 (16 Jun 2020 14:10), Max: 98.3 (16 Jun 2020 14:10)  HR: 77 (16 Jun 2020 14:10) (50 - 77)  BP: 169/75 (16 Jun 2020 14:10) (135/87 - 169/75)  BP(mean): --  RR: 18 (16 Jun 2020 14:10) (17 - 18)  SpO2: 95% (16 Jun 2020 14:10) (95% - 97%)    ________________________________________________  PHYSICAL EXAM:  GENERAL: NAD  HEENT: Normocephalic;  conjunctivae and sclerae clear; moist mucous membranes;   NECK : supple  CHEST/LUNG: Clear to auscultation bilaterally with good air entry   HEART: S1 S2  regular; no murmurs, gallops or rubs  ABDOMEN: Soft, Nontender, Nondistended; Bowel sounds present  EXTREMITIES: no cyanosis; no edema; no calf tenderness  SKIN: warm and dry; no rash  NERVOUS SYSTEM:  Awake and alert; Oriented  to place, person and time ; no new deficits    _________________________________________________  LABS:                        9.3    13.57 )-----------( 398      ( 16 Jun 2020 06:35 )             29.5     06-16    131<L>  |  94<L>  |  22<H>  ----------------------------<  166<H>  3.7   |  29  |  0.88    Ca    8.6      16 Jun 2020 06:35  Phos  3.2     06-15  Mg     1.8     06-16    TPro  7.2  /  Alb  2.1<L>  /  TBili  0.4  /  DBili  x   /  AST  22  /  ALT  24  /  AlkPhos  330<H>  06-16    PT/INR - ( 15 Uli 2020 06:07 )   PT: 14.1 sec;   INR: 1.24 ratio         PTT - ( 16 Jun 2020 01:21 )  PTT:154.6 sec    CAPILLARY BLOOD GLUCOSE      POCT Blood Glucose.: 191 mg/dL (16 Jun 2020 16:32)  POCT Blood Glucose.: 195 mg/dL (16 Jun 2020 11:28)  POCT Blood Glucose.: 177 mg/dL (16 Jun 2020 07:36)  POCT Blood Glucose.: 209 mg/dL (15 Lui 2020 21:06)        RADIOLOGY & ADDITIONAL TESTS:    Imaging  Reviewed:  YES    Consultant(s) Notes Reviewed:   YES      Plan of care was discussed with patient and /or primary care giver; all questions and concerns were addressed NP Note discussed with  Primary Attending    Patient is a 74y old  Female who presents with a chief complaint of Intractable nausea and vomiting (16 Jun 2020 14:54)    HPI-  A 75 YO female from home, H/O CAD/DM/HTN/PVD/hysterectomy/cholecystectomy ,with AICD has come to ED with intractable nausea and vomiting for 2 days, likely due to pill induced esophagitis/gastritis.  CT abdomen/pelvis shows bladder wall thickening and pt has history of Stage1 endometrial cancer.   Pt is admitted for management of acute gastritis w/ intractable abdominal pain, for malignancy workup. Followed by GI dr. Matute.   CTA head/neck showed LEFT PICA stroke. Neurology following. Followed by cardiology for new AF/CVA. Heparin drip started.  Followed by Vascular and podiatry for Left foot OM. s/p angiogram on 6/9. I&D done. on IV abt. ID following.   s/p EGD on 6/15. mild gastritis and healing duodenal ulcer. no active bleeding. biopsy sent. plan for f/u pathology report.   As per ID dr. Reagan, pt will requires total 6 weeks of IV abt. Plan for IR for PICC placement. Confirmed with dr. Dupree and IR. ms. Butler. no need to hold Eliquis as discussed w/ IR.       INTERVAL HPI/OVERNIGHT EVENTS: seen at bedside. left foot pain controlled w/ medication as per patient. refusing heparin drip and frequent blood test for ptt.     MEDICATIONS  (STANDING):  apixaban 5 milliGRAM(s) Oral every 12 hours  atorvastatin 40 milliGRAM(s) Oral at bedtime  cefTRIAXone   IVPB 2000 milliGRAM(s) IV Intermittent every 24 hours  Dakins Solution - 1/4 Strength 1 Application(s) Topical daily  dextrose 5% + sodium chloride 0.9%. 1000 milliLiter(s) (70 mL/Hr) IV Continuous <Continuous>  doxycycline IVPB 100 milliGRAM(s) IV Intermittent every 12 hours  furosemide    Tablet 20 milliGRAM(s) Oral daily  gabapentin 100 milliGRAM(s) Oral every 12 hours  hydrALAZINE 100 milliGRAM(s) Oral three times a day  insulin glargine Injectable (LANTUS) 10 Unit(s) SubCutaneous at bedtime  insulin lispro (HumaLOG) corrective regimen sliding scale   SubCutaneous Before meals and at bedtime  lactobacillus acidophilus 1 Tablet(s) Oral three times a day with meals  lisinopril 40 milliGRAM(s) Oral daily  melatonin 5 milliGRAM(s) Oral at bedtime  metoprolol succinate ER 50 milliGRAM(s) Oral daily  pantoprazole  Injectable 40 milliGRAM(s) IV Push daily    MEDICATIONS  (PRN):  acetaminophen   Tablet .. 650 milliGRAM(s) Oral every 6 hours PRN Mild Pain (1 - 3), Moderate Pain (4 - 6)  aluminum hydroxide/magnesium hydroxide/simethicone Suspension 30 milliLiter(s) Oral every 6 hours PRN Dyspepsia  diphenhydrAMINE   Injectable 15 milliGRAM(s) IV Push every 6 hours PRN nausea or vomiting  metoprolol tartrate Injectable 5 milliGRAM(s) IV Push every 6 hours PRN HR greater than 120  ondansetron Injectable 4 milliGRAM(s) IV Push every 8 hours PRN Nausea and/or Vomiting  traMADol 25 milliGRAM(s) Oral every 6 hours PRN Severe Pain (7 - 10)      __________________________________________________  REVIEW OF SYSTEMS:    CONSTITUTIONAL: No fever,   EYES: no acute visual disturbances  NECK: No pain or stiffness  RESPIRATORY: No cough; No shortness of breath  CARDIOVASCULAR: No chest pain, no palpitations  GASTROINTESTINAL: No pain. No nausea or vomiting; No diarrhea   NEUROLOGICAL: No headache or numbness, no tremors  MUSCULOSKELETAL: mild left foot pain  GENITOURINARY: no dysuria, no frequency, no hesitancy  PSYCHIATRY: no depression , no anxiety  ALL OTHER  ROS negative        Vital Signs Last 24 Hrs  T(C): 36.8 (16 Jun 2020 14:10), Max: 36.8 (16 Jun 2020 14:10)  T(F): 98.3 (16 Jun 2020 14:10), Max: 98.3 (16 Jun 2020 14:10)  HR: 77 (16 Jun 2020 14:10) (50 - 77)  BP: 169/75 (16 Jun 2020 14:10) (135/87 - 169/75)  BP(mean): --  RR: 18 (16 Jun 2020 14:10) (17 - 18)  SpO2: 95% (16 Jun 2020 14:10) (95% - 97%)    ________________________________________________  PHYSICAL EXAM:  GENERAL: NAD. conversant. comprehensive on exam  HEENT: Normocephalic;  conjunctivae and sclerae clear; moist mucous membranes;   NECK : supple  CHEST/LUNG: Clear to auscultation bilaterally with good air entry   HEART: S1 S2  regular; no murmurs, gallops or rubs  ABDOMEN: Soft, Nontender, Nondistended; Bowel sounds present  EXTREMITIES: no cyanosis; no edema; no calf tenderness  SKIN: warm and dry; no rash. left foot dressing c/d/i  NERVOUS SYSTEM:  Awake and alert; Oriented  to place, person and time    _________________________________________________  LABS:                        9.3    13.57 )-----------( 398      ( 16 Jun 2020 06:35 )             29.5     06-16    131<L>  |  94<L>  |  22<H>  ----------------------------<  166<H>  3.7   |  29  |  0.88    Ca    8.6      16 Jun 2020 06:35  Phos  3.2     06-15  Mg     1.8     06-16    TPro  7.2  /  Alb  2.1<L>  /  TBili  0.4  /  DBili  x   /  AST  22  /  ALT  24  /  AlkPhos  330<H>  06-16    PT/INR - ( 15 Uli 2020 06:07 )   PT: 14.1 sec;   INR: 1.24 ratio         PTT - ( 16 Jun 2020 01:21 )  PTT:154.6 sec    CAPILLARY BLOOD GLUCOSE      POCT Blood Glucose.: 191 mg/dL (16 Jun 2020 16:32)  POCT Blood Glucose.: 195 mg/dL (16 Jun 2020 11:28)  POCT Blood Glucose.: 177 mg/dL (16 Jun 2020 07:36)  POCT Blood Glucose.: 209 mg/dL (15 Uli 2020 21:06)        RADIOLOGY & ADDITIONAL TESTS:    Imaging  Reviewed:  YES    < from: Xray Tibia + Fibula 2 Views, Left (06.11.20 @ 10:10) >    EXAM:  LEG AP&LAT - LEFT                            PROCEDURE DATE:  06/11/2020          INTERPRETATION:  Left tib-fib. There is history of popliteal stent. 2 views.    There are rather mild degenerative changes in the knee and there is some spurring of the posterior upper shaft of the tibia.    Popliteal stent starting at the upper tibial plateau level and extends for a length of approximately 10 cm into the trifurcation area.    There is what appears to be an old fracture deformity of the upper shaft of the fibula. There are posterior and inferior calcaneal spurs.    IMPRESSION: As above.                ISABELLA HOWARD M.D., ATTENDING RADIOLOGIST  This document has been electronically signed. Jun 11 2020 10:59AM                < end of copied text >      < from: CT Angio Neck w/ IV Cont (06.04.20 @ 20:09) >    EXAM:  CT ANGIO NECK (W)AW IC                          EXAM:  CT ANGIO BRAIN (W)AW IC                            PROCEDURE DATE:  06/04/2020          INTERPRETATION:  CLINICAL STATEMENT: Nausea vomiting vertically    TECHNIQUE: CTA of the head and neck performed with IV contrast.  3-D MIP imaging obtained. Approximately 90 cc of Omnipaque 350 administered.    COMPARISON: None.    FINDINGS:  CTA of the neck:  Evaluation of the origins of the great vessels is limited due to artifacts.    The visualized common carotid and internal carotid arteries are patent. Calcified atherosclerotic plaque at the right carotid bulb results in focal mild narrowing of the origin of the right internal carotid artery.    Atherosclerotic plaque noted resulting inmoderate narrowing distal left common carotid artery.    The visualized extracranial vertebral arteries are patent. Origin of vertebral arteries not evaluated.    Left anterior chest device causes artifacts limiting evaluation of the chest left chestwall device limits evaluation of the chest due to artifacts. Questionable filling defects noted in the upper segmental and subsegmental pulmonary arteries. Possible groundglass opacities. Pleural effusions noted. Partially visualized soft tissue densities noted in the subcarinal region    CTA Head:  Calcified atherosclerotic plaques results in narrowing of the cavernous and supraclinoid bilateral internal carotid arteries. There is severe narrowing of the terminal segment of distal left internal carotid artery    The proximal anterior, middle and posterior cerebral arteries are patent without hemodynamically significant stenosis.    The intracranial vertebral and basilar arteries are patent. Calcified atherosclerotic plaques results in narrowing of bilateral V4 segments of distal vertebral arteries.    There is no intracranial aneurysm.        IMPRESSION:  Focal mild (less than 50%) narrowing at the origin of right internal carotid artery.    Short segment Moderate (50-70%) narrowing distal left common carotid artery    Intracranial narrowing as described above.    Partially visualized pleural effusions and soft tissue density in the subcarinal region. Questionable filling defects in the segmental and subsegmental upper pulmonary arteries. Evaluation significantly limited due to artifacts from left chest wall device. CTA chest recommended for further evaluation                SALOMÓN GARCÍA M.D., ATTENDING RADIOLOGIST  This document has been electronically signed. Jun 4 2020  8:21PM                < end of copied text >    < from: US Abdomen Limited (06.04.20 @ 16:11) >    EXAM:  US ABDOMEN LIMITED                            PROCEDURE DATE:  06/04/2020          INTERPRETATION:  CLINICAL INFORMATION: Nausea vomiting and elevated liver enzymes    COMPARISON: CT scan of 6/1/2020.    TECHNIQUE: Sonography of the right upper quadrant.     FINDINGS:  Liver: Somewhat heterogeneous with areas of increased echogenicity.  Bile ducts: Normal caliber. Common bile duct measures 6 mm.   Gallbladder: Surgically absent        Pancreas: Visualized portions are within normal limits.  Right kidney: 10.4 cm. No hydronephrosis.   Ascites: None.  IVC: Visualized portions are within normal limits.    IMPRESSION:   Liver demonstrates some areas of increased echogenicity which may represent heterogeneous fatty infiltration.  Status post cholecystectomy                GABRIELA MANCILLA M.D.,ATTENDING RADIOLOGIST  This document has been electronically signed. Jun 4 2020  4:18PM                < end of copied text >      Consultant(s) Notes Reviewed:   YES  ID. podiatry. cardiology. neurology. GI    Plan of care was discussed with patient and /or primary care giver; all questions and concerns were addressed

## 2020-06-16 NOTE — PROGRESS NOTE ADULT - PROBLEM SELECTOR PLAN 4
likely GI etiology  UA negative, No fevers, UCx: -ve  CT abd/pelvis as above.    Gi dr. Matute following  EGD today  noted hyponatremia due to  poor po intake with nausea  on IVF  repeat BMP in AM Pt takes Lisinopril 40mg, toprol Xl 100mg, hydralazine 50mg TID   c/w current meds  monitor BP

## 2020-06-16 NOTE — PROGRESS NOTE ADULT - SUBJECTIVE AND OBJECTIVE BOX
Subjective: no new complaints . for d/c planning. for picc line placement d/w pmd . as per podiatry may need HBO       PHYSICAL EXAM:    Vital Signs Last 24 Hrs  T(C): 37 (16 Jun 2020 20:29), Max: 37 (16 Jun 2020 20:29)  T(F): 98.6 (16 Jun 2020 20:29), Max: 98.6 (16 Jun 2020 20:29)  HR: 88 (16 Jun 2020 20:29) (50 - 88)  BP: 175/72 (16 Jun 2020 20:29) (168/56 - 175/72)  BP(mean): --  RR: 18 (16 Jun 2020 20:29) (18 - 18)  SpO2: 98% (16 Jun 2020 20:29) (95% - 98%)        Constitutional: awake alert oriented times 3   [place] SCLERA anicteric EOMI   LUNGS clear   CVS s1 s2 aud no murmurs /ppm in place  ABDOMEN soft non tender no HSM   NEUROLOGY  no defecits   SKIN left foot dressed   EXTREMITIES no edema no cyanosis /no groin hematoma           LABS/DIAGNOSTIC TESTS                        9.3    13.57 )-----------( 398      ( 16 Jun 2020 06:35 )             29.5     06-16    131<L>  |  94<L>  |  22<H>  ----------------------------<  166<H>  3.7   |  29  |  0.88    Ca    8.6      16 Jun 2020 06:35  Phos  3.2     06-15  Mg     1.8     06-16    TPro  7.2  /  Alb  2.1<L>  /  TBili  0.4  /  DBili  x   /  AST  22  /  ALT  24  /  AlkPhos  330<H>  06-16    PT/INR - ( 15 Uli 2020 06:07 )   PT: 14.1 sec;   INR: 1.24 ratio         PTT - ( 16 Jun 2020 01:21 )  PTT:154.6 sec      meds  acetaminophen   Tablet .. 650 milliGRAM(s) Oral every 6 hours PRN  aluminum hydroxide/magnesium hydroxide/simethicone Suspension 30 milliLiter(s) Oral every 6 hours PRN  apixaban 5 milliGRAM(s) Oral every 12 hours  atorvastatin 40 milliGRAM(s) Oral at bedtime  cefTRIAXone   IVPB 2000 milliGRAM(s) IV Intermittent every 24 hours  Dakins Solution - 1/4 Strength 1 Application(s) Topical daily  dextrose 5% + sodium chloride 0.9%. 1000 milliLiter(s) IV Continuous <Continuous>  diphenhydrAMINE   Injectable 15 milliGRAM(s) IV Push every 6 hours PRN  doxycycline IVPB 100 milliGRAM(s) IV Intermittent every 12 hours  furosemide    Tablet 20 milliGRAM(s) Oral daily  gabapentin 100 milliGRAM(s) Oral every 12 hours  hydrALAZINE 100 milliGRAM(s) Oral three times a day  insulin glargine Injectable (LANTUS) 10 Unit(s) SubCutaneous at bedtime  insulin lispro (HumaLOG) corrective regimen sliding scale   SubCutaneous Before meals and at bedtime  lactobacillus acidophilus 1 Tablet(s) Oral three times a day with meals  lisinopril 40 milliGRAM(s) Oral daily  melatonin 5 milliGRAM(s) Oral at bedtime  metoprolol succinate ER 50 milliGRAM(s) Oral daily  metoprolol tartrate Injectable 5 milliGRAM(s) IV Push every 6 hours PRN  ondansetron Injectable 4 milliGRAM(s) IV Push every 8 hours PRN  pantoprazole  Injectable 40 milliGRAM(s) IV Push daily  traMADol 25 milliGRAM(s) Oral every 6 hours PRN        CULTURES  no new cx       RADIOLOGY  no new xrays

## 2020-06-16 NOTE — PROGRESS NOTE ADULT - PROBLEM SELECTOR PLAN 8
H/o CAD and PCI in 2009,  patient has both AICD and pacemaker  EKG shows paced Rhythm, Afib  No active issues  continue  BB, statin, ACE    Heparin drip d/c'd   Start Eliquis 5mg BID as per attending.   monitor for bleeding. likely GI etiology  UA negative, No fevers, UCx: -ve  CT abd/pelvis as above.    Gi dr. Matute following  s/p EGD  6/15- healing duodenal bulb ulcer and mild gastritis.  f/u pathology report  noted hyponatremia due to poor po intake with nausea  Na 131 today  s/p IVF - D5 09.NS x 12 hr  monitor  BMP

## 2020-06-16 NOTE — PROGRESS NOTE ADULT - PROBLEM SELECTOR PLAN 3
recently antibiotics changed from clindamycin to Levaquin that led to epigastric pain.  Leukocytosis. afebrile  CT L foot shows OM ( pt cannot get MRI dur to AICD)  Podiatry Dr. Yo - s/p I and D  ID Dr. Reagan  c/w Franky for now. D#3. H/o PVD and popliteal stent  s/p left big toe amputation in May 2020  s/p ESTRELLA :  RLE:  0.6. LLE:  0.5. Severe b/l peripheral artery disease. Pulse volume recordings are diffusely diminished b/l,  severely diminished in the left foot.  s/p left metatarsal amputation  PT rec-Home PT.    s/p Angio with severe occlusive popliteal and tibial dx that is not reconstructable.   Vascular Dr. Grove following

## 2020-06-16 NOTE — PROGRESS NOTE ADULT - PROBLEM SELECTOR PLAN 9
H/o CHF but clinically patient is not in fluid overload  No peripheral edema  Patient takes furosemide 20mg daily twice at home  Echo: EF 50-55%, mild TR, IN  c/w lasix , statin and BB Patient takes Levemir 30mg at night and 10Units pre-meals  c/w sliding scale and Lantus 20Units HS  Diabetic diet  Hb A1C: 8.1%  monitor BS

## 2020-06-16 NOTE — PROGRESS NOTE ADULT - ASSESSMENT
Nausea vomiting/ PUD    ·	Resolved   ·	Continue IV heparin can switch to NOAC if needed    ·	Zofran PRN nausea   ·	Small frequent low fat meals  ·	Follow up pathology (treat H. pylori IF positive   ·	PPI daily x 1month     ·	Avoid NSAIDs     I was physically present for the key portions of the evaluation and management (E/M) service provided.  The patient was personally seen and examined at bedside.    Thank you for your consultation and allowing  me to participate in the care of your patients. If you have further questions please contact me at [phone].     Quintin Madrigal M.D.       _________________________________________________________________________________________________  [facility]  [address] [place] [id number]  Office: [phone]    Tremaine Culp PA-C  ___________________________________________________________________________________________________

## 2020-06-16 NOTE — PROGRESS NOTE ADULT - PROBLEM SELECTOR PLAN 5
CT abdomen shows bladder wall thickening  pt reports weight loss and loss of appetite recently, Patient has history of Stage 1 endometrial cancer and hysterectomy  - urine cytology: NEGATIVE FOR HIGH GRADE UROTHELIAL CARCINOMA  urology consulted: outpt f/u w/ , need cystoscopy H/o CAD and PCI in 2009,  patient has both AICD and pacemaker  EKG shows paced Rhythm, new Afib  No active issues  continue  BB, statin, ACE    Heparin drip d/c'd   monitor for bleeding.  followed by cardiology dr. Segundo. agreeable for NOAC.  Start Eliquis 5mg BID as per attending.

## 2020-06-16 NOTE — PROGRESS NOTE ADULT - PROBLEM SELECTOR PLAN 2
H/o PVD and popliteal stent  s/p left big toe amputation in May 2020  s/p ESTRELLA :  RLE:  0.6. LLE:  0.5. Severe bilateral peripheral artery disease. Pulse volume recordings are diffusely diminished bilaterally, however severely diminished in the left foot.  s/p left metatarsal amputation  PT re eval  Vascular Dr. Grove Leukocytosis. afebrile  CT L foot shows OM ( pt cannot get MRI dur to AICD)  Podiatry Dr. Yo   s/p I&D left foot as well as partial amputation of left first MT. Pathology consistent w/ acute osteo as per dr. Reagan.   wound cx + for staph aureus (MSSA)  ID Dr. Reagan following  c/w Rocephin and Doxy   c/w wound care tx- Darkin's solution  Pt will need total 6 wks of IV abt as per dr. Reagan  IR for PICC tomorrow. spoke to dr. Dupree and IR ms. Carrie  no need for holding AC therapy

## 2020-06-16 NOTE — PROGRESS NOTE ADULT - PROBLEM SELECTOR PLAN 7
Patient takes Levemir 30mg at night and 10Units pre-meals  c/w sliding scale and Lantus 20Units HS  Diabetic diet  Hb A1C: 8.1%  monitor BS CT abdomen shows bladder wall thickening  pt reports weight loss and loss of appetite recently, Patient has history of Stage 1 endometrial cancer and hysterectomy  - urine cytology: NEGATIVE FOR HIGH GRADE UROTHELIAL CARCINOMA  urology consulted: outpt f/u w/ , need cystoscopy

## 2020-06-16 NOTE — PROGRESS NOTE ADULT - SUBJECTIVE AND OBJECTIVE BOX
HPI:  75 YO female from home, H/O CAD/DM/HTN/PVD/hysterectomy/cholecystectomy ,with AICD comes to ED with intractable nausea and vomiting for 2 days. Patient states that she recently had left big toe amputation in the beginning of May and she was taking antibiotics. On Friday, her antibiotics were changed from clindamycin to Levaquin and after she took antibiotics along with percocet, she started having severe pain in epigastric region. Patient reports that it was sudden in onset, 7/10, burning & pressure like pain, non-radiating , associated with nausea,, bitter taste in mouth and projectile vomiting. She was not able to tolerate food and it aggravated her symptoms. Patient also reports significant weight( not sure how much) recently and decrease in appetite.   patient denies fever, cough, SOB, constipation, dysuria, headache, chest pain, orthopnea, back pain, burning sensation in extremities.    ED course; Patient is in mild distress due to nausea but otherwise stable  Vitals:     /88  SpO2 99% on RA    CT abdomen/pelvis:   NEW MULTI BLADDER WALL FOCAL AREAS OF SOFT TISSUE THICKENING CONCERNING FOR BLADDER CARCINOMA/TRANSITIONAL CELL CARCINOMA. No hydronephrosis.  Stomach not fully distended and gastric pathology cannot be excluded.  Status post cholecystectomy and hysterectomy.  Remaining abdominal pelvic viscera unremarkable.   No evidence of colitis.. (30 May 2020 14:56)  Vital Signs Last 24 Hrs  T(C): 36.8 (16 Jun 2020 14:10), Max: 36.8 (16 Jun 2020 14:10)  T(F): 98.3 (16 Jun 2020 14:10), Max: 98.3 (16 Jun 2020 14:10)  HR: 77 (16 Jun 2020 14:10) (50 - 77)  BP: 169/75 (16 Jun 2020 14:10) (135/87 - 169/75)  BP(mean): --  RR: 18 (16 Jun 2020 14:10) (17 - 18)  SpO2: 95% (16 Jun 2020 14:10) (95% - 97%)  WBC Count: 13.57 K/uL (06-16 @ 06:35)  WBC Count: 14.39 K/uL (06-15 @ 06:07)  WBC Count: 13.34 K/uL (06-14 @ 08:16)  WBC Count: 13.23 K/uL (06-13 @ 05:26)  WBC Count: 13.52 K/uL (06-12 @ 22:25)    06-16    131<L>  |  94<L>  |  22<H>  ----------------------------<  166<H>  3.7   |  29  |  0.88    Ca    8.6      16 Jun 2020 06:35  Phos  3.2     06-15  Mg     1.8     06-16    TPro  7.2  /  Alb  2.1<L>  /  TBili  0.4  /  DBili  x   /  AST  22  /  ALT  24  /  AlkPhos  330<H>  06-16  MEDICATIONS  (STANDING):  apixaban 5 milliGRAM(s) Oral every 12 hours  atorvastatin 40 milliGRAM(s) Oral at bedtime  cefTRIAXone   IVPB 2000 milliGRAM(s) IV Intermittent every 24 hours  Dakins Solution - 1/4 Strength 1 Application(s) Topical daily  dextrose 5% + sodium chloride 0.9%. 1000 milliLiter(s) (70 mL/Hr) IV Continuous <Continuous>  doxycycline IVPB 100 milliGRAM(s) IV Intermittent every 12 hours  furosemide    Tablet 20 milliGRAM(s) Oral daily  gabapentin 100 milliGRAM(s) Oral every 12 hours  hydrALAZINE 100 milliGRAM(s) Oral three times a day  insulin glargine Injectable (LANTUS) 10 Unit(s) SubCutaneous at bedtime  insulin lispro (HumaLOG) corrective regimen sliding scale   SubCutaneous Before meals and at bedtime  lactobacillus acidophilus 1 Tablet(s) Oral three times a day with meals  lisinopril 40 milliGRAM(s) Oral daily  melatonin 5 milliGRAM(s) Oral at bedtime  metoprolol succinate ER 50 milliGRAM(s) Oral daily  pantoprazole  Injectable 40 milliGRAM(s) IV Push daily    MEDICATIONS  (PRN):  acetaminophen   Tablet .. 650 milliGRAM(s) Oral every 6 hours PRN Mild Pain (1 - 3), Moderate Pain (4 - 6)  aluminum hydroxide/magnesium hydroxide/simethicone Suspension 30 milliLiter(s) Oral every 6 hours PRN Dyspepsia  diphenhydrAMINE   Injectable 15 milliGRAM(s) IV Push every 6 hours PRN nausea or vomiting  metoprolol tartrate Injectable 5 milliGRAM(s) IV Push every 6 hours PRN HR greater than 120  ondansetron Injectable 4 milliGRAM(s) IV Push every 8 hours PRN Nausea and/or Vomiting  traMADol 25 milliGRAM(s) Oral every 6 hours PRN Severe Pain (7 - 10)  Allergies    codeine (Rash)  penicillin (Rash)    Intolerances    EXAM:  FOOT LEFT (MINIMUM 3 VIEWS)                            PROCEDURE DATE:  06/11/2020          INTERPRETATION:  Left foot    HISTORY: Postop      Three views of the left foot show transmetatarsal amputation of the first toe. The joint spaces are maintained.    IMPRESSION: Postoperative changes.    Thank you for this referral.                ELYSSA RECIO M.D., ATTENDING RADIOLOGIST  This document has been electronically signed. Jun 11 2020 10:47AM        < end of copied text >  < from: [facility] Physiol Extremity Lower 3+ Level, BI (06.06.20 @ 12:48) >  EXAM:  US PHYSIOL LWR EXT 3+ LEV BI                            PROCEDURE DATE:  06/06/2020          INTERPRETATION:  Clinical Information: Peripheral vascular disease. Left toe ulceration.    Technique: Bilateral lower extremity ABIs/PVR    Comparison: None    Findings/  Impression:  Right lower extremity: The ankle brachial index is 0.6. The pulse waveforms are diffusely diminished.    Left lower extremity: The ankle brachial index is 0.5. The pulse waveforms are diffusely diminished, however particularly severe in the left foot.    Severe bilateral peripheral artery disease.    Pulse volume recordings are diffusely diminished bilaterally, however severely diminished in the left foot.            < end of copied text >  Culture - Abscess with Gram Stain (06.06.20 @ 03:05)    -  Trimethoprim/Sulfamethoxazole: S <=0.5/9.5    -  Vancomycin: S 1    -  Tetra/Doxy: S <=1    -  RIF- Rifampin: S <=1 Should not be used as monotherapy    -  Ampicillin/Sulbactam: S <=8/4    -  Oxacillin: S <=0.25    -  Penicillin: R 2    -  Gentamicin: S <=1 Should not be used as monotherapy    -  Clindamycin: R >4    -  Erythromycin: R >4    -  Cefazolin: S <=4    Specimen Source: .Abscess left foot    Culture Results:   Moderate Staphylococcus aureus    Organism Identification: Staphylococcus aureus  Staphylococcus aureus    Organism: Staphylococcus aureus    Organism: Staphylococcus aureus    Method Type: LI    Method Type: LI    Surgical Pathology Report (06.09.20 @ 13:38)    Surgical Pathology Report:   ACCESSION No:  [id number]    MELENDEZDARRION LIMON                      1        Surgical Final Report          Final Diagnosis    1. Left first metatarsal; amputation:  - Partially necrotic and severely inflamed fibroconnective  tissue containing fibrinopurulent exudate.  - Acute osteomyelitis.    2. Clean margin, left foot; amputation:  - Bone with bone marrow and periosteal fibroconnective tissue  without acute inflammation, consistent with clean  margin.    Verified by: Kath Levin MD  (Electronic Signature)  Reported on: 06/11/20 12:08 EDT, [facility],  [address] [place] [address], [place] [address]  Phone: [phone]   Fax: [phone]  _________________________________________________________________    Clinical History  Abscess, left foot osteomyelitis, right metatarsal  Incision and drainage  Wound debridement, left foot partial first metatarsal amputation    Specimen(s) Submitted  1-Left first metatarsal  2-Clean margin left foot    Gross Description  1.  The specimen is received in formalin and the specimen  container is labeled: Left first metatarsal.  It consists of  multiple fragments of pink red focally hemorrhagic bones and soft  tissue measuring 5 x 4.5 x 2 cm in aggregate.  Representative  sections are submitted in three cassettes after fixation and  decalcification.    2.   The specimen is received in formalin and the specimen  container is labeled: Clean margin left foot.  It consists of two  fragments of tan bones measuring 0.5 x 0.4 x 0.2 cm and 1 x 0.6 x  0.2 cm in greatest dimension.  Entirely submitted.  One cassette  after fixation and decalcification.    In addition to other data that may appear on the specimen  containers, all labels have been inspected to confirm the  presence of the patient's name and date of birth.  Cesar Rea 06/10/2020 10:29          pt  seen and evaluated at bedside , reports nausea is improved  , feeling better today ,  reports pain from  blood drawing , refused heparin. AAOX3  reports very little  pain in her foot sometimes  ,  tolerated exam and dressing change very well    exam focused LE   left foot s/p I&D of abscess and 1first met partial amputation     there is no active  sanguinous drainage , no odor    no crepitus , no fluctuance   CRF -<5sec   DP/PT -0- out of 4   TG -WNL   CRF -1-2 sec x4   there is  erythema ascending to the level of the midfoot - improving , there is area of bluish /purple discoloration -post op changes vs early  wound dehiscence , failing flap ,   the area is slightly tender -improving   pt is able to move her lower extremities , reports no pain when she is moving

## 2020-06-16 NOTE — PROGRESS NOTE ADULT - SUBJECTIVE AND OBJECTIVE BOX
Patient denies chest pain or shortness of breath.   Review of systems otherwise (-)  	  acetaminophen   Tablet .. 650 milliGRAM(s) Oral every 6 hours PRN  aluminum hydroxide/magnesium hydroxide/simethicone Suspension 30 milliLiter(s) Oral every 6 hours PRN  atorvastatin 40 milliGRAM(s) Oral at bedtime  cefTRIAXone   IVPB 2000 milliGRAM(s) IV Intermittent every 24 hours  Dakins Solution - 1/4 Strength 1 Application(s) Topical daily  dextrose 5% + sodium chloride 0.9%. 1000 milliLiter(s) IV Continuous <Continuous>  diphenhydrAMINE   Injectable 15 milliGRAM(s) IV Push every 6 hours PRN  doxycycline IVPB 100 milliGRAM(s) IV Intermittent every 12 hours  furosemide    Tablet 20 milliGRAM(s) Oral daily  gabapentin 100 milliGRAM(s) Oral every 12 hours  heparin  Infusion. 800 Unit(s)/Hr IV Continuous <Continuous>  hydrALAZINE 100 milliGRAM(s) Oral three times a day  insulin glargine Injectable (LANTUS) 10 Unit(s) SubCutaneous at bedtime  insulin lispro (HumaLOG) corrective regimen sliding scale   SubCutaneous Before meals and at bedtime  lactobacillus acidophilus 1 Tablet(s) Oral three times a day with meals  lisinopril 40 milliGRAM(s) Oral daily  melatonin 5 milliGRAM(s) Oral at bedtime  metoprolol succinate ER 50 milliGRAM(s) Oral daily  metoprolol tartrate Injectable 5 milliGRAM(s) IV Push every 6 hours PRN  ondansetron Injectable 4 milliGRAM(s) IV Push every 8 hours PRN  pantoprazole  Injectable 40 milliGRAM(s) IV Push daily  traMADol 25 milliGRAM(s) Oral every 6 hours PRN                            9.3    13.57 )-----------( 398      ( 16 Jun 2020 06:35 )             29.5       Hemoglobin: 9.3 g/dL (06-16 @ 06:35)  Hemoglobin: 9.4 g/dL (06-15 @ 06:07)  Hemoglobin: 9.6 g/dL (06-14 @ 08:16)  Hemoglobin: 9.3 g/dL (06-13 @ 05:26)  Hemoglobin: 9.5 g/dL (06-12 @ 22:25)      06-16    131<L>  |  94<L>  |  22<H>  ----------------------------<  166<H>  3.7   |  29  |  0.88    Ca    8.6      16 Jun 2020 06:35  Phos  3.2     06-15  Mg     1.8     06-16    TPro  7.2  /  Alb  2.1<L>  /  TBili  0.4  /  DBili  x   /  AST  22  /  ALT  24  /  AlkPhos  330<H>  06-16    Creatinine Trend: 0.88<--, 0.88<--, 0.98<--, 0.96<--, 1.01<--, 0.97<--    COAGS: PTT - ( 16 Jun 2020 01:21 )  PTT:154.6 sec          T(C): 36.6 (06-16-20 @ 04:53), Max: 36.7 (06-15-20 @ 16:40)  HR: 50 (06-16-20 @ 04:53) (50 - 92)  BP: 168/56 (06-16-20 @ 04:53) (135/87 - 168/56)  RR: 18 (06-16-20 @ 04:53) (16 - 18)  SpO2: 95% (06-16-20 @ 04:53) (95% - 97%)  Wt(kg): --    I&O's Summary        HEENT:  (-)icterus (-)pallor  CV: N S1 S2 1/6 JAMAAL (+)2 Pulses B/l  Resp:  Clear to ausculatation B/L, normal effort  GI: (+) BS Soft, NT, ND  Lymph:  (-)Edema, (-)obvious lymphadenopathy  Skin: Warm to touch, Normal turgor  Psych: Appropriate mood and affect          ASSESSMENT/PLAN: 	74y  Female AD/DM/HTN/PVD/hysterectomy/cholecystectomy ,with CHF BIVICD, Mild anterior wall ischemia being medically managed  comes to ED with intractable nausea and vomiting for 2 days 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 bladder malignancy.    - Interrogate Stanley Sci ICD  -  cont toprol XL 50 mg PO Daily  - Tolerated I+D as well as partial amputation by podiatry  - Vasculary f/u regarding completely occluded popliteal artery  - Will need repeat stress prior to any  high risk surgery  - EGD with nonbleeding ulcer  - Transition to NOAC if no further invasive procedures     Pipo Segundo MD, [facility]  BEEPER [phone]

## 2020-06-16 NOTE — PROGRESS NOTE ADULT - ASSESSMENT
75 YO female from home, H/O CAD/DM/HTN/PVD/hysterectomy/cholecystectomy ,with AICD comes to ED with intractable nausea and vomiting for 2 days. Patient states that she recently had left big toe amputation in the beginning of May and she was taking antibiotics. On Friday, her antibiotics were changed from clindamycin to Levaquin and after she took antibiotics along with percocet, she started having severe pain in epigastric region. Patient reports that it was sudden in onset, 7/10, burning & pressure like pain, non-radiating , associated with nausea,, bitter taste in mouth and projectile vomiting. She was not able to tolerate food and it aggravated her symptoms. Patient also reports significant weight( not sure how much) recently and decrease in appetite. patient denies fever, cough, SOB, constipation, dysuria, headache, chest pain, orthopnea, back pain, burning sensation in extremities.c/o pain over left big toe stump which is not resolving despite pain meds . afebrile on adm , covid neg. ID called for persistant leucocytosis and appr ab     # s/p amputation of left great toe for gangrene with post op collection and overlying cellulits as well as underlying OM /wound cx pos for staph aureus ( MSSA)/s/p left leg stent at [facility] as per pt /underlying PVD/s/p angio, with severe occlusive popliteal and tibial  dz that is non reconstructable/s/p I and D of left foot as well as partial amputation of left first MT /path consistent with acute osteo     #s/p  nausea/vomiting , now seems to be vertigo sec to poss cerebellar cva vs tia /vomiting has resolved /egd with healing DU     # transaminitis improving     #  wbc decreasing most likely reactive postop /cxr more consistent with pulm edema , low procal  /no resp complaints     # hx of endometrial ca s/p hysterectomy /ct with bladder wall thickening /urine cytology neg for uroepithelial ca     plan  cont  iv rocephin 2gm daily and  doxy 100mg po bid   will need 6 wks of ab for osteo as vascular could not revascularize d/w dr morfin  CONT  probiotics   podiatry f/u of stump   for outpt cystoscopy r/o bladder ca   picc line   for HBO as per podiatry         thnx will f/u   d/w resident 73 YO female from home, H/O CAD/DM/HTN/PVD/hysterectomy/cholecystectomy ,with AICD comes to ED with intractable nausea and vomiting for 2 days. Patient states that she recently had left big toe amputation in the beginning of May and she was taking antibiotics. On Friday, her antibiotics were changed from clindamycin to Levaquin and after she took antibiotics along with percocet, she started having severe pain in epigastric region. Patient reports that it was sudden in onset, 7/10, burning & pressure like pain, non-radiating , associated with nausea,, bitter taste in mouth and projectile vomiting. She was not able to tolerate food and it aggravated her symptoms. Patient also reports significant weight( not sure how much) recently and decrease in appetite. patient denies fever, cough, SOB, constipation, dysuria, headache, chest pain, orthopnea, back pain, burning sensation in extremities.c/o pain over left big toe stump which is not resolving despite pain meds . afebrile on adm , covid neg. ID called for persistant leucocytosis and appr ab     # s/p amputation of left great toe for gangrene with post op collection and overlying cellulits as well as underlying OM /wound cx pos for staph aureus ( MSSA)/s/p left leg stent at [facility] as per pt /underlying PVD/s/p angio, with severe occlusive popliteal and tibial  dz that is non reconstructable/s/p I and D of left foot as well as partial amputation of left first MT /path consistent with acute osteo     #s/p  nausea/vomiting , now seems to be vertigo sec to poss cerebellar cva vs tia /vomiting has resolved /egd with healing DU     # transaminitis improving     #  wbc decreasing most likely reactive postop /cxr more consistent with pulm edema , low procal  /no resp complaints     # hx of endometrial ca s/p hysterectomy /ct with bladder wall thickening /urine cytology neg for uroepithelial ca     plan  cont  iv rocephin 2gm daily and  doxy 100mg po bid   will need 6 wks of ab for osteo as vascular could not revascularize d/w dr morfin and poor chance of healing post op wound   CONT  probiotics   podiatry f/u of stump   for outpt cystoscopy r/o bladder ca   picc line   for HBO as per podiatry         thnx will f/u   d/w resident

## 2020-06-17 DIAGNOSIS — E87.1 HYPO-OSMOLALITY AND HYPONATREMIA: ICD-10-CM

## 2020-06-17 LAB
ALBUMIN SERPL ELPH-MCNC: 2.1 G/DL — LOW (ref 3.5–5)
ALP SERPL-CCNC: 305 U/L — HIGH (ref 40–120)
ALT FLD-CCNC: 23 U/L DA — SIGNIFICANT CHANGE UP (ref 10–60)
ANION GAP SERPL CALC-SCNC: 7 MMOL/L — SIGNIFICANT CHANGE UP (ref 5–17)
APTT BLD: 35.4 SEC — SIGNIFICANT CHANGE UP (ref 27.5–36.3)
AST SERPL-CCNC: 20 U/L — SIGNIFICANT CHANGE UP (ref 10–40)
BASOPHILS # BLD AUTO: 0.06 K/UL — SIGNIFICANT CHANGE UP (ref 0–0.2)
BASOPHILS NFR BLD AUTO: 0.5 % — SIGNIFICANT CHANGE UP (ref 0–2)
BILIRUB SERPL-MCNC: 0.4 MG/DL — SIGNIFICANT CHANGE UP (ref 0.2–1.2)
BUN SERPL-MCNC: 20 MG/DL — HIGH (ref 7–18)
CALCIUM SERPL-MCNC: 8.4 MG/DL — SIGNIFICANT CHANGE UP (ref 8.4–10.5)
CHLORIDE SERPL-SCNC: 95 MMOL/L — LOW (ref 96–108)
CO2 SERPL-SCNC: 30 MMOL/L — SIGNIFICANT CHANGE UP (ref 22–31)
CREAT SERPL-MCNC: 0.82 MG/DL — SIGNIFICANT CHANGE UP (ref 0.5–1.3)
EOSINOPHIL # BLD AUTO: 0.3 K/UL — SIGNIFICANT CHANGE UP (ref 0–0.5)
EOSINOPHIL NFR BLD AUTO: 2.6 % — SIGNIFICANT CHANGE UP (ref 0–6)
GLUCOSE BLDC GLUCOMTR-MCNC: 158 MG/DL — HIGH (ref 70–99)
GLUCOSE BLDC GLUCOMTR-MCNC: 202 MG/DL — HIGH (ref 70–99)
GLUCOSE BLDC GLUCOMTR-MCNC: 203 MG/DL — HIGH (ref 70–99)
GLUCOSE BLDC GLUCOMTR-MCNC: 211 MG/DL — HIGH (ref 70–99)
GLUCOSE SERPL-MCNC: 149 MG/DL — HIGH (ref 70–99)
HCT VFR BLD CALC: 28.7 % — LOW (ref 34.5–45)
HGB BLD-MCNC: 9.1 G/DL — LOW (ref 11.5–15.5)
IMM GRANULOCYTES NFR BLD AUTO: 0.7 % — SIGNIFICANT CHANGE UP (ref 0–1.5)
INR BLD: 1.38 RATIO — HIGH (ref 0.88–1.16)
LYMPHOCYTES # BLD AUTO: 26.4 % — SIGNIFICANT CHANGE UP (ref 13–44)
LYMPHOCYTES # BLD AUTO: 3 K/UL — SIGNIFICANT CHANGE UP (ref 1–3.3)
MCHC RBC-ENTMCNC: 28.4 PG — SIGNIFICANT CHANGE UP (ref 27–34)
MCHC RBC-ENTMCNC: 31.7 GM/DL — LOW (ref 32–36)
MCV RBC AUTO: 89.7 FL — SIGNIFICANT CHANGE UP (ref 80–100)
MONOCYTES # BLD AUTO: 0.78 K/UL — SIGNIFICANT CHANGE UP (ref 0–0.9)
MONOCYTES NFR BLD AUTO: 6.9 % — SIGNIFICANT CHANGE UP (ref 2–14)
NEUTROPHILS # BLD AUTO: 7.15 K/UL — SIGNIFICANT CHANGE UP (ref 1.8–7.4)
NEUTROPHILS NFR BLD AUTO: 62.9 % — SIGNIFICANT CHANGE UP (ref 43–77)
NRBC # BLD: 0 /100 WBCS — SIGNIFICANT CHANGE UP (ref 0–0)
PLATELET # BLD AUTO: 350 K/UL — SIGNIFICANT CHANGE UP (ref 150–400)
POTASSIUM SERPL-MCNC: 3.9 MMOL/L — SIGNIFICANT CHANGE UP (ref 3.5–5.3)
POTASSIUM SERPL-SCNC: 3.9 MMOL/L — SIGNIFICANT CHANGE UP (ref 3.5–5.3)
PROT SERPL-MCNC: 6.9 G/DL — SIGNIFICANT CHANGE UP (ref 6–8.3)
PROTHROM AB SERPL-ACNC: 15.7 SEC — HIGH (ref 10–12.9)
RBC # BLD: 3.2 M/UL — LOW (ref 3.8–5.2)
RBC # FLD: 15 % — HIGH (ref 10.3–14.5)
SODIUM SERPL-SCNC: 132 MMOL/L — LOW (ref 135–145)
SURGICAL PATHOLOGY STUDY: SIGNIFICANT CHANGE UP
WBC # BLD: 11.37 K/UL — HIGH (ref 3.8–10.5)
WBC # FLD AUTO: 11.37 K/UL — HIGH (ref 3.8–10.5)

## 2020-06-17 PROCEDURE — 99233 SBSQ HOSP IP/OBS HIGH 50: CPT

## 2020-06-17 PROCEDURE — 99231 SBSQ HOSP IP/OBS SF/LOW 25: CPT

## 2020-06-17 RX ORDER — CHLORHEXIDINE GLUCONATE 213 G/1000ML
1 SOLUTION TOPICAL ONCE
Refills: 0 | Status: COMPLETED | OUTPATIENT
Start: 2020-06-18 | End: 2020-06-18

## 2020-06-17 RX ORDER — SODIUM HYPOCHLORITE 0.125 %
1 SOLUTION, NON-ORAL MISCELLANEOUS DAILY
Refills: 0 | Status: DISCONTINUED | OUTPATIENT
Start: 2020-06-17 | End: 2020-06-19

## 2020-06-17 RX ORDER — CHLORHEXIDINE GLUCONATE 213 G/1000ML
1 SOLUTION TOPICAL DAILY
Refills: 0 | Status: DISCONTINUED | OUTPATIENT
Start: 2020-06-17 | End: 2020-06-17

## 2020-06-17 RX ORDER — SODIUM CHLORIDE 9 MG/ML
10 INJECTION INTRAMUSCULAR; INTRAVENOUS; SUBCUTANEOUS
Qty: 800 | Refills: 0
Start: 2020-06-17 | End: 2020-07-26

## 2020-06-17 RX ORDER — CEFTRIAXONE 500 MG/1
2 INJECTION, POWDER, FOR SOLUTION INTRAMUSCULAR; INTRAVENOUS
Qty: 80 | Refills: 0
Start: 2020-06-17 | End: 2020-07-26

## 2020-06-17 RX ORDER — PANTOPRAZOLE SODIUM 20 MG/1
40 TABLET, DELAYED RELEASE ORAL
Refills: 0 | Status: DISCONTINUED | OUTPATIENT
Start: 2020-06-17 | End: 2020-06-19

## 2020-06-17 RX ORDER — TRAMADOL HYDROCHLORIDE 50 MG/1
25 TABLET ORAL EVERY 6 HOURS
Refills: 0 | Status: DISCONTINUED | OUTPATIENT
Start: 2020-06-17 | End: 2020-06-19

## 2020-06-17 RX ORDER — SENNA PLUS 8.6 MG/1
2 TABLET ORAL AT BEDTIME
Refills: 0 | Status: DISCONTINUED | OUTPATIENT
Start: 2020-06-17 | End: 2020-06-19

## 2020-06-17 RX ORDER — POLYETHYLENE GLYCOL 3350 17 G/17G
17 POWDER, FOR SOLUTION ORAL DAILY
Refills: 0 | Status: DISCONTINUED | OUTPATIENT
Start: 2020-06-17 | End: 2020-06-19

## 2020-06-17 RX ADMIN — Medication 110 MILLIGRAM(S): at 05:26

## 2020-06-17 RX ADMIN — Medication 5 MILLIGRAM(S): at 00:42

## 2020-06-17 RX ADMIN — Medication 20 MILLIGRAM(S): at 05:26

## 2020-06-17 RX ADMIN — CEFTRIAXONE 100 MILLIGRAM(S): 500 INJECTION, POWDER, FOR SOLUTION INTRAMUSCULAR; INTRAVENOUS at 18:36

## 2020-06-17 RX ADMIN — Medication 1 TABLET(S): at 08:25

## 2020-06-17 RX ADMIN — Medication 110 MILLIGRAM(S): at 17:28

## 2020-06-17 RX ADMIN — Medication 100 MILLIGRAM(S): at 05:26

## 2020-06-17 RX ADMIN — GABAPENTIN 100 MILLIGRAM(S): 400 CAPSULE ORAL at 05:27

## 2020-06-17 RX ADMIN — Medication 100 MILLIGRAM(S): at 21:29

## 2020-06-17 RX ADMIN — ATORVASTATIN CALCIUM 40 MILLIGRAM(S): 80 TABLET, FILM COATED ORAL at 21:29

## 2020-06-17 RX ADMIN — INSULIN GLARGINE 10 UNIT(S): 100 INJECTION, SOLUTION SUBCUTANEOUS at 21:29

## 2020-06-17 RX ADMIN — TRAMADOL HYDROCHLORIDE 25 MILLIGRAM(S): 50 TABLET ORAL at 09:44

## 2020-06-17 RX ADMIN — APIXABAN 5 MILLIGRAM(S): 2.5 TABLET, FILM COATED ORAL at 17:28

## 2020-06-17 RX ADMIN — GABAPENTIN 100 MILLIGRAM(S): 400 CAPSULE ORAL at 17:27

## 2020-06-17 RX ADMIN — Medication 100 MILLIGRAM(S): at 17:27

## 2020-06-17 RX ADMIN — TRAMADOL HYDROCHLORIDE 25 MILLIGRAM(S): 50 TABLET ORAL at 23:32

## 2020-06-17 RX ADMIN — Medication 650 MILLIGRAM(S): at 20:21

## 2020-06-17 RX ADMIN — Medication 2: at 17:17

## 2020-06-17 RX ADMIN — APIXABAN 5 MILLIGRAM(S): 2.5 TABLET, FILM COATED ORAL at 05:26

## 2020-06-17 RX ADMIN — Medication 50 MILLIGRAM(S): at 05:26

## 2020-06-17 RX ADMIN — Medication 2: at 21:31

## 2020-06-17 RX ADMIN — LISINOPRIL 40 MILLIGRAM(S): 2.5 TABLET ORAL at 05:27

## 2020-06-17 RX ADMIN — Medication 1 TABLET(S): at 12:52

## 2020-06-17 RX ADMIN — Medication 1 TABLET(S): at 17:28

## 2020-06-17 RX ADMIN — Medication 5 MILLIGRAM(S): at 21:29

## 2020-06-17 NOTE — PROGRESS NOTE ADULT - SUBJECTIVE AND OBJECTIVE BOX
HPI:  73 YO female from home, H/O CAD/DM/HTN/PVD/hysterectomy/cholecystectomy ,with AICD comes to ED with intractable nausea and vomiting for 2 days. Patient states that she recently had left big toe amputation in the beginning of May and she was taking antibiotics. On Friday, her antibiotics were changed from clindamycin to Levaquin and after she took antibiotics along with percocet, she started having severe pain in epigastric region. Patient reports that it was sudden in onset, 7/10, burning & pressure like pain, non-radiating , associated with nausea,, bitter taste in mouth and projectile vomiting. She was not able to tolerate food and it aggravated her symptoms. Patient also reports significant weight( not sure how much) recently and decrease in appetite.   patient denies fever, cough, SOB, constipation, dysuria, headache, chest pain, orthopnea, back pain, burning sensation in extremities.    ED course; Patient is in mild distress due to nausea but otherwise stable  Vitals:     /88  SpO2 99% on RA    CT abdomen/pelvis:   NEW MULTI BLADDER WALL FOCAL AREAS OF SOFT TISSUE THICKENING CONCERNING FOR BLADDER CARCINOMA/TRANSITIONAL CELL CARCINOMA. No hydronephrosis.  Stomach not fully distended and gastric pathology cannot be excluded.  Status post cholecystectomy and hysterectomy.  Remaining abdominal pelvic viscera unremarkable.   No evidence of colitis.. (30 May 2020 14:56)  p/o day 7  ICU Vital Signs Last 24 Hrs  T(C): 36.8 (17 Jun 2020 14:10), Max: 37 (16 Jun 2020 20:29)  T(F): 98.2 (17 Jun 2020 14:10), Max: 98.6 (16 Jun 2020 20:29)  HR: 75 (17 Jun 2020 14:10) (75 - 93)  BP: 160/84 (17 Jun 2020 14:10) (155/62 - 175/72)  BP(mean): --  ABP: --  ABP(mean): --  RR: 17 (17 Jun 2020 14:10) (17 - 18)  SpO2: 97% (17 Jun 2020 14:10) (96% - 98%)                        9.1    11.37 )-----------( 350      ( 17 Jun 2020 06:30 )             28.7   WBC Count: 11.37 K/uL (06-17 @ 06:30)  WBC Count: 13.57 K/uL (06-16 @ 06:35)  WBC Count: 14.39 K/uL (06-15 @ 06:07)  WBC Count: 13.34 K/uL (06-14 @ 08:16)  WBC Count: 13.23 K/uL (06-13 @ 05:26)  06-17    132<L>  |  95<L>  |  20<H>  ----------------------------<  149<H>  3.9   |  30  |  0.82    Ca    8.4      17 Jun 2020 06:30  Mg     1.8     06-16    TPro  6.9  /  Alb  2.1<L>  /  TBili  0.4  /  DBili  x   /  AST  20  /  ALT  23  /  AlkPhos  305<H>  06-17  MEDICATIONS  (STANDING):  apixaban 5 milliGRAM(s) Oral every 12 hours  atorvastatin 40 milliGRAM(s) Oral at bedtime  cefTRIAXone   IVPB 2000 milliGRAM(s) IV Intermittent every 24 hours  Dakins Solution - 1/4 Strength 1 Application(s) Topical daily  dextrose 5% + sodium chloride 0.9%. 1000 milliLiter(s) (70 mL/Hr) IV Continuous <Continuous>  doxycycline IVPB 100 milliGRAM(s) IV Intermittent every 12 hours  furosemide    Tablet 20 milliGRAM(s) Oral daily  gabapentin 100 milliGRAM(s) Oral every 12 hours  hydrALAZINE 100 milliGRAM(s) Oral three times a day  insulin glargine Injectable (LANTUS) 10 Unit(s) SubCutaneous at bedtime  insulin lispro (HumaLOG) corrective regimen sliding scale   SubCutaneous Before meals and at bedtime  lactobacillus acidophilus 1 Tablet(s) Oral three times a day with meals  lisinopril 40 milliGRAM(s) Oral daily  melatonin 5 milliGRAM(s) Oral at bedtime  metoprolol succinate ER 50 milliGRAM(s) Oral daily  pantoprazole    Tablet 40 milliGRAM(s) Oral before breakfast  polyethylene glycol 3350 17 Gram(s) Oral daily  senna 2 Tablet(s) Oral at bedtime    MEDICATIONS  (PRN):  acetaminophen   Tablet .. 650 milliGRAM(s) Oral every 6 hours PRN Mild Pain (1 - 3), Moderate Pain (4 - 6)  aluminum hydroxide/magnesium hydroxide/simethicone Suspension 30 milliLiter(s) Oral every 6 hours PRN Dyspepsia  diphenhydrAMINE   Injectable 15 milliGRAM(s) IV Push every 6 hours PRN nausea or vomiting  metoprolol tartrate Injectable 5 milliGRAM(s) IV Push every 6 hours PRN HR greater than 120  ondansetron Injectable 4 milliGRAM(s) IV Push every 8 hours PRN Nausea and/or Vomiting  traMADol 25 milliGRAM(s) Oral every 6 hours PRN Severe Pain (7 - 10)      EXAM:  FOOT LEFT (MINIMUM 3 VIEWS)                            PROCEDURE DATE:  06/11/2020          INTERPRETATION:  Left foot    HISTORY: Postop      Three views of the left foot show transmetatarsal amputation of the first toe. The joint spaces are maintained.    IMPRESSION: Postoperative changes.    Thank you for this referral.                ELYSSA RECIO M.D., ATTENDING RADIOLOGIST  This document has been electronically signed. Jun 11 2020 10:47AM        < end of copied text >  < from: [facility] Physiol Extremity Lower 3+ Level, BI (06.06.20 @ 12:48) >  EXAM:  US PHYSIOL LWR EXT 3+ LEV BI                            PROCEDURE DATE:  06/06/2020          INTERPRETATION:  Clinical Information: Peripheral vascular disease. Left toe ulceration.    Technique: Bilateral lower extremity ABIs/PVR    Comparison: None    Findings/  Impression:  Right lower extremity: The ankle brachial index is 0.6. The pulse waveforms are diffusely diminished.    Left lower extremity: The ankle brachial index is 0.5. The pulse waveforms are diffusely diminished, however particularly severe in the left foot.    Severe bilateral peripheral artery disease.    Pulse volume recordings are diffusely diminished bilaterally, however severely diminished in the left foot.            < end of copied text >  Culture - Abscess with Gram Stain (06.06.20 @ 03:05)    -  Trimethoprim/Sulfamethoxazole: S <=0.5/9.5    -  Vancomycin: S 1    -  Tetra/Doxy: S <=1    -  RIF- Rifampin: S <=1 Should not be used as monotherapy    -  Ampicillin/Sulbactam: S <=8/4    -  Oxacillin: S <=0.25    -  Penicillin: R 2    -  Gentamicin: S <=1 Should not be used as monotherapy    -  Clindamycin: R >4    -  Erythromycin: R >4    -  Cefazolin: S <=4    Specimen Source: .Abscess left foot    Culture Results:   Moderate Staphylococcus aureus    Organism Identification: Staphylococcus aureus  Staphylococcus aureus    Organism: Staphylococcus aureus    Organism: Staphylococcus aureus    Method Type: LI    Method Type: LI    Surgical Pathology Report (06.09.20 @ 13:38)    Surgical Pathology Report:   ACCESSION No:  [id number]    DARRION MELENDEZ                      1        Surgical Final Report          Final Diagnosis    1. Left first metatarsal; amputation:  - Partially necrotic and severely inflamed fibroconnective  tissue containing fibrinopurulent exudate.  - Acute osteomyelitis.    2. Clean margin, left foot; amputation:  - Bone with bone marrow and periosteal fibroconnective tissue  without acute inflammation, consistent with clean  margin.    Verified by: Kath Levin MD  (Electronic Signature)  Reported on: 06/11/20 12:08 EDT, [facility],  [address] [place] [address], [place] [address]  Phone: [phone]   Fax: [phone]  _________________________________________________________________    Clinical History  Abscess, left foot osteomyelitis, right metatarsal  Incision and drainage  Wound debridement, left foot partial first metatarsal amputation    Specimen(s) Submitted  1-Left first metatarsal  2-Clean margin left foot    Gross Description  1.  The specimen is received in formalin and the specimen  container is labeled: Left first metatarsal.  It consists of  multiple fragments of pink red focally hemorrhagic bones and soft  tissue measuring 5 x 4.5 x 2 cm in aggregate.  Representative  sections are submitted in three cassettes after fixation and  decalcification.    2.   The specimen is received in formalin and the specimen  container is labeled: Clean margin left foot.  It consists of two  fragments of tan bones measuring 0.5 x 0.4 x 0.2 cm and 1 x 0.6 x  0.2 cm in greatest dimension.  Entirely submitted.  One cassette  after fixation and decalcification.    In addition to other data that may appear on the specimen  containers, all labels have been inspected to confirm the  presence of the patient's name and date of birth.  Cesar Rea 06/10/2020 10:29            pt  seen and evaluated at bedside , reports no nausea  , feeling better today ,  AAOX3  reports very little  pain in her foot sometimes  ,  tolerated exam and dressing change very well    exam focused LE   left foot s/p I&D of abscess and 1first met partial amputation     there is  active  sanguinous drainage , no odor    no crepitus , no fluctuance   CRF -<5sec   DP/PT -0- out of 4   TG -WNL   CRF -1-2 sec x4   there is  erythema ascending to the level of the midfoot -some improvement  , there is area of bluish /purple discoloration -post op changes vs early  wound dehiscence , failing flap ,   the area is slightly tender -improving   pt is able to move her lower extremities , reports no pain when she is moving

## 2020-06-17 NOTE — PROGRESS NOTE ADULT - SUBJECTIVE AND OBJECTIVE BOX
INTERVAL HPI/OVERNIGHT EVENTS:  Patient seen at bedside,eventsnoticed for overnight  VITAL SIGNS:  T(F): 98.2 (06-17-20 @ 14:10)  HR: 75 (06-17-20 @ 14:10)  BP: 160/84 (06-17-20 @ 14:10)  RR: 17 (06-17-20 @ 14:10)  SpO2: 97% (06-17-20 @ 14:10)  Wt(kg): --    PHYSICAL EXAM:  awake  Constitutional:  Eyes:  ENMT:perrla  Neck:  Respiratory:clear  Cardiovascular:s1 s2.m-none  Gastrointestinal:soft,bs pos  Extremities:l exteem with dressing  Vascular:  Neurological:no focal deficit  Musculoskeletal:    MEDICATIONS  (STANDING):  apixaban 5 milliGRAM(s) Oral every 12 hours  atorvastatin 40 milliGRAM(s) Oral at bedtime  cefTRIAXone   IVPB 2000 milliGRAM(s) IV Intermittent every 24 hours  Dakins Solution - 1/4 Strength 1 Application(s) Topical daily  dextrose 5% + sodium chloride 0.9%. 1000 milliLiter(s) (70 mL/Hr) IV Continuous <Continuous>  doxycycline IVPB 100 milliGRAM(s) IV Intermittent every 12 hours  furosemide    Tablet 20 milliGRAM(s) Oral daily  gabapentin 100 milliGRAM(s) Oral every 12 hours  hydrALAZINE 100 milliGRAM(s) Oral three times a day  insulin glargine Injectable (LANTUS) 10 Unit(s) SubCutaneous at bedtime  insulin lispro (HumaLOG) corrective regimen sliding scale   SubCutaneous Before meals and at bedtime  lactobacillus acidophilus 1 Tablet(s) Oral three times a day with meals  lisinopril 40 milliGRAM(s) Oral daily  melatonin 5 milliGRAM(s) Oral at bedtime  metoprolol succinate ER 50 milliGRAM(s) Oral daily  pantoprazole    Tablet 40 milliGRAM(s) Oral before breakfast  polyethylene glycol 3350 17 Gram(s) Oral daily  senna 2 Tablet(s) Oral at bedtime    MEDICATIONS  (PRN):  acetaminophen   Tablet .. 650 milliGRAM(s) Oral every 6 hours PRN Mild Pain (1 - 3), Moderate Pain (4 - 6)  aluminum hydroxide/magnesium hydroxide/simethicone Suspension 30 milliLiter(s) Oral every 6 hours PRN Dyspepsia  diphenhydrAMINE   Injectable 15 milliGRAM(s) IV Push every 6 hours PRN nausea or vomiting  metoprolol tartrate Injectable 5 milliGRAM(s) IV Push every 6 hours PRN HR greater than 120  ondansetron Injectable 4 milliGRAM(s) IV Push every 8 hours PRN Nausea and/or Vomiting      Allergies    codeine (Rash)  penicillin (Rash)    Intolerances        LABS:                        9.1    11.37 )-----------( 350      ( 17 Jun 2020 06:30 )             28.7     06-17    132<L>  |  95<L>  |  20<H>  ----------------------------<  149<H>  3.9   |  30  |  0.82    Ca    8.4      17 Jun 2020 06:30  Mg     1.8     06-16    TPro  6.9  /  Alb  2.1<L>  /  TBili  0.4  /  DBili  x   /  AST  20  /  ALT  23  /  AlkPhos  305<H>  06-17    PT/INR - ( 17 Jun 2020 08:36 )   PT: 15.7 sec;   INR: 1.38 ratio         PTT - ( 17 Jun 2020 08:36 )  PTT:35.4 sec       Assessment and Plan:    Problem/Plan - 1:  ·  Problem: Stroke.  Plan: CTA head/neck showed LEFT PICA stroke  -will switch to oral anticoagulation   -will start on eliquis 5 mg bid  Neuro Dr. Coral acuna.      Problem/Plan - 2:  ·  Problem: PVD (peripheral vascular disease).  Plan: H/o PVD and popliteal stent  s/p left big toe amputation in May 2020  s/p ESTRELLA :  RLE:  0.6. LLE:  0.5. Severe bilateral peripheral artery disease. Pulse volume recordings are diffusely diminished bilaterally, however severely diminished in the left foot.  s/p left metatarsal amputation  Vascular Dr. Grove.      Problem/Plan - 3:  ·  Problem: Osteomyelitis of left foot, unspecified type  CT L foot shows OM ( pt cannot get MRI dur to AICD)  Podiatry Dr. Yo - s/p I and D  ID Dr. Reagan  c/w Franky for now.   patient needs 6 weeks of abx  pending for pick line placement prior to d/c   d/w patient and daughter plan of care     Problem/Plan - 4:  ·  Problem: Intractable nausea and vomiting-stable ,resolved  s/p egd -stable  CT abd/pelvis as above.    Gi dr. Matute following  EGD today  started IVF.       Problem/Plan - 5:  ·  Problem: Bladder wall thickening.  Plan: CT abdomen shows bladder wall thickening  pt reports weight loss and loss of appetite recently, Patient has history of Stage 1 endometrial cancer and hysterectomy  - urine cytology: NEGATIVE FOR HIGH GRADE UROTHELIAL CARCINOMA  urology consulted: outpt f/u w/ , need cystoscopy.      Problem/Plan - 6:  Problem: HTN (hypertension). Plan: Pt takes Lisinopril 40mg, toprol Xl 100mg, hydralazine 50mg TID   c/w current meds  monitor BP.     Problem/Plan - 7:  ·  Problem: DM (diabetes mellitus).  Plan: Patient takes Levemir 30mg at night and 10Units pre-meals  c/w sliding scale and Lantus 20Units HS  Diabetic diet  Hb A1C: 8.1%  monitor BS.      Problem/Plan - 8:  ·  Problem: CAD (coronary artery disease).  Plan: H/o CAD and PCI in 2009,  patient has both AICD and pacemaker  EKG shows paced Rythm  No active issues  continue aspirin , Plavix, BB, statin, ACE    hold heparin drip for EGD today. may resume after procedure  monitor PT/INR/Ptt.      Problem/Plan - 9:  ·  Problem: CHF (congestive heart failure).  Plan: H/o CHF but clinically patient is not in fluid overload  No peripheral edema  Patient takes furosemide 20mg daily twice at home  Echo: EF 50-55%, mild TR, NE  c/w lasix , statin and BB.      Problem/Plan - 10:  Problem: Prophylactic measure. Plan; DVT ppx-will start on eliquis

## 2020-06-17 NOTE — PROGRESS NOTE ADULT - PROBLEM SELECTOR PLAN 7
CT abd/pelvis as above.    Gi dr. Matute following  s/p EGD  6/15- healing duodenal bulb ulcer and mild gastritis.  f/u pathology report

## 2020-06-17 NOTE — PROGRESS NOTE ADULT - PROBLEM SELECTOR PLAN 6
CT abdomen shows bladder wall thickening  pt reports weight loss and loss of appetite recently, Patient has history of Stage 1 endometrial cancer and hysterectomy  - urine cytology: NEGATIVE FOR HIGH GRADE UROTHELIAL CARCINOMA  -Per uro- outpt f/u w/ , needs cystoscopy

## 2020-06-17 NOTE — PROGRESS NOTE ADULT - ASSESSMENT
Confidential Drug Utilization Report  Search Terms: Ankita Keating, 1945   Search Date: 06/08/2020 16:27:30 PM   The Drug Utilization Report below displays all of the controlled substance prescriptions, if any, that your patient has filled in the last twelve months. The information displayed on this report is compiled from pharmacy submissions to the Department, and accurately reflects the information as submitted by the pharmacies.  This report was requested by: Kathya Sinclair | Reference #: [id number]   You have not added a TRVAIS number. Keeping your TRAVIS number(s) up to date on the My TRAVIS Numbers </doh2/applinks/cspnp/myDeaNumbers> page will enable the separation of your prescriptions from others in the search results.   Others' Prescriptions  Patient Name: Ankita Keating   YOB: 1945   Address: [address] [place] [address]   Sex: Female   Rx Written	Rx Dispensed	Drug	Quantity	Days Supply	Prescriber Name	Payment Method	Dispenser	  05/28/2020	05/29/2020	oxycodone-acetaminophen  mg tab 	60	30	Chico Jeffries MD	Insurance	[facility] #[id number]	  05/22/2020	05/22/2020	oxycodone-acetaminophen 5-325 mg tab 	20	5	Gomez Caraballo Jr, MD	Insurance	[facility] #[id number]	  05/08/2020	05/09/2020	oxycodone-acetaminophen 5-325 mg tab 	20	5	Gomez Caraballo Jr, MD	Insurance	[facility] #[id number]	  04/22/2020	04/28/2020	oxycodone-acetaminophen 5-325 mg tab 	20	5	Venita Thompson MD	Insurance	[facility] #[id number]	  * - Drugs marked with an asterisk are compound drugs. If the compound drug is made up of more than one controlled substance, then each controlled substance will be a separate row in the table.

## 2020-06-17 NOTE — PROGRESS NOTE ADULT - I HAVE PERSONALLY SEEN, EXAMINED, AND PARTICIPATED IN THE CARE OF THIS PATIENT.  I HAVE REVIEWED ALL PERTINENT CLINICAL INFORMATION, INCLUDING HISTORY, PHYSICAL EXAM, PLAN AND THE MEDICAL/PA/NP STUDENT’S NOTE AND AGREE EXCEPT AS NOTED.
Pediatric Otolaryngology- Head & Neck Surgery  Consultation     Chief Complaint: ear infection    HPI  Kenneth is a 21 m.o. male who presents for evaluation of otitis media for the last 12 months. The symptoms are noted to be mild. The infections have been recurrent. The patient has had 3 visits to the primary care physician in the last 12 months for treatment of this problem. Previous antibiotics include Amoxicillin, Rocephin .    When Kenneth has an infection, he typically has pain. The patient does not have a speech delay. The patient does not have problems with balance.   Hearing seems to be normal. The patient did pass a  hearing test.     The patient has intermittent problems with nasal congestion. The severity of the nasal obstruction is described as: mild. This  Mostly only occurs only during times of URI.  Mild snore. No apneas. There are no modifying factors.    The patient has intermittent problems with rhinitis. The severity of the rhinitis is described as: mild. This does occur only during times of URI.  There are no modifying factors.    The patient has not had previous PET insertion.   The patient has not had a previous adenoidectomy. The patient  has not had a previous tonsillectomy.       Medical History  Past Medical History:   Diagnosis Date    Premature infant of 34 weeks gestation     Stridor          Surgical History  Past Surgical History:   Procedure Laterality Date    CIRCUMCISION      None         Medications  No current outpatient prescriptions on file prior to visit.     No current facility-administered medications on file prior to visit.        Allergies  Review of patient's allergies indicates:  No Known Allergies    Social History  There are no smokers in the home    Family History  No family history of bleeding disorders or problems with anethesia    Review of Systems  General: no fever, no recent weight change  Eyes: no vision changes  Pulm: no asthma  Heme: no bleeding or 
anemia  GI: No GERD  Endo: No DM or thyroid problems  Musculoskeletal: no arthritis  Neuro: no seizures, speech or developmental delay  Skin: no rash  Psych: no psych history  Allergery/Immune: no allergy history or history of immunologic deficiency  Cardiac: no congenital cardiac abnormality    Physical Exam  General:  Alert, well developed, comfortable  Voice:  Regular for age, good volume  Respiratory:  Symmetric breathing, no stridor, no distress  Head:  Normocephalic, no lesions  Face: Symmetric, HB 1/6 bilat, no lesions, no obvious sinus tenderness, salivary glands nontender  Eyes:  Sclera white, extraocular movements intact  Nose: Dorsum straight, septum midline, normal turbinate size, normal mucosa  Right Ear: Pinna and external ear appears normal, EAC patent, TM with serous effusion  Left Ear: Pinna and external ear appears normal, EAC patent, TM clear  Hearing:  Grossly intact  Oral cavity: Healthy mucosa, no masses or lesions including lips, gums, floor of mouth, palate, or tongue.  Oropharynx: Tonsils 1+, palate intact, normal pharyngeal wall movement  Neck: Supple, no palpable nodes, no masses, trachea midline, no thyroid masses  Cardiovascular system:  Pulses regular in both upper extremities, good skin turgor   Neuro: CN II-XII grossly intact, moves all extremities spontaneously  Skin: no rashes    Studies Reviewed       Mild hearing loss    Procedures  NA    Impression  1. Recurrent acute suppurative otitis media without spontaneous rupture of tympanic membrane of both sides     2. Hearing loss, unspecified hearing loss type, unspecified laterality         Child with recurrent otitis media. Has right side serous effusion today. Return to clinic in 8 weeks, if fluid still present consider tubes or if infections recur. If ears clear repeat audio    Treatment Plan  -Return to clinic in 8 weeks, if fluid still present consider tubes or if infections recur. If ears clear repeat audio    Saad Mcdaniel 
MD  Pediatric Otolaryngology Attending      
Statement Selected

## 2020-06-17 NOTE — PROGRESS NOTE ADULT - PROBLEM SELECTOR PLAN 1
Pt with acute somatic pain of left foot due to OM. Pt is now POD #8 s/p Angiogram, extremity, left, and I&D per podiatry on 6/9. Plan is for PICC today and to be discharge tomorrow.  Opioid pain recommendations   - Continue tramadol 25mg PO q6h PRN severe pain. Monitor for respiratory depression/ sedation  Non-opioid pain recommendations   -  Acetaminophen 650mg PO q 6 hours PRN moderate pain. Monitor LFTs  - Gabapentin 100mg PO 2x/day. Monitor renal function.   -Avoid NSAIDs. Pt on heparin gtt and s/p I&D 6/9.  - Bleeding precautions  Bowel Regimen  - Miralax 17G PO daily. Hold for loose BM/ diarrhea  - Senna 2 tabs PO at bedtime. Hold for loose BM/ diarrhea  Mild pain   - Non pharmacological measures   - Warm/ Cool packs PRN   - Repositioning, PT, imagery, relaxation, distraction.  Recommendations discussed with primary team and RN.

## 2020-06-17 NOTE — PROGRESS NOTE ADULT - SUBJECTIVE AND OBJECTIVE BOX
NP Note discussed with  Primary Attending    Patient is a 74y old  Female who presents with a chief complaint of Intractable nausea and vomiting (17 Jun 2020 14:25)      INTERVAL HPI/OVERNIGHT EVENTS: no new complaints    MEDICATIONS  (STANDING):  apixaban 5 milliGRAM(s) Oral every 12 hours  atorvastatin 40 milliGRAM(s) Oral at bedtime  cefTRIAXone   IVPB 2000 milliGRAM(s) IV Intermittent every 24 hours  Dakins Solution - 1/4 Strength 1 Application(s) Topical daily  dextrose 5% + sodium chloride 0.9%. 1000 milliLiter(s) (70 mL/Hr) IV Continuous <Continuous>  doxycycline IVPB 100 milliGRAM(s) IV Intermittent every 12 hours  furosemide    Tablet 20 milliGRAM(s) Oral daily  gabapentin 100 milliGRAM(s) Oral every 12 hours  hydrALAZINE 100 milliGRAM(s) Oral three times a day  insulin glargine Injectable (LANTUS) 10 Unit(s) SubCutaneous at bedtime  insulin lispro (HumaLOG) corrective regimen sliding scale   SubCutaneous Before meals and at bedtime  lactobacillus acidophilus 1 Tablet(s) Oral three times a day with meals  lisinopril 40 milliGRAM(s) Oral daily  melatonin 5 milliGRAM(s) Oral at bedtime  metoprolol succinate ER 50 milliGRAM(s) Oral daily  pantoprazole    Tablet 40 milliGRAM(s) Oral before breakfast  polyethylene glycol 3350 17 Gram(s) Oral daily  senna 2 Tablet(s) Oral at bedtime    MEDICATIONS  (PRN):  acetaminophen   Tablet .. 650 milliGRAM(s) Oral every 6 hours PRN Mild Pain (1 - 3), Moderate Pain (4 - 6)  aluminum hydroxide/magnesium hydroxide/simethicone Suspension 30 milliLiter(s) Oral every 6 hours PRN Dyspepsia  diphenhydrAMINE   Injectable 15 milliGRAM(s) IV Push every 6 hours PRN nausea or vomiting  metoprolol tartrate Injectable 5 milliGRAM(s) IV Push every 6 hours PRN HR greater than 120  ondansetron Injectable 4 milliGRAM(s) IV Push every 8 hours PRN Nausea and/or Vomiting  traMADol 25 milliGRAM(s) Oral every 6 hours PRN Severe Pain (7 - 10)      __________________________________________________  REVIEW OF SYSTEMS:    CONSTITUTIONAL: No fever,   EYES: no acute visual disturbances  NECK: No pain or stiffness  RESPIRATORY: No cough; No shortness of breath  CARDIOVASCULAR: No chest pain, no palpitations  GASTROINTESTINAL: No pain. No nausea or vomiting; No diarrhea   NEUROLOGICAL: No headache or numbness, no tremors  MUSCULOSKELETAL: No joint pain, no muscle pain  GENITOURINARY: no dysuria, no frequency, no hesitancy  PSYCHIATRY: no depression , no anxiety  ALL OTHER  ROS negative        Vital Signs Last 24 Hrs  T(C): 36.8 (17 Jun 2020 14:10), Max: 37 (16 Jun 2020 20:29)  T(F): 98.2 (17 Jun 2020 14:10), Max: 98.6 (16 Jun 2020 20:29)  HR: 75 (17 Jun 2020 14:10) (75 - 93)  BP: 160/84 (17 Jun 2020 14:10) (155/62 - 175/72)  BP(mean): --  RR: 17 (17 Jun 2020 14:10) (17 - 18)  SpO2: 97% (17 Jun 2020 14:10) (96% - 98%)    ________________________________________________  PHYSICAL EXAM:  well developed, well groomed  GENERAL: NAD  HEENT: Normocephalic;  conjunctivae and sclerae clear; moist mucous membranes;   NECK : supple  CHEST/LUNG: Clear to auscultation bilaterally with good air entry   HEART: S1 S2  regular; no murmurs, gallops or rubs  ABDOMEN: Soft, Nontender, Nondistended; Bowel sounds present  EXTREMITIES: no cyanosis; no edema; no calf tenderness  SKIN: warm and dry; no rash  NERVOUS SYSTEM:  Awake and alert; Oriented  to place, person and time ; no new deficits    _________________________________________________  LABS:                        9.1    11.37 )-----------( 350      ( 17 Jun 2020 06:30 )             28.7     06-17    132<L>  |  95<L>  |  20<H>  ----------------------------<  149<H>  3.9   |  30  |  0.82    Ca    8.4      17 Jun 2020 06:30  Mg     1.8     06-16    TPro  6.9  /  Alb  2.1<L>  /  TBili  0.4  /  DBili  x   /  AST  20  /  ALT  23  /  AlkPhos  305<H>  06-17    PT/INR - ( 17 Jun 2020 08:36 )   PT: 15.7 sec;   INR: 1.38 ratio         PTT - ( 17 Jun 2020 08:36 )  PTT:35.4 sec    CAPILLARY BLOOD GLUCOSE      POCT Blood Glucose.: 202 mg/dL (17 Jun 2020 11:23)  POCT Blood Glucose.: 158 mg/dL (17 Jun 2020 07:51)  POCT Blood Glucose.: 161 mg/dL (16 Jun 2020 20:57)  POCT Blood Glucose.: 191 mg/dL (16 Jun 2020 16:32)    RADIOLOGY & ADDITIONAL TESTS:    CT Abdomen and Pelvis w/ IV Cont (06.01.20 @ 10:35) >  EXAM:  CT ABDOMEN AND PELVIS IC                            PROCEDURE DATE:  06/01/2020          INTERPRETATION:  CT of the abdomen and pelvis without and with IV contrast (CT urogram)    Indication: Urinary bladder wall thickening.    Technique: Axial multidetector CT images of the abdomen and pelvis are acquired without and with IV contrast (90 cc Omnipaque-350 administered, 10 cc discarded) according to our CT urogram protocol.     Comparison: 5/30/2020.    Findings: Limited sections through the lung bases demonstrate small bilateral pleural effusions, new on the right and slightly increased on the left. Small bilateral atelectasis.    No evidence for a calculus in the kidneys or ureters. No hydronephrosis. Small hypodense lesion in the left kidney, too small to characterize. No definite enhancing renal mass is identified. No suspicious filling defect is identified in the renal pelvises or ureters. Multiple nodular peripheral filling defect are seen in the urinary bladder measuring upto 1.3 cm, suspicious for transitional cell carcinomas. Cystoscopy is recommended for further evaluation.    Stable cholecystectomy clips. Small hypodense area in the left hepatic lobe adjacent to the falciform ligament, likely due to focal fatty infiltration. Allowing for the noncontrast technique, the common bile duct, pancreas, spleen and adrenals appear unremarkable.    Stable mild dilatation of the appendix measuring up to 8 mm in caliber. No evidence for periappendiceal stranding. No bowel obstruction.    Trace perihepatic ascites. No evidence for free air, or enlarged lymph node.    The uterus is not visualized, probably surgically absent.    Impression:    Small bilateral pleural effusions.    Multiple nodular peripheral filling defect in the urinary bladder measuring up to 1.3 cm, suspicious for transitional cell carcinomas. Cystoscopy is recommended for further evaluation.    Stable mild dilatation of the appendix measuring up to 8 mm in caliber. No evidence for periappendiceal stranding. Given its stability, mucocele of the appendix is considered. Acute appendicitis is possible but less likely due to no interval development of periappendiceal stranding.    Trace perihepatic ascites.    < end of copied text >      Xray Tibia + Fibula 2 Views, Left (06.11.20 @ 10:10) >  EXAM:  LEG AP&LAT - LEFT                            PROCEDURE DATE:  06/11/2020          INTERPRETATION:  Left tib-fib. There is history of popliteal stent. 2 views.    There are rather mild degenerative changes in the knee and there is some spurring of the posterior upper shaft of the tibia.    Popliteal stent starting at the upper tibial plateau level and extends for a length of approximately 10 cm into the trifurcation area.    There is what appears to be an old fracture deformity of the upper shaft of the fibula. There are posterior and inferior calcaneal spurs.    IMPRESSION: As above.    < end of copied text >    Xray Chest 1 View- PORTABLE-Routine (06.11.20 @ 10:09) >  EXAM:  XR CHEST PORTABLE ROUTINE 1V                            PROCEDURE DATE:  06/11/2020          INTERPRETATION:  TIME OF EXAM: June 11, 2020 at 9:33 AM.    CLINICAL INFORMATION: Follow-up of pneumonia.    COMPARISON:  June 5, 2020.    TECHNIQUE:   AP Portable chest x-ray.    INTERPRETATION:     Heart size and the mediastinum cannot be accurately evaluated on this projection. A left chest wall biventricular pacemaker ICD is again seen. The generator obscures part of the left lung.  There is increased accentuation and indistinctness of the val and pulmonary vascular markings as well as some airspace opacity on the right, findings which may be due to worsening pulmonary edema. Infection is not excluded.  There is increased left basilar and retrocardiac opacity with obscuration of the left hemidiaphragm.  No pneumothorax is seen.                IMPRESSION:  Findings which may represent worsening pulmonary edema. Infection is not excluded.    Increased left basilar and retrocardiac opacity, possibly an increasing small left pleural effusion with passive atelectasis. Atelectasis of other cause and/or pneumonia is not excluded.    < end of copied text >    VA Physiol Extremity Lower 3+ Level, BI (06.06.20 @ 12:48) >  EXAM:  US PHYSIOL LWR EXT 3+ LEV BI                            PROCEDURE DATE:  06/06/2020          INTERPRETATION:  Clinical Information: Peripheral vascular disease. Left toe ulceration.    Technique: Bilateral lower extremity ABIs/PVR    Comparison: None    Findings/  Impression:  Right lower extremity: The ankle brachial index is 0.6. The pulse waveforms are diffusely diminished.    Left lower extremity: The ankle brachial index is 0.5. The pulse waveforms are diffusely diminished, however particularly severe in the left foot.    Severe bilateral peripheral artery disease.    Pulse volume recordings are diffusely diminished bilaterally, however severely diminished in the left foot.    < end of copied text >    CT Angio Neck w/ IV Cont (06.04.20 @ 20:09) >  EXAM:  CT ANGIO NECK (W)AW IC                          EXAM:  CT ANGIO BRAIN (W)AW IC                            PROCEDURE DATE:  06/04/2020          INTERPRETATION:  CLINICAL STATEMENT: Nausea vomiting vertically    TECHNIQUE: CTA of the head and neck performed with IV contrast.  3-D MIP imaging obtained. Approximately 90 cc of Omnipaque 350 administered.    COMPARISON: None.    FINDINGS:  CTA of the neck:  Evaluation of the origins of the great vessels is limited due to artifacts.    The visualized common carotid and internal carotid arteries are patent. Calcified atherosclerotic plaque at the right carotid bulb results in focal mild narrowing of the origin of the right internal carotid artery.    Atherosclerotic plaque noted resulting inmoderate narrowing distal left common carotid artery.    The visualized extracranial vertebral arteries are patent. Origin of vertebral arteries not evaluated.    Left anterior chest device causes artifacts limiting evaluation of the chest left chestwall device limits evaluation of the chest due to artifacts. Questionable filling defects noted in the upper segmental and subsegmental pulmonary arteries. Possible groundglass opacities. Pleural effusions noted. Partially visualized soft tissue densities noted in the subcarinal region    CTA Head:  Calcified atherosclerotic plaques results in narrowing of the cavernous and supraclinoid bilateral internal carotid arteries. There is severe narrowing of the terminal segment of distal left internal carotid artery    The proximal anterior, middle and posterior cerebral arteries are patent without hemodynamically significant stenosis.    The intracranial vertebral and basilar arteries are patent. Calcified atherosclerotic plaques results in narrowing of bilateral V4 segments of distal vertebral arteries.    There is no intracranial aneurysm.        IMPRESSION:  Focal mild (less than 50%) narrowing at the origin of right internal carotid artery.    Short segment Moderate (50-70%) narrowing distal left common carotid artery    Intracranial narrowing as described above.    Partially visualized pleural effusions and soft tissue density in the subcarinal region. Questionable filling defects in the segmental and subsegmental upper pulmonary arteries. Evaluation significantly limited due to artifacts from left chest wall device. CTA chest recommended for further evaluation    < end of copied text >      Imaging Personally Reviewed:  YES/NO    Consultant(s) Notes Reviewed:   YES/ No    Care Discussed with Consultants :     Plan of care was discussed with patient and /or primary care giver; all questions and concerns were addressed and care was aligned with patient's wishes.

## 2020-06-17 NOTE — PROGRESS NOTE ADULT - SUBJECTIVE AND OBJECTIVE BOX
Subjective:   Doing well. Nausea has resolved     Objective:    MEDICATIONS  (STANDING):  atorvastatin 40 milliGRAM(s) Oral at bedtime  cefTRIAXone   IVPB 2000 milliGRAM(s) IV Intermittent every 24 hours  Dakins Solution - 1/4 Strength 1 Application(s) Topical daily  dextrose 5% + sodium chloride 0.9%. 1000 milliLiter(s) (70 mL/Hr) IV Continuous <Continuous>  doxycycline IVPB 100 milliGRAM(s) IV Intermittent every 12 hours  furosemide    Tablet 20 milliGRAM(s) Oral daily  gabapentin 100 milliGRAM(s) Oral every 12 hours  heparin  Infusion. 800 Unit(s)/Hr (8 mL/Hr) IV Continuous <Continuous>  hydrALAZINE 100 milliGRAM(s) Oral three times a day  insulin glargine Injectable (LANTUS) 10 Unit(s) SubCutaneous at bedtime  insulin lispro (HumaLOG) corrective regimen sliding scale   SubCutaneous Before meals and at bedtime  lactobacillus acidophilus 1 Tablet(s) Oral three times a day with meals  lisinopril 40 milliGRAM(s) Oral daily  melatonin 5 milliGRAM(s) Oral at bedtime  metoprolol succinate ER 50 milliGRAM(s) Oral daily  pantoprazole  Injectable 40 milliGRAM(s) IV Push daily    MEDICATIONS  (PRN):  acetaminophen   Tablet .. 650 milliGRAM(s) Oral every 6 hours PRN Mild Pain (1 - 3), Moderate Pain (4 - 6)  aluminum hydroxide/magnesium hydroxide/simethicone Suspension 30 milliLiter(s) Oral every 6 hours PRN Dyspepsia  diphenhydrAMINE   Injectable 15 milliGRAM(s) IV Push every 6 hours PRN nausea or vomiting  metoprolol tartrate Injectable 5 milliGRAM(s) IV Push every 6 hours PRN HR greater than 120  ondansetron Injectable 4 milliGRAM(s) IV Push every 8 hours PRN Nausea and/or Vomiting  traMADol 25 milliGRAM(s) Oral every 6 hours PRN Severe Pain (7 - 10)              Vital Signs Last 24 Hrs  T(C): 36.6 (16 Jun 2020 04:53), Max: 36.7 (15 Uli 2020 16:40)  T(F): 97.9 (16 Jun 2020 04:53), Max: 98 (15 Uli 2020 16:40)  HR: 50 (16 Jun 2020 04:53) (50 - 92)  BP: 168/56 (16 Jun 2020 04:53) (135/87 - 168/56)  BP(mean): --  RR: 18 (16 Jun 2020 04:53) (16 - 18)  SpO2: 95% (16 Jun 2020 04:53) (95% - 97%)      General:  Well developed, well nourished, alert and active, no pallor, NAD.  HEENT:    Normal appearance of conjunctiva, ears, nose, lips, oropharynx, and oral mucosa, anicteric.  Neck:  No masses, no asymmetry.  Lymph Nodes:  No lymphadenopathy.   Cardiovascular:  RRR normal S1/S2, no murmur.  Respiratory:  CTA B/L, normal respiratory effort.   Abdominal:   soft, no masses or tenderness, normoactive BS, NT/ND, no HSM.  Extremities:   No clubbing or cyanosis, normal capillary refill, no edema.      Other:       LABS:                        9.3    13.57 )-----------( 398      ( 16 Jun 2020 06:35 )             29.5     06-16    131<L>  |  94<L>  |  22<H>  ----------------------------<  166<H>  3.7   |  29  |  0.88    Ca    8.6      16 Jun 2020 06:35  Phos  3.2     06-15  Mg     1.8     06-16    TPro  7.2  /  Alb  2.1<L>  /  TBili  0.4  /  DBili  x   /  AST  22  /  ALT  24  /  AlkPhos  330<H>  06-16    PT/INR - ( 15 Uli 2020 06:07 )   PT: 14.1 sec;   INR: 1.24 ratio         PTT - ( 16 Jun 2020 01:21 )  PTT:154.6 sec      RADIOLOGY & ADDITIONAL TESTS:    · Note Type	Event Note  EGD	      Esophagogastroduodenoscopy Report  Anesthesia: MAC  Consent:  Informed consent was obtained from the patient after providing any opportunity for questions  Procedure: The gastroscope was gently passed through the incisoral orifice into the oral cavity and under direct visualization the esophagus was intubated. The endoscope was passed down the esophagus, through the stomach and into proximal jejunum. Color, texture, mucosa and anatomy of the esophagus, stomach, and duodenum were carefully examined with the scope. The patient tolerated the procedure well. After completion of the examination, the patient was transferred to the recovery room.   Preparation: NPO   Findings:   Oropharynx	Normal  Esophagus	Normal  EG-junction	Normal  Cardia	Normal.  Body	Normal   Antrum	Mild gastritis biopsy taken [1]  Pylorus	Normal.  Duodenal Bulb	Superficial clean based ulcer. Non-obstructing , non bleeding.   2nd portion	Normal  3rd portion	Normal.  Date and time:11/27/2019 9:20:48 AM  EBL:0  Impression: 1- Healing duodenal bulb ulcer 2- Mild gastritis 3- No pathology to explain vomiting.     Plan: 1- Advance diet 2- PPI daily 3- Follow up pathology     Quintin Wharton M.D.   Date and Time:      Electronic Signatures:  Quintin Wharton (MD)  (Signed 15-Uli-2020 15:52)  	Authored: Note Type, Note      Last Updated: 15-Uli-2020 15:52 by Quintin Wharton)    Surgical Pathology Report (06.15.20 @ 16:00)    Surgical Pathology Report:   ACCESSION No:  [id number]    DARRION MELENDEZ                      1        Surgical Final Report          Final Diagnosis    Gastric antrum; biopsy:  - Chronic inactive gastritis.  - Cresyl violet stain is negative for H. pylori-like  microorganisms.    Verified by: Kath Levin MD  (Electronic Signature)  Reported on: 06/17/20 14:12 EDT, [facility],  [address] [place] [address], [place] [address]  Phone: [phone]   Fax: [phone]  _________________________________________________________________    Clinical History  Antrum    Specimen(s) Submitted  Antrum    Gross Description  The specimen is received in formalin and the specimen container  is labeled: Antrum.  It consists of a 0.2 cm in greatest  dimension fragment of soft, tan-pink tissue.  Entirely submitted.  One cassette.    In addition to other data that may appear on the specimen  container, the label has been inspected to confirm the presence  of the patient's name and date of birth.  Cesar Rea 06/16/2020 13:31

## 2020-06-17 NOTE — PROGRESS NOTE ADULT - PROBLEM SELECTOR PLAN 3
H/o PVD and popliteal stent  -s/p left big toe amputation in May 2020  -s/p ESTRELLA :  RLE:  0.6. LLE:  0.5. Severe b/l peripheral artery disease. Pulse volume recordings are diffusely diminished b/l,  severely diminished in the left foot.  -s/p left metatarsal amputation  -PT rec-Home PT.    -s/p Angio with severe occlusive popliteal and tibial dx that is not reconstructable.   -Vascular Dr. Grove following

## 2020-06-17 NOTE — PROGRESS NOTE ADULT - ASSESSMENT
Nausea vomiting/ PUD    ·	Resolved   ·	Continue IV heparin can switch to NOAC if needed    ·	Zofran PRN nausea   ·	Small frequent low fat meals  ·	pathology negative  ·	PPI daily x 1month     ·	Avoid NSAIDs     I was physically present for the key portions of the evaluation and management (E/M) service provided.  The patient was personally seen and examined at bedside.    Thank you for your consultation and allowing  me to participate in the care of your patients. If you have further questions please contact me at [phone].     Quintin Madrigal M.D.       _________________________________________________________________________________________________  [facility]  [address] [place] [address]  Office: [phone]    Tremaine Culp PA-C  ___________________________________________________________________________________________________

## 2020-06-17 NOTE — PROGRESS NOTE ADULT - PROBLEM SELECTOR PLAN 2
Afebrile, leukocytosis trending down 11.3<-13.57<-14.39  -CT L foot shows OM ( pt cannot get MRI dur to AICD)  -Podiatry Dr. Yo   -s/p I&D left foot as well as partial amputation of left first MT. Pathology consistent w/ acute osteo as per dr. Reagan.   wound cx + for staph aureus (MSSA)  -ID Dr. Reagan following  -c/w Rocephin and Doxy   -c/w wound care tx- Darkin's solution  -Pt will need total 6 wks of IV abt as per dr. Reagan-Rx for IV Rocephin given to SW  -IR for PICC tomorrow.   -no need for holding AC therapy

## 2020-06-17 NOTE — PROGRESS NOTE ADULT - ASSESSMENT
A/P  PVD /PAD  DM  ulcer   dehiscence surgical wound   history of gangrene left hallux   positive probe to bone   leukocytosis -stable reactive after surgery   OM with  abscess  left -P/O day 6  pyogenic tendon       pt seen and eval   chart review  s/p angiogram -pt is very high risk for none healing  due to very poor vascular status as per Dr. Grove   Ms Keating and her daughter want to see if the wound will improve before discussing BKA  pt is at risk of hematoma formation/bleeding due to heparin drip   continue antibiotics as per id -id    dressing change  keep foot slightly elevated   no weight bearing  -due to risk of bleeding   elevate foot when sitting down   if stable for d/c will need to follow up with podiatry, will need HBO -if not a candidate for HBO consider topical oxygen     please call with any pt related questions [phone] A/P  PVD /PAD  DM  ulcer   dehiscence surgical wound   history of gangrene left hallux   positive probe to bone   leukocytosis -stable reactive after surgery   OM with  abscess  left -P/O day 7  pyogenic tendon       pt seen and eval   chart review  s/p angiogram -pt is very high risk for none healing  due to very poor vascular status   pt is a high risk for limb loss /need of revision procedure -poor vascular status, poorly controlled  DM , multiple health problems   Ms Keating and her daughter want to see if the wound will improve before discussing BKA  pt is at risk of hematoma formation/bleeding   continue antibiotics as per id -id    dressing change  keep foot slightly elevated   no weight bearing  -due to risk of bleeding   elevate foot when sitting down   if stable for d/c will need to follow up with podiatry, will need HBO -if not a candidate for HBO consider topical oxygen     please call with any pt related questions [phone]

## 2020-06-17 NOTE — PROGRESS NOTE ADULT - SUBJECTIVE AND OBJECTIVE BOX
Source of information: DARRION MELENDEZ, Chart review  Patient language: English  : n/a    HPI:  73 YO female from home, H/O CAD/DM/HTN/PVD/hysterectomy/cholecystectomy ,with AICD comes to ED with intractable nausea and vomiting for 2 days. Patient states that she recently had left big toe amputation in the beginning of May and she was taking antibiotics. On Friday, her antibiotics were changed from clindamycin to Levaquin and after she took antibiotics along with percocet, she started having severe pain in epigastric region. Patient reports that it was sudden in onset, 7/10, burning & pressure like pain, non-radiating , associated with nausea,, bitter taste in mouth and projectile vomiting. She was not able to tolerate food and it aggravated her symptoms. Patient also reports significant weight( not sure how much) recently and decrease in appetite.   patient denies fever, cough, SOB, constipation, dysuria, headache, chest pain, orthopnea, back pain, burning sensation in extremities.    ED course; Patient is in mild distress due to nausea but otherwise stable  Vitals:     /88  SpO2 99% on RA    CT abdomen/pelvis:   NEW MULTI BLADDER WALL FOCAL AREAS OF SOFT TISSUE THICKENING CONCERNING FOR BLADDER CARCINOMA/TRANSITIONAL CELL CARCINOMA. No hydronephrosis.  Stomach not fully distended and gastric pathology cannot be excluded.  Status post cholecystectomy and hysterectomy.  Remaining abdominal pelvic viscera unremarkable.   No evidence of colitis.. (30 May 2020 14:56)      Patient is a 74y old  Female who presents with a chief complaint of Intractable nausea and vomiting found to have gastritis. Pt found to have OM of left foot. Recent left big toe amputation in May 2020. Pt was on Percocet 10mg PO at home 2x/day PRN. Plan is now POD #8 (6/9) s/p Angiogram, extremity, left, and I&D per podiatry. Pt is on heparin gtt for LEFT PICA stroke.   Pt seen and examined at bedside. Patient sitting in chair, reports pain score 6/10 and tolerable. Pt states she has been able to ambulate with tolerable pain control. Plan is for PICC line and possible discharge tomorrow. Denies lethargy, nausea, vomiting, constipation, itchiness. Reports last BM yesterday 6/15. Pt requesting medication to assist in having a BM. Patient stated goal for pain control: to be able to take deep breaths, get out of bed to chair and ambulate with tolerable pain control. Pt reports taking Tylenol for pain at home as needed. Pt states she was prescribed Percocet but does not want to take it as she experienced nausea after taking it.     PAST MEDICAL & SURGICAL HISTORY:  PVD (peripheral vascular disease)  CAD (coronary artery disease)  CHF (congestive heart failure)  HTN (hypertension)  DM (diabetes mellitus)  Status post coronary artery stent placement  H/O: hysterectomy  Cardiac defibrillator in place  Artificial cardiac pacemaker  History of cholecystectomy    Social History:  Used to smoke 1 cigarette a day 36 years ago  No Alcohol or drugs  Retired and lives with family, No sick contacts (30 May 2020 14:56)    Allergies    codeine (Rash)  penicillin (Rash)      MEDICATIONS  (STANDING):  apixaban 5 milliGRAM(s) Oral every 12 hours  atorvastatin 40 milliGRAM(s) Oral at bedtime  cefTRIAXone   IVPB 2000 milliGRAM(s) IV Intermittent every 24 hours  Dakins Solution - 1/4 Strength 1 Application(s) Topical daily  dextrose 5% + sodium chloride 0.9%. 1000 milliLiter(s) (70 mL/Hr) IV Continuous <Continuous>  doxycycline IVPB 100 milliGRAM(s) IV Intermittent every 12 hours  furosemide    Tablet 20 milliGRAM(s) Oral daily  gabapentin 100 milliGRAM(s) Oral every 12 hours  hydrALAZINE 100 milliGRAM(s) Oral three times a day  insulin glargine Injectable (LANTUS) 10 Unit(s) SubCutaneous at bedtime  insulin lispro (HumaLOG) corrective regimen sliding scale   SubCutaneous Before meals and at bedtime  lactobacillus acidophilus 1 Tablet(s) Oral three times a day with meals  lisinopril 40 milliGRAM(s) Oral daily  melatonin 5 milliGRAM(s) Oral at bedtime  metoprolol succinate ER 50 milliGRAM(s) Oral daily  pantoprazole    Tablet 40 milliGRAM(s) Oral before breakfast  senna 2 Tablet(s) Oral at bedtime    MEDICATIONS  (PRN):  acetaminophen   Tablet .. 650 milliGRAM(s) Oral every 6 hours PRN Mild Pain (1 - 3), Moderate Pain (4 - 6)  aluminum hydroxide/magnesium hydroxide/simethicone Suspension 30 milliLiter(s) Oral every 6 hours PRN Dyspepsia  diphenhydrAMINE   Injectable 15 milliGRAM(s) IV Push every 6 hours PRN nausea or vomiting  metoprolol tartrate Injectable 5 milliGRAM(s) IV Push every 6 hours PRN HR greater than 120  ondansetron Injectable 4 milliGRAM(s) IV Push every 8 hours PRN Nausea and/or Vomiting  traMADol 25 milliGRAM(s) Oral every 6 hours PRN Severe Pain (7 - 10)      Vital Signs Last 24 Hrs  T(C): 36.8 (17 Jun 2020 14:10), Max: 37 (16 Jun 2020 20:29)  T(F): 98.2 (17 Jun 2020 14:10), Max: 98.6 (16 Jun 2020 20:29)  HR: 75 (17 Jun 2020 14:10) (75 - 93)  BP: 160/84 (17 Jun 2020 14:10) (155/62 - 175/72)  BP(mean): --  RR: 17 (17 Jun 2020 14:10) (17 - 18)  SpO2: 97% (17 Jun 2020 14:10) (96% - 98%)  COVID-19 PCR: NotDetec (14 Jun 2020 09:41)    LABS: Reviewed                          9.1    11.37 )-----------( 350      ( 17 Jun 2020 06:30 )             28.7     06-17    132<L>  |  95<L>  |  20<H>  ----------------------------<  149<H>  3.9   |  30  |  0.82    Ca    8.4      17 Jun 2020 06:30  Mg     1.8     06-16    TPro  6.9  /  Alb  2.1<L>  /  TBili  0.4  /  DBili  x   /  AST  20  /  ALT  23  /  AlkPhos  305<H>  06-17    PT/INR - ( 17 Jun 2020 08:36 )   PT: 15.7 sec;   INR: 1.38 ratio         PTT - ( 17 Jun 2020 08:36 )  PTT:35.4 sec  LIVER FUNCTIONS - ( 17 Jun 2020 06:30 )  Alb: 2.1 g/dL / Pro: 6.9 g/dL / ALK PHOS: 305 U/L / ALT: 23 U/L DA / AST: 20 U/L / GGT: x             CAPILLARY BLOOD GLUCOSE      POCT Blood Glucose.: 202 mg/dL (17 Jun 2020 11:23)  POCT Blood Glucose.: 158 mg/dL (17 Jun 2020 07:51)  POCT Blood Glucose.: 161 mg/dL (16 Jun 2020 20:57)  POCT Blood Glucose.: 191 mg/dL (16 Jun 2020 16:32)    COVID-19 PCR: NotDetec (14 Jun 2020 09:41)      Radiology:     < from: [facility] Physiol Extremity Lower 3+ Level, BI (06.06.20 @ 12:48) >    EXAM:  US PHYSIOL LWR EXT 3+ LEV BI                            PROCEDURE DATE:  06/06/2020          INTERPRETATION:  Clinical Information: Peripheral vascular disease. Left toe ulceration.    Technique: Bilateral lower extremity ABIs/PVR    Comparison: None    Findings/  Impression:  Right lower extremity: The ankle brachial index is 0.6. The pulse waveforms are diffusely diminished.    Left lower extremity: The ankle brachial index is 0.5. The pulse waveforms are diffusely diminished, however particularly severe in the left foot.    Severe bilateral peripheral artery disease.    Pulse volume recordings are diffusely diminished bilaterally, however severely diminished in the left foot.                  ANIBAL UPTON M.D., ATTENDING RADIOLOGIST  This document has been electronically signed. Jun 6 2020  1:16PM                < end of copied text >    < from: Xray Chest 1 View- PORTABLE-Urgent (06.05.20 @ 13:40) >    EXAM:  XR CHEST PORTABLE URGENT 1V                            PROCEDURE DATE:  06/05/2020          INTERPRETATION:  Portable chest radiograph        CLINICAL INFORMATION:   Leukocytosis.    TECHNIQUE:  Portable  AP view of the chest was obtained.    COMPARISON: 10/12/2019 available for review.    FINDINGS:   The lungs  show bilateral lung perihilar and basilar interstitial opacity/infiltrates. Apices of lungs clear.    The  heart is enlarged in transverse diameter. No hilar mass. Trachea midline.   Right atrium and biventricular cardiac wire leads in place.    The heart and mediastinum are within normal limits.    Visualized osseous structures are intact.        IMPRESSION:   Cardiomegaly. Right atrium and biventricular cardiac wire leads in place.. Infrahilar and bibasilar interstitial opacities/infiltrates...      < end of copied text >      ORT Score -   Family Hx of substance abuse	Female	      Male  Alcohol 	                                           1                     3  Illegal drugs	                                   2                     3  Rx drugs                                           4 	                  4  Personal Hx of substance abuse		  Alcohol 	                                          3	                  3  Illegal drugs                                     4	                  4  Rx drugs                                            5 	                  5  Age between 16- 45 years	           1                     1  hx preadolescent sexual abuse	   3 	                  0  Psychological disease		  ADD, OCD, bipolar, schizophrenia   2	          2  Depression                                           1 	          1  Total: 0    a score of 3 or lower indicates low risk for opioid abuse		  a score of 4-7 indicates moderate risk for opioid abuse		  a score of 8 or higher indicates high risk for opioid abuse    	  4M's:   mental status: AAOx3.   ]medications: age-appropriate  mobility: community ambulatory, uses walker at home  Matters most-goals/GOC: to be able to take deep breaths, utilize incentive spirometer, get out of bed to chair and ambulate with tolerable pain control    REVIEW OF SYSTEMS:  CONSTITUTIONAL: No fever or fatigue  RESPIRATORY: No cough, wheezing, chills or hemoptysis; No shortness of breath  CARDIOVASCULAR: No chest pain, palpitations, dizziness, or leg swelling  GASTROINTESTINAL: No nausea/ vomiting. No abdominal or epigastric pain. No diarrhea or constipation.   GENITOURINARY: No dysuria, frequency, hematuria, retention or incontinence  MUSCULOSKELETAL: Positive left foot pain. Positive Rt foot swelling. No muscle, back, or extremity pain, no upper or lower motor strength weakness, bowel/bladder incontinence  NEURO: No numbness/ tingling in b/l LE   PSYCHIATRIC: Positive anxiety (does not take medications at home) No depression, mood swings, or difficulty sleeping    PHYSICAL EXAM:  GENERAL:  Alert & Oriented X3, NAD, Good concentration  CHEST/LUNG: Even and unlabored on room air.   HEART: Regular rate and rhythm  ABDOMEN: Obese, Soft, Nontender, Nondistended  EXTREMITIES:  Positive Rt foot moderate edema. 2+ Peripheral Pulses in Rt foot. Unable to assess pulse in Left foot due to ace wrap and inner dressing. Pt is able to wiggle toes on b/l feet.  MUSCULOSKELETAL: Motor Strength 5/5 B/L upper and 4/4 b/l lower extremities; moves all extremities equally against gravity  SKIN: No rashes or lesions. Left foot ace wrap dressing c/d/i.     Risk factors associated with adverse outcomes related to opioid treatment  [ ]  Concurrent benzodiazepine use  [ ]  History/ Active substance use or alcohol use disorder  [X ] Psychiatric co-morbidity - anxiety  [ ] Sleep apnea  [ ] COPD  [ ] BMI> 35  [ ] Liver dysfunction  [ ] Renal dysfunction  [X ] CHF  [X ] Former Smoker  [X ]  Age > 60 years    [X ]  [place]  Reviewed and Copied to Chart. See below.    Plan of care and goal oriented pain management treatment options were discussed with patient and /or primary care giver; all questions and concerns were addressed and care was aligned with patient's wishes.    Educated patient on goal oriented pain management treatment options     06-17-20 @ 14:32

## 2020-06-17 NOTE — PROGRESS NOTE ADULT - SUBJECTIVE AND OBJECTIVE BOX
Subjective: no new complaints. for picc line placement and cont of iv ab at home. no fevers. wbc has decreased to 11k       PHYSICAL EXAM:    Vital Signs Last 24 Hrs  T(C): 36.6 (17 Jun 2020 05:05), Max: 37 (16 Jun 2020 20:29)  T(F): 97.9 (17 Jun 2020 05:05), Max: 98.6 (16 Jun 2020 20:29)  HR: 90 (17 Jun 2020 11:46) (77 - 93)  BP: 155/62 (17 Jun 2020 11:46) (155/62 - 175/72)  BP(mean): --  RR: 17 (17 Jun 2020 05:05) (17 - 18)  SpO2: 97% (17 Jun 2020 11:46) (95% - 98%)      Constitutional: awake alert oriented times 3   [place] SCLERA anicteric EOMI   LUNGS clear   CVS s1 s2 aud no murmurs /ppm in place  ABDOMEN soft non tender no HSM   NEUROLOGY  no defecits   SKIN left foot dressed   EXTREMITIES no edema no cyanosis /no groin hematoma         LABS/DIAGNOSTIC TESTS                        9.1    11.37 )-----------( 350      ( 17 Jun 2020 06:30 )             28.7     06-17    132<L>  |  95<L>  |  20<H>  ----------------------------<  149<H>  3.9   |  30  |  0.82    Ca    8.4      17 Jun 2020 06:30  Mg     1.8     06-16    TPro  6.9  /  Alb  2.1<L>  /  TBili  0.4  /  DBili  x   /  AST  20  /  ALT  23  /  AlkPhos  305<H>  06-17    PT/INR - ( 17 Jun 2020 08:36 )   PT: 15.7 sec;   INR: 1.38 ratio         PTT - ( 17 Jun 2020 08:36 )  PTT:35.4 sec        acetaminophen   Tablet .. 650 milliGRAM(s) Oral every 6 hours PRN  aluminum hydroxide/magnesium hydroxide/simethicone Suspension 30 milliLiter(s) Oral every 6 hours PRN  apixaban 5 milliGRAM(s) Oral every 12 hours  atorvastatin 40 milliGRAM(s) Oral at bedtime  cefTRIAXone   IVPB 2000 milliGRAM(s) IV Intermittent every 24 hours  Dakins Solution - 1/4 Strength 1 Application(s) Topical daily  dextrose 5% + sodium chloride 0.9%. 1000 milliLiter(s) IV Continuous <Continuous>  diphenhydrAMINE   Injectable 15 milliGRAM(s) IV Push every 6 hours PRN  doxycycline IVPB 100 milliGRAM(s) IV Intermittent every 12 hours  furosemide    Tablet 20 milliGRAM(s) Oral daily  gabapentin 100 milliGRAM(s) Oral every 12 hours  hydrALAZINE 100 milliGRAM(s) Oral three times a day  insulin glargine Injectable (LANTUS) 10 Unit(s) SubCutaneous at bedtime  insulin lispro (HumaLOG) corrective regimen sliding scale   SubCutaneous Before meals and at bedtime  lactobacillus acidophilus 1 Tablet(s) Oral three times a day with meals  lisinopril 40 milliGRAM(s) Oral daily  melatonin 5 milliGRAM(s) Oral at bedtime  metoprolol succinate ER 50 milliGRAM(s) Oral daily  metoprolol tartrate Injectable 5 milliGRAM(s) IV Push every 6 hours PRN  ondansetron Injectable 4 milliGRAM(s) IV Push every 8 hours PRN  pantoprazole    Tablet 40 milliGRAM(s) Oral before breakfast  traMADol 25 milliGRAM(s) Oral every 6 hours PRN        CULTURES  no new cx      RADIOLOGY  no new xrays

## 2020-06-17 NOTE — PROGRESS NOTE ADULT - SUBJECTIVE AND OBJECTIVE BOX
Patient denies chest pain or shortness of breath.   Review of systems otherwise (-)  	  acetaminophen   Tablet .. 650 milliGRAM(s) Oral every 6 hours PRN  aluminum hydroxide/magnesium hydroxide/simethicone Suspension 30 milliLiter(s) Oral every 6 hours PRN  apixaban 5 milliGRAM(s) Oral every 12 hours  atorvastatin 40 milliGRAM(s) Oral at bedtime  cefTRIAXone   IVPB 2000 milliGRAM(s) IV Intermittent every 24 hours  Dakins Solution - 1/4 Strength 1 Application(s) Topical daily  dextrose 5% + sodium chloride 0.9%. 1000 milliLiter(s) IV Continuous <Continuous>  diphenhydrAMINE   Injectable 15 milliGRAM(s) IV Push every 6 hours PRN  doxycycline IVPB 100 milliGRAM(s) IV Intermittent every 12 hours  furosemide    Tablet 20 milliGRAM(s) Oral daily  gabapentin 100 milliGRAM(s) Oral every 12 hours  hydrALAZINE 100 milliGRAM(s) Oral three times a day  insulin glargine Injectable (LANTUS) 10 Unit(s) SubCutaneous at bedtime  insulin lispro (HumaLOG) corrective regimen sliding scale   SubCutaneous Before meals and at bedtime  lactobacillus acidophilus 1 Tablet(s) Oral three times a day with meals  lisinopril 40 milliGRAM(s) Oral daily  melatonin 5 milliGRAM(s) Oral at bedtime  metoprolol succinate ER 50 milliGRAM(s) Oral daily  metoprolol tartrate Injectable 5 milliGRAM(s) IV Push every 6 hours PRN  ondansetron Injectable 4 milliGRAM(s) IV Push every 8 hours PRN  pantoprazole    Tablet 40 milliGRAM(s) Oral before breakfast  polyethylene glycol 3350 17 Gram(s) Oral daily  senna 2 Tablet(s) Oral at bedtime  traMADol 25 milliGRAM(s) Oral every 6 hours PRN                            9.1    11.37 )-----------( 350      ( 17 Jun 2020 06:30 )             28.7       Hemoglobin: 9.1 g/dL (06-17 @ 06:30)  Hemoglobin: 9.3 g/dL (06-16 @ 06:35)  Hemoglobin: 9.4 g/dL (06-15 @ 06:07)  Hemoglobin: 9.6 g/dL (06-14 @ 08:16)  Hemoglobin: 9.3 g/dL (06-13 @ 05:26)      06-17    132<L>  |  95<L>  |  20<H>  ----------------------------<  149<H>  3.9   |  30  |  0.82    Ca    8.4      17 Jun 2020 06:30  Mg     1.8     06-16    TPro  6.9  /  Alb  2.1<L>  /  TBili  0.4  /  DBili  x   /  AST  20  /  ALT  23  /  AlkPhos  305<H>  06-17    Creatinine Trend: 0.82<--, 0.88<--, 0.88<--, 0.98<--, 0.96<--, 1.01<--    COAGS: PT/INR - ( 17 Jun 2020 08:36 )   PT: 15.7 sec;   INR: 1.38 ratio         PTT - ( 17 Jun 2020 08:36 )  PTT:35.4 sec          T(C): 36.8 (06-17-20 @ 14:10), Max: 37 (06-16-20 @ 20:29)  HR: 75 (06-17-20 @ 14:10) (75 - 93)  BP: 160/84 (06-17-20 @ 14:10) (155/62 - 175/72)  RR: 17 (06-17-20 @ 14:10) (17 - 18)  SpO2: 97% (06-17-20 @ 14:10) (96% - 98%)  Wt(kg): --    I&O's Summary      HEENT:  (-)icterus (-)pallor  CV: N S1 S2 1/6 JAMAAL (+)2 Pulses B/l  Resp:  Clear to ausculatation B/L, normal effort  GI: (+) BS Soft, NT, ND  Lymph:  (-)Edema, (-)obvious lymphadenopathy  Skin: Warm to touch, Normal turgor  Psych: Appropriate mood and affect          ASSESSMENT/PLAN: 	74y  Female AD/DM/HTN/PVD/hysterectomy/cholecystectomy ,with CHF BIVICD, Mild anterior wall ischemia being medically managed  comes to ED with intractable nausea and vomiting for 2 days 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 bladder malignancy.    - Interrogate Touchet Sci ICD  -  cont toprol XL 50 mg PO Daily  - Tolerated I+D as well as partial amputation by podiatry  - Vasculary f/u regarding completely occluded popliteal artery  - Will need repeat stress prior to any  high risk surgery  - EGD with nonbleeding ulcer  - Now on Rafa Segundo [facility], [facility]  BEEPER [phone]

## 2020-06-17 NOTE — PHARMACOTHERAPY INTERVENTION NOTE - COMMENTS
Recommended to review the order for Diphenhydramine 15mg (usual dosing is 25 or 50mg) IV prn N/V (usual indication is itching/allergy).

## 2020-06-17 NOTE — PROGRESS NOTE ADULT - NSHPATTENDINGPLANDISCUSS_GEN_ALL_CORE
Dr Hayes
NP
message left for Dr Grove, note flagged for Dr Hayes
 , Dr. Robertson , patient
, pt and her daughter , medical team
med team , RN
medical team , patient
patient , team
patient, RN
patient, RN
Dr. Reagan , Dr. Stout , Dr. Robertson
Dr. Robertson, Dr. Grove , , Dr. Carpenter patient
RN , patient
patient , RN
pt, Dr. Merary Garcia
patient and staff
patient and staff

## 2020-06-17 NOTE — PROGRESS NOTE ADULT - ASSESSMENT
75 YO female from home, H/O CAD/DM/HTN/PVD/hysterectomy/cholecystectomy ,with AICD comes to ED with intractable nausea and vomiting for 2 days. Patient states that she recently had left big toe amputation in the beginning of May and she was taking antibiotics. On Friday, her antibiotics were changed from clindamycin to Levaquin and after she took antibiotics along with percocet, she started having severe pain in epigastric region. Patient reports that it was sudden in onset, 7/10, burning & pressure like pain, non-radiating , associated with nausea,, bitter taste in mouth and projectile vomiting. She was not able to tolerate food and it aggravated her symptoms. Patient also reports significant weight( not sure how much) recently and decrease in appetite. patient denies fever, cough, SOB, constipation, dysuria, headache, chest pain, orthopnea, back pain, burning sensation in extremities.c/o pain over left big toe stump which is not resolving despite pain meds . afebrile on adm , covid neg. ID called for persistant leucocytosis and appr ab     # s/p amputation of left great toe for gangrene with post op collection and overlying cellulits as well as underlying OM /wound cx pos for staph aureus ( MSSA)/s/p left leg stent at [facility] as per pt /underlying PVD/s/p angio, with severe occlusive popliteal and tibial  dz that is non reconstructable/s/p I and D of left foot as well as partial amputation of left first MT /path consistent with acute osteo     #s/p  nausea/vomiting , now seems to be vertigo sec to poss cerebellar cva vs tia /vomiting has resolved /egd with healing DU     # transaminitis improving     #  wbc decreasing most likely reactive postop /cxr more consistent with pulm edema , low procal  /no resp complaints     # hx of endometrial ca s/p hysterectomy /ct with bladder wall thickening /urine cytology neg for uroepithelial ca     plan  cont  iv rocephin 2gm daily and  doxy 100mg po bid ( 40 days more from today )  will need 6 wks of ab for osteo as vascular could not revascularize d/w dr morfin and poor chance of healing post op wound   CONT  probiotics   podiatry f/u of stump   for outpt cystoscopy r/o bladder ca   picc line placement today  for HBO as per podiatry         thnx will f/u   d/w resident

## 2020-06-17 NOTE — PROGRESS NOTE ADULT - ASSESSMENT
73 YO female from home, H/O CAD/DM/HTN/PVD/hysterectomy/cholecystectomy ,with AICD has come to ED with intractable nausea and vomiting for 2 days, likely due to pill induced esophagitis/gastritis.  CT abdomen/pelvis shows bladder wall thickening and pt has history of Stage1 endometrial cancer.   Pt is admitted for management of acute gastritis w/ intractable abdominal pain, for malignancy workup. Followed by GI dr. Matute.   CTA head/neck showed LEFT PICA stroke. Neurology following. Followed by cardiology for new AF/CVA. Heparin drip started.  Followed by Vascular and podiatry for Left foot OM. s/p angiogram on 6/9. I&D done. on IV abt. ID following.   s/p EGD on 6/15. mild gastritis and healing duodenal ulcer. no active bleeding. biopsy sent. plan for f/u pathology report.   As per ID dr. Reagan, pt will requires total 6 weeks of IV abt. Plan for IR for PICC placement. Confirmed with dr. Dupree and IR. ms. Butler. no need to hold Eliquis as discussed w/ IR.     PICC placement likely 6/18 per IR as unable to accommodate today due to busy schedule.   Rx for IV Rocephin provided to SW who found out that pt.'d co-pay will be approximately $560.  ARMOND Morales discussed co-pay with family, they will consider and respond by tomorrow whether they agree or not.

## 2020-06-18 ENCOUNTER — TRANSCRIPTION ENCOUNTER (OUTPATIENT)
Age: 75
End: 2020-06-18

## 2020-06-18 LAB
ANION GAP SERPL CALC-SCNC: 10 MMOL/L — SIGNIFICANT CHANGE UP (ref 5–17)
BUN SERPL-MCNC: 19 MG/DL — HIGH (ref 7–18)
CALCIUM SERPL-MCNC: 8.8 MG/DL — SIGNIFICANT CHANGE UP (ref 8.4–10.5)
CHLORIDE SERPL-SCNC: 95 MMOL/L — LOW (ref 96–108)
CO2 SERPL-SCNC: 27 MMOL/L — SIGNIFICANT CHANGE UP (ref 22–31)
CREAT SERPL-MCNC: 0.79 MG/DL — SIGNIFICANT CHANGE UP (ref 0.5–1.3)
GLUCOSE BLDC GLUCOMTR-MCNC: 173 MG/DL — HIGH (ref 70–99)
GLUCOSE BLDC GLUCOMTR-MCNC: 174 MG/DL — HIGH (ref 70–99)
GLUCOSE BLDC GLUCOMTR-MCNC: 180 MG/DL — HIGH (ref 70–99)
GLUCOSE BLDC GLUCOMTR-MCNC: 210 MG/DL — HIGH (ref 70–99)
GLUCOSE SERPL-MCNC: 121 MG/DL — HIGH (ref 70–99)
HCT VFR BLD CALC: 30 % — LOW (ref 34.5–45)
HGB BLD-MCNC: 9.4 G/DL — LOW (ref 11.5–15.5)
MAGNESIUM SERPL-MCNC: 1.8 MG/DL — SIGNIFICANT CHANGE UP (ref 1.6–2.6)
MCHC RBC-ENTMCNC: 28.1 PG — SIGNIFICANT CHANGE UP (ref 27–34)
MCHC RBC-ENTMCNC: 31.3 GM/DL — LOW (ref 32–36)
MCV RBC AUTO: 89.8 FL — SIGNIFICANT CHANGE UP (ref 80–100)
NRBC # BLD: 0 /100 WBCS — SIGNIFICANT CHANGE UP (ref 0–0)
PHOSPHATE SERPL-MCNC: 3.2 MG/DL — SIGNIFICANT CHANGE UP (ref 2.5–4.5)
PLATELET # BLD AUTO: 368 K/UL — SIGNIFICANT CHANGE UP (ref 150–400)
POTASSIUM SERPL-MCNC: 3.9 MMOL/L — SIGNIFICANT CHANGE UP (ref 3.5–5.3)
POTASSIUM SERPL-SCNC: 3.9 MMOL/L — SIGNIFICANT CHANGE UP (ref 3.5–5.3)
RBC # BLD: 3.34 M/UL — LOW (ref 3.8–5.2)
RBC # FLD: 15 % — HIGH (ref 10.3–14.5)
SODIUM SERPL-SCNC: 132 MMOL/L — LOW (ref 135–145)
WBC # BLD: 11.54 K/UL — HIGH (ref 3.8–10.5)
WBC # FLD AUTO: 11.54 K/UL — HIGH (ref 3.8–10.5)

## 2020-06-18 PROCEDURE — 99231 SBSQ HOSP IP/OBS SF/LOW 25: CPT

## 2020-06-18 PROCEDURE — 36573 INSJ PICC RS&I 5 YR+: CPT

## 2020-06-18 RX ORDER — CHLORHEXIDINE GLUCONATE 213 G/1000ML
1 SOLUTION TOPICAL DAILY
Refills: 0 | Status: DISCONTINUED | OUTPATIENT
Start: 2020-06-19 | End: 2020-06-19

## 2020-06-18 RX ORDER — SENNA PLUS 8.6 MG/1
2 TABLET ORAL
Qty: 0 | Refills: 0 | DISCHARGE
Start: 2020-06-18

## 2020-06-18 RX ORDER — HYDRALAZINE HCL 50 MG
1 TABLET ORAL
Qty: 0 | Refills: 0 | DISCHARGE

## 2020-06-18 RX ORDER — LACTOBACILLUS ACIDOPHILUS 100MM CELL
1 CAPSULE ORAL
Qty: 90 | Refills: 0
Start: 2020-06-18 | End: 2020-07-17

## 2020-06-18 RX ORDER — CEFTRIAXONE 500 MG/1
2 INJECTION, POWDER, FOR SOLUTION INTRAMUSCULAR; INTRAVENOUS
Qty: 80 | Refills: 0
Start: 2020-06-18 | End: 2020-07-27

## 2020-06-18 RX ORDER — METOPROLOL TARTRATE 50 MG
100 TABLET ORAL DAILY
Refills: 0 | Status: DISCONTINUED | OUTPATIENT
Start: 2020-06-19 | End: 2020-06-19

## 2020-06-18 RX ORDER — APIXABAN 2.5 MG/1
1 TABLET, FILM COATED ORAL
Qty: 60 | Refills: 0
Start: 2020-06-18 | End: 2020-07-17

## 2020-06-18 RX ORDER — METOPROLOL TARTRATE 50 MG
50 TABLET ORAL AT BEDTIME
Refills: 0 | Status: DISCONTINUED | OUTPATIENT
Start: 2020-06-18 | End: 2020-06-18

## 2020-06-18 RX ORDER — HYDRALAZINE HCL 50 MG
1 TABLET ORAL
Qty: 42 | Refills: 0
Start: 2020-06-18 | End: 2020-07-01

## 2020-06-18 RX ORDER — LACTOBACILLUS ACIDOPHILUS 100MM CELL
1 CAPSULE ORAL
Qty: 40 | Refills: 0
Start: 2020-06-18 | End: 2020-07-27

## 2020-06-18 RX ORDER — ACETAMINOPHEN 500 MG
2 TABLET ORAL
Qty: 0 | Refills: 0 | DISCHARGE
Start: 2020-06-18

## 2020-06-18 RX ORDER — SODIUM CHLORIDE 9 MG/ML
10 INJECTION INTRAMUSCULAR; INTRAVENOUS; SUBCUTANEOUS
Refills: 0 | Status: DISCONTINUED | OUTPATIENT
Start: 2020-06-18 | End: 2020-06-19

## 2020-06-18 RX ORDER — METOPROLOL TARTRATE 50 MG
50 TABLET ORAL AT BEDTIME
Refills: 0 | Status: COMPLETED | OUTPATIENT
Start: 2020-06-18 | End: 2020-06-18

## 2020-06-18 RX ORDER — TRAMADOL HYDROCHLORIDE 50 MG/1
0.5 TABLET ORAL
Qty: 7.5 | Refills: 0
Start: 2020-06-18 | End: 2020-06-22

## 2020-06-18 RX ORDER — ASPIRIN/CALCIUM CARB/MAGNESIUM 324 MG
1 TABLET ORAL
Qty: 0 | Refills: 0 | DISCHARGE

## 2020-06-18 RX ORDER — METOPROLOL TARTRATE 50 MG
12.5 TABLET ORAL ONCE
Refills: 0 | Status: COMPLETED | OUTPATIENT
Start: 2020-06-18 | End: 2020-06-18

## 2020-06-18 RX ORDER — GABAPENTIN 400 MG/1
1 CAPSULE ORAL
Qty: 0 | Refills: 0 | DISCHARGE
Start: 2020-06-18

## 2020-06-18 RX ORDER — CLOPIDOGREL BISULFATE 75 MG/1
1 TABLET, FILM COATED ORAL
Qty: 0 | Refills: 0 | DISCHARGE

## 2020-06-18 RX ADMIN — APIXABAN 5 MILLIGRAM(S): 2.5 TABLET, FILM COATED ORAL at 17:09

## 2020-06-18 RX ADMIN — GABAPENTIN 100 MILLIGRAM(S): 400 CAPSULE ORAL at 05:01

## 2020-06-18 RX ADMIN — GABAPENTIN 100 MILLIGRAM(S): 400 CAPSULE ORAL at 17:11

## 2020-06-18 RX ADMIN — Medication 50 MILLIGRAM(S): at 22:08

## 2020-06-18 RX ADMIN — Medication 1: at 21:46

## 2020-06-18 RX ADMIN — ATORVASTATIN CALCIUM 40 MILLIGRAM(S): 80 TABLET, FILM COATED ORAL at 21:05

## 2020-06-18 RX ADMIN — Medication 100 MILLIGRAM(S): at 14:41

## 2020-06-18 RX ADMIN — Medication 50 MILLIGRAM(S): at 05:01

## 2020-06-18 RX ADMIN — LISINOPRIL 40 MILLIGRAM(S): 2.5 TABLET ORAL at 05:01

## 2020-06-18 RX ADMIN — Medication 12.5 MILLIGRAM(S): at 18:33

## 2020-06-18 RX ADMIN — Medication 1 TABLET(S): at 17:09

## 2020-06-18 RX ADMIN — Medication 110 MILLIGRAM(S): at 05:01

## 2020-06-18 RX ADMIN — Medication 20 MILLIGRAM(S): at 05:01

## 2020-06-18 RX ADMIN — APIXABAN 5 MILLIGRAM(S): 2.5 TABLET, FILM COATED ORAL at 05:01

## 2020-06-18 RX ADMIN — Medication 100 MILLIGRAM(S): at 21:05

## 2020-06-18 RX ADMIN — PANTOPRAZOLE SODIUM 40 MILLIGRAM(S): 20 TABLET, DELAYED RELEASE ORAL at 05:02

## 2020-06-18 RX ADMIN — Medication 110 MILLIGRAM(S): at 17:09

## 2020-06-18 RX ADMIN — Medication 100 MILLIGRAM(S): at 05:01

## 2020-06-18 RX ADMIN — Medication 1 TABLET(S): at 12:13

## 2020-06-18 RX ADMIN — Medication 5 MILLIGRAM(S): at 21:05

## 2020-06-18 RX ADMIN — POLYETHYLENE GLYCOL 3350 17 GRAM(S): 17 POWDER, FOR SOLUTION ORAL at 12:13

## 2020-06-18 RX ADMIN — Medication 1: at 17:08

## 2020-06-18 RX ADMIN — Medication 1: at 08:00

## 2020-06-18 RX ADMIN — INSULIN GLARGINE 10 UNIT(S): 100 INJECTION, SOLUTION SUBCUTANEOUS at 21:45

## 2020-06-18 RX ADMIN — CHLORHEXIDINE GLUCONATE 1 APPLICATION(S): 213 SOLUTION TOPICAL at 05:01

## 2020-06-18 RX ADMIN — Medication 1 APPLICATION(S): at 17:26

## 2020-06-18 RX ADMIN — SENNA PLUS 2 TABLET(S): 8.6 TABLET ORAL at 21:05

## 2020-06-18 RX ADMIN — Medication 5 MILLIGRAM(S): at 15:55

## 2020-06-18 RX ADMIN — Medication 1 TABLET(S): at 08:00

## 2020-06-18 RX ADMIN — CEFTRIAXONE 100 MILLIGRAM(S): 500 INJECTION, POWDER, FOR SOLUTION INTRAMUSCULAR; INTRAVENOUS at 17:08

## 2020-06-18 RX ADMIN — Medication 2: at 12:13

## 2020-06-18 NOTE — CHART NOTE - NSCHARTNOTEFT_GEN_A_CORE
EVENT: Notified by RN of pt -190/70's.       HPI    75 YO female from home, H/O CAD/DM/HTN/PVD/hysterectomy/cholecystectomy ,with AICD has come to ED with intractable nausea and vomiting for 2 days, likely due to pill induced esophagitis/gastritis.  CT abdomen/pelvis shows bladder wall thickening and pt has history of Stage1 endometrial cancer.   Pt is admitted for management of acute gastritis w/ intractable abdominal pain, for malignancy workup. Followed by GI dr. Matute. CTA head/neck showed LEFT PICA stroke. Neurology following. Followed by cardiology for new AF/CVA. Heparin drip started.Followed by Vascular and podiatry for Left foot OM. s/p angiogram on 6/9. I&D done. on IV abt. ID following.   s/p EGD on 6/15. mild gastritis and healing duodenal ulcer. no active bleeding. biopsy sent. plan for f/u pathology report.   As per ID dr. Reagan, pt will requires total 6 weeks of IV abt.  No need to hold Eliquis as discussed w/ IR. PICC placed  6/18 per IR .  Pt was planned for discharge today due to uncontrolled HTN. Pt denies chest pain, generalized pain.     Vital Signs Last 24 Hrs  T(C): 36.4 (18 Jun 2020 14:13), Max: 36.8 (18 Jun 2020 05:05)  T(F): 97.6 (18 Jun 2020 14:13), Max: 98.3 (18 Jun 2020 05:05)  HR: 80 (18 Jun 2020 18:33) (66 - 98)  BP: 194/73 (18 Jun 2020 18:33) (154/87 - 194/73)  BP(mean): --  RR: 18 (18 Jun 2020 14:13) (17 - 18)  SpO2: 97% (18 Jun 2020 14:13) (95% - 97%)    MEDICATIONS  (STANDING):  apixaban 5 milliGRAM(s) Oral every 12 hours  atorvastatin 40 milliGRAM(s) Oral at bedtime  cefTRIAXone   IVPB 2000 milliGRAM(s) IV Intermittent every 24 hours  Dakins Solution - 1/4 Strength 1 Application(s) Topical daily  dextrose 5% + sodium chloride 0.9%. 1000 milliLiter(s) (70 mL/Hr) IV Continuous <Continuous>  doxycycline IVPB 100 milliGRAM(s) IV Intermittent every 12 hours  furosemide    Tablet 20 milliGRAM(s) Oral daily  gabapentin 100 milliGRAM(s) Oral every 12 hours  hydrALAZINE 100 milliGRAM(s) Oral three times a day  insulin glargine Injectable (LANTUS) 10 Unit(s) SubCutaneous at bedtime  insulin lispro (HumaLOG) corrective regimen sliding scale   SubCutaneous Before meals and at bedtime  lactobacillus acidophilus 1 Tablet(s) Oral three times a day with meals  lisinopril 40 milliGRAM(s) Oral daily  melatonin 5 milliGRAM(s) Oral at bedtime  metoprolol tartrate 50 milliGRAM(s) Oral at bedtime  pantoprazole    Tablet 40 milliGRAM(s) Oral before breakfast  polyethylene glycol 3350 17 Gram(s) Oral daily  senna 2 Tablet(s) Oral at bedtime    MEDICATIONS  (PRN):  acetaminophen   Tablet .. 650 milliGRAM(s) Oral every 6 hours PRN Mild Pain (1 - 3), Moderate Pain (4 - 6)  aluminum hydroxide/magnesium hydroxide/simethicone Suspension 30 milliLiter(s) Oral every 6 hours PRN Dyspepsia  diphenhydrAMINE   Injectable 15 milliGRAM(s) IV Push every 6 hours PRN nausea or vomiting  metoprolol tartrate Injectable 5 milliGRAM(s) IV Push every 6 hours PRN HR greater than 120  ondansetron Injectable 4 milliGRAM(s) IV Push every 8 hours PRN Nausea and/or Vomiting  sodium chloride 0.9% lock flush 10 milliLiter(s) IV Push every 1 hour PRN Pre/post blood products, medications, blood draw, and to maintain line patency  traMADol 25 milliGRAM(s) Oral every 6 hours PRN Severe Pain (7 - 10)      -------------  Assessment/Plan    Uncontrolled HTN  -s/p additional Metoprolol 5 mg IVP , and additional Metoprolol 12.5mg po x 1  -Increase Metoprolol 50mg ER to 100mg ER once daily with hold parameters per Cardiologist Dr. Ratna alexander)  -Will dose Metoprolol 50mg x 1 tonight with hold parameters.  -Will observe pt overnight.  -Postpone discharge tonight.  -D/w pt and daughter  -Dr. Robertson aware.   -Monitor and call for worsening condition.    Sindhu Gold NP Medicine

## 2020-06-18 NOTE — DISCHARGE NOTE NURSING/CASE MANAGEMENT/SOCIAL WORK - PATIENT PORTAL LINK FT
You can access the FollowMyHealth Patient Portal offered by [facility] by registering at the following website: [email or web address]. By joining [facility]’s FollowMyHealth portal, you will also be able to view your health information using other applications (apps) compatible with our system.

## 2020-06-18 NOTE — CHART NOTE - NSCHARTNOTEFT_GEN_A_CORE
Wound care instructions upon discharge.    Keep dressing Clean, dry, and intact. Follow with Dr. Yo outpatient.

## 2020-06-18 NOTE — CHART NOTE - NSCHARTNOTEFT_GEN_A_CORE
Assessment:       Nutrition reassessment for follow-up. Chart reviewed, pt visited, feeling better, well-communicated; GI consult for intractable nausea/vomiting, due to vertigo from acute stroke per MD; s/p stroke, swallow evaluation with soft food, thin liquid recommended 6/10/2020; Pending discharge planning to home with home care service after for PICC line ( antibiotic Rx for osteomyelitis left food) today per team    Factors impacting intake: [ ] none [ ] nausea  [ ] vomiting [ ] diarrhea [ ] constipation  [ ]chewing problems [ ] swallowing issues  [ X ] other: altered GI function with acute gastritis, intractable nausea/vomiting--resolving     Diet Prescription: Diet, Soft:   Consistent Carbohydrate {Evening Snacks}  DASH/TLC {Sodium & Cholesterol Restricted}  Supplement Feeding Modality:  Oral  Glucerna Shake Cans or Servings Per Day:  1       Frequency:  Three Times a day (06-15-20 @ 17:03)    Intake: tolerating solid meals better today, 90% intake at breakfast observed, no GI distress, no swallowing/chewing problem reported at present per pt, no new food choices to update; taking and tolerating Glucerna Shake per pt    Daily Weight in k.5 (2020 05:05)  Weight in k (2020 04:53)  Weight in k.1 (2020 04:48)    % Weight Change: fluctuated in-house may due to scale/fluid variance     Pertinent Medications: MEDICATIONS  (STANDING):  apixaban 5 milliGRAM(s) Oral every 12 hours  atorvastatin 40 milliGRAM(s) Oral at bedtime  cefTRIAXone   IVPB 2000 milliGRAM(s) IV Intermittent every 24 hours  Dakins Solution - 1/4 Strength 1 Application(s) Topical daily  dextrose 5% + sodium chloride 0.9%. 1000 milliLiter(s) (70 mL/Hr) IV Continuous <Continuous>  doxycycline IVPB 100 milliGRAM(s) IV Intermittent every 12 hours  furosemide    Tablet 20 milliGRAM(s) Oral daily  gabapentin 100 milliGRAM(s) Oral every 12 hours  hydrALAZINE 100 milliGRAM(s) Oral three times a day  insulin glargine Injectable (LANTUS) 10 Unit(s) SubCutaneous at bedtime  insulin lispro (HumaLOG) corrective regimen sliding scale   SubCutaneous Before meals and at bedtime  lactobacillus acidophilus 1 Tablet(s) Oral three times a day with meals  lisinopril 40 milliGRAM(s) Oral daily  melatonin 5 milliGRAM(s) Oral at bedtime  metoprolol succinate ER 50 milliGRAM(s) Oral daily  pantoprazole    Tablet 40 milliGRAM(s) Oral before breakfast  polyethylene glycol 3350 17 Gram(s) Oral daily  senna 2 Tablet(s) Oral at bedtime    MEDICATIONS  (PRN):  acetaminophen   Tablet .. 650 milliGRAM(s) Oral every 6 hours PRN Mild Pain (1 - 3), Moderate Pain (4 - 6)  aluminum hydroxide/magnesium hydroxide/simethicone Suspension 30 milliLiter(s) Oral every 6 hours PRN Dyspepsia  diphenhydrAMINE   Injectable 15 milliGRAM(s) IV Push every 6 hours PRN nausea or vomiting  metoprolol tartrate Injectable 5 milliGRAM(s) IV Push every 6 hours PRN HR greater than 120  ondansetron Injectable 4 milliGRAM(s) IV Push every 8 hours PRN Nausea and/or Vomiting  traMADol 25 milliGRAM(s) Oral every 6 hours PRN Severe Pain (7 - 10)    Pertinent Labs:  Na132 mmol/L<L> Glu 121 mg/dL<H> K+ 3.9 mmol/L Cr  0.79 mg/dL BUN 19 mg/dL<H> 18 Phos 3.2 mg/dL  Alb 2.1 g/dL<L> 31 Chol 169 mg/dL LDL 93 mg/dL HDL 59 mg/dL Trig 87 mg/dL     CAPILLARY BLOOD GLUCOSE    POCT Blood Glucose.: 173 mg/dL (2020 07:55)  POCT Blood Glucose.: 211 mg/dL (2020 21:07)  POCT Blood Glucose.: 203 mg/dL (2020 16:26)  POCT Blood Glucose.: 202 mg/dL (2020 11:23)    Skin: skin wounds     Estimated Needs:   [ X ] no change since previous assessment  [ ] recalculated:     Previous Nutrition Diagnosis:   [ X ] Malnutrition ( SEVERE )    Nutrition Diagnosis is [ X ] ongoing  [ X ] Improving   [ ] resolved [ ] not applicable     New Nutrition Diagnosis: [ X ] not applicable       Interventions:   Recommend  [ ] Change Diet To:  [ X ] Nutrition Supplement: Continue Glucerna Shake 1can tid as ordered (660kcal, 30g protein)   [ ] Nutrition Support  [ X ] Other: Provide food choices within diet Rx as available/updated                      Monitoring and Evaluation:   [ X ] PO intake [ x ] Tolerance to diet prescription [ x ] weights [ x ] labs[ x ] follow up per protocol  [ ] other Assessment:       Nutrition reassessment for follow-up. Chart reviewed, pt visited, feeling better, well-communicated; GI consult for intractable nausea/vomiting, due to vertigo from acute stroke per MD; s/p stroke, swallow evaluation with soft food, thin liquid recommended 6/10/2020; Pending discharge planning to home with home care service after pending PICC line ( antibiotic Rx for osteomyelitis left foot) today per team    Factors impacting intake: [ ] none [ ] nausea  [ ] vomiting [ ] diarrhea [ ] constipation  [ ]chewing problems [ ] swallowing issues  [ X ] other: altered GI function with acute gastritis, intractable nausea/vomiting--resolving     Diet Prescription: Diet, Soft:   Consistent Carbohydrate {Evening Snacks}  DASH/TLC {Sodium & Cholesterol Restricted}  Supplement Feeding Modality:  Oral  Glucerna Shake Cans or Servings Per Day:  1       Frequency:  Three Times a day (06-15-20 @ 17:03)    Intake: tolerating solid meals better today, 90% intake at breakfast observed, no GI distress, no swallowing/chewing problem reported at present per pt, no new food choices to update; taking and tolerating Glucerna Shake per pt    Daily Weight in k.5 (2020 05:05)  Weight in k (2020 04:53)  Weight in k.1 (2020 04:48)    % Weight Change: fluctuated in-house may due to scale/fluid variance     Pertinent Medications: MEDICATIONS  (STANDING):  apixaban 5 milliGRAM(s) Oral every 12 hours  atorvastatin 40 milliGRAM(s) Oral at bedtime  cefTRIAXone   IVPB 2000 milliGRAM(s) IV Intermittent every 24 hours  Dakins Solution - 1/4 Strength 1 Application(s) Topical daily  dextrose 5% + sodium chloride 0.9%. 1000 milliLiter(s) (70 mL/Hr) IV Continuous <Continuous>  doxycycline IVPB 100 milliGRAM(s) IV Intermittent every 12 hours  furosemide    Tablet 20 milliGRAM(s) Oral daily  gabapentin 100 milliGRAM(s) Oral every 12 hours  hydrALAZINE 100 milliGRAM(s) Oral three times a day  insulin glargine Injectable (LANTUS) 10 Unit(s) SubCutaneous at bedtime  insulin lispro (HumaLOG) corrective regimen sliding scale   SubCutaneous Before meals and at bedtime  lactobacillus acidophilus 1 Tablet(s) Oral three times a day with meals  lisinopril 40 milliGRAM(s) Oral daily  melatonin 5 milliGRAM(s) Oral at bedtime  metoprolol succinate ER 50 milliGRAM(s) Oral daily  pantoprazole    Tablet 40 milliGRAM(s) Oral before breakfast  polyethylene glycol 3350 17 Gram(s) Oral daily  senna 2 Tablet(s) Oral at bedtime    MEDICATIONS  (PRN):  acetaminophen   Tablet .. 650 milliGRAM(s) Oral every 6 hours PRN Mild Pain (1 - 3), Moderate Pain (4 - 6)  aluminum hydroxide/magnesium hydroxide/simethicone Suspension 30 milliLiter(s) Oral every 6 hours PRN Dyspepsia  diphenhydrAMINE   Injectable 15 milliGRAM(s) IV Push every 6 hours PRN nausea or vomiting  metoprolol tartrate Injectable 5 milliGRAM(s) IV Push every 6 hours PRN HR greater than 120  ondansetron Injectable 4 milliGRAM(s) IV Push every 8 hours PRN Nausea and/or Vomiting  traMADol 25 milliGRAM(s) Oral every 6 hours PRN Severe Pain (7 - 10)    Pertinent Labs:  Na132 mmol/L<L> Glu 121 mg/dL<H> K+ 3.9 mmol/L Cr  0.79 mg/dL BUN 19 mg/dL<H> 18 Phos 3.2 mg/dL  Alb 2.1 g/dL<L> 31 Chol 169 mg/dL LDL 93 mg/dL HDL 59 mg/dL Trig 87 mg/dL     CAPILLARY BLOOD GLUCOSE    POCT Blood Glucose.: 173 mg/dL (2020 07:55)  POCT Blood Glucose.: 211 mg/dL (2020 21:07)  POCT Blood Glucose.: 203 mg/dL (2020 16:26)  POCT Blood Glucose.: 202 mg/dL (2020 11:23)    Skin: skin wounds     Estimated Needs:   [ X ] no change since previous assessment  [ ] recalculated:     Previous Nutrition Diagnosis:   [ X ] Malnutrition ( SEVERE )    Nutrition Diagnosis is [ X ] ongoing  [ X ] Improving   [ ] resolved [ ] not applicable     New Nutrition Diagnosis: [ X ] not applicable       Interventions:   Recommend  [ ] Change Diet To:  [ X ] Nutrition Supplement: Continue Glucerna Shake 1can tid as ordered (660kcal, 30g protein)   [ ] Nutrition Support  [ X ] Other: Provide food choices within diet Rx as available/updated                      Monitoring and Evaluation:   [ X ] PO intake [ x ] Tolerance to diet prescription [ x ] weights [ x ] labs[ x ] follow up per protocol  [ ] other

## 2020-06-18 NOTE — PROGRESS NOTE ADULT - SUBJECTIVE AND OBJECTIVE BOX
Source of information: DARRION MELENDEZ, Chart review  Patient language: English  : n/a    HPI:  73 YO female from home, H/O CAD/DM/HTN/PVD/hysterectomy/cholecystectomy ,with AICD comes to ED with intractable nausea and vomiting for 2 days. Patient states that she recently had left big toe amputation in the beginning of May and she was taking antibiotics. On Friday, her antibiotics were changed from clindamycin to Levaquin and after she took antibiotics along with percocet, she started having severe pain in epigastric region. Patient reports that it was sudden in onset, 7/10, burning & pressure like pain, non-radiating , associated with nausea,, bitter taste in mouth and projectile vomiting. She was not able to tolerate food and it aggravated her symptoms. Patient also reports significant weight( not sure how much) recently and decrease in appetite.   patient denies fever, cough, SOB, constipation, dysuria, headache, chest pain, orthopnea, back pain, burning sensation in extremities.    ED course; Patient is in mild distress due to nausea but otherwise stable  Vitals:     /88  SpO2 99% on RA    CT abdomen/pelvis:   NEW MULTI BLADDER WALL FOCAL AREAS OF SOFT TISSUE THICKENING CONCERNING FOR BLADDER CARCINOMA/TRANSITIONAL CELL CARCINOMA. No hydronephrosis.  Stomach not fully distended and gastric pathology cannot be excluded.  Status post cholecystectomy and hysterectomy.  Remaining abdominal pelvic viscera unremarkable.   No evidence of colitis.. (30 May 2020 14:56)      Patient is a 74y old  Female who presents with a chief complaint of Intractable nausea and vomiting found to have gastritis. Pt found to have OM of left foot. Recent left big toe amputation in May 2020. Pt was on Percocet 10mg PO at home 2x/day PRN. Plan is now POD #9 (6/9) s/p Angiogram, extremity, left, and I&D per podiatry.  Pt seen and examined at bedside. Patient laying in bed, denies pain at this time. Pt states she has been able to ambulate with tolerable pain control. Pt s/p Rt PICC line this morning, plan to be discharge this evening. Denies lethargy, nausea, vomiting, constipation, itchiness. Reports last BM yesterday 6/17. Patient stated goal for pain control: to be able to take deep breaths, get out of bed to chair and ambulate with tolerable pain control. Pt reports taking Tylenol for pain at home as needed. Pt states she was prescribed Percocet but does not want to take it as she experienced nausea after taking it.     PAST MEDICAL & SURGICAL HISTORY:  PVD (peripheral vascular disease)  CAD (coronary artery disease)  CHF (congestive heart failure)  HTN (hypertension)  DM (diabetes mellitus)  Status post coronary artery stent placement  H/O: hysterectomy  Cardiac defibrillator in place  Artificial cardiac pacemaker  History of cholecystectomy    Social History:  Used to smoke 1 cigarette a day 36 years ago  No Alcohol or drugs  Retired and lives with family, No sick contacts (30 May 2020 14:56)    Allergies    codeine (Rash)  penicillin (Rash)      MEDICATIONS  (STANDING):  apixaban 5 milliGRAM(s) Oral every 12 hours  atorvastatin 40 milliGRAM(s) Oral at bedtime  cefTRIAXone   IVPB 2000 milliGRAM(s) IV Intermittent every 24 hours  Dakins Solution - 1/4 Strength 1 Application(s) Topical daily  dextrose 5% + sodium chloride 0.9%. 1000 milliLiter(s) (70 mL/Hr) IV Continuous <Continuous>  doxycycline IVPB 100 milliGRAM(s) IV Intermittent every 12 hours  furosemide    Tablet 20 milliGRAM(s) Oral daily  gabapentin 100 milliGRAM(s) Oral every 12 hours  hydrALAZINE 100 milliGRAM(s) Oral three times a day  insulin glargine Injectable (LANTUS) 10 Unit(s) SubCutaneous at bedtime  insulin lispro (HumaLOG) corrective regimen sliding scale   SubCutaneous Before meals and at bedtime  lactobacillus acidophilus 1 Tablet(s) Oral three times a day with meals  lisinopril 40 milliGRAM(s) Oral daily  melatonin 5 milliGRAM(s) Oral at bedtime  metoprolol succinate ER 50 milliGRAM(s) Oral daily  pantoprazole    Tablet 40 milliGRAM(s) Oral before breakfast  polyethylene glycol 3350 17 Gram(s) Oral daily  senna 2 Tablet(s) Oral at bedtime    MEDICATIONS  (PRN):  acetaminophen   Tablet .. 650 milliGRAM(s) Oral every 6 hours PRN Mild Pain (1 - 3), Moderate Pain (4 - 6)  aluminum hydroxide/magnesium hydroxide/simethicone Suspension 30 milliLiter(s) Oral every 6 hours PRN Dyspepsia  diphenhydrAMINE   Injectable 15 milliGRAM(s) IV Push every 6 hours PRN nausea or vomiting  metoprolol tartrate Injectable 5 milliGRAM(s) IV Push every 6 hours PRN HR greater than 120  ondansetron Injectable 4 milliGRAM(s) IV Push every 8 hours PRN Nausea and/or Vomiting  sodium chloride 0.9% lock flush 10 milliLiter(s) IV Push every 1 hour PRN Pre/post blood products, medications, blood draw, and to maintain line patency  traMADol 25 milliGRAM(s) Oral every 6 hours PRN Severe Pain (7 - 10)      Vital Signs Last 24 Hrs  T(C): 36.8 (18 Jun 2020 05:05), Max: 36.8 (17 Jun 2020 14:10)  T(F): 98.3 (18 Jun 2020 05:05), Max: 98.3 (18 Jun 2020 05:05)  HR: 84 (18 Jun 2020 05:05) (75 - 98)  BP: 154/87 (18 Jun 2020 05:05) (154/87 - 165/71)  BP(mean): --  RR: 17 (18 Jun 2020 05:05) (17 - 18)  SpO2: 95% (18 Jun 2020 05:05) (95% - 97%)  COVID-19 PCR: NotDetec (14 Jun 2020 09:41)    LABS: Reviewed                          9.4    11.54 )-----------( 368      ( 18 Jun 2020 06:43 )             30.0     06-18    132<L>  |  95<L>  |  19<H>  ----------------------------<  121<H>  3.9   |  27  |  0.79    Ca    8.8      18 Jun 2020 06:43  Phos  3.2     06-18  Mg     1.8     06-18    TPro  6.9  /  Alb  2.1<L>  /  TBili  0.4  /  DBili  x   /  AST  20  /  ALT  23  /  AlkPhos  305<H>  06-17    PT/INR - ( 17 Jun 2020 08:36 )   PT: 15.7 sec;   INR: 1.38 ratio         PTT - ( 17 Jun 2020 08:36 )  PTT:35.4 sec  LIVER FUNCTIONS - ( 17 Jun 2020 06:30 )  Alb: 2.1 g/dL / Pro: 6.9 g/dL / ALK PHOS: 305 U/L / ALT: 23 U/L DA / AST: 20 U/L / GGT: x             CAPILLARY BLOOD GLUCOSE      POCT Blood Glucose.: 210 mg/dL (18 Jun 2020 11:27)  POCT Blood Glucose.: 173 mg/dL (18 Jun 2020 07:55)  POCT Blood Glucose.: 211 mg/dL (17 Jun 2020 21:07)  POCT Blood Glucose.: 203 mg/dL (17 Jun 2020 16:26)    COVID-19 PCR: NotDetec (14 Jun 2020 09:41)          Radiology:     < from: [facility] Physiol Extremity Lower 3+ Level, BI (06.06.20 @ 12:48) >    EXAM:  US PHYSIOL LWR EXT 3+ LEV BI                            PROCEDURE DATE:  06/06/2020          INTERPRETATION:  Clinical Information: Peripheral vascular disease. Left toe ulceration.    Technique: Bilateral lower extremity ABIs/PVR    Comparison: None    Findings/  Impression:  Right lower extremity: The ankle brachial index is 0.6. The pulse waveforms are diffusely diminished.    Left lower extremity: The ankle brachial index is 0.5. The pulse waveforms are diffusely diminished, however particularly severe in the left foot.    Severe bilateral peripheral artery disease.    Pulse volume recordings are diffusely diminished bilaterally, however severely diminished in the left foot.                  ANIBAL UPTON M.D., ATTENDING RADIOLOGIST  This document has been electronically signed. Jun 6 2020  1:16PM                < end of copied text >    < from: Xray Chest 1 View- PORTABLE-Urgent (06.05.20 @ 13:40) >    EXAM:  XR CHEST PORTABLE URGENT 1V                            PROCEDURE DATE:  06/05/2020          INTERPRETATION:  Portable chest radiograph        CLINICAL INFORMATION:   Leukocytosis.    TECHNIQUE:  Portable  AP view of the chest was obtained.    COMPARISON: 10/12/2019 available for review.    FINDINGS:   The lungs  show bilateral lung perihilar and basilar interstitial opacity/infiltrates. Apices of lungs clear.    The  heart is enlarged in transverse diameter. No hilar mass. Trachea midline.   Right atrium and biventricular cardiac wire leads in place.    The heart and mediastinum are within normal limits.    Visualized osseous structures are intact.        IMPRESSION:   Cardiomegaly. Right atrium and biventricular cardiac wire leads in place.. Infrahilar and bibasilar interstitial opacities/infiltrates...      < end of copied text >      ORT Score -   Family Hx of substance abuse	Female	      Male  Alcohol 	                                           1                     3  Illegal drugs	                                   2                     3  Rx drugs                                           4 	                  4  Personal Hx of substance abuse		  Alcohol 	                                          3	                  3  Illegal drugs                                     4	                  4  Rx drugs                                            5 	                  5  Age between 16- 45 years	           1                     1  hx preadolescent sexual abuse	   3 	                  0  Psychological disease		  ADD, OCD, bipolar, schizophrenia   2	          2  Depression                                           1 	          1  Total: 0    a score of 3 or lower indicates low risk for opioid abuse		  a score of 4-7 indicates moderate risk for opioid abuse		  a score of 8 or higher indicates high risk for opioid abuse    	  4M's:   mental status: AAOx3.   ]medications: age-appropriate  mobility: community ambulatory, uses walker at home  Matters most-goals/GOC: to be able to take deep breaths, utilize incentive spirometer, get out of bed to chair and ambulate with tolerable pain control    REVIEW OF SYSTEMS:  CONSTITUTIONAL: No fever or fatigue  RESPIRATORY: No cough, wheezing, chills or hemoptysis; No shortness of breath  CARDIOVASCULAR: No chest pain, palpitations, dizziness, or leg swelling  GASTROINTESTINAL: No nausea/ vomiting. No abdominal or epigastric pain. No diarrhea or constipation.   GENITOURINARY: No dysuria, frequency, hematuria, retention or incontinence  MUSCULOSKELETAL: Positive left foot pain. Positive Rt foot swelling. No muscle, back, or extremity pain, no upper or lower motor strength weakness, bowel/bladder incontinence  NEURO: No numbness/ tingling in b/l LE   PSYCHIATRIC: Positive anxiety (does not take medications at home) No depression, mood swings, or difficulty sleeping    PHYSICAL EXAM:  GENERAL:  Alert & Oriented X3, NAD, Good concentration  CHEST/LUNG: Even and unlabored on room air.   HEART: Regular rate and rhythm  ABDOMEN: Obese, Soft, Nontender, Nondistended  EXTREMITIES:  Positive Rt foot mild edema. 2+ Peripheral Pulses in Rt foot. Unable to assess pulse in Left foot due to ace wrap and inner dressing. Pt is able to wiggle toes on b/l feet.  MUSCULOSKELETAL: Motor Strength 5/5 B/L upper and 4/4 b/l lower extremities; moves all extremities equally against gravity  SKIN: No rashes or lesions. Left foot ace wrap dressing c/d/i.     Risk factors associated with adverse outcomes related to opioid treatment  [ ]  Concurrent benzodiazepine use  [ ]  History/ Active substance use or alcohol use disorder  [X ] Psychiatric co-morbidity - anxiety  [ ] Sleep apnea  [ ] COPD  [ ] BMI> 35  [ ] Liver dysfunction  [ ] Renal dysfunction  [X ] CHF  [X ] Former Smoker  [X ]  Age > 60 years    [X ]  [place]  Reviewed and Copied to Chart. See below.    Plan of care and goal oriented pain management treatment options were discussed with patient and /or primary care giver; all questions and concerns were addressed and care was aligned with patient's wishes.    Educated patient on goal oriented pain management treatment options     06-18-20 @ 13:38

## 2020-06-18 NOTE — PROGRESS NOTE ADULT - NSREFPHYEXINPTDOCREFER_GEN_ALL_CORE
6/1/20
6/10/2020
6/4/2020
6/11/2020
6/12/2020
6/12/2020
6/15/20
6/17/2020
6/3/2020
6/3/2020
6/6/2020
6/8/2020
6/8/2020
6/15/2020
6/16/2020

## 2020-06-18 NOTE — PROGRESS NOTE ADULT - SUBJECTIVE AND OBJECTIVE BOX
Patient denies chest pain or shortness of breath.   Review of systems otherwise (-)  	  acetaminophen   Tablet .. 650 milliGRAM(s) Oral every 6 hours PRN  aluminum hydroxide/magnesium hydroxide/simethicone Suspension 30 milliLiter(s) Oral every 6 hours PRN  apixaban 5 milliGRAM(s) Oral every 12 hours  atorvastatin 40 milliGRAM(s) Oral at bedtime  cefTRIAXone   IVPB 2000 milliGRAM(s) IV Intermittent every 24 hours  Dakins Solution - 1/4 Strength 1 Application(s) Topical daily  dextrose 5% + sodium chloride 0.9%. 1000 milliLiter(s) IV Continuous <Continuous>  diphenhydrAMINE   Injectable 15 milliGRAM(s) IV Push every 6 hours PRN  doxycycline IVPB 100 milliGRAM(s) IV Intermittent every 12 hours  furosemide    Tablet 20 milliGRAM(s) Oral daily  gabapentin 100 milliGRAM(s) Oral every 12 hours  hydrALAZINE 100 milliGRAM(s) Oral three times a day  insulin glargine Injectable (LANTUS) 10 Unit(s) SubCutaneous at bedtime  insulin lispro (HumaLOG) corrective regimen sliding scale   SubCutaneous Before meals and at bedtime  lactobacillus acidophilus 1 Tablet(s) Oral three times a day with meals  lisinopril 40 milliGRAM(s) Oral daily  melatonin 5 milliGRAM(s) Oral at bedtime  metoprolol succinate ER 50 milliGRAM(s) Oral daily  metoprolol tartrate Injectable 5 milliGRAM(s) IV Push every 6 hours PRN  ondansetron Injectable 4 milliGRAM(s) IV Push every 8 hours PRN  pantoprazole    Tablet 40 milliGRAM(s) Oral before breakfast  polyethylene glycol 3350 17 Gram(s) Oral daily  senna 2 Tablet(s) Oral at bedtime  sodium chloride 0.9% lock flush 10 milliLiter(s) IV Push every 1 hour PRN  traMADol 25 milliGRAM(s) Oral every 6 hours PRN                            9.4    11.54 )-----------( 368      ( 18 Jun 2020 06:43 )             30.0       Hemoglobin: 9.4 g/dL (06-18 @ 06:43)  Hemoglobin: 9.1 g/dL (06-17 @ 06:30)  Hemoglobin: 9.3 g/dL (06-16 @ 06:35)  Hemoglobin: 9.4 g/dL (06-15 @ 06:07)  Hemoglobin: 9.6 g/dL (06-14 @ 08:16)      06-18    132<L>  |  95<L>  |  19<H>  ----------------------------<  121<H>  3.9   |  27  |  0.79    Ca    8.8      18 Jun 2020 06:43  Phos  3.2     06-18  Mg     1.8     06-18    TPro  6.9  /  Alb  2.1<L>  /  TBili  0.4  /  DBili  x   /  AST  20  /  ALT  23  /  AlkPhos  305<H>  06-17    Creatinine Trend: 0.79<--, 0.82<--, 0.88<--, 0.88<--, 0.98<--, 0.96<--    COAGS:           T(C): 36.4 (06-18-20 @ 14:13), Max: 36.8 (06-18-20 @ 05:05)  HR: 88 (06-18-20 @ 16:22) (66 - 98)  BP: 184/81 (06-18-20 @ 16:22) (154/87 - 191/72)  RR: 18 (06-18-20 @ 14:13) (17 - 18)  SpO2: 97% (06-18-20 @ 14:13) (95% - 97%)  Wt(kg): --    I&O's Summary    HEENT:  (-)icterus (-)pallor  CV: N S1 S2 1/6 JAMAAL (+)2 Pulses B/l  Resp:  Clear to ausculatation B/L, normal effort  GI: (+) BS Soft, NT, ND  Lymph:  (-)Edema, (-)obvious lymphadenopathy  Skin: Warm to touch, Normal turgor  Psych: Appropriate mood and affect          ASSESSMENT/PLAN: 	74y  Female AD/DM/HTN/PVD/hysterectomy/cholecystectomy ,with CHF BIVICD, Mild anterior wall ischemia being medically managed  comes to ED with intractable nausea and vomiting for 2 days 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 bladder malignancy.    - Interrogate [place] [facility] ICD  -  cont toprol XL 50 mg PO Daily  - Tolerated I+D as well as partial amputation by podiatry  - Vasculary f/u regarding completely occluded popliteal artery  - Will need repeat stress prior to any  high risk surgery  - EGD with nonbleeding ulcer  - Now on Eliquis  - complete Abx per med, s/p PICC      Pipo Segundo MD, [facility]  BEEPER [phone]

## 2020-06-18 NOTE — PROGRESS NOTE ADULT - SUBJECTIVE AND OBJECTIVE BOX
Subjective: s/p pic line discharge held sec to uncontrolled htn . no fevers       PHYSICAL EXAM:    Vital Signs Last 24 Hrs  T(C): 36.4 (18 Jun 2020 14:13), Max: 36.8 (18 Jun 2020 05:05)  T(F): 97.6 (18 Jun 2020 14:13), Max: 98.3 (18 Jun 2020 05:05)  HR: 80 (18 Jun 2020 18:33) (66 - 98)  BP: 194/73 (18 Jun 2020 18:33) (154/87 - 194/73)  BP(mean): --  RR: 18 (18 Jun 2020 14:13) (17 - 18)  SpO2: 97% (18 Jun 2020 14:13) (95% - 97%)       Constitutional: awake alert oriented times 3   [place] SCLERA anicteric EOMI   LUNGS clear   CVS s1 s2 aud no murmurs /ppm in place  ABDOMEN soft non tender no HSM   NEUROLOGY  no defecits   SKIN left foot dressed   EXTREMITIES no edema no cyanosis /no groin hematoma         LABS/DIAGNOSTIC TESTS                        9.4    11.54 )-----------( 368      ( 18 Jun 2020 06:43 )             30.0     06-18    132<L>  |  95<L>  |  19<H>  ----------------------------<  121<H>  3.9   |  27  |  0.79    Ca    8.8      18 Jun 2020 06:43  Phos  3.2     06-18  Mg     1.8     06-18    TPro  6.9  /  Alb  2.1<L>  /  TBili  0.4  /  DBili  x   /  AST  20  /  ALT  23  /  AlkPhos  305<H>  06-17    PT/INR - ( 17 Jun 2020 08:36 )   PT: 15.7 sec;   INR: 1.38 ratio         PTT - ( 17 Jun 2020 08:36 )  PTT:35.4 sec      meds  acetaminophen   Tablet .. 650 milliGRAM(s) Oral every 6 hours PRN  aluminum hydroxide/magnesium hydroxide/simethicone Suspension 30 milliLiter(s) Oral every 6 hours PRN  apixaban 5 milliGRAM(s) Oral every 12 hours  atorvastatin 40 milliGRAM(s) Oral at bedtime  cefTRIAXone   IVPB 2000 milliGRAM(s) IV Intermittent every 24 hours  Dakins Solution - 1/4 Strength 1 Application(s) Topical daily  dextrose 5% + sodium chloride 0.9%. 1000 milliLiter(s) IV Continuous <Continuous>  diphenhydrAMINE   Injectable 15 milliGRAM(s) IV Push every 6 hours PRN  doxycycline IVPB 100 milliGRAM(s) IV Intermittent every 12 hours  furosemide    Tablet 20 milliGRAM(s) Oral daily  gabapentin 100 milliGRAM(s) Oral every 12 hours  hydrALAZINE 100 milliGRAM(s) Oral three times a day  insulin glargine Injectable (LANTUS) 10 Unit(s) SubCutaneous at bedtime  insulin lispro (HumaLOG) corrective regimen sliding scale   SubCutaneous Before meals and at bedtime  lactobacillus acidophilus 1 Tablet(s) Oral three times a day with meals  lisinopril 40 milliGRAM(s) Oral daily  melatonin 5 milliGRAM(s) Oral at bedtime  metoprolol tartrate 50 milliGRAM(s) Oral at bedtime  metoprolol tartrate Injectable 5 milliGRAM(s) IV Push every 6 hours PRN  ondansetron Injectable 4 milliGRAM(s) IV Push every 8 hours PRN  pantoprazole    Tablet 40 milliGRAM(s) Oral before breakfast  polyethylene glycol 3350 17 Gram(s) Oral daily  senna 2 Tablet(s) Oral at bedtime  sodium chloride 0.9% lock flush 10 milliLiter(s) IV Push every 1 hour PRN  traMADol 25 milliGRAM(s) Oral every 6 hours PRN        CULTURES  no new cx      RADIOLOGY  < from: IR PICC (06.18.20 @ 14:39) >  EXAM:  IR PROCEDURE PICC                          EXAM:  SP INSERT PICC EQL GRT 5Y [facility]                            PROCEDURE DATE:  06/18/2020          INTERPRETATION:  INDICATION: PICC line placement for IV antibiotic treatment of osteomyelitis  COMPARISON:  None available.    PROCEDURE:  After an informed consent was obtained from the patient, the patient's right upper arm was prepped and draped in the usual sterile fashion and maximal sterile technique was utilized. Local anesthesia was administered (1 cc 1% lidocaine at 9:05 AM).    Using direct ultrasound guidance, the right basilic vein was accessed in the midupper arm with a micropuncture needle. A peel away sheath was placed over a 0.018 wire. A 4 Lao, 38 cm long single lumen PICC was advanced through the peel-away sheath. The PICC was secured to the skin and flushed with saline.  A portable radiograph of the chest was obtained to document catheter position. A sterile dressing was applied, thus terminating the procedure.    The procedure was performed by BRANDON Blanc.    FINDINGS/  IMPRESSION:  Successful ultrasound-guided single lumen PICC placement.  Radiograph of the chest demonstrates the tip of the PICC overlying the cavoatrial junction.    < end of copied text >

## 2020-06-18 NOTE — PROGRESS NOTE ADULT - ASSESSMENT
Confidential Drug Utilization Report  Search Terms: Ankita Keating, 1945   Search Date: 06/08/2020 16:27:30 PM   The Drug Utilization Report below displays all of the controlled substance prescriptions, if any, that your patient has filled in the last twelve months. The information displayed on this report is compiled from pharmacy submissions to the Department, and accurately reflects the information as submitted by the pharmacies.  This report was requested by: Kathya Sinclair | Reference #: [id number]   You have not added a TRAVIS number. Keeping your TRAVIS number(s) up to date on the My TRAVIS Numbers </[email or web address]/applinks/cspnp/myDeaNumbers> page will enable the separation of your prescriptions from others in the search results.   Others' Prescriptions  Patient Name: Ankita Keating   YOB: 1945   Address: [address] [place] [address]   Sex: Female   Rx Written	Rx Dispensed	Drug	Quantity	Days Supply	Prescriber Name	Payment Method	Dispenser	  05/28/2020	05/29/2020	oxycodone-acetaminophen  mg tab 	60	30	Chico Jeffries MD	Insurance	[facility] #[id number]	  05/22/2020	05/22/2020	oxycodone-acetaminophen 5-325 mg tab 	20	5	Gomez Caraballo Jr, MD	Insurance	[facility] #[id number]	  05/08/2020	05/09/2020	oxycodone-acetaminophen 5-325 mg tab 	20	5	Gomez Caraballo Jr, MD	Insurance	[facility] #[id number]	  04/22/2020	04/28/2020	oxycodone-acetaminophen 5-325 mg tab 	20	5	Venita Thompson MD	Insurance	[facility] #[id number]	  * - Drugs marked with an asterisk are compound drugs. If the compound drug is made up of more than one controlled substance, then each controlled substance will be a separate row in the table.

## 2020-06-18 NOTE — PROGRESS NOTE ADULT - NSREFPHYEXREFTO_GEN_ALL_CORE
Inpatient Physical Exam
Other

## 2020-06-18 NOTE — PROGRESS NOTE ADULT - SUBJECTIVE AND OBJECTIVE BOX
Subjective:   Doing well. Nausea has resolved     Objective:    MEDICATIONS  (STANDING):  atorvastatin 40 milliGRAM(s) Oral at bedtime  cefTRIAXone   IVPB 2000 milliGRAM(s) IV Intermittent every 24 hours  Dakins Solution - 1/4 Strength 1 Application(s) Topical daily  dextrose 5% + sodium chloride 0.9%. 1000 milliLiter(s) (70 mL/Hr) IV Continuous <Continuous>  doxycycline IVPB 100 milliGRAM(s) IV Intermittent every 12 hours  furosemide    Tablet 20 milliGRAM(s) Oral daily  gabapentin 100 milliGRAM(s) Oral every 12 hours  heparin  Infusion. 800 Unit(s)/Hr (8 mL/Hr) IV Continuous <Continuous>  hydrALAZINE 100 milliGRAM(s) Oral three times a day  insulin glargine Injectable (LANTUS) 10 Unit(s) SubCutaneous at bedtime  insulin lispro (HumaLOG) corrective regimen sliding scale   SubCutaneous Before meals and at bedtime  lactobacillus acidophilus 1 Tablet(s) Oral three times a day with meals  lisinopril 40 milliGRAM(s) Oral daily  melatonin 5 milliGRAM(s) Oral at bedtime  metoprolol succinate ER 50 milliGRAM(s) Oral daily  pantoprazole  Injectable 40 milliGRAM(s) IV Push daily    MEDICATIONS  (PRN):  acetaminophen   Tablet .. 650 milliGRAM(s) Oral every 6 hours PRN Mild Pain (1 - 3), Moderate Pain (4 - 6)  aluminum hydroxide/magnesium hydroxide/simethicone Suspension 30 milliLiter(s) Oral every 6 hours PRN Dyspepsia  diphenhydrAMINE   Injectable 15 milliGRAM(s) IV Push every 6 hours PRN nausea or vomiting  metoprolol tartrate Injectable 5 milliGRAM(s) IV Push every 6 hours PRN HR greater than 120  ondansetron Injectable 4 milliGRAM(s) IV Push every 8 hours PRN Nausea and/or Vomiting  traMADol 25 milliGRAM(s) Oral every 6 hours PRN Severe Pain (7 - 10)              Vital Signs Last 24 Hrs  T(C): 36.6 (16 Jun 2020 04:53), Max: 36.7 (15 Uli 2020 16:40)  T(F): 97.9 (16 Jun 2020 04:53), Max: 98 (15 Uli 2020 16:40)  HR: 50 (16 Jun 2020 04:53) (50 - 92)  BP: 168/56 (16 Jun 2020 04:53) (135/87 - 168/56)  BP(mean): --  RR: 18 (16 Jun 2020 04:53) (16 - 18)  SpO2: 95% (16 Jun 2020 04:53) (95% - 97%)      General:  Well developed, well nourished, alert and active, no pallor, NAD.  HEENT:    Normal appearance of conjunctiva, ears, nose, lips, oropharynx, and oral mucosa, anicteric.  Neck:  No masses, no asymmetry.  Lymph Nodes:  No lymphadenopathy.   Cardiovascular:  RRR normal S1/S2, no murmur.  Respiratory:  CTA B/L, normal respiratory effort.   Abdominal:   soft, no masses or tenderness, normoactive BS, NT/ND, no HSM.  Extremities:   No clubbing or cyanosis, normal capillary refill, no edema.      Other:       LABS:                        9.3    13.57 )-----------( 398      ( 16 Jun 2020 06:35 )             29.5     06-16    131<L>  |  94<L>  |  22<H>  ----------------------------<  166<H>  3.7   |  29  |  0.88    Ca    8.6      16 Jun 2020 06:35  Phos  3.2     06-15  Mg     1.8     06-16    TPro  7.2  /  Alb  2.1<L>  /  TBili  0.4  /  DBili  x   /  AST  22  /  ALT  24  /  AlkPhos  330<H>  06-16    PT/INR - ( 15 Uli 2020 06:07 )   PT: 14.1 sec;   INR: 1.24 ratio         PTT - ( 16 Jun 2020 01:21 )  PTT:154.6 sec      RADIOLOGY & ADDITIONAL TESTS:    · Note Type	Event Note  EGD	      Esophagogastroduodenoscopy Report  Anesthesia: MAC  Consent:  Informed consent was obtained from the patient after providing any opportunity for questions  Procedure: The gastroscope was gently passed through the incisoral orifice into the oral cavity and under direct visualization the esophagus was intubated. The endoscope was passed down the esophagus, through the stomach and into proximal jejunum. Color, texture, mucosa and anatomy of the esophagus, stomach, and duodenum were carefully examined with the scope. The patient tolerated the procedure well. After completion of the examination, the patient was transferred to the recovery room.   Preparation: NPO   Findings:   Oropharynx	Normal  Esophagus	Normal  EG-junction	Normal  Cardia	Normal.  Body	Normal   Antrum	Mild gastritis biopsy taken [1]  Pylorus	Normal.  Duodenal Bulb	Superficial clean based ulcer. Non-obstructing , non bleeding.   2nd portion	Normal  3rd portion	Normal.  Date and time:11/27/2019 9:20:48 AM  EBL:0  Impression: 1- Healing duodenal bulb ulcer 2- Mild gastritis 3- No pathology to explain vomiting.     Plan: 1- Advance diet 2- PPI daily 3- Follow up pathology     Quintin Wharton M.D.   Date and Time:      Electronic Signatures:  Quintin Wharton (MD)  (Signed 15-Uli-2020 15:52)  	Authored: Note Type, Note      Last Updated: 15-Uli-2020 15:52 by Quintin Wharton)    Surgical Pathology Report (06.15.20 @ 16:00)    Surgical Pathology Report:   ACCESSION No:  [id number]    DARRION MELENDEZ                      1        Surgical Final Report          Final Diagnosis    Gastric antrum; biopsy:  - Chronic inactive gastritis.  - Cresyl violet stain is negative for H. pylori-like  microorganisms.    Verified by: Kath Levin MD  (Electronic Signature)  Reported on: 06/17/20 14:12 EDT, [facility],  [address] [place] [address], [place] [address]  Phone: [phone]   Fax: [phone]  _________________________________________________________________    Clinical History  Antrum    Specimen(s) Submitted  Antrum    Gross Description  The specimen is received in formalin and the specimen container  is labeled: Antrum.  It consists of a 0.2 cm in greatest  dimension fragment of soft, tan-pink tissue.  Entirely submitted.  One cassette.    In addition to other data that may appear on the specimen  container, the label has been inspected to confirm the presence  of the patient's name and date of birth.  Cesar Rea 06/16/2020 13:31

## 2020-06-18 NOTE — PROGRESS NOTE ADULT - ASSESSMENT
Nausea vomiting/ PUD    ·	Resolved   ·	On NOAC   ·	Zofran PRN nausea   ·	Small frequent low fat meals  ·	pathology negative  ·	PPI daily x 1month     ·	Avoid NSAIDs     I was physically present for the key portions of the evaluation and management (E/M) service provided.  The patient was personally seen and examined at bedside.    Thank you for your consultation and allowing  me to participate in the care of your patients. If you have further questions please contact me at [phone].     Quintin Madrigal M.D.       _________________________________________________________________________________________________  [facility]  [address] [place] [address]  Office: [phone]    Tremaine Culp PA-C  ___________________________________________________________________________________________________

## 2020-06-18 NOTE — DISCHARGE NOTE NURSING/CASE MANAGEMENT/SOCIAL WORK - NSDCPEELIQUISREACT_GEN_ALL_CORE
Universal Safety Interventions
Apixaban/Eliquis increases your risk for bleeding. Notify your doctor if you experience any of the following side effects: bleeding, coughing or vomiting blood, red or black stool, unexpected pain or swelling, itching or hives, chest pain, chest tightness, trouble breathing, changes in how much or how often you urinate, red or pink urine, numbness or tingling in your feet, or unusual muscle weakness. When Apixaban/Eliquis is taken with other medicines, they can affect how it works. Taking other medications such as aspirin, blood thinners, nonsteroidal anti-inflammatories, and medications that treat depression can increase your risk of bleeding. It is very important to tell your health care provider about all of the other medicines, including over-the-counter medications, herbs, and vitamins you are taking. DO NOT start, stop, or change the dosage of any medicine, including over-the-counter medicines, vitamins, and herbal products without your doctor’s approval. Any products containing aspirin or are nonsteroidal anti-inflammatories lessen the blood’s ability to form clots and add to the effect of Apixaban/Eliquis. Never take aspirin or medicines that contain aspirin without speaking to your doctor.

## 2020-06-18 NOTE — PROGRESS NOTE ADULT - PROBLEM SELECTOR PLAN 1
Pt with acute somatic pain of left foot due to OM. Pt is now POD #9 s/p Angiogram, extremity, left, and I&D per podiatry on 6/9. Pt s/p Rt PICC placement today, plan to be discharged this evening.   Opioid pain recommendations   - Continue tramadol 25mg PO q6h PRN severe pain. Monitor for respiratory depression/ sedation  Non-opioid pain recommendations   -  Acetaminophen 650mg PO q 6 hours PRN moderate pain. Monitor LFTs  - Gabapentin 100mg PO 2x/day. Monitor renal function.   -Avoid NSAIDs.   - Bleeding precautions  Bowel Regimen  - Miralax 17G PO daily. Hold for loose BM/ diarrhea  - Senna 2 tabs PO at bedtime. Hold for loose BM/ diarrhea  Mild pain   - Non pharmacological measures   - Warm/ Cool packs PRN   - Repositioning, PT, imagery, relaxation, distraction.  Recommendations discussed with primary team and RN.

## 2020-06-18 NOTE — DISCHARGE NOTE NURSING/CASE MANAGEMENT/SOCIAL WORK - NSDCFUADDAPPT_GEN_ALL_CORE_FT
Follow-up with your primary care physician within 1 week. Call for appointment.  Follow-up outpatient with Podiatrist Dr. Yo within 1 week. Follow-up with Podiatrist regarding hyperbaric treatment. Call for appointment.    Have your liver function enzymes monitored weekly while antibiotics. Results to Infectious Disease Dr. Reagan.

## 2020-06-18 NOTE — PROGRESS NOTE ADULT - ASSESSMENT
75 YO female from home, H/O CAD/DM/HTN/PVD/hysterectomy/cholecystectomy ,with AICD comes to ED with intractable nausea and vomiting for 2 days. Patient states that she recently had left big toe amputation in the beginning of May and she was taking antibiotics. On Friday, her antibiotics were changed from clindamycin to Levaquin and after she took antibiotics along with percocet, she started having severe pain in epigastric region. Patient reports that it was sudden in onset, 7/10, burning & pressure like pain, non-radiating , associated with nausea,, bitter taste in mouth and projectile vomiting. She was not able to tolerate food and it aggravated her symptoms. Patient also reports significant weight( not sure how much) recently and decrease in appetite. patient denies fever, cough, SOB, constipation, dysuria, headache, chest pain, orthopnea, back pain, burning sensation in extremities.c/o pain over left big toe stump which is not resolving despite pain meds . afebrile on adm , covid neg. ID called for persistant leucocytosis and appr ab     # s/p amputation of left great toe for gangrene with post op collection and overlying cellulits as well as underlying OM /wound cx pos for staph aureus ( MSSA)/s/p left leg stent at [facility] as per pt /underlying PVD/s/p angio, with severe occlusive popliteal and tibial  dz that is non reconstructable/s/p I and D of left foot as well as partial amputation of left first MT /path consistent with acute osteo /s/p picc line today     #s/p  nausea/vomiting , now seems to be vertigo sec to poss cerebellar cva vs tia /vomiting has resolved /egd with healing DU /path with inactive gastritis     # transaminitis improving     #  wbc decreasing most likely reactive postop /cxr more consistent with pulm edema , low procal  /no resp complaints     # hx of endometrial ca s/p hysterectomy /ct with bladder wall thickening /urine cytology neg for uroepithelial ca     plan  cont  iv rocephin 2gm daily and  doxy 100mg po bid ( 39 days more from today )  will need 6 wks of ab for osteo as vascular could not revascularize d/w dr morfin and poor chance of healing post op wound   CONT  probiotics   podiatry f/u of stump   for outpt cystoscopy r/o bladder ca   for HBO as per podiatry   control of BP as per pmd        stable for d/c from id pt of view  d/w np

## 2020-06-18 NOTE — DISCHARGE NOTE NURSING/CASE MANAGEMENT/SOCIAL WORK - NSDCPEPTSTRK_GEN_ALL_CORE
Risk factors for stroke/Stroke support groups for patients, families, and friends/Stroke warning signs and symptoms/Signs and symptoms of stroke/Stroke education booklet/Prescribed medications/Call 911 for stroke/Need for follow up after discharge

## 2020-06-18 NOTE — PROGRESS NOTE ADULT - PROBLEM SELECTOR PROBLEM 10
HTN (hypertension)
Prophylactic measure

## 2020-06-19 VITALS
HEART RATE: 73 BPM | SYSTOLIC BLOOD PRESSURE: 150 MMHG | OXYGEN SATURATION: 97 % | RESPIRATION RATE: 17 BRPM | DIASTOLIC BLOOD PRESSURE: 72 MMHG | TEMPERATURE: 98 F

## 2020-06-19 LAB
ANION GAP SERPL CALC-SCNC: 11 MMOL/L — SIGNIFICANT CHANGE UP (ref 5–17)
BUN SERPL-MCNC: 20 MG/DL — HIGH (ref 7–18)
CALCIUM SERPL-MCNC: 8.9 MG/DL — SIGNIFICANT CHANGE UP (ref 8.4–10.5)
CHLORIDE SERPL-SCNC: 93 MMOL/L — LOW (ref 96–108)
CO2 SERPL-SCNC: 27 MMOL/L — SIGNIFICANT CHANGE UP (ref 22–31)
CREAT SERPL-MCNC: 0.84 MG/DL — SIGNIFICANT CHANGE UP (ref 0.5–1.3)
GLUCOSE BLDC GLUCOMTR-MCNC: 148 MG/DL — HIGH (ref 70–99)
GLUCOSE BLDC GLUCOMTR-MCNC: 180 MG/DL — HIGH (ref 70–99)
GLUCOSE SERPL-MCNC: 182 MG/DL — HIGH (ref 70–99)
HCT VFR BLD CALC: 31.4 % — LOW (ref 34.5–45)
HGB BLD-MCNC: 9.9 G/DL — LOW (ref 11.5–15.5)
MAGNESIUM SERPL-MCNC: 1.8 MG/DL — SIGNIFICANT CHANGE UP (ref 1.6–2.6)
MCHC RBC-ENTMCNC: 27.7 PG — SIGNIFICANT CHANGE UP (ref 27–34)
MCHC RBC-ENTMCNC: 31.5 GM/DL — LOW (ref 32–36)
MCV RBC AUTO: 88 FL — SIGNIFICANT CHANGE UP (ref 80–100)
NRBC # BLD: 0 /100 WBCS — SIGNIFICANT CHANGE UP (ref 0–0)
PLATELET # BLD AUTO: 355 K/UL — SIGNIFICANT CHANGE UP (ref 150–400)
POTASSIUM SERPL-MCNC: 3.8 MMOL/L — SIGNIFICANT CHANGE UP (ref 3.5–5.3)
POTASSIUM SERPL-SCNC: 3.8 MMOL/L — SIGNIFICANT CHANGE UP (ref 3.5–5.3)
RBC # BLD: 3.57 M/UL — LOW (ref 3.8–5.2)
RBC # FLD: 14.9 % — HIGH (ref 10.3–14.5)
SODIUM SERPL-SCNC: 131 MMOL/L — LOW (ref 135–145)
WBC # BLD: 13.14 K/UL — HIGH (ref 3.8–10.5)
WBC # FLD AUTO: 13.14 K/UL — HIGH (ref 3.8–10.5)

## 2020-06-19 PROCEDURE — 87070 CULTURE OTHR SPECIMN AEROBIC: CPT

## 2020-06-19 PROCEDURE — 97116 GAIT TRAINING THERAPY: CPT

## 2020-06-19 PROCEDURE — 74177 CT ABD & PELVIS W/CONTRAST: CPT

## 2020-06-19 PROCEDURE — 70496 CT ANGIOGRAPHY HEAD: CPT

## 2020-06-19 PROCEDURE — 70450 CT HEAD/BRAIN W/O DYE: CPT

## 2020-06-19 PROCEDURE — 93923 UPR/LXTR ART STDY 3+ LVLS: CPT

## 2020-06-19 PROCEDURE — 71045 X-RAY EXAM CHEST 1 VIEW: CPT

## 2020-06-19 PROCEDURE — 82962 GLUCOSE BLOOD TEST: CPT

## 2020-06-19 PROCEDURE — 82306 VITAMIN D 25 HYDROXY: CPT

## 2020-06-19 PROCEDURE — U0003: CPT

## 2020-06-19 PROCEDURE — 99285 EMERGENCY DEPT VISIT HI MDM: CPT | Mod: 25

## 2020-06-19 PROCEDURE — C1751: CPT

## 2020-06-19 PROCEDURE — 88305 TISSUE EXAM BY PATHOLOGIST: CPT

## 2020-06-19 PROCEDURE — 93005 ELECTROCARDIOGRAM TRACING: CPT

## 2020-06-19 PROCEDURE — 87186 SC STD MICRODIL/AGAR DIL: CPT

## 2020-06-19 PROCEDURE — 85652 RBC SED RATE AUTOMATED: CPT

## 2020-06-19 PROCEDURE — 76000 FLUOROSCOPY <1 HR PHYS/QHP: CPT

## 2020-06-19 PROCEDURE — 88311 DECALCIFY TISSUE: CPT

## 2020-06-19 PROCEDURE — 84484 ASSAY OF TROPONIN QUANT: CPT

## 2020-06-19 PROCEDURE — 97162 PT EVAL MOD COMPLEX 30 MIN: CPT

## 2020-06-19 PROCEDURE — 83036 HEMOGLOBIN GLYCOSYLATED A1C: CPT

## 2020-06-19 PROCEDURE — 83880 ASSAY OF NATRIURETIC PEPTIDE: CPT

## 2020-06-19 PROCEDURE — 88312 SPECIAL STAINS GROUP 1: CPT

## 2020-06-19 PROCEDURE — 86803 HEPATITIS C AB TEST: CPT

## 2020-06-19 PROCEDURE — 88112 CYTOPATH CELL ENHANCE TECH: CPT

## 2020-06-19 PROCEDURE — 92610 EVALUATE SWALLOWING FUNCTION: CPT

## 2020-06-19 PROCEDURE — 96375 TX/PRO/DX INJ NEW DRUG ADDON: CPT

## 2020-06-19 PROCEDURE — 81001 URINALYSIS AUTO W/SCOPE: CPT

## 2020-06-19 PROCEDURE — 87205 SMEAR GRAM STAIN: CPT

## 2020-06-19 PROCEDURE — 86923 COMPATIBILITY TEST ELECTRIC: CPT

## 2020-06-19 PROCEDURE — 93306 TTE W/DOPPLER COMPLETE: CPT

## 2020-06-19 PROCEDURE — 97110 THERAPEUTIC EXERCISES: CPT

## 2020-06-19 PROCEDURE — 73700 CT LOWER EXTREMITY W/O DYE: CPT

## 2020-06-19 PROCEDURE — 36573 INSJ PICC RS&I 5 YR+: CPT

## 2020-06-19 PROCEDURE — 86850 RBC ANTIBODY SCREEN: CPT

## 2020-06-19 PROCEDURE — 86301 IMMUNOASSAY TUMOR CA 19-9: CPT

## 2020-06-19 PROCEDURE — 73630 X-RAY EXAM OF FOOT: CPT

## 2020-06-19 PROCEDURE — 73718 MRI LOWER EXTREMITY W/O DYE: CPT

## 2020-06-19 PROCEDURE — 86140 C-REACTIVE PROTEIN: CPT

## 2020-06-19 PROCEDURE — 96374 THER/PROPH/DIAG INJ IV PUSH: CPT

## 2020-06-19 PROCEDURE — 82550 ASSAY OF CK (CPK): CPT

## 2020-06-19 PROCEDURE — 83540 ASSAY OF IRON: CPT

## 2020-06-19 PROCEDURE — 86900 BLOOD TYPING SEROLOGIC ABO: CPT

## 2020-06-19 PROCEDURE — 82553 CREATINE MB FRACTION: CPT

## 2020-06-19 PROCEDURE — 73552 X-RAY EXAM OF FEMUR 2/>: CPT

## 2020-06-19 PROCEDURE — 83690 ASSAY OF LIPASE: CPT

## 2020-06-19 PROCEDURE — 84100 ASSAY OF PHOSPHORUS: CPT

## 2020-06-19 PROCEDURE — 73590 X-RAY EXAM OF LOWER LEG: CPT

## 2020-06-19 PROCEDURE — 70498 CT ANGIOGRAPHY NECK: CPT

## 2020-06-19 PROCEDURE — 85730 THROMBOPLASTIN TIME PARTIAL: CPT

## 2020-06-19 PROCEDURE — 80048 BASIC METABOLIC PNL TOTAL CA: CPT

## 2020-06-19 PROCEDURE — 82378 CARCINOEMBRYONIC ANTIGEN: CPT

## 2020-06-19 PROCEDURE — 71045 X-RAY EXAM CHEST 1 VIEW: CPT | Mod: 26

## 2020-06-19 PROCEDURE — 99231 SBSQ HOSP IP/OBS SF/LOW 25: CPT

## 2020-06-19 PROCEDURE — 85610 PROTHROMBIN TIME: CPT

## 2020-06-19 PROCEDURE — 83550 IRON BINDING TEST: CPT

## 2020-06-19 PROCEDURE — 87635 SARS-COV-2 COVID-19 AMP PRB: CPT

## 2020-06-19 PROCEDURE — 86901 BLOOD TYPING SEROLOGIC RH(D): CPT

## 2020-06-19 PROCEDURE — 97530 THERAPEUTIC ACTIVITIES: CPT

## 2020-06-19 PROCEDURE — 82607 VITAMIN B-12: CPT

## 2020-06-19 PROCEDURE — 88304 TISSUE EXAM BY PATHOLOGIST: CPT

## 2020-06-19 PROCEDURE — 84145 PROCALCITONIN (PCT): CPT

## 2020-06-19 PROCEDURE — 83605 ASSAY OF LACTIC ACID: CPT

## 2020-06-19 PROCEDURE — 84443 ASSAY THYROID STIM HORMONE: CPT

## 2020-06-19 PROCEDURE — 80061 LIPID PANEL: CPT

## 2020-06-19 PROCEDURE — 76705 ECHO EXAM OF ABDOMEN: CPT

## 2020-06-19 PROCEDURE — 87040 BLOOD CULTURE FOR BACTERIA: CPT

## 2020-06-19 PROCEDURE — 36415 COLL VENOUS BLD VENIPUNCTURE: CPT

## 2020-06-19 PROCEDURE — 83735 ASSAY OF MAGNESIUM: CPT

## 2020-06-19 PROCEDURE — 82728 ASSAY OF FERRITIN: CPT

## 2020-06-19 PROCEDURE — 80053 COMPREHEN METABOLIC PANEL: CPT

## 2020-06-19 PROCEDURE — 82746 ASSAY OF FOLIC ACID SERUM: CPT

## 2020-06-19 PROCEDURE — 85027 COMPLETE CBC AUTOMATED: CPT

## 2020-06-19 PROCEDURE — 86304 IMMUNOASSAY TUMOR CA 125: CPT

## 2020-06-19 PROCEDURE — 80202 ASSAY OF VANCOMYCIN: CPT

## 2020-06-19 PROCEDURE — 87086 URINE CULTURE/COLONY COUNT: CPT

## 2020-06-19 RX ORDER — METOPROLOL TARTRATE 50 MG
1 TABLET ORAL
Qty: 0 | Refills: 0 | DISCHARGE

## 2020-06-19 RX ORDER — METOPROLOL TARTRATE 50 MG
1 TABLET ORAL
Qty: 14 | Refills: 0
Start: 2020-06-19 | End: 2020-07-02

## 2020-06-19 RX ADMIN — TRAMADOL HYDROCHLORIDE 25 MILLIGRAM(S): 50 TABLET ORAL at 03:12

## 2020-06-19 RX ADMIN — CEFTRIAXONE 100 MILLIGRAM(S): 500 INJECTION, POWDER, FOR SOLUTION INTRAMUSCULAR; INTRAVENOUS at 14:32

## 2020-06-19 RX ADMIN — Medication 1: at 08:02

## 2020-06-19 RX ADMIN — Medication 100 MILLIGRAM(S): at 13:12

## 2020-06-19 RX ADMIN — Medication 650 MILLIGRAM(S): at 06:50

## 2020-06-19 RX ADMIN — LISINOPRIL 40 MILLIGRAM(S): 2.5 TABLET ORAL at 05:18

## 2020-06-19 RX ADMIN — PANTOPRAZOLE SODIUM 40 MILLIGRAM(S): 20 TABLET, DELAYED RELEASE ORAL at 05:19

## 2020-06-19 RX ADMIN — Medication 1 TABLET(S): at 08:03

## 2020-06-19 RX ADMIN — CHLORHEXIDINE GLUCONATE 1 APPLICATION(S): 213 SOLUTION TOPICAL at 11:44

## 2020-06-19 RX ADMIN — GABAPENTIN 100 MILLIGRAM(S): 400 CAPSULE ORAL at 05:21

## 2020-06-19 RX ADMIN — Medication 100 MILLIGRAM(S): at 05:19

## 2020-06-19 RX ADMIN — POLYETHYLENE GLYCOL 3350 17 GRAM(S): 17 POWDER, FOR SOLUTION ORAL at 11:43

## 2020-06-19 RX ADMIN — Medication 1 APPLICATION(S): at 11:43

## 2020-06-19 RX ADMIN — Medication 100 MILLIGRAM(S): at 05:18

## 2020-06-19 RX ADMIN — Medication 110 MILLIGRAM(S): at 05:18

## 2020-06-19 RX ADMIN — APIXABAN 5 MILLIGRAM(S): 2.5 TABLET, FILM COATED ORAL at 05:18

## 2020-06-19 RX ADMIN — Medication 1 TABLET(S): at 11:43

## 2020-06-19 RX ADMIN — Medication 20 MILLIGRAM(S): at 05:19

## 2020-06-19 NOTE — PROGRESS NOTE ADULT - SUBJECTIVE AND OBJECTIVE BOX
Subjective:   Doing well. Nausea has resolved     Objective:    MEDICATIONS  (STANDING):  atorvastatin 40 milliGRAM(s) Oral at bedtime  cefTRIAXone   IVPB 2000 milliGRAM(s) IV Intermittent every 24 hours  Dakins Solution - 1/4 Strength 1 Application(s) Topical daily  dextrose 5% + sodium chloride 0.9%. 1000 milliLiter(s) (70 mL/Hr) IV Continuous <Continuous>  doxycycline IVPB 100 milliGRAM(s) IV Intermittent every 12 hours  furosemide    Tablet 20 milliGRAM(s) Oral daily  gabapentin 100 milliGRAM(s) Oral every 12 hours  heparin  Infusion. 800 Unit(s)/Hr (8 mL/Hr) IV Continuous <Continuous>  hydrALAZINE 100 milliGRAM(s) Oral three times a day  insulin glargine Injectable (LANTUS) 10 Unit(s) SubCutaneous at bedtime  insulin lispro (HumaLOG) corrective regimen sliding scale   SubCutaneous Before meals and at bedtime  lactobacillus acidophilus 1 Tablet(s) Oral three times a day with meals  lisinopril 40 milliGRAM(s) Oral daily  melatonin 5 milliGRAM(s) Oral at bedtime  metoprolol succinate ER 50 milliGRAM(s) Oral daily  pantoprazole  Injectable 40 milliGRAM(s) IV Push daily    MEDICATIONS  (PRN):  acetaminophen   Tablet .. 650 milliGRAM(s) Oral every 6 hours PRN Mild Pain (1 - 3), Moderate Pain (4 - 6)  aluminum hydroxide/magnesium hydroxide/simethicone Suspension 30 milliLiter(s) Oral every 6 hours PRN Dyspepsia  diphenhydrAMINE   Injectable 15 milliGRAM(s) IV Push every 6 hours PRN nausea or vomiting  metoprolol tartrate Injectable 5 milliGRAM(s) IV Push every 6 hours PRN HR greater than 120  ondansetron Injectable 4 milliGRAM(s) IV Push every 8 hours PRN Nausea and/or Vomiting  traMADol 25 milliGRAM(s) Oral every 6 hours PRN Severe Pain (7 - 10)              Vital Signs Last 24 Hrs  T(C): 36.6 (16 Jun 2020 04:53), Max: 36.7 (15 Uli 2020 16:40)  T(F): 97.9 (16 Jun 2020 04:53), Max: 98 (15 Uli 2020 16:40)  HR: 50 (16 Jun 2020 04:53) (50 - 92)  BP: 168/56 (16 Jun 2020 04:53) (135/87 - 168/56)  BP(mean): --  RR: 18 (16 Jun 2020 04:53) (16 - 18)  SpO2: 95% (16 Jun 2020 04:53) (95% - 97%)      General:  Well developed, well nourished, alert and active, no pallor, NAD.  HEENT:    Normal appearance of conjunctiva, ears, nose, lips, oropharynx, and oral mucosa, anicteric.  Neck:  No masses, no asymmetry.  Lymph Nodes:  No lymphadenopathy.   Cardiovascular:  RRR normal S1/S2, no murmur.  Respiratory:  CTA B/L, normal respiratory effort.   Abdominal:   soft, no masses or tenderness, normoactive BS, NT/ND, no HSM.  Extremities:   No clubbing or cyanosis, normal capillary refill, no edema.      Other:       LABS:                        9.3    13.57 )-----------( 398      ( 16 Jun 2020 06:35 )             29.5     06-16    131<L>  |  94<L>  |  22<H>  ----------------------------<  166<H>  3.7   |  29  |  0.88    Ca    8.6      16 Jun 2020 06:35  Phos  3.2     06-15  Mg     1.8     06-16    TPro  7.2  /  Alb  2.1<L>  /  TBili  0.4  /  DBili  x   /  AST  22  /  ALT  24  /  AlkPhos  330<H>  06-16    PT/INR - ( 15 Uli 2020 06:07 )   PT: 14.1 sec;   INR: 1.24 ratio         PTT - ( 16 Jun 2020 01:21 )  PTT:154.6 sec      RADIOLOGY & ADDITIONAL TESTS:    · Note Type	Event Note  EGD	      Esophagogastroduodenoscopy Report  Anesthesia: MAC  Consent:  Informed consent was obtained from the patient after providing any opportunity for questions  Procedure: The gastroscope was gently passed through the incisoral orifice into the oral cavity and under direct visualization the esophagus was intubated. The endoscope was passed down the esophagus, through the stomach and into proximal jejunum. Color, texture, mucosa and anatomy of the esophagus, stomach, and duodenum were carefully examined with the scope. The patient tolerated the procedure well. After completion of the examination, the patient was transferred to the recovery room.   Preparation: NPO   Findings:   Oropharynx	Normal  Esophagus	Normal  EG-junction	Normal  Cardia	Normal.  Body	Normal   Antrum	Mild gastritis biopsy taken [1]  Pylorus	Normal.  Duodenal Bulb	Superficial clean based ulcer. Non-obstructing , non bleeding.   2nd portion	Normal  3rd portion	Normal.  Date and time:11/27/2019 9:20:48 AM  EBL:0  Impression: 1- Healing duodenal bulb ulcer 2- Mild gastritis 3- No pathology to explain vomiting.     Plan: 1- Advance diet 2- PPI daily 3- Follow up pathology     Quintin Wharton M.D.   Date and Time:      Electronic Signatures:  Quintin Wharton (MD)  (Signed 15-Uli-2020 15:52)  	Authored: Note Type, Note      Last Updated: 15-Uli-2020 15:52 by Quintin Wharton)    Surgical Pathology Report (06.15.20 @ 16:00)    Surgical Pathology Report:   ACCESSION No:  [id number]    DARRION MELENDEZ                      1        Surgical Final Report          Final Diagnosis    Gastric antrum; biopsy:  - Chronic inactive gastritis.  - Cresyl violet stain is negative for H. pylori-like  microorganisms.    Verified by: Kath Levin MD  (Electronic Signature)  Reported on: 06/17/20 14:12 EDT, [facility],  [address] [place] [address], [place] [address]  Phone: [phone]   Fax: [phone]  _________________________________________________________________    Clinical History  Antrum    Specimen(s) Submitted  Antrum    Gross Description  The specimen is received in formalin and the specimen container  is labeled: Antrum.  It consists of a 0.2 cm in greatest  dimension fragment of soft, tan-pink tissue.  Entirely submitted.  One cassette.    In addition to other data that may appear on the specimen  container, the label has been inspected to confirm the presence  of the patient's name and date of birth.  Cesar Rea 06/16/2020 13:31

## 2020-06-19 NOTE — PROGRESS NOTE ADULT - SUBJECTIVE AND OBJECTIVE BOX
INTERVAL HPI/OVERNIGHT EVENTS:  Patient seen at bedside,events noticed for overnight,no dystress  VITAL SIGNS:  T(F): 98.1 (06-19-20 @ 05:05)  HR: 72 (06-19-20 @ 05:05)  BP: 138/90 (06-19-20 @ 05:05)  RR: 16 (06-19-20 @ 05:05)  SpO2: 97% (06-19-20 @ 05:05)  Wt(kg): --    PHYSICAL EXAM:  awake,allert  Constitutional:  Eyes:  ENMT:perrla  Neck:  Respiratory:clear  Cardiovascular:s1 s2,m-none  Gastrointestinal:soft,bs pos  Extremities:l/extrem with dressing  Vascular:  Neurological:no focal deficit  Musculoskeletal:    MEDICATIONS  (STANDING):  apixaban 5 milliGRAM(s) Oral every 12 hours  atorvastatin 40 milliGRAM(s) Oral at bedtime  cefTRIAXone   IVPB 2000 milliGRAM(s) IV Intermittent every 24 hours  chlorhexidine 2% Cloths 1 Application(s) Topical daily  Dakins Solution - 1/4 Strength 1 Application(s) Topical daily  dextrose 5% + sodium chloride 0.9%. 1000 milliLiter(s) (70 mL/Hr) IV Continuous <Continuous>  doxycycline IVPB 100 milliGRAM(s) IV Intermittent every 12 hours  furosemide    Tablet 20 milliGRAM(s) Oral daily  gabapentin 100 milliGRAM(s) Oral every 12 hours  hydrALAZINE 100 milliGRAM(s) Oral three times a day  insulin glargine Injectable (LANTUS) 10 Unit(s) SubCutaneous at bedtime  insulin lispro (HumaLOG) corrective regimen sliding scale   SubCutaneous Before meals and at bedtime  lactobacillus acidophilus 1 Tablet(s) Oral three times a day with meals  lisinopril 40 milliGRAM(s) Oral daily  melatonin 5 milliGRAM(s) Oral at bedtime  metoprolol succinate  milliGRAM(s) Oral daily  pantoprazole    Tablet 40 milliGRAM(s) Oral before breakfast  polyethylene glycol 3350 17 Gram(s) Oral daily  senna 2 Tablet(s) Oral at bedtime    MEDICATIONS  (PRN):  acetaminophen   Tablet .. 650 milliGRAM(s) Oral every 6 hours PRN Mild Pain (1 - 3), Moderate Pain (4 - 6)  aluminum hydroxide/magnesium hydroxide/simethicone Suspension 30 milliLiter(s) Oral every 6 hours PRN Dyspepsia  diphenhydrAMINE   Injectable 15 milliGRAM(s) IV Push every 6 hours PRN nausea or vomiting  metoprolol tartrate Injectable 5 milliGRAM(s) IV Push every 6 hours PRN HR greater than 120  ondansetron Injectable 4 milliGRAM(s) IV Push every 8 hours PRN Nausea and/or Vomiting  sodium chloride 0.9% lock flush 10 milliLiter(s) IV Push every 1 hour PRN Pre/post blood products, medications, blood draw, and to maintain line patency  traMADol 25 milliGRAM(s) Oral every 6 hours PRN Severe Pain (7 - 10)      Allergies    codeine (Rash)  penicillin (Rash)    Intolerances        LABS:                        9.9    13.14 )-----------( 355      ( 19 Jun 2020 06:37 )             31.4     06-19    131<L>  |  93<L>  |  20<H>  ----------------------------<  182<H>  3.8   |  27  |  0.84    Ca    8.9      19 Jun 2020 06:37  Phos  3.2     06-18  Mg     1.8     06-19    Assessment and Plan:    Problem/Plan - 1:  ·  Problem: Stroke.  Plan: CTA head/neck showed LEFT PICA stroke  -will switch to oral anticoagulation   -will start on eliquis 5 mg bid  Neuro Dr. Coral acuna.      Problem/Plan - 2:  ·  Problem: PVD (peripheral vascular disease).  Plan: H/o PVD and popliteal stent  s/p left big toe amputation in May 2020  s/p ESTRELLA :  RLE:  0.6. LLE:  0.5. Severe bilateral peripheral artery disease. Pulse volume recordings are diffusely diminished bilaterally, however severely diminished in the left foot.  s/p left metatarsal amputation  Vascular Dr. Grove.      Problem/Plan - 3:  ·  Problem: Osteomyelitis of left foot, unspecified type  CT L foot shows OM ( pt cannot get MRI dur to AICD)  Podiatry Dr. Yo - s/p I and D  ID Dr. Reagan  c/w Franky for now.   patient needs 6 weeks of abx  pending for pick line placement prior to d/c   d/w patient and daughter plan of care     Problem/Plan - 4:  ·  Problem: Intractable nausea and vomiting-stable ,resolved  s/p egd -stable  CT abd/pelvis as above.    Gi dr. Matute following  EGD today  started IVF.       Problem/Plan - 5:  ·  Problem: Bladder wall thickening.  Plan: CT abdomen shows bladder wall thickening  pt reports weight loss and loss of appetite recently, Patient has history of Stage 1 endometrial cancer and hysterectomy  - urine cytology: NEGATIVE FOR HIGH GRADE UROTHELIAL CARCINOMA  urology consulted: outpt f/u w/ , need cystoscopy.      Problem/Plan - 6:  Problem: HTN (hypertension).-better controlled today   Plan: Pt takes Lisinopril 40mg, toprol Xl 100mg, hydralazine 50mg TID   c/w current meds  d/c planning home     Problem/Plan - 7:  ·  Problem: DM (diabetes mellitus).  Plan: Patient takes Levemir 30mg at night and 10Units pre-meals  c/w sliding scale and Lantus 20Units HS  Diabetic diet  Hb A1C: 8.1%  monitor BS.      Problem/Plan - 8:  ·  Problem: CAD (coronary artery disease).  Plan: H/o CAD and PCI in 2009,  patient has both AICD and pacemaker  EKG shows paced Rythm  No active issues  continue aspirin , Plavix, BB, statin, ACE    hold heparin drip for EGD today. may resume after procedure  monitor PT/INR/Ptt.      Problem/Plan - 9:  ·  Problem: CHF (congestive heart failure).  Plan: H/o CHF but clinically patient is not in fluid overload  No peripheral edema  Patient takes furosemide 20mg daily twice at home  Echo: EF 50-55%, mild TR, WA  c/w lasix , statin and BB.      Problem/Plan - 10:  Problem: Prophylactic measure. Plan; DVT ppx-will start on eliquis

## 2020-06-19 NOTE — PROGRESS NOTE ADULT - PROVIDER SPECIALTY LIST ADULT
Cardiology
Gastroenterology
Infectious Disease
Internal Medicine
Neurology
Pain Medicine
Podiatry
Urology
Vascular Surgery
Pain Medicine

## 2020-06-19 NOTE — PROGRESS NOTE ADULT - PROBLEM SELECTOR PLAN 1
Pt with acute somatic pain of left foot due to OM. Pt is now POD #9 s/p Angiogram, extremity, left, and I&D per podiatry on 6/9. Pt s/p Rt PICC placement today, plan to be discharged this evening.   Opioid pain recommendations   - upon discharge pt should not require a script for opioids.  Pt was written for percocet for 30 days  - Continue tramadol 25mg PO q6h PRN severe pain. Monitor for respiratory depression/ sedation  - pt was on percocet at home but tolerating tramadol  Non-opioid pain recommendations   -  Acetaminophen 650mg PO q 6 hours PRN moderate pain. Monitor LFTs  - Gabapentin 100mg PO 2x/day. Monitor renal function.   -Avoid NSAIDs.   - Bleeding precautions  Bowel Regimen  - Miralax 17G PO daily. Hold for loose BM/ diarrhea  - Senna 2 tabs PO at bedtime. Hold for loose BM/ diarrhea  Mild pain   - Non pharmacological measures   - Warm/ Cool packs PRN   - Repositioning, PT, imagery, relaxation, distraction.  Recommendations discussed with primary team and RN.

## 2020-06-19 NOTE — PROGRESS NOTE ADULT - REASON FOR ADMISSION
Intractable nausea and vomiting

## 2020-06-19 NOTE — PROGRESS NOTE ADULT - SUBJECTIVE AND OBJECTIVE BOX
Source of information: , Chart review  Patient language: English  : n/a    CC:      This is a 74yFemale patient who presents to the hospital for Patient is a 74y old  Female who presents with a chief complaint of Intractable nausea and vomiting (19 Jun 2020 10:47)  .       Patient stated goal for pain control: to be able to take deep breaths, get out of bed to chair and ambulate with tolerable pain control.     PAIN SCORE:         SCALE USED: (1-10 NRS)      PAST MEDICAL & SURGICAL HISTORY:  PVD (peripheral vascular disease)  CAD (coronary artery disease)  CHF (congestive heart failure)  HTN (hypertension)  DM (diabetes mellitus)  Status post coronary artery stent placement  H/O: hysterectomy  Cardiac defibrillator in place  Artificial cardiac pacemaker  History of cholecystectomy      FAMILY HISTORY:        SOCIAL HISTORY:  [ ] Denies Smoking, Alcohol, or Drug Use    Allergies    codeine (Rash)  penicillin (Rash)    Intolerances        MEDICATIONS:    MEDICATIONS  (STANDING):  apixaban 5 milliGRAM(s) Oral every 12 hours  atorvastatin 40 milliGRAM(s) Oral at bedtime  cefTRIAXone   IVPB 2000 milliGRAM(s) IV Intermittent every 24 hours  chlorhexidine 2% Cloths 1 Application(s) Topical daily  Dakins Solution - 1/4 Strength 1 Application(s) Topical daily  dextrose 5% + sodium chloride 0.9%. 1000 milliLiter(s) (70 mL/Hr) IV Continuous <Continuous>  doxycycline IVPB 100 milliGRAM(s) IV Intermittent every 12 hours  furosemide    Tablet 20 milliGRAM(s) Oral daily  gabapentin 100 milliGRAM(s) Oral every 12 hours  hydrALAZINE 100 milliGRAM(s) Oral three times a day  insulin glargine Injectable (LANTUS) 10 Unit(s) SubCutaneous at bedtime  insulin lispro (HumaLOG) corrective regimen sliding scale   SubCutaneous Before meals and at bedtime  lactobacillus acidophilus 1 Tablet(s) Oral three times a day with meals  lisinopril 40 milliGRAM(s) Oral daily  melatonin 5 milliGRAM(s) Oral at bedtime  metoprolol succinate  milliGRAM(s) Oral daily  pantoprazole    Tablet 40 milliGRAM(s) Oral before breakfast  polyethylene glycol 3350 17 Gram(s) Oral daily  senna 2 Tablet(s) Oral at bedtime    MEDICATIONS  (PRN):  acetaminophen   Tablet .. 650 milliGRAM(s) Oral every 6 hours PRN Mild Pain (1 - 3), Moderate Pain (4 - 6)  aluminum hydroxide/magnesium hydroxide/simethicone Suspension 30 milliLiter(s) Oral every 6 hours PRN Dyspepsia  diphenhydrAMINE   Injectable 15 milliGRAM(s) IV Push every 6 hours PRN nausea or vomiting  metoprolol tartrate Injectable 5 milliGRAM(s) IV Push every 6 hours PRN HR greater than 120  ondansetron Injectable 4 milliGRAM(s) IV Push every 8 hours PRN Nausea and/or Vomiting  sodium chloride 0.9% lock flush 10 milliLiter(s) IV Push every 1 hour PRN Pre/post blood products, medications, blood draw, and to maintain line patency  traMADol 25 milliGRAM(s) Oral every 6 hours PRN Severe Pain (7 - 10)      Vital Signs Last 24 Hrs  T(C): 36.7 (19 Jun 2020 05:05), Max: 36.9 (18 Jun 2020 20:56)  T(F): 98.1 (19 Jun 2020 05:05), Max: 98.5 (18 Jun 2020 20:56)  HR: 72 (19 Jun 2020 05:05) (66 - 88)  BP: 138/90 (19 Jun 2020 05:05) (138/90 - 194/73)  BP(mean): --  RR: 16 (19 Jun 2020 05:05) (16 - 18)  SpO2: 97% (19 Jun 2020 05:05) (97% - 97%)    LABS:                          9.9    13.14 )-----------( 355      ( 19 Jun 2020 06:37 )             31.4     06-19    131<L>  |  93<L>  |  20<H>  ----------------------------<  182<H>  3.8   |  27  |  0.84    Ca    8.9      19 Jun 2020 06:37  Phos  3.2     06-18  Mg     1.8     06-19            CAPILLARY BLOOD GLUCOSE      POCT Blood Glucose.: 148 mg/dL (19 Jun 2020 11:37)  POCT Blood Glucose.: 180 mg/dL (19 Jun 2020 07:54)  POCT Blood Glucose.: 180 mg/dL (18 Jun 2020 21:23)  POCT Blood Glucose.: 174 mg/dL (18 Jun 2020 16:29)      Radiology:    Drug Screen:        REVIEW OF SYSTEMS:  CONSTITUTIONAL: No fever or fatigue  RESPIRATORY: No cough, wheezing, chills or hemoptysis; No shortness of breath  CARDIOVASCULAR: No chest pain, palpitations, dizziness, or leg swelling  GASTROINTESTINAL: No abdominal or epigastric pain. No nausea, vomiting; No diarrhea or constipation.   GENITOURINARY: No dysuria, frequency, hematuria, retention or incontinence  MUSCULOSKELETAL: No joint pain or swelling; No muscle, back, or extremity pain, no upper or lower motor strength weakness, no saddle anesthesia, bowel/bladder incontinence, no falls   NEURO: No headaches, No numbness/tingling b/l LE, No weakness  PSYCHIATRIC: No depression, anxiety, mood swings, or difficulty sleeping    PHYSICAL EXAM:  GENERAL:  Alert & Oriented X3, NAD, Good concentration  CHEST/LUNG: Clear to auscultation bilaterally; No rales, rhonchi, wheezing, or rubs  HEART: Regular rate and rhythm; No murmurs, rubs, or gallops  ABDOMEN: Soft, Nontender, Nondistended; Bowel sounds present  EXTREMITIES:  2+ Peripheral Pulses, No cyanosis, or edema  MUSCULOSKELETAL: + decreased rom Motor Strength 5/5 B/L upper and lower extremities; moves all extremities equally against gravity; ROM intact; negative SLR  SKIN: No rashes or lesions        Risk factors associated with adverse outcomes related to opioid treatment  [ ]  Concurrent benzodiazepine use  [ ]  History/ Active substance use or alcohol use disorder  [ ] Psychiatric co-morbidity  [ ] Sleep apnea  [ ] COPD  [ ] BMI> 35  [ ] Liver dysfunction  [ ] Renal dysfunction  [ ] CHF  [ ] Smoker  [ ]  Age > 60 years    [ ]  [place]  Reviewed and Copied to Chart. See below.    Plan of care and goal oriented pain management treatment options were discussed with patient and /or primary care giver; all questions and concerns were addressed and care was aligned with patient's wishes.    Educated patient on goal oriented pain management treatment options     06-19-20 @ 12:13 Source of information: , Chart review  Patient language: English  : n/a    CC:  left foot pain    This is a 74yFemale patient who presents to the hospital for n/v.  Pt Pt with acute somatic pain of left foot due to OM. Pt is now POD # 10 s/p Angiogram, extremity, left, and I&D per podiatry on 6/9.  Pt  with no acute complaints.  + mild left foot discomfort.  Pt ambulating with walker.  PICC in place for long term abx for OM.  Pt for discharge today. Patient stated goal for pain control: to be able to take deep breaths, get out of bed to chair and ambulate with tolerable pain control.     PAIN SCORE:   5/10   SCALE USED: (1-10 NRS)      PAST MEDICAL & SURGICAL HISTORY:  PVD (peripheral vascular disease)  CAD (coronary artery disease)  CHF (congestive heart failure)  HTN (hypertension)  DM (diabetes mellitus)  Status post coronary artery stent placement  H/O: hysterectomy  Cardiac defibrillator in place  Artificial cardiac pacemaker  History of cholecystectomy      FAMILY HISTORY:        SOCIAL HISTORY:  [x ] Denies Smoking, Alcohol, or Drug Use    Allergies    codeine (Rash)  penicillin (Rash)    Intolerances        MEDICATIONS:    MEDICATIONS  (STANDING):  apixaban 5 milliGRAM(s) Oral every 12 hours  atorvastatin 40 milliGRAM(s) Oral at bedtime  cefTRIAXone   IVPB 2000 milliGRAM(s) IV Intermittent every 24 hours  chlorhexidine 2% Cloths 1 Application(s) Topical daily  Dakins Solution - 1/4 Strength 1 Application(s) Topical daily  dextrose 5% + sodium chloride 0.9%. 1000 milliLiter(s) (70 mL/Hr) IV Continuous <Continuous>  doxycycline IVPB 100 milliGRAM(s) IV Intermittent every 12 hours  furosemide    Tablet 20 milliGRAM(s) Oral daily  gabapentin 100 milliGRAM(s) Oral every 12 hours  hydrALAZINE 100 milliGRAM(s) Oral three times a day  insulin glargine Injectable (LANTUS) 10 Unit(s) SubCutaneous at bedtime  insulin lispro (HumaLOG) corrective regimen sliding scale   SubCutaneous Before meals and at bedtime  lactobacillus acidophilus 1 Tablet(s) Oral three times a day with meals  lisinopril 40 milliGRAM(s) Oral daily  melatonin 5 milliGRAM(s) Oral at bedtime  metoprolol succinate  milliGRAM(s) Oral daily  pantoprazole    Tablet 40 milliGRAM(s) Oral before breakfast  polyethylene glycol 3350 17 Gram(s) Oral daily  senna 2 Tablet(s) Oral at bedtime    MEDICATIONS  (PRN):  acetaminophen   Tablet .. 650 milliGRAM(s) Oral every 6 hours PRN Mild Pain (1 - 3), Moderate Pain (4 - 6)  aluminum hydroxide/magnesium hydroxide/simethicone Suspension 30 milliLiter(s) Oral every 6 hours PRN Dyspepsia  diphenhydrAMINE   Injectable 15 milliGRAM(s) IV Push every 6 hours PRN nausea or vomiting  metoprolol tartrate Injectable 5 milliGRAM(s) IV Push every 6 hours PRN HR greater than 120  ondansetron Injectable 4 milliGRAM(s) IV Push every 8 hours PRN Nausea and/or Vomiting  sodium chloride 0.9% lock flush 10 milliLiter(s) IV Push every 1 hour PRN Pre/post blood products, medications, blood draw, and to maintain line patency  traMADol 25 milliGRAM(s) Oral every 6 hours PRN Severe Pain (7 - 10)      Vital Signs Last 24 Hrs  T(C): 36.7 (19 Jun 2020 05:05), Max: 36.9 (18 Jun 2020 20:56)  T(F): 98.1 (19 Jun 2020 05:05), Max: 98.5 (18 Jun 2020 20:56)  HR: 72 (19 Jun 2020 05:05) (66 - 88)  BP: 138/90 (19 Jun 2020 05:05) (138/90 - 194/73)  BP(mean): --  RR: 16 (19 Jun 2020 05:05) (16 - 18)  SpO2: 97% (19 Jun 2020 05:05) (97% - 97%)    LABS:                          9.9    13.14 )-----------( 355      ( 19 Jun 2020 06:37 )             31.4     06-19    131<L>  |  93<L>  |  20<H>  ----------------------------<  182<H>  3.8   |  27  |  0.84    Ca    8.9      19 Jun 2020 06:37  Phos  3.2     06-18  Mg     1.8     06-19            CAPILLARY BLOOD GLUCOSE      POCT Blood Glucose.: 148 mg/dL (19 Jun 2020 11:37)  POCT Blood Glucose.: 180 mg/dL (19 Jun 2020 07:54)  POCT Blood Glucose.: 180 mg/dL (18 Jun 2020 21:23)  POCT Blood Glucose.: 174 mg/dL (18 Jun 2020 16:29)      Radiology:    Drug Screen:        REVIEW OF SYSTEMS:  CONSTITUTIONAL: No fever or fatigue  RESPIRATORY: No cough, wheezing, chills or hemoptysis; No shortness of breath  CARDIOVASCULAR: No chest pain, palpitations, dizziness, or leg swelling  GASTROINTESTINAL: No abdominal or epigastric pain. No nausea, vomiting; No diarrhea or constipation.   GENITOURINARY: No dysuria, frequency, hematuria, retention or incontinence  MUSCULOSKELETAL: + left foot pain  NEURO: No headaches, No numbness/tingling b/l LE, No weakness  PSYCHIATRIC: No depression, anxiety, mood swings, or difficulty sleeping    PHYSICAL EXAM:  GENERAL:  Alert & Oriented X3, NAD, Good concentration  CHEST/LUNG: Clear to auscultation bilaterally; No rales, rhonchi, wheezing, or rubs  HEART: Regular rate and rhythm; No murmurs, rubs, or gallops  ABDOMEN: Soft, Nontender, Nondistended; Bowel sounds present  EXTREMITIES:  2+ Peripheral Pulses, No cyanosis, or edema  MUSCULOSKELETAL: + decreased rom + left foot tenderness   SKIN: No rashes or lesions        Risk factors associated with adverse outcomes related to opioid treatment  [ ]  Concurrent benzodiazepine use  [ ]  History/ Active substance use or alcohol use disorder  [x ] Psychiatric co-morbidity  [ ] Sleep apnea  [ ] COPD  [ ] BMI> 35  [ ] Liver dysfunction  [ ] Renal dysfunction  [ ] CHF  [ ] Smoker  [x ]  Age > 60 years    [ ]  [facility]  Reviewed and Copied to Chart. See below.    Plan of care and goal oriented pain management treatment options were discussed with patient and /or primary care giver; all questions and concerns were addressed and care was aligned with patient's wishes.    Educated patient on goal oriented pain management treatment options     06-19-20 @ 12:13

## 2020-06-19 NOTE — PROGRESS NOTE ADULT - SUBJECTIVE AND OBJECTIVE BOX
Patient denies chest pain or shortness of breath.   Review of systems otherwise (-)  	  acetaminophen   Tablet .. 650 milliGRAM(s) Oral every 6 hours PRN  aluminum hydroxide/magnesium hydroxide/simethicone Suspension 30 milliLiter(s) Oral every 6 hours PRN  apixaban 5 milliGRAM(s) Oral every 12 hours  atorvastatin 40 milliGRAM(s) Oral at bedtime  chlorhexidine 2% Cloths 1 Application(s) Topical daily  Dakins Solution - 1/4 Strength 1 Application(s) Topical daily  dextrose 5% + sodium chloride 0.9%. 1000 milliLiter(s) IV Continuous <Continuous>  diphenhydrAMINE   Injectable 15 milliGRAM(s) IV Push every 6 hours PRN  doxycycline IVPB 100 milliGRAM(s) IV Intermittent every 12 hours  furosemide    Tablet 20 milliGRAM(s) Oral daily  gabapentin 100 milliGRAM(s) Oral every 12 hours  hydrALAZINE 100 milliGRAM(s) Oral three times a day  insulin glargine Injectable (LANTUS) 10 Unit(s) SubCutaneous at bedtime  insulin lispro (HumaLOG) corrective regimen sliding scale   SubCutaneous Before meals and at bedtime  lactobacillus acidophilus 1 Tablet(s) Oral three times a day with meals  lisinopril 40 milliGRAM(s) Oral daily  melatonin 5 milliGRAM(s) Oral at bedtime  metoprolol succinate  milliGRAM(s) Oral daily  metoprolol tartrate Injectable 5 milliGRAM(s) IV Push every 6 hours PRN  ondansetron Injectable 4 milliGRAM(s) IV Push every 8 hours PRN  pantoprazole    Tablet 40 milliGRAM(s) Oral before breakfast  polyethylene glycol 3350 17 Gram(s) Oral daily  senna 2 Tablet(s) Oral at bedtime  sodium chloride 0.9% lock flush 10 milliLiter(s) IV Push every 1 hour PRN  traMADol 25 milliGRAM(s) Oral every 6 hours PRN                            9.9    13.14 )-----------( 355      ( 19 Jun 2020 06:37 )             31.4       Hemoglobin: 9.9 g/dL (06-19 @ 06:37)  Hemoglobin: 9.4 g/dL (06-18 @ 06:43)  Hemoglobin: 9.1 g/dL (06-17 @ 06:30)  Hemoglobin: 9.3 g/dL (06-16 @ 06:35)  Hemoglobin: 9.4 g/dL (06-15 @ 06:07)      06-19    131<L>  |  93<L>  |  20<H>  ----------------------------<  182<H>  3.8   |  27  |  0.84    Ca    8.9      19 Jun 2020 06:37  Phos  3.2     06-18  Mg     1.8     06-19      Creatinine Trend: 0.84<--, 0.79<--, 0.82<--, 0.88<--, 0.88<--, 0.98<--    COAGS:           T(C): 36.4 (06-19-20 @ 16:27), Max: 36.9 (06-18-20 @ 20:56)  HR: 73 (06-19-20 @ 16:27) (72 - 88)  BP: 150/72 (06-19-20 @ 16:27) (138/90 - 194/73)  RR: 17 (06-19-20 @ 16:27) (16 - 17)  SpO2: 97% (06-19-20 @ 16:27) (97% - 97%)  Wt(kg): --    I&O's Summary    18 Jun 2020 07:01  -  19 Jun 2020 07:00  --------------------------------------------------------  IN: 0 mL / OUT: 850 mL / NET: -850 mL      HEENT:  (-)icterus (-)pallor  CV: N S1 S2 1/6 JAMAAL (+)2 Pulses B/l  Resp:  Clear to ausculatation B/L, normal effort  GI: (+) BS Soft, NT, ND  Lymph:  (-)Edema, (-)obvious lymphadenopathy  Skin: Warm to touch, Normal turgor  Psych: Appropriate mood and affect          ASSESSMENT/PLAN: 	74y  Female AD/DM/HTN/PVD/hysterectomy/cholecystectomy ,with CHF BIVICD, Mild anterior wall ischemia being medically managed  comes to ED with intractable nausea and vomiting for 2 days 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 bladder malignancy.    - BP improved on higher dose of torpol  - Tolerated I+D as well as partial amputation by podiatry  - Vasculary f/u regarding completely occluded popliteal artery  - Will need repeat stress prior to any  high risk surgery  - EGD with nonbleeding ulcer  - Now on Eliquis  - complete Abx per med, s/p PICC  - D/C per med      Pipo Segundo MD, [facility]  BEEPER [phone]

## 2020-06-19 NOTE — PROGRESS NOTE ADULT - ASSESSMENT
Confidential Drug Utilization Report  Search Terms: Ankita Keating, 1945   Search Date: 06/08/2020 16:27:30 PM   The Drug Utilization Report below displays all of the controlled substance prescriptions, if any, that your patient has filled in the last twelve months. The information displayed on this report is compiled from pharmacy submissions to the Department, and accurately reflects the information as submitted by the pharmacies.  This report was requested by: Kathya Sinclair | Reference #: [id number]   You have not added a TRAVIS number. Keeping your TRAVIS number(s) up to date on the My TRAVIS Numbers </[email or web address]/applinks/cspnp/myDeaNumbers> page will enable the separation of your prescriptions from others in the search results.   Others' Prescriptions  Patient Name: Ankita Keating   YOB: 1945   Address: [address] [place] [address]   Sex: Female   Rx Written	Rx Dispensed	Drug	Quantity	Days Supply	Prescriber Name	Payment Method	Dispenser	  05/28/2020	05/29/2020	oxycodone-acetaminophen  mg tab 	60	30	Chico Jeffries MD	Insurance	[facility] #[id number]	  05/22/2020	05/22/2020	oxycodone-acetaminophen 5-325 mg tab 	20	5	Gomez Caraballo Jr, MD	Insurance	[facility] #[id number]	  05/08/2020	05/09/2020	oxycodone-acetaminophen 5-325 mg tab 	20	5	Gomez Caraballo Jr, MD	Insurance	[facility] #[id number]	  04/22/2020	04/28/2020	oxycodone-acetaminophen 5-325 mg tab 	20	5	Venita Thompson MD	Insurance	[facility] #[id number]	  * - Drugs marked with an asterisk are compound drugs. If the compound drug is made up of more than one controlled substance, then each controlled substance will be a separate row in the table.

## 2020-06-19 NOTE — PROGRESS NOTE ADULT - ASSESSMENT
Nausea vomiting/ PUD    ·	Resolved   ·	On NOAC   ·	Zofran PRN nausea   ·	Small frequent low fat meals  ·	pathology negative  ·	PPI daily x 1month     ·	Avoid NSAIDs     I was physically present for the key portions of the evaluation and management (E/M) service provided.  The patient was personally seen and examined at bedside.    Thank you for your consultation and allowing  me to participate in the care of your patients. If you have further questions please contact me at [phone].     Quintin Madrigal M.D.       _________________________________________________________________________________________________  [facility]  [address] [place] [address]  Office: [phone]    Tremaine Culp PA-C  ___________________________________________________________________________________________________

## 2020-06-20 RX ORDER — GABAPENTIN 400 MG/1
1 CAPSULE ORAL
Qty: 28 | Refills: 0
Start: 2020-06-20 | End: 2020-07-03

## 2020-06-20 RX ORDER — ONDANSETRON 8 MG/1
1 TABLET, FILM COATED ORAL
Qty: 6 | Refills: 0
Start: 2020-06-20 | End: 2020-06-21

## 2020-06-23 ENCOUNTER — INPATIENT (INPATIENT)
Facility: HOSPITAL | Age: 75
LOS: 3 days | Discharge: HOME HEALTH | End: 2020-06-27
Attending: INTERNAL MEDICINE | Admitting: INTERNAL MEDICINE
Payer: MEDICARE

## 2020-06-23 VITALS
SYSTOLIC BLOOD PRESSURE: 162 MMHG | RESPIRATION RATE: 18 BRPM | OXYGEN SATURATION: 99 % | HEART RATE: 68 BPM | TEMPERATURE: 98 F | DIASTOLIC BLOOD PRESSURE: 66 MMHG | HEIGHT: 61 IN

## 2020-06-23 DIAGNOSIS — Z90.710 ACQUIRED ABSENCE OF BOTH CERVIX AND UTERUS: Chronic | ICD-10-CM

## 2020-06-23 DIAGNOSIS — Z90.49 ACQUIRED ABSENCE OF OTHER SPECIFIED PARTS OF DIGESTIVE TRACT: Chronic | ICD-10-CM

## 2020-06-23 DIAGNOSIS — Z95.810 PRESENCE OF AUTOMATIC (IMPLANTABLE) CARDIAC DEFIBRILLATOR: Chronic | ICD-10-CM

## 2020-06-23 DIAGNOSIS — Z95.5 PRESENCE OF CORONARY ANGIOPLASTY IMPLANT AND GRAFT: Chronic | ICD-10-CM

## 2020-06-23 DIAGNOSIS — Z95.0 PRESENCE OF CARDIAC PACEMAKER: Chronic | ICD-10-CM

## 2020-06-23 PROBLEM — Z00.00 ENCOUNTER FOR PREVENTIVE HEALTH EXAMINATION: Status: ACTIVE | Noted: 2020-06-23

## 2020-06-23 LAB
ALBUMIN SERPL ELPH-MCNC: 3.4 G/DL — LOW (ref 3.5–5.2)
ALP SERPL-CCNC: 346 U/L — HIGH (ref 30–115)
ALT FLD-CCNC: 30 U/L — SIGNIFICANT CHANGE UP (ref 0–41)
ANION GAP SERPL CALC-SCNC: 13 MMOL/L — SIGNIFICANT CHANGE UP (ref 7–14)
AST SERPL-CCNC: 35 U/L — SIGNIFICANT CHANGE UP (ref 0–41)
BASOPHILS # BLD AUTO: 0.05 K/UL — SIGNIFICANT CHANGE UP (ref 0–0.2)
BASOPHILS NFR BLD AUTO: 0.5 % — SIGNIFICANT CHANGE UP (ref 0–1)
BILIRUB SERPL-MCNC: 0.3 MG/DL — SIGNIFICANT CHANGE UP (ref 0.2–1.2)
BUN SERPL-MCNC: 25 MG/DL — HIGH (ref 10–20)
CALCIUM SERPL-MCNC: 9.2 MG/DL — SIGNIFICANT CHANGE UP (ref 8.5–10.1)
CHLORIDE SERPL-SCNC: 91 MMOL/L — LOW (ref 98–110)
CO2 SERPL-SCNC: 27 MMOL/L — SIGNIFICANT CHANGE UP (ref 17–32)
CREAT SERPL-MCNC: 1.2 MG/DL — SIGNIFICANT CHANGE UP (ref 0.7–1.5)
EOSINOPHIL # BLD AUTO: 0.25 K/UL — SIGNIFICANT CHANGE UP (ref 0–0.7)
EOSINOPHIL NFR BLD AUTO: 2.3 % — SIGNIFICANT CHANGE UP (ref 0–8)
GLUCOSE BLDC GLUCOMTR-MCNC: 218 MG/DL — HIGH (ref 70–99)
GLUCOSE SERPL-MCNC: 143 MG/DL — HIGH (ref 70–99)
HCT VFR BLD CALC: 31.5 % — LOW (ref 37–47)
HGB BLD-MCNC: 10.3 G/DL — LOW (ref 12–16)
IMM GRANULOCYTES NFR BLD AUTO: 0.6 % — HIGH (ref 0.1–0.3)
LYMPHOCYTES # BLD AUTO: 2.42 K/UL — SIGNIFICANT CHANGE UP (ref 1.2–3.4)
LYMPHOCYTES # BLD AUTO: 22.4 % — SIGNIFICANT CHANGE UP (ref 20.5–51.1)
MCHC RBC-ENTMCNC: 28.8 PG — SIGNIFICANT CHANGE UP (ref 27–31)
MCHC RBC-ENTMCNC: 32.7 G/DL — SIGNIFICANT CHANGE UP (ref 32–37)
MCV RBC AUTO: 88 FL — SIGNIFICANT CHANGE UP (ref 81–99)
MONOCYTES # BLD AUTO: 0.91 K/UL — HIGH (ref 0.1–0.6)
MONOCYTES NFR BLD AUTO: 8.4 % — SIGNIFICANT CHANGE UP (ref 1.7–9.3)
NEUTROPHILS # BLD AUTO: 7.13 K/UL — HIGH (ref 1.4–6.5)
NEUTROPHILS NFR BLD AUTO: 65.8 % — SIGNIFICANT CHANGE UP (ref 42.2–75.2)
NRBC # BLD: 0 /100 WBCS — SIGNIFICANT CHANGE UP (ref 0–0)
PLATELET # BLD AUTO: 368 K/UL — SIGNIFICANT CHANGE UP (ref 130–400)
POTASSIUM SERPL-MCNC: 4.2 MMOL/L — SIGNIFICANT CHANGE UP (ref 3.5–5)
POTASSIUM SERPL-SCNC: 4.2 MMOL/L — SIGNIFICANT CHANGE UP (ref 3.5–5)
PROT SERPL-MCNC: 7.6 G/DL — SIGNIFICANT CHANGE UP (ref 6–8)
RBC # BLD: 3.58 M/UL — LOW (ref 4.2–5.4)
RBC # FLD: 14.7 % — HIGH (ref 11.5–14.5)
SODIUM SERPL-SCNC: 131 MMOL/L — LOW (ref 135–146)
WBC # BLD: 10.82 K/UL — HIGH (ref 4.8–10.8)
WBC # FLD AUTO: 10.82 K/UL — HIGH (ref 4.8–10.8)

## 2020-06-23 PROCEDURE — 99223 1ST HOSP IP/OBS HIGH 75: CPT

## 2020-06-23 PROCEDURE — 71045 X-RAY EXAM CHEST 1 VIEW: CPT | Mod: 26

## 2020-06-23 PROCEDURE — 93010 ELECTROCARDIOGRAM REPORT: CPT

## 2020-06-23 PROCEDURE — 73630 X-RAY EXAM OF FOOT: CPT | Mod: 26,LT

## 2020-06-23 PROCEDURE — 99497 ADVNCD CARE PLAN 30 MIN: CPT | Mod: 25

## 2020-06-23 PROCEDURE — 99285 EMERGENCY DEPT VISIT HI MDM: CPT

## 2020-06-23 RX ORDER — HEPARIN SODIUM 5000 [USP'U]/ML
1400 INJECTION INTRAVENOUS; SUBCUTANEOUS
Qty: 25000 | Refills: 0 | Status: DISCONTINUED | OUTPATIENT
Start: 2020-06-23 | End: 2020-06-24

## 2020-06-23 RX ORDER — ATORVASTATIN CALCIUM 80 MG/1
40 TABLET, FILM COATED ORAL AT BEDTIME
Refills: 0 | Status: DISCONTINUED | OUTPATIENT
Start: 2020-06-23 | End: 2020-06-27

## 2020-06-23 RX ORDER — DEXTROSE 50 % IN WATER 50 %
25 SYRINGE (ML) INTRAVENOUS ONCE
Refills: 0 | Status: DISCONTINUED | OUTPATIENT
Start: 2020-06-23 | End: 2020-06-25

## 2020-06-23 RX ORDER — CLOPIDOGREL BISULFATE 75 MG/1
75 TABLET, FILM COATED ORAL DAILY
Refills: 0 | Status: DISCONTINUED | OUTPATIENT
Start: 2020-06-23 | End: 2020-06-27

## 2020-06-23 RX ORDER — INSULIN LISPRO 100/ML
VIAL (ML) SUBCUTANEOUS
Refills: 0 | Status: DISCONTINUED | OUTPATIENT
Start: 2020-06-23 | End: 2020-06-27

## 2020-06-23 RX ORDER — CEFTRIAXONE 500 MG/1
2000 INJECTION, POWDER, FOR SOLUTION INTRAMUSCULAR; INTRAVENOUS EVERY 24 HOURS
Refills: 0 | Status: DISCONTINUED | OUTPATIENT
Start: 2020-06-23 | End: 2020-06-27

## 2020-06-23 RX ORDER — LORATADINE 10 MG/1
10 TABLET ORAL DAILY
Refills: 0 | Status: DISCONTINUED | OUTPATIENT
Start: 2020-06-23 | End: 2020-06-27

## 2020-06-23 RX ORDER — INSULIN LISPRO 100/ML
7 VIAL (ML) SUBCUTANEOUS
Refills: 0 | Status: DISCONTINUED | OUTPATIENT
Start: 2020-06-23 | End: 2020-06-27

## 2020-06-23 RX ORDER — PANTOPRAZOLE SODIUM 20 MG/1
40 TABLET, DELAYED RELEASE ORAL
Refills: 0 | Status: DISCONTINUED | OUTPATIENT
Start: 2020-06-23 | End: 2020-06-27

## 2020-06-23 RX ORDER — METOPROLOL TARTRATE 50 MG
100 TABLET ORAL DAILY
Refills: 0 | Status: DISCONTINUED | OUTPATIENT
Start: 2020-06-23 | End: 2020-06-27

## 2020-06-23 RX ORDER — CHLORHEXIDINE GLUCONATE 213 G/1000ML
1 SOLUTION TOPICAL
Refills: 0 | Status: DISCONTINUED | OUTPATIENT
Start: 2020-06-23 | End: 2020-06-27

## 2020-06-23 RX ORDER — GLUCAGON INJECTION, SOLUTION 0.5 MG/.1ML
1 INJECTION, SOLUTION SUBCUTANEOUS ONCE
Refills: 0 | Status: DISCONTINUED | OUTPATIENT
Start: 2020-06-23 | End: 2020-06-25

## 2020-06-23 RX ORDER — SODIUM CHLORIDE 9 MG/ML
1000 INJECTION, SOLUTION INTRAVENOUS
Refills: 0 | Status: DISCONTINUED | OUTPATIENT
Start: 2020-06-23 | End: 2020-06-25

## 2020-06-23 RX ORDER — DEXTROSE 50 % IN WATER 50 %
12.5 SYRINGE (ML) INTRAVENOUS ONCE
Refills: 0 | Status: DISCONTINUED | OUTPATIENT
Start: 2020-06-23 | End: 2020-06-25

## 2020-06-23 RX ORDER — GABAPENTIN 400 MG/1
100 CAPSULE ORAL EVERY 12 HOURS
Refills: 0 | Status: DISCONTINUED | OUTPATIENT
Start: 2020-06-23 | End: 2020-06-27

## 2020-06-23 RX ORDER — HEPARIN SODIUM 5000 [USP'U]/ML
6500 INJECTION INTRAVENOUS; SUBCUTANEOUS ONCE
Refills: 0 | Status: DISCONTINUED | OUTPATIENT
Start: 2020-06-23 | End: 2020-06-25

## 2020-06-23 RX ORDER — SODIUM CHLORIDE 9 MG/ML
1000 INJECTION INTRAMUSCULAR; INTRAVENOUS; SUBCUTANEOUS
Refills: 0 | Status: DISCONTINUED | OUTPATIENT
Start: 2020-06-23 | End: 2020-06-23

## 2020-06-23 RX ORDER — TRAMADOL HYDROCHLORIDE 50 MG/1
25 TABLET ORAL EVERY 8 HOURS
Refills: 0 | Status: DISCONTINUED | OUTPATIENT
Start: 2020-06-23 | End: 2020-06-27

## 2020-06-23 RX ORDER — DEXTROSE 50 % IN WATER 50 %
15 SYRINGE (ML) INTRAVENOUS ONCE
Refills: 0 | Status: DISCONTINUED | OUTPATIENT
Start: 2020-06-23 | End: 2020-06-25

## 2020-06-23 RX ORDER — HYDRALAZINE HCL 50 MG
50 TABLET ORAL EVERY 8 HOURS
Refills: 0 | Status: DISCONTINUED | OUTPATIENT
Start: 2020-06-23 | End: 2020-06-27

## 2020-06-23 RX ORDER — SODIUM CHLORIDE 9 MG/ML
1000 INJECTION INTRAMUSCULAR; INTRAVENOUS; SUBCUTANEOUS
Refills: 0 | Status: DISCONTINUED | OUTPATIENT
Start: 2020-06-23 | End: 2020-06-25

## 2020-06-23 RX ORDER — SENNA PLUS 8.6 MG/1
2 TABLET ORAL AT BEDTIME
Refills: 0 | Status: DISCONTINUED | OUTPATIENT
Start: 2020-06-23 | End: 2020-06-27

## 2020-06-23 RX ORDER — HYDRALAZINE HCL 50 MG
100 TABLET ORAL EVERY 8 HOURS
Refills: 0 | Status: DISCONTINUED | OUTPATIENT
Start: 2020-06-23 | End: 2020-06-23

## 2020-06-23 RX ORDER — ACETAMINOPHEN 500 MG
650 TABLET ORAL EVERY 6 HOURS
Refills: 0 | Status: DISCONTINUED | OUTPATIENT
Start: 2020-06-23 | End: 2020-06-27

## 2020-06-23 RX ORDER — INSULIN GLARGINE 100 [IU]/ML
21 INJECTION, SOLUTION SUBCUTANEOUS AT BEDTIME
Refills: 0 | Status: DISCONTINUED | OUTPATIENT
Start: 2020-06-23 | End: 2020-06-27

## 2020-06-23 RX ADMIN — INSULIN GLARGINE 21 UNIT(S): 100 INJECTION, SOLUTION SUBCUTANEOUS at 22:58

## 2020-06-23 RX ADMIN — Medication 50 MILLIGRAM(S): at 22:05

## 2020-06-23 RX ADMIN — CEFTRIAXONE 100 MILLIGRAM(S): 500 INJECTION, POWDER, FOR SOLUTION INTRAMUSCULAR; INTRAVENOUS at 22:07

## 2020-06-23 RX ADMIN — ATORVASTATIN CALCIUM 40 MILLIGRAM(S): 80 TABLET, FILM COATED ORAL at 22:59

## 2020-06-23 RX ADMIN — SENNA PLUS 2 TABLET(S): 8.6 TABLET ORAL at 22:05

## 2020-06-23 RX ADMIN — HEPARIN SODIUM 14 UNIT(S)/HR: 5000 INJECTION INTRAVENOUS; SUBCUTANEOUS at 22:56

## 2020-06-23 NOTE — CONSULT NOTE ADULT - SUBJECTIVE AND OBJECTIVE BOX
Podiatry Consult Note    Subjective:  DARRION MELENDEZ is a pleasant well-nourished, well developed 74y Female in no acute distress, alert awake, and oriented to person, place and time.   Patient is a 74y old  Female who presents with a chief complaint of Non-healing LLE ulcers (23 Jun 2020 18:33)    Past Medical History and Surgical History  PAST MEDICAL & SURGICAL HISTORY:  Amputation of toe of left foot  PVD (peripheral vascular disease)  CAD (coronary artery disease)  CHF (congestive heart failure)  HTN (hypertension)  DM (diabetes mellitus)  Status post coronary artery stent placement  H/O: hysterectomy  Cardiac defibrillator in place  Artificial cardiac pacemaker  History of cholecystectomy       Review of Systems:   Constitutional: No weakness, fevers or chills  Eyes / ENT: No visual changes; No vertigo or throat pain   Neck: No pain or stiffness  Respiratory: No cough, wheezing, hemoptysis; No shortness of breath  Cardiovascular: No chest pain or palpitations  Gastrointestinal: No abdominal or epigastric pain. No nausea, vomiting, or hematemesis; No diarrhea or constipation. No melena or hematochezia.  Genitourinary: No dysuria, frequency or hematuria  Neurological: No numbness or weakness  Skin:    Objective:  Vital Signs Last 24 Hrs  T(C): 36.7 (23 Jun 2020 17:41), Max: 36.7 (23 Jun 2020 17:41)  T(F): 98 (23 Jun 2020 17:41), Max: 98 (23 Jun 2020 17:41)  HR: 68 (23 Jun 2020 17:41) (68 - 68)  BP: 162/66 (23 Jun 2020 17:41) (162/66 - 162/66)  BP(mean): --  RR: 18 (23 Jun 2020 17:41) (18 - 18)  SpO2: 99% (23 Jun 2020 17:41) (99% - 99%)                        10.3   10.82 )-----------( 368      ( 23 Jun 2020 19:02 )             31.5                 06-23    131<L>  |  91<L>  |  25<H>  ----------------------------<  143<H>  4.2   |  27  |  1.2    Ca    9.2      23 Jun 2020 19:02    TPro  7.6  /  Alb  3.4<L>  /  TBili  0.3  /  DBili  x   /  AST  35  /  ALT  30  /  AlkPhos  346<H>  06-23        Physical Exam - Lower Extremity Focused:   Derm: Left hallux amputated with non-healing ulcer. Sutures are intact without healing surgical incision site. No drainage, no pus, no malodor, no sign of infection. Anteromedial aspect of left hallux amputation site has about 1cm x 1.5 cm superficial 90% granular, 10% fibrotic ulcer.    Vascular: DP and PT Pulses Diminished  Neuro: Light touch diminished   MSK: Mild discomfort on palpation on left hallux amputation site    Assessment:  Left hallux amputation surgical site non-healing ulcer    Plan:  Chart reviewed and Patient evaluated. All Questions and Concerns Addressed and Answered  Discussed diagnosis and treatment with patient  ID consult recommended  Vascular consult recommended  Betadine with DSD dressings applied  3 Views of standard foot xray is recommended to r/o possible OM  Attending to follow up tomorrow.  Discuss plan with attending, Dr. Yo.     Podiatry

## 2020-06-23 NOTE — H&P ADULT - NSHPPHYSICALEXAM_GEN_ALL_CORE
PHYSICAL EXAM:  General Appearance: NAD alert and oriented x 3  Cardiovascular: regular S1 /S2   Respiratory: Lungs clear to auscultation	  Gastrointestinal:  Soft, Non-tender  Neurologic: Non-focal  Musculoskeletal/extremities: Normal range of motion, dorsal ulcer with pus and lateral ulcers LLE  Left hallux amputated with non-healing ulcer. Sutures are intact without healing surgical incision site. No drainage, no pus, no malodor, no sign of infection.   Vascular: RLE: +1 DP, +1 PT. LLE: +1 PT, absent DP PHYSICAL EXAM:  General Appearance: NAD alert and oriented x 3  Cardiovascular: regular S1 /S2   Respiratory: Lungs clear to auscultation	  Gastrointestinal:  Soft, Non-tender  Neurologic: Non-focal  Musculoskeletal/extremities: Normal range of motion, dorsal ulcer with pus and lateral ulcers LLE  Left hallux amputated with non-healing ulcer. Sutures are intact without healing surgical incision site. No drainage, no pus, no malodor, no sign of infection.   Vascular: RLE: +1 DP, +1 PT. LLE: +1 PT, severely diminished DP

## 2020-06-23 NOTE — CONSULT NOTE ADULT - ASSESSMENT
Patient is a 74y old  Female who presents with a chief complaint of worsening LE pain and new developing ulcers.    Critical limb ischemia- non-healing ulcers of L foot   Amputation of toe of left foot  PVD (peripheral vascular disease)  CAD (coronary artery disease) s/p PCI  Ischemic CMP s/p CRT-D   HTN (hypertension)  Bladder mass?  DM (diabetes mellitus)  TIA  PAF-CHADSVASC 8  OM of the L foot     Recommendations:   Admit to medicine  Heparin gtt, PTT 50-70s, d/c Eliquis   c/w Lipitor 40 mg daily  c/w Doxycylcine and Rocephin via PICC line as per home regimen   SQ Insulin and tight FS control   Monitor H/H   COVID PCR   LE angiogram on Thursday 6/25, keep NPO after midnight Patient is a 74y old  Female who presents with a chief complaint of worsening LE pain and new developing ulcers.    Critical limb ischemia- non-healing ulcers of L foot   Amputation of toe of left foot  PVD (peripheral vascular disease)  CAD (coronary artery disease) s/p PCI  Ischemic CMP s/p CRT-D   HTN (hypertension)  Bladder mass?  DM (diabetes mellitus)  TIA  PAF-CHADSVASC 8  OM of the L foot     Recommendations:   Admit to medicine  Heparin gtt, PTT 50-70s, d/c Eliquis   c/w Lipitor 40 mg daily  c/w Doxycylcine and Rocephin via PICC line as per home regimen   SQ Insulin and tight FS control   c/w home meds   Monitor H/H   COVID PCR   LE angiogram on Thursday 6/25, keep NPO after midnight Patient is a 74y old  Female who presents with a chief complaint of worsening LE pain and new developing ulcers.    Critical limb ischemia- non-healing ulcers of L foot   Amputation of toe of left foot  PVD (peripheral vascular disease)  CAD (coronary artery disease) s/p PCI  Ischemic CMP s/p CRT-D   HTN (hypertension)  Bladder mass?  DM (diabetes mellitus)  TIA  PAF-CHADSVASC 8  OM of the L foot     Recommendations:   Admit to medicine  Heparin gtt, PTT 50-70s, d/c Eliquis   c/w Lipitor 40 mg daily  c/w Doxycylcine and Rocephin via PICC line as per home regimen   SQ Insulin and tight FS control   c/w home meds   Monitor H/H   Bilateral arterial duplex and ESTRELLA  COVID PCR   LE angiogram on Thursday 6/25, keep NPO after midnight

## 2020-06-23 NOTE — H&P ADULT - ATTENDING COMMENTS
Patient declines need to contact family at this time.      PHYSICAL EXAM:    CONSTITUTIONAL: NAD  ENMT: EOMI, PERRLA, No tonsillar erythema, exudates, or enlargement, neck supple, No JVD  PSYCH: Alert & Oriented X3  RESPIRATORY: Clear to percussion bilaterally; No rales, rhonchi, wheezing, or rubs  CARDIOVASCULAR: Regular rate and rhythm; No murmurs, rubs, or gallops, negative edema  GASTROINTESTINAL: Soft, Nontender, Nondistended; Bowel sounds present  EXTREMITIES:   No clubbing, cyanosis over RLE, Left foot dressing clean, dry and intact, patient declines take down of dressings as recently dressed prior to my interview  SKIN: Right PICC line in place    75 yo F, recently admitted and discharged on 6/20/20 from [facility] for acute Left PICA stroke, with extensive PMHx of CAD s/p PCI, Ischemic Cardiomyopathy s/p CRT-D, HTN, DM II, Paroxysmal AFIB on Eliquis, HFrEF, PPM, PUD, Bladder Wall Thickening under workup, PVD s/p LLE angiogram at [facility] revealing non-reconstructive severe occlusion of popliteal and tibial vessels, s/p recent left great hallux amputation 5/20 secondary to gangrene and MSSA Osteomyelitis on current IV Antiboitics still, advised to report to ED s/p home visit with Podiatrist noted 2 new left foot ulcers. Patient denies any other complaints, states she was otherwise doing well following recent discharge.      #Chronic PVD, LLE ischemia, Left Foot MSSA OM: Vascular-Cardiology recommendations noted, discussed case with Cardiology fellow, plan for LLE Angiogram on Thursday, Podiatry recommendations noted, continue Ceftriaxone and Doxycycline via right PICC line for 6 weeks total, will complete second to last week in July, continue heparin infusion, monitor PTT, maintain between 50-70    #BONNY, likely prerenal, last known Creatinine 0.84 on 6/19, as per resident physician's discussion with Cardio to hydrate 12 hours prior to angiogram, monitor BUN/Cr, start IVF if worsening BONNY reflecting on AM labs, avoid nephrotoxins, lisinopril held    #CAD/Ischemic Cardiomyopathy/HFrEF (no sign of overt volume overload)/recent left PICA stroke/PUD/HTN/DM II (basal bolus insulin, monitor fingersticks): Continue home medications     #Bladder Wall Thickening, history of Stage 1 endometrial cancer s/p hysterectomy: f/u Urology for outpatient cystoscopy   urine cytology: NEGATIVE FOR HIGH GRADE UROTHELIAL CARCINOMA    Patient is full code    Disposition: Acute Patient declines need to contact family at this time.    PHYSICAL EXAM:    CONSTITUTIONAL: NAD  ENMT: EOMI, PERRLA, No tonsillar erythema, exudates, or enlargement, neck supple, No JVD  PSYCH: Alert & Oriented X3  RESPIRATORY: Clear to percussion bilaterally; No rales, rhonchi, wheezing, or rubs  CARDIOVASCULAR: Regular rate and rhythm; No murmurs, rubs, or gallops, negative edema  GASTROINTESTINAL: Soft, Nontender, Nondistended; Bowel sounds present  EXTREMITIES:   No clubbing, cyanosis over RLE, Left foot dressing clean, dry and intact, patient declines take down of dressings as recently dressed prior to my interview--defer to Podiatry's documentation of physicial findings  SKIN: Right PICC line in place    75 yo F, recently admitted and discharged on 6/20/20 from [facility] for acute Left PICA stroke, with extensive PMHx of CAD s/p PCI, Ischemic Cardiomyopathy s/p CRT-D, HTN, DM II, Paroxysmal AFIB on Eliquis, HFrEF, PPM, PUD, Bladder Wall Thickening under workup, PVD s/p LLE angiogram at [facility] revealing non-reconstructive severe occlusion of popliteal and tibial vessels, s/p recent left great hallux amputation 5/20 secondary to gangrene and MSSA Osteomyelitis on current IV Antiboitics still, advised to report to ED s/p home visit with Podiatrist noted 2 new left foot ulcers. Patient denies any other complaints, states she was otherwise doing well following recent discharge.      #Chronic PVD, LLE ischemia, Left Foot MSSA OM: Vascular-Cardiology recommendations noted, discussed case with Cardiology fellow, plan for LLE Angiogram on Thursday, Podiatry recommendations noted, continue Ceftriaxone and Doxycycline via right PICC line for 6 weeks total, will complete second to last week in July, continue heparin infusion, monitor PTT, maintain between 50-70    #BONNY, likely prerenal, last known Creatinine 0.84 on 6/19, as per resident physician's discussion with Cardio to hydrate 12 hours prior to angiogram, monitor BUN/Cr, start IVF if worsening BONNY reflecting on AM labs, avoid nephrotoxins, lisinopril held    #CAD/Ischemic Cardiomyopathy/HFrEF (no sign of overt volume overload)/recent left PICA stroke/PUD/HTN/DM II (basal bolus insulin, monitor fingersticks): Continue home medications     #Bladder Wall Thickening, history of Stage 1 endometrial cancer s/p hysterectomy: f/u Urology for outpatient cystoscopy   urine cytology: NEGATIVE FOR HIGH GRADE UROTHELIAL CARCINOMA    Patient is full code    Disposition: Acute Patient declines need to contact family at this time.    PHYSICAL EXAM:    CONSTITUTIONAL: NAD  ENMT: EOMI, PERRLA, No tonsillar erythema, exudates, or enlargement, neck supple, No JVD  PSYCH: Alert & Oriented X3  RESPIRATORY: Clear to percussion bilaterally; No rales, rhonchi, wheezing, or rubs  CARDIOVASCULAR: Regular rate and rhythm; No murmurs, rubs, or gallops, negative edema  GASTROINTESTINAL: Soft, Nontender, Nondistended; Bowel sounds present  EXTREMITIES:   No clubbing, cyanosis over RLE, Left foot dressing clean, dry and intact, patient declines take down of dressings as recently dressed prior to my interview--defer to Podiatry's documentation of physicial findings  SKIN: Right PICC line in place    73 yo F, recently admitted and discharged on 6/20/20 from [facility] for acute Left PICA stroke, with extensive PMHx of CAD s/p PCI, Ischemic Cardiomyopathy s/p CRT-D, HTN, DM II, Paroxysmal AFIB on Eliquis, HFrEF, PPM, PUD, Bladder Wall Thickening under workup, PVD s/p LLE angiogram at [facility] revealing non-reconstructive severe occlusion of popliteal and tibial vessels, s/p recent left great hallux amputation 5/20 secondary to gangrene and MSSA Osteomyelitis on current IV Antiboitics still, advised to report to ED s/p home visit with Podiatrist noted 2 new left foot ulcers. Patient denies any other complaints, states she was otherwise doing well following recent discharge.      #Chronic PVD, LLE ischemia, Left Foot MSSA OM: Vascular-Cardiology recommendations noted, discussed case with Cardiology fellow, plan for LLE Angiogram on Thursday, Podiatry recommendations noted, continue Ceftriaxone and Doxycycline via right PICC line for 6 weeks total, will complete second to last week in July, continue heparin infusion, monitor PTT, maintain between 50-70    #BONNY, likely prerenal, last known Creatinine 0.84 on 6/19, as per resident physician's discussion with Cardio to hydrate 12 hours prior to angiogram, monitor BUN/Cr, start IVF if worsening BONNY reflecting on AM labs, avoid nephrotoxins, lisinopril held    #CAD/Ischemic Cardiomyopathy/HFrEF (no sign of overt volume overload)/recent left PICA stroke/PUD/HTN/DM II (basal bolus insulin, monitor fingersticks): Continue home medications     #Bladder Wall Thickening, history of Stage 1 endometrial cancer s/p hysterectomy: f/u Urology for outpatient cystoscopy     Patient is full code    Disposition: Acute Patient declines need to contact family at this time.    PHYSICAL EXAM:    CONSTITUTIONAL: NAD  ENMT: EOMI, PERRLA, No tonsillar erythema, exudates, or enlargement, neck supple, No JVD  PSYCH: Alert & Oriented X3  RESPIRATORY: Clear to percussion bilaterally; No rales, rhonchi, wheezing, or rubs  CARDIOVASCULAR: Regular rate and rhythm; No murmurs, rubs, or gallops, negative edema  GASTROINTESTINAL: Soft, Nontender, Nondistended; Bowel sounds present  EXTREMITIES:   No clubbing, cyanosis over RLE, Left foot dressing clean, dry and intact, patient declines take down of dressings as recently dressed prior to my interview--defer to Podiatry's documentation of physicial findings  SKIN: Right PICC line in place    73 yo F, recently admitted and discharged on 6/20/20 from [facility] for acute Left PICA stroke, with extensive PMHx of CAD s/p PCI, Ischemic Cardiomyopathy s/p CRT-D, HTN, DM II, Paroxysmal AFIB on Eliquis, HFrEF, PPM, PUD, Bladder Wall Thickening under workup, PVD s/p LLE angiogram at [facility] revealing non-reconstructive severe occlusion of popliteal and tibial vessels, s/p recent left great hallux amputation 5/20 secondary to gangrene and MSSA Osteomyelitis on current IV Antiboitics still, advised to report to ED s/p home visit with Podiatrist noted 2 new left foot ulcers. Patient denies any other complaints, states she was otherwise doing well following recent discharge.      #Chronic PVD, LLE ischemia, Left Foot Nonhealing Diabetic Foot Ulcers, Left Foot MSSA OM: Vascular-Cardiology recommendations noted, discussed case with Cardiology fellow, plan for LLE Angiogram on Thursday, Podiatry recommendations noted, continue Ceftriaxone and Doxycycline via right PICC line for 6 weeks total, will complete second to last week in July, continue heparin infusion, monitor PTT, maintain between 50-70    #BONNY, likely prerenal, last known Creatinine 0.84 on 6/19, as per resident physician's discussion with Cardio to hydrate 12 hours prior to angiogram, monitor BUN/Cr, start IVF if worsening BONNY reflecting on AM labs, avoid nephrotoxins, lisinopril held    #CAD/Ischemic Cardiomyopathy/HFrEF (no sign of overt volume overload)/recent left PICA stroke/PUD/HTN/DM II (basal bolus insulin, monitor fingersticks): Continue home medications     #Bladder Wall Thickening, history of Stage 1 endometrial cancer s/p hysterectomy: f/u Urology for outpatient cystoscopy     Patient is full code    Disposition: Acute Patient declines need to contact family at this time.    PHYSICAL EXAM:    CONSTITUTIONAL: NAD  ENMT: EOMI, PERRLA, No tonsillar erythema, exudates, or enlargement, neck supple, No JVD  PSYCH: Alert & Oriented X3  RESPIRATORY: Clear to percussion bilaterally; No rales, rhonchi, wheezing, or rubs  CARDIOVASCULAR: Regular rate and rhythm; No murmurs, rubs, or gallops, negative edema  GASTROINTESTINAL: Soft, Nontender, Nondistended; Bowel sounds present  EXTREMITIES:   No clubbing, cyanosis over RLE, Left foot dressing clean, dry and intact, patient declines take down of dressings as recently dressed prior to my interview--defer to Podiatry's documentation of physicial findings  SKIN: Right PICC line in place    75 yo F, recently admitted and discharged on 6/20/20 from [facility] for acute Left PICA stroke, with extensive PMHx of CAD s/p PCI, Ischemic Cardiomyopathy s/p CRT-D, HTN, DM II, Paroxysmal AFIB on Eliquis, HFrEF, PPM, PUD, Bladder Wall Thickening under workup, PVD s/p LLE angiogram at [facility] revealing non-reconstructive severe occlusion of popliteal and tibial vessels, s/p recent left great hallux amputation 5/20 secondary to gangrene and MSSA Osteomyelitis on current IV Antiboitics still, advised to report to ED s/p home visit with Podiatrist noted 2 new left foot ulcers. Patient denies any other complaints, states she was otherwise doing well following recent discharge.      #Chronic PVD, LLE ischemia, Left Foot Nonhealing Diabetic Foot Ulcers, Left Foot MSSA OM: Vascular-Cardiology recommendations noted, discussed case with Cardiology fellow, plan for LLE Angiogram on Thursday, Podiatry recommendations noted, continue Ceftriaxone and Doxycycline via right PICC line for 6 weeks total, will complete second to last week in July, continue heparin infusion, monitor PTT, maintain between 50-70    #BONNY, likely prerenal, last known Creatinine 0.84 on 6/19, continue gentle IVF, monitor BUN/Cr, avoid nephrotoxins, lisinopril held    #CAD/Ischemic Cardiomyopathy/HFrEF (no sign of overt volume overload)/recent left PICA stroke/PUD/HTN/DM II (basal bolus insulin, monitor fingersticks): Continue home medications     #Bladder Wall Thickening, history of Stage 1 endometrial cancer s/p hysterectomy: f/u Urology for outpatient cystoscopy     Patient is full code    Disposition: Acute

## 2020-06-23 NOTE — CONSULT NOTE ADULT - SUBJECTIVE AND OBJECTIVE BOX
Date of Admission: 6/23/2020    CHIEF COMPLAINT: LLE non-healing ulcers    HISTORY OF PRESENT ILLNESS:   75 yo female with CAD s/p PCI (last in 2014), PVD s/p stenting to LLE, HFpEF (improved), ischemic cardiomyopathy s/p CRT-D, TIA, HTN, DM on insulin, PAF on Eliquis, bladder mass (under investigation), PUD (healed) presenting for worsening LLE pain. Patient had a recent hospitalization at [place] for non-healing LLE ulcer, where she underwent a LE angiogram which showed "non-reconstructible diffuse disease annette. pop. and tibial arteries".  Today, she was at her podiatrist today who noticed two new ulcers on the lateral aspect of her L foot so she sent her to ED for evaluation by Dr Hammonds.       PAST MEDICAL & SURGICAL HISTORY:  PVD (peripheral vascular disease)  CAD (coronary artery disease)  CHF (congestive heart failure)  HTN (hypertension)  DM (diabetes mellitus)  Status post coronary artery stent placement  H/O: hysterectomy  Cardiac defibrillator in place  Artificial cardiac pacemaker  History of cholecystectomy      FAMILY HISTORY:  [x] no pertinent family history of premature cardiovascular disease in first degree relatives.  Mother:   Father:   Siblings:     SOCIAL HISTORY:    [x] Non-smoker  [ ] Smoker  [ ] Alcohol    Allergies    codeine (Rash)  penicillin (Rash)    Intolerances    	    REVIEW OF SYSTEMS:  CONSTITUTIONAL: denies fever, weight loss, or fatigue  CARDIOLOGY: denies chest pain, shortness of breath or syncopal episodes.   RESPIRATORY: denies shortness of breath, wheezing.   NEUROLOGICAL: denies weakness, no focal deficits to report.  ENDOCRINOLOGICAL: no recent change in diabetic medications.   GI: no BRBPR, no N,V, diarrhea.    PSYCHIATRY: normal mood and affect  HEENT: no nasal discharge, no ecchymosis  SKIN: peripheral loss of hair  MUSCULOSKELETAL: Full range of motion x4.     PHYSICAL EXAM:  T(C): 36.7 (06-23-20 @ 17:41), Max: 36.7 (06-23-20 @ 17:41)  HR: 68 (06-23-20 @ 17:41) (68 - 68)  BP: 162/66 (06-23-20 @ 17:41) (162/66 - 162/66)  RR: 18 (06-23-20 @ 17:41) (18 - 18)  SpO2: 99% (06-23-20 @ 17:41) (99% - 99%)  Wt(kg): --  I&O's Summary      General Appearance: well appearing, normal for age and gender. 	  Neck: normal JVP, no bruit.   Eyes: No xanthomalasia, Extra Ocular muscles intact.   Cardiovascular: irregular S1 S2, No JVD, No murmurs, No edema  Respiratory: Lungs clear to auscultation	  Psychiatry: Alert and oriented x 3, Mood & affect appropriate  Gastrointestinal:  Soft, Non-tender  Skin/Integumen: No rashes, No ecchymoses, No cyanosis	  Neurologic: Non-focal  Musculoskeletal/extremities: Normal range of motion, dorsal ulcer with pus and lateral ulcers LLE  Vascular: RLE: +1 DP, +1 PT. LLE: +1 PT, absent DP             ECG:  	pending  RADIOLOGY:  OTHER: 	    PREVIOUS DIAGNOSTIC TESTING:    [ ] Echocardiogram:  < from: Transthoracic Echocardiogram (06.01.20 @ 09:12) >  CONCLUSIONS:  1. Mitral annular calcification, and calcified mitral  leaflets with normal diastolic opening. Moderate mitral  regurgitation.  2. Calcified trileaflet aortic valve with normal opening.  3. Aortic Root: 2.6 cm.  4. Mildlydilated left atrium.  LA volume index = 37 cc/m2.  5. Normal left ventricular internal dimensions and wall  thicknesses.  6. Normal Left Ventricular Systolic Function,  (EF =  50-55%)  7. Diastolic function incompletely assessed.  8. Normal right atrium.  9. Normal right ventricular size with decreased RV systolic  function (TAPSE 1.3cm, tissue doppler .08m/s) . A device  lead is visualized in the right heart.  10. Unable to estimate RVSP.  11. There is mild tricuspid regurgitation.  12. There ismild pulmonic regurgitation.  13. Normal pericardium with no pericardial effusion.    < end of copied text >    [ ] Stress Test:  	  < from: Nuclear Stress Test-Pharmacologic (11.01.16 @ 09:43) >  IMPRESSIONS:  * Non-diagnostic Regadenoson stress ECG.  * Myocardial Perfusion SPECT results are abnormal.  * The left ventricle was normal LV size.  * There is a small, mdoerate defect in apical wall that is  predominantly fixed consistent with attenuation artifact.  *There is a small, mild defect in mid anterior  wall that  is predominantly reversible, suggestive of mild ischemia.    *  Post-stress resting myocardial perfusion gated SPECT  imaging was performed (LVEF > 70%;LVEDV = 52 ml.) and  showed normal wall motion.    < end of copied text >    	    Home Medications:  acetaminophen 325 mg oral tablet: 2 tab(s) orally every 6 hours, As needed, Mild Pain (1 - 3), Moderate Pain (4 - 6) (18 Jun 2020 12:32)  atorvastatin 40 mg oral tablet: 1 tab(s) orally once a day (30 May 2020 14:52)  fexofenadine 180 mg oral tablet: 1 tab(s) orally once a day (30 May 2020 14:52)  furosemide 20 mg oral tablet: 1 tab(s) orally 2 times a day (30 May 2020 14:52)  glimepiride 4 mg oral tablet: 1 tab(s) orally 2 times a day (30 May 2020 14:52)  Levemir FlexPen 100 units/mL subcutaneous solution: 30 unit(s) subcutaneous once a day (at bedtime) (30 May 2020 14:52)  lisinopril 40 mg oral tablet: 1 tab(s) orally once a day (30 May 2020 14:52)  NovoLOG FlexPen 100 units/mL injectable solution: 10 unit(s) injectable 3 times a day with meals (30 May 2020 14:52)  omeprazole 40 mg oral delayed release capsule: 1 cap(s) orally once a day (30 May 2020 14:52)  senna oral tablet: 2 tab(s) orally once a day (at bedtime) (18 Jun 2020 12:32)    MEDICATIONS  (STANDING):    MEDICATIONS  (PRN): Date of Admission: 6/23/2020    CHIEF COMPLAINT: LLE non-healing ulcers    HISTORY OF PRESENT ILLNESS:   75 yo female with CAD s/p PCI (last in 2014), PVD s/p stenting to LLE, HFpEF (improved), ischemic cardiomyopathy s/p CRT-D, TIA, HTN, DM on insulin, PAF on Eliquis, bladder mass (under investigation), PUD (healed) presenting for worsening LLE pain. Patient had a recent hospitalization at [place] for non-healing LLE ulcer, where she underwent a LE angiogram which showed "non-reconstructible diffuse disease annette. pop. and tibial arteries".  Today, she was at her podiatrist today who noticed two new ulcers on the lateral aspect of her L foot so she sent her to ED for evaluation by Dr Hammonds.       PAST MEDICAL & SURGICAL HISTORY:  PVD (peripheral vascular disease)  CAD (coronary artery disease)  CHF (congestive heart failure)  HTN (hypertension)  DM (diabetes mellitus)  Status post coronary artery stent placement  H/O: hysterectomy  Cardiac defibrillator in place  Artificial cardiac pacemaker  History of cholecystectomy      FAMILY HISTORY:  [x] no pertinent family history of premature cardiovascular disease in first degree relatives.  Mother:   Father:   Siblings:     SOCIAL HISTORY:    [x] Non-smoker  [ ] Smoker  [ ] Alcohol    Allergies    codeine (Rash)  penicillin (Rash)    Intolerances    	    REVIEW OF SYSTEMS:  CONSTITUTIONAL: denies fever, weight loss, or fatigue  CARDIOLOGY: denies chest pain, shortness of breath or syncopal episodes.   RESPIRATORY: denies shortness of breath, wheezing.   NEUROLOGICAL: denies weakness, no focal deficits to report.  ENDOCRINOLOGICAL: no recent change in diabetic medications.   GI: no BRBPR, no N,V, diarrhea.    PSYCHIATRY: normal mood and affect  HEENT: no nasal discharge, no ecchymosis  SKIN: peripheral loss of hair, LE uclers  MUSCULOSKELETAL: Full range of motion x4.     PHYSICAL EXAM:  T(C): 36.7 (06-23-20 @ 17:41), Max: 36.7 (06-23-20 @ 17:41)  HR: 68 (06-23-20 @ 17:41) (68 - 68)  BP: 162/66 (06-23-20 @ 17:41) (162/66 - 162/66)  RR: 18 (06-23-20 @ 17:41) (18 - 18)  SpO2: 99% (06-23-20 @ 17:41) (99% - 99%)  Wt(kg): --  I&O's Summary      General Appearance: well appearing, normal for age and gender. 	  Neck: normal JVP, no bruit.   Eyes: No xanthomalasia, Extra Ocular muscles intact.   Cardiovascular: irregular S1 S2, No JVD, No murmurs, No edema  Respiratory: Lungs clear to auscultation	  Psychiatry: Alert and oriented x 3, Mood & affect appropriate  Gastrointestinal:  Soft, Non-tender  Skin/Integumen: No rashes, No ecchymoses, No cyanosis. Left foot: clean stump, red with possible underlying collection, clean ulcers of the dorsum the lateral aspect (5th metatarsal head)  Neurologic: Non-focal  Musculoskeletal/extremities: Normal range of motion, dorsal ulcer with pus and lateral ulcers LLE  Vascular: RLE: +1 DP, +1 PT. LLE: +1 PT, absent DP             ECG:  	pending  RADIOLOGY:  OTHER: 	    PREVIOUS DIAGNOSTIC TESTING:    [ ] Echocardiogram:  < from: Transthoracic Echocardiogram (06.01.20 @ 09:12) >  CONCLUSIONS:  1. Mitral annular calcification, and calcified mitral  leaflets with normal diastolic opening. Moderate mitral  regurgitation.  2. Calcified trileaflet aortic valve with normal opening.  3. Aortic Root: 2.6 cm.  4. Mildlydilated left atrium.  LA volume index = 37 cc/m2.  5. Normal left ventricular internal dimensions and wall  thicknesses.  6. Normal Left Ventricular Systolic Function,  (EF =  50-55%)  7. Diastolic function incompletely assessed.  8. Normal right atrium.  9. Normal right ventricular size with decreased RV systolic  function (TAPSE 1.3cm, tissue doppler .08m/s) . A device  lead is visualized in the right heart.  10. Unable to estimate RVSP.  11. There is mild tricuspid regurgitation.  12. There ismild pulmonic regurgitation.  13. Normal pericardium with no pericardial effusion.    < end of copied text >    [ ] Stress Test:  	  < from: Nuclear Stress Test-Pharmacologic (11.01.16 @ 09:43) >  IMPRESSIONS:  * Non-diagnostic Regadenoson stress ECG.  * Myocardial Perfusion SPECT results are abnormal.  * The left ventricle was normal LV size.  * There is a small, mdoerate defect in apical wall that is  predominantly fixed consistent with attenuation artifact.  *There is a small, mild defect in mid anterior  wall that  is predominantly reversible, suggestive of mild ischemia.    *  Post-stress resting myocardial perfusion gated SPECT  imaging was performed (LVEF > 70%;LVEDV = 52 ml.) and  showed normal wall motion.    < end of copied text >    	    Home Medications:  acetaminophen 325 mg oral tablet: 2 tab(s) orally every 6 hours, As needed, Mild Pain (1 - 3), Moderate Pain (4 - 6) (18 Jun 2020 12:32)  atorvastatin 40 mg oral tablet: 1 tab(s) orally once a day (30 May 2020 14:52)  fexofenadine 180 mg oral tablet: 1 tab(s) orally once a day (30 May 2020 14:52)  furosemide 20 mg oral tablet: 1 tab(s) orally 2 times a day (30 May 2020 14:52)  glimepiride 4 mg oral tablet: 1 tab(s) orally 2 times a day (30 May 2020 14:52)  Levemir FlexPen 100 units/mL subcutaneous solution: 30 unit(s) subcutaneous once a day (at bedtime) (30 May 2020 14:52)  lisinopril 40 mg oral tablet: 1 tab(s) orally once a day (30 May 2020 14:52)  NovoLOG FlexPen 100 units/mL injectable solution: 10 unit(s) injectable 3 times a day with meals (30 May 2020 14:52)  omeprazole 40 mg oral delayed release capsule: 1 cap(s) orally once a day (30 May 2020 14:52)  senna oral tablet: 2 tab(s) orally once a day (at bedtime) (18 Jun 2020 12:32)    MEDICATIONS  (STANDING):    MEDICATIONS  (PRN): Wear glasses for distance

## 2020-06-23 NOTE — ED PROVIDER NOTE - PHYSICAL EXAMINATION
PHYSICAL EXAM:  	General Appearance: NAD alert and oriented x 3  	Cardiovascular: regular S1 /S2   	Respiratory: Lungs clear to auscultation	  	Gastrointestinal:  Soft, Non-tender  	Neurologic: Non-focal  	Musculoskeletal/extremities: Normal range of motion, dorsal ulcer with pus and lateral ulcers LLE  	Left hallux amputated with non-healing ulcer. Sutures are intact without healing surgical incision site. No drainage, no pus, no malodor, no sign of infection.   Vascular: RLE: +1 DP, +1 PT. LLE: +1 PT, severely diminished DP

## 2020-06-23 NOTE — ED PROVIDER NOTE - OBJECTIVE STATEMENT
74y F pmh CAD, CHF, T2D, HTN, PVD presenting for admission for angiogram planned for 6/25. Pt has hx of limb ischemia in L leg. Has been treated and worked up at [facility]. S/p 1 toe amputation in May. Ulcer lateral to toe not healing. Occasional pain to L leg. Pt's podiatrist contacted

## 2020-06-23 NOTE — H&P ADULT - NSICDXPASTMEDICALHX_GEN_ALL_CORE_FT
PAST MEDICAL HISTORY:  Amputation of toe of left foot     CAD (coronary artery disease)     CHF (congestive heart failure)     DM (diabetes mellitus)     HTN (hypertension)     PVD (peripheral vascular disease)

## 2020-06-23 NOTE — ED ADULT NURSE NOTE - PMH
Amputation of toe of left foot    CAD (coronary artery disease)    CHF (congestive heart failure)    DM (diabetes mellitus)    HTN (hypertension)    PVD (peripheral vascular disease)

## 2020-06-23 NOTE — H&P ADULT - ASSESSMENT
Patient is a 74y old Female known to have CAD s.p PCI; ischemic CM s/p CRT-D, HTN, DM type 2, pAfib with CHADSVASC of 8 on eliquis, PUD, recent left PICA stroke, bladder mass (under investigation)  PVD with most recent angiogram june 2020 showing non-reconstructive severe occlusion of popliteal and tibial vessels; she has hx of angioplasty/stenting at [facility] and she is s/p amputation of L hallux (may 2020) due to toe gangrene and underlying OM with MSSA organism . Patient presents with a chief complaint of worsening LE pain secondary to critical limb ischemia, absent distal pulses causing non-healing ulcers of L foot at the site of hallux amputation.     #critical limb ischemia, absent distal pulses causing non-healing ulcers of L foot at the site of hallux amputation; with underlying OM.  #PVD with most recent angiogram june 2020 showing non-reconstructive severe occlusion of popliteal and tibial vessels; she has hx of angioplasty/stenting at [facility] and she is s/p amputation of L hallux (may 2020) due to toe gangrene and underlying OM with MSSA organism     -vascular cardiology following   -arterial duplex and ESTRELLA ordered and Xray ordered   -previous ESTRELLA:  RLE:  0.6. LLE:  0.5.   -Heparin gtt started ,target PTT 50-70s (pt on eliquis at home)   -c/w Doxycylcine and Rocephin via PICC line as per home regimen   -COVID PCR done for OR   -plan for LE angiogram on Thursday 6/25, keep NPO after midnight   -podiatry recds appreciated Betadine with DSD dressings applied  -fu ID consult     #BONNY   likely pre-renal   Lindsay inacurrate since patient on lasix but BUN/Cr >20  will not hydrate now - start hydration tomorrow 12 hrs prior to angiogram (spoke with cardio fellow)   will hold lasix and ACe     #CAD s.p PCI in 2009,; ischemic CM s/p CRT-D  ef 50-55% mild MR KS  -pt on eliquis /?? aspirin stati  - aspirin , Plavix, BB, statin, ACE (will hold now for BONNY, resume if Cr improves in am)    #HFpEF    No peripheral edema  Patient takes furosemide 20mg daily twice at home  Echo: EF 50-55%, mild TR, KS  will hold lasix now as euvolemic and Bonny and plan for angiogram   re-assess need in am     #left PICA stroke   -on eliquis/ inpatient on heparin   -afib etiology likely   -risk factor control     #PUD ( egd may 2020)   -continue pantoprazole   -avoid nsaids    # Bladder wall thickening.  -CT abdomen shows bladder wall thickening  hx of Stage 1 endometrial cancer s/p hysterectomy  urine cytology: NEGATIVE FOR HIGH GRADE UROTHELIAL CARCINOMA  follow up cystoscopy.     #HTN (hypertension).-better controlled today   Plan: Pt takes Lisinopril 40mg, toprol Xl 100mg, hydralazine 50mg TID   c/w current meds    #DM type 2  Plan: Patient takes Levemir 30mg at night and 10Units pre-meals  inpatient will give 2/3rd of dose   Diabetic diet  Hb A1C: 8.1%  monitor BS.     GI ppx: Po protonix 40 mg QD  DVT ppx: Heparin drip   Activity : ambulate as tolerated  Diet : carb consistent   Dispo: acute   Full Code Patient is a 74y old Female known to have CAD s.p PCI; ischemic CM s/p CRT-D, HTN, DM type 2, pAfib with CHADSVASC of 8 on eliquis, PUD, recent left PICA stroke, bladder mass (under investigation)  PVD with most recent angiogram june 2020 showing non-reconstructive severe occlusion of popliteal and tibial vessels; she has hx of angioplasty/stenting at [facility] and she is s/p amputation of L hallux (may 2020) due to toe gangrene and underlying OM with MSSA organism . Patient presents with a chief complaint of worsening LE pain secondary to critical limb ischemia, absent distal pulses causing non-healing ulcers of L foot at the site of hallux amputation.     #critical limb ischemia, absent distal pulses causing non-healing ulcers of L foot at the site of hallux amputation; with underlying OM.  #PVD with most recent angiogram june 2020 showing non-reconstructive severe occlusion of popliteal and tibial vessels; she has hx of angioplasty/stenting at [facility] and she is s/p amputation of L hallux (may 2020) due to toe gangrene and underlying OM with MSSA organism     -vascular cardiology following   -arterial duplex and ESTRELLA ordered and Xray ordered   -previous ESTRELLA:  RLE:  0.6. LLE:  0.5.   -Heparin gtt started ,target PTT 50-70s (pt on eliquis at home)   -c/w Doxycylcine and Rocephin via PICC line as per home regimen   -COVID PCR done for OR   -plan for LE angiogram on Thursday 6/25, keep NPO after midnight   -podiatry recds appreciated Betadine with DSD dressings applied  -fu ID consult     #BONNY   likely pre-renal   Lindsay inacurrate since patient on lasix but BUN/Cr >20  will not hydrate now - start hydration tomorrow 12 hrs prior to angiogram (spoke with cardio fellow)   will hold lasix and ACe     #CAD s.p PCI in 2009,; ischemic CM s/p CRT-D  ef 50-55% mild MR DC  -pt on eliquis /?? aspirin stati  - on Plavix, BB, statin, ACE (will hold now for BONNY, resume if Cr improves in am)  (aspirin stopped in recent admission)    #HFpEF    No peripheral edema  Patient takes furosemide 20mg daily twice at home  Echo: EF 50-55%, mild TR, DC  will hold lasix now as euvolemic and Bonny and plan for angiogram   re-assess need in am     #left PICA stroke   -on eliquis/ inpatient on heparin   -afib etiology likely   -risk factor control     #PUD ( egd may 2020)   -continue pantoprazole   -avoid nsaids    # Bladder wall thickening.  -CT abdomen shows bladder wall thickening  hx of Stage 1 endometrial cancer s/p hysterectomy  urine cytology: NEGATIVE FOR HIGH GRADE UROTHELIAL CARCINOMA  follow up cystoscopy.     #HTN (hypertension).-better controlled today   Plan: Pt takes Lisinopril 40mg, toprol Xl 100mg, hydralazine 50mg TID   c/w current meds    #DM type 2  Plan: Patient takes Levemir 30mg at night and 10Units pre-meals  inpatient will give 2/3rd of dose   Diabetic diet  Hb A1C: 8.1%  monitor BS.     GI ppx: Po protonix 40 mg QD  DVT ppx: Heparin drip   Activity : ambulate as tolerated  Diet : carb consistent   Dispo: acute   Full Code Patient is a 74y old Female known to have CAD s.p PCI; ischemic CM s/p CRT-D, HTN, DM type 2, pAfib with CHADSVASC of 8 on eliquis, PUD, recent left PICA stroke, bladder mass (under investigation)  PVD with most recent angiogram june 2020 showing non-reconstructive severe occlusion of popliteal and tibial vessels; she has hx of angioplasty/stenting at [facility] and she is s/p amputation of L hallux (may 2020) due to toe gangrene and underlying OM with MSSA organism . Patient presents with a chief complaint of worsening LE pain secondary to critical limb ischemia, absent distal pulses causing non-healing ulcers of L foot at the site of hallux amputation.     #critical limb ischemia as per vascular cardiology, causing non-healing ulcers of L foot at the site of hallux amputation; with underlying OM.  #PVD with most recent angiogram june 2020 showing non-reconstructive severe occlusion of popliteal and tibial vessels; she has hx of angioplasty/stenting at [facility] and she is s/p amputation of L hallux (may 2020) due to toe gangrene and underlying OM with MSSA organism     -vascular cardiology following   -arterial duplex and ESTRELLA ordered and Xray ordered   -previous ESTRELLA:  RLE:  0.6. LLE:  0.5.   -Heparin gtt started ,target PTT 50-70s (pt on eliquis at home)   -c/w Doxycylcine and Rocephin via PICC line as per home regimen   -COVID PCR done for OR   -plan for LE angiogram on Thursday 6/25, keep NPO after midnight   -podiatry recds appreciated Betadine with DSD dressings applied  -fu ID consult     #BONNY   likely pre-renal   Lindsay inacurrate since patient on lasix but BUN/Cr >20  will not hydrate now - start hydration tomorrow 12 hrs prior to angiogram (spoke with cardio fellow)   will hold lasix and ACE    #CAD s/p PCI in 2009; ischemic CM s/p CRT-D  ef 50-55% mild MR AK  -pt on eliquis /?? aspirin statin  - on Plavix, BB, statin, ACE (will hold now for BONNY, resume if Cr improves in am)  (aspirin stopped in recent admission)    #HFpEF    No peripheral edema  Patient takes furosemide 20mg daily twice at home  Echo: EF 50-55%, mild TR, AK  will hold lasix now as euvolemic and Bonny and plan for angiogram   re-assess need in am     #left PICA stroke   -on eliquis/ inpatient on heparin   -afib etiology likely   -risk factor control     #PUD ( egd may 2020)   -continue pantoprazole   -avoid nsaids    # Bladder wall thickening.  -CT abdomen shows bladder wall thickening  hx of Stage 1 endometrial cancer s/p hysterectomy  urine cytology: NEGATIVE FOR HIGH GRADE UROTHELIAL CARCINOMA  follow up cystoscopy.     #HTN (hypertension).-better controlled today   Plan: Pt takes Lisinopril 40mg, toprol Xl 100mg, hydralazine 50mg TID   c/w current meds    #DM type 2  Plan: Patient takes Levemir 30mg at night and 10Units pre-meals  inpatient will give 2/3rd of dose   Diabetic diet  Hb A1C: 8.1%  monitor BS.     GI ppx: Po protonix 40 mg QD  DVT ppx: Heparin drip   Activity : ambulate as tolerated  Diet : carb consistent   Dispo: acute   Full Code

## 2020-06-23 NOTE — ED PROVIDER NOTE - PROGRESS NOTE DETAILS
Attending Note: 75 y/o F with PMH of PVD, CAD, CHF, HTN, DM, presents to ED with a non-healing ulcer to lower extremity. Pt sent in by Dr. Hammonds for evaluation and admission with podiatry consult.  AVSS, exam as noted.

## 2020-06-23 NOTE — ED ADULT NURSE NOTE - OBJECTIVE STATEMENT
Pt presents to ED, sent in by Podiatrist for admission for a non-healing left diabetic foot ulcer. Pt was just d/c from [facility] w/ a right PICC line for IV abx at home, but several new ulcers have become infected. Pt reports pain relief w/ tramadol at home. Denies fevers/chills/sob. Has had one toe amputated on left foot and walks w/ a boot.

## 2020-06-23 NOTE — H&P ADULT - NSHPLABSRESULTS_GEN_ALL_CORE
10.3   10.82 )-----------( 368      ( 23 Jun 2020 19:02 )             31.5       06-23    131<L>  |  91<L>  |  25<H>  ----------------------------<  143<H>  4.2   |  27  |  1.2    Ca    9.2      23 Jun 2020 19:02    TPro  7.6  /  Alb  3.4<L>  /  TBili  0.3  /  DBili  x   /  AST  35  /  ALT  30  /  AlkPhos  346<H>  06-23      LIVER FUNCTIONS - ( 23 Jun 2020 19:02 )  Alb: 3.4 g/dL / Pro: 7.6 g/dL / ALK PHOS: 346 U/L / ALT: 30 U/L / AST: 35 U/L / GGT: x

## 2020-06-23 NOTE — H&P ADULT - HISTORY OF PRESENT ILLNESS
Patient is a 74y old Female known to have CAD s.p PCI; ischemic CM s/p CRT-D, HTN, DM type 2, pAfib with CHADSVASC of 8 on eliquis, PUD, recent left PICA stroke, PVD with most recent angiogram june 2020 showing non-reconstructive severe occlusion of popliteal and tibial vessels; she has hx of angioplasty/stenting at [facility] and she is s/p amputation of L hallux (may 2020) due to toe gangrene and underlying OM with MSSA organism . Patient presents with a chief complaint of worsening LE pain secondary to critical limb ischemia causing non-healing ulcers of L foot at the site of hallux amputation.  (to note patient had ? bladder mass with negative cytology for malignancy was supposed to follow up outpt for cystoscopy) she denies any fever or chills   No nausea no vomiting no diarrhe and otherwise ROS negative   vitals stable afebrile however bp 162/66 mmHg  labs showed WBC 44420 with Hg 10 and Na 131( at baseline) but Cr 1.2 ( baseline 0.8)  ecg revealed paced rhythm

## 2020-06-24 LAB
ALBUMIN SERPL ELPH-MCNC: 3.2 G/DL — LOW (ref 3.5–5.2)
ALP SERPL-CCNC: 318 U/L — HIGH (ref 30–115)
ALT FLD-CCNC: 29 U/L — SIGNIFICANT CHANGE UP (ref 0–41)
ANION GAP SERPL CALC-SCNC: 16 MMOL/L — HIGH (ref 7–14)
APTT BLD: 130.1 SEC — CRITICAL HIGH (ref 27–39.2)
APTT BLD: 177.6 SEC — CRITICAL HIGH (ref 27–39.2)
APTT BLD: 59.1 SEC — HIGH (ref 27–39.2)
APTT BLD: >200 SEC — CRITICAL HIGH (ref 27–39.2)
AST SERPL-CCNC: 41 U/L — SIGNIFICANT CHANGE UP (ref 0–41)
BASOPHILS # BLD AUTO: 0.06 K/UL — SIGNIFICANT CHANGE UP (ref 0–0.2)
BASOPHILS NFR BLD AUTO: 0.5 % — SIGNIFICANT CHANGE UP (ref 0–1)
BILIRUB SERPL-MCNC: 0.4 MG/DL — SIGNIFICANT CHANGE UP (ref 0.2–1.2)
BUN SERPL-MCNC: 22 MG/DL — HIGH (ref 10–20)
CALCIUM SERPL-MCNC: 8.9 MG/DL — SIGNIFICANT CHANGE UP (ref 8.5–10.1)
CHLORIDE SERPL-SCNC: 94 MMOL/L — LOW (ref 98–110)
CO2 SERPL-SCNC: 23 MMOL/L — SIGNIFICANT CHANGE UP (ref 17–32)
CREAT SERPL-MCNC: 1 MG/DL — SIGNIFICANT CHANGE UP (ref 0.7–1.5)
EOSINOPHIL # BLD AUTO: 0.38 K/UL — SIGNIFICANT CHANGE UP (ref 0–0.7)
EOSINOPHIL NFR BLD AUTO: 3 % — SIGNIFICANT CHANGE UP (ref 0–8)
GLUCOSE BLDC GLUCOMTR-MCNC: 136 MG/DL — HIGH (ref 70–99)
GLUCOSE BLDC GLUCOMTR-MCNC: 170 MG/DL — HIGH (ref 70–99)
GLUCOSE BLDC GLUCOMTR-MCNC: 205 MG/DL — HIGH (ref 70–99)
GLUCOSE BLDC GLUCOMTR-MCNC: 238 MG/DL — HIGH (ref 70–99)
GLUCOSE BLDC GLUCOMTR-MCNC: 44 MG/DL — CRITICAL LOW (ref 70–99)
GLUCOSE BLDC GLUCOMTR-MCNC: 61 MG/DL — LOW (ref 70–99)
GLUCOSE SERPL-MCNC: 58 MG/DL — LOW (ref 70–99)
HCT VFR BLD CALC: 30.8 % — LOW (ref 37–47)
HGB BLD-MCNC: 10.1 G/DL — LOW (ref 12–16)
IMM GRANULOCYTES NFR BLD AUTO: 0.6 % — HIGH (ref 0.1–0.3)
INR BLD: 1.32 RATIO — HIGH (ref 0.65–1.3)
LYMPHOCYTES # BLD AUTO: 29.6 % — SIGNIFICANT CHANGE UP (ref 20.5–51.1)
LYMPHOCYTES # BLD AUTO: 3.76 K/UL — HIGH (ref 1.2–3.4)
MAGNESIUM SERPL-MCNC: 1.6 MG/DL — LOW (ref 1.8–2.4)
MCHC RBC-ENTMCNC: 29 PG — SIGNIFICANT CHANGE UP (ref 27–31)
MCHC RBC-ENTMCNC: 32.8 G/DL — SIGNIFICANT CHANGE UP (ref 32–37)
MCV RBC AUTO: 88.5 FL — SIGNIFICANT CHANGE UP (ref 81–99)
MONOCYTES # BLD AUTO: 0.92 K/UL — HIGH (ref 0.1–0.6)
MONOCYTES NFR BLD AUTO: 7.2 % — SIGNIFICANT CHANGE UP (ref 1.7–9.3)
NEUTROPHILS # BLD AUTO: 7.5 K/UL — HIGH (ref 1.4–6.5)
NEUTROPHILS NFR BLD AUTO: 59.1 % — SIGNIFICANT CHANGE UP (ref 42.2–75.2)
NRBC # BLD: 0 /100 WBCS — SIGNIFICANT CHANGE UP (ref 0–0)
PLATELET # BLD AUTO: 383 K/UL — SIGNIFICANT CHANGE UP (ref 130–400)
POTASSIUM SERPL-MCNC: 4.3 MMOL/L — SIGNIFICANT CHANGE UP (ref 3.5–5)
POTASSIUM SERPL-SCNC: 4.3 MMOL/L — SIGNIFICANT CHANGE UP (ref 3.5–5)
PROT SERPL-MCNC: 7.1 G/DL — SIGNIFICANT CHANGE UP (ref 6–8)
PROTHROM AB SERPL-ACNC: 15.2 SEC — HIGH (ref 9.95–12.87)
RBC # BLD: 3.48 M/UL — LOW (ref 4.2–5.4)
RBC # FLD: 14.8 % — HIGH (ref 11.5–14.5)
SARS-COV-2 RNA SPEC QL NAA+PROBE: SIGNIFICANT CHANGE UP
SODIUM SERPL-SCNC: 133 MMOL/L — LOW (ref 135–146)
WBC # BLD: 12.69 K/UL — HIGH (ref 4.8–10.8)
WBC # FLD AUTO: 12.69 K/UL — HIGH (ref 4.8–10.8)

## 2020-06-24 PROCEDURE — 93925 LOWER EXTREMITY STUDY: CPT | Mod: 26

## 2020-06-24 PROCEDURE — 93923 UPR/LXTR ART STDY 3+ LVLS: CPT | Mod: 26

## 2020-06-24 PROCEDURE — 99233 SBSQ HOSP IP/OBS HIGH 50: CPT

## 2020-06-24 RX ORDER — MAGNESIUM OXIDE 400 MG ORAL TABLET 241.3 MG
400 TABLET ORAL
Refills: 0 | Status: COMPLETED | OUTPATIENT
Start: 2020-06-24 | End: 2020-06-26

## 2020-06-24 RX ORDER — HEPARIN SODIUM 5000 [USP'U]/ML
900 INJECTION INTRAVENOUS; SUBCUTANEOUS
Qty: 25000 | Refills: 0 | Status: DISCONTINUED | OUTPATIENT
Start: 2020-06-24 | End: 2020-06-25

## 2020-06-24 RX ORDER — SODIUM CHLORIDE 9 MG/ML
1000 INJECTION INTRAMUSCULAR; INTRAVENOUS; SUBCUTANEOUS
Refills: 0 | Status: DISCONTINUED | OUTPATIENT
Start: 2020-06-24 | End: 2020-06-25

## 2020-06-24 RX ORDER — LISINOPRIL 2.5 MG/1
40 TABLET ORAL DAILY
Refills: 0 | Status: DISCONTINUED | OUTPATIENT
Start: 2020-06-24 | End: 2020-06-27

## 2020-06-24 RX ORDER — HEPARIN SODIUM 5000 [USP'U]/ML
1100 INJECTION INTRAVENOUS; SUBCUTANEOUS
Qty: 25000 | Refills: 0 | Status: DISCONTINUED | OUTPATIENT
Start: 2020-06-24 | End: 2020-06-24

## 2020-06-24 RX ORDER — MAGNESIUM SULFATE 500 MG/ML
2 VIAL (ML) INJECTION ONCE
Refills: 0 | Status: DISCONTINUED | OUTPATIENT
Start: 2020-06-24 | End: 2020-06-24

## 2020-06-24 RX ADMIN — Medication 100 MILLIGRAM(S): at 05:19

## 2020-06-24 RX ADMIN — Medication 7 UNIT(S): at 11:26

## 2020-06-24 RX ADMIN — Medication 50 MILLIGRAM(S): at 13:45

## 2020-06-24 RX ADMIN — HEPARIN SODIUM 9 UNIT(S)/HR: 5000 INJECTION INTRAVENOUS; SUBCUTANEOUS at 14:45

## 2020-06-24 RX ADMIN — MAGNESIUM OXIDE 400 MG ORAL TABLET 400 MILLIGRAM(S): 241.3 TABLET ORAL at 12:04

## 2020-06-24 RX ADMIN — Medication 50 MILLIGRAM(S): at 21:13

## 2020-06-24 RX ADMIN — PANTOPRAZOLE SODIUM 40 MILLIGRAM(S): 20 TABLET, DELAYED RELEASE ORAL at 08:14

## 2020-06-24 RX ADMIN — Medication 100 MILLIGRAM(S): at 17:14

## 2020-06-24 RX ADMIN — TRAMADOL HYDROCHLORIDE 25 MILLIGRAM(S): 50 TABLET ORAL at 02:26

## 2020-06-24 RX ADMIN — Medication 2: at 11:26

## 2020-06-24 RX ADMIN — LORATADINE 10 MILLIGRAM(S): 10 TABLET ORAL at 11:27

## 2020-06-24 RX ADMIN — SENNA PLUS 2 TABLET(S): 8.6 TABLET ORAL at 21:13

## 2020-06-24 RX ADMIN — ATORVASTATIN CALCIUM 40 MILLIGRAM(S): 80 TABLET, FILM COATED ORAL at 21:13

## 2020-06-24 RX ADMIN — Medication 50 MILLIGRAM(S): at 05:19

## 2020-06-24 RX ADMIN — CEFTRIAXONE 100 MILLIGRAM(S): 500 INJECTION, POWDER, FOR SOLUTION INTRAMUSCULAR; INTRAVENOUS at 21:16

## 2020-06-24 RX ADMIN — Medication 100 MILLIGRAM(S): at 05:20

## 2020-06-24 RX ADMIN — CLOPIDOGREL BISULFATE 75 MILLIGRAM(S): 75 TABLET, FILM COATED ORAL at 11:27

## 2020-06-24 RX ADMIN — Medication 650 MILLIGRAM(S): at 17:15

## 2020-06-24 RX ADMIN — SODIUM CHLORIDE 75 MILLILITER(S): 9 INJECTION INTRAMUSCULAR; INTRAVENOUS; SUBCUTANEOUS at 00:13

## 2020-06-24 RX ADMIN — GABAPENTIN 100 MILLIGRAM(S): 400 CAPSULE ORAL at 17:14

## 2020-06-24 RX ADMIN — MAGNESIUM OXIDE 400 MG ORAL TABLET 400 MILLIGRAM(S): 241.3 TABLET ORAL at 17:14

## 2020-06-24 RX ADMIN — Medication 650 MILLIGRAM(S): at 11:27

## 2020-06-24 RX ADMIN — GABAPENTIN 100 MILLIGRAM(S): 400 CAPSULE ORAL at 05:20

## 2020-06-24 RX ADMIN — HEPARIN SODIUM 11 UNIT(S)/HR: 5000 INJECTION INTRAVENOUS; SUBCUTANEOUS at 09:40

## 2020-06-24 RX ADMIN — Medication 650 MILLIGRAM(S): at 05:20

## 2020-06-24 NOTE — PROGRESS NOTE ADULT - ASSESSMENT
Assessment:  Left hallux amputation incision site non-healing ulcer,    Plan:  Chart reviewed and Patient evaluated. All Questions and Concerns Addressed and Answered  Discussed diagnosis and treatment with patient  Previously applied dressings removed. Left hallux amputation surgical site non-healing ulcer improving, smaller ulcer without any sign of infection.  Changed dressing, betadine-adaptic-DSD-kerlix.  follow up vascular

## 2020-06-24 NOTE — PROGRESS NOTE ADULT - SUBJECTIVE AND OBJECTIVE BOX
SUBJECTIVE:    Patient is a 74y old Female who presents with a chief complaint of worsening lower extremity ulcers (23 Jun 2020 20:30)    Overnight Events: Patient is stable, no acute events overnight.  VS are stable, her pain has decreased.    PAST MEDICAL & SURGICAL HISTORY  Amputation of toe of left foot  PVD (peripheral vascular disease)  CAD (coronary artery disease)  CHF (congestive heart failure)  HTN (hypertension)  DM (diabetes mellitus)  Status post coronary artery stent placement  H/O: hysterectomy  Cardiac defibrillator in place  Artificial cardiac pacemaker  History of cholecystectomy    SOCIAL HISTORY:  Negative for smoking/alcohol/drug use.     ALLERGIES:  codeine (Rash)  penicillin (Rash)    MEDICATIONS:  STANDING MEDICATIONS  acetaminophen   Tablet .. 650 milliGRAM(s) Oral every 6 hours  atorvastatin 40 milliGRAM(s) Oral at bedtime  cefTRIAXone   IVPB 2000 milliGRAM(s) IV Intermittent every 24 hours  chlorhexidine 4% Liquid 1 Application(s) Topical <User Schedule>  clopidogrel Tablet 75 milliGRAM(s) Oral daily  dextrose 5%. 1000 milliLiter(s) IV Continuous <Continuous>  dextrose 50% Injectable 12.5 Gram(s) IV Push once  dextrose 50% Injectable 25 Gram(s) IV Push once  dextrose 50% Injectable 25 Gram(s) IV Push once  doxycycline hyclate Capsule 100 milliGRAM(s) Oral every 12 hours  gabapentin 100 milliGRAM(s) Oral every 12 hours  heparin   Injectable 6500 Unit(s) IV Push once  heparin  Infusion 1100 Unit(s)/Hr IV Continuous <Continuous>  hydrALAZINE 50 milliGRAM(s) Oral every 8 hours  insulin glargine Injectable (LANTUS) 21 Unit(s) SubCutaneous at bedtime  insulin lispro (HumaLOG) corrective regimen sliding scale   SubCutaneous three times a day before meals  insulin lispro Injectable (HumaLOG) 7 Unit(s) SubCutaneous before breakfast  insulin lispro Injectable (HumaLOG) 7 Unit(s) SubCutaneous before lunch  insulin lispro Injectable (HumaLOG) 7 Unit(s) SubCutaneous before dinner  loratadine 10 milliGRAM(s) Oral daily  magnesium sulfate  IVPB 2 Gram(s) IV Intermittent once  metoprolol succinate  milliGRAM(s) Oral daily  pantoprazole    Tablet 40 milliGRAM(s) Oral before breakfast  senna 2 Tablet(s) Oral at bedtime  sodium chloride 0.9%. 1000 milliLiter(s) IV Continuous <Continuous>    PRN MEDICATIONS  dextrose 40% Gel 15 Gram(s) Oral once PRN  glucagon  Injectable 1 milliGRAM(s) IntraMuscular once PRN  traMADol 25 milliGRAM(s) Oral every 8 hours PRN    VITALS:   T(F): 97.3, Max: 98.5 (06-24-20 @ 05:15)  HR: 78 (68 - 78)  BP: 173/77 (162/66 - 185/79)  RR: 18 (18 - 18)  SpO2: 98% (97% - 99%)    LABS:                        10.1   12.69 )-----------( 383      ( 24 Jun 2020 05:52 )             30.8     06-24    133<L>  |  94<L>  |  22<H>  ----------------------------<  58<L>  4.3   |  23  |  1.0    Ca    8.9      24 Jun 2020 05:52  Mg     1.6     06-24    TPro  7.1  /  Alb  3.2<L>  /  TBili  0.4  /  DBili  x   /  AST  41  /  ALT  29  /  AlkPhos  318<H>  06-24    PT/INR - ( 24 Jun 2020 05:52 )   PT: 15.20 sec;   INR: 1.32 ratio         PTT - ( 24 Jun 2020 05:52 )  PTT:>200.0 sec                  PHYSICAL EXAM:  GEN: NAD, comfortable  LUNGS: CTAB, no w/r/r  HEART: RRR, s1 and s2 appreciated, no m/r/g  ABD: soft, NT/ND, +BS  EXT: no edema, absent PP  NEURO: AAOX3

## 2020-06-24 NOTE — PROGRESS NOTE ADULT - ASSESSMENT
Patient is a 74y old Female known to have CAD s.p PCI; ischemic CM s/p CRT-D, HTN, DM type 2, pAfib with CHADSVASC of 8 on eliquis, PUD, recent left PICA stroke, bladder mass (under investigation)  PVD with most recent angiogram June 2020 showing non-reconstructive severe occlusion of popliteal and tibial vessels; she has hx of angioplasty/stenting at [facility] and she is s/p amputation of L hallux (may 2020) due to toe gangrene and underlying OM with MSSA organism . Patient presents with a chief complaint of worsening LE pain secondary to critical limb ischemia, absent distal pulses causing non-healing ulcers of L foot at the site of hallux amputation.     A/P:   Non healing Left foot ulcer  Severe Peripheral vascular disease:   Patient is s/p Left Hallux amputation   #critical limb ischemia as per vascular cardiology, causing non-healing ulcers of L foot at the site of hallux amputation; with underlying OM.  #PVD with most recent angiogram june 2020 showing non-reconstructive severe occlusion of popliteal and tibial vessels; she has hx of angioplasty/stenting at [facility] and she is s/p amputation of L hallux (may 2020) due to toe gangrene and underlying OM with MSSA organism     -vascular cardiology following   -arterial duplex and ESTRELLA ordered and Xray ordered   -previous ESTRELLA:  RLE:  0.6. LLE:  0.5.   -Heparin gtt started ,target PTT 50-70s (pt on eliquis at home)   -c/w Doxycylcine and Rocephin via PICC line as per home regimen , course will be finished on july 11th as per the patient  -COVID PCR done for OR   -plan for LE angiogram on Thursday 6/25  -podiatry recds appreciated Betadine with DSD dressings applied  -fu ID consult     #JAYDEN- improving  likely pre-renal   will not hydrate now - start hydration tonight 12 hrs prior to angiogram  will hold lasix    #CAD s/p PCI in 2009; ischemic CM s/p CRT-D  ef 50-55% mild MR OR  -pt on eliquis /?? aspirin statin  - on Plavix, BB, statin, ACE  (aspirin stopped in recent admission)    #HFpEF    No peripheral edema  Patient takes furosemide 20mg daily twice at home  Echo: EF 50-55%, mild TR, OR  will hold lasix now as euvolemic and Jayden and plan for angiogram     #left PICA stroke   -on eliquis/ inpatient on heparin   -afib etiology likely   -risk factor control     #PUD ( egd may 2020)   -continue pantoprazole   -avoid nsaids    # Bladder wall thickening.  -CT abdomen shows bladder wall thickening  hx of Stage 1 endometrial cancer s/p hysterectomy  urine cytology: NEGATIVE FOR HIGH GRADE UROTHELIAL CARCINOMA  follow up cystoscopy.     #HTN (hypertension).   Plan: Pt takes Lisinopril 40mg, toprol Xl 100mg, hydralazine 50mg TID   c/w current meds    #DM type 2  Plan: Patient takes Levemir 30mg at night and 10Units pre-meals  inpatient will give 2/3rd of dose   Diabetic diet  Hb A1C: 8.1%  monitor BS.     GI ppx: Po protonix 40 mg QD  DVT ppx: Heparin drip   Activity : ambulate as tolerated  Diet : carb consistent   Dispo: acute   Full Code Patient is a 74y old Female known to have CAD s.p PCI; ischemic CM s/p CRT-D, HTN, DM type 2, pAfib with CHADSVASC of 8 on eliquis, PUD, recent left PICA stroke, bladder mass (under investigation)  PVD with most recent angiogram June 2020 showing non-reconstructive severe occlusion of popliteal and tibial vessels; she has hx of angioplasty/stenting at [facility] and she is s/p amputation of L hallux (may 2020) due to toe gangrene and underlying OM with MSSA organism . Patient presents with a chief complaint of worsening LE pain secondary to critical limb ischemia, absent distal pulses causing non-healing ulcers of L foot at the site of hallux amputation.     A/P:   Non healing Left foot ulcer: s/p left Hallux amputation May 2020.   Severe Peripheral vascular disease:   Patient is s/p Left Hallux amputation   PVD with most recent angiogram june 2020 showing non-reconstructive severe occlusion of popliteal and tibial vessels.   Plan for Angiogram on Thursday  continue Heparin drip.   Continue with Rocephin and Doxycycline for left foot OM until 7/11/2020.     Recent CVA: left PICA, likely embolic.   Continue Eliquis (on hold now for angiogram, on heparin drip) and Lipitor.     Atrial Fibrillation: recent diagnosis with CVA  Continue Amiodarone, Metoprolol and anticoagulation.     Chronic HFrEF (now improved) s/p BiVICD  No peripheral edema  Patient takes furosemide 20mg daily twice at home  Echo: EF 50-55%, mild TR, VT  will hold lasix now as euvolemic and Jayden and plan for angiogram     CAD s/p PCI in 2009  Continue Plavix, Lipitor and Metoprolol.     PUD ( egd may 2020)   continue pantoprazole     HTN (hypertension).  Continue Lisinopril 40mg, toprol Xl 100mg, hydralazine 50mg TID       DM type 2   Hb A1C: 8.1%  Continue Lantus and Lispro pre-meal.     #Progress Note Handoff:  Pending (specify): angiogram on Thursday.   Family discussion:  Disposition: Home

## 2020-06-24 NOTE — PROGRESS NOTE ADULT - ASSESSMENT
Patient is a 74y old Female known to have CAD s.p PCI; ischemic CM s/p CRT-D, HTN, DM type 2, pAfib with CHADSVASC of 8 on eliquis, PUD, recent left PICA stroke, bladder mass (under investigation)  PVD with most recent angiogram june 2020 showing non-reconstructive severe occlusion of popliteal and tibial vessels; she has hx of angioplasty/stenting at [facility] and she is s/p amputation of L hallux (may 2020) due to toe gangrene and underlying OM with MSSA organism . Patient presents with a chief complaint of worsening LE pain secondary to critical limb ischemia, absent distal pulses causing non-healing ulcers of L foot at the site of hallux amputation.     #critical limb ischemia as per vascular cardiology, causing non-healing ulcers of L foot at the site of hallux amputation; with underlying OM.  #PVD with most recent angiogram june 2020 showing non-reconstructive severe occlusion of popliteal and tibial vessels; she has hx of angioplasty/stenting at [facility] and she is s/p amputation of L hallux (may 2020) due to toe gangrene and underlying OM with MSSA organism     -vascular cardiology following   -arterial duplex and ESTRELLA ordered and Xray ordered   -previous ESTRELLA:  RLE:  0.6. LLE:  0.5.   -Heparin gtt started ,target PTT 50-70s (pt on eliquis at home)   -c/w Doxycylcine and Rocephin via PICC line as per home regimen , course will be finished on july 11th as per the patient  -COVID PCR done for OR   -plan for LE angiogram on Thursday 6/25  -podiatry recds appreciated Betadine with DSD dressings applied  -fu ID consult     #JAYDEN- improving  likely pre-renal   will not hydrate now - start hydration tonight 12 hrs prior to angiogram  will hold lasix    #CAD s/p PCI in 2009; ischemic CM s/p CRT-D  ef 50-55% mild MR GA  -pt on eliquis /?? aspirin statin  - on Plavix, BB, statin, ACE  (aspirin stopped in recent admission)    #HFpEF    No peripheral edema  Patient takes furosemide 20mg daily twice at home  Echo: EF 50-55%, mild TR, GA  will hold lasix now as euvolemic and Jayden and plan for angiogram     #left PICA stroke   -on eliquis/ inpatient on heparin   -afib etiology likely   -risk factor control     #PUD ( egd may 2020)   -continue pantoprazole   -avoid nsaids    # Bladder wall thickening.  -CT abdomen shows bladder wall thickening  hx of Stage 1 endometrial cancer s/p hysterectomy  urine cytology: NEGATIVE FOR HIGH GRADE UROTHELIAL CARCINOMA  follow up cystoscopy.     #HTN (hypertension).   Plan: Pt takes Lisinopril 40mg, toprol Xl 100mg, hydralazine 50mg TID   c/w current meds    #DM type 2  Plan: Patient takes Levemir 30mg at night and 10Units pre-meals  inpatient will give 2/3rd of dose   Diabetic diet  Hb A1C: 8.1%  monitor BS.     GI ppx: Po protonix 40 mg QD  DVT ppx: Heparin drip   Activity : ambulate as tolerated  Diet : carb consistent   Dispo: acute   Full Code

## 2020-06-24 NOTE — PROGRESS NOTE ADULT - SUBJECTIVE AND OBJECTIVE BOX
Podiatry Consult Note    Subjective:  DARRION MELENDEZ is a pleasant well-nourished, well developed 74y Female in no acute distress, alert awake, and oriented to person, place and time.   Patient is a 74y old  Female who presents with a chief complaint of worsening lower extremity ulcers (24 Jun 2020 08:50)    HPI:  Patient is a 74y old Female known to have CAD s.p PCI; ischemic CM s/p CRT-D, HTN, DM type 2, pAfib with CHADSVASC of 8 on eliquis, PUD, recent left PICA stroke, PVD with most recent angiogram june 2020 showing non-reconstructive severe occlusion of popliteal and tibial vessels; she has hx of angioplasty/stenting at [facility] and she is s/p amputation of L hallux (may 2020) due to toe gangrene and underlying OM with MSSA organism . Patient presents with a chief complaint of worsening LE pain secondary to critical limb ischemia causing non-healing ulcers of L foot at the site of hallux amputation.  (to note patient had ? bladder mass with negative cytology for malignancy was supposed to follow up outpt for cystoscopy) she denies any fever or chills   No nausea no vomiting no diarrhe and otherwise ROS negative   vitals stable afebrile however bp 162/66 mmHg  labs showed WBC 90431 with Hg 10 and Na 131( at baseline) but Cr 1.2 ( baseline 0.8)  ecg revealed paced rhythm (23 Jun 2020 20:30)    Patient mentioned about having "coldness" few hours ago, but no longer feeling as of now. She says she feels less pain on left hallux amputation site than yesterday.      Past Medical History and Surgical History  PAST MEDICAL & SURGICAL HISTORY:  Amputation of toe of left foot  PVD (peripheral vascular disease)  CAD (coronary artery disease)  CHF (congestive heart failure)  HTN (hypertension)  DM (diabetes mellitus)  Status post coronary artery stent placement  H/O: hysterectomy  Cardiac defibrillator in place  Artificial cardiac pacemaker  History of cholecystectomy       Review of Systems:   Constitutional: No weakness, fevers or chills  Eyes / ENT: No visual changes; No vertigo or throat pain   Neck: No pain or stiffness  Respiratory: No cough, wheezing, hemoptysis; No shortness of breath  Cardiovascular: No chest pain or palpitations  Gastrointestinal: No abdominal or epigastric pain. No nausea, vomiting, or hematemesis; No diarrhea or constipation. No melena or hematochezia.  Genitourinary: No dysuria, frequency or hematuria  Neurological: No numbness or weakness    Objective:  Vital Signs Last 24 Hrs  T(C): 36.3 (24 Jun 2020 08:12), Max: 36.9 (24 Jun 2020 05:15)  T(F): 97.3 (24 Jun 2020 08:12), Max: 98.5 (24 Jun 2020 05:15)  HR: 78 (24 Jun 2020 08:12) (68 - 78)  BP: 175/80 (24 Jun 2020 13:49) (162/66 - 185/79)  BP(mean): --  RR: 18 (24 Jun 2020 08:12) (18 - 18)  SpO2: 98% (24 Jun 2020 08:12) (97% - 99%)                        10.1   12.69 )-----------( 383      ( 24 Jun 2020 05:52 )             30.8                 06-24    133<L>  |  94<L>  |  22<H>  ----------------------------<  58<L>  4.3   |  23  |  1.0    Ca    8.9      24 Jun 2020 05:52  Mg     1.6     06-24    TPro  7.1  /  Alb  3.2<L>  /  TBili  0.4  /  DBili  x   /  AST  41  /  ALT  29  /  AlkPhos  318<H>  06-24        Physical Exam - Lower Extremity Focused:   Derm: Left hallux amputation incision site with nicely intact sutures. Mild erythema presents on left hallux amputation site without drainage or malodor. No sign of infection. About 1 cm x 1cm healing granular tissue, superficial, improving from yesterday.   Vascular: DP and PT Pulses palpable     Assessment:  Left hallux amputation incision site non-healing ulcer, improving.     Plan:  Chart reviewed and Patient evaluated. All Questions and Concerns Addressed and Answered  Discussed diagnosis and treatment with patient  Previously applied dressings removed. Left hallux amputation surgical site non-healing ulcer improving, smaller ulcer without any sign of infection.  Changed dressing, betadine-adaptic-DSD-kerlix.  Attending to follow up.     Podiatry

## 2020-06-24 NOTE — PROGRESS NOTE ADULT - SUBJECTIVE AND OBJECTIVE BOX
DARRION MELENDEZ  74y  Female      Patient is a 74y old  Female who presents with a chief complaint of worsening lower extremity ulcers (24 Jun 2020 16:06)      INTERVAL HPI/OVERNIGHT EVENTS:  She feels ok, no pain today, no fever.   Vital Signs Last 24 Hrs  T(C): 35.6 (24 Jun 2020 16:05), Max: 36.9 (24 Jun 2020 05:15)  T(F): 96 (24 Jun 2020 16:05), Max: 98.5 (24 Jun 2020 05:15)  HR: 66 (24 Jun 2020 16:05) (66 - 78)  BP: 185/73 (24 Jun 2020 16:05) (162/66 - 185/79)  BP(mean): --  RR: 18 (24 Jun 2020 16:05) (18 - 18)  SpO2: 98% (24 Jun 2020 16:05) (97% - 99%)            Consultant(s) Notes Reviewed:  [x ] YES  [ ] NO          MEDICATIONS  (STANDING):  acetaminophen   Tablet .. 650 milliGRAM(s) Oral every 6 hours  atorvastatin 40 milliGRAM(s) Oral at bedtime  cefTRIAXone   IVPB 2000 milliGRAM(s) IV Intermittent every 24 hours  chlorhexidine 4% Liquid 1 Application(s) Topical <User Schedule>  clopidogrel Tablet 75 milliGRAM(s) Oral daily  dextrose 5%. 1000 milliLiter(s) (50 mL/Hr) IV Continuous <Continuous>  dextrose 50% Injectable 12.5 Gram(s) IV Push once  dextrose 50% Injectable 25 Gram(s) IV Push once  dextrose 50% Injectable 25 Gram(s) IV Push once  doxycycline hyclate Capsule 100 milliGRAM(s) Oral every 12 hours  gabapentin 100 milliGRAM(s) Oral every 12 hours  heparin   Injectable 6500 Unit(s) IV Push once  heparin  Infusion 900 Unit(s)/Hr (9 mL/Hr) IV Continuous <Continuous>  hydrALAZINE 50 milliGRAM(s) Oral every 8 hours  insulin glargine Injectable (LANTUS) 21 Unit(s) SubCutaneous at bedtime  insulin lispro (HumaLOG) corrective regimen sliding scale   SubCutaneous three times a day before meals  insulin lispro Injectable (HumaLOG) 7 Unit(s) SubCutaneous before breakfast  insulin lispro Injectable (HumaLOG) 7 Unit(s) SubCutaneous before lunch  insulin lispro Injectable (HumaLOG) 7 Unit(s) SubCutaneous before dinner  lisinopril 40 milliGRAM(s) Oral daily  loratadine 10 milliGRAM(s) Oral daily  magnesium oxide 400 milliGRAM(s) Oral three times a day with meals  metoprolol succinate  milliGRAM(s) Oral daily  pantoprazole    Tablet 40 milliGRAM(s) Oral before breakfast  senna 2 Tablet(s) Oral at bedtime  silver sulfADIAZINE 1% Cream 1 Application(s) Topical daily  sodium chloride 0.9%. 1000 milliLiter(s) (75 mL/Hr) IV Continuous <Continuous>  sodium chloride 0.9%. 1000 milliLiter(s) (75 mL/Hr) IV Continuous <Continuous>    MEDICATIONS  (PRN):  dextrose 40% Gel 15 Gram(s) Oral once PRN Blood Glucose LESS THAN 70 milliGRAM(s)/deciliter  glucagon  Injectable 1 milliGRAM(s) IntraMuscular once PRN Glucose LESS THAN 70 milligrams/deciliter  traMADol 25 milliGRAM(s) Oral every 8 hours PRN Severe Pain (7 - 10)      LABS                          10.1   12.69 )-----------( 383      ( 24 Jun 2020 05:52 )             30.8     06-24    133<L>  |  94<L>  |  22<H>  ----------------------------<  58<L>  4.3   |  23  |  1.0    Ca    8.9      24 Jun 2020 05:52  Mg     1.6     06-24    TPro  7.1  /  Alb  3.2<L>  /  TBili  0.4  /  DBili  x   /  AST  41  /  ALT  29  /  AlkPhos  318<H>  06-24        PT/INR - ( 24 Jun 2020 05:52 )   PT: 15.20 sec;   INR: 1.32 ratio         PTT - ( 24 Jun 2020 11:00 )  PTT:177.6 sec  Lactate Trend        CAPILLARY BLOOD GLUCOSE      POCT Blood Glucose.: 44 mg/dL (24 Jun 2020 16:19)        RADIOLOGY & ADDITIONAL TESTS:    Imaging Personally Reviewed:  [ ] YES  [ ] NO    HEALTH ISSUES - PROBLEM Dx:          PHYSICAL EXAM:  GENERAL: NAD, well-developed.  HEAD:  Atraumatic, Normocephalic.  EYES: EOMI, PERRLA, conjunctiva and sclera clear.  NECK: Supple, No JVD.  CHEST/LUNG: Clear to auscultation bilaterally; No wheeze.  HEART: Regular rate and rhythm; S1 S2.   ABDOMEN: Soft, Nontender, Nondistended; Bowel sounds present.  EXTREMITIES:  left foot s/p 1st toe amputation with ulcerative wound.   PSYCH: AAOx3.  NEUROLOGY: non-focal.  SKIN: No rashes or lesions.

## 2020-06-25 ENCOUNTER — APPOINTMENT (OUTPATIENT)
Dept: CARDIOLOGY | Facility: CLINIC | Age: 75
End: 2020-06-25

## 2020-06-25 ENCOUNTER — TRANSCRIPTION ENCOUNTER (OUTPATIENT)
Age: 75
End: 2020-06-25

## 2020-06-25 LAB
ALBUMIN SERPL ELPH-MCNC: 3.1 G/DL — LOW (ref 3.5–5.2)
ALP SERPL-CCNC: 300 U/L — HIGH (ref 30–115)
ALT FLD-CCNC: 28 U/L — SIGNIFICANT CHANGE UP (ref 0–41)
ANION GAP SERPL CALC-SCNC: 16 MMOL/L — HIGH (ref 7–14)
APTT BLD: 35.6 SEC — SIGNIFICANT CHANGE UP (ref 27–39.2)
AST SERPL-CCNC: 35 U/L — SIGNIFICANT CHANGE UP (ref 0–41)
BASOPHILS # BLD AUTO: 0.03 K/UL — SIGNIFICANT CHANGE UP (ref 0–0.2)
BASOPHILS NFR BLD AUTO: 0.3 % — SIGNIFICANT CHANGE UP (ref 0–1)
BILIRUB SERPL-MCNC: 0.2 MG/DL — SIGNIFICANT CHANGE UP (ref 0.2–1.2)
BLD GP AB SCN SERPL QL: SIGNIFICANT CHANGE UP
BUN SERPL-MCNC: 16 MG/DL — SIGNIFICANT CHANGE UP (ref 10–20)
CALCIUM SERPL-MCNC: 8.8 MG/DL — SIGNIFICANT CHANGE UP (ref 8.5–10.1)
CHLORIDE SERPL-SCNC: 99 MMOL/L — SIGNIFICANT CHANGE UP (ref 98–110)
CO2 SERPL-SCNC: 21 MMOL/L — SIGNIFICANT CHANGE UP (ref 17–32)
CREAT SERPL-MCNC: 0.8 MG/DL — SIGNIFICANT CHANGE UP (ref 0.7–1.5)
EOSINOPHIL # BLD AUTO: 0.7 K/UL — SIGNIFICANT CHANGE UP (ref 0–0.7)
EOSINOPHIL NFR BLD AUTO: 7.9 % — SIGNIFICANT CHANGE UP (ref 0–8)
GLUCOSE BLDC GLUCOMTR-MCNC: 126 MG/DL — HIGH (ref 70–99)
GLUCOSE BLDC GLUCOMTR-MCNC: 158 MG/DL — HIGH (ref 70–99)
GLUCOSE BLDC GLUCOMTR-MCNC: 76 MG/DL — SIGNIFICANT CHANGE UP (ref 70–99)
GLUCOSE BLDC GLUCOMTR-MCNC: 88 MG/DL — SIGNIFICANT CHANGE UP (ref 70–99)
GLUCOSE BLDC GLUCOMTR-MCNC: 99 MG/DL — SIGNIFICANT CHANGE UP (ref 70–99)
GLUCOSE SERPL-MCNC: 71 MG/DL — SIGNIFICANT CHANGE UP (ref 70–99)
HCT VFR BLD CALC: 31 % — LOW (ref 37–47)
HGB BLD-MCNC: 9.6 G/DL — LOW (ref 12–16)
IMM GRANULOCYTES NFR BLD AUTO: 0.6 % — HIGH (ref 0.1–0.3)
INR BLD: 1.23 RATIO — SIGNIFICANT CHANGE UP (ref 0.65–1.3)
LYMPHOCYTES # BLD AUTO: 2.27 K/UL — SIGNIFICANT CHANGE UP (ref 1.2–3.4)
LYMPHOCYTES # BLD AUTO: 25.6 % — SIGNIFICANT CHANGE UP (ref 20.5–51.1)
MCHC RBC-ENTMCNC: 27.6 PG — SIGNIFICANT CHANGE UP (ref 27–31)
MCHC RBC-ENTMCNC: 31 G/DL — LOW (ref 32–37)
MCV RBC AUTO: 89.1 FL — SIGNIFICANT CHANGE UP (ref 81–99)
MONOCYTES # BLD AUTO: 0.6 K/UL — SIGNIFICANT CHANGE UP (ref 0.1–0.6)
MONOCYTES NFR BLD AUTO: 6.8 % — SIGNIFICANT CHANGE UP (ref 1.7–9.3)
NEUTROPHILS # BLD AUTO: 5.2 K/UL — SIGNIFICANT CHANGE UP (ref 1.4–6.5)
NEUTROPHILS NFR BLD AUTO: 58.8 % — SIGNIFICANT CHANGE UP (ref 42.2–75.2)
NRBC # BLD: 0 /100 WBCS — SIGNIFICANT CHANGE UP (ref 0–0)
PLATELET # BLD AUTO: 355 K/UL — SIGNIFICANT CHANGE UP (ref 130–400)
POTASSIUM SERPL-MCNC: 4.8 MMOL/L — SIGNIFICANT CHANGE UP (ref 3.5–5)
POTASSIUM SERPL-SCNC: 4.8 MMOL/L — SIGNIFICANT CHANGE UP (ref 3.5–5)
PROT SERPL-MCNC: 6.7 G/DL — SIGNIFICANT CHANGE UP (ref 6–8)
PROTHROM AB SERPL-ACNC: 14.1 SEC — HIGH (ref 9.95–12.87)
RBC # BLD: 3.48 M/UL — LOW (ref 4.2–5.4)
RBC # FLD: 14.7 % — HIGH (ref 11.5–14.5)
SODIUM SERPL-SCNC: 136 MMOL/L — SIGNIFICANT CHANGE UP (ref 135–146)
WBC # BLD: 8.85 K/UL — SIGNIFICANT CHANGE UP (ref 4.8–10.8)
WBC # FLD AUTO: 8.85 K/UL — SIGNIFICANT CHANGE UP (ref 4.8–10.8)

## 2020-06-25 PROCEDURE — 75710 ARTERY X-RAYS ARM/LEG: CPT | Mod: 26,XU

## 2020-06-25 PROCEDURE — 37225: CPT | Mod: LT

## 2020-06-25 PROCEDURE — 99233 SBSQ HOSP IP/OBS HIGH 50: CPT

## 2020-06-25 PROCEDURE — 36246 INS CATH ABD/L-EXT ART 2ND: CPT | Mod: 59

## 2020-06-25 PROCEDURE — 37229: CPT | Mod: LT

## 2020-06-25 RX ORDER — HEPARIN SODIUM 5000 [USP'U]/ML
700 INJECTION INTRAVENOUS; SUBCUTANEOUS
Qty: 25000 | Refills: 0 | Status: DISCONTINUED | OUTPATIENT
Start: 2020-06-25 | End: 2020-06-25

## 2020-06-25 RX ORDER — SODIUM CHLORIDE 9 MG/ML
1000 INJECTION INTRAMUSCULAR; INTRAVENOUS; SUBCUTANEOUS
Refills: 0 | Status: DISCONTINUED | OUTPATIENT
Start: 2020-06-25 | End: 2020-06-27

## 2020-06-25 RX ADMIN — CLOPIDOGREL BISULFATE 75 MILLIGRAM(S): 75 TABLET, FILM COATED ORAL at 08:49

## 2020-06-25 RX ADMIN — CHLORHEXIDINE GLUCONATE 1 APPLICATION(S): 213 SOLUTION TOPICAL at 05:32

## 2020-06-25 RX ADMIN — TRAMADOL HYDROCHLORIDE 25 MILLIGRAM(S): 50 TABLET ORAL at 21:49

## 2020-06-25 RX ADMIN — MAGNESIUM OXIDE 400 MG ORAL TABLET 400 MILLIGRAM(S): 241.3 TABLET ORAL at 14:14

## 2020-06-25 RX ADMIN — ATORVASTATIN CALCIUM 40 MILLIGRAM(S): 80 TABLET, FILM COATED ORAL at 21:51

## 2020-06-25 RX ADMIN — Medication 50 MILLIGRAM(S): at 04:41

## 2020-06-25 RX ADMIN — LORATADINE 10 MILLIGRAM(S): 10 TABLET ORAL at 14:14

## 2020-06-25 RX ADMIN — Medication 650 MILLIGRAM(S): at 14:11

## 2020-06-25 RX ADMIN — CEFTRIAXONE 100 MILLIGRAM(S): 500 INJECTION, POWDER, FOR SOLUTION INTRAMUSCULAR; INTRAVENOUS at 22:21

## 2020-06-25 RX ADMIN — Medication 650 MILLIGRAM(S): at 05:32

## 2020-06-25 RX ADMIN — Medication 50 MILLIGRAM(S): at 21:51

## 2020-06-25 RX ADMIN — GABAPENTIN 100 MILLIGRAM(S): 400 CAPSULE ORAL at 05:31

## 2020-06-25 RX ADMIN — Medication 1 APPLICATION(S): at 14:15

## 2020-06-25 RX ADMIN — Medication 650 MILLIGRAM(S): at 23:27

## 2020-06-25 RX ADMIN — SODIUM CHLORIDE 65 MILLILITER(S): 9 INJECTION INTRAMUSCULAR; INTRAVENOUS; SUBCUTANEOUS at 22:07

## 2020-06-25 RX ADMIN — SENNA PLUS 2 TABLET(S): 8.6 TABLET ORAL at 21:51

## 2020-06-25 RX ADMIN — HEPARIN SODIUM 7 UNIT(S)/HR: 5000 INJECTION INTRAVENOUS; SUBCUTANEOUS at 05:37

## 2020-06-25 RX ADMIN — INSULIN GLARGINE 21 UNIT(S): 100 INJECTION, SOLUTION SUBCUTANEOUS at 21:51

## 2020-06-25 RX ADMIN — Medication 50 MILLIGRAM(S): at 14:10

## 2020-06-25 RX ADMIN — Medication 100 MILLIGRAM(S): at 05:31

## 2020-06-25 RX ADMIN — PANTOPRAZOLE SODIUM 40 MILLIGRAM(S): 20 TABLET, DELAYED RELEASE ORAL at 05:36

## 2020-06-25 RX ADMIN — Medication 100 MILLIGRAM(S): at 04:41

## 2020-06-25 RX ADMIN — LISINOPRIL 40 MILLIGRAM(S): 2.5 TABLET ORAL at 05:31

## 2020-06-25 NOTE — CHART NOTE - NSCHARTNOTEFT_GEN_A_CORE
PRE-OP DIAGNOSIS: left leg ischemia    PROCEDURE: [facility] with coronary angiography    Physician: Dr Isamar Quintana   Assistant: Sami    ANESTHESIA TYPE:  [  ]General Anesthesia  [  ] Sedation  [ x ] Local/Regional    ESTIMATED BLOOD LOSS:    10   mL    CONDITION  [  ] Critical  [  ] Serious  [  ]Fair  [ x ]Good      SPECIMENS REMOVED (IF APPLICABLE): N/A      IV CONTRAST:    120   mL      IMPLANTS (IF APPLICABLE)      FINDINGS          POST-OP DIAGNOSIS          PLAN OF CARE  [ ] D/C Home today  [ ]  D/C in AM  [ ] Return to In-patient bed  [ ] Admit for observation  [ ] Return for staged procedure:  [ ] CT Surgery consult called  [ ]  Continue DAPT, B-blocker & Statin therapy    Results of procedure/ plan of care discussed with patient/  in detail. PRE-OP DIAGNOSIS: left leg ischemia    PROCEDURE: [facility] with coronary angiography    Physician: Dr Isamar Quintana   Assistant: Sami    ANESTHESIA TYPE:  [  ]General Anesthesia  [  ] Sedation  [ x ] Local/Regional    ESTIMATED BLOOD LOSS:    10   mL    CONDITION  [  ] Critical  [  ] Serious  [  ]Fair  [ x ]Good      SPECIMENS REMOVED (IF APPLICABLE): N/A      IV CONTRAST:    120   mL      IMPLANTS (IF APPLICABLE)      FINDINGS  Left Lower Ext angiogram:    Left common femoral:    Left Profunda:    Left SFA:    left popliteal:    Left AT:    Left trunk    Left PT    Left peroneal        Right Lower Ext angiogram:    Right common femoral:    Right  Profunda:    Right SFA:    Right popliteal:    Right AT:    Right trunk    Right PT    Right peroneal            POST-OP DIAGNOSIS          PLAN OF CARE  [ ] D/C Home today  [ ]  D/C in AM  [ ] Return to In-patient bed  [ ] Admit for observation  [ ] Return for staged procedure:  [ ] CT Surgery consult called  [ ]  Continue DAPT, B-blocker & Statin therapy    Results of procedure/ plan of care discussed with patient/  in detail. PRE-OP DIAGNOSIS: left leg ischemia    PROCEDURE: [facility] with coronary angiography    Physician: Dr Isamar Quintana   Assistant: Sami    ANESTHESIA TYPE:  [  ]General Anesthesia  [  ] Sedation  [ x ] Local/Regional    ESTIMATED BLOOD LOSS:    10   mL    CONDITION  [  ] Critical  [  ] Serious  [  ]Fair  [ x ]Good      SPECIMENS REMOVED (IF APPLICABLE): N/A      IV CONTRAST:    120   mL      IMPLANTS (IF APPLICABLE)      Peripheral Angiography Summary:    -left distal SFA severe stenosis.    -left popliteal occlusion. Stent occluded.    -left TPT occluded.    -left proximal peroneal occluded.    -runoff via this peroneal which faintly perfuses the foot (very poorly    seen).         Peripheral Interventional Summary:    -complex interventional procedure.    -successful laser atherectomy (2.0) and drug coated balloon (Impact) of the    left distal SFA.    -successful laser atherectomy (2.0) and drug coated balloon (Impact) of the    left popliteal artery.    -successful laser atherectomy (2.0) and drug coated balloon (Impact) of the    left TPT trunk.    -successful laser atherectomy (2.0) and drug coated balloon (Impact) of the    left peroneal artery (proximally) followed by more distal 2.0mm balloon    inflation.    -the foot is perfused via the peroneal. Most significant collaterals via    the PT/plantar artery.         Plan    -manual sheath removal.    -podiatry following.    -continue antibiotics (PICC line in place).    -restart eliquis on Saturday AM.    -gentle hydration overnight.

## 2020-06-25 NOTE — PROGRESS NOTE ADULT - SUBJECTIVE AND OBJECTIVE BOX
Pt seen and examined at bedside. No CP or SOB. Pt seen in cath lab. s/p angiogram.     PAST MEDICAL & SURGICAL HISTORY:  Amputation of toe of left foot  PVD (peripheral vascular disease)  CAD (coronary artery disease)  CHF (congestive heart failure)  HTN (hypertension)  DM (diabetes mellitus)  Status post coronary artery stent placement  H/O: hysterectomy  Cardiac defibrillator in place  Artificial cardiac pacemaker  History of cholecystectomy      VITAL SIGNS (Last 24 hrs):  T(C): 36.4 (06-25-20 @ 04:05), Max: 36.4 (06-25-20 @ 04:05)  HR: 75 (06-25-20 @ 04:05) (66 - 75)  BP: 177/76 (06-25-20 @ 06:49) (177/76 - 190/138)  RR: 18 (06-25-20 @ 04:05) (18 - 18)  SpO2: 98% (06-25-20 @ 01:25) (98% - 98%)  Wt(kg): --  Daily Height in cm: 154.94 (24 Jun 2020 23:30)    Daily     I&O's Summary      PHYSICAL EXAM:  GENERAL: NAD, well-developed  HEAD:  Atraumatic, Normocephalic  EYES: EOMI, PERRLA, conjunctiva and sclera clear  NECK: Supple, No JVD  CHEST/LUNG: Clear to auscultation bilaterally; No wheeze  HEART: Regular rate and rhythm; No murmurs, rubs, or gallops  ABDOMEN: Soft, Nontender, Nondistended; Bowel sounds present  EXTREMITIES:  2+ Peripheral Pulses on right, left hard to feel pulse, No clubbing, cyanosis, or edema  PSYCH: AAOx3  NEUROLOGY: non-focal  SKIN: No rashes or lesions    Labs Reviewed  Spoke to patient in regards to abnormal labs.    CBC Full  -  ( 25 Jun 2020 05:41 )  WBC Count : 8.85 K/uL  Hemoglobin : 9.6 g/dL  Hematocrit : 31.0 %  Platelet Count - Automated : 355 K/uL  Mean Cell Volume : 89.1 fL  Mean Cell Hemoglobin : 27.6 pg  Mean Cell Hemoglobin Concentration : 31.0 g/dL  Auto Neutrophil # : 5.20 K/uL  Auto Lymphocyte # : 2.27 K/uL  Auto Monocyte # : 0.60 K/uL  Auto Eosinophil # : 0.70 K/uL  Auto Basophil # : 0.03 K/uL  Auto Neutrophil % : 58.8 %  Auto Lymphocyte % : 25.6 %  Auto Monocyte % : 6.8 %  Auto Eosinophil % : 7.9 %  Auto Basophil % : 0.3 %    BMP:    06-25 @ 05:41    Blood Urea Nitrogen - 16  Calcium - 8.8  Carbond Dioxide - 21  Chloride - 99  Creatinine - 0.8  Glucose - 71  Potassium - 4.8  Sodium - 136      Hemoglobin A1c -   PT/INR - ( 25 Jun 2020 05:41 )   PT: 14.10 sec;   INR: 1.23 ratio         PTT - ( 25 Jun 2020 05:41 )  PTT:35.6 sec  Urine Culture:  06-24 @ 05:52 Urine culture: --    Culture Results:   No growth to date.  Method Type: --  Organism: --  Organism Identification: --  Specimen Source: .Blood None        Imaging reviewed      MEDICATIONS  (STANDING):  acetaminophen   Tablet .. 650 milliGRAM(s) Oral every 6 hours  atorvastatin 40 milliGRAM(s) Oral at bedtime  cefTRIAXone   IVPB 2000 milliGRAM(s) IV Intermittent every 24 hours  chlorhexidine 4% Liquid 1 Application(s) Topical <User Schedule>  clopidogrel Tablet 75 milliGRAM(s) Oral daily  dextrose 5%. 1000 milliLiter(s) (50 mL/Hr) IV Continuous <Continuous>  dextrose 50% Injectable 12.5 Gram(s) IV Push once  dextrose 50% Injectable 25 Gram(s) IV Push once  dextrose 50% Injectable 25 Gram(s) IV Push once  doxycycline hyclate Capsule 100 milliGRAM(s) Oral every 12 hours  gabapentin 100 milliGRAM(s) Oral every 12 hours  heparin  Infusion 700 Unit(s)/Hr (7 mL/Hr) IV Continuous <Continuous>  hydrALAZINE 50 milliGRAM(s) Oral every 8 hours  insulin glargine Injectable (LANTUS) 21 Unit(s) SubCutaneous at bedtime  insulin lispro (HumaLOG) corrective regimen sliding scale   SubCutaneous three times a day before meals  insulin lispro Injectable (HumaLOG) 7 Unit(s) SubCutaneous before breakfast  insulin lispro Injectable (HumaLOG) 7 Unit(s) SubCutaneous before lunch  insulin lispro Injectable (HumaLOG) 7 Unit(s) SubCutaneous before dinner  lisinopril 40 milliGRAM(s) Oral daily  loratadine 10 milliGRAM(s) Oral daily  magnesium oxide 400 milliGRAM(s) Oral three times a day with meals  metoprolol succinate  milliGRAM(s) Oral daily  pantoprazole    Tablet 40 milliGRAM(s) Oral before breakfast  senna 2 Tablet(s) Oral at bedtime  silver sulfADIAZINE 1% Cream 1 Application(s) Topical daily  sodium chloride 0.9%. 1000 milliLiter(s) (75 mL/Hr) IV Continuous <Continuous>  sodium chloride 0.9%. 1000 milliLiter(s) (75 mL/Hr) IV Continuous <Continuous>    MEDICATIONS  (PRN):  dextrose 40% Gel 15 Gram(s) Oral once PRN Blood Glucose LESS THAN 70 milliGRAM(s)/deciliter  glucagon  Injectable 1 milliGRAM(s) IntraMuscular once PRN Glucose LESS THAN 70 milligrams/deciliter  traMADol 25 milliGRAM(s) Oral every 8 hours PRN Severe Pain (7 - 10)

## 2020-06-25 NOTE — PROGRESS NOTE ADULT - ASSESSMENT
Physical Exam - Lower Extremity Focused:   s/p vascular intervention   foot is warm   pt reports no pain only tenderness at the plantar foot   Derm:   Left hallux amputation incision site, sutures intact   Mild erythema presents at left hallux amputation site   No drainage or malodor  About 1 cm x 1 cm healing granular tissue, superficial, improving from yesterday.   there is a very tender induration on the patient plantar medial arch , not improving   there is a skin lesion on the lateral 5th met -improved   Vascular: DP and PT pulses diminished    Assessment:  Left hallux amputation incision site non-healing ulcer  possible pus collection /hematoma left foot     Plan:  Chart reviewed and Patient evaluated. All Questions and Concerns Addressed and Answered  Patient seen bedside with residents   Wound evaluated; Will continue to monitor wound site while patient admitted   rec: CT scan to eval for possible collection   Continue with Local Wound Care  continue antibiotics as per ID

## 2020-06-25 NOTE — CONSULT NOTE ADULT - SUBJECTIVE AND OBJECTIVE BOX
NORA DARRION  74y, Female  Allergy: codeine (Rash)  penicillin (Rash)      CHIEF COMPLAINT: worsening lower extremity ulcers (24 Jun 2020 16:06)      LOS  2d    HPI:  Patient is a 74y old Female known to have CAD s.p PCI; ischemic CM s/p CRT-D, HTN, DM type 2, pAfib with CHADSVASC of 8 on eliquis, PUD, recent left PICA stroke, PVD with most recent angiogram june 2020 showing non-reconstructive severe occlusion of popliteal and tibial vessels; she has hx of angioplasty/stenting at [facility] and she is s/p amputation of L hallux (may 2020) due to toe gangrene and underlying OM with MSSA organism . Patient presents with a chief complaint of worsening LE pain secondary to critical limb ischemia causing non-healing ulcers of L foot at the site of hallux amputation.  (to note patient had ? bladder mass with negative cytology for malignancy was supposed to follow up outpt for cystoscopy) she denies any fever or chills   No nausea no vomiting no diarrhe and otherwise ROS negative   vitals stable afebrile however bp 162/66 mmHg  labs showed WBC 01911 with Hg 10 and Na 131( at baseline) but Cr 1.2 ( baseline 0.8)  ecg revealed paced rhythm (23 Jun 2020 20:30)      INFECTIOUS DISEASE HISTORY:  recently treated 5/2020 with PICC for OM cx MSSA Ceftriaxone IV and PO doxy    PAST MEDICAL & SURGICAL HISTORY:  Amputation of toe of left foot  PVD (peripheral vascular disease)  CAD (coronary artery disease)  CHF (congestive heart failure)  HTN (hypertension)  DM (diabetes mellitus)  Status post coronary artery stent placement  H/O: hysterectomy  Cardiac defibrillator in place  Artificial cardiac pacemaker  History of cholecystectomy      FAMILY HISTORY      SOCIAL HISTORY  Social History:  non smoker, denies ETOH and illicit drug abuse (23 Jun 2020 20:30)        ROS  General: Denies rigors, nightsweats  HEENT: Denies headache, rhinorrhea, sore throat, eye pain  CV: Denies CP, palpitations  PULM: Denies wheezing, hemoptysis  GI: Denies hematemesis, hematochezia, melena  : Denies discharge, hematuria  MSK: Denies arthralgias, myalgias  SKIN: as noted above   NEURO: Denies paresthesias, weakness  PSYCH: Denies depression, anxiety    VITALS:  T(F): 97.5, Max: 97.5 (06-25-20 @ 04:05)  HR: 75  BP: 177/76  RR: 18Vital Signs Last 24 Hrs  T(C): 36.4 (25 Jun 2020 04:05), Max: 36.4 (25 Jun 2020 04:05)  T(F): 97.5 (25 Jun 2020 04:05), Max: 97.5 (25 Jun 2020 04:05)  HR: 75 (25 Jun 2020 04:05) (66 - 75)  BP: 177/76 (25 Jun 2020 06:49) (175/80 - 190/138)  BP(mean): --  RR: 18 (25 Jun 2020 04:05) (18 - 18)  SpO2: 98% (25 Jun 2020 01:25) (98% - 98%)    PHYSICAL EXAM:  Gen: NAD, resting in bed  HEENT: Normocephalic, atraumatic  Neck: supple, no lymphadenopathy  CV: Regular rate & regular rhythm  Lungs: decreased BS at bases, no fremitus  Abdomen: Soft, BS present  Ext: Warm, well perfused, Left hallux amputated with non-healing ulcer. stitches in place, no erythema/no purulence  Neuro: non focal, awake  Skin: no rash, no erythema  Lines: no phlebitis    TESTS & MEASUREMENTS:                        9.6    8.85  )-----------( 355      ( 25 Jun 2020 05:41 )             31.0     06-25    136  |  99  |  16  ----------------------------<  71  4.8   |  21  |  0.8    Ca    8.8      25 Jun 2020 05:41  Mg     1.6     06-24    TPro  6.7  /  Alb  3.1<L>  /  TBili  0.2  /  DBili  x   /  AST  35  /  ALT  28  /  AlkPhos  300<H>  06-25    eGFR if Non African American: 74 mL/min/1.73M2 (06-25-20 @ 05:41)  eGFR if African American: 85 mL/min/1.73M2 (06-25-20 @ 05:41)    LIVER FUNCTIONS - ( 25 Jun 2020 05:41 )  Alb: 3.1 g/dL / Pro: 6.7 g/dL / ALK PHOS: 300 U/L / ALT: 28 U/L / AST: 35 U/L / GGT: x               Culture - Abscess with Gram Stain (collected 06-06-20 @ 03:05)  Source: .Abscess left foot  Final Report (06-11-20 @ 08:31):    Moderate Staphylococcus aureus  Organism: Staphylococcus aureus  Staphylococcus aureus (06-11-20 @ 08:31)  Organism: Staphylococcus aureus (06-11-20 @ 08:31)      Method Type: LI  Organism: Staphylococcus aureus (06-11-20 @ 08:31)      -  Ampicillin/Sulbactam: S <=8/4      -  Cefazolin: S <=4      -  Clindamycin: R >4      -  Erythromycin: R >4      -  Gentamicin: S <=1 Should not be used as monotherapy      -  Oxacillin: S <=0.25      -  Penicillin: R 2      -  RIF- Rifampin: S <=1 Should not be used as monotherapy      -  Tetra/Doxy: S <=1      -  Trimethoprim/Sulfamethoxazole: S <=0.5/9.5      -  Vancomycin: S 1      Method Type: LI    Culture - Blood (collected 06-03-20 @ 08:52)  Source: .Blood Blood-Peripheral  Final Report (06-08-20 @ 09:00):    No Growth Final    Culture - Surgical Swab (collected 06-03-20 @ 00:34)  Source: .Surgical Swab left foot wound  Final Report (06-07-20 @ 20:07):    Few Staphylococcus aureus  Organism: Staphylococcus aureus (06-07-20 @ 20:07)  Organism: Staphylococcus aureus (06-07-20 @ 20:07)      -  Ampicillin/Sulbactam: S <=8/4      -  Cefazolin: S <=4      -  Clindamycin: R >4      -  Erythromycin: R >4      -  Gentamicin: S <=1 Should not be used as monotherapy      -  Oxacillin: S <=0.25      -  Penicillin: R 2      -  RIF- Rifampin: S <=1 Should not be used as monotherapy      -  Tetra/Doxy: S <=1      -  Trimethoprim/Sulfamethoxazole: S <=0.5/9.5      -  Vancomycin: S 1      Method Type: LI    Culture - Blood (collected 06-02-20 @ 14:30)  Source: .Blood Blood  Final Report (06-07-20 @ 15:00):    No Growth Final    Culture - Blood (collected 06-02-20 @ 14:30)  Source: .Blood Blood  Final Report (06-07-20 @ 15:00):    No Growth Final    Culture - Urine (collected 05-30-20 @ 16:36)  Source: .Urine Clean Catch (Midstream)  Final Report (05-31-20 @ 11:47):    <10,000 CFU/mL Normal Urogenital Shannan            INFECTIOUS DISEASES TESTING  COVID-19 PCR: NotDetec (06-23-20 @ 22:30)  COVID-19 PCR: NotDetec (06-14-20 @ 09:41)  Procalcitonin, Serum: 0.10 ng/mL (06-12-20 @ 09:03)  COVID-19 PCR: NotDetec (06-08-20 @ 23:20)  Procalcitonin, Serum: 0.17 ng/mL (06-08-20 @ 10:08)  Procalcitonin, Serum: 0.10 ng/mL (06-06-20 @ 15:06)  Procalcitonin, Serum: 0.11 ng/mL (06-05-20 @ 19:14)  COVID-19 PCR: NotDetec (06-04-20 @ 17:04)  Procalcitonin, Serum: 0.17 ng/mL (06-02-20 @ 09:59)  Hepatitis C Virus Interpretation: Nonreact (05-31-20 @ 10:59)  COVID-19 PCR: NotDetec (05-30-20 @ 11:51)      RADIOLOGY & ADDITIONAL TESTS:  I have personally reviewed the last Chest xray  CXR      CT      CARDIOLOGY TESTING  12 Lead ECG:   Ventricular Rate 68 BPM    Atrial Rate 68 BPM    P-R Interval 118 ms    QRS Duration 138 ms    Q-T Interval 468 ms    QTC Calculation(Bezet) 497 ms    P Axis 59 degrees    R Axis 248 degrees    T Axis 48 degrees    Diagnosis Line *** Poor data quality, interpretation may be adversely affected  Atrial-sensed ventricular-paced rhythm  Biventricular pacemaker detected  Abnormal ECG    Confirmed by Addison Rosario ([id number]) on 6/23/2020 7:43:24 PM (06-23-20 @ 18:59)      MEDICATIONS  acetaminophen   Tablet .. 650 Oral every 6 hours  atorvastatin 40 Oral at bedtime  cefTRIAXone   IVPB 2000 IV Intermittent every 24 hours  chlorhexidine 4% Liquid 1 Topical <User Schedule>  clopidogrel Tablet 75 Oral daily  dextrose 5%. 1000 IV Continuous <Continuous>  dextrose 50% Injectable 12.5 IV Push once  dextrose 50% Injectable 25 IV Push once  dextrose 50% Injectable 25 IV Push once  doxycycline hyclate Capsule 100 Oral every 12 hours  gabapentin 100 Oral every 12 hours  heparin  Infusion 700 IV Continuous <Continuous>  hydrALAZINE 50 Oral every 8 hours  insulin glargine Injectable (LANTUS) 21 SubCutaneous at bedtime  insulin lispro (HumaLOG) corrective regimen sliding scale  SubCutaneous three times a day before meals  insulin lispro Injectable (HumaLOG) 7 SubCutaneous before breakfast  insulin lispro Injectable (HumaLOG) 7 SubCutaneous before lunch  insulin lispro Injectable (HumaLOG) 7 SubCutaneous before dinner  lisinopril 40 Oral daily  loratadine 10 Oral daily  magnesium oxide 400 Oral three times a day with meals  metoprolol succinate  Oral daily  pantoprazole    Tablet 40 Oral before breakfast  senna 2 Oral at bedtime  silver sulfADIAZINE 1% Cream 1 Topical daily  sodium chloride 0.9%. 1000 IV Continuous <Continuous>  sodium chloride 0.9%. 1000 IV Continuous <Continuous>      Weight  Weight (kg): 65.2 (06-24-20 @ 23:30)    ANTIBIOTICS:  cefTRIAXone   IVPB 2000 milliGRAM(s) IV Intermittent every 24 hours  doxycycline hyclate Capsule 100 milliGRAM(s) Oral every 12 hours      ALLERGIES:  codeine (Rash)  penicillin (Rash) DARRION MELENDEZ  74y, Female  Allergy: codeine (Rash)  penicillin (Rash)      CHIEF COMPLAINT: worsening lower extremity ulcers (24 Jun 2020 16:06)      LOS  2d    HPI:  Patient is a 74y old Female known to have CAD s.p PCI; ischemic CM s/p CRT-D, HTN, DM type 2, pAfib with CHADSVASC of 8 on eliquis, PUD, recent left PICA stroke, PVD with most recent angiogram june 2020 showing non-reconstructive severe occlusion of popliteal and tibial vessels; she has hx of angioplasty/stenting at [facility] and she is s/p amputation of L hallux (may 2020) due to toe gangrene and underlying OM with MSSA organism . Patient presents with a chief complaint of worsening LE pain secondary to critical limb ischemia causing non-healing ulcers of L foot at the site of hallux amputation.  (to note patient had ? bladder mass with negative cytology for malignancy was supposed to follow up outpt for cystoscopy) she denies any fever or chills   No nausea no vomiting no diarrhe and otherwise ROS negative   vitals stable afebrile however bp 162/66 mmHg  labs showed WBC 85170 with Hg 10 and Na 131( at baseline) but Cr 1.2 ( baseline 0.8)  ecg revealed paced rhythm (23 Jun 2020 20:30)      INFECTIOUS DISEASE HISTORY:  recently treated 5/2020 with PICC for OM cx MSSA Ceftriaxone IV and PO doxy    PAST MEDICAL & SURGICAL HISTORY:  Amputation of toe of left foot  PVD (peripheral vascular disease)  CAD (coronary artery disease)  CHF (congestive heart failure)  HTN (hypertension)  DM (diabetes mellitus)  Status post coronary artery stent placement  H/O: hysterectomy  Cardiac defibrillator in place  Artificial cardiac pacemaker  History of cholecystectomy      FAMILY HISTORY  non-contributory     SOCIAL HISTORY  Social History:  non smoker, denies ETOH and illicit drug abuse (23 Jun 2020 20:30)        ROS  General: Denies rigors, nightsweats  HEENT: Denies headache, rhinorrhea, sore throat, eye pain  CV: Denies CP, palpitations  PULM: Denies wheezing, hemoptysis  GI: Denies hematemesis, hematochezia, melena  : Denies discharge, hematuria  MSK: Denies arthralgias, myalgias  SKIN: as noted above   NEURO: Denies paresthesias, weakness  PSYCH: Denies depression, anxiety    VITALS:  T(F): 97.5, Max: 97.5 (06-25-20 @ 04:05)  HR: 75  BP: 177/76  RR: 18Vital Signs Last 24 Hrs  T(C): 36.4 (25 Jun 2020 04:05), Max: 36.4 (25 Jun 2020 04:05)  T(F): 97.5 (25 Jun 2020 04:05), Max: 97.5 (25 Jun 2020 04:05)  HR: 75 (25 Jun 2020 04:05) (66 - 75)  BP: 177/76 (25 Jun 2020 06:49) (175/80 - 190/138)  BP(mean): --  RR: 18 (25 Jun 2020 04:05) (18 - 18)  SpO2: 98% (25 Jun 2020 01:25) (98% - 98%)    PHYSICAL EXAM:  Gen: NAD, resting in bed  HEENT: Normocephalic, atraumatic  Neck: supple, no lymphadenopathy  CV: Regular rate & regular rhythm  Lungs: decreased BS at bases, no fremitus  Abdomen: Soft, BS present  Ext: Warm, well perfused, Left hallux amputated with non-healing ulcer. stitches in place, no erythema/no purulence  Neuro: non focal, awake  Skin: no rash, no erythema  Lines: no phlebitis    TESTS & MEASUREMENTS:                        9.6    8.85  )-----------( 355      ( 25 Jun 2020 05:41 )             31.0     06-25    136  |  99  |  16  ----------------------------<  71  4.8   |  21  |  0.8    Ca    8.8      25 Jun 2020 05:41  Mg     1.6     06-24    TPro  6.7  /  Alb  3.1<L>  /  TBili  0.2  /  DBili  x   /  AST  35  /  ALT  28  /  AlkPhos  300<H>  06-25    eGFR if Non African American: 74 mL/min/1.73M2 (06-25-20 @ 05:41)  eGFR if African American: 85 mL/min/1.73M2 (06-25-20 @ 05:41)    LIVER FUNCTIONS - ( 25 Jun 2020 05:41 )  Alb: 3.1 g/dL / Pro: 6.7 g/dL / ALK PHOS: 300 U/L / ALT: 28 U/L / AST: 35 U/L / GGT: x               Culture - Abscess with Gram Stain (collected 06-06-20 @ 03:05)  Source: .Abscess left foot  Final Report (06-11-20 @ 08:31):    Moderate Staphylococcus aureus  Organism: Staphylococcus aureus  Staphylococcus aureus (06-11-20 @ 08:31)  Organism: Staphylococcus aureus (06-11-20 @ 08:31)      Method Type: LI  Organism: Staphylococcus aureus (06-11-20 @ 08:31)      -  Ampicillin/Sulbactam: S <=8/4      -  Cefazolin: S <=4      -  Clindamycin: R >4      -  Erythromycin: R >4      -  Gentamicin: S <=1 Should not be used as monotherapy      -  Oxacillin: S <=0.25      -  Penicillin: R 2      -  RIF- Rifampin: S <=1 Should not be used as monotherapy      -  Tetra/Doxy: S <=1      -  Trimethoprim/Sulfamethoxazole: S <=0.5/9.5      -  Vancomycin: S 1      Method Type: LI    Culture - Blood (collected 06-03-20 @ 08:52)  Source: .Blood Blood-Peripheral  Final Report (06-08-20 @ 09:00):    No Growth Final    Culture - Surgical Swab (collected 06-03-20 @ 00:34)  Source: .Surgical Swab left foot wound  Final Report (06-07-20 @ 20:07):    Few Staphylococcus aureus  Organism: Staphylococcus aureus (06-07-20 @ 20:07)  Organism: Staphylococcus aureus (06-07-20 @ 20:07)      -  Ampicillin/Sulbactam: S <=8/4      -  Cefazolin: S <=4      -  Clindamycin: R >4      -  Erythromycin: R >4      -  Gentamicin: S <=1 Should not be used as monotherapy      -  Oxacillin: S <=0.25      -  Penicillin: R 2      -  RIF- Rifampin: S <=1 Should not be used as monotherapy      -  Tetra/Doxy: S <=1      -  Trimethoprim/Sulfamethoxazole: S <=0.5/9.5      -  Vancomycin: S 1      Method Type: LI    Culture - Blood (collected 06-02-20 @ 14:30)  Source: .Blood Blood  Final Report (06-07-20 @ 15:00):    No Growth Final    Culture - Blood (collected 06-02-20 @ 14:30)  Source: .Blood Blood  Final Report (06-07-20 @ 15:00):    No Growth Final    Culture - Urine (collected 05-30-20 @ 16:36)  Source: .Urine Clean Catch (Midstream)  Final Report (05-31-20 @ 11:47):    <10,000 CFU/mL Normal Urogenital Shannan            INFECTIOUS DISEASES TESTING  COVID-19 PCR: NotDetec (06-23-20 @ 22:30)  COVID-19 PCR: NotDetec (06-14-20 @ 09:41)  Procalcitonin, Serum: 0.10 ng/mL (06-12-20 @ 09:03)  COVID-19 PCR: NotDetec (06-08-20 @ 23:20)  Procalcitonin, Serum: 0.17 ng/mL (06-08-20 @ 10:08)  Procalcitonin, Serum: 0.10 ng/mL (06-06-20 @ 15:06)  Procalcitonin, Serum: 0.11 ng/mL (06-05-20 @ 19:14)  COVID-19 PCR: NotDetec (06-04-20 @ 17:04)  Procalcitonin, Serum: 0.17 ng/mL (06-02-20 @ 09:59)  Hepatitis C Virus Interpretation: Nonreact (05-31-20 @ 10:59)  COVID-19 PCR: NotDetec (05-30-20 @ 11:51)      RADIOLOGY & ADDITIONAL TESTS:  I have personally reviewed the last Chest xray  CXR      CT      CARDIOLOGY TESTING  12 Lead ECG:   Ventricular Rate 68 BPM    Atrial Rate 68 BPM    P-R Interval 118 ms    QRS Duration 138 ms    Q-T Interval 468 ms    QTC Calculation(Bezet) 497 ms    P Axis 59 degrees    R Axis 248 degrees    T Axis 48 degrees    Diagnosis Line *** Poor data quality, interpretation may be adversely affected  Atrial-sensed ventricular-paced rhythm  Biventricular pacemaker detected  Abnormal ECG    Confirmed by Addison Rosario ([id number]) on 6/23/2020 7:43:24 PM (06-23-20 @ 18:59)      MEDICATIONS  acetaminophen   Tablet .. 650 Oral every 6 hours  atorvastatin 40 Oral at bedtime  cefTRIAXone   IVPB 2000 IV Intermittent every 24 hours  chlorhexidine 4% Liquid 1 Topical <User Schedule>  clopidogrel Tablet 75 Oral daily  dextrose 5%. 1000 IV Continuous <Continuous>  dextrose 50% Injectable 12.5 IV Push once  dextrose 50% Injectable 25 IV Push once  dextrose 50% Injectable 25 IV Push once  doxycycline hyclate Capsule 100 Oral every 12 hours  gabapentin 100 Oral every 12 hours  heparin  Infusion 700 IV Continuous <Continuous>  hydrALAZINE 50 Oral every 8 hours  insulin glargine Injectable (LANTUS) 21 SubCutaneous at bedtime  insulin lispro (HumaLOG) corrective regimen sliding scale  SubCutaneous three times a day before meals  insulin lispro Injectable (HumaLOG) 7 SubCutaneous before breakfast  insulin lispro Injectable (HumaLOG) 7 SubCutaneous before lunch  insulin lispro Injectable (HumaLOG) 7 SubCutaneous before dinner  lisinopril 40 Oral daily  loratadine 10 Oral daily  magnesium oxide 400 Oral three times a day with meals  metoprolol succinate  Oral daily  pantoprazole    Tablet 40 Oral before breakfast  senna 2 Oral at bedtime  silver sulfADIAZINE 1% Cream 1 Topical daily  sodium chloride 0.9%. 1000 IV Continuous <Continuous>  sodium chloride 0.9%. 1000 IV Continuous <Continuous>      Weight  Weight (kg): 65.2 (06-24-20 @ 23:30)    ANTIBIOTICS:  cefTRIAXone   IVPB 2000 milliGRAM(s) IV Intermittent every 24 hours  doxycycline hyclate Capsule 100 milliGRAM(s) Oral every 12 hours      ALLERGIES:  codeine (Rash)  penicillin (Rash)

## 2020-06-25 NOTE — DISCHARGE NOTE NURSING/CASE MANAGEMENT/SOCIAL WORK - PATIENT PORTAL LINK FT
You can access the FollowMyHealth Patient Portal offered by [facility] by registering at the following website: [email or web address]. By joining [employer]’s FollowMyHealth portal, you will also be able to view your health information using other applications (apps) compatible with our system.

## 2020-06-25 NOTE — PROGRESS NOTE ADULT - ASSESSMENT
Patient is a 74y old Female known to have CAD s.p PCI; ischemic CM s/p CRT-D, HTN, DM type 2, pAfib with CHADSVASC of 8 on eliquis, PUD, recent left PICA stroke, bladder mass (under investigation)  PVD with most recent angiogram June 2020 showing non-reconstructive severe occlusion of popliteal and tibial vessels; she has hx of angioplasty/stenting at [facility] and she is s/p amputation of L hallux (may 2020) due to toe gangrene and underlying OM with MSSA organism . Patient presents with a chief complaint of worsening LE pain secondary to critical limb ischemia, absent distal pulses causing non-healing ulcers of L foot at the site of hallux amputation.     A/P:   Non healing Left foot ulcer: s/p left Hallux amputation May 2020.   Severe Peripheral vascular disease:   Patient is s/p Left Hallux amputation   PVD with most recent angiogram june 2020 showing non-reconstructive severe occlusion of popliteal and tibial vessels.   Plan for Angiogram on today, follow up procedure note.   continue Heparin drip.   Continue with Rocephin and Doxycycline for left foot OM until 7/11/2020.     Recent CVA: left PICA, likely embolic.   Continue Eliquis (on hold nowon heparin drip) and Lipitor.     Atrial Fibrillation: recent diagnosis with CVA  Continue Amiodarone, Metoprolol and anticoagulation.  heparing ggt     Chronic HFrEF (now improved) s/p BiVICD  No peripheral edema  Patient takes furosemide 20mg daily twice at home  Echo: EF 50-55%, mild TR, NM  will hold lasix now as euvolemic and Jayden and plan for angiogram     CAD s/p PCI in 2009  Continue Plavix, Lipitor and Metoprolol.     PUD ( egd may 2020)   continue pantoprazole     HTN (hypertension).  Continue Lisinopril 40mg, toprol Xl 100mg, hydralazine 50mg TID   BP elevated today, follow up repeat BP      DM type 2   Hb A1C: 8.1%  Continue Lantus and Lispro pre-meal.     #Progress Note Handoff:  Pending (specify): follow up podiatry, angio results and ID   Family discussion: vipin pt and agreed to plan  Disposition: Home

## 2020-06-25 NOTE — CONSULT NOTE ADULT - ASSESSMENT
ASSESSMENT  74y old Female known to have CAD s.p PCI; ischemic CM s/p CRT-D, HTN, DM type 2, pAfib with CHADSVASC of 8 on eliquis, PUD, recent left PICA stroke, PVD with most recent angiogram june 2020 showing non-reconstructive severe occlusion of popliteal and tibial vessels; she has hx of angioplasty/stenting at [facility] and she is s/p amputation of L hallux (may 2020) due to toe gangrene and underlying OM with MSSA treated 5/2020 with PICC for OM cx MSSA Ceftriaxone IV and PO doxy    IMPRESSION  #Chronic foot ulcer at L hallux amputation site     6/6 WCX MSSA    6/3 WCX MSSA    < from: Xray Foot AP + Lateral + Oblique, Left (06.23.20 @ 22:44) >  Status post hallux mid metatarsal amputation with persistent soft tissue defect, unchanged since 6/11/2020  #Abx allergy: penicillin (Rash)    RECOMMENDATIONS  - Continue current therapy for OM with IV Ceftriaxone 2g q24h and PO doxy (?) - no MRSA in cultures, can likely D/C. Would discuss with patient's ID physician   This is a preliminary incomplete pended note, all final recommendations to follow after interview and examination of the patient.     Spectra [id number] ASSESSMENT  74y old Female known to have CAD s.p PCI; ischemic CM s/p CRT-D, HTN, DM type 2, pAfib with CHADSVASC of 8 on eliquis, PUD, recent left PICA stroke, PVD with most recent angiogram june 2020 showing non-reconstructive severe occlusion of popliteal and tibial vessels; she has hx of angioplasty/stenting at [facility] and she is s/p amputation of L hallux (may 2020) due to toe gangrene and underlying OM with MSSA treated 5/2020 with PICC for OM cx MSSA Ceftriaxone IV and PO doxy    IMPRESSION  #Chronic foot ulcer at L hallux amputation site     6/6 WCX MSSA    6/3 WCX MSSA    < from: Xray Foot AP + Lateral + Oblique, Left (06.23.20 @ 22:44) >  Status post hallux mid metatarsal amputation with persistent soft tissue defect, unchanged since 6/11/2020  #Abx allergy: penicillin (Rash)    RECOMMENDATIONS  - ESR, CRP   - Continue current therapy for OM with IV Ceftriaxone 2g q24h and PO doxy (?) - no MRSA in cultures, can likely D/C the doxy. Would discuss with patient's ID physician . Complete the original 6 weeks for OM    Spectra [id number]

## 2020-06-25 NOTE — PHYSICAL THERAPY INITIAL EVALUATION ADULT - PATIENT PROFILE REVIEW, REHAB EVAL
Chart reviewed. Hold PT today, as per Dr. Christine MD. Angiogram today, so pt is on hold. No activity orders. PT consult placed. Ask for activity orders tomorrow, pending medical clearance./yes

## 2020-06-25 NOTE — PROGRESS NOTE ADULT - SUBJECTIVE AND OBJECTIVE BOX
Podiatry Progress Note    Subjective:   DARRION MELENDEZ is a pleasant well-nourished, well developed 74y Female in no acute distress, alert awake, and oriented to person, place and time.  Patient is a 74y old  Female who presents with a chief complaint of worsening lower extremity ulcers (25 Jun 2020 16:02)      PAST MEDICAL & SURGICAL HISTORY:  Amputation of toe of left foot  PVD (peripheral vascular disease)  CAD (coronary artery disease)  CHF (congestive heart failure)  HTN (hypertension)  DM (diabetes mellitus)  Status post coronary artery stent placement  H/O: hysterectomy  Cardiac defibrillator in place  Artificial cardiac pacemaker  History of cholecystectomy       Objective:  Vital Signs Last 24 Hrs  T(C): 36.4 (25 Jun 2020 04:05), Max: 36.4 (25 Jun 2020 04:05)  T(F): 97.5 (25 Jun 2020 04:05), Max: 97.5 (25 Jun 2020 04:05)  HR: 75 (25 Jun 2020 04:05) (72 - 75)  BP: 177/76 (25 Jun 2020 06:49) (177/76 - 190/138)  BP(mean): --  RR: 18 (25 Jun 2020 04:05) (18 - 18)  SpO2: 98% (25 Jun 2020 01:25) (98% - 98%)                        9.6    8.85  )-----------( 355      ( 25 Jun 2020 05:41 )             31.0                 06-25    136  |  99  |  16  ----------------------------<  71  4.8   |  21  |  0.8    Ca    8.8      25 Jun 2020 05:41  Mg     1.6     06-24    TPro  6.7  /  Alb  3.1<L>  /  TBili  0.2  /  DBili  x   /  AST  35  /  ALT  28  /  AlkPhos  300<H>  06-25    Physical Exam - Lower Extremity Focused:   Derm:   Left hallux amputation incision site, sutures intact   Mild erythema presents at left hallux amputation site   No drainage or malodor  About 1 cm x 1 cm healing granular tissue, superficial, improving from yesterday.     Vascular: DP and PT pulses diminished    Assessment:  Left hallux amputation incision site non-healing ulcer    Plan:  Chart reviewed and Patient evaluated. All Questions and Concerns Addressed and Answered  Patient seen bedside with Attending   Wound evaluated; Will continue to monitor wound site while patient admitted   Continue with Local Wound Care    Podiatry

## 2020-06-26 ENCOUNTER — TRANSCRIPTION ENCOUNTER (OUTPATIENT)
Age: 75
End: 2020-06-26

## 2020-06-26 LAB
ANION GAP SERPL CALC-SCNC: 13 MMOL/L — SIGNIFICANT CHANGE UP (ref 7–14)
BASOPHILS # BLD AUTO: 0.03 K/UL — SIGNIFICANT CHANGE UP (ref 0–0.2)
BASOPHILS NFR BLD AUTO: 0.3 % — SIGNIFICANT CHANGE UP (ref 0–1)
BUN SERPL-MCNC: 16 MG/DL — SIGNIFICANT CHANGE UP (ref 10–20)
CALCIUM SERPL-MCNC: 8.5 MG/DL — SIGNIFICANT CHANGE UP (ref 8.5–10.1)
CHLORIDE SERPL-SCNC: 100 MMOL/L — SIGNIFICANT CHANGE UP (ref 98–110)
CO2 SERPL-SCNC: 21 MMOL/L — SIGNIFICANT CHANGE UP (ref 17–32)
CREAT SERPL-MCNC: 0.8 MG/DL — SIGNIFICANT CHANGE UP (ref 0.7–1.5)
CRP SERPL-MCNC: 2.41 MG/DL — HIGH (ref 0–0.4)
EOSINOPHIL # BLD AUTO: 0.64 K/UL — SIGNIFICANT CHANGE UP (ref 0–0.7)
EOSINOPHIL NFR BLD AUTO: 6.5 % — SIGNIFICANT CHANGE UP (ref 0–8)
ERYTHROCYTE [SEDIMENTATION RATE] IN BLOOD: 97 MM/HR — HIGH (ref 0–20)
GLUCOSE BLDC GLUCOMTR-MCNC: 105 MG/DL — HIGH (ref 70–99)
GLUCOSE BLDC GLUCOMTR-MCNC: 123 MG/DL — HIGH (ref 70–99)
GLUCOSE BLDC GLUCOMTR-MCNC: 193 MG/DL — HIGH (ref 70–99)
GLUCOSE BLDC GLUCOMTR-MCNC: 201 MG/DL — HIGH (ref 70–99)
GLUCOSE SERPL-MCNC: 105 MG/DL — HIGH (ref 70–99)
HCT VFR BLD CALC: 28.8 % — LOW (ref 37–47)
HGB BLD-MCNC: 9 G/DL — LOW (ref 12–16)
IMM GRANULOCYTES NFR BLD AUTO: 0.6 % — HIGH (ref 0.1–0.3)
LYMPHOCYTES # BLD AUTO: 2.3 K/UL — SIGNIFICANT CHANGE UP (ref 1.2–3.4)
LYMPHOCYTES # BLD AUTO: 23.2 % — SIGNIFICANT CHANGE UP (ref 20.5–51.1)
MAGNESIUM SERPL-MCNC: 1.5 MG/DL — LOW (ref 1.8–2.4)
MCHC RBC-ENTMCNC: 27.3 PG — SIGNIFICANT CHANGE UP (ref 27–31)
MCHC RBC-ENTMCNC: 31.3 G/DL — LOW (ref 32–37)
MCV RBC AUTO: 87.3 FL — SIGNIFICANT CHANGE UP (ref 81–99)
MONOCYTES # BLD AUTO: 0.63 K/UL — HIGH (ref 0.1–0.6)
MONOCYTES NFR BLD AUTO: 6.4 % — SIGNIFICANT CHANGE UP (ref 1.7–9.3)
NEUTROPHILS # BLD AUTO: 6.24 K/UL — SIGNIFICANT CHANGE UP (ref 1.4–6.5)
NEUTROPHILS NFR BLD AUTO: 63 % — SIGNIFICANT CHANGE UP (ref 42.2–75.2)
NRBC # BLD: 0 /100 WBCS — SIGNIFICANT CHANGE UP (ref 0–0)
PLATELET # BLD AUTO: 321 K/UL — SIGNIFICANT CHANGE UP (ref 130–400)
POTASSIUM SERPL-MCNC: 3.9 MMOL/L — SIGNIFICANT CHANGE UP (ref 3.5–5)
POTASSIUM SERPL-SCNC: 3.9 MMOL/L — SIGNIFICANT CHANGE UP (ref 3.5–5)
RBC # BLD: 3.3 M/UL — LOW (ref 4.2–5.4)
RBC # FLD: 14.6 % — HIGH (ref 11.5–14.5)
SODIUM SERPL-SCNC: 134 MMOL/L — LOW (ref 135–146)
WBC # BLD: 9.9 K/UL — SIGNIFICANT CHANGE UP (ref 4.8–10.8)
WBC # FLD AUTO: 9.9 K/UL — SIGNIFICANT CHANGE UP (ref 4.8–10.8)

## 2020-06-26 PROCEDURE — 99232 SBSQ HOSP IP/OBS MODERATE 35: CPT

## 2020-06-26 PROCEDURE — 73700 CT LOWER EXTREMITY W/O DYE: CPT | Mod: 26,LT

## 2020-06-26 PROCEDURE — 99233 SBSQ HOSP IP/OBS HIGH 50: CPT

## 2020-06-26 RX ORDER — FUROSEMIDE 40 MG
20 TABLET ORAL
Refills: 0 | Status: DISCONTINUED | OUTPATIENT
Start: 2020-06-26 | End: 2020-06-27

## 2020-06-26 RX ORDER — METOPROLOL TARTRATE 50 MG
25 TABLET ORAL ONCE
Refills: 0 | Status: COMPLETED | OUTPATIENT
Start: 2020-06-26 | End: 2020-06-26

## 2020-06-26 RX ORDER — TRAMADOL HYDROCHLORIDE 50 MG/1
25 TABLET ORAL ONCE
Refills: 0 | Status: DISCONTINUED | OUTPATIENT
Start: 2020-06-26 | End: 2020-06-26

## 2020-06-26 RX ORDER — HYDRALAZINE HCL 50 MG
50 TABLET ORAL ONCE
Refills: 0 | Status: COMPLETED | OUTPATIENT
Start: 2020-06-26 | End: 2020-06-26

## 2020-06-26 RX ORDER — APIXABAN 2.5 MG/1
5 TABLET, FILM COATED ORAL
Refills: 0 | Status: DISCONTINUED | OUTPATIENT
Start: 2020-06-27 | End: 2020-06-27

## 2020-06-26 RX ORDER — MAGNESIUM SULFATE 500 MG/ML
1 VIAL (ML) INJECTION ONCE
Refills: 0 | Status: COMPLETED | OUTPATIENT
Start: 2020-06-26 | End: 2020-06-26

## 2020-06-26 RX ORDER — ALPRAZOLAM 0.25 MG
0.25 TABLET ORAL ONCE
Refills: 0 | Status: DISCONTINUED | OUTPATIENT
Start: 2020-06-26 | End: 2020-06-26

## 2020-06-26 RX ORDER — MAGNESIUM SULFATE 500 MG/ML
2 VIAL (ML) INJECTION ONCE
Refills: 0 | Status: COMPLETED | OUTPATIENT
Start: 2020-06-26 | End: 2020-06-26

## 2020-06-26 RX ADMIN — PANTOPRAZOLE SODIUM 40 MILLIGRAM(S): 20 TABLET, DELAYED RELEASE ORAL at 06:02

## 2020-06-26 RX ADMIN — Medication 20 MILLIGRAM(S): at 17:37

## 2020-06-26 RX ADMIN — CEFTRIAXONE 100 MILLIGRAM(S): 500 INJECTION, POWDER, FOR SOLUTION INTRAMUSCULAR; INTRAVENOUS at 21:50

## 2020-06-26 RX ADMIN — Medication 50 GRAM(S): at 17:29

## 2020-06-26 RX ADMIN — Medication 25 MILLIGRAM(S): at 09:58

## 2020-06-26 RX ADMIN — Medication 50 MILLIGRAM(S): at 22:21

## 2020-06-26 RX ADMIN — Medication 100 MILLIGRAM(S): at 17:39

## 2020-06-26 RX ADMIN — LORATADINE 10 MILLIGRAM(S): 10 TABLET ORAL at 11:50

## 2020-06-26 RX ADMIN — Medication 50 MILLIGRAM(S): at 09:58

## 2020-06-26 RX ADMIN — Medication 100 GRAM(S): at 12:59

## 2020-06-26 RX ADMIN — GABAPENTIN 100 MILLIGRAM(S): 400 CAPSULE ORAL at 17:39

## 2020-06-26 RX ADMIN — Medication 650 MILLIGRAM(S): at 05:26

## 2020-06-26 RX ADMIN — INSULIN GLARGINE 21 UNIT(S): 100 INJECTION, SOLUTION SUBCUTANEOUS at 21:50

## 2020-06-26 RX ADMIN — ATORVASTATIN CALCIUM 40 MILLIGRAM(S): 80 TABLET, FILM COATED ORAL at 21:50

## 2020-06-26 RX ADMIN — Medication 50 MILLIGRAM(S): at 05:26

## 2020-06-26 RX ADMIN — SENNA PLUS 2 TABLET(S): 8.6 TABLET ORAL at 22:22

## 2020-06-26 RX ADMIN — LISINOPRIL 40 MILLIGRAM(S): 2.5 TABLET ORAL at 05:26

## 2020-06-26 RX ADMIN — TRAMADOL HYDROCHLORIDE 25 MILLIGRAM(S): 50 TABLET ORAL at 01:59

## 2020-06-26 RX ADMIN — CLOPIDOGREL BISULFATE 75 MILLIGRAM(S): 75 TABLET, FILM COATED ORAL at 11:50

## 2020-06-26 RX ADMIN — Medication 7 UNIT(S): at 17:28

## 2020-06-26 RX ADMIN — Medication 650 MILLIGRAM(S): at 11:50

## 2020-06-26 RX ADMIN — Medication 1 APPLICATION(S): at 11:50

## 2020-06-26 RX ADMIN — Medication 650 MILLIGRAM(S): at 17:39

## 2020-06-26 RX ADMIN — TRAMADOL HYDROCHLORIDE 25 MILLIGRAM(S): 50 TABLET ORAL at 21:52

## 2020-06-26 RX ADMIN — Medication 100 MILLIGRAM(S): at 05:26

## 2020-06-26 RX ADMIN — Medication 0.25 MILLIGRAM(S): at 18:20

## 2020-06-26 RX ADMIN — MAGNESIUM OXIDE 400 MG ORAL TABLET 400 MILLIGRAM(S): 241.3 TABLET ORAL at 10:00

## 2020-06-26 RX ADMIN — GABAPENTIN 100 MILLIGRAM(S): 400 CAPSULE ORAL at 05:26

## 2020-06-26 RX ADMIN — Medication 2: at 17:28

## 2020-06-26 NOTE — PROGRESS NOTE ADULT - SUBJECTIVE AND OBJECTIVE BOX
Podiatry Progress Note    Subjective:   DARRION MELENDEZ is a pleasant well-nourished, well developed 74y Female in no acute distress, alert awake, and oriented to person, place and time.  Patient is a 74y old  Female who presents with a chief complaint of worsening lower extremity ulcers (25 Jun 2020 17:50)    PAST MEDICAL & SURGICAL HISTORY:  Amputation of toe of left foot  PVD (peripheral vascular disease)  CAD (coronary artery disease)  CHF (congestive heart failure)  HTN (hypertension)  DM (diabetes mellitus)  Status post coronary artery stent placement  H/O: hysterectomy  Cardiac defibrillator in place  Artificial cardiac pacemaker  History of cholecystectomy     Objective:  Vital Signs Last 24 Hrs  T(C): 36.5 (26 Jun 2020 04:51), Max: 36.5 (26 Jun 2020 04:51)  T(F): 97.7 (26 Jun 2020 04:51), Max: 97.7 (26 Jun 2020 04:51)  HR: 80 (26 Jun 2020 05:56) (80 - 106)  BP: 187/79 (26 Jun 2020 05:56) (146/65 - 195/86)  BP(mean): 93 (25 Jun 2020 18:00) (93 - 93)  RR: 18 (26 Jun 2020 04:51) (18 - 18)  SpO2: --                        9.6    8.85  )-----------( 355      ( 25 Jun 2020 05:41 )             31.0                 06-25    136  |  99  |  16  ----------------------------<  71  4.8   |  21  |  0.8    Ca    8.8      25 Jun 2020 05:41    TPro  6.7  /  Alb  3.1<L>  /  TBili  0.2  /  DBili  x   /  AST  35  /  ALT  28  /  AlkPhos  300<H>  06-25    Physical Exam - Lower Extremity Focused:   Derm:   s/p 1st Ray Resection;  Sutures Intact; Wound edges Well Coapted;   Mild Eschar on the Proximal Aspect of Suture Site;  Mild Bruising of the Plantar Midfoot;  No Drainage or Active Bleeding Noted;     Vascular: DP and PT Pulses Diminished; Foot is Warm to Warm to the touch   Neuro: Protective Sensation Intact; Pain on Palpation of the Plantar Midfoot  MSK: 1st Ray Resection; Left Foot;     Assessment:   s/p 1st Ray Resection;   Sutures Intact; Wound Edges Well Coapted;  Mild Tenderness on the Plantar Midfoot; Left Foot;     Plan:  Chart reviewed and Patient evaluated. All Questions and Concerns Addressed and Answered  Discussed diagnosis and treatment with patient  Local Wound Care; Light Layer Silvadene / DSD / Kerlix; Q24 Dressing Changes;  CT Imaging; Ordered Left Foot; Rule out Hematoma;   Patient is Stable Per Podiatry Standpoint; Can Follow Up w/ Dr. Yo Post Discharge;  Discussed Plan w/ Attending;     Podiatry  Podiatry Progress Note    Subjective:   DARRION MELENDEZ is a pleasant well-nourished, well developed 74y Female in no acute distress, alert awake, and oriented to person, place and time.  Patient is a 74y old  Female who presents with a chief complaint of worsening lower extremity ulcers (25 Jun 2020 17:50)    PAST MEDICAL & SURGICAL HISTORY:  Amputation of toe of left foot  PVD (peripheral vascular disease)  CAD (coronary artery disease)  CHF (congestive heart failure)  HTN (hypertension)  DM (diabetes mellitus)  Status post coronary artery stent placement  H/O: hysterectomy  Cardiac defibrillator in place  Artificial cardiac pacemaker  History of cholecystectomy     Objective:  Vital Signs Last 24 Hrs  T(C): 36.5 (26 Jun 2020 04:51), Max: 36.5 (26 Jun 2020 04:51)  T(F): 97.7 (26 Jun 2020 04:51), Max: 97.7 (26 Jun 2020 04:51)  HR: 80 (26 Jun 2020 05:56) (80 - 106)  BP: 187/79 (26 Jun 2020 05:56) (146/65 - 195/86)  BP(mean): 93 (25 Jun 2020 18:00) (93 - 93)  RR: 18 (26 Jun 2020 04:51) (18 - 18)  SpO2: --                        9.6    8.85  )-----------( 355      ( 25 Jun 2020 05:41 )             31.0                 06-25    136  |  99  |  16  ----------------------------<  71  4.8   |  21  |  0.8    Ca    8.8      25 Jun 2020 05:41    TPro  6.7  /  Alb  3.1<L>  /  TBili  0.2  /  DBili  x   /  AST  35  /  ALT  28  /  AlkPhos  300<H>  06-25  WBC Count: 9.90 K/uL (06-26 @ 08:05)  WBC Count: 8.85 K/uL (06-25 @ 05:41)  WBC Count: 12.69 K/uL (06-24 @ 05:52)  WBC Count: 10.82 K/uL (06-23 @ 19:02)  WBC Count: 13.14 K/uL (06-19 @ 06:37)    Physical Exam - Lower Extremity Focused:   Derm:   s/p 1st Ray Resection;  Sutures Intact; Wound edges Well Coapted;   Mild Eschar on the Proximal Aspect of Suture Site;  Mild Bruising of the Plantar Midfoot;  No Drainage or Active Bleeding Noted;     Vascular: DP and PT Pulses Diminished; Foot is Warm to Warm to the touch   Neuro: Protective Sensation Intact; Pain on Palpation of the Plantar Midfoot  MSK: 1st Ray Resection; Left Foot;   MEDICATIONS  (STANDING):  acetaminophen   Tablet .. 650 milliGRAM(s) Oral every 6 hours  atorvastatin 40 milliGRAM(s) Oral at bedtime  cefTRIAXone   IVPB 2000 milliGRAM(s) IV Intermittent every 24 hours  chlorhexidine 4% Liquid 1 Application(s) Topical <User Schedule>  clopidogrel Tablet 75 milliGRAM(s) Oral daily  doxycycline hyclate Capsule 100 milliGRAM(s) Oral every 12 hours  furosemide    Tablet 20 milliGRAM(s) Oral two times a day  gabapentin 100 milliGRAM(s) Oral every 12 hours  hydrALAZINE 50 milliGRAM(s) Oral every 8 hours  insulin glargine Injectable (LANTUS) 21 Unit(s) SubCutaneous at bedtime  insulin lispro (HumaLOG) corrective regimen sliding scale   SubCutaneous three times a day before meals  insulin lispro Injectable (HumaLOG) 7 Unit(s) SubCutaneous before breakfast  insulin lispro Injectable (HumaLOG) 7 Unit(s) SubCutaneous before lunch  insulin lispro Injectable (HumaLOG) 7 Unit(s) SubCutaneous before dinner  lisinopril 40 milliGRAM(s) Oral daily  loratadine 10 milliGRAM(s) Oral daily  metoprolol succinate  milliGRAM(s) Oral daily  pantoprazole    Tablet 40 milliGRAM(s) Oral before breakfast  senna 2 Tablet(s) Oral at bedtime  silver sulfADIAZINE 1% Cream 1 Application(s) Topical daily  sodium chloride 0.9%. 1000 milliLiter(s) (65 mL/Hr) IV Continuous <Continuous>    MEDICATIONS  (PRN):  traMADol 25 milliGRAM(s) Oral every 8 hours PRN Severe Pain (7 - 10)  Allergies    codeine (Rash)  penicillin (Rash)    Intolerances    HPI:  Patient is a 74y old Female known to have CAD s.p PCI; ischemic CM s/p CRT-D, HTN, DM type 2, pAfib with CHADSVASC of 8 on eliquis, PUD, recent left PICA stroke, PVD with most recent angiogram june 2020 showing non-reconstructive severe occlusion of popliteal and tibial vessels; she has hx of angioplasty/stenting at [facility] and she is s/p amputation of L hallux (may 2020) due to toe gangrene and underlying OM with MSSA organism . Patient presents with a chief complaint of worsening LE pain secondary to critical limb ischemia causing non-healing ulcers of L foot at the site of hallux amputation.  (to note patient had ? bladder mass with negative cytology for malignancy was supposed to follow up outpt for cystoscopy) she denies any fever or chills   No nausea no vomiting no diarrhe and otherwise ROS negative   vitals stable afebrile however bp 162/66 mmHg  labs showed WBC 91449 with Hg 10 and Na 131( at baseline) but Cr 1.2 ( baseline 0.8)  ecg revealed paced rhythm (23 Jun 2020 20:30)  EXAM:  CT FOOT ONLY LT            PROCEDURE DATE:  06/26/2020            INTERPRETATION:  CT OF THE LEFT FOOT WITHOUT CONTRAST    CLINICAL HISTORY: Suspected hematoma along the plantar medial midfoot after surgery.    TECHNIQUE: Images were obtained of the left foot without contrast. Coronal and sagittal reformatted images were also provided.    COMPARISON: Left foot radiographs June 23, 2020, CT of the foot June 2, 2020    FINDINGS:    BONES/JOINTS: Patient is post transmetatarsal amputation of the first ray. Osseous fragments largest measuring 0.5 cm are next to the amputation stump. There is distal quadriceps enthesophyte and 7 mm calcaneal heel spur. There is diffuse osteopenia. No acute displaced fracture or dislocation. There are degenerative changes of the hindfoot and midfoot and forefoot joints. No large joint effusion. Ossifications are noted in the distribution of the deltoid ligament, consistent with remote injury.    SOFT TISSUES: At the plantar medial forefoot area of the amputation site, there is confluent soft tissue edema/collection, measuring 3.6 x 2.9 x 5.2 cm collectively, with intermixed hyperdensity consistent with hematomas measuring 1.8 x 1.8 and 1.3 x 1 cm just adjacent to the amputation stump.    OTHER: There are vascular calcifications.    IMPRESSION:  Post transmetatarsal amputation of the first ray, with a confluent soft tissue edema/collection measuring 5.2 cm collectively with intermixed hyperdensities consistent with hematomas measuring 1.8 cm and1.3 cm just adjacent to the amputation stump.    EDIL MAK M.D., ATTENDING RADIOLOGIST  This document has been electronically signed. Jun 26 2020  3:34PM          < end of copied text >   Coapted;  Mild Tenderness on the Plantar Midfoot; Left Foot;         Assessment:   s/p 1st Ray Resection;   Sutures Intact; Wound Edges Well< from: CT Foot No Cont, Left (06.26.20 @ 13:35) >                  Plan:  Chart reviewed and Patient evaluated. All Questions and Concerns Addressed and Answered  Discussed diagnosis and treatment with patient  Local Wound Care; Light Layer Silvadene / DSD / Kerlix; Q24 Dressing Changes;  CT Imaging; Ordered Left Foot; Rule out Hematoma;   Discussed Plan w/ Attending;     Podiatry  Podiatry Progress Note    Subjective:   DARRION MELENDEZ is a pleasant well-nourished, well developed 74y Female in no acute distress, alert awake, and oriented to person, place and time.  Patient is a 74y old  Female who presents with a chief complaint of worsening lower extremity ulcers (25 Jun 2020 17:50)    PAST MEDICAL & SURGICAL HISTORY:  Amputation of toe of left foot  PVD (peripheral vascular disease)  CAD (coronary artery disease)  CHF (congestive heart failure)  HTN (hypertension)  DM (diabetes mellitus)  Status post coronary artery stent placement  H/O: hysterectomy  Cardiac defibrillator in place  Artificial cardiac pacemaker  History of cholecystectomy     Objective:  Vital Signs Last 24 Hrs  T(C): 36.5 (26 Jun 2020 04:51), Max: 36.5 (26 Jun 2020 04:51)  T(F): 97.7 (26 Jun 2020 04:51), Max: 97.7 (26 Jun 2020 04:51)  HR: 80 (26 Jun 2020 05:56) (80 - 106)  BP: 187/79 (26 Jun 2020 05:56) (146/65 - 195/86)  BP(mean): 93 (25 Jun 2020 18:00) (93 - 93)  RR: 18 (26 Jun 2020 04:51) (18 - 18)  SpO2: --                        9.6    8.85  )-----------( 355      ( 25 Jun 2020 05:41 )             31.0                 06-25    136  |  99  |  16  ----------------------------<  71  4.8   |  21  |  0.8    Ca    8.8      25 Jun 2020 05:41    TPro  6.7  /  Alb  3.1<L>  /  TBili  0.2  /  DBili  x   /  AST  35  /  ALT  28  /  AlkPhos  300<H>  06-25  WBC Count: 9.90 K/uL (06-26 @ 08:05)  WBC Count: 8.85 K/uL (06-25 @ 05:41)  WBC Count: 12.69 K/uL (06-24 @ 05:52)  WBC Count: 10.82 K/uL (06-23 @ 19:02)  WBC Count: 13.14 K/uL (06-19 @ 06:37)    Physical Exam - Lower Extremity Focused:   Derm:   s/p 1st Ray Resection;  Sutures Intact; Wound edges Well Coapted;   Mild Eschar on the Proximal Aspect of Suture Site;  Mild Bruising of the Plantar Midfoot;  No Drainage or Active Bleeding Noted;     Vascular: DP and PT Pulses Diminished; Foot is Warm to Warm to the touch   Neuro: Protective Sensation Intact; Pain on Palpation of the Plantar Midfoot  MSK: 1st Ray Resection; Left Foot;   MEDICATIONS  (STANDING):  acetaminophen   Tablet .. 650 milliGRAM(s) Oral every 6 hours  atorvastatin 40 milliGRAM(s) Oral at bedtime  cefTRIAXone   IVPB 2000 milliGRAM(s) IV Intermittent every 24 hours  chlorhexidine 4% Liquid 1 Application(s) Topical <User Schedule>  clopidogrel Tablet 75 milliGRAM(s) Oral daily  doxycycline hyclate Capsule 100 milliGRAM(s) Oral every 12 hours  furosemide    Tablet 20 milliGRAM(s) Oral two times a day  gabapentin 100 milliGRAM(s) Oral every 12 hours  hydrALAZINE 50 milliGRAM(s) Oral every 8 hours  insulin glargine Injectable (LANTUS) 21 Unit(s) SubCutaneous at bedtime  insulin lispro (HumaLOG) corrective regimen sliding scale   SubCutaneous three times a day before meals  insulin lispro Injectable (HumaLOG) 7 Unit(s) SubCutaneous before breakfast  insulin lispro Injectable (HumaLOG) 7 Unit(s) SubCutaneous before lunch  insulin lispro Injectable (HumaLOG) 7 Unit(s) SubCutaneous before dinner  lisinopril 40 milliGRAM(s) Oral daily  loratadine 10 milliGRAM(s) Oral daily  metoprolol succinate  milliGRAM(s) Oral daily  pantoprazole    Tablet 40 milliGRAM(s) Oral before breakfast  senna 2 Tablet(s) Oral at bedtime  silver sulfADIAZINE 1% Cream 1 Application(s) Topical daily  sodium chloride 0.9%. 1000 milliLiter(s) (65 mL/Hr) IV Continuous <Continuous>    MEDICATIONS  (PRN):  traMADol 25 milliGRAM(s) Oral every 8 hours PRN Severe Pain (7 - 10)  Allergies    codeine (Rash)  penicillin (Rash)    Intolerances    HPI:  Patient is a 74y old Female known to have CAD s.p PCI; ischemic CM s/p CRT-D, HTN, DM type 2, pAfib with CHADSVASC of 8 on eliquis, PUD, recent left PICA stroke, PVD with most recent angiogram june 2020 showing non-reconstructive severe occlusion of popliteal and tibial vessels; she has hx of angioplasty/stenting at [facility] and she is s/p amputation of L hallux (may 2020) due to toe gangrene and underlying OM with MSSA organism . Patient presents with a chief complaint of worsening LE pain secondary to critical limb ischemia causing non-healing ulcers of L foot at the site of hallux amputation.  (to note patient had ? bladder mass with negative cytology for malignancy was supposed to follow up outpt for cystoscopy) she denies any fever or chills   No nausea no vomiting no diarrhe and otherwise ROS negative   vitals stable afebrile however bp 162/66 mmHg  labs showed WBC 78135 with Hg 10 and Na 131( at baseline) but Cr 1.2 ( baseline 0.8)  ecg revealed paced rhythm (23 Jun 2020 20:30)  EXAM:  CT FOOT ONLY LT            PROCEDURE DATE:  06/26/2020            INTERPRETATION:  CT OF THE LEFT FOOT WITHOUT CONTRAST    CLINICAL HISTORY: Suspected hematoma along the plantar medial midfoot after surgery.    TECHNIQUE: Images were obtained of the left foot without contrast. Coronal and sagittal reformatted images were also provided.    COMPARISON: Left foot radiographs June 23, 2020, CT of the foot June 2, 2020    FINDINGS:    BONES/JOINTS: Patient is post transmetatarsal amputation of the first ray. Osseous fragments largest measuring 0.5 cm are next to the amputation stump. There is distal quadriceps enthesophyte and 7 mm calcaneal heel spur. There is diffuse osteopenia. No acute displaced fracture or dislocation. There are degenerative changes of the hindfoot and midfoot and forefoot joints. No large joint effusion. Ossifications are noted in the distribution of the deltoid ligament, consistent with remote injury.    SOFT TISSUES: At the plantar medial forefoot area of the amputation site, there is confluent soft tissue edema/collection, measuring 3.6 x 2.9 x 5.2 cm collectively, with intermixed hyperdensity consistent with hematomas measuring 1.8 x 1.8 and 1.3 x 1 cm just adjacent to the amputation stump.    OTHER: There are vascular calcifications.    IMPRESSION:  Post transmetatarsal amputation of the first ray, with a confluent soft tissue edema/collection measuring 5.2 cm collectively with intermixed hyperdensities consistent with hematomas measuring 1.8 cm and1.3 cm just adjacent to the amputation stump.    EDIL MAK M.D., ATTENDING RADIOLOGIST  This document has been electronically signed. Jun 26 2020  3:34PM          < end of copied text >   Coapted;  Mild Tenderness on the Plantar Midfoot; Left Foot;         Assessment:   s/p 1st Ray Resection;   Sutures Intact; Wound Edges Well                  Plan:  Chart reviewed and Patient evaluated. All Questions and Concerns Addressed and Answered  Discussed diagnosis and treatment with patient  Local Wound Care; Light Layer Silvadene / DSD / Kerlix; Q24 Dressing Changes;  CT Imaging; Ordered Left Foot; Rule out Hematoma;   Discussed Plan w/ Attending;     Podiatry

## 2020-06-26 NOTE — PROGRESS NOTE ADULT - SUBJECTIVE AND OBJECTIVE BOX
Cardiology Follow up    DARRION MELENDEZ   74y Female  PAST MEDICAL & SURGICAL HISTORY:  Amputation of toe of left foot  PVD (peripheral vascular disease)  CAD (coronary artery disease)  CHF (congestive heart failure)  HTN (hypertension)  DM (diabetes mellitus)  Status post coronary artery stent placement  H/O: hysterectomy  Cardiac defibrillator in place  Artificial cardiac pacemaker  History of cholecystectomy       HPI:  Patient is a 74y old Female known to have CAD s.p PCI; ischemic CM s/p CRT-D, HTN, DM type 2, pAfib with CHADSVASC of 8 on eliquis, PUD, recent left PICA stroke, PVD with most recent angiogram june 2020 showing non-reconstructive severe occlusion of popliteal and tibial vessels; she has hx of angioplasty/stenting at [facility] and she is s/p amputation of L hallux (may 2020) due to toe gangrene and underlying OM with MSSA organism . Patient presents with a chief complaint of worsening LE pain secondary to critical limb ischemia causing non-healing ulcers of L foot at the site of hallux amputation.  (to note patient had ? bladder mass with negative cytology for malignancy was supposed to follow up outpt for cystoscopy) she denies any fever or chills   No nausea no vomiting no diarrhe and otherwise ROS negative   vitals stable afebrile however bp 162/66 mmHg  labs showed WBC 73058 with Hg 10 and Na 131( at baseline) but Cr 1.2 ( baseline 0.8)  ecg revealed paced rhythm (23 Jun 2020 20:30)    Allergies    codeine (Rash)  penicillin (Rash)    Intolerances      Patient without complaints.  Denies CP, SOB, palpitations, or dizziness    Vital Signs Last 24 Hrs  T(C): 36.5 (26 Jun 2020 04:51), Max: 36.5 (26 Jun 2020 04:51)  T(F): 97.7 (26 Jun 2020 04:51), Max: 97.7 (26 Jun 2020 04:51)  HR: 101 (26 Jun 2020 08:59) (80 - 106)  BP: 168/74 (26 Jun 2020 08:59) (146/65 - 195/86)  BP(mean): 93 (25 Jun 2020 18:00) (93 - 93)  RR: 18 (26 Jun 2020 04:51) (18 - 18)  SpO2: --    MEDICATIONS  (STANDING):  acetaminophen   Tablet .. 650 milliGRAM(s) Oral every 6 hours  atorvastatin 40 milliGRAM(s) Oral at bedtime  cefTRIAXone   IVPB 2000 milliGRAM(s) IV Intermittent every 24 hours  chlorhexidine 4% Liquid 1 Application(s) Topical <User Schedule>  clopidogrel Tablet 75 milliGRAM(s) Oral daily  doxycycline hyclate Capsule 100 milliGRAM(s) Oral every 12 hours  gabapentin 100 milliGRAM(s) Oral every 12 hours  hydrALAZINE 50 milliGRAM(s) Oral every 8 hours  insulin glargine Injectable (LANTUS) 21 Unit(s) SubCutaneous at bedtime  insulin lispro (HumaLOG) corrective regimen sliding scale   SubCutaneous three times a day before meals  insulin lispro Injectable (HumaLOG) 7 Unit(s) SubCutaneous before breakfast  insulin lispro Injectable (HumaLOG) 7 Unit(s) SubCutaneous before lunch  insulin lispro Injectable (HumaLOG) 7 Unit(s) SubCutaneous before dinner  lisinopril 40 milliGRAM(s) Oral daily  loratadine 10 milliGRAM(s) Oral daily  metoprolol succinate  milliGRAM(s) Oral daily  pantoprazole    Tablet 40 milliGRAM(s) Oral before breakfast  senna 2 Tablet(s) Oral at bedtime  silver sulfADIAZINE 1% Cream 1 Application(s) Topical daily  sodium chloride 0.9%. 1000 milliLiter(s) (65 mL/Hr) IV Continuous <Continuous>    MEDICATIONS  (PRN):  traMADol 25 milliGRAM(s) Oral every 8 hours PRN Severe Pain (7 - 10)      REVIEW OF SYSTEMS:          CONSTITUTIONAL: No weakness, fevers or chills          EYES/ENT: No visual changes;  No vertigo or throat pain           NECK: No pain or stiffness          RESPIRATORY: No cough, wheezing, hemoptysis          CARDIOVASCULAR: no pain, no PLASENCIA, no palpitations           GASTROINTESTINAL: No abdominal or epigastric pain. No nausea, vomiting, or hematemesis;           GENITOURINARY: No dysuria, frequency or hematuria          NEUROLOGICAL: No numbness or weakness          SKIN: No itching, rashes    PHYSICAL EXAM:           CONSTITUTIONAL: Well-developed; well-nourished; in no acute distress  	SKIN: warm, dry  	HEAD: Normocephalic; atraumatic  	EYES: PERRL.  	ENT: No nasal discharge, airway clear, mucous membranes moist  	NECK: Supple; non tender.  	CARD: +S1, +S2, no murmurs, gallops, or rubs. (Regular) rate and rhythm    	RESP: No wheezes, rales or rhonchi. CTA B/L  	ABD: soft ntnd, + BS x 4 quadrants  	EXT: moves all extremities,  no clubbing, cyanosis or edema  	NEURO: Alert and oriented x3, no focal deficits          PSYCH: Cooperative, appropriate          EXTREMITY:             Left Groin:  Dressing removed, + pulses, access site soft, no hematoma, no pain, no numbness, no signs and symptoms of infection    LABS:                        9.0    9.90  )-----------( 321      ( 26 Jun 2020 08:05 )             28.8     06-26    134<L>  |  100  |  16  ----------------------------<  105<H>  3.9   |  21  |  0.8    Ca    8.5      26 Jun 2020 08:05  Mg     1.5     06-26    TPro  6.7  /  Alb  3.1<L>  /  TBili  0.2  /  DBili  x   /  AST  35  /  ALT  28  /  AlkPhos  300<H>  06-25        Magnesium, Serum: 1.5 mg/dL <L> [1.8 - 2.4] (06-26-20 @ 08:05)  LIVER FUNCTIONS - ( 25 Jun 2020 05:41 )  Alb: 3.1 g/dL / Pro: 6.7 g/dL / ALK PHOS: 300 U/L / ALT: 28 U/L / AST: 35 U/L / GGT: x           Peripheral Interventional Summary:    -complex interventional procedure.    -successful laser atherectomy (2.0) and drug coated balloon (Impact) of the    left distal SFA.    -successful laser atherectomy (2.0) and drug coated balloon (Impact) of the    left popliteal artery.    -successful laser atherectomy (2.0) and drug coated balloon (Impact) of the    left TPT trunk.    -successful laser atherectomy (2.0) and drug coated balloon (Impact) of the    left peroneal artery (proximally) followed by more distal 2.0mm balloon    inflation.    -the foot is perfused via the peroneal. Most significant collaterals via    the PT/plantar artery.         Plan      -podiatry following.    -continue antibiotics (PICC line in place).    -restart eliquis on Saturday AM.        A/P:  I discussed the case with Cardiologist Dr. Hammonds  and recommend the following:    S/P laser atherectomy and ballon of L distal SFA, L popliteal artery, L TPT trunk and L peroneal artery  	     Continue plavix and statin                   resume eliquis on sat 6/27                   monitor access site/distal pulses                   oob-ch with assiatnce                   replete Mg                   wound care per podiatry                   plan for CT per podiatry                    Pt given instructions on importance of taking antiplatelet medication                   Post cath instructions, access site care and activity restrictions reviewed with patient                     Discussed with patient to return to hospital if increasing leg pain and site bleeding                   Aggressive risk factor modification, diet counseling, smoking cessation discussed with patient                       Follow up with Cardiology Dr. Hammonds in 1-2 weeks after discharge.  Instructed to call and make an appointment

## 2020-06-26 NOTE — DISCHARGE NOTE PROVIDER - CARE PROVIDERS DIRECT ADDRESSES
[email or web address]

## 2020-06-26 NOTE — DISCHARGE NOTE PROVIDER - NSDCFUSCHEDAPPT_GEN_ALL_CORE_FT
DARRION MELENDEZ ; 07/01/2020 ; [facility] [address] [facility] [address] [facility] ; 07/01/2020 ; [facility] [address] [facility] ; 07/01/2020 ; [facility] [address] [facility] [facility] ; 07/01/2020 ; [facility] [address] [address]

## 2020-06-26 NOTE — DISCHARGE NOTE PROVIDER - NSDCFUADDINST_GEN_ALL_CORE_FT
Ambulate as tolerated wearing DARCO boot with assistance until at baseline mobility to perform activities of daily living.

## 2020-06-26 NOTE — DISCHARGE NOTE PROVIDER - NSDCCPCAREPLAN_GEN_ALL_CORE_FT
PRINCIPAL DISCHARGE DIAGNOSIS  Diagnosis: Critical lower limb ischemia  Assessment and Plan of Treatment: You were having pain in the left lower extremity which was due to poor blood supply. You were taken for an angiogram with vascular surgery which revealed severely poor blood supply. The vascular team was able to open multiple vessels with laser atherectomy and balloon to the following: L-distal SFA, L-popliteal artery, L-TPT trunk, and L-peroneal artery. Please continue to take your medications as prescribed and recommended on this discharge packet. Please follow up with Vascular Surgery upon discharge.      SECONDARY DISCHARGE DIAGNOSES  Diagnosis: Gangrene due to peripheral vascular disease  Assessment and Plan of Treatment: Please continue your antibiotics as prescribed until completion and follow up with vascular surgery and podiatry closely.    Diagnosis: Osteomyelitis  Assessment and Plan of Treatment: Please continue your antibiotics as prescribed until completion and follow up with vascular surgery and podiatry closely.    Diagnosis: Diabetic foot ulcers  Assessment and Plan of Treatment: You have what are known as diabetic foot ulcers on the left foot. This is a common complication of diabtes, which is complicated by poor blood supply to the foot. Please continue to care for your wounds and follow up with podiatry regularly for continued assessment and management of your wounds.

## 2020-06-26 NOTE — PROGRESS NOTE ADULT - CONVERSATION/DISCUSSION
Pt presented today with blackouts, pt states most recent blackout occurred on 06/12/2017, pt does not recall if he falls or hits his head, blackouts began last year, but in the last few weeks it has been occurring more frequently. Has pt had any falls since last visit? unknown. Pt preferred language for health care discussion is english. Advanced Directive? no    Is someone accompanying this pt? No    Is the patient using any DME equipment during OV? No      1. Have you been to the ER, urgent care clinic since your last visit? Hospitalized since your last visit? No    2. Have you seen or consulted any other health care providers outside of the [address] [place] [address] since your last visit? Include any colon screening. No      Pt has a reminder for a \"due or due soon\" health maintenance. I have asked that he contact his primary care provider for follow-up on this health maintenance. Diagnosis/Prognosis

## 2020-06-26 NOTE — PROGRESS NOTE ADULT - NSHPATTENDINGPLANDISCUSS_GEN_ALL_CORE
resident
patient , residents
residents, patient , pt's daughter , Dr. Hammonds
patient, her daughter, RN

## 2020-06-26 NOTE — PHYSICAL THERAPY INITIAL EVALUATION ADULT - GENERAL OBSERVATIONS, REHAB EVAL
Pt encountered supine in bed in NAD, +IV, +Lt foot dressing intact. no c/o pain, and agreeable with PT. Pt requires supervision in bed mobility and transfer mobility. Pt ambulated 75 ft CGA using RW. Pt reported inc fatigue during gait activity.

## 2020-06-26 NOTE — DISCHARGE NOTE PROVIDER - CARE PROVIDER_API CALL
Arpan Hammonds (MD)  Cardiovascular Disease; Endovascular Medicine; Interventional Cardiology; Vascular Medicine  [address] [place] [address]  Phone: [phone]  Fax: [phone]  Follow Up Time:     Rylee Yo  PODIATRIC MEDICINE  [address] [place] [address]   [place] [address]  Phone: [phone]  Fax: [phone]  Follow Up Time:

## 2020-06-26 NOTE — DISCHARGE NOTE PROVIDER - NSDCCPTREATMENT_GEN_ALL_CORE_FT
PRINCIPAL PROCEDURE  Procedure: Arterial duplex scan of unilateral lower extremity  Findings and Treatment: INTERPRETATION:  Clinical History / Reason for exam: This patient is a 74-year-old female with leg pain. Lower extremity arterial duplex ultrasound was performed to assess for arterial occlusive disease.  The Bilateral common femoral, deep femoral, superficial femoral, popliteal, anterior tibial, posterior tibial and peroneal arteries were visualized.    Significant drop off of velocities noted in the bilateral popliteal arteries in the right superficial femoral velocities are 120 cm/s popliteal artery 31 cm/s in the left peak systolic velocities of 116 cm/s in the popliteal 0 cm/s.   Impression:  Severe atherosclerotic occlusive disease is noted in the bilateral popliteal arteries  Sluggish flow is noted in the tibial vessels bilaterally. Next  A vascular surgery consultation is recommended if clinically indicated.  -----  PROCEDURE DATE:  06/24/2020    INTERPRETATION:  Clinical History / Reason for exam: This patient is a 74-year-old female with leg pain. Lower extremity arterial duplex ultrasound was performed to assess for arterial occlusive disease.  The Bilateral common femoral, deep femoral, superficial femoral, popliteal, anterior tibial, posterior tibial and peroneal arteries were visualized.    Loss of dicrotic notch noted in the ankle and metatarsal levels  In the digits suggestive of severe distal tibial vascular disease moderate femoral popliteal artery disease noted given loss of dicrotic notch in the calf. Elevated segmental pressures in the high thigh suggestive of noncompressible atherosclerotic vessels  Impression:  Moderate femoral popliteal occlusive disease. Severe distal tibial vascular disease.  Elevated segmental pressures in the high thigh suggestive of noncompressible vessels and atherosclerotic disease.  Severe tibial vascular disease noted bilaterally with an ESTRELLA of 0.62 on the right 0.39 on the left

## 2020-06-26 NOTE — DISCHARGE NOTE PROVIDER - HOSPITAL COURSE
This is a 74 year old female who is a known vasculopath that presented to the ED with worsening LLE pain. She was admitted with a working diagnosis and concern for critical limb ischemia. The patient had recently undergone a L-Hallux amputation (OM and gangrene) and angiogram of the LLE without intervention. During the admission the patient was taken for an angiogram (s/p laser atherectomy and balloon of L-distal SFA, L-popliteal artery, L-TPT trunk, and L-peroneal artery). She is being discharge home with follow up with: Podiatry, Vascular Surgery. This is a 74 year old female who is a known vasculopath that presented to the ED with worsening LLE pain. She was admitted with a working diagnosis and concern for critical limb ischemia. The patient had recently undergone a L-Hallux amputation (OM and gangrene) and angiogram of the LLE without intervention. During the admission the patient was taken for an angiogram (s/p laser atherectomy and balloon of L-distal SFA, L-popliteal artery, L-TPT trunk, and L-peroneal artery). She is being discharge home with follow up with: Podiatry, Vascular Surgery.     Attending Attestation:    Patient was seen & examined independently. At least 10 systems were reviewed in ROS. All systems reviewed  are within normal limits. Latest vital signs and labs were reviewed today. Case was discussed with house staff in morning rounds for assessment and plan.  Patient is medically stable for discharge . About 38 mins spent on discharge disposition.

## 2020-06-26 NOTE — DISCHARGE NOTE PROVIDER - NSDCMRMEDTOKEN_GEN_ALL_CORE_FT
acetaminophen 325 mg oral tablet: 2 tab(s) orally every 6 hours, As needed, Mild Pain (1 - 3), Moderate Pain (4 - 6)  apixaban 5 mg oral tablet: 1 tab(s) orally every 12 hours  atorvastatin 40 mg oral tablet: 1 tab(s) orally once a day  cefTRIAXone 2 g intravenous injection: 2 gram(s) intravenous every 24 hours  PLEASE ADMINISTER LAST DOSE ON 7/26.  PLEASE REMOVE PICC LINE UPON COMPLETION OF ANTIBIOTICS.  doxycycline hyclate 100 mg oral capsule: 1 cap(s) orally 2 times a day   fexofenadine 180 mg oral tablet: 1 tab(s) orally once a day  furosemide 20 mg oral tablet: 1 tab(s) orally 2 times a day  gabapentin 100 mg oral capsule: 1 cap(s) orally every 12 hours  glimepiride 4 mg oral tablet: 1 tab(s) orally 2 times a day  hydrALAZINE 100 mg oral tablet: 1 tab(s) orally 3 times a day  lactobacillus acidophilus oral capsule: 1 cap(s) orally once a day   lactobacillus acidophilus oral capsule: 1 cap(s) orally 3 times a day   Levemir FlexPen 100 units/mL subcutaneous solution: 30 unit(s) subcutaneous once a day (at bedtime)  lisinopril 40 mg oral tablet: 1 tab(s) orally once a day  Metoprolol Succinate  mg oral tablet, extended release: 1 tab(s) orally once a day  Normal Saline Flush 0.9% injectable solution: 10 milliliter(s) injectable 2 times a day MDD:PLEASE FLUSH PICC LINE PRE AND POST INFUSION  NovoLOG FlexPen 100 units/mL injectable solution: 10 unit(s) injectable 3 times a day with meals  omeprazole 40 mg oral delayed release capsule: 1 cap(s) orally once a day  Plavix 75 mg oral tablet: 1 tab(s) orally once a day  senna oral tablet: 2 tab(s) orally once a day (at bedtime)  traMADol 50 mg oral tablet: 0.5 tab(s) orally every 8 hours, As Needed -Severe Pain (7 - 10) MDD:1.5 tab daily  Weekly bloodwork: liver function test. : Results to Infectious Disease Dr. Reagan (O) [phone]., (Fax) [phone]   Zofran 4 mg oral tablet: 1 tab(s) orally 3 times a day, As Needed for nausea or vomitting acetaminophen 325 mg oral tablet: 2 tab(s) orally every 6 hours, As needed, Mild Pain (1 - 3), Moderate Pain (4 - 6)  apixaban 5 mg oral tablet: 1 tab(s) orally every 12 hours  atorvastatin 40 mg oral tablet: 1 tab(s) orally once a day  cefTRIAXone 2 g intravenous injection: 2 gram(s) intravenous every 24 hours  PLEASE ADMINISTER LAST DOSE ON 7/26.  PLEASE REMOVE PICC LINE UPON COMPLETION OF ANTIBIOTICS.  doxycycline hyclate 100 mg oral capsule: 1 cap(s) orally 2 times a day   fexofenadine 180 mg oral tablet: 1 tab(s) orally once a day  furosemide 20 mg oral tablet: 1 tab(s) orally 2 times a day  gabapentin 100 mg oral capsule: 1 cap(s) orally every 12 hours  glimepiride 4 mg oral tablet: 1 tab(s) orally 2 times a day  hydrALAZINE 100 mg oral tablet: 1 tab(s) orally 3 times a day  lactobacillus acidophilus oral capsule: 1 cap(s) orally once a day   lactobacillus acidophilus oral capsule: 1 cap(s) orally 3 times a day   Levemir FlexPen 100 units/mL subcutaneous solution: 30 unit(s) subcutaneous once a day (at bedtime)  lisinopril 40 mg oral tablet: 1 tab(s) orally once a day  Metoprolol Succinate  mg oral tablet, extended release: 1 tab(s) orally once a day  Normal Saline Flush 0.9% injectable solution: 10 milliliter(s) injectable 2 times a day MDD:PLEASE FLUSH PICC LINE PRE AND POST INFUSION  NovoLOG FlexPen 100 units/mL injectable solution: 10 unit(s) injectable 3 times a day with meals  omeprazole 40 mg oral delayed release capsule: 1 cap(s) orally once a day  Plavix 75 mg oral tablet: 1 tab(s) orally once a day  senna oral tablet: 2 tab(s) orally once a day (at bedtime)  traMADol 50 mg oral tablet: 0.5 tab(s) orally every 8 hours, As Needed -Severe Pain (7 - 10) MDD:1.5 tab daily  Weekly bloodwork: liver function test. : Results to Infectious Disease Dr. Reagan (O) [phone]., (Fax) [phone]   Zofran 4 mg oral tablet: 1 tab(s) orally 3 times a day, As Needed for nausea or vomitting apixaban 5 mg oral tablet: 1 tab(s) orally every 12 hours  atorvastatin 40 mg oral tablet: 1 tab(s) orally once a day  cefTRIAXone 2 g intravenous injection: 2 gram(s) intravenous every 24 hours  PLEASE ADMINISTER LAST DOSE ON 7/26.  PLEASE REMOVE PICC LINE UPON COMPLETION OF ANTIBIOTICS.  doxycycline hyclate 100 mg oral capsule: 1 cap(s) orally 2 times a day   fexofenadine 180 mg oral tablet: 1 tab(s) orally once a day  furosemide 20 mg oral tablet: 1 tab(s) orally 2 times a day  gabapentin 100 mg oral capsule: 1 cap(s) orally every 12 hours  glimepiride 4 mg oral tablet: 1 tab(s) orally 2 times a day  hydrALAZINE 100 mg oral tablet: 1 tab(s) orally 3 times a day  lactobacillus acidophilus oral capsule: 1 cap(s) orally 3 times a day   Levemir FlexPen 100 units/mL subcutaneous solution: 30 unit(s) subcutaneous once a day (at bedtime)  lisinopril 40 mg oral tablet: 1 tab(s) orally once a day  Metoprolol Succinate  mg oral tablet, extended release: 1 tab(s) orally once a day  NovoLOG FlexPen 100 units/mL injectable solution: 10 unit(s) injectable 3 times a day with meals  omeprazole 40 mg oral delayed release capsule: 1 cap(s) orally once a day  Plavix 75 mg oral tablet: 1 tab(s) orally once a day  senna oral tablet: 2 tab(s) orally once a day (at bedtime)  traMADol 50 mg oral tablet: 0.5 tab(s) orally every 8 hours, As Needed -Severe Pain (7 - 10) MDD:1.5 tab daily  Weekly bloodwork: liver function test. : Results to Infectious Disease Dr. Reagan (O) [phone]., (Fax) [phone] apixaban 5 mg oral tablet: 1 tab(s) orally every 12 hours  atorvastatin 40 mg oral tablet: 1 tab(s) orally once a day  cefTRIAXone 2 g intravenous injection: 2 gram(s) intravenous every 24 hours  PLEASE ADMINISTER LAST DOSE ON 7/26.  PLEASE REMOVE PICC LINE UPON COMPLETION OF ANTIBIOTICS.  doxycycline hyclate 100 mg oral capsule: 1 cap(s) orally 2 times a day   fexofenadine 180 mg oral tablet: 1 tab(s) orally once a day  furosemide 20 mg oral tablet: 1 tab(s) orally 2 times a day  gabapentin 100 mg oral capsule: 1 cap(s) orally every 12 hours  glimepiride 4 mg oral tablet: 1 tab(s) orally 2 times a day  hydrALAZINE 100 mg oral tablet: 1 tab(s) orally every 8 hours  lactobacillus acidophilus oral capsule: 1 cap(s) orally 3 times a day   Levemir FlexPen 100 units/mL subcutaneous solution: 30 unit(s) subcutaneous once a day (at bedtime)  lisinopril 40 mg oral tablet: 1 tab(s) orally once a day  Metoprolol Succinate  mg oral tablet, extended release: 1 tab(s) orally once a day  NovoLOG FlexPen 100 units/mL injectable solution: 10 unit(s) injectable 3 times a day with meals  omeprazole 40 mg oral delayed release capsule: 1 cap(s) orally once a day  Plavix 75 mg oral tablet: 1 tab(s) orally once a day  senna oral tablet: 2 tab(s) orally once a day (at bedtime)  traMADol 50 mg oral tablet: 0.5 tab(s) orally every 8 hours, As Needed -Severe Pain (7 - 10) MDD:1.5 tab daily  Weekly bloodwork: liver function test. : Results to Infectious Disease Dr. Reagan (O) [phone]., (Fax) [phone]

## 2020-06-26 NOTE — PROGRESS NOTE ADULT - ASSESSMENT
attempted bedside plantar hematoma evacuation   will follow up in the morning   pt needs   -her  is having fever and was tested for corona today   results pending Assessment:   s/p 1st Ray Resection;   s/p angio   Sutures Intact; Wound Edges Well  tenderness at the bottom of the foot         Plan:  Chart reviewed and Patient evaluated. All Questions and Concerns Addressed and Answered  Discussed diagnosis and treatment with patient  Local Wound Care; Light Layer Silvadene only to open skin , not on sutures area / DSD / Kerlix; Q24 Dressing Changes;  CT Imaging;  Left Foot pos/op  Hematoma;   attempted bedside plantar hematoma evacuation   will follow up in the morning   pt may need repeat hematoma evac   pt needs   -her  is having fever and was tested for corona today   results pending

## 2020-06-26 NOTE — PROGRESS NOTE ADULT - ASSESSMENT
ASSESSMENT  74y old Female known to have CAD s.p PCI; ischemic CM s/p CRT-D, HTN, DM type 2, pAfib with CHADSVASC of 8 on eliquis, PUD, recent left PICA stroke, PVD with most recent angiogram june 2020 showing non-reconstructive severe occlusion of popliteal and tibial vessels; she has hx of angioplasty/stenting at [facility] and she is s/p amputation of L hallux (may 2020) due to toe gangrene and underlying OM with MSSA treated 5/2020 with PICC for OM cx MSSA Ceftriaxone IV and PO doxy    IMPRESSION  #Chronic foot ulcer at L hallux amputation site     6/6 WCX MSSA    6/3 WCX MSSA    < from: Xray Foot AP + Lateral + Oblique, Left (06.23.20 @ 22:44) >  Status post hallux mid metatarsal amputation with persistent soft tissue defect, unchanged since 6/11/2020    Sedimentation Rate, Erythrocyte: 97 mm/Hr (06-26-20 @ 08:05)  #Abx allergy: penicillin (Rash)    RECOMMENDATIONS  - pt inquiring about a CT?  - Continue current therapy for OM with IV Ceftriaxone 2g q24h and PO doxy (?) - no MRSA in cultures, can likely D/C the doxy. Would discuss with patient's ID physician . Complete the original 6 weeks for OM    Spectra [id number]

## 2020-06-26 NOTE — PROGRESS NOTE ADULT - SUBJECTIVE AND OBJECTIVE BOX
Progress Note:  Provider Speciality                            Hospitalist      DARRION MELENDEZ [id number] 74y Female     CHIEF PRESENTING COMPLAINT:  Patient is a 74y old  Female who presents with a chief complaint of worsening lower extremity ulcers (26 Jun 2020 14:16)        SUBJECTIVE:  Patient was seen and examined at bedside. Reports improvement in  presenting complaint. No significant overnight events reported.     HISTORY OF PRESENTING ILLNESS:  HPI:  Patient is a 74y old Female known to have CAD s.p PCI; ischemic CM s/p CRT-D, HTN, DM type 2, pAfib with CHADSVASC of 8 on eliquis, PUD, recent left PICA stroke, PVD with most recent angiogram june 2020 showing non-reconstructive severe occlusion of popliteal and tibial vessels; she has hx of angioplasty/stenting at [facility] and she is s/p amputation of L hallux (may 2020) due to toe gangrene and underlying OM with MSSA organism . Patient presents with a chief complaint of worsening LE pain secondary to critical limb ischemia causing non-healing ulcers of L foot at the site of hallux amputation.  (to note patient had ? bladder mass with negative cytology for malignancy was supposed to follow up outpt for cystoscopy) she denies any fever or chills   No nausea no vomiting no diarrhe and otherwise ROS negative   vitals stable afebrile however bp 162/66 mmHg  labs showed WBC 79357 with Hg 10 and Na 131( at baseline) but Cr 1.2 ( baseline 0.8)  ecg revealed paced rhythm (23 Jun 2020 20:30)        REVIEW OF SYSTEMS:  Patient denies any headache, any vision complaints, runny nose, fever, chills, sore throat. Denies chest pain, shortness of breath, palpitation. Denies nausea, vomiting, abdominal pain, diarrhoea, Denies urinary burning, urgency, frequency, dysuria. Denies weakness in any part of the body or numbness.   At least 10 systems were reviewed in ROS. All systems reviewed  are within normal limits except for the complaints as described in Subjective.    PAST MEDICAL & SURGICAL HISTORY:  PAST MEDICAL & SURGICAL HISTORY:  Amputation of toe of left foot  PVD (peripheral vascular disease)  CAD (coronary artery disease)  CHF (congestive heart failure)  HTN (hypertension)  DM (diabetes mellitus)  Status post coronary artery stent placement  H/O: hysterectomy  Cardiac defibrillator in place  Artificial cardiac pacemaker  History of cholecystectomy          VITAL SIGNS:  Vital Signs Last 24 Hrs  T(C): 36.3 (26 Jun 2020 14:07), Max: 36.5 (26 Jun 2020 04:51)  T(F): 97.3 (26 Jun 2020 14:07), Max: 97.7 (26 Jun 2020 04:51)  HR: 88 (26 Jun 2020 14:07) (80 - 106)  BP: 200/87 (26 Jun 2020 14:07) (146/65 - 200/87)  BP(mean): 93 (25 Jun 2020 18:00) (93 - 93)  RR: 18 (26 Jun 2020 14:07) (18 - 18)  SpO2: --          PHYSICAL EXAMINATION:  Not in acute distress  General: No pallor, no icterus  HEENT:   EOMI, no JVD.  Heart: S1+S2 audible  Lungs: bilateral  fair air entry, no wheezing, no crepitations.  Abdomen: Soft, non-tender, non-distended , no  rigidity or guarding.  CNS: Awake alert, CN  grossly intact.  Extremities:  No edema  , L foot dressing intact dry          CONSULTS:  Consultant(s) Notes Reviewed by me.   Care Discussed with Consultants/Other Providers where required.        MEDICATIONS:  MEDICATIONS  (STANDING):  acetaminophen   Tablet .. 650 milliGRAM(s) Oral every 6 hours  atorvastatin 40 milliGRAM(s) Oral at bedtime  cefTRIAXone   IVPB 2000 milliGRAM(s) IV Intermittent every 24 hours  chlorhexidine 4% Liquid 1 Application(s) Topical <User Schedule>  clopidogrel Tablet 75 milliGRAM(s) Oral daily  doxycycline hyclate Capsule 100 milliGRAM(s) Oral every 12 hours  gabapentin 100 milliGRAM(s) Oral every 12 hours  hydrALAZINE 50 milliGRAM(s) Oral every 8 hours  insulin glargine Injectable (LANTUS) 21 Unit(s) SubCutaneous at bedtime  insulin lispro (HumaLOG) corrective regimen sliding scale   SubCutaneous three times a day before meals  insulin lispro Injectable (HumaLOG) 7 Unit(s) SubCutaneous before breakfast  insulin lispro Injectable (HumaLOG) 7 Unit(s) SubCutaneous before lunch  insulin lispro Injectable (HumaLOG) 7 Unit(s) SubCutaneous before dinner  lisinopril 40 milliGRAM(s) Oral daily  loratadine 10 milliGRAM(s) Oral daily  metoprolol succinate  milliGRAM(s) Oral daily  pantoprazole    Tablet 40 milliGRAM(s) Oral before breakfast  senna 2 Tablet(s) Oral at bedtime  silver sulfADIAZINE 1% Cream 1 Application(s) Topical daily  sodium chloride 0.9%. 1000 milliLiter(s) (65 mL/Hr) IV Continuous <Continuous>    MEDICATIONS  (PRN):  traMADol 25 milliGRAM(s) Oral every 8 hours PRN Severe Pain (7 - 10)            ASSESSMENT:    Patient is a 74y old Female known to have CAD s.p PCI; ischemic CM s/p CRT-D, HTN, DM type 2, pAfib with CHADSVASC of 8 on eliquis, PUD, recent left PICA stroke, bladder mass (under investigation)  PVD with most recent angiogram June 2020 showing non-reconstructive severe occlusion of popliteal and tibial vessels; she has hx of angioplasty/stenting at [facility] and she is s/p amputation of L hallux (may 2020) due to toe gangrene and underlying OM with MSSA organism .Patient presents with a chief complaint of worsening LE pain secondary to critical limb ischemia, absent distal pulses causing non-healing ulcers of L foot at the site of hallux amputation.       ASSESSMENT:  Severe Peripheral vascular disease, angiogram status post  laser atherectomy and balloon of L-distal SFA, L-popliteal artery, L-TPT trunk, and L-peroneal artery  Non healing Left foot ulcer: s/p left Hallux amputation May 2020  Recent CVA  Coronary artery disease - status post PCI  Hypertension   Chronic HFrEF    PLAN:  Severe Peripheral vascular disease, angiogram status post  laser atherectomy and balloon of L-distal SFA, L-popliteal artery, L-TPT trunk, and L-peroneal artery  Non healing Left foot ulcer: s/p left Hallux amputation May 2020  pending CT LE read to rule out  abscess    Resume oral anti-coagulation(eliquis) 06/27.   Continue with Rocephin End date 7/11/2020. d/c Doxy  chronic Atrial Fibrillation: recent diagnosis with CVA. Continue Amiodarone, Metoprolol and anticoagulation.  heparing ggt while off eliquia  Chronic HFrEF (now improved) s/p BiVICD- euvolemic  Resume Lasix 20mg daily twice  Echo: EF 50-55%, mild TR, [place]  Coronary artery disease - s/p PCI in 2009.Continue Plavix, Lipitor and Metoprolol.   Hypertension-not well controlled-Continue Lisinopril 40mg, toprol Xl 100mg, hydralazine 50mg TID   Insulin sliding scale with coverage wih meals and QHS     Discharge Disposition: Home with wound care & IV antibiotics , pending CT LE read to rule out  abscess Progress Note:  Provider Speciality                            Hospitalist      DARRION MELENDEZ [id number] 74y Female     CHIEF PRESENTING COMPLAINT:  Patient is a 74y old  Female who presents with a chief complaint of worsening lower extremity ulcers (26 Jun 2020 14:16)        SUBJECTIVE:  Patient was seen and examined at bedside. Reports improvement in  presenting complaint. No significant overnight events reported.     HISTORY OF PRESENTING ILLNESS:.  HPI:  Patient is a 74y old Female known to have CAD s.p PCI; ischemic CM s/p CRT-D, HTN, DM type 2, pAfib with CHADSVASC of 8 on eliquis, PUD, recent left PICA stroke, PVD with most recent angiogram june 2020 showing non-reconstructive severe occlusion of popliteal and tibial vessels; she has hx of angioplasty/stenting at [facility] and she is s/p amputation of L hallux (may 2020) due to toe gangrene and underlying OM with MSSA organism . Patient presents with a chief complaint of worsening LE pain secondary to critical limb ischemia causing non-healing ulcers of L foot at the site of hallux amputation.  (to note patient had ? bladder mass with negative cytology for malignancy was supposed to follow up outpt for cystoscopy) she denies any fever or chills   No nausea no vomiting no diarrhe and otherwise ROS negative   vitals stable afebrile however bp 162/66 mmHg  labs showed WBC 08887 with Hg 10 and Na 131( at baseline) but Cr 1.2 ( baseline 0.8)  ecg revealed paced rhythm (23 Jun 2020 20:30)        REVIEW OF SYSTEMS:  Patient denies any headache, any vision complaints, runny nose, fever, chills, sore throat. Denies chest pain, shortness of breath, palpitation. Denies nausea, vomiting, abdominal pain, diarrhoea, Denies urinary burning, urgency, frequency, dysuria. Denies weakness in any part of the body or numbness.   At least 10 systems were reviewed in ROS. All systems reviewed  are within normal limits except for the complaints as described in Subjective.    PAST MEDICAL & SURGICAL HISTORY:  PAST MEDICAL & SURGICAL HISTORY:  Amputation of toe of left foot  PVD (peripheral vascular disease)  CAD (coronary artery disease)  CHF (congestive heart failure)  HTN (hypertension)  DM (diabetes mellitus)  Status post coronary artery stent placement  H/O: hysterectomy  Cardiac defibrillator in place  Artificial cardiac pacemaker  History of cholecystectomy          VITAL SIGNS:  Vital Signs Last 24 Hrs  T(C): 36.3 (26 Jun 2020 14:07), Max: 36.5 (26 Jun 2020 04:51)  T(F): 97.3 (26 Jun 2020 14:07), Max: 97.7 (26 Jun 2020 04:51)  HR: 88 (26 Jun 2020 14:07) (80 - 106)  BP: 200/87 (26 Jun 2020 14:07) (146/65 - 200/87)  BP(mean): 93 (25 Jun 2020 18:00) (93 - 93)  RR: 18 (26 Jun 2020 14:07) (18 - 18)  SpO2: --          PHYSICAL EXAMINATION:  Not in acute distress  General: No pallor, no icterus  HEENT:   EOMI, no JVD.  Heart: S1+S2 audible  Lungs: bilateral  fair air entry, no wheezing, no crepitations.  Abdomen: Soft, non-tender, non-distended , no  rigidity or guarding.  CNS: Awake alert, CN  grossly intact.  Extremities:  No edema  , L foot dressing intact dry          CONSULTS:  Consultant(s) Notes Reviewed by me.   Care Discussed with Consultants/Other Providers where required.        MEDICATIONS:  MEDICATIONS  (STANDING):  acetaminophen   Tablet .. 650 milliGRAM(s) Oral every 6 hours  atorvastatin 40 milliGRAM(s) Oral at bedtime  cefTRIAXone   IVPB 2000 milliGRAM(s) IV Intermittent every 24 hours  chlorhexidine 4% Liquid 1 Application(s) Topical <User Schedule>  clopidogrel Tablet 75 milliGRAM(s) Oral daily  doxycycline hyclate Capsule 100 milliGRAM(s) Oral every 12 hours  gabapentin 100 milliGRAM(s) Oral every 12 hours  hydrALAZINE 50 milliGRAM(s) Oral every 8 hours  insulin glargine Injectable (LANTUS) 21 Unit(s) SubCutaneous at bedtime  insulin lispro (HumaLOG) corrective regimen sliding scale   SubCutaneous three times a day before meals  insulin lispro Injectable (HumaLOG) 7 Unit(s) SubCutaneous before breakfast  insulin lispro Injectable (HumaLOG) 7 Unit(s) SubCutaneous before lunch  insulin lispro Injectable (HumaLOG) 7 Unit(s) SubCutaneous before dinner  lisinopril 40 milliGRAM(s) Oral daily  loratadine 10 milliGRAM(s) Oral daily  metoprolol succinate  milliGRAM(s) Oral daily  pantoprazole    Tablet 40 milliGRAM(s) Oral before breakfast  senna 2 Tablet(s) Oral at bedtime  silver sulfADIAZINE 1% Cream 1 Application(s) Topical daily  sodium chloride 0.9%. 1000 milliLiter(s) (65 mL/Hr) IV Continuous <Continuous>    MEDICATIONS  (PRN):  traMADol 25 milliGRAM(s) Oral every 8 hours PRN Severe Pain (7 - 10)            ASSESSMENT:    Patient is a 74y old Female known to have CAD s.p PCI; ischemic CM s/p CRT-D, HTN, DM type 2, pAfib with CHADSVASC of 8 on eliquis, PUD, recent left PICA stroke, bladder mass (under investigation)  PVD with most recent angiogram June 2020 showing non-reconstructive severe occlusion of popliteal and tibial vessels; she has hx of angioplasty/stenting at [facility] and she is s/p amputation of L hallux (may 2020) due to toe gangrene and underlying OM with MSSA organism .Patient presents with a chief complaint of worsening LE pain secondary to critical limb ischemia, absent distal pulses causing non-healing ulcers of L foot at the site of hallux amputation.       ASSESSMENT:  Severe Peripheral vascular disease, angiogram status post  laser atherectomy and balloon of L-distal SFA, L-popliteal artery, L-TPT trunk, and L-peroneal artery  Non healing Left foot ulcer: s/p left Hallux amputation May 2020  Recent CVA  Coronary artery disease - status post PCI  Hypertension   Chronic HFrEF    PLAN:  Severe Peripheral vascular disease, angiogram status post  laser atherectomy and balloon of L-distal SFA, L-popliteal artery, L-TPT trunk, and L-peroneal artery  Non healing Left foot ulcer: s/p left Hallux amputation May 2020  pending CT LE read to rule out  abscess    Resume oral anti-coagulation(eliquis) 06/27.   Continue with Rocephin End date 7/11/2020. d/c Doxy  chronic Atrial Fibrillation: recent diagnosis with CVA. Continue Amiodarone, Metoprolol and anticoagulation.  heparing ggt while off eliquia  Chronic HFrEF (now improved) s/p BiVICD- euvolemic  Resume Lasix 20mg daily twice  Echo: EF 50-55%, mild TR, [place]  Coronary artery disease - s/p PCI in 2009.Continue Plavix, Lipitor and Metoprolol.   Hypertension-not well controlled-Continue Lisinopril 40mg, toprol Xl 100mg, hydralazine 50mg TID   Insulin sliding scale with coverage wih meals and QHS     Discharge Disposition: Home with wound care & IV antibiotics , pending CT LE read to rule out  abscess

## 2020-06-26 NOTE — PROGRESS NOTE ADULT - SUBJECTIVE AND OBJECTIVE BOX
DARRION MELENDEZ  74y, Female  Allergy: codeine (Rash)  penicillin (Rash)      LOS  3d    CHIEF COMPLAINT: worsening lower extremity ulcers (26 Jun 2020 10:55)      INTERVAL EVENTS/HPI  - No acute events overnight  - T(F): , Max: 97.7 (06-26-20 @ 04:51)  - WBC Count: 9.90 (06-26-20 @ 08:05)  WBC Count: 8.85 (06-25-20 @ 05:41)  - Creatinine, Serum: 0.8 (06-26-20 @ 08:05)  Creatinine, Serum: 0.8 (06-25-20 @ 05:41)       ROS  General: Denies rigors, nightsweats  HEENT: Denies headache, rhinorrhea, sore throat, eye pain  CV: Denies CP, palpitations  PULM: Denies wheezing, hemoptysis  GI: Denies hematemesis, hematochezia, melena  : Denies discharge, hematuria  MSK: Denies arthralgias, myalgias  SKIN: Denies rash, lesions  NEURO: Denies paresthesias, weakness  PSYCH: Denies depression, anxiety    VITALS:  T(F): 97.7, Max: 97.7 (06-26-20 @ 04:51)  HR: 101  BP: 168/74  RR: 18Vital Signs Last 24 Hrs  T(C): 36.5 (26 Jun 2020 04:51), Max: 36.5 (26 Jun 2020 04:51)  T(F): 97.7 (26 Jun 2020 04:51), Max: 97.7 (26 Jun 2020 04:51)  HR: 101 (26 Jun 2020 08:59) (80 - 106)  BP: 168/74 (26 Jun 2020 08:59) (146/65 - 195/86)  BP(mean): 93 (25 Jun 2020 18:00) (93 - 93)  RR: 18 (26 Jun 2020 04:51) (18 - 18)  SpO2: --    PHYSICAL EXAM:  Gen: NAD, resting in bed  HEENT: Normocephalic, atraumatic  Neck: supple, no lymphadenopathy  CV: Regular rate & regular rhythm  Lungs: decreased BS at bases, no fremitus  Abdomen: Soft, BS present  Ext: Warm, well perfused, Left hallux amputated with non-healing ulcer. stitches in place, no erythema/no purulence  Neuro: non focal, awake  Skin: no rash, no erythema  Lines: no phlebitis    FH: Non-contributory  Social Hx: Non-contributory    TESTS & MEASUREMENTS:                        9.0    9.90  )-----------( 321      ( 26 Jun 2020 08:05 )             28.8     06-26    134<L>  |  100  |  16  ----------------------------<  105<H>  3.9   |  21  |  0.8    Ca    8.5      26 Jun 2020 08:05  Mg     1.5     06-26    TPro  6.7  /  Alb  3.1<L>  /  TBili  0.2  /  DBili  x   /  AST  35  /  ALT  28  /  AlkPhos  300<H>  06-25    eGFR if Non African American: 74 mL/min/1.73M2 (06-26-20 @ 08:05)  eGFR if African American: 85 mL/min/1.73M2 (06-26-20 @ 08:05)    LIVER FUNCTIONS - ( 25 Jun 2020 05:41 )  Alb: 3.1 g/dL / Pro: 6.7 g/dL / ALK PHOS: 300 U/L / ALT: 28 U/L / AST: 35 U/L / GGT: x               Culture - Blood (collected 06-24-20 @ 05:52)  Source: .Blood None  Preliminary Report (06-25-20 @ 13:01):    No growth to date.    Culture - Abscess with Gram Stain (collected 06-06-20 @ 03:05)  Source: .Abscess left foot  Final Report (06-11-20 @ 08:31):    Moderate Staphylococcus aureus  Organism: Staphylococcus aureus  Staphylococcus aureus (06-11-20 @ 08:31)  Organism: Staphylococcus aureus (06-11-20 @ 08:31)      Method Type: LI  Organism: Staphylococcus aureus (06-11-20 @ 08:31)      -  Ampicillin/Sulbactam: S <=8/4      -  Cefazolin: S <=4      -  Clindamycin: R >4      -  Erythromycin: R >4      -  Gentamicin: S <=1 Should not be used as monotherapy      -  Oxacillin: S <=0.25      -  Penicillin: R 2      -  RIF- Rifampin: S <=1 Should not be used as monotherapy      -  Tetra/Doxy: S <=1      -  Trimethoprim/Sulfamethoxazole: S <=0.5/9.5      -  Vancomycin: S 1      Method Type: LI    Culture - Blood (collected 06-03-20 @ 08:52)  Source: .Blood Blood-Peripheral  Final Report (06-08-20 @ 09:00):    No Growth Final    Culture - Surgical Swab (collected 06-03-20 @ 00:34)  Source: .Surgical Swab left foot wound  Final Report (06-07-20 @ 20:07):    Few Staphylococcus aureus  Organism: Staphylococcus aureus (06-07-20 @ 20:07)  Organism: Staphylococcus aureus (06-07-20 @ 20:07)      -  Ampicillin/Sulbactam: S <=8/4      -  Cefazolin: S <=4      -  Clindamycin: R >4      -  Erythromycin: R >4      -  Gentamicin: S <=1 Should not be used as monotherapy      -  Oxacillin: S <=0.25      -  Penicillin: R 2      -  RIF- Rifampin: S <=1 Should not be used as monotherapy      -  Tetra/Doxy: S <=1      -  Trimethoprim/Sulfamethoxazole: S <=0.5/9.5      -  Vancomycin: S 1      Method Type: LI    Culture - Blood (collected 06-02-20 @ 14:30)  Source: .Blood Blood  Final Report (06-07-20 @ 15:00):    No Growth Final    Culture - Blood (collected 06-02-20 @ 14:30)  Source: .Blood Blood  Final Report (06-07-20 @ 15:00):    No Growth Final    Culture - Urine (collected 05-30-20 @ 16:36)  Source: .Urine Clean Catch (Midstream)  Final Report (05-31-20 @ 11:47):    <10,000 CFU/mL Normal Urogenital Shannan            INFECTIOUS DISEASES TESTING  COVID-19 PCR: NotDetec (06-23-20 @ 22:30)  COVID-19 PCR: NotDetec (06-14-20 @ 09:41)  Procalcitonin, Serum: 0.10 (06-12-20 @ 09:03)  COVID-19 PCR: NotDetec (06-08-20 @ 23:20)  Procalcitonin, Serum: 0.17 (06-08-20 @ 10:08)  Procalcitonin, Serum: 0.10 (06-06-20 @ 15:06)  Procalcitonin, Serum: 0.11 (06-05-20 @ 19:14)  COVID-19 PCR: NotDetec (06-04-20 @ 17:04)  Procalcitonin, Serum: 0.17 (06-02-20 @ 09:59)  COVID-19 PCR: NotDetec (05-30-20 @ 11:51)      INFLAMMATORY MARKERS  Sedimentation Rate, Erythrocyte: 97 mm/Hr (06-26-20 @ 08:05)  C-Reactive Protein, Serum: 11.22 mg/dL (06-02-20 @ 15:05)  Sedimentation Rate, Erythrocyte: 86 mm/Hr (06-02-20 @ 13:48)      RADIOLOGY & ADDITIONAL TESTS:  I have personally reviewed the last available Chest xray  CXR      CT      CARDIOLOGY TESTING  12 Lead ECG:   Ventricular Rate 68 BPM    Atrial Rate 68 BPM    P-R Interval 118 ms    QRS Duration 138 ms    Q-T Interval 468 ms    QTC Calculation(Bezet) 497 ms    P Axis 59 degrees    R Axis 248 degrees    T Axis 48 degrees    Diagnosis Line *** Poor data quality, interpretation may be adversely affected  Atrial-sensed ventricular-paced rhythm  Biventricular pacemaker detected  Abnormal ECG    Confirmed by Addison Rosario ([id number]) on 6/23/2020 7:43:24 PM (06-23-20 @ 18:59)      MEDICATIONS  acetaminophen   Tablet .. 650 Oral every 6 hours  atorvastatin 40 Oral at bedtime  cefTRIAXone   IVPB 2000 IV Intermittent every 24 hours  chlorhexidine 4% Liquid 1 Topical <User Schedule>  clopidogrel Tablet 75 Oral daily  doxycycline hyclate Capsule 100 Oral every 12 hours  gabapentin 100 Oral every 12 hours  hydrALAZINE 50 Oral every 8 hours  insulin glargine Injectable (LANTUS) 21 SubCutaneous at bedtime  insulin lispro (HumaLOG) corrective regimen sliding scale  SubCutaneous three times a day before meals  insulin lispro Injectable (HumaLOG) 7 SubCutaneous before breakfast  insulin lispro Injectable (HumaLOG) 7 SubCutaneous before lunch  insulin lispro Injectable (HumaLOG) 7 SubCutaneous before dinner  lisinopril 40 Oral daily  loratadine 10 Oral daily  metoprolol succinate  Oral daily  pantoprazole    Tablet 40 Oral before breakfast  senna 2 Oral at bedtime  silver sulfADIAZINE 1% Cream 1 Topical daily  sodium chloride 0.9%. 1000 IV Continuous <Continuous>      WEIGHT  Weight (kg): 65.7 (06-26-20 @ 10:55)  Creatinine, Serum: 0.8 mg/dL (06-26-20 @ 08:05)      ANTIBIOTICS:  cefTRIAXone   IVPB 2000 milliGRAM(s) IV Intermittent every 24 hours  doxycycline hyclate Capsule 100 milliGRAM(s) Oral every 12 hours      All available historical records have been reviewed

## 2020-06-27 VITALS
SYSTOLIC BLOOD PRESSURE: 165 MMHG | DIASTOLIC BLOOD PRESSURE: 70 MMHG | HEART RATE: 87 BPM | TEMPERATURE: 99 F | RESPIRATION RATE: 18 BRPM

## 2020-06-27 LAB
GLUCOSE BLDC GLUCOMTR-MCNC: 140 MG/DL — HIGH (ref 70–99)
GLUCOSE BLDC GLUCOMTR-MCNC: 80 MG/DL — SIGNIFICANT CHANGE UP (ref 70–99)

## 2020-06-27 PROCEDURE — 99232 SBSQ HOSP IP/OBS MODERATE 35: CPT

## 2020-06-27 PROCEDURE — 99239 HOSP IP/OBS DSCHRG MGMT >30: CPT

## 2020-06-27 RX ORDER — HYDRALAZINE HCL 50 MG
1 TABLET ORAL
Qty: 0 | Refills: 0 | DISCHARGE
Start: 2020-06-27

## 2020-06-27 RX ORDER — HYDRALAZINE HCL 50 MG
100 TABLET ORAL EVERY 8 HOURS
Refills: 0 | Status: DISCONTINUED | OUTPATIENT
Start: 2020-06-27 | End: 2020-06-27

## 2020-06-27 RX ORDER — CEFTRIAXONE 500 MG/1
2000 INJECTION, POWDER, FOR SOLUTION INTRAMUSCULAR; INTRAVENOUS ONCE
Refills: 0 | Status: COMPLETED | OUTPATIENT
Start: 2020-06-27 | End: 2020-06-27

## 2020-06-27 RX ORDER — HYDRALAZINE HCL 50 MG
50 TABLET ORAL ONCE
Refills: 0 | Status: COMPLETED | OUTPATIENT
Start: 2020-06-27 | End: 2020-06-27

## 2020-06-27 RX ADMIN — Medication 7 UNIT(S): at 12:51

## 2020-06-27 RX ADMIN — CEFTRIAXONE 100 MILLIGRAM(S): 500 INJECTION, POWDER, FOR SOLUTION INTRAMUSCULAR; INTRAVENOUS at 15:08

## 2020-06-27 RX ADMIN — LORATADINE 10 MILLIGRAM(S): 10 TABLET ORAL at 12:55

## 2020-06-27 RX ADMIN — Medication 7 UNIT(S): at 08:00

## 2020-06-27 RX ADMIN — CLOPIDOGREL BISULFATE 75 MILLIGRAM(S): 75 TABLET, FILM COATED ORAL at 12:54

## 2020-06-27 RX ADMIN — Medication 1 APPLICATION(S): at 12:54

## 2020-06-27 RX ADMIN — PANTOPRAZOLE SODIUM 40 MILLIGRAM(S): 20 TABLET, DELAYED RELEASE ORAL at 06:40

## 2020-06-27 RX ADMIN — Medication 100 MILLIGRAM(S): at 13:31

## 2020-06-27 RX ADMIN — GABAPENTIN 100 MILLIGRAM(S): 400 CAPSULE ORAL at 05:01

## 2020-06-27 RX ADMIN — Medication 650 MILLIGRAM(S): at 05:02

## 2020-06-27 RX ADMIN — Medication 50 MILLIGRAM(S): at 09:15

## 2020-06-27 RX ADMIN — Medication 20 MILLIGRAM(S): at 05:01

## 2020-06-27 RX ADMIN — Medication 100 MILLIGRAM(S): at 05:01

## 2020-06-27 RX ADMIN — APIXABAN 5 MILLIGRAM(S): 2.5 TABLET, FILM COATED ORAL at 05:02

## 2020-06-27 RX ADMIN — LISINOPRIL 40 MILLIGRAM(S): 2.5 TABLET ORAL at 05:01

## 2020-06-27 RX ADMIN — Medication 650 MILLIGRAM(S): at 12:54

## 2020-06-27 RX ADMIN — Medication 50 MILLIGRAM(S): at 05:01

## 2020-06-27 NOTE — PROGRESS NOTE ADULT - SUBJECTIVE AND OBJECTIVE BOX
Progress Note:  Provider Speciality                            Hospitalist      DARRION MELENDEZ [id number] 74y Female     CHIEF PRESENTING COMPLAINT:  Patient is a 74y old  Female who presents with a chief complaint of worsening lower extremity ulcers (26 Jun 2020 14:16)        SUBJECTIVE:  Patient was seen and examined at bedside. Reports improvement in  presenting complaint. No significant overnight events reported.     HISTORY OF PRESENTING ILLNESS:.  HPI:  Patient is a 74y old Female known to have CAD s.p PCI; ischemic CM s/p CRT-D, HTN, DM type 2, pAfib with CHADSVASC of 8 on eliquis, PUD, recent left PICA stroke, PVD with most recent angiogram june 2020 showing non-reconstructive severe occlusion of popliteal and tibial vessels; she has hx of angioplasty/stenting at [facility] and she is s/p amputation of L hallux (may 2020) due to toe gangrene and underlying OM with MSSA organism . Patient presents with a chief complaint of worsening LE pain secondary to critical limb ischemia causing non-healing ulcers of L foot at the site of hallux amputation.  (to note patient had ? bladder mass with negative cytology for malignancy was supposed to follow up outpt for cystoscopy) she denies any fever or chills   No nausea no vomiting no diarrhe and otherwise ROS negative   vitals stable afebrile however bp 162/66 mmHg  labs showed WBC 54305 with Hg 10 and Na 131( at baseline) but Cr 1.2 ( baseline 0.8)  ecg revealed paced rhythm (23 Jun 2020 20:30)        REVIEW OF SYSTEMS:  Patient denies any headache, any vision complaints, runny nose, fever, chills, sore throat. Denies chest pain, shortness of breath, palpitation. Denies nausea, vomiting, abdominal pain, diarrhoea, Denies urinary burning, urgency, frequency, dysuria. Denies weakness in any part of the body or numbness.   At least 10 systems were reviewed in ROS. All systems reviewed  are within normal limits except for the complaints as described in Subjective.    PAST MEDICAL & SURGICAL HISTORY:  PAST MEDICAL & SURGICAL HISTORY:  Amputation of toe of left foot  PVD (peripheral vascular disease)  CAD (coronary artery disease)  CHF (congestive heart failure)  HTN (hypertension)  DM (diabetes mellitus)  Status post coronary artery stent placement  H/O: hysterectomy  Cardiac defibrillator in place  Artificial cardiac pacemaker  History of cholecystectomy          VITAL SIGNS:  Vital Signs Last 24 Hrs  T(C): 37.1 (27 Jun 2020 05:07), Max: 37.1 (27 Jun 2020 05:07)  T(F): 98.8 (27 Jun 2020 05:07), Max: 98.8 (27 Jun 2020 05:07)  HR: 75 (27 Jun 2020 10:19) (75 - 88)  BP: 138/61 (27 Jun 2020 10:19) (138/61 - 200/87)  BP(mean): --  RR: 18 (27 Jun 2020 05:07) (18 - 18)  SpO2: --        PHYSICAL EXAMINATION:  Not in acute distress  General: No pallor, no icterus  HEENT:   EOMI, no JVD.  Heart: S1+S2 audible  Lungs: bilateral  fair air entry, no wheezing, no crepitations.  Abdomen: Soft, non-tender, non-distended , no  rigidity or guarding.  CNS: Awake alert, CN  grossly intact.  Extremities:  No edema  , L foot dressing intact dry          CONSULTS:  Consultant(s) Notes Reviewed by me.   Care Discussed with Consultants/Other Providers where required.        MEDICATIONS:  MEDICATIONS  (STANDING):  acetaminophen   Tablet .. 650 milliGRAM(s) Oral every 6 hours  atorvastatin 40 milliGRAM(s) Oral at bedtime  cefTRIAXone   IVPB 2000 milliGRAM(s) IV Intermittent every 24 hours  chlorhexidine 4% Liquid 1 Application(s) Topical <User Schedule>  clopidogrel Tablet 75 milliGRAM(s) Oral daily  doxycycline hyclate Capsule 100 milliGRAM(s) Oral every 12 hours  gabapentin 100 milliGRAM(s) Oral every 12 hours  hydrALAZINE 50 milliGRAM(s) Oral every 8 hours  insulin glargine Injectable (LANTUS) 21 Unit(s) SubCutaneous at bedtime  insulin lispro (HumaLOG) corrective regimen sliding scale   SubCutaneous three times a day before meals  insulin lispro Injectable (HumaLOG) 7 Unit(s) SubCutaneous before breakfast  insulin lispro Injectable (HumaLOG) 7 Unit(s) SubCutaneous before lunch  insulin lispro Injectable (HumaLOG) 7 Unit(s) SubCutaneous before dinner  lisinopril 40 milliGRAM(s) Oral daily  loratadine 10 milliGRAM(s) Oral daily  metoprolol succinate  milliGRAM(s) Oral daily  pantoprazole    Tablet 40 milliGRAM(s) Oral before breakfast  senna 2 Tablet(s) Oral at bedtime  silver sulfADIAZINE 1% Cream 1 Application(s) Topical daily  sodium chloride 0.9%. 1000 milliLiter(s) (65 mL/Hr) IV Continuous <Continuous>    MEDICATIONS  (PRN):  traMADol 25 milliGRAM(s) Oral every 8 hours PRN Severe Pain (7 - 10)            ASSESSMENT:    Patient is a 74y old Female known to have CAD s.p PCI; ischemic CM s/p CRT-D, HTN, DM type 2, pAfib with CHADSVASC of 8 on eliquis, PUD, recent left PICA stroke, bladder mass (under investigation)  PVD with most recent angiogram June 2020 showing non-reconstructive severe occlusion of popliteal and tibial vessels; she has hx of angioplasty/stenting at [facility] and she is s/p amputation of L hallux (may 2020) due to toe gangrene and underlying OM with MSSA organism .Patient presents with a chief complaint of worsening LE pain secondary to critical limb ischemia, absent distal pulses causing non-healing ulcers of L foot at the site of hallux amputation.       ASSESSMENT:  Severe Peripheral vascular disease, angiogram status post  laser atherectomy and balloon of L-distal SFA, L-popliteal artery, L-TPT trunk, and L-peroneal artery  Non healing Left foot ulcer: s/p left Hallux amputation May 2020  Recent CVA  Coronary artery disease - status post PCI  Hypertension   Chronic HFrEF    PLAN:  Severe Peripheral vascular disease, angiogram status post  laser atherectomy and balloon of L-distal SFA, L-popliteal artery, L-TPT trunk, and L-peroneal artery  Non healing Left foot ulcer: s/p left Hallux amputation May 2020   Resume oral anti-coagulation(eliquis) 06/27.   Continue with Rocephin End date 7/11/2020. d/c Doxy  chronic Atrial Fibrillation: recent diagnosis with CVA. Continue Amiodarone, Metoprolol and anticoagulation.  heparing ggt while off eliquia  Chronic HFrEF (now improved) s/p BiVICD- euvolemic  Resume Lasix 20mg daily twice  Echo: EF 50-55%, mild TR, ND  Coronary artery disease - s/p PCI in 2009.Continue Plavix, Lipitor and Metoprolol.   Hypertension-not well controlled-Continue Lisinopril 40mg, toprol Xl 100mg, hydralazine 50mg TID (increased to 100)  Insulin sliding scale with coverage wih meals and QHS     Discharge Disposition: Home with wound care & IV antibiotics today

## 2020-06-27 NOTE — PROGRESS NOTE ADULT - PROVIDER SPECIALTY LIST ADULT
Cardiology
Cardiology
Hospitalist
Internal Medicine
Podiatry
Infectious Disease

## 2020-06-27 NOTE — PROGRESS NOTE ADULT - SUBJECTIVE AND OBJECTIVE BOX
PROGRESS NOTE   Pt seen @ bedside during AM rounds. Dressing +Clean, +Dry, +Intact. Pt. denies any recent n/f/v/c/sob. Pt. denies any other pedal complaints at this time.       Vital Signs Last 24 Hrs  T(C): 37.1 (27 Jun 2020 05:07), Max: 37.1 (27 Jun 2020 05:07)  T(F): 98.8 (27 Jun 2020 05:07), Max: 98.8 (27 Jun 2020 05:07)  HR: 84 (27 Jun 2020 05:07) (76 - 88)  BP: 176/79 (27 Jun 2020 04:53) (175/83 - 200/87)  BP(mean): --  RR: 18 (27 Jun 2020 05:07) (18 - 18)  SpO2: --                          9.0    9.90  )-----------( 321      ( 26 Jun 2020 08:05 )             28.8               06-26    134<L>  |  100  |  16  ----------------------------<  105<H>  3.9   |  21  |  0.8    Ca    8.5      26 Jun 2020 08:05  Mg     1.5     06-26        PHYSICAL EXAM  Left LE Focused:  Derm:   s/p 1st Ray Resection;  Sutures Intact; Wound edges Well Coapted. No signs of wound dehiscence  Mild Eschar on the Proximal Aspect of Suture Site;  Improvement in edema and tenderness at the plantar aspect of the foot  No Drainage or Active Bleeding    Vascular: DP and PT Pulses Diminished; Foot is Warm to Warm to the touch   Neuro: Protective Sensation Intact; Pain on Palpation of the Plantar Midfoot  MSK: Mild tenderness on palpation at the plantar aspect of the foot       Assesment:  s/p 1st Ray Resection;     Plan:  Pt. seen and evaluated  Discussed treatment and condition w/ patient  Cont w/ LWC: Apply silvadene/adaptic/DSD/Kerlix  Pt. can be discharged with the wound care stated above  WBAT to heel touch with DARCO shoe  Pt. gtg from podiatry standpoint;can f/u as o/p w/ Dr. Yo in her private office post d/c  Discussed plan w/ Attending

## 2020-06-27 NOTE — PROGRESS NOTE ADULT - REASON FOR ADMISSION
worsening lower extremity ulcers

## 2020-06-27 NOTE — PROGRESS NOTE ADULT - SUBJECTIVE AND OBJECTIVE BOX
Follow up    DARRION MELENDEZ   74y Female  PAST MEDICAL & SURGICAL HISTORY:  Amputation of toe of left foot  PVD (peripheral vascular disease)  CAD (coronary artery disease)  CHF (congestive heart failure)  HTN (hypertension)  DM (diabetes mellitus)  Status post coronary artery stent placement  H/O: hysterectomy  Cardiac defibrillator in place  Artificial cardiac pacemaker  History of cholecystectomy       HPI:  Patient is a 74y old Female known to have CAD s.p PCI; ischemic CM s/p CRT-D, HTN, DM type 2, pAfib with CHADSVASC of 8 on eliquis, PUD, recent left PICA stroke, PVD with most recent angiogram june 2020 showing non-reconstructive severe occlusion of popliteal and tibial vessels; she has hx of angioplasty/stenting at [facility] and she is s/p amputation of L hallux (may 2020) due to toe gangrene and underlying OM with MSSA organism . Patient presents with a chief complaint of worsening LE pain secondary to critical limb ischemia causing non-healing ulcers of L foot at the site of hallux amputation.  (to note patient had ? bladder mass with negative cytology for malignancy was supposed to follow up outpt for cystoscopy) she denies any fever or chills   No nausea no vomiting no diarrhe and otherwise ROS negative   vitals stable afebrile however bp 162/66 mmHg  labs showed WBC 69269 with Hg 10 and Na 131( at baseline) but Cr 1.2 ( baseline 0.8)  ecg revealed paced rhythm (23 Jun 2020 20:30)    Allergies    codeine (Rash)  penicillin (Rash)    Intolerances      Patient without complaints. Pt ambulated without issues/symptoms  Denies CP, SOB, palpitations, or dizziness      Vital Signs Last 24 Hrs  T(C): 37.1 (27 Jun 2020 05:07), Max: 37.1 (27 Jun 2020 05:07)  T(F): 98.8 (27 Jun 2020 05:07), Max: 98.8 (27 Jun 2020 05:07)  HR: 75 (27 Jun 2020 10:19) (75 - 88)  BP: 138/61 (27 Jun 2020 10:19) (138/61 - 200/87)  BP(mean): --  RR: 18 (27 Jun 2020 05:07) (18 - 18)  SpO2: --    MEDICATIONS  (STANDING):  acetaminophen   Tablet .. 650 milliGRAM(s) Oral every 6 hours  apixaban 5 milliGRAM(s) Oral two times a day  atorvastatin 40 milliGRAM(s) Oral at bedtime  cefTRIAXone   IVPB 2000 milliGRAM(s) IV Intermittent every 24 hours  chlorhexidine 4% Liquid 1 Application(s) Topical <User Schedule>  clopidogrel Tablet 75 milliGRAM(s) Oral daily  doxycycline hyclate Capsule 100 milliGRAM(s) Oral every 12 hours  furosemide    Tablet 20 milliGRAM(s) Oral two times a day  gabapentin 100 milliGRAM(s) Oral every 12 hours  hydrALAZINE 100 milliGRAM(s) Oral every 8 hours  insulin glargine Injectable (LANTUS) 21 Unit(s) SubCutaneous at bedtime  insulin lispro (HumaLOG) corrective regimen sliding scale   SubCutaneous three times a day before meals  insulin lispro Injectable (HumaLOG) 7 Unit(s) SubCutaneous before breakfast  insulin lispro Injectable (HumaLOG) 7 Unit(s) SubCutaneous before lunch  insulin lispro Injectable (HumaLOG) 7 Unit(s) SubCutaneous before dinner  lisinopril 40 milliGRAM(s) Oral daily  loratadine 10 milliGRAM(s) Oral daily  metoprolol succinate  milliGRAM(s) Oral daily  pantoprazole    Tablet 40 milliGRAM(s) Oral before breakfast  senna 2 Tablet(s) Oral at bedtime  silver sulfADIAZINE 1% Cream 1 Application(s) Topical daily  sodium chloride 0.9%. 1000 milliLiter(s) (65 mL/Hr) IV Continuous <Continuous>    MEDICATIONS  (PRN):  traMADol 25 milliGRAM(s) Oral every 8 hours PRN Severe Pain (7 - 10)      REVIEW OF SYSTEMS:          All negative except as mentioned in HPI    PHYSICAL EXAM:           CONSTITUTIONAL: Well-developed; well-nourished; in no acute distress  	SKIN: warm, dry  	HEAD: Normocephalic; atraumatic  	EYES: PERRL.  	ENT: No nasal discharge, airway clear, mucous membranes moist  	NECK: Supple; non tender.  	CARD: +S1, +S2, no murmurs, gallops, or rubs. (Regular) rate and rhythm    	RESP: No wheezes, rales or rhonchi. CTA B/L  	ABD: soft ntnd, + BS x 4 quadrants  	EXT: moves all extremities,  no clubbing, cyanosis or edema,   L Foot : Dressing +Clean, +Dry, +Intact. No pain at this time     	NEURO: Alert and oriented x3, no focal deficits          PSYCH: Cooperative, appropriate          EXTREMITY:             Left Groin:  Dressing removed, + pulses, access site soft, no hematoma, no pain, no numbness, no signs and symptoms of infection        LABS:                        9.0    9.90  )-----------( 321      ( 26 Jun 2020 08:05 )             28.8     06-26    134<L>  |  100  |  16  ----------------------------<  105<H>  3.9   |  21  |  0.8    Ca    8.5      26 Jun 2020 08:05  Mg     1.5     06-26        	   S/P laser atherectomy and ballon of L distal SFA, L popliteal artery, L TPT trunk and L peroneal artery  	     Continue plavix and statin                   resume eliquis on sat 6/27                   monitor access site/distal pulses                   oob-ch with assiatnce                   wound care per podiatry                   plan for CT per podiatry                    Pt given instructions on importance of taking antiplatelet medication                   Post cath instructions, access site care and activity restrictions reviewed with patient                     Discussed with patient to return to hospital if increasing leg pain and site bleeding                   Aggressive risk factor modification, diet counseling, smoking cessation discussed with patient                       Follow up with Cardiology Dr. Hammonds in 1-2 weeks after discharge.  Instructed to call and make an appointment

## 2020-06-29 LAB
CULTURE RESULTS: SIGNIFICANT CHANGE UP
SPECIMEN SOURCE: SIGNIFICANT CHANGE UP

## 2020-06-30 ENCOUNTER — TRANSCRIPTION ENCOUNTER (OUTPATIENT)
Age: 75
End: 2020-06-30

## 2020-07-01 ENCOUNTER — APPOINTMENT (OUTPATIENT)
Dept: UROLOGY | Facility: CLINIC | Age: 75
End: 2020-07-01

## 2020-07-02 NOTE — PROGRESS NOTE ADULT - ASSESSMENT
73 YO female from home, H/O CAD/DM/HTN/PVD/hysterectomy/cholecystectomy ,with AICD comes to ED with intractable nausea and vomiting for 2 days. Patient states that she recently had left big toe amputation in the beginning of May and she was taking antibiotics. On Friday, her antibiotics were changed from clindamycin to Levaquin and after she took antibiotics along with percocet, she started having severe pain in epigastric region. Patient reports that it was sudden in onset, 7/10, burning & pressure like pain, non-radiating , associated with nausea,, bitter taste in mouth and projectile vomiting. She was not able to tolerate food and it aggravated her symptoms. Patient also reports significant weight( not sure how much) recently and decrease in appetite. patient denies fever, cough, SOB, constipation, dysuria, headache, chest pain, orthopnea, back pain, burning sensation in extremities.c/o pain over left big toe stump which is not resolving despite pain meds . afebrile on adm , covid neg. ID called for persistant leucocytosis and appr ab     # s/p amputation of left great toe for gangrene with post op collection and overlying cellulits as well as underlying OM /wound cx pos for staph aureus ( MSSA)/s/p left leg stent at [facility] as per pt     #persistant  nausea/vomiting , now seems to be vertigo sec to poss cerebellar cva vs tia     # transaminitis improving     # increasing wbc r/o asp pna     plan  blood cx times 2 f/u ( neg)   rpt covid testing ( pt does not want to have it )  change ab to doxy and zosyn to cover mssa for stump infection/abscess as well as asp pna d/w intern   for more debridement /I and D and poss more proximal amputation as per podiatry   arterial dopplers /pvr/sandra   CONT  probiotics   monitor for diarrhea   EGD ON HOLD AS PER GI   neuro f/u       thnx will f/u   d/w intern dr diaz Axilla Units: 100

## 2020-07-07 DIAGNOSIS — I11.0 HYPERTENSIVE HEART DISEASE WITH HEART FAILURE: ICD-10-CM

## 2020-07-07 DIAGNOSIS — L97.529 NON-PRESSURE CHRONIC ULCER OF OTHER PART OF LEFT FOOT WITH UNSPECIFIED SEVERITY: ICD-10-CM

## 2020-07-07 DIAGNOSIS — E11.69 TYPE 2 DIABETES MELLITUS WITH OTHER SPECIFIED COMPLICATION: ICD-10-CM

## 2020-07-07 DIAGNOSIS — Z85.42 PERSONAL HISTORY OF MALIGNANT NEOPLASM OF OTHER PARTS OF UTERUS: ICD-10-CM

## 2020-07-07 DIAGNOSIS — Z87.11 PERSONAL HISTORY OF PEPTIC ULCER DISEASE: ICD-10-CM

## 2020-07-07 DIAGNOSIS — Z95.810 PRESENCE OF AUTOMATIC (IMPLANTABLE) CARDIAC DEFIBRILLATOR: ICD-10-CM

## 2020-07-07 DIAGNOSIS — I50.32 CHRONIC DIASTOLIC (CONGESTIVE) HEART FAILURE: ICD-10-CM

## 2020-07-07 DIAGNOSIS — Z86.73 PERSONAL HISTORY OF TRANSIENT ISCHEMIC ATTACK (TIA), AND CEREBRAL INFARCTION WITHOUT RESIDUAL DEFICITS: ICD-10-CM

## 2020-07-07 DIAGNOSIS — I25.10 ATHEROSCLEROTIC HEART DISEASE OF NATIVE CORONARY ARTERY WITHOUT ANGINA PECTORIS: ICD-10-CM

## 2020-07-07 DIAGNOSIS — Z88.0 ALLERGY STATUS TO PENICILLIN: ICD-10-CM

## 2020-07-07 DIAGNOSIS — E11.621 TYPE 2 DIABETES MELLITUS WITH FOOT ULCER: ICD-10-CM

## 2020-07-07 DIAGNOSIS — I70.262 ATHEROSCLEROSIS OF NATIVE ARTERIES OF EXTREMITIES WITH GANGRENE, LEFT LEG: ICD-10-CM

## 2020-07-07 DIAGNOSIS — E11.51 TYPE 2 DIABETES MELLITUS WITH DIABETIC PERIPHERAL ANGIOPATHY WITHOUT GANGRENE: ICD-10-CM

## 2020-07-07 DIAGNOSIS — M86.8X7 OTHER OSTEOMYELITIS, ANKLE AND FOOT: ICD-10-CM

## 2020-07-07 DIAGNOSIS — E11.52 TYPE 2 DIABETES MELLITUS WITH DIABETIC PERIPHERAL ANGIOPATHY WITH GANGRENE: ICD-10-CM

## 2020-07-07 DIAGNOSIS — L76.32 POSTPROCEDURAL HEMATOMA OF SKIN AND SUBCUTANEOUS TISSUE FOLLOWING OTHER PROCEDURE: ICD-10-CM

## 2020-07-07 DIAGNOSIS — I77.1 STRICTURE OF ARTERY: ICD-10-CM

## 2020-07-07 DIAGNOSIS — Z79.4 LONG TERM (CURRENT) USE OF INSULIN: ICD-10-CM

## 2020-07-07 DIAGNOSIS — N17.9 ACUTE KIDNEY FAILURE, UNSPECIFIED: ICD-10-CM
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
## 2020-07-07 DIAGNOSIS — I25.5 ISCHEMIC CARDIOMYOPATHY: ICD-10-CM

## 2020-07-07 DIAGNOSIS — I48.0 PAROXYSMAL ATRIAL FIBRILLATION: ICD-10-CM

## 2020-07-07 DIAGNOSIS — Z95.5 PRESENCE OF CORONARY ANGIOPLASTY IMPLANT AND GRAFT: ICD-10-CM

## 2020-07-07 DIAGNOSIS — Z79.01 LONG TERM (CURRENT) USE OF ANTICOAGULANTS: ICD-10-CM

## 2020-07-07 DIAGNOSIS — N33 BLADDER DISORDERS IN DISEASES CLASSIFIED ELSEWHERE: ICD-10-CM

## 2020-07-08 ENCOUNTER — APPOINTMENT (OUTPATIENT)
Dept: UROLOGY | Facility: CLINIC | Age: 75
End: 2020-07-08
Payer: MEDICARE

## 2020-07-08 VITALS
RESPIRATION RATE: 75 BRPM | WEIGHT: 140 LBS | HEIGHT: 61 IN | OXYGEN SATURATION: 97 % | SYSTOLIC BLOOD PRESSURE: 142 MMHG | TEMPERATURE: 97.7 F | HEART RATE: 71 BPM | BODY MASS INDEX: 26.43 KG/M2 | DIASTOLIC BLOOD PRESSURE: 71 MMHG

## 2020-07-08 DIAGNOSIS — Z95.0 PRESENCE OF CARDIAC PACEMAKER: ICD-10-CM

## 2020-07-08 DIAGNOSIS — Z90.710 ACQUIRED ABSENCE OF BOTH CERVIX AND UTERUS: ICD-10-CM

## 2020-07-08 DIAGNOSIS — Z82.49 FAMILY HISTORY OF ISCHEMIC HEART DISEASE AND OTHER DISEASES OF THE CIRCULATORY SYSTEM: ICD-10-CM

## 2020-07-08 DIAGNOSIS — Z56.0 UNEMPLOYMENT, UNSPECIFIED: ICD-10-CM

## 2020-07-08 DIAGNOSIS — Z87.891 PERSONAL HISTORY OF NICOTINE DEPENDENCE: ICD-10-CM

## 2020-07-08 DIAGNOSIS — I99.8 OTHER DISORDER OF CIRCULATORY SYSTEM: ICD-10-CM

## 2020-07-08 DIAGNOSIS — K46.9 UNSPECIFIED ABDOMINAL HERNIA W/OUT OBSTRUCTION OR GANGRENE: ICD-10-CM

## 2020-07-08 PROCEDURE — 99214 OFFICE O/P EST MOD 30 MIN: CPT

## 2020-07-08 SDOH — ECONOMIC STABILITY - INCOME SECURITY: UNEMPLOYMENT, UNSPECIFIED: Z56.0

## 2020-07-08 NOTE — REVIEW OF SYSTEMS
[see HPI] : see HPI [Muscle Weakness] : muscle weakness [Joint Pain] : joint pain [Negative] : Psychiatric

## 2020-07-08 NOTE — HISTORY OF PRESENT ILLNESS
[FreeTextEntry1] : Very pleasant 74-year-old woman who presents for follow-up from recent hospitalization for numerous bladder masses on CT scan.  Patient was admitted to [facility] last month and underwent CT scan of the abdomen and pelvis which demonstrated a number of small irregularities in her bladder concerning for bladder tumors.  The largest of these measured 1.3 cm.  She was recently discharged from the hospital and now presents for follow-up.  She denies a history of gross hematuria.  No flank pain or suprapubic pain.  No nausea or vomiting.  She never smoked.  No family history of  malignancy, including bladder cancer.  No specific timing to her symptoms.  No other complaints. [Urinary Retention] : no urinary retention [Urinary Frequency] : no urinary frequency [Urinary Urgency] : no urinary urgency [Nocturia] : no nocturia [Straining] : no straining [Weak Stream] : no weak stream [Hematuria - Gross] : no gross hematuria [Dysuria] : no dysuria [Abdominal Pain] : no abdominal pain [Flank Pain] : no flank pain

## 2020-07-08 NOTE — ASSESSMENT
[FreeTextEntry1] : Very pleasant 74-year-old woman who presents for follow-up from recent hospitalization for small bladder irregularities concerning for bladder tumor\par -Urinalysis\par -Urine culture\par -Urine cytology\par -CT images reviewed\par -Labs and records from hospital reviewed\par -Follow-up for cystoscopy\par -We discussed potential etiologies of small irregularities in the bladder concerning for bladder tumors

## 2020-07-08 NOTE — PHYSICAL EXAM
[General Appearance - Well Developed] : well developed [General Appearance - Well Nourished] : well nourished [Well Groomed] : well groomed [General Appearance - In No Acute Distress] : no acute distress [Normal Appearance] : normal appearance [Costovertebral Angle Tenderness] : no ~M costovertebral angle tenderness [Abdomen Tenderness] : non-tender [Abdomen Soft] : soft [Edema] : no peripheral edema [Urinary Bladder Findings] : the bladder was normal on palpation [Respiration, Rhythm And Depth] : normal respiratory rhythm and effort [] : no respiratory distress [Exaggerated Use Of Accessory Muscles For Inspiration] : no accessory muscle use [Mood] : the mood was normal [Affect] : the affect was normal [Oriented To Time, Place, And Person] : oriented to person, place, and time [FreeTextEntry1] : Uses wheelchair [Not Anxious] : not anxious [No Focal Deficits] : no focal deficits [No Palpable Adenopathy] : no palpable adenopathy

## 2020-07-10 LAB
BACTERIA UR CULT: NORMAL
URINE CYTOLOGY: NORMAL

## 2020-07-14 ENCOUNTER — INPATIENT (INPATIENT)
Facility: HOSPITAL | Age: 75
LOS: 5 days | Discharge: HOME HEALTH | End: 2020-07-20
Attending: HOSPITALIST | Admitting: HOSPITALIST
Payer: MEDICARE

## 2020-07-14 VITALS
OXYGEN SATURATION: 98 % | RESPIRATION RATE: 18 BRPM | TEMPERATURE: 98 F | SYSTOLIC BLOOD PRESSURE: 180 MMHG | DIASTOLIC BLOOD PRESSURE: 78 MMHG | HEART RATE: 81 BPM

## 2020-07-14 DIAGNOSIS — Z87.891 PERSONAL HISTORY OF NICOTINE DEPENDENCE: ICD-10-CM

## 2020-07-14 DIAGNOSIS — Z95.5 PRESENCE OF CORONARY ANGIOPLASTY IMPLANT AND GRAFT: ICD-10-CM

## 2020-07-14 DIAGNOSIS — E11.65 TYPE 2 DIABETES MELLITUS WITH HYPERGLYCEMIA: ICD-10-CM

## 2020-07-14 DIAGNOSIS — T87.81 DEHISCENCE OF AMPUTATION STUMP: ICD-10-CM

## 2020-07-14 DIAGNOSIS — Z89.422 ACQUIRED ABSENCE OF OTHER LEFT TOE(S): ICD-10-CM

## 2020-07-14 DIAGNOSIS — Z90.49 ACQUIRED ABSENCE OF OTHER SPECIFIED PARTS OF DIGESTIVE TRACT: Chronic | ICD-10-CM

## 2020-07-14 DIAGNOSIS — I48.20 CHRONIC ATRIAL FIBRILLATION, UNSPECIFIED: ICD-10-CM

## 2020-07-14 DIAGNOSIS — Z79.01 LONG TERM (CURRENT) USE OF ANTICOAGULANTS: ICD-10-CM

## 2020-07-14 DIAGNOSIS — Z95.0 PRESENCE OF CARDIAC PACEMAKER: Chronic | ICD-10-CM

## 2020-07-14 DIAGNOSIS — Z95.810 PRESENCE OF AUTOMATIC (IMPLANTABLE) CARDIAC DEFIBRILLATOR: Chronic | ICD-10-CM

## 2020-07-14 DIAGNOSIS — Z90.710 ACQUIRED ABSENCE OF BOTH CERVIX AND UTERUS: Chronic | ICD-10-CM

## 2020-07-14 DIAGNOSIS — Z88.0 ALLERGY STATUS TO PENICILLIN: ICD-10-CM

## 2020-07-14 DIAGNOSIS — E11.69 TYPE 2 DIABETES MELLITUS WITH OTHER SPECIFIED COMPLICATION: ICD-10-CM

## 2020-07-14 DIAGNOSIS — I50.22 CHRONIC SYSTOLIC (CONGESTIVE) HEART FAILURE: ICD-10-CM

## 2020-07-14 DIAGNOSIS — Z95.5 PRESENCE OF CORONARY ANGIOPLASTY IMPLANT AND GRAFT: Chronic | ICD-10-CM

## 2020-07-14 DIAGNOSIS — E11.51 TYPE 2 DIABETES MELLITUS WITH DIABETIC PERIPHERAL ANGIOPATHY WITHOUT GANGRENE: ICD-10-CM

## 2020-07-14 DIAGNOSIS — I11.0 HYPERTENSIVE HEART DISEASE WITH HEART FAILURE: ICD-10-CM

## 2020-07-14 DIAGNOSIS — M86.8X7 OTHER OSTEOMYELITIS, ANKLE AND FOOT: ICD-10-CM

## 2020-07-14 DIAGNOSIS — Z79.4 LONG TERM (CURRENT) USE OF INSULIN: ICD-10-CM

## 2020-07-14 DIAGNOSIS — Z95.810 PRESENCE OF AUTOMATIC (IMPLANTABLE) CARDIAC DEFIBRILLATOR: ICD-10-CM

## 2020-07-14 DIAGNOSIS — I25.10 ATHEROSCLEROTIC HEART DISEASE OF NATIVE CORONARY ARTERY WITHOUT ANGINA PECTORIS: ICD-10-CM

## 2020-07-14 DIAGNOSIS — E11.621 TYPE 2 DIABETES MELLITUS WITH FOOT ULCER: ICD-10-CM

## 2020-07-14 DIAGNOSIS — L97.529 NON-PRESSURE CHRONIC ULCER OF OTHER PART OF LEFT FOOT WITH UNSPECIFIED SEVERITY: ICD-10-CM

## 2020-07-14 DIAGNOSIS — Z86.73 PERSONAL HISTORY OF TRANSIENT ISCHEMIC ATTACK (TIA), AND CEREBRAL INFARCTION WITHOUT RESIDUAL DEFICITS: ICD-10-CM
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
## 2020-07-14 LAB
ALBUMIN SERPL ELPH-MCNC: 3.5 G/DL — SIGNIFICANT CHANGE UP (ref 3.5–5.2)
ALP SERPL-CCNC: 330 U/L — HIGH (ref 30–115)
ALT FLD-CCNC: 35 U/L — SIGNIFICANT CHANGE UP (ref 0–41)
ANION GAP SERPL CALC-SCNC: 13 MMOL/L — SIGNIFICANT CHANGE UP (ref 7–14)
APTT BLD: 42.4 SEC — HIGH (ref 27–39.2)
AST SERPL-CCNC: 46 U/L — HIGH (ref 0–41)
BASOPHILS # BLD AUTO: 0.05 K/UL — SIGNIFICANT CHANGE UP (ref 0–0.2)
BASOPHILS NFR BLD AUTO: 0.4 % — SIGNIFICANT CHANGE UP (ref 0–1)
BILIRUB SERPL-MCNC: <0.2 MG/DL — SIGNIFICANT CHANGE UP (ref 0.2–1.2)
BUN SERPL-MCNC: 29 MG/DL — HIGH (ref 10–20)
CALCIUM SERPL-MCNC: 9.3 MG/DL — SIGNIFICANT CHANGE UP (ref 8.5–10.1)
CHLORIDE SERPL-SCNC: 96 MMOL/L — LOW (ref 98–110)
CO2 SERPL-SCNC: 22 MMOL/L — SIGNIFICANT CHANGE UP (ref 17–32)
CREAT SERPL-MCNC: 1 MG/DL — SIGNIFICANT CHANGE UP (ref 0.7–1.5)
EOSINOPHIL # BLD AUTO: 0.29 K/UL — SIGNIFICANT CHANGE UP (ref 0–0.7)
EOSINOPHIL NFR BLD AUTO: 2.3 % — SIGNIFICANT CHANGE UP (ref 0–8)
GLUCOSE SERPL-MCNC: 154 MG/DL — HIGH (ref 70–99)
HCT VFR BLD CALC: 35.2 % — LOW (ref 37–47)
HGB BLD-MCNC: 11.5 G/DL — LOW (ref 12–16)
IMM GRANULOCYTES NFR BLD AUTO: 0.6 % — HIGH (ref 0.1–0.3)
INR BLD: 1.43 RATIO — HIGH (ref 0.65–1.3)
LACTATE SERPL-SCNC: 1.4 MMOL/L — SIGNIFICANT CHANGE UP (ref 0.7–2)
LYMPHOCYTES # BLD AUTO: 19.3 % — LOW (ref 20.5–51.1)
LYMPHOCYTES # BLD AUTO: 2.42 K/UL — SIGNIFICANT CHANGE UP (ref 1.2–3.4)
MCHC RBC-ENTMCNC: 27.7 PG — SIGNIFICANT CHANGE UP (ref 27–31)
MCHC RBC-ENTMCNC: 32.7 G/DL — SIGNIFICANT CHANGE UP (ref 32–37)
MCV RBC AUTO: 84.8 FL — SIGNIFICANT CHANGE UP (ref 81–99)
MONOCYTES # BLD AUTO: 0.82 K/UL — HIGH (ref 0.1–0.6)
MONOCYTES NFR BLD AUTO: 6.5 % — SIGNIFICANT CHANGE UP (ref 1.7–9.3)
NEUTROPHILS # BLD AUTO: 8.88 K/UL — HIGH (ref 1.4–6.5)
NEUTROPHILS NFR BLD AUTO: 70.9 % — SIGNIFICANT CHANGE UP (ref 42.2–75.2)
NRBC # BLD: 0 /100 WBCS — SIGNIFICANT CHANGE UP (ref 0–0)
PLATELET # BLD AUTO: 445 K/UL — HIGH (ref 130–400)
POTASSIUM SERPL-MCNC: 4.6 MMOL/L — SIGNIFICANT CHANGE UP (ref 3.5–5)
POTASSIUM SERPL-SCNC: 4.6 MMOL/L — SIGNIFICANT CHANGE UP (ref 3.5–5)
PROT SERPL-MCNC: 7.4 G/DL — SIGNIFICANT CHANGE UP (ref 6–8)
PROTHROM AB SERPL-ACNC: 16.4 SEC — HIGH (ref 9.95–12.87)
RBC # BLD: 4.15 M/UL — LOW (ref 4.2–5.4)
RBC # FLD: 15 % — HIGH (ref 11.5–14.5)
SODIUM SERPL-SCNC: 131 MMOL/L — LOW (ref 135–146)
WBC # BLD: 12.54 K/UL — HIGH (ref 4.8–10.8)
WBC # FLD AUTO: 12.54 K/UL — HIGH (ref 4.8–10.8)

## 2020-07-14 PROCEDURE — 93010 ELECTROCARDIOGRAM REPORT: CPT

## 2020-07-14 PROCEDURE — 99285 EMERGENCY DEPT VISIT HI MDM: CPT

## 2020-07-14 PROCEDURE — 73630 X-RAY EXAM OF FOOT: CPT | Mod: 26,LT

## 2020-07-14 RX ORDER — CEFTRIAXONE 500 MG/1
2000 INJECTION, POWDER, FOR SOLUTION INTRAMUSCULAR; INTRAVENOUS ONCE
Refills: 0 | Status: COMPLETED | OUTPATIENT
Start: 2020-07-14 | End: 2020-07-14

## 2020-07-14 RX ORDER — ACETAMINOPHEN 500 MG
650 TABLET ORAL ONCE
Refills: 0 | Status: COMPLETED | OUTPATIENT
Start: 2020-07-14 | End: 2020-07-14

## 2020-07-14 RX ADMIN — Medication 650 MILLIGRAM(S): at 23:41

## 2020-07-14 RX ADMIN — CEFTRIAXONE 100 MILLIGRAM(S): 500 INJECTION, POWDER, FOR SOLUTION INTRAMUSCULAR; INTRAVENOUS at 21:13

## 2020-07-14 NOTE — ED PROVIDER NOTE - OBJECTIVE STATEMENT
74 year old F with hx of CAD s/p PCI, HTN, DM, a fib on Eliquis, CHF, PVD, recent angio 06/20 severe occlusion popliteal/tibeal vessels, hx of angioplasty/stenting at [facility], s/p amputation to L hallux 05/20 sent in by podiatry Dr. Hudson for admission. Sts has had worsening pain/redness/swelling/warmth surrounding L hallux site. Denies any assoc trauma/injuries, purulent drainage, inability to bare weight, fever/chills, nausea, vomiting.

## 2020-07-14 NOTE — ED ADULT NURSE REASSESSMENT NOTE - NS ED NURSE REASSESS COMMENT FT1
RN unable to obtain T&S and one blood culture, BRANDON Bailey made aware, attending aware. As per providers, OK to administer abx without blood culture. Charge RN and ANM aware.

## 2020-07-14 NOTE — ED PROVIDER NOTE - PHYSICAL EXAMINATION
CONSTITUTIONAL: Well-appearing; well-nourished; in no apparent distress.   EYES: PERRL; EOM intact.   ENT: normal nose; no rhinorrhea; normal pharynx with no tonsillar hypertrophy.   NECK: Supple; non-tender; no cervical lymphadenopathy.  CARDIOVASCULAR: Normal S1, S2; no murmurs, rubs, or gallops.   RESPIRATORY: Normal chest excursion with respiration; breath sounds clear and equal bilaterally; no wheezes, rhonchi, or rales.  GI/: Normal bowel sounds; non-distended; non-tender; no palpable organomegaly.   MS: No evidence of trauma or deformity . Normal ROM in all four extremities; non-tender to palpation; distal pulses are normal.   SKIN: + s/p L hallux amputation, + surrounding erythema/warmth/swelling with ttp. No drainage noted  NEURO/PSYCH: A & O x 4; grossly unremarkable. mood and manner are appropriate. Grooming and personal hygiene are appropriate.

## 2020-07-14 NOTE — ED PROVIDER NOTE - ATTENDING CONTRIBUTION TO CARE
74 year old F with hx of CAD s/p PCI, HTN, DM, a fib on Eliquis, CHF, PVD, recent angio 06/20 severe occlusion popliteal/tibeal vessels, hx of angioplasty/stenting at [facility], s/p amputation to L hallux 05/20 sent in by podiatry Dr. Hudson here for admission. pt has been following closely to L hallux site. no trauma, fever, chills, purulent drainage. pt med compliant    vss  gen- NAD, aaox3  card-rrr  lungs-ctab, no wheezing or rhonchi  abd-sntnd, no guarding or rebound  neuro- full str/sensation, cn ii-xii grossly intact, normal coordination and gait  LLE- L hallux amputation site w/ erythema and tenderness, no draiange, mild tenderness to plantar aspect of foot    poorly healing ulcer in pt w/ PVD, will get basic labs, preops, xr, pod consult, abx per pod

## 2020-07-14 NOTE — ED PROVIDER NOTE - NS ED ROS FT
Constitutional: no fever, chills, no recent weight loss, change in appetite or malaise  Eyes: no redness/discharge/pain/vision changes  ENT: no rhinorrhea/ear pain/sore throat  Cardiac: No chest pain, SOB or edema.  Respiratory: No cough or respiratory distress  GI: No nausea, vomiting, diarrhea or abdominal pain.  : No dysuria, frequency, urgency or hematuria  MS: + s/p L hallux amputation. + redness/pain/swelling to amputation site.  no loss of ROM, no weakness  Neuro: No headache or weakness. No LOC.  Endocrine: + hx of DM  Except as documented in the HPI, all other systems are negative.

## 2020-07-15 LAB
ALBUMIN SERPL ELPH-MCNC: 3.2 G/DL — LOW (ref 3.5–5.2)
ALP SERPL-CCNC: 307 U/L — HIGH (ref 30–115)
ALT FLD-CCNC: 36 U/L — SIGNIFICANT CHANGE UP (ref 0–41)
ANION GAP SERPL CALC-SCNC: 13 MMOL/L — SIGNIFICANT CHANGE UP (ref 7–14)
AST SERPL-CCNC: 37 U/L — SIGNIFICANT CHANGE UP (ref 0–41)
BASOPHILS # BLD AUTO: 0.04 K/UL — SIGNIFICANT CHANGE UP (ref 0–0.2)
BASOPHILS NFR BLD AUTO: 0.4 % — SIGNIFICANT CHANGE UP (ref 0–1)
BILIRUB SERPL-MCNC: <0.2 MG/DL — SIGNIFICANT CHANGE UP (ref 0.2–1.2)
BLD GP AB SCN SERPL QL: SIGNIFICANT CHANGE UP
BUN SERPL-MCNC: 27 MG/DL — HIGH (ref 10–20)
CALCIUM SERPL-MCNC: 9.4 MG/DL — SIGNIFICANT CHANGE UP (ref 8.5–10.1)
CHLORIDE SERPL-SCNC: 100 MMOL/L — SIGNIFICANT CHANGE UP (ref 98–110)
CHOLEST SERPL-MCNC: 146 MG/DL — SIGNIFICANT CHANGE UP (ref 100–200)
CO2 SERPL-SCNC: 25 MMOL/L — SIGNIFICANT CHANGE UP (ref 17–32)
CREAT SERPL-MCNC: 0.9 MG/DL — SIGNIFICANT CHANGE UP (ref 0.7–1.5)
CRP SERPL-MCNC: 0.96 MG/DL — HIGH (ref 0–0.4)
CRP SERPL-MCNC: 1 MG/DL — HIGH (ref 0–0.4)
EOSINOPHIL # BLD AUTO: 0.44 K/UL — SIGNIFICANT CHANGE UP (ref 0–0.7)
EOSINOPHIL NFR BLD AUTO: 4 % — SIGNIFICANT CHANGE UP (ref 0–8)
ERYTHROCYTE [SEDIMENTATION RATE] IN BLOOD: 56 MM/HR — HIGH (ref 0–20)
ERYTHROCYTE [SEDIMENTATION RATE] IN BLOOD: 67 MM/HR — HIGH (ref 0–20)
GLUCOSE BLDC GLUCOMTR-MCNC: 122 MG/DL — HIGH (ref 70–99)
GLUCOSE BLDC GLUCOMTR-MCNC: 127 MG/DL — HIGH (ref 70–99)
GLUCOSE BLDC GLUCOMTR-MCNC: 138 MG/DL — HIGH (ref 70–99)
GLUCOSE BLDC GLUCOMTR-MCNC: 141 MG/DL — HIGH (ref 70–99)
GLUCOSE SERPL-MCNC: 99 MG/DL — SIGNIFICANT CHANGE UP (ref 70–99)
HCT VFR BLD CALC: 32.2 % — LOW (ref 37–47)
HDLC SERPL-MCNC: 58 MG/DL — SIGNIFICANT CHANGE UP
HGB BLD-MCNC: 10.2 G/DL — LOW (ref 12–16)
IMM GRANULOCYTES NFR BLD AUTO: 0.7 % — HIGH (ref 0.1–0.3)
LIPID PNL WITH DIRECT LDL SERPL: 62 MG/DL — SIGNIFICANT CHANGE UP (ref 4–129)
LYMPHOCYTES # BLD AUTO: 2.24 K/UL — SIGNIFICANT CHANGE UP (ref 1.2–3.4)
LYMPHOCYTES # BLD AUTO: 20.5 % — SIGNIFICANT CHANGE UP (ref 20.5–51.1)
MAGNESIUM SERPL-MCNC: 1.6 MG/DL — LOW (ref 1.8–2.4)
MCHC RBC-ENTMCNC: 26.9 PG — LOW (ref 27–31)
MCHC RBC-ENTMCNC: 31.7 G/DL — LOW (ref 32–37)
MCV RBC AUTO: 85 FL — SIGNIFICANT CHANGE UP (ref 81–99)
MONOCYTES # BLD AUTO: 0.75 K/UL — HIGH (ref 0.1–0.6)
MONOCYTES NFR BLD AUTO: 6.9 % — SIGNIFICANT CHANGE UP (ref 1.7–9.3)
NEUTROPHILS # BLD AUTO: 7.38 K/UL — HIGH (ref 1.4–6.5)
NEUTROPHILS NFR BLD AUTO: 67.5 % — SIGNIFICANT CHANGE UP (ref 42.2–75.2)
NRBC # BLD: 0 /100 WBCS — SIGNIFICANT CHANGE UP (ref 0–0)
PLATELET # BLD AUTO: 395 K/UL — SIGNIFICANT CHANGE UP (ref 130–400)
POTASSIUM SERPL-MCNC: 3.9 MMOL/L — SIGNIFICANT CHANGE UP (ref 3.5–5)
POTASSIUM SERPL-SCNC: 3.9 MMOL/L — SIGNIFICANT CHANGE UP (ref 3.5–5)
PROT SERPL-MCNC: 7.2 G/DL — SIGNIFICANT CHANGE UP (ref 6–8)
RBC # BLD: 3.79 M/UL — LOW (ref 4.2–5.4)
RBC # FLD: 14.9 % — HIGH (ref 11.5–14.5)
SARS-COV-2 RNA SPEC QL NAA+PROBE: SIGNIFICANT CHANGE UP
SODIUM SERPL-SCNC: 138 MMOL/L — SIGNIFICANT CHANGE UP (ref 135–146)
TOTAL CHOLESTEROL/HDL RATIO MEASUREMENT: 2.5 RATIO — LOW (ref 4–5.5)
TRIGL SERPL-MCNC: 155 MG/DL — HIGH (ref 10–149)
WBC # BLD: 10.93 K/UL — HIGH (ref 4.8–10.8)
WBC # FLD AUTO: 10.93 K/UL — HIGH (ref 4.8–10.8)

## 2020-07-15 PROCEDURE — 99222 1ST HOSP IP/OBS MODERATE 55: CPT

## 2020-07-15 PROCEDURE — 99497 ADVNCD CARE PLAN 30 MIN: CPT | Mod: 25

## 2020-07-15 PROCEDURE — 93926 LOWER EXTREMITY STUDY: CPT | Mod: 26,LT

## 2020-07-15 PROCEDURE — 99223 1ST HOSP IP/OBS HIGH 75: CPT

## 2020-07-15 RX ORDER — MAGNESIUM SULFATE 500 MG/ML
2 VIAL (ML) INJECTION ONCE
Refills: 0 | Status: COMPLETED | OUTPATIENT
Start: 2020-07-15 | End: 2020-07-15

## 2020-07-15 RX ORDER — ACETAMINOPHEN 500 MG
650 TABLET ORAL ONCE
Refills: 0 | Status: COMPLETED | OUTPATIENT
Start: 2020-07-15 | End: 2020-07-15

## 2020-07-15 RX ADMIN — Medication 650 MILLIGRAM(S): at 21:57

## 2020-07-15 RX ADMIN — Medication 650 MILLIGRAM(S): at 01:31

## 2020-07-15 RX ADMIN — Medication 650 MILLIGRAM(S): at 21:27

## 2020-07-15 RX ADMIN — Medication 25 GRAM(S): at 08:58

## 2020-07-15 NOTE — H&P ADULT - HISTORY OF PRESENT ILLNESS
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 After having left hallux amputation in May, patient was discharged with PICC line and abx and told to follow up with podiatrist. She had the stitches taken out of the wound yesterday, but was still having significant pain, 6/10, sharp, intermittent as well as swelling and redness to the wound area. Thus, she was told to go to ED in order to open the wound again by her podiatrist. She denies any fevers, chills, nausea, vomiting, chest pain, palpitations, shortness of breath, diarrhea, constipation. No purulent drainage of wound.    ED Course:  T 98F, P 81, /78, R 18, S 98% on RA at rest. Given rocephin 2g in ED, podiatry consulted.

## 2020-07-15 NOTE — CHART NOTE - NSCHARTNOTEFT_GEN_A_CORE
Pt seen by nocturnist in am.   Pt seen and examined at bedside. vitals and exam stable. Chart reviewed. Discussed plan with resident.   #Non-healing left foot ulcer s/p left hallux amputation- vascular consult appreciated, will check duplex arterial, pod consult appreciated, pt going for  OR for Excisional debridement, follow up ID, continue antibiotics.   #Medical clearance: pt is a moderate risk, NSQIP below risk for any complication, follow up Cardio clearance. Pt may proceed to procedure once cleared by Cardio.

## 2020-07-15 NOTE — H&P ADULT - ASSESSMENT
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    #Non-healing left foot ulcer s/p left hallux amputation  -continue with rocephin 2g iv q24h and doxycycline 100mg po q12h  -patient has PICC line  -wound care per podiatry  -per ED, podiatry is planning to take patient to OR tomorrow  -holding eliquis; f/u with podiatry when ok to restart  -npo for procedure  -continue with asa/plavix    #Chronic HFrEF (now improved) s/p BiVICD  -euvolemic  -strict i/o, fluid restriction, daily wt  -monitor for signs of volume overload  -continue with lasix 20mg po q12h     #CAD s/p PCI  #Chronic afib with recent CVA  -holding eliquis for now due to procedure with podiatry tomorrow  -continue toprol xl 100mg qd  -continue statin    #HTN  -continue with Lisinopril 40mg, toprol Xl 100mg, hydralazine 100mg TID     #DM  -lantus 30units qhs  -lispro 10units qac  -f/u fs  -avoid hypoglycemia    #DVT PPx- LVX 40sc qhs for now; f/u with podiatry when can restart eliquis  #GI PPx- Protonix 40mg po qd  #Diet- NPO  #CHG  #Activity- WBAT to heel touch with [employer] shoe  #Dispo- Home  #Code- FULL 74 Y F with pmh CAD s/p PCI, Chronic HFrEF (now improved) s/p BiVICD, afib on eliquis, HTN, CVA, DM, severe peripheral vascular disease s/p angiogram status post laser atherectomy and balloon of L-distal SFA, L-popliteal artery, L-TPT trunk, and L-peroneal artery, non healing Left foot ulcer: s/p left Hallux amputation May 2020 presents to ED after being sent in by podiatrist for non-healing left foot ulcer.    #Non-healing left foot ulcer s/p left hallux amputation  -continue with rocephin 2g iv q24h and doxycycline 100mg po q12h  -patient has PICC line  -wound care per podiatry  -per ED, podiatry is planning to take patient to OR tomorrow  -holding eliquis; f/u with podiatry when ok to restart  -npo for procedure  -continue with plavix    #Chronic HFrEF (now improved) s/p BiVICD  -euvolemic  -strict i/o, fluid restriction, daily wt  -monitor for signs of volume overload  -continue with lasix 20mg po q12h     #CAD s/p PCI  #Chronic afib with recent CVA  -holding eliquis for now due to procedure with podiatry tomorrow  -continue toprol xl 100mg qd  -continue statin    #HTN  -continue with Lisinopril 40mg, toprol Xl 100mg, hydralazine 100mg TID     #DM  -lantus 30units qhs  -lispro 10units qac  -f/u fs  -avoid hypoglycemia    #DVT PPx- LVX 40sc qhs for now; f/u with podiatry when can restart eliquis  #GI PPx- Protonix 40mg po qd  #Diet- NPO  #CHG  #Activity- WBAT to heel touch with [employer] shoe  #Dispo- Home  #Code- FULL 74 Y F with pmh CAD s/p PCI, Chronic HFrEF (now improved) s/p BiVICD, afib on eliquis, HTN, CVA, DM, severe peripheral vascular disease s/p angiogram status post laser atherectomy and balloon of L-distal SFA, L-popliteal artery, L-TPT trunk, and L-peroneal artery, non healing Left foot ulcer: s/p left Hallux amputation May 2020 presents to ED after being sent in by podiatrist for non-healing left foot ulcer.    #Non-healing left foot ulcer s/p left hallux amputation  -continue with rocephin 2g iv q24h and doxycycline 100mg iv q12h  -patient has PICC line  -wound care per podiatry  -per ED, podiatry is planning to take patient to OR tomorrow  -holding eliquis; f/u with podiatry when ok to restart  -npo for procedure  -continue with plavix    #Chronic HFrEF (now improved) s/p BiVICD  -euvolemic  -strict i/o, fluid restriction, daily wt  -monitor for signs of volume overload  -continue with lasix 20mg po q12h     #CAD s/p PCI  #Chronic afib with recent CVA  -holding eliquis for now due to procedure with podiatry tomorrow  -continue toprol xl 100mg qd  -continue statin    #HTN  -continue with Lisinopril 40mg, toprol Xl 100mg, hydralazine 100mg TID     #DM  -lantus 30units qhs  -lispro 10units qac  -f/u fs  -avoid hypoglycemia    #DVT PPx- LVX 40sc qhs for now; f/u with podiatry when can restart eliquis  #GI PPx- Protonix 40mg po qd  #Diet- NPO  #CHG  #Activity- WBAT to heel touch with [employer] shoe  #Dispo- Home  #Code- FULL

## 2020-07-15 NOTE — ED ADULT NURSE REASSESSMENT NOTE - NS ED NURSE REASSESS COMMENT FT1
Pt. received. Pt. assessed. Pt. alert and responsive to tactile and verbal stimuli, oriented to person time place and situation. Left foot dressing in place and intact. Plan of care discussed with pt. Pt. voices understanding. Vital signs stable. Safety maintained. Will continue to monitor.

## 2020-07-15 NOTE — CONSULT NOTE ADULT - CONSULT REASON
Chronic L foot ulcer s/p L hallux amputation, worsening pain, on rocephni and doxy Chronic L foot ulcer s/p L hallux amputation, worsening pain, on rocephin and doxy

## 2020-07-15 NOTE — CONSULT NOTE ADULT - SUBJECTIVE AND OBJECTIVE BOX
HPI:  74 Y F with pmh CAD s/p PCI, Chronic HFrEF (now improved) s/p BiVICD, afib on eliquis, HTN, CVA, DM, severe peripheral vascular disease s/p angiogram status post laser atherectomy and balloon of L-distal SFA, L-popliteal artery, L-TPT trunk, and L-peroneal artery, non healing Left foot ulcer: s/p left Hallux amputation May 2020 presents to ED after being sent in by podiatrist for non-healing left foot ulcer. After having left hallux amputation in May, patient was discharged with PICC line and abx and told to follow up with podiatrist. She had the stitches taken out of the wound yesterday, but was still having significant pain, 6/10, sharp, intermittent as well as swelling and redness to the wound area. Thus, she was told to go to ED in order to open the wound again by her podiatrist. She denies any fevers, chills, nausea, vomiting, chest pain, palpitations, shortness of breath, diarrhea, constipation. No purulent drainage of wound.  ED Course:  T 98F, P 81, /78, R 18, S 98% on RA at rest. Given rocephin 2g in ED, podiatry consulted. (15 Jul 2020 01:50)      PAST MEDICAL & SURGICAL HISTORY  Amputation of toe of left foot  PVD (peripheral vascular disease)  CAD (coronary artery disease)  CHF (congestive heart failure)  HTN (hypertension)  DM (diabetes mellitus)  Status post coronary artery stent placement  H/O: hysterectomy  Cardiac defibrillator in place  Artificial cardiac pacemaker  History of cholecystectomy        SOCIAL HISTORY:  []smoker  []Alcohol  []Drug    ALLERGIES:  codeine (Rash)  penicillin (Rash)      MEDICATIONS:  MEDICATIONS  (STANDING):  atorvastatin Oral Tab/Cap - Peds 40 milliGRAM(s) Oral daily  cefTRIAXone   IVPB 2000 milliGRAM(s) IV Intermittent every 24 hours  chlorhexidine 4% Liquid 1 Application(s) Topical <User Schedule>  clopidogrel Tablet 75 milliGRAM(s) Oral daily  dextrose 5%. 1000 milliLiter(s) (50 mL/Hr) IV Continuous <Continuous>  dextrose 50% Injectable 12.5 Gram(s) IV Push once  dextrose 50% Injectable 25 Gram(s) IV Push once  dextrose 50% Injectable 25 Gram(s) IV Push once  doxycycline IVPB 100 milliGRAM(s) IV Intermittent every 12 hours  enoxaparin Injectable 40 milliGRAM(s) SubCutaneous at bedtime  furosemide    Tablet 20 milliGRAM(s) Oral two times a day  hydrALAZINE  Oral Tab/Cap - Peds 100 milliGRAM(s) Oral every 8 hours  insulin glargine Injectable (LANTUS) 30 Unit(s) SubCutaneous at bedtime  insulin lispro (HumaLOG) corrective regimen sliding scale   SubCutaneous three times a day before meals  insulin lispro Injectable (HumaLOG) 10 Unit(s) SubCutaneous three times a day before meals  lactobacillus acidophilus 1 Tablet(s) Oral three times a day  lisinopril 40 milliGRAM(s) Oral daily  loratadine 10 milliGRAM(s) Oral daily  metoprolol succinate  milliGRAM(s) Oral daily  pantoprazole    Tablet 40 milliGRAM(s) Oral before breakfast  senna 2 Tablet(s) Oral at bedtime    MEDICATIONS  (PRN):  dextrose 40% Gel 15 Gram(s) Oral once PRN Blood Glucose LESS THAN 70 milliGRAM(s)/deciliter  glucagon  Injectable 1 milliGRAM(s) IntraMuscular once PRN Glucose LESS THAN 70 milligrams/deciliter      HOME MEDICATIONS:  Home Medications:  atorvastatin 40 mg oral tablet: 1 tab(s) orally once a day (15 Jul 2020 01:58)  fexofenadine 180 mg oral tablet: 1 tab(s) orally once a day (15 Jul 2020 01:58)  furosemide 20 mg oral tablet: 1 tab(s) orally 2 times a day (15 Jul 2020 01:58)  glimepiride 4 mg oral tablet: 1 tab(s) orally 2 times a day (15 Jul 2020 01:58)  hydrALAZINE 100 mg oral tablet: 1 tab(s) orally every 8 hours (15 Jul 2020 01:58)  Levemir FlexPen 100 units/mL subcutaneous solution: 30 unit(s) subcutaneous once a day (at bedtime) (15 Jul 2020 01:58)  lisinopril 40 mg oral tablet: 1 tab(s) orally once a day (15 Jul 2020 01:58)  NovoLOG FlexPen 100 units/mL injectable solution: 10 unit(s) injectable 3 times a day with meals (15 Jul 2020 01:58)  omeprazole 40 mg oral delayed release capsule: 1 cap(s) orally once a day (15 Jul 2020 01:58)  Plavix 75 mg oral tablet: 1 tab(s) orally once a day (15 Jul 2020 01:58)  senna oral tablet: 2 tab(s) orally once a day (at bedtime) (15 Jul 2020 01:58)      VITALS:   T(F): 97.6 (07-15 @ 08:08), Max: 98.2 (07-14 @ 23:22)  HR: 76 (07-15 @ 08:08) (68 - 86)  BP: 168/77 (07-15 @ 08:08) (168/77 - 180/78)  BP(mean): --  RR: 18 (07-15 @ 08:08) (16 - 18)  SpO2: 98% (07-15 @ 08:08) (96% - 98%)    I&O's Summary      REVIEW OF SYSTEMS:  CONSTITUTIONAL: No weakness, fevers or chills  EYES: No visual changes  ENT: No vertigo or throat pain   NECK: No pain or stiffness  RESPIRATORY: No cough, wheezing, hemoptysis; No shortness of breath  CARDIOVASCULAR: No chest pain or palpitations  GASTROINTESTINAL: No abdominal or epigastric pain. No nausea, vomiting, or hematemesis; No diarrhea or constipation. No melena or hematochezia.  GENITOURINARY: No dysuria, frequency or hematuria  NEUROLOGICAL: No numbness or weakness  SKIN: see HPI      PHYSICAL EXAM:  NEURO: patient is awake , alert and oriented  GEN: Not in acute distress  NECK: no JVD  LUNGS: Clear to auscultation bilaterally   CARDIOVASCULAR: S1/S2 present, RRR , no murmurs   ABD: Soft, non-tender, non-distended, +BS  EXT: weak left PT pulse , non palpable DP pulse , both legs are warm  SKIN: non healing ulcer with gangrenous soft tissue at the site of prior Left toe amputation    LABS:                        10.2   10.93 )-----------( 395      ( 15 Jul 2020 06:00 )             32.2     07-15    138  |  100  |  27<H>  ----------------------------<  99  3.9   |  25  |  0.9    Ca    9.4      15 Jul 2020 06:00  Mg     1.6     07-15    TPro  7.2  /  Alb  3.2<L>  /  TBili  <0.2  /  DBili  x   /  AST  37  /  ALT  36  /  AlkPhos  307<H>  07-15    PT/INR - ( 14 Jul 2020 20:45 )   PT: 16.40 sec;   INR: 1.43 ratio         PTT - ( 14 Jul 2020 20:45 )  PTT:42.4 sec  Sedimentation Rate, Erythrocyte: 67 mm/Hr <H> (07-15-20 @ 06:00)  Lactate, Blood: 1.4 mmol/L (07-14-20 @ 20:45)  Sedimentation Rate, Erythrocyte: 56 mm/Hr <H> (07-14-20 @ 20:45)          07-15 Chol 146 LDL 62 HDL 58 Trig 155<H>, 05-31 Chol 169 LDL 93 HDL 59 Trig 87      RADIOLOGY:  -CXR: < from: Xray Chest 1 View-PORTABLE IMMEDIATE (06.23.20 @ 22:19) >  Impression:      No radiographic evidence of acute cardiopulmonary disease.    < end of copied text >      ECG:  < from: 12 Lead ECG (07.14.20 @ 20:16) >  Diagnosis Line Atrial-sensed ventricular-paced rhythm  Abnormal ECG    < end of copied text >

## 2020-07-15 NOTE — CONSULT NOTE ADULT - ASSESSMENT
* SUMMARY:    * Patient-based characteristics (Functional capacity)  Patient is able to achieve more than 4 MET (walk 4 blocks, climb 2 flights of stairs, etc...)          Y [X] / N [x] in view of ischemic gangrenous necrosis of left foot     High-risk patient:   Recent (<30 days) or active MI          Y [] / N [X]                                    Unstable or severe angina          Y [] / N [X]                                    Decompensated heart failure, or worsening or new-onset heart failure          Y [] / N [X]                                    Severe valvular disease          Y [] / N [X]                                    Significant arrhythmia (Tachy- or Bradyarrhythmia)          Y [] / N [X]    * Surgery/Procedure-based characteristics (Type of surgery)  - Low-risk procedure (outpatient procedure, elective, endoscopy, etc...)          Y [] / N []  - Elevated or Moderate-risk procedure (Inpatient)          Y [x] / N []  - High-risk procedure (urgent/emergent procedure, Intrathoracic, vascular, etc...)          Y [] / N []    * Revised Cardiac Risk Index (RCRI)  1- History of ischemic heart disease          Y [x] / N []  2- History of congestive heart failure          Y [] / N [x]  3- History of stroke/TIA          Y [] / N [X]  4- History of insulin-dependent diabetes          Y [x] / N []  5- Chronic kidney disease (Cr >2mg/dL)          Y [] / N [X]  6- Undergoing suprainguinal vascular, intraperitoneal, or intrathoracic surgery          Y [] / N [X]    Class I risk (Zero factors) --> 3.9% (30-day risk of death, MI, or cardiac arrest)  Class II risk (One factor) --> 6% risk  Class III risk (Two factors) --> 10.1% risk  Class IV risk (Three factors or more) --> >15% risk    * IMPRESSION & RECOMMENDATIONS:  moderate risk patient RCRI 6.6%  for a moderate risk surgery   No further cardiac work-up is needed at the moment. There are no current cardiac contraindications to prevent from proceeding with the scheduled surgery/procedure.    - This consult serves only as a tai-operative cardiac risk stratification and evaluation to predict 30-days cardiac complications risk and mortality. The decision to proceed with the surgery/procedure is made by the performing physician and the patient
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    PVD with CLI s/p laser atherectomy with Drug coated Balloon to left SFA , PT ,TPT and Peroneal artery           exam: warm leg with weak PT pulse , non healed ulcer with gangrenous soft tissue at site of previous toe amputation    plan:  check Arterial Duplex for Left lower extremity   Podiatry and ID eval  continue with Plavix , statin  will follow after duplex result
ASSESSMENT  74 Y F with pmh CAD s/p PCI, Chronic HFrEF (now improved) s/p BiVICD, afib on eliquis, HTN, CVA, DM, severe peripheral vascular disease s/p angiogram status post laser atherectomy and balloon of L-distal SFA, L-popliteal artery, L-TPT trunk, and L-peroneal artery, non healing Left foot ulcer: s/p left Hallux amputation May 2020 sent to hospital by podiatrist for non healing wound and further debridement. Labs show improving leukocytosis but pt afebrile. On inspection, there is scattered necrosis but no signs of purulence or superimposed cellulitis.    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 Wound is poorly perfused but distal pulses weakly palpated and foot is warm. In light of extensive vasculopathic hx, monotherapy with abx unlikely to result in resolution of infection. Per podiatry note from today, pt is planned to undergo further surgical debridement.  # No signs of superimposed cellulitis. No overlying erythema or discharge. Margins from surgical site are well demarcated.   # PICC line shows no signs of phlebitis or infection    RECOMMENDATIONS  - f/u pending cultures  - obtain wound culture in OR and treat based on results of growth and sensitivities.  - c/w Ceftriaxone 2g IV q24  - no need for doxycycline

## 2020-07-15 NOTE — H&P ADULT - NSHPLABSRESULTS_GEN_ALL_CORE
07-14    131<L>  |  96<L>  |  29<H>  ----------------------------<  154<H>  4.6   |  22  |  1.0    Ca    9.3      14 Jul 2020 20:45    TPro  7.4  /  Alb  3.5  /  TBili  <0.2  /  DBili  x   /  AST  46<H>  /  ALT  35  /  AlkPhos  330<H>  07-14    CBC Full  -  ( 14 Jul 2020 20:45 )  WBC Count : 12.54 K/uL  RBC Count : 4.15 M/uL  Hemoglobin : 11.5 g/dL  Hematocrit : 35.2 %  Platelet Count - Automated : 445 K/uL  Mean Cell Volume : 84.8 fL  Mean Cell Hemoglobin : 27.7 pg  Mean Cell Hemoglobin Concentration : 32.7 g/dL  Auto Neutrophil # : 8.88 K/uL  Auto Lymphocyte # : 2.42 K/uL  Auto Monocyte # : 0.82 K/uL  Auto Eosinophil # : 0.29 K/uL  Auto Basophil # : 0.05 K/uL  Auto Neutrophil % : 70.9 %  Auto Lymphocyte % : 19.3 %  Auto Monocyte % : 6.5 %  Auto Eosinophil % : 2.3 %  Auto Basophil % : 0.4 %    Sedimentation Rate, Erythrocyte: 56 mm/Hr (07.14.20 @ 20:45)    PT/INR - ( 14 Jul 2020 20:45 )   PT: 16.40 sec;   INR: 1.43 ratio         PTT - ( 14 Jul 2020 20:45 )  PTT:42.4 sec    Lactate, Blood: 1.4 mmol/L (07.14.20 @ 20:45)    < from: 12 Lead ECG (07.14.20 @ 20:16) >    Diagnosis Line Atrial-sensed ventricular-paced rhythm  Abnormal ECG    < end of copied text >

## 2020-07-15 NOTE — CONSULT NOTE ADULT - SUBJECTIVE AND OBJECTIVE BOX
HPI:  74 Y F with pmh CAD s/p PCI, Chronic HFrEF (now improved) s/p BiVICD, afib on eliquis, HTN, DM, severe peripheral vascular disease s/p angiogram status post laser atherectomy and balloon of L-distal SFA, L-popliteal artery, L-TPT trunk, and L-peroneal artery, non healing Left foot ulcer: s/p left Hallux amputation May 2020 presents to ED after being sent in by podiatrist for non-healing left foot ulcer. After having left hallux amputation in May, patient was discharged with PICC line and abx and told to follow up with podiatrist. She had the stitches taken out of the wound yesterday, but was still having significant pain, 6/10, sharp, intermittent as well as swelling and redness to the wound area. Thus, she was told to go to ED in order to open the wound again by her podiatrist. She denies any fevers, chills, nausea, vomiting, chest pain, palpitations, shortness of breath, diarrhea, constipation. No purulent drainage of wound.    ED Course:  T 98F, P 81, /78, R 18, S 98% on RA at rest. Given rocephin 2g in ED, podiatry consulted. (15 Jul 2020 01:50)      PAST MEDICAL & SURGICAL HISTORY  Amputation of toe of left foot  PVD (peripheral vascular disease)  CAD (coronary artery disease)  CHF (congestive heart failure)  HTN (hypertension)  DM (diabetes mellitus)  Status post coronary artery stent placement  H/O: hysterectomy  Cardiac defibrillator in place  Artificial cardiac pacemaker  History of cholecystectomy      FAMILY HISTORY:  FAMILY HISTORY:      SOCIAL HISTORY:  []smoker remote hx of rare smoking  []Alcohol none  []Drug none    ALLERGIES:  codeine (Rash)  penicillin (Rash)      MEDICATIONS:  MEDICATIONS  (STANDING):  atorvastatin Oral Tab/Cap - Peds 40 milliGRAM(s) Oral daily  cefTRIAXone   IVPB 2000 milliGRAM(s) IV Intermittent every 24 hours  chlorhexidine 4% Liquid 1 Application(s) Topical <User Schedule>  clopidogrel Tablet 75 milliGRAM(s) Oral daily  dextrose 5%. 1000 milliLiter(s) (50 mL/Hr) IV Continuous <Continuous>  dextrose 50% Injectable 12.5 Gram(s) IV Push once  dextrose 50% Injectable 25 Gram(s) IV Push once  dextrose 50% Injectable 25 Gram(s) IV Push once  doxycycline IVPB 100 milliGRAM(s) IV Intermittent every 12 hours  enoxaparin Injectable 40 milliGRAM(s) SubCutaneous at bedtime  furosemide    Tablet 20 milliGRAM(s) Oral two times a day  hydrALAZINE  Oral Tab/Cap - Peds 100 milliGRAM(s) Oral every 8 hours  insulin glargine Injectable (LANTUS) 30 Unit(s) SubCutaneous at bedtime  insulin lispro (HumaLOG) corrective regimen sliding scale   SubCutaneous three times a day before meals  insulin lispro Injectable (HumaLOG) 10 Unit(s) SubCutaneous three times a day before meals  lactobacillus acidophilus 1 Tablet(s) Oral three times a day  lisinopril 40 milliGRAM(s) Oral daily  loratadine 10 milliGRAM(s) Oral daily  metoprolol succinate  milliGRAM(s) Oral daily  pantoprazole    Tablet 40 milliGRAM(s) Oral before breakfast  senna 2 Tablet(s) Oral at bedtime    MEDICATIONS  (PRN):  dextrose 40% Gel 15 Gram(s) Oral once PRN Blood Glucose LESS THAN 70 milliGRAM(s)/deciliter  glucagon  Injectable 1 milliGRAM(s) IntraMuscular once PRN Glucose LESS THAN 70 milligrams/deciliter      HOME MEDICATIONS:  Home Medications:  atorvastatin 40 mg oral tablet: 1 tab(s) orally once a day (15 Jul 2020 01:58)  fexofenadine 180 mg oral tablet: 1 tab(s) orally once a day (15 Jul 2020 01:58)  furosemide 20 mg oral tablet: 1 tab(s) orally 2 times a day (15 Jul 2020 01:58)  glimepiride 4 mg oral tablet: 1 tab(s) orally 2 times a day (15 Jul 2020 01:58)  hydrALAZINE 100 mg oral tablet: 1 tab(s) orally every 8 hours (15 Jul 2020 01:58)  Levemir FlexPen 100 units/mL subcutaneous solution: 30 unit(s) subcutaneous once a day (at bedtime) (15 Jul 2020 01:58)  lisinopril 40 mg oral tablet: 1 tab(s) orally once a day (15 Jul 2020 01:58)  NovoLOG FlexPen 100 units/mL injectable solution: 10 unit(s) injectable 3 times a day with meals (15 Jul 2020 01:58)  omeprazole 40 mg oral delayed release capsule: 1 cap(s) orally once a day (15 Jul 2020 01:58)  Plavix 75 mg oral tablet: 1 tab(s) orally once a day (15 Jul 2020 01:58)  senna oral tablet: 2 tab(s) orally once a day (at bedtime) (15 Jul 2020 01:58)      VITALS:   T(F): 96.6 (07-15 @ 11:36), Max: 98.2 (07-14 @ 23:22)  HR: 71 (07-15 @ 11:36) (68 - 86)  BP: 139/63 (07-15 @ 11:36) (139/63 - 180/78)  BP(mean): --  RR: 18 (07-15 @ 11:36) (16 - 18)  SpO2: 99% (07-15 @ 11:36) (96% - 99%)    I&O's Summary      REVIEW OF SYSTEMS:  CONSTITUTIONAL: No weakness, fevers or chills  EYES: No visual changes  ENT: No vertigo or throat pain   NECK: No pain or stiffness  RESPIRATORY: No cough, wheezing, hemoptysis; No shortness of breath  CARDIOVASCULAR: No chest pain or palpitations  GASTROINTESTINAL: No abdominal or epigastric pain. No nausea, vomiting, or hematemesis; No diarrhea or constipation. No melena or hematochezia.  GENITOURINARY: No dysuria, frequency or hematuria  NEUROLOGICAL: No numbness or weakness  SKIN: No itching, no rashes  MSK: left lower extremity shooting, sharp pain     PHYSICAL EXAM:  NEURO: patient is awake , alert and oriented  GEN: Not in acute distress  NECK: no thyroid enlargement, no JVD  LUNGS: Clear to auscultation bilaterally   CARDIOVASCULAR: S1/S2 present, RRR, no murmurs   ABD: Soft, non-tender, non-distended, +BS  EXT: No SUSAN. weak pedal pulses bilaterally , +2 radial pulses   SKIN: left hallux amputation , no signs of erythema, swelling, no discharge     LABS:                        10.2   10.93 )-----------( 395      ( 15 Jul 2020 06:00 )             32.2     07-15    138  |  100  |  27<H>  ----------------------------<  99  3.9   |  25  |  0.9    Ca    9.4      15 Jul 2020 06:00  Mg     1.6     07-15    TPro  7.2  /  Alb  3.2<L>  /  TBili  <0.2  /  DBili  x   /  AST  37  /  ALT  36  /  AlkPhos  307<H>  07-15    PT/INR - ( 14 Jul 2020 20:45 )   PT: 16.40 sec;   INR: 1.43 ratio         PTT - ( 14 Jul 2020 20:45 )  PTT:42.4 sec  Sedimentation Rate, Erythrocyte: 67 mm/Hr <H> (07-15-20 @ 06:00)  Lactate, Blood: 1.4 mmol/L (07-14-20 @ 20:45)  Sedimentation Rate, Erythrocyte: 56 mm/Hr <H> (07-14-20 @ 20:45)          Troponin trend:      07-15 Chol 146 LDL 62 HDL 58 Trig 155<H>, 05-31 Chol 169 LDL 93 HDL 59 Trig 87      RADIOLOGY:  -CXR: clear  -TTE: done in june with normal EF + moderate MR + diastolic dysfunction grade 1   -CCTA: none  -STRESS TEST: none  -CATHETERIZATION: CLI s/p laser atherectomy with Drug coated Balloon to left SFA , PT ,TPT and Peroneal artery    ECG: atrial sensed ventricular paced rhythm     TELEMETRY EVENTS: patient is OFf TELEMETRY

## 2020-07-15 NOTE — H&P ADULT - ATTENDING COMMENTS
75 YO F with a PMH of CAD s/p PCI, HFrEF s/p BiVICD, Afib (eliquis), HTN, CVA, DM, severe peripheral vascular disease s/p angiogram and intervention, and left foot hallux amputation 73 YO F with a PMH of CAD s/p PCI, HFrEF s/p BiVICD, Afib (eliquis), HTN, CVA, DM, severe peripheral vascular disease s/p angiogram and intervention, and left foot hallux amputation who was sent into the hospital by her podiatrist for eval of left foot non-healing ulcer over left hallux amputation site and possible OR in the AM. Associated with an area of redness, pain and swelling. Denies any fevers/chills, drainage, or foul smelling discharge from site. In the ED, IV ABXs (Ceftriaxone).     Physical exam shows pt in NAD. VSS, afebrile, not hypoxic on RA. A&Ox3. Non-focal neuro exam. Muscle strength/sensation intact. CTA B/L with no W/C/R. RRR, no M/G/R. ABD is soft and non-tender, normoactive BSs. Recent left halux amputation with small area of fluctuance on the plantar aspect, TTP, and warmth; no discharge or pus, no crepitus; difficult to palpate pulses, sensation is intact. PICC line in RUE with clean insertion site and no TTP. Labs and radiology as above.     Left foot non-healing wound over recent amputation site, likely infected vs abscess; no sepsis on admission. Send ESR/CRP. Obtain A1c and lipids. IV ABXs (Cefepime/Doxy). FU blood/wound cultures. FU X-ray results. ID/Pod consult. PRN pain meds. Hold pts apixaban, if debridement is the plan, recommend to wait and start pt on heparin drip.     Diabetes mellitus with hyperglycemia. A1c. FSs. Insulin PRN.     HX of CAD s/p PCI, HFrEF s/p BiVICD, Afib (eliquis), HTN, CVA, and severe PVD. Restart home meds. DVT PPX. Inform PCP of pt's admission to hospital. My note supersedes the residents note.

## 2020-07-15 NOTE — CONSULT NOTE ADULT - SUBJECTIVE AND OBJECTIVE BOX
DARRION MELENDEZ  74y, Female  Allergy: codeine (Rash)  penicillin (Rash)      CHIEF COMPLAINT: foot wound (15 Jul 2020 07:07)      HPI:  74yFemale with a past medical history of ***(fill in)    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  former seamstress  remote history of rare smoking  no alcohol or drugs (15 Jul 2020 01:50)      Recent Travel:  Other Exposures:     ROS  General: Denies rigors, nightsweats  HEENT: Denies headache, rhinorrhea, sore throat, eye pain  CV: Denies CP, palpitations  PULM: Denies wheezing, hemoptysis  GI: Denies hematemesis, hematochezia, melena  : Denies discharge, hematuria  MSK: Denies arthralgias, myalgias  SKIN: Denies rash, lesions  NEURO: Denies paresthesias, weakness  PSYCH: Denies depression, anxiety    VITALS:  T(F): 97.6, Max: 98.2 (07-14-20 @ 23:22)  HR: 76  BP: 168/77  RR: 18Vital Signs Last 24 Hrs  T(C): 36.4 (15 Jul 2020 08:08), Max: 36.8 (14 Jul 2020 23:22)  T(F): 97.6 (15 Jul 2020 08:08), Max: 98.2 (14 Jul 2020 23:22)  HR: 76 (15 Jul 2020 08:08) (68 - 86)  BP: 168/77 (15 Jul 2020 08:08) (168/77 - 180/78)  BP(mean): --  RR: 18 (15 Jul 2020 08:08) (16 - 18)  SpO2: 98% (15 Jul 2020 08:08) (96% - 98%)    PHYSICAL EXAM:  Gen: NAD, resting in bed  HEENT: Normocephalic, atraumatic  Neck: supple, no lymphadenopathy  CV: Regular rate & regular rhythm  Lungs: CTAB  Abdomen: Soft, BS present  Ext: Lt foot shows fresh wound from L hallux amputation. Scattered areas of necrosis. Wound has well defined margins without any surrounding erythema, no obvious signs of pus or purulent discharge. Foot is warm. faint posterior tibialis pulse on affected foot. Dorsalis pedis pulse not felt. Rt foot warm with distal pulse in tact.   Neuro: non focal, awake  Skin: Wound on left hallux as described above. Otherwise, no rashes or lesions.  Lines: no phlebitis. Pt has PICC line on medial aspect of rt bicep, no overlying erythema or swelling.     TESTS & MEASUREMENTS:                        10.2   10.93 )-----------( 395      ( 15 Jul 2020 06:00 )             32.2     07-15    138  |  100  |  27<H>  ----------------------------<  99  3.9   |  25  |  0.9    Ca    9.4      15 Jul 2020 06:00  Mg     1.6     07-15    TPro  7.2  /  Alb  3.2<L>  /  TBili  <0.2  /  DBili  x   /  AST  37  /  ALT  36  /  AlkPhos  307<H>  07-15    eGFR if Non African American: 64 mL/min/1.73M2 (07-15-20 @ 06:00)  eGFR if : 74 mL/min/1.73M2 (07-15-20 @ 06:00)  eGFR if : 65 mL/min/1.73M2 (07-14-20 @ 20:45)  eGFR if Non African American: 56 mL/min/1.73M2 (07-14-20 @ 20:45)    LIVER FUNCTIONS - ( 15 Jul 2020 06:00 )  Alb: 3.2 g/dL / Pro: 7.2 g/dL / ALK PHOS: 307 U/L / ALT: 36 U/L / AST: 37 U/L / GGT: x                 Lactate, Blood: 1.4 mmol/L (07-14-20 @ 20:45)      INFECTIOUS DISEASES TESTING      RADIOLOGY & ADDITIONAL TESTS:  < from: Xray Foot AP + Lateral + Oblique, Left (07.14.20 @ 19:41) >  Post transmetatarsal indication changes of the first digit. Residual soft tissue swelling.    Abnormality of the base of the fifth proximal phalanx with sclerosis.    Midfoot degenerative change.    Plantar arch is intact.    IMPRESSION:    Postoperative change of the first digit. Without difference from the prior study.      < from: Xray Foot AP + Lateral + Oblique, Left (06.23.20 @ 22:44) >  Findings: Patient status post mid hallux transmetatarsal and toe amputation with soft tissue defect. There is persistent periostitis no interval healing at this time. Remaining bony structures demonstrate midfoot and forefoot degenerative change unchanged since prior. Insertional Achilles enthesopathy and plantar heel spurs stable. No ankle effusion. No proximal subcutaneous emphysema.    Impression: Status post hallux mid metatarsal amputation with persistent soft tissue defect, unchanged since 6/11/2020    < from: CT Foot No Cont, Left (06.26.20 @ 13:35) >  Post transmetatarsal amputation of the first ray, with a confluent soft tissue edema/collection measuring 5.2 cm collectively with intermixed hyperdensities consistent with hematomas measuring 1.8 cm and1.3 cm just adjacent to the amputation stump.    No CXR or Chest CT has been performed on this admission.    CARDIOLOGY TESTING  12 Lead ECG:   Ventricular Rate 77 BPM    Atrial Rate 77 BPM    P-R Interval 116 ms    QRS Duration 148 ms    Q-T Interval 456 ms    QTC Calculation(Bezet) 516 ms    P Axis 76 degrees    R Axis 254 degrees    T Axis 55 degrees    Diagnosis Line Atrial-sensed ventricular-paced rhythm  Abnormal ECG    Confirmed by Addison Rosario ([id number]) on 7/14/2020 9:30:52 PM (07-14-20 @ 20:16)      All available historical records have been reviewed    MEDICATIONS  atorvastatin Oral Tab/Cap - Peds 40  cefTRIAXone   IVPB 2000  chlorhexidine 4% Liquid 1  clopidogrel Tablet 75  dextrose 5%. 1000  dextrose 50% Injectable 12.5  dextrose 50% Injectable 25  dextrose 50% Injectable 25  doxycycline IVPB 100  enoxaparin Injectable 40  furosemide    Tablet 20  hydrALAZINE  Oral Tab/Cap - Peds 100  insulin glargine Injectable (LANTUS) 30  insulin lispro (HumaLOG) corrective regimen sliding scale   insulin lispro Injectable (HumaLOG) 10  lactobacillus acidophilus 1  lisinopril 40  loratadine 10  metoprolol succinate   pantoprazole    Tablet 40  senna 2      ANTIBIOTICS:  cefTRIAXone   IVPB 2000 milliGRAM(s) IV Intermittent every 24 hours  doxycycline IVPB 100 milliGRAM(s) IV Intermittent every 12 hours      All available historical data has been reviewed    ASSESSMENT  74yFemale with a PMH of....... (fill in)    IMPRESSION  # (?) Sepsis on admission (2 or more of the following T<96.8F, T>101F, Pulse>90, Resp Rate>20, WBC>12, wbc<4, Bands>10%), PLUS (+) lactic acidosis, OR metabolic encephalopathy, OR BONNY, OR bacteremia . Otherwise just SIRS on admission, sepsis ruled out-->Only WBC>12 present on admission (currently 10.93)  	  # Soft tissue infection that is limb threatening due to location. Wound is poorly perfused. Per podiatry note from today, pt is planned to undergo further surgical debridement    # No signs of superimposed cellulitis. No overlying erythema or discharge. Margins from surgical site are well demarcated.     RECOMMENDATIONS  - f/u pending cultures  - ***    This is a pended note. All final recommendations to follow pending discussion with ID Attending DARRION MELENDEZ  74y, Female  Allergy: codeine (Rash)  penicillin (Rash)      CHIEF COMPLAINT: foot wound (15 Jul 2020 07:07)      HPI:  74 Y F with pmh CAD s/p PCI, Chronic HFrEF (now improved) s/p BiVICD, afib on eliquis, HTN, CVA, DM, severe peripheral vascular disease s/p angiogram status post laser atherectomy and balloon of L-distal SFA, L-popliteal artery, L-TPT trunk, and L-peroneal artery, non healing Left foot ulcer: s/p left Hallux amputation May 2020 presents to ED after being sent in by podiatrist for non-healing left foot ulcer. After having left hallux amputation in May, patient was discharged with PICC line and abx and told to follow up with podiatrist. She had the stitches taken out of the wound yesterday, but was still having significant pain, 6/10, sharp, intermittent as well as swelling and redness to the wound area. Thus, she was told to go to ED in order to open the wound again by her podiatrist. She denies any fevers, chills, nausea, vomiting, chest pain, palpitations, shortness of breath, diarrhea, constipation. No purulent drainage of wound.    ED Course:  T 98F, P 81, /78, R 18, S 98% on RA at rest. Given rocephin 2g in ED, podiatry consulted.	    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  former seamstress  remote history of rare smoking  no alcohol or drugs (15 Jul 2020 01:50)      Recent Travel:  Other Exposures:     ROS  General: Denies rigors, nightsweats  HEENT: Denies headache, rhinorrhea, sore throat, eye pain  CV: Denies CP, palpitations  PULM: Denies wheezing, hemoptysis  GI: Denies hematemesis, hematochezia, melena  : Denies discharge, hematuria  MSK: Denies arthralgias, myalgias  SKIN: Denies rash, lesions  NEURO: Denies paresthesias, weakness  PSYCH: Denies depression, anxiety    VITALS:  T(F): 97.6, Max: 98.2 (07-14-20 @ 23:22)  HR: 76  BP: 168/77  RR: 18Vital Signs Last 24 Hrs  T(C): 36.4 (15 Jul 2020 08:08), Max: 36.8 (14 Jul 2020 23:22)  T(F): 97.6 (15 Jul 2020 08:08), Max: 98.2 (14 Jul 2020 23:22)  HR: 76 (15 Jul 2020 08:08) (68 - 86)  BP: 168/77 (15 Jul 2020 08:08) (168/77 - 180/78)  BP(mean): --  RR: 18 (15 Jul 2020 08:08) (16 - 18)  SpO2: 98% (15 Jul 2020 08:08) (96% - 98%)    PHYSICAL EXAM:  Gen: NAD, resting in bed  HEENT: Normocephalic, atraumatic  Neck: supple, no lymphadenopathy  CV: Regular rate & regular rhythm  Lungs: CTAB  Abdomen: Soft, BS present  Ext: Lt foot shows fresh wound from L hallux amputation. Scattered areas of necrosis. Wound has well defined margins without any surrounding erythema, no obvious signs of pus or purulent discharge. Foot is warm. faint posterior tibialis pulse on affected foot. Dorsalis pedis pulse not felt. Rt foot warm with distal pulse in tact.   Neuro: non focal, awake  Skin: Wound on left hallux as described above. Otherwise, no rashes or lesions.  Lines: no phlebitis. Pt has PICC line on medial aspect of rt bicep, no overlying erythema or swelling.     TESTS & MEASUREMENTS:                        10.2   10.93 )-----------( 395      ( 15 Jul 2020 06:00 )             32.2     07-15    138  |  100  |  27<H>  ----------------------------<  99  3.9   |  25  |  0.9    Ca    9.4      15 Jul 2020 06:00  Mg     1.6     07-15    TPro  7.2  /  Alb  3.2<L>  /  TBili  <0.2  /  DBili  x   /  AST  37  /  ALT  36  /  AlkPhos  307<H>  07-15    eGFR if Non African American: 64 mL/min/1.73M2 (07-15-20 @ 06:00)  eGFR if : 74 mL/min/1.73M2 (07-15-20 @ 06:00)  eGFR if : 65 mL/min/1.73M2 (07-14-20 @ 20:45)  eGFR if Non African American: 56 mL/min/1.73M2 (07-14-20 @ 20:45)    LIVER FUNCTIONS - ( 15 Jul 2020 06:00 )  Alb: 3.2 g/dL / Pro: 7.2 g/dL / ALK PHOS: 307 U/L / ALT: 36 U/L / AST: 37 U/L / GGT: x                 Lactate, Blood: 1.4 mmol/L (07-14-20 @ 20:45)      INFECTIOUS DISEASES TESTING      RADIOLOGY & ADDITIONAL TESTS:  < from: Xray Foot AP + Lateral + Oblique, Left (07.14.20 @ 19:41) >  Post transmetatarsal indication changes of the first digit. Residual soft tissue swelling.    Abnormality of the base of the fifth proximal phalanx with sclerosis.    Midfoot degenerative change.    Plantar arch is intact.    IMPRESSION:    Postoperative change of the first digit. Without difference from the prior study.      < from: Xray Foot AP + Lateral + Oblique, Left (06.23.20 @ 22:44) >  Findings: Patient status post mid hallux transmetatarsal and toe amputation with soft tissue defect. There is persistent periostitis no interval healing at this time. Remaining bony structures demonstrate midfoot and forefoot degenerative change unchanged since prior. Insertional Achilles enthesopathy and plantar heel spurs stable. No ankle effusion. No proximal subcutaneous emphysema.    Impression: Status post hallux mid metatarsal amputation with persistent soft tissue defect, unchanged since 6/11/2020    < from: CT Foot No Cont, Left (06.26.20 @ 13:35) >  Post transmetatarsal amputation of the first ray, with a confluent soft tissue edema/collection measuring 5.2 cm collectively with intermixed hyperdensities consistent with hematomas measuring 1.8 cm and1.3 cm just adjacent to the amputation stump.    No CXR or Chest CT has been performed on this admission.    CARDIOLOGY TESTING  12 Lead ECG:   Ventricular Rate 77 BPM    Atrial Rate 77 BPM    P-R Interval 116 ms    QRS Duration 148 ms    Q-T Interval 456 ms    QTC Calculation(Bezet) 516 ms    P Axis 76 degrees    R Axis 254 degrees    T Axis 55 degrees    Diagnosis Line Atrial-sensed ventricular-paced rhythm  Abnormal ECG    Confirmed by Addison Rosario ([id number]) on 7/14/2020 9:30:52 PM (07-14-20 @ 20:16)      All available historical records have been reviewed    MEDICATIONS  atorvastatin Oral Tab/Cap - Peds 40  cefTRIAXone   IVPB 2000  chlorhexidine 4% Liquid 1  clopidogrel Tablet 75  dextrose 5%. 1000  dextrose 50% Injectable 12.5  dextrose 50% Injectable 25  dextrose 50% Injectable 25  doxycycline IVPB 100  enoxaparin Injectable 40  furosemide    Tablet 20  hydrALAZINE  Oral Tab/Cap - Peds 100  insulin glargine Injectable (LANTUS) 30  insulin lispro (HumaLOG) corrective regimen sliding scale   insulin lispro Injectable (HumaLOG) 10  lactobacillus acidophilus 1  lisinopril 40  loratadine 10  metoprolol succinate   pantoprazole    Tablet 40  senna 2      ANTIBIOTICS:  cefTRIAXone   IVPB 2000 milliGRAM(s) IV Intermittent every 24 hours  doxycycline IVPB 100 milliGRAM(s) IV Intermittent every 12 hours      All available historical data has been reviewed    ASSESSMENT  74 Y F with pmh CAD s/p PCI, Chronic HFrEF (now improved) s/p BiVICD, afib on eliquis, HTN, CVA, DM, severe peripheral vascular disease s/p angiogram status post laser atherectomy and balloon of L-distal SFA, L-popliteal artery, L-TPT trunk, and L-peroneal artery, non healing Left foot ulcer: s/p left Hallux amputation May 2020 sent to hospital by podiatrist for non healing wound and further debridement. Labs show improving leukocytosis but pt afebrile. On inspection, there is scattered necrosis but no signs of purulence or superimposed cellulitis.    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 Wound is poorly perfused but distal pulses weakly palpated and foot is warm. In light of extensive vasculopathic hx, monotherapy with abx unlikely to result in resolution of infection. Per podiatry note from today, pt is planned to undergo further surgical debridement.  # No signs of superimposed cellulitis. No overlying erythema or discharge. Margins from surgical site are well demarcated.   # PICC line shows no signs of phlebitis or infection    RECOMMENDATIONS  - f/u pending cultures  - obtain wound culture in OR and treat based on results of growth and sensitivities.  - c/w Ceftriaxone 2g IV q24  - no need for doxycycline       This is a pended note. All final recommendations to follow pending discussion with ID Attending DARRION MELENDEZ  74y, Female  Allergy: codeine (Rash)  penicillin (Rash)      CHIEF COMPLAINT: foot wound (15 Jul 2020 07:07)      HPI:  74 Y F with pmh CAD s/p PCI, Chronic HFrEF (now improved) s/p BiVICD, afib on eliquis, HTN, CVA, DM, severe peripheral vascular disease s/p angiogram status post laser atherectomy and balloon of L-distal SFA, L-popliteal artery, L-TPT trunk, and L-peroneal artery, non healing Left foot ulcer: s/p left Hallux amputation May 2020 presents to ED after being sent in by podiatrist for non-healing left foot ulcer. After having left hallux amputation in May, patient was discharged with PICC line and abx and told to follow up with podiatrist. She had the stitches taken out of the wound yesterday, but was still having significant pain, 6/10, sharp, intermittent as well as swelling and redness to the wound area. Thus, she was told to go to ED in order to open the wound again by her podiatrist. She denies any fevers, chills, nausea, vomiting, chest pain, palpitations, shortness of breath, diarrhea, constipation. No purulent drainage of wound.    ED Course:  T 98F, P 81, /78, R 18, S 98% on RA at rest. Given rocephin 2g in ED, podiatry consulted.	    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  no alcohol or drugs (15 Jul 2020 01:50)      Recent Travel:  Other Exposures:     ROS  General: Denies rigors, nightsweats  HEENT: Denies headache, rhinorrhea, sore throat, eye pain  CV: Denies CP, palpitations  PULM: Denies wheezing, hemoptysis  GI: Denies hematemesis, hematochezia, melena  : Denies discharge, hematuria  MSK: Denies arthralgias, myalgias  SKIN: Denies rash, lesions  NEURO: Denies paresthesias, weakness  PSYCH: Denies depression, anxiety    VITALS:  T(F): 97.6, Max: 98.2 (07-14-20 @ 23:22)  HR: 76  BP: 168/77  RR: 18Vital Signs Last 24 Hrs  T(C): 36.4 (15 Jul 2020 08:08), Max: 36.8 (14 Jul 2020 23:22)  T(F): 97.6 (15 Jul 2020 08:08), Max: 98.2 (14 Jul 2020 23:22)  HR: 76 (15 Jul 2020 08:08) (68 - 86)  BP: 168/77 (15 Jul 2020 08:08) (168/77 - 180/78)  BP(mean): --  RR: 18 (15 Jul 2020 08:08) (16 - 18)  SpO2: 98% (15 Jul 2020 08:08) (96% - 98%)    PHYSICAL EXAM:  Gen: NAD, resting in bed  HEENT: Normocephalic, atraumatic  Neck: supple, no lymphadenopathy  CV: Regular rate & regular rhythm  Lungs: CTAB  Abdomen: Soft, BS present  Ext: Lt foot shows fresh wound from L hallux amputation. Scattered areas of necrosis. Wound has well defined margins without any surrounding erythema, no obvious signs of pus or purulent discharge. Foot is warm. faint posterior tibialis pulse on affected foot. Dorsalis pedis pulse not felt. Rt foot warm with distal pulse in tact.   Neuro: non focal, awake  Skin: Wound on left hallux as described above. Otherwise, no rashes or lesions.  Lines: no phlebitis. Pt has PICC line on medial aspect of rt bicep, no overlying erythema or swelling.     TESTS & MEASUREMENTS:                        10.2   10.93 )-----------( 395      ( 15 Jul 2020 06:00 )             32.2     07-15    138  |  100  |  27<H>  ----------------------------<  99  3.9   |  25  |  0.9    Ca    9.4      15 Jul 2020 06:00  Mg     1.6     07-15    TPro  7.2  /  Alb  3.2<L>  /  TBili  <0.2  /  DBili  x   /  AST  37  /  ALT  36  /  AlkPhos  307<H>  07-15    eGFR if Non African American: 64 mL/min/1.73M2 (07-15-20 @ 06:00)  eGFR if : 74 mL/min/1.73M2 (07-15-20 @ 06:00)  eGFR if : 65 mL/min/1.73M2 (07-14-20 @ 20:45)  eGFR if Non African American: 56 mL/min/1.73M2 (07-14-20 @ 20:45)    LIVER FUNCTIONS - ( 15 Jul 2020 06:00 )  Alb: 3.2 g/dL / Pro: 7.2 g/dL / ALK PHOS: 307 U/L / ALT: 36 U/L / AST: 37 U/L / GGT: x                 Lactate, Blood: 1.4 mmol/L (07-14-20 @ 20:45)      INFECTIOUS DISEASES TESTING      RADIOLOGY & ADDITIONAL TESTS:  < from: Xray Foot AP + Lateral + Oblique, Left (07.14.20 @ 19:41) >  Post transmetatarsal indication changes of the first digit. Residual soft tissue swelling.    Abnormality of the base of the fifth proximal phalanx with sclerosis.    Midfoot degenerative change.    Plantar arch is intact.    IMPRESSION:    Postoperative change of the first digit. Without difference from the prior study.      < from: Xray Foot AP + Lateral + Oblique, Left (06.23.20 @ 22:44) >  Findings: Patient status post mid hallux transmetatarsal and toe amputation with soft tissue defect. There is persistent periostitis no interval healing at this time. Remaining bony structures demonstrate midfoot and forefoot degenerative change unchanged since prior. Insertional Achilles enthesopathy and plantar heel spurs stable. No ankle effusion. No proximal subcutaneous emphysema.    Impression: Status post hallux mid metatarsal amputation with persistent soft tissue defect, unchanged since 6/11/2020    < from: CT Foot No Cont, Left (06.26.20 @ 13:35) >  Post transmetatarsal amputation of the first ray, with a confluent soft tissue edema/collection measuring 5.2 cm collectively with intermixed hyperdensities consistent with hematomas measuring 1.8 cm and1.3 cm just adjacent to the amputation stump.    No CXR or Chest CT has been performed on this admission.    CARDIOLOGY TESTING  12 Lead ECG:   Ventricular Rate 77 BPM    Atrial Rate 77 BPM    P-R Interval 116 ms    QRS Duration 148 ms    Q-T Interval 456 ms    QTC Calculation(Bezet) 516 ms    P Axis 76 degrees    R Axis 254 degrees    T Axis 55 degrees    Diagnosis Line Atrial-sensed ventricular-paced rhythm  Abnormal ECG    Confirmed by Addison Rosario ([id number]) on 7/14/2020 9:30:52 PM (07-14-20 @ 20:16)      All available historical records have been reviewed    MEDICATIONS  atorvastatin Oral Tab/Cap - Peds 40  cefTRIAXone   IVPB 2000  chlorhexidine 4% Liquid 1  clopidogrel Tablet 75  dextrose 5%. 1000  dextrose 50% Injectable 12.5  dextrose 50% Injectable 25  dextrose 50% Injectable 25  doxycycline IVPB 100  enoxaparin Injectable 40  furosemide    Tablet 20  hydrALAZINE  Oral Tab/Cap - Peds 100  insulin glargine Injectable (LANTUS) 30  insulin lispro (HumaLOG) corrective regimen sliding scale   insulin lispro Injectable (HumaLOG) 10  lactobacillus acidophilus 1  lisinopril 40  loratadine 10  metoprolol succinate   pantoprazole    Tablet 40  senna 2      ANTIBIOTICS:  cefTRIAXone   IVPB 2000 milliGRAM(s) IV Intermittent every 24 hours  doxycycline IVPB 100 milliGRAM(s) IV Intermittent every 12 hours      All available historical data has been reviewed

## 2020-07-15 NOTE — H&P ADULT - NSHPREVIEWOFSYSTEMS_GEN_ALL_CORE

## 2020-07-15 NOTE — CONSULT NOTE ADULT - ATTENDING COMMENTS
agree with above
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    Previous WCX MSSA  PICC Clean    - pending debridement- send OR cultures  - Continue Ceftriaxone  - see no need for doxy as no MRSA in cultures    Spectra [phone]

## 2020-07-15 NOTE — H&P ADULT - NSHPPHYSICALEXAM_GEN_ALL_CORE
PHYSICAL EXAM:  GENERAL: NAD, lying in bed comfortably  HEAD:  Atraumatic, Normocephalic  EYES: EOMI, PERRLA, conjunctiva and sclera clear  ENT: Moist mucous membranes  NECK: Supple, No JVD  CHEST/LUNG: Clear to auscultation bilaterally; No rales, rhonchi, wheezing, or rubs. Unlabored respirations  HEART: Regular rate and rhythm; No murmurs, rubs, or gallops  ABDOMEN: Bowel sounds present; Soft, Nontender, Nondistended. No hepatomegaly  EXTREMITIES:  2+ Peripheral Pulses, brisk capillary refill. L hallux amputation site w/ erythema and tenderness, no drainage, mild tenderness to plantar aspect of foot  NERVOUS SYSTEM:  Alert & Oriented X3, speech clear. No deficits   MSK: FROM all 4 extremities, full and equal strength PHYSICAL EXAM:  GENERAL: NAD, lying in bed comfortably  HEAD:  Atraumatic, Normocephalic  EYES: EOMI, PERRLA, conjunctiva and sclera clear  ENT: Moist mucous membranes  NECK: Supple, No JVD  CHEST/LUNG: Clear to auscultation bilaterally; No rales, rhonchi, wheezing, or rubs. Unlabored respirations  HEART: Regular rate and rhythm; No murmurs, rubs, or gallops  ABDOMEN: Bowel sounds present; Soft, Nontender, Nondistended. No hepatomegaly  EXTREMITIES:  DP/PT pulses diminished in left foot, but foot warm to touch. Other pulses 2+, brisk capillary refill. L hallux amputation site w/ erythema and tenderness, no drainage, mild tenderness to plantar aspect of foot  NERVOUS SYSTEM:  Alert & Oriented X3, speech clear. No deficits   MSK: FROM all 4 extremities, full and equal strength

## 2020-07-15 NOTE — CONSULT NOTE ADULT - SUBJECTIVE AND OBJECTIVE BOX
PODIATRY CONSULT   DARRION MELENDEZ is a 74y Female Patient is a 74y old  Female who presents with a chief complaint of foot wound (15 Jul 2020 01:50)    HPI:  74 Y F with pmh CAD s/p PCI, Chronic HFrEF (now improved) s/p BiVICD, afib on eliquis, HTN, CVA, DM, severe peripheral vascular disease s/p angiogram status post laser atherectomy and balloon of L-distal SFA, L-popliteal artery, L-TPT trunk, and L-peroneal artery, non healing Left foot ulcer: s/p left Hallux amputation May 2020 presents to ED after being sent in by podiatrist for non-healing left foot ulcer. After having left hallux amputation in May, patient was discharged with PICC line and abx and told to follow up with podiatrist. She had the stitches taken out of the wound yesterday, but was still having significant pain, 6/10, sharp, intermittent as well as swelling and redness to the wound area. Thus, she was told to go to ED in order to open the wound again by her podiatrist. She denies any fevers, chills, nausea, vomiting, chest pain, palpitations, shortness of breath, diarrhea, constipation. No purulent drainage of wound.    ED Course:  T 98F, P 81, /78, R 18, S 98% on RA at rest. Given rocephin 2g in ED, podiatry consulted. (15 Jul 2020 01:50)      FOOT INFECTION  Amputation of toe of left foot  PVD (peripheral vascular disease)  CAD (coronary artery disease)  CHF (congestive heart failure)  HTN (hypertension)  DM (diabetes mellitus)      PMH: FOOT INFECTION  Amputation of toe of left foot  PVD (peripheral vascular disease)  CAD (coronary artery disease)  CHF (congestive heart failure)  HTN (hypertension)  DM (diabetes mellitus)    PSH: Status post coronary artery stent placement  H/O: hysterectomy  Cardiac defibrillator in place  Artificial cardiac pacemaker  History of cholecystectomy    Medication cefTRIAXone   IVPB 2000 milliGRAM(s) IV Intermittent every 24 hours  doxycycline IVPB 100 milliGRAM(s) IV Intermittent every 12 hours    Allergy: codeine (Rash)  penicillin (Rash)        Labs:                        10.2   10.93 )-----------( 395      ( 15 Jul 2020 06:00 )             32.2     PT/INR - ( 14 Jul 2020 20:45 )   PT: 16.40 sec;   INR: 1.43 ratio         PTT - ( 14 Jul 2020 20:45 )  PTT:42.4 sec  07-15    138  |  100  |  27<H>  ----------------------------<  99  3.9   |  25  |  0.9    Ca    9.4      15 Jul 2020 06:00  Mg     1.6     07-15    TPro  7.2  /  Alb  3.2<L>  /  TBili  <0.2  /  DBili  x   /  AST  37  /  ALT  36  /  AlkPhos  307<H>  07-15            ROS:  [ x]A ten point review of system was otherwise negative except as noted    Physical Exam -Left Lower Extremity Focused:   Derm:   Dehiscenced wound noted at the medial aspect of the hallux stump  - Wound Edges + Irregular + hyperkeratotic border -Macerated, -Necrotic, -Clean  - Wound base fibrogranular, wound size (1.5cm X 1cm)  - Gracy-wound skin +Erythema, -Streaking erythema, +Warmth  +Edema  -No drainage  Vascular:   DP and PT pulses are mildly diminished. Cap re-fill time > then 3sec to the digits .  Skin temperature is warm to warm from proximal to distal  Neuro:  - Protective sensation moderately diminished.   MSK:   Manual Muscle strength +5/5 in all muscle compartments.         Assessment:    Dehiscenced wound  Left hallux stump  Plan:  Chart reviewed and Patient evaluated  Discussed diagnosis and treatment with patient  Wound flush with normal saline  Applied betadine with dry sterile dressing  Wound Care Orders: As stated above  X-rays ordered: Pending report  Recommend ID consult  WBAT  Possible sx intervention tomorrow for debridement;will f/u w/ time  Cardiac clearance and OR stratification requested  Please optimize all pre-surgical labs prior to sx  NPO MN today, 7/15  Discussed plan  with  Attending, Dr Yo    Spectra: [phone]  Pt to OR for Excisional debridement  of non viable soft  tissue/ bone Right/ Left foot  Medical clearance with stratification requested PODIATRY CONSULT   DARRION MELENDEZ is a 74y Female Patient is a 74y old  Female who presents with a chief complaint of foot wound (15 Jul 2020 01:50)    HPI:  74 Y F with pmh CAD s/p PCI, Chronic HFrEF (now improved) s/p BiVICD, afib on eliquis, HTN, CVA, DM, severe peripheral vascular disease s/p angiogram status post laser atherectomy and balloon of L-distal SFA, L-popliteal artery, L-TPT trunk, and L-peroneal artery, non healing Left foot ulcer: s/p left Hallux amputation May 2020 presents to ED after being sent in by podiatrist for non-healing left foot ulcer. After having left hallux amputation in May, patient was discharged with PICC line and abx and told to follow up with podiatrist. She had the stitches taken out of the wound yesterday, but was still having significant pain, 6/10, sharp, intermittent as well as swelling and redness to the wound area. Thus, she was told to go to ED in order to open the wound again by her podiatrist. She denies any fevers, chills, nausea, vomiting, chest pain, palpitations, shortness of breath, diarrhea, constipation. No purulent drainage of wound.    ED Course:  T 98F, P 81, /78, R 18, S 98% on RA at rest. Given rocephin 2g in ED, podiatry consulted. (15 Jul 2020 01:50)      FOOT INFECTION  Amputation of toe of left foot  PVD (peripheral vascular disease)  CAD (coronary artery disease)  CHF (congestive heart failure)  HTN (hypertension)  DM (diabetes mellitus)      PMH: FOOT INFECTION  Amputation of toe of left foot  PVD (peripheral vascular disease)  CAD (coronary artery disease)  CHF (congestive heart failure)  HTN (hypertension)  DM (diabetes mellitus)    PSH: Status post coronary artery stent placement  H/O: hysterectomy  Cardiac defibrillator in place  Artificial cardiac pacemaker  History of cholecystectomy    Medication cefTRIAXone   IVPB 2000 milliGRAM(s) IV Intermittent every 24 hours  doxycycline IVPB 100 milliGRAM(s) IV Intermittent every 12 hours    Allergy: codeine (Rash)  penicillin (Rash)        Labs:                        10.2   10.93 )-----------( 395      ( 15 Jul 2020 06:00 )             32.2     PT/INR - ( 14 Jul 2020 20:45 )   PT: 16.40 sec;   INR: 1.43 ratio         PTT - ( 14 Jul 2020 20:45 )  PTT:42.4 sec  07-15    138  |  100  |  27<H>  ----------------------------<  99  3.9   |  25  |  0.9    Ca    9.4      15 Jul 2020 06:00  Mg     1.6     07-15    TPro  7.2  /  Alb  3.2<L>  /  TBili  <0.2  /  DBili  x   /  AST  37  /  ALT  36  /  AlkPhos  307<H>  07-15            ROS:  [ x]A ten point review of system was otherwise negative except as noted    Physical Exam -Left Lower Extremity Focused:   Derm:   Dehiscenced wound noted at the medial aspect of the hallux stump  - Wound Edges + Irregular + hyperkeratotic border -Macerated, -Necrotic, -Clean  - Wound base fibrogranular, wound size (1.5cm X 1cm)  - Gracy-wound skin +Erythema, -Streaking erythema, +Warmth  +Edema  -No drainage  Vascular:   DP and PT pulses are mildly diminished. Cap re-fill time > then 3sec to the digits .  Skin temperature is warm to warm from proximal to distal  Neuro:  - Protective sensation moderately diminished.   MSK:   Manual Muscle strength +5/5 in all muscle compartments.         Assessment:    Dehiscenced wound  Left hallux stump  Plan:  Chart reviewed and Patient evaluated  Discussed diagnosis and treatment with patient  Wound flush with normal saline  Applied betadine with dry sterile dressing  Wound Care Orders: As stated above  X-rays ordered: Pending report  Recommend ID consult  WBAT  Sx intervention tomorrow, 7/16 for debridement at 3:00pm  Cardiac clearance and OR stratification requested  Please optimize all pre-surgical labs prior to sx  NPO MN today, 7/15  Discussed plan  with  Attending, Dr Yo    Spectra: [phone]

## 2020-07-16 ENCOUNTER — RESULT REVIEW (OUTPATIENT)
Age: 75
End: 2020-07-16

## 2020-07-16 ENCOUNTER — TRANSCRIPTION ENCOUNTER (OUTPATIENT)
Age: 75
End: 2020-07-16

## 2020-07-16 LAB
ANION GAP SERPL CALC-SCNC: 14 MMOL/L — SIGNIFICANT CHANGE UP (ref 7–14)
BASOPHILS # BLD AUTO: 0.04 K/UL — SIGNIFICANT CHANGE UP (ref 0–0.2)
BASOPHILS NFR BLD AUTO: 0.3 % — SIGNIFICANT CHANGE UP (ref 0–1)
BLD GP AB SCN SERPL QL: SIGNIFICANT CHANGE UP
BUN SERPL-MCNC: 29 MG/DL — HIGH (ref 10–20)
CALCIUM SERPL-MCNC: 9.4 MG/DL — SIGNIFICANT CHANGE UP (ref 8.5–10.1)
CHLORIDE SERPL-SCNC: 97 MMOL/L — LOW (ref 98–110)
CO2 SERPL-SCNC: 27 MMOL/L — SIGNIFICANT CHANGE UP (ref 17–32)
CREAT SERPL-MCNC: 1.2 MG/DL — SIGNIFICANT CHANGE UP (ref 0.7–1.5)
EOSINOPHIL # BLD AUTO: 0.45 K/UL — SIGNIFICANT CHANGE UP (ref 0–0.7)
EOSINOPHIL NFR BLD AUTO: 3.5 % — SIGNIFICANT CHANGE UP (ref 0–8)
GLUCOSE BLDC GLUCOMTR-MCNC: 142 MG/DL — HIGH (ref 70–99)
GLUCOSE BLDC GLUCOMTR-MCNC: 150 MG/DL — HIGH (ref 70–99)
GLUCOSE BLDC GLUCOMTR-MCNC: 163 MG/DL — HIGH (ref 70–99)
GLUCOSE SERPL-MCNC: 117 MG/DL — HIGH (ref 70–99)
HCT VFR BLD CALC: 33.7 % — LOW (ref 37–47)
HGB BLD-MCNC: 10.4 G/DL — LOW (ref 12–16)
IMM GRANULOCYTES NFR BLD AUTO: 0.7 % — HIGH (ref 0.1–0.3)
LYMPHOCYTES # BLD AUTO: 24.9 % — SIGNIFICANT CHANGE UP (ref 20.5–51.1)
LYMPHOCYTES # BLD AUTO: 3.23 K/UL — SIGNIFICANT CHANGE UP (ref 1.2–3.4)
MCHC RBC-ENTMCNC: 27.4 PG — SIGNIFICANT CHANGE UP (ref 27–31)
MCHC RBC-ENTMCNC: 30.9 G/DL — LOW (ref 32–37)
MCV RBC AUTO: 88.7 FL — SIGNIFICANT CHANGE UP (ref 81–99)
MONOCYTES # BLD AUTO: 0.91 K/UL — HIGH (ref 0.1–0.6)
MONOCYTES NFR BLD AUTO: 7 % — SIGNIFICANT CHANGE UP (ref 1.7–9.3)
NEUTROPHILS # BLD AUTO: 8.26 K/UL — HIGH (ref 1.4–6.5)
NEUTROPHILS NFR BLD AUTO: 63.6 % — SIGNIFICANT CHANGE UP (ref 42.2–75.2)
NRBC # BLD: 0 /100 WBCS — SIGNIFICANT CHANGE UP (ref 0–0)
PLATELET # BLD AUTO: 451 K/UL — HIGH (ref 130–400)
POTASSIUM SERPL-MCNC: 4.7 MMOL/L — SIGNIFICANT CHANGE UP (ref 3.5–5)
POTASSIUM SERPL-SCNC: 4.7 MMOL/L — SIGNIFICANT CHANGE UP (ref 3.5–5)
RBC # BLD: 3.8 M/UL — LOW (ref 4.2–5.4)
RBC # FLD: 15.2 % — HIGH (ref 11.5–14.5)
SODIUM SERPL-SCNC: 138 MMOL/L — SIGNIFICANT CHANGE UP (ref 135–146)
WBC # BLD: 12.98 K/UL — HIGH (ref 4.8–10.8)
WBC # FLD AUTO: 12.98 K/UL — HIGH (ref 4.8–10.8)

## 2020-07-16 PROCEDURE — 88311 DECALCIFY TISSUE: CPT | Mod: 26

## 2020-07-16 PROCEDURE — 73630 X-RAY EXAM OF FOOT: CPT | Mod: 26,LT

## 2020-07-16 PROCEDURE — 99233 SBSQ HOSP IP/OBS HIGH 50: CPT

## 2020-07-16 PROCEDURE — 88304 TISSUE EXAM BY PATHOLOGIST: CPT | Mod: 26

## 2020-07-16 RX ORDER — LACTOBACILLUS ACIDOPHILUS 100MM CELL
1 CAPSULE ORAL THREE TIMES A DAY
Refills: 0 | Status: DISCONTINUED | OUTPATIENT
Start: 2020-07-16 | End: 2020-07-20

## 2020-07-16 RX ORDER — INSULIN LISPRO 100/ML
10 VIAL (ML) SUBCUTANEOUS
Refills: 0 | Status: DISCONTINUED | OUTPATIENT
Start: 2020-07-16 | End: 2020-07-20

## 2020-07-16 RX ORDER — MAGNESIUM SULFATE 500 MG/ML
2 VIAL (ML) INJECTION ONCE
Refills: 0 | Status: COMPLETED | OUTPATIENT
Start: 2020-07-16 | End: 2020-07-16

## 2020-07-16 RX ORDER — DEXTROSE 50 % IN WATER 50 %
15 SYRINGE (ML) INTRAVENOUS ONCE
Refills: 0 | Status: DISCONTINUED | OUTPATIENT
Start: 2020-07-16 | End: 2020-07-20

## 2020-07-16 RX ORDER — CHLORHEXIDINE GLUCONATE 213 G/1000ML
1 SOLUTION TOPICAL
Refills: 0 | Status: DISCONTINUED | OUTPATIENT
Start: 2020-07-16 | End: 2020-07-20

## 2020-07-16 RX ORDER — HYDROMORPHONE HYDROCHLORIDE 2 MG/ML
1 INJECTION INTRAMUSCULAR; INTRAVENOUS; SUBCUTANEOUS
Refills: 0 | Status: DISCONTINUED | OUTPATIENT
Start: 2020-07-16 | End: 2020-07-16

## 2020-07-16 RX ORDER — ACETAMINOPHEN 500 MG
650 TABLET ORAL EVERY 6 HOURS
Refills: 0 | Status: DISCONTINUED | OUTPATIENT
Start: 2020-07-16 | End: 2020-07-16

## 2020-07-16 RX ORDER — ATORVASTATIN CALCIUM 80 MG/1
40 TABLET, FILM COATED ORAL DAILY
Refills: 0 | Status: DISCONTINUED | OUTPATIENT
Start: 2020-07-16 | End: 2020-07-20

## 2020-07-16 RX ORDER — FUROSEMIDE 40 MG
20 TABLET ORAL
Refills: 0 | Status: DISCONTINUED | OUTPATIENT
Start: 2020-07-16 | End: 2020-07-20

## 2020-07-16 RX ORDER — HYDROMORPHONE HYDROCHLORIDE 2 MG/ML
0.5 INJECTION INTRAMUSCULAR; INTRAVENOUS; SUBCUTANEOUS
Refills: 0 | Status: DISCONTINUED | OUTPATIENT
Start: 2020-07-16 | End: 2020-07-16

## 2020-07-16 RX ORDER — HYDRALAZINE HCL 50 MG
100 TABLET ORAL EVERY 8 HOURS
Refills: 0 | Status: DISCONTINUED | OUTPATIENT
Start: 2020-07-16 | End: 2020-07-20

## 2020-07-16 RX ORDER — PANTOPRAZOLE SODIUM 20 MG/1
40 TABLET, DELAYED RELEASE ORAL
Refills: 0 | Status: DISCONTINUED | OUTPATIENT
Start: 2020-07-16 | End: 2020-07-20

## 2020-07-16 RX ORDER — SODIUM CHLORIDE 9 MG/ML
1000 INJECTION, SOLUTION INTRAVENOUS
Refills: 0 | Status: DISCONTINUED | OUTPATIENT
Start: 2020-07-16 | End: 2020-07-16

## 2020-07-16 RX ORDER — DEXTROSE 50 % IN WATER 50 %
25 SYRINGE (ML) INTRAVENOUS ONCE
Refills: 0 | Status: DISCONTINUED | OUTPATIENT
Start: 2020-07-16 | End: 2020-07-20

## 2020-07-16 RX ORDER — CLOPIDOGREL BISULFATE 75 MG/1
75 TABLET, FILM COATED ORAL DAILY
Refills: 0 | Status: DISCONTINUED | OUTPATIENT
Start: 2020-07-16 | End: 2020-07-20

## 2020-07-16 RX ORDER — DIPHENHYDRAMINE HCL 50 MG
25 CAPSULE ORAL EVERY 6 HOURS
Refills: 0 | Status: DISCONTINUED | OUTPATIENT
Start: 2020-07-16 | End: 2020-07-20

## 2020-07-16 RX ORDER — ONDANSETRON 8 MG/1
4 TABLET, FILM COATED ORAL ONCE
Refills: 0 | Status: DISCONTINUED | OUTPATIENT
Start: 2020-07-16 | End: 2020-07-16

## 2020-07-16 RX ORDER — LORATADINE 10 MG/1
10 TABLET ORAL DAILY
Refills: 0 | Status: DISCONTINUED | OUTPATIENT
Start: 2020-07-16 | End: 2020-07-20

## 2020-07-16 RX ORDER — ACETAMINOPHEN 500 MG
650 TABLET ORAL EVERY 6 HOURS
Refills: 0 | Status: DISCONTINUED | OUTPATIENT
Start: 2020-07-16 | End: 2020-07-20

## 2020-07-16 RX ORDER — MEPERIDINE HYDROCHLORIDE 50 MG/ML
12.5 INJECTION INTRAMUSCULAR; INTRAVENOUS; SUBCUTANEOUS
Refills: 0 | Status: DISCONTINUED | OUTPATIENT
Start: 2020-07-16 | End: 2020-07-16

## 2020-07-16 RX ORDER — GLUCAGON INJECTION, SOLUTION 0.5 MG/.1ML
1 INJECTION, SOLUTION SUBCUTANEOUS ONCE
Refills: 0 | Status: DISCONTINUED | OUTPATIENT
Start: 2020-07-16 | End: 2020-07-20

## 2020-07-16 RX ORDER — DIPHENHYDRAMINE HCL 50 MG
25 CAPSULE ORAL EVERY 6 HOURS
Refills: 0 | Status: DISCONTINUED | OUTPATIENT
Start: 2020-07-16 | End: 2020-07-16

## 2020-07-16 RX ORDER — INSULIN LISPRO 100/ML
VIAL (ML) SUBCUTANEOUS
Refills: 0 | Status: DISCONTINUED | OUTPATIENT
Start: 2020-07-16 | End: 2020-07-20

## 2020-07-16 RX ORDER — SODIUM CHLORIDE 9 MG/ML
1000 INJECTION, SOLUTION INTRAVENOUS
Refills: 0 | Status: DISCONTINUED | OUTPATIENT
Start: 2020-07-16 | End: 2020-07-20

## 2020-07-16 RX ORDER — LISINOPRIL 2.5 MG/1
40 TABLET ORAL DAILY
Refills: 0 | Status: DISCONTINUED | OUTPATIENT
Start: 2020-07-16 | End: 2020-07-20

## 2020-07-16 RX ORDER — METOPROLOL TARTRATE 50 MG
100 TABLET ORAL DAILY
Refills: 0 | Status: DISCONTINUED | OUTPATIENT
Start: 2020-07-16 | End: 2020-07-20

## 2020-07-16 RX ORDER — DEXTROSE 50 % IN WATER 50 %
12.5 SYRINGE (ML) INTRAVENOUS ONCE
Refills: 0 | Status: DISCONTINUED | OUTPATIENT
Start: 2020-07-16 | End: 2020-07-20

## 2020-07-16 RX ORDER — CEFTRIAXONE 500 MG/1
2000 INJECTION, POWDER, FOR SOLUTION INTRAMUSCULAR; INTRAVENOUS EVERY 24 HOURS
Refills: 0 | Status: DISCONTINUED | OUTPATIENT
Start: 2020-07-16 | End: 2020-07-18

## 2020-07-16 RX ORDER — INSULIN GLARGINE 100 [IU]/ML
30 INJECTION, SOLUTION SUBCUTANEOUS AT BEDTIME
Refills: 0 | Status: DISCONTINUED | OUTPATIENT
Start: 2020-07-16 | End: 2020-07-20

## 2020-07-16 RX ADMIN — INSULIN GLARGINE 30 UNIT(S): 100 INJECTION, SOLUTION SUBCUTANEOUS at 21:16

## 2020-07-16 RX ADMIN — CEFTRIAXONE 100 MILLIGRAM(S): 500 INJECTION, POWDER, FOR SOLUTION INTRAMUSCULAR; INTRAVENOUS at 21:48

## 2020-07-16 RX ADMIN — Medication 1 TABLET(S): at 21:17

## 2020-07-16 RX ADMIN — Medication 100 MILLIGRAM(S): at 21:30

## 2020-07-16 RX ADMIN — Medication 20 MILLIGRAM(S): at 18:53

## 2020-07-16 RX ADMIN — Medication 25 MILLIGRAM(S): at 13:06

## 2020-07-16 RX ADMIN — Medication 12.5 GRAM(S): at 13:06

## 2020-07-16 NOTE — BRIEF OPERATIVE NOTE - NSICDXBRIEFPROCEDURE_GEN_ALL_CORE_FT
PROCEDURES:  Excisional debridement of ulcer of left foot 16-Jul-2020 18:24:23  Ambika Ag  Pulsed lavage with suction 16-Jul-2020 18:24:14  Ambika Ag  Resection, metatarsal bone 16-Jul-2020 18:24:07  Ambika Ag

## 2020-07-16 NOTE — PROGRESS NOTE ADULT - SUBJECTIVE AND OBJECTIVE BOX
74 Y F with pmh CAD s/p PCI, Chronic HFrEF (now improved) s/p BiVICD, afib on eliquis, HTN, CVA, DM, severe peripheral vascular disease s/p angiogram status post laser atherectomy and balloon of L-distal SFA, L-popliteal artery, L-TPT trunk, and L-peroneal artery, p/w wound dehiscence of the foot s/p left hallux amputation (5/2020). Patient was d/c'd on PICC line and abx after amputation.     Today is hospital day 2d.     INTERVAL HPI / OVERNIGHT EVENTS  Patient was examined and seen at bedside. This morning she is resting comfortably, but reports she had difficulty sleeping last night due to intermittent shooting pain in her foot and pruritus.   Denies CP, SOB, Abdominal pain, fever/chills, N/V/D.     PAST MEDICAL & SURGICAL HISTORY  Amputation of toe of left foot  PVD (peripheral vascular disease)  CAD (coronary artery disease)  CHF (congestive heart failure)  HTN (hypertension)  DM (diabetes mellitus)  Status post coronary artery stent placement  H/O: hysterectomy  Cardiac defibrillator in place  Artificial cardiac pacemaker  History of cholecystectomy    ALLERGIES  codeine (Rash)  penicillin (Rash)    MEDICATIONS  STANDING MEDICATIONS    PRN MEDICATIONS    VITALS:  T(F): 97.7  HR: 66  BP: 154/67  RR: 18  SpO2: 99%    PHYSICAL EXAM  GEN: NAD, Resting comfortably in bed  PULM: Clear to auscultation bilaterally, No wheezes/rales/rhonchi  CVS: Regular rate and rhythm, S1-S2, no murmurs/rubs/gallops, +2 pulses  ABD: Soft, non-tender, non-distended, no guarding  EXT: No edema; Left foot is wrapped with dressing which has minor strikethrough bleeding. The ankle and LLE are not warm or tender. Pulses are intact. Sensation of the other toes is intact.   NEURO: A&Ox3, no focal deficits    LABS                        10.4   12.98 )-----------( 451      ( 16 Jul 2020 05:36 )             33.7     07-16    138  |  97<L>  |  29<H>  ----------------------------<  117<H>  4.7   |  27  |  1.2    Ca    9.4      16 Jul 2020 05:36  Mg     1.6     07-15    TPro  7.2  /  Alb  3.2<L>  /  TBili  <0.2  /  DBili  x   /  AST  37  /  ALT  36  /  AlkPhos  307<H>  07-15    PT/INR - ( 14 Jul 2020 20:45 )   PT: 16.40 sec;   INR: 1.43 ratio         PTT - ( 14 Jul 2020 20:45 )  PTT:42.4 sec    Culture - Blood (collected 14 Jul 2020 20:45)  Source: .Blood Blood  Preliminary Report (16 Jul 2020 03:01):    No growth to date.    Culture - Blood (collected 14 Jul 2020 20:45)  Source: .Blood Blood  Preliminary Report (16 Jul 2020 03:01):    No growth to date.    RADIOLOGY  < from: VA Duplex Low Ext Arterial, Ltd, Left (07.15.20 @ 14:34) >  Normal arterial flow in left lower extremity     < from: Xray Foot AP + Lateral + Oblique, Left (07.14.20 @ 19:41) >  Postoperative change of the first digit. Without difference from the prior study.    < from: CT Foot No Cont, Left (06.26.20 @ 13:35) >  Post transmetatarsal amputation of the first ray, with a confluent soft tissue edema/collection measuring 5.2 cm collectively with intermixed hyperdensities consistent with hematomas measuring 1.8 cm and1.3 cm just adjacent to the amputation stump.

## 2020-07-16 NOTE — DISCHARGE NOTE NURSING/CASE MANAGEMENT/SOCIAL WORK - PATIENT PORTAL LINK FT
You can access the FollowMyHealth Patient Portal offered by [facility] by registering at the following website: [email or web address]. By joining [facility]’s FollowMyHealth portal, you will also be able to view your health information using other applications (apps) compatible with our system.

## 2020-07-16 NOTE — CHART NOTE - NSCHARTNOTEFT_GEN_A_CORE
PACU ANESTHESIA ADMISSION NOTE      Procedure: Excisional debridement of ulcer of left foot  Pulsed lavage with suction  Resection, metatarsal bone    Post op diagnosis:  Bone infection      ____  Intubated  TV:______       Rate: ______      FiO2: ______  x  ____  Patent Airway  x  ____  Full return of protective reflexes  x  ____  Full recovery from anesthesia / back to baseline     Vitals:   T:           R:                  BP:                  Sat:                   P:     see PACU notes  Mental Status:  _x___ Awake   _____ Alert   _____ Drowsy   _____ Sedated    Nausea/Vomiting:  __x__ NO  ______Yes,   See Post - Op Orders        0  Pain Scale (0-10):  __0___    Treatment: ____ None    __x__ See Post - Op/PCA Orders    Post - Operative Fluids:   ____ Oral   ___x_ See Post - Op Orders    Plan: Discharge:   ____Home       _x___Floor     _____Critical Care    _____  Other:_________________    Comments:

## 2020-07-16 NOTE — PRE-OP CHECKLIST - SKIN PREP
Patient is resting comfortably. States he feels much better. NO distress. Report given to [facility].  Going to room 4608
n/a

## 2020-07-16 NOTE — PROGRESS NOTE ADULT - SUBJECTIVE AND OBJECTIVE BOX
MELENDEZGUILLERMINA LIMONDARRION  74y  Female      Patient is a 74y old  Female who presents with a chief complaint of foot wound (16 Jul 2020 17:06)      INTERVAL HPI/OVERNIGHT EVENTS: c/o mild throbbing at prior amputation site. no other complaints. no cp/sob      REVIEW OF SYSTEMS:  as above  All other review of systems negative    T(C): 36.5 (07-16-20 @ 15:46), Max: 36.7 (07-15-20 @ 20:49)  HR: 66 (07-16-20 @ 15:46) (65 - 84)  BP: 154/67 (07-16-20 @ 15:46) (146/68 - 189/77)  RR: 18 (07-16-20 @ 15:46) (18 - 19)  SpO2: 99% (07-16-20 @ 15:28) (98% - 99%)  Wt(kg): --Vital Signs Last 24 Hrs  T(C): 36.5 (16 Jul 2020 15:46), Max: 36.7 (15 Jul 2020 20:49)  T(F): 97.7 (16 Jul 2020 15:28), Max: 98 (15 Jul 2020 20:49)  HR: 66 (16 Jul 2020 15:46) (65 - 84)  BP: 154/67 (16 Jul 2020 15:46) (146/68 - 189/77)  BP(mean): --  RR: 18 (16 Jul 2020 15:46) (18 - 19)  SpO2: 99% (16 Jul 2020 15:28) (98% - 99%)        PHYSICAL EXAM:  GENERAL: NAD  PSYCH: no agitation, baseline mentation  NERVOUS SYSTEM:  Alert & Oriented X3, no new focal deficits  PULMONARY: Clear to percussion bilaterally; No rales, rhonchi, wheezing, or rubs  CARDIOVASCULAR: irregular; No murmurs, rubs, or gallops  GI: Soft, Nontender, Nondistended; Bowel sounds present  EXTREMITIES:  2+ Peripheral Pulses, No clubbing, cyanosis, or edema  SKIN L hallux dressed; skin dry    Consultant(s) Notes Reviewed:  [x ] YES  [ ] NO    Discussed with Consultants/Other Providers [ x] YES     LABS                          10.4   12.98 )-----------( 451      ( 16 Jul 2020 05:36 )             33.7     07-16    138  |  97<L>  |  29<H>  ----------------------------<  117<H>  4.7   |  27  |  1.2    Ca    9.4      16 Jul 2020 05:36  Mg     1.6     07-15    TPro  7.2  /  Alb  3.2<L>  /  TBili  <0.2  /  DBili  x   /  AST  37  /  ALT  36  /  AlkPhos  307<H>  07-15        PT/INR - ( 14 Jul 2020 20:45 )   PT: 16.40 sec;   INR: 1.43 ratio         PTT - ( 14 Jul 2020 20:45 )  PTT:42.4 sec  Lactate Trend  07-14 @ 20:45 Lactate:1.4         CAPILLARY BLOOD GLUCOSE      POCT Blood Glucose.: 150 mg/dL (16 Jul 2020 11:20)        RADIOLOGY & ADDITIONAL TESTS:    Imaging Personally Reviewed:  [ ] YES  [ ] NO    HEALTH ISSUES - PROBLEM Dx:

## 2020-07-17 LAB
ALBUMIN SERPL ELPH-MCNC: 3.4 G/DL — LOW (ref 3.5–5.2)
ALP SERPL-CCNC: 275 U/L — HIGH (ref 30–115)
ALT FLD-CCNC: 32 U/L — SIGNIFICANT CHANGE UP (ref 0–41)
ANION GAP SERPL CALC-SCNC: 12 MMOL/L — SIGNIFICANT CHANGE UP (ref 7–14)
AST SERPL-CCNC: 34 U/L — SIGNIFICANT CHANGE UP (ref 0–41)
BASOPHILS # BLD AUTO: 0.02 K/UL — SIGNIFICANT CHANGE UP (ref 0–0.2)
BASOPHILS NFR BLD AUTO: 0.1 % — SIGNIFICANT CHANGE UP (ref 0–1)
BILIRUB DIRECT SERPL-MCNC: <0.2 MG/DL — SIGNIFICANT CHANGE UP (ref 0–0.2)
BILIRUB INDIRECT FLD-MCNC: SIGNIFICANT CHANGE UP MG/DL (ref 0.2–1.2)
BILIRUB SERPL-MCNC: <0.2 MG/DL — SIGNIFICANT CHANGE UP (ref 0.2–1.2)
BUN SERPL-MCNC: 27 MG/DL — HIGH (ref 10–20)
CALCIUM SERPL-MCNC: 9.1 MG/DL — SIGNIFICANT CHANGE UP (ref 8.5–10.1)
CHLORIDE SERPL-SCNC: 97 MMOL/L — LOW (ref 98–110)
CO2 SERPL-SCNC: 26 MMOL/L — SIGNIFICANT CHANGE UP (ref 17–32)
CREAT SERPL-MCNC: 0.9 MG/DL — SIGNIFICANT CHANGE UP (ref 0.7–1.5)
EOSINOPHIL # BLD AUTO: 0.36 K/UL — SIGNIFICANT CHANGE UP (ref 0–0.7)
EOSINOPHIL NFR BLD AUTO: 2.5 % — SIGNIFICANT CHANGE UP (ref 0–8)
GLUCOSE BLDC GLUCOMTR-MCNC: 102 MG/DL — HIGH (ref 70–99)
GLUCOSE BLDC GLUCOMTR-MCNC: 125 MG/DL — HIGH (ref 70–99)
GLUCOSE BLDC GLUCOMTR-MCNC: 145 MG/DL — HIGH (ref 70–99)
GLUCOSE BLDC GLUCOMTR-MCNC: 202 MG/DL — HIGH (ref 70–99)
GLUCOSE BLDC GLUCOMTR-MCNC: 58 MG/DL — LOW (ref 70–99)
GLUCOSE BLDC GLUCOMTR-MCNC: 70 MG/DL — SIGNIFICANT CHANGE UP (ref 70–99)
GLUCOSE SERPL-MCNC: 114 MG/DL — HIGH (ref 70–99)
HCT VFR BLD CALC: 30.9 % — LOW (ref 37–47)
HGB BLD-MCNC: 9.6 G/DL — LOW (ref 12–16)
IMM GRANULOCYTES NFR BLD AUTO: 0.6 % — HIGH (ref 0.1–0.3)
LIDOCAIN IGE QN: 135 U/L — HIGH (ref 7–60)
LYMPHOCYTES # BLD AUTO: 17.7 % — LOW (ref 20.5–51.1)
LYMPHOCYTES # BLD AUTO: 2.51 K/UL — SIGNIFICANT CHANGE UP (ref 1.2–3.4)
MAGNESIUM SERPL-MCNC: 2.2 MG/DL — SIGNIFICANT CHANGE UP (ref 1.8–2.4)
MCHC RBC-ENTMCNC: 26.5 PG — LOW (ref 27–31)
MCHC RBC-ENTMCNC: 31.1 G/DL — LOW (ref 32–37)
MCV RBC AUTO: 85.4 FL — SIGNIFICANT CHANGE UP (ref 81–99)
MONOCYTES # BLD AUTO: 0.87 K/UL — HIGH (ref 0.1–0.6)
MONOCYTES NFR BLD AUTO: 6.1 % — SIGNIFICANT CHANGE UP (ref 1.7–9.3)
NEUTROPHILS # BLD AUTO: 10.36 K/UL — HIGH (ref 1.4–6.5)
NEUTROPHILS NFR BLD AUTO: 73 % — SIGNIFICANT CHANGE UP (ref 42.2–75.2)
NRBC # BLD: 0 /100 WBCS — SIGNIFICANT CHANGE UP (ref 0–0)
PHOSPHATE SERPL-MCNC: 4.3 MG/DL — SIGNIFICANT CHANGE UP (ref 2.1–4.9)
PLATELET # BLD AUTO: 440 K/UL — HIGH (ref 130–400)
POTASSIUM SERPL-MCNC: 4.2 MMOL/L — SIGNIFICANT CHANGE UP (ref 3.5–5)
POTASSIUM SERPL-SCNC: 4.2 MMOL/L — SIGNIFICANT CHANGE UP (ref 3.5–5)
PROT SERPL-MCNC: 6.7 G/DL — SIGNIFICANT CHANGE UP (ref 6–8)
RBC # BLD: 3.62 M/UL — LOW (ref 4.2–5.4)
RBC # FLD: 15.1 % — HIGH (ref 11.5–14.5)
SODIUM SERPL-SCNC: 135 MMOL/L — SIGNIFICANT CHANGE UP (ref 135–146)
WBC # BLD: 14.2 K/UL — HIGH (ref 4.8–10.8)
WBC # FLD AUTO: 14.2 K/UL — HIGH (ref 4.8–10.8)

## 2020-07-17 PROCEDURE — 99233 SBSQ HOSP IP/OBS HIGH 50: CPT

## 2020-07-17 RX ORDER — OXYCODONE HYDROCHLORIDE 5 MG/1
10 TABLET ORAL EVERY 6 HOURS
Refills: 0 | Status: DISCONTINUED | OUTPATIENT
Start: 2020-07-17 | End: 2020-07-18

## 2020-07-17 RX ORDER — SODIUM HYPOCHLORITE 0.125 %
1 SOLUTION, NON-ORAL MISCELLANEOUS DAILY
Refills: 0 | Status: DISCONTINUED | OUTPATIENT
Start: 2020-07-17 | End: 2020-07-20

## 2020-07-17 RX ORDER — GABAPENTIN 400 MG/1
200 CAPSULE ORAL EVERY 8 HOURS
Refills: 0 | Status: DISCONTINUED | OUTPATIENT
Start: 2020-07-17 | End: 2020-07-20

## 2020-07-17 RX ORDER — APIXABAN 2.5 MG/1
5 TABLET, FILM COATED ORAL EVERY 12 HOURS
Refills: 0 | Status: DISCONTINUED | OUTPATIENT
Start: 2020-07-17 | End: 2020-07-20

## 2020-07-17 RX ORDER — TRAMADOL HYDROCHLORIDE 50 MG/1
50 TABLET ORAL EVERY 8 HOURS
Refills: 0 | Status: DISCONTINUED | OUTPATIENT
Start: 2020-07-17 | End: 2020-07-20

## 2020-07-17 RX ADMIN — Medication 1 TABLET(S): at 05:35

## 2020-07-17 RX ADMIN — Medication 25 MILLIGRAM(S): at 00:24

## 2020-07-17 RX ADMIN — Medication 10 UNIT(S): at 07:54

## 2020-07-17 RX ADMIN — Medication 1 APPLICATION(S): at 11:26

## 2020-07-17 RX ADMIN — Medication 650 MILLIGRAM(S): at 00:23

## 2020-07-17 RX ADMIN — TRAMADOL HYDROCHLORIDE 50 MILLIGRAM(S): 50 TABLET ORAL at 12:00

## 2020-07-17 RX ADMIN — Medication 100 MILLIGRAM(S): at 21:58

## 2020-07-17 RX ADMIN — Medication 650 MILLIGRAM(S): at 08:30

## 2020-07-17 RX ADMIN — Medication 20 MILLIGRAM(S): at 17:17

## 2020-07-17 RX ADMIN — Medication 1 TABLET(S): at 21:59

## 2020-07-17 RX ADMIN — CLOPIDOGREL BISULFATE 75 MILLIGRAM(S): 75 TABLET, FILM COATED ORAL at 11:26

## 2020-07-17 RX ADMIN — Medication 20 MILLIGRAM(S): at 05:35

## 2020-07-17 RX ADMIN — LORATADINE 10 MILLIGRAM(S): 10 TABLET ORAL at 11:26

## 2020-07-17 RX ADMIN — Medication 100 MILLIGRAM(S): at 05:35

## 2020-07-17 RX ADMIN — GABAPENTIN 200 MILLIGRAM(S): 400 CAPSULE ORAL at 21:58

## 2020-07-17 RX ADMIN — Medication 650 MILLIGRAM(S): at 08:00

## 2020-07-17 RX ADMIN — CEFTRIAXONE 100 MILLIGRAM(S): 500 INJECTION, POWDER, FOR SOLUTION INTRAMUSCULAR; INTRAVENOUS at 22:14

## 2020-07-17 RX ADMIN — Medication 25 MILLIGRAM(S): at 22:19

## 2020-07-17 RX ADMIN — Medication 2: at 17:14

## 2020-07-17 RX ADMIN — TRAMADOL HYDROCHLORIDE 50 MILLIGRAM(S): 50 TABLET ORAL at 11:26

## 2020-07-17 RX ADMIN — Medication 10 UNIT(S): at 17:13

## 2020-07-17 RX ADMIN — Medication 100 MILLIGRAM(S): at 11:25

## 2020-07-17 RX ADMIN — Medication 1 TABLET(S): at 11:25

## 2020-07-17 RX ADMIN — PANTOPRAZOLE SODIUM 40 MILLIGRAM(S): 20 TABLET, DELAYED RELEASE ORAL at 05:35

## 2020-07-17 RX ADMIN — LISINOPRIL 40 MILLIGRAM(S): 2.5 TABLET ORAL at 05:35

## 2020-07-17 RX ADMIN — ATORVASTATIN CALCIUM 40 MILLIGRAM(S): 80 TABLET, FILM COATED ORAL at 11:26

## 2020-07-17 NOTE — CDI QUERY NOTE - NSCDIOTHERTXTBX_GEN_ALL_CORE_HH
Documentd left DFU  pt is s/p recent L hallux amputation  Documented in the podiatry note of 7/16/20: " Dehiscenced wound  Left hallux stump.  Documented in the resident note of 7/16/20:#Non-healing left foot ulcer s/p left hallux amputation.    Based on the clinical indicators and your professional judgment, please determine  > Wound dehiscence due to prior surgery  > Non healing left foot ulcer due to prior surgery  > Other(please specify)  >Unable to determine

## 2020-07-17 NOTE — PROGRESS NOTE ADULT - SUBJECTIVE AND OBJECTIVE BOX
Podiatry Progress Note    Subjective:   DARRION MELENDEZ is a pleasant well-nourished, well developed 74y Female in no acute distress, alert awake, and oriented to person, place and time.  Patient is a 74y old  Female who presents with a chief complaint of foot wound (16 Jul 2020 17:36)  S/p Debridement w/1st ray resection, left foot 7/16/20;    PAST MEDICAL & SURGICAL HISTORY:  Amputation of toe of left foot  PVD (peripheral vascular disease)  CAD (coronary artery disease)  CHF (congestive heart failure)  HTN (hypertension)  DM (diabetes mellitus)  Status post coronary artery stent placement  H/O: hysterectomy  Cardiac defibrillator in place  Artificial cardiac pacemaker  History of cholecystectomy     Objective:  Vital Signs Last 24 Hrs  T(C): 36.4 (17 Jul 2020 04:53), Max: 36.6 (16 Jul 2020 18:20)  T(F): 97.6 (17 Jul 2020 04:53), Max: 97.8 (16 Jul 2020 18:20)  HR: 78 (17 Jul 2020 04:53) (65 - 78)  BP: 157/68 (17 Jul 2020 04:53) (129/56 - 175/72)  BP(mean): --  RR: 16 (17 Jul 2020 04:53) (14 - 18)  SpO2: 100% (16 Jul 2020 19:15) (98% - 100%)                        9.6    14.20 )-----------( 440      ( 17 Jul 2020 05:30 )             30.9                 07-16    138  |  97<L>  |  29<H>  ----------------------------<  117<H>  4.7   |  27  |  1.2    Ca    9.4      16 Jul 2020 05:36    Physical Exam - Lower Extremity Focused:   Derm:   Left 1st ray resection surgical site suture intact nicely; mild rubor on tai suture sites; 1cm x 0.5cm granular tissue noted on suture site; Mild 0.2cm x 0.1cm fibrotic tissue noted on suture site;  Plantar aspect of left foot without any sign of tenderness;   No drainage, no malodor;  Vascular: DP and PT Pulses Diminished;   Neuro: Protective Sensation Diminished   MSK: Mild pain on palpation on suture site    Assessment:   S/p left 1st ray resection; POD#1    Plan:  Chart reviewed and Patient evaluated. All Questions and Concerns Addressed and Answered  Discussed diagnosis and treatment with patient  Wound Flushed w/ normal saline; suture site betadine soaked adaptic / DSD / Kerlix / ACE applied  Local Wound Care; clean w/ Dakins solution / WTD / DSD / Kerlix / ACE; q24  Recommend Abx; F/u w/ ID;  Attending to F/U w/ patient today; 7/17 Friday;  Patient is good to be D/C once attending sees patient today 7/17 Friday; patient is stable from podiatry standpoint;  F/u as an outpatient to Dr. Yo for 1 week  Discussed plan w/ Dr. Yo    Podiatry  Podiatry Progress Note    Subjective:   DARRION MELENDEZ is a pleasant well-nourished, well developed 74y Female in no acute distress, alert awake, and oriented to person, place and time.  Patient is a 74y old  Female who presents with a chief complaint of foot wound (16 Jul 2020 17:36)  S/p Debridement w/1st ray resection, left foot 7/16/20;    PAST MEDICAL & SURGICAL HISTORY:  Amputation of toe of left foot  PVD (peripheral vascular disease)  CAD (coronary artery disease)  CHF (congestive heart failure)  HTN (hypertension)  DM (diabetes mellitus)  Status post coronary artery stent placement  H/O: hysterectomy  Cardiac defibrillator in place  Artificial cardiac pacemaker  History of cholecystectomy     Objective:  Vital Signs Last 24 Hrs  T(C): 36.4 (17 Jul 2020 04:53), Max: 36.6 (16 Jul 2020 18:20)  T(F): 97.6 (17 Jul 2020 04:53), Max: 97.8 (16 Jul 2020 18:20)  HR: 78 (17 Jul 2020 04:53) (65 - 78)  BP: 157/68 (17 Jul 2020 04:53) (129/56 - 175/72)  BP(mean): --  RR: 16 (17 Jul 2020 04:53) (14 - 18)  SpO2: 100% (16 Jul 2020 19:15) (98% - 100%)                        9.6    14.20 )-----------( 440      ( 17 Jul 2020 05:30 )             30.9                 07-16    138  |  97<L>  |  29<H>  ----------------------------<  117<H>  4.7   |  27  |  1.2    Ca    9.4      16 Jul 2020 05:36    Physical Exam - Lower Extremity Focused:   Derm:   Left 1st ray resection surgical site suture intact nicely; mild rubor on tai suture sites; 1cm x 0.5cm granular tissue noted on suture site; Mild 0.2cm x 0.1cm fibrotic tissue noted on suture site;  Plantar aspect of left foot without any sign of tenderness;   No drainage, no malodor;  Vascular: DP and PT Pulses Diminished;   Neuro: Protective Sensation Diminished   MSK: Mild pain on palpation on suture site    Assessment:   S/p left 1st ray resection; POD#1    Plan:  Chart reviewed and Patient evaluated. All Questions and Concerns Addressed and Answered  Discussed diagnosis and treatment with patient  Wound Flushed w/ normal saline; suture site betadine soaked adaptic / DSD / Kerlix / ACE applied  Local Wound Care; clean w/ betadine soaked adaptic / DSD / Kerlix / ACE; q24  Wound Care Orders: As stated above  F/u w/ ID for Abx regimen upon discharge  Attending to f/u today, 7/17  F/u w/ Attending    Podiatry  Podiatry Progress Note    Subjective:   DARRION MELENDEZ is a pleasant well-nourished, well developed 74y Female in no acute distress, alert awake, and oriented to person, place and time.  Patient is a 74y old  Female who presents with a chief complaint of foot wound (16 Jul 2020 17:36)  S/p Debridement w/1st ray resection, left foot 7/16/20;    PAST MEDICAL & SURGICAL HISTORY:  Amputation of toe of left foot  PVD (peripheral vascular disease)  CAD (coronary artery disease)  CHF (congestive heart failure)  HTN (hypertension)  DM (diabetes mellitus)  Status post coronary artery stent placement  H/O: hysterectomy  Cardiac defibrillator in place  Artificial cardiac pacemaker  History of cholecystectomy     Objective:  Vital Signs Last 24 Hrs  T(C): 36.4 (17 Jul 2020 04:53), Max: 36.6 (16 Jul 2020 18:20)  T(F): 97.6 (17 Jul 2020 04:53), Max: 97.8 (16 Jul 2020 18:20)  HR: 78 (17 Jul 2020 04:53) (65 - 78)  BP: 157/68 (17 Jul 2020 04:53) (129/56 - 175/72)  BP(mean): --  RR: 16 (17 Jul 2020 04:53) (14 - 18)  SpO2: 100% (16 Jul 2020 19:15) (98% - 100%)                        9.6    14.20 )-----------( 440      ( 17 Jul 2020 05:30 )             30.9                 07-16    138  |  97<L>  |  29<H>  ----------------------------<  117<H>  4.7   |  27  |  1.2    Ca    9.4      16 Jul 2020 05:36    Physical Exam - Lower Extremity Focused:   Derm:   Left 1st ray resection surgical site suture intact nicely; mild rubor on tai suture sites; 1cm x 0.5cm granular tissue noted on suture site; Mild 0.2cm x 0.1cm fibrotic tissue noted on suture site;  Plantar aspect of left foot without any sign of tenderness;   No drainage, no malodor;  Vascular: DP and PT Pulses Diminished;   Neuro: Protective Sensation Diminished   MSK: Mild pain on palpation on suture site    Assessment:   S/p left 1st ray resection; POD#1  DM  PAD  OM   none healing ulcer/ Dehiscenced wound  Left hallux stump  cellulitis left foot       Plan:  Chart reviewed and Patient evaluated. All Questions and Concerns Addressed and Answered  Discussed diagnosis and treatment with patient  Wound Flushed w/ normal saline; suture site betadine soaked adaptic / DSD / Kerlix / ACE applied  Local Wound Care; clean w/ betadine soaked adaptic / DSD / Kerlix / ACE; q24  Wound Care Orders: As stated above  F/u w/ ID for Abx regimen upon discharge  Attending to f/u today, 7/17  F/u w/ Attending    Podiatry

## 2020-07-17 NOTE — PROGRESS NOTE ADULT - SUBJECTIVE AND OBJECTIVE BOX
RACHID MELENDEZTA  74y  Female      Patient is a 74y old  Female who presents with a chief complaint of foot wound (17 Jul 2020 14:44)      INTERVAL HPI/OVERNIGHT EVENTS: still having some pain at surgical site. otherwise no complaints      REVIEW OF SYSTEMS:  as above  All other review of systems negative    T(C): 36.9 (07-17-20 @ 17:11), Max: 36.9 (07-17-20 @ 17:11)  HR: 76 (07-17-20 @ 13:35) (65 - 78)  BP: 163/65 (07-17-20 @ 17:11) (115/54 - 168/73)  RR: 18 (07-17-20 @ 13:35) (14 - 18)  SpO2: 98% (07-17-20 @ 11:31) (98% - 100%)  Wt(kg): --Vital Signs Last 24 Hrs  T(C): 36.9 (17 Jul 2020 17:11), Max: 36.9 (17 Jul 2020 17:11)  T(F): 98.4 (17 Jul 2020 17:11), Max: 98.4 (17 Jul 2020 17:11)  HR: 76 (17 Jul 2020 13:35) (65 - 78)  BP: 163/65 (17 Jul 2020 17:11) (115/54 - 168/73)  BP(mean): --  RR: 18 (17 Jul 2020 13:35) (14 - 18)  SpO2: 98% (17 Jul 2020 11:31) (98% - 100%)        PHYSICAL EXAM:  GENERAL: NAD  PSYCH: no agitation, baseline mentation  NERVOUS SYSTEM:  Alert & Oriented X3, no new focal deficits  PULMONARY: Clear to percussion bilaterally; No rales, rhonchi, wheezing, or rubs  CARDIOVASCULAR: Regular rate and rhythm; No murmurs, rubs, or gallops  GI: Soft, Nontender, Nondistended; Bowel sounds present  EXTREMITIES:  2+ Peripheral Pulses, No clubbing, cyanosis, or edema, L foot dressing cdi no strike through bleeding    Consultant(s) Notes Reviewed:  [x ] YES  [ ] NO    Discussed with Consultants/Other Providers [ x] YES     LABS                          9.6    14.20 )-----------( 440      ( 17 Jul 2020 05:30 )             30.9     07-17    135  |  97<L>  |  27<H>  ----------------------------<  114<H>  4.2   |  26  |  0.9    Ca    9.1      17 Jul 2020 05:30  Phos  4.3     07-17  Mg     2.2     07-17    TPro  6.7  /  Alb  3.4<L>  /  TBili  <0.2  /  DBili  <0.2  /  AST  34  /  ALT  32  /  AlkPhos  275<H>  07-17          Lactate Trend  07-14 @ 20:45 Lactate:1.4         CAPILLARY BLOOD GLUCOSE      POCT Blood Glucose.: 202 mg/dL (17 Jul 2020 16:46)        RADIOLOGY & ADDITIONAL TESTS:    Imaging Personally Reviewed:  [ ] YES  [ ] NO    HEALTH ISSUES - PROBLEM Dx:      Disposition: Home___/SNF___/Other________/Unknown at this time________

## 2020-07-17 NOTE — PROGRESS NOTE ADULT - SUBJECTIVE AND OBJECTIVE BOX
74 Y F with pmh CAD s/p PCI, Chronic HFrEF (now improved) s/p BiVICD, afib on eliquis, HTN, CVA, DM, severe peripheral vascular disease s/p angiogram status post laser atherectomy and balloon of L-distal SFA, L-popliteal artery, L-TPT trunk, and L-peroneal artery, p/w wound dehiscence of the foot s/p left hallux amputation (5/2020). She is now s/p debridement.     Today is hospital day 3d.     INTERVAL HPI / OVERNIGHT EVENTS  Patient was examined and seen at bedside. This morning she is resting comfortably in bed and says that overnight she had right upper quadrant abdominal pain.  Denies CP, SOB, chills, N/V/D. Patient is eating well, adequate BM/urination, not ambulating, and slept well last night.    PAST MEDICAL & SURGICAL HISTORY  Amputation of toe of left foot  PVD (peripheral vascular disease)  CAD (coronary artery disease)  CHF (congestive heart failure)  HTN (hypertension)  DM (diabetes mellitus)  Status post coronary artery stent placement  H/O: hysterectomy  Cardiac defibrillator in place  Artificial cardiac pacemaker  History of cholecystectomy    ALLERGIES  codeine (Rash)  penicillin (Rash)    MEDICATIONS  STANDING MEDICATIONS  apixaban 5 milliGRAM(s) Oral every 12 hours  atorvastatin Oral Tab/Cap - Peds 40 milliGRAM(s) Oral daily  cefTRIAXone   IVPB 2000 milliGRAM(s) IV Intermittent every 24 hours  chlorhexidine 4% Liquid 1 Application(s) Topical <User Schedule>  clopidogrel Tablet 75 milliGRAM(s) Oral daily  Dakins Solution - 1/2 Strength 1 Application(s) Topical daily  dextrose 5%. 1000 milliLiter(s) IV Continuous <Continuous>  dextrose 50% Injectable 12.5 Gram(s) IV Push once  dextrose 50% Injectable 25 Gram(s) IV Push once  furosemide    Tablet 20 milliGRAM(s) Oral two times a day  gabapentin 200 milliGRAM(s) Oral every 8 hours  hydrALAZINE  Oral Tab/Cap - Peds 100 milliGRAM(s) Oral every 8 hours  insulin glargine Injectable (LANTUS) 30 Unit(s) SubCutaneous at bedtime  insulin lispro (HumaLOG) corrective regimen sliding scale   SubCutaneous three times a day before meals  insulin lispro Injectable (HumaLOG) 10 Unit(s) SubCutaneous three times a day before meals  lactobacillus acidophilus 1 Tablet(s) Oral three times a day  lisinopril 40 milliGRAM(s) Oral daily  loratadine 10 milliGRAM(s) Oral daily  metoprolol succinate  milliGRAM(s) Oral daily  pantoprazole    Tablet 40 milliGRAM(s) Oral before breakfast    PRN MEDICATIONS  acetaminophen   Tablet .. 650 milliGRAM(s) Oral every 6 hours PRN  dextrose 40% Gel 15 Gram(s) Oral once PRN  diphenhydrAMINE 25 milliGRAM(s) Oral every 6 hours PRN  glucagon  Injectable 1 milliGRAM(s) IntraMuscular once PRN  oxyCODONE    IR 10 milliGRAM(s) Oral every 6 hours PRN  traMADol 50 milliGRAM(s) Oral every 8 hours PRN    VITALS:  T(F): 98.4  HR: 76  BP: 163/65  RR: 18  SpO2: 98%    PHYSICAL EXAM  GEN: NAD, Resting comfortably in bed  PULM: Clear to auscultation bilaterally, No wheezes/rales/rhonchi  CVS: Regular rate and rhythm, S1-S2, no murmurs/rubs/gallops, +2 pulses  ABD: Soft, non-tender, non-distended, no guarding  EXT: No edema; Left foot is wrapped with dressing which has minor strikethrough bleeding. The ankle and LLE are not warm or tender. Pulses are intact. Sensation of the other toes is intact.   NEURO: A&Ox3, no focal deficits    LABS                        9.6    14.20 )-----------( 440      ( 17 Jul 2020 05:30 )             30.9     07-17    135  |  97<L>  |  27<H>  ----------------------------<  114<H>  4.2   |  26  |  0.9    Ca    9.1      17 Jul 2020 05:30  Phos  4.3     07-17  Mg     2.2     07-17    TPro  6.7  /  Alb  3.4<L>  /  TBili  <0.2  /  DBili  <0.2  /  AST  34  /  ALT  32  /  AlkPhos  275<H>  07-17      Culture - Blood (collected 14 Jul 2020 20:45)  Source: .Blood Blood  Preliminary Report (16 Jul 2020 03:01):    No growth to date.    Culture - Blood (collected 14 Jul 2020 20:45)  Source: .Blood Blood  Preliminary Report (16 Jul 2020 03:01):    No growth to date.    RADIOLOGY    < from: Xray Foot AP + Lateral + Oblique, Left (07.16.20 @ 19:44) >    Further debridement and resection of thefirst trans-metatarsal amputation, with postsurgical changes in the soft tissues. Previously seen bony fragments in the soft tissues are no longer present.    < end of copied text >

## 2020-07-17 NOTE — PROGRESS NOTE ADULT - SUBJECTIVE AND OBJECTIVE BOX
DARRION MELENDEZ  74y, Female  Allergy: codeine (Rash)  penicillin (Rash)      LOS  3d    CHIEF COMPLAINT: foot wound (17 Jul 2020 07:15)      INTERVAL EVENTS/HPI  - No acute events overnight, reports foot pain and itching on the chest  - T(F): , Max: 97.8 (07-16-20 @ 18:20)  - Denies any worsening symptoms  - Tolerating medication  - WBC Count: 14.20 (07-17-20 @ 05:30)  WBC Count: 12.98 (07-16-20 @ 05:36)  - Creatinine, Serum: 0.9 (07-17-20 @ 05:30)  Creatinine, Serum: 1.2 (07-16-20 @ 05:36)       ROS  General: Denies rigors, nightsweats  HEENT: Denies headache, rhinorrhea, sore throat, eye pain  CV: Denies CP, palpitations  PULM: Denies wheezing, hemoptysis  GI: Denies hematemesis, hematochezia, melena  : Denies discharge, hematuria  MSK: Denies arthralgias, myalgias  SKIN: Denies rash, lesions  NEURO: Denies paresthesias, weakness  PSYCH: Denies depression, anxiety    VITALS:  T(F): 97.3, Max: 97.8 (07-16-20 @ 18:20)  HR: 72  BP: 129/58  RR: 18Vital Signs Last 24 Hrs  T(C): 36.3 (17 Jul 2020 11:31), Max: 36.6 (16 Jul 2020 18:20)  T(F): 97.3 (17 Jul 2020 11:31), Max: 97.8 (16 Jul 2020 18:20)  HR: 72 (17 Jul 2020 11:31) (65 - 78)  BP: 129/58 (17 Jul 2020 11:31) (129/56 - 175/72)  BP(mean): --  RR: 18 (17 Jul 2020 11:31) (14 - 18)  SpO2: 98% (17 Jul 2020 11:31) (98% - 100%)    PHYSICAL EXAM:  Gen: NAD, resting in bed  HEENT: Normocephalic, atraumatic  Neck: supple, no lymphadenopathy  CV: Regular rate & regular rhythm  Lungs: decreased BS at bases, no fremitus  Abdomen: Soft, BS present  Ext: Warm, well perfused, LLE dressings  Neuro: non focal, awake  Skin: no rash, no erythema  Lines: no phlebitis    FH: Non-contributory  Social Hx: Non-contributory    TESTS & MEASUREMENTS:                        9.6    14.20 )-----------( 440      ( 17 Jul 2020 05:30 )             30.9     07-17    135  |  97<L>  |  27<H>  ----------------------------<  114<H>  4.2   |  26  |  0.9    Ca    9.1      17 Jul 2020 05:30  Phos  4.3     07-17  Mg     2.2     07-17    TPro  6.7  /  Alb  3.4<L>  /  TBili  <0.2  /  DBili  <0.2  /  AST  34  /  ALT  32  /  AlkPhos  275<H>  07-17    eGFR if Non African American: 64 mL/min/1.73M2 (07-17-20 @ 05:30)  eGFR if : 74 mL/min/1.73M2 (07-17-20 @ 05:30)    LIVER FUNCTIONS - ( 17 Jul 2020 10:05 )  Alb: 3.4 g/dL / Pro: 6.7 g/dL / ALK PHOS: 275 U/L / ALT: 32 U/L / AST: 34 U/L / GGT: x               Culture - Blood (collected 07-14-20 @ 20:45)  Source: .Blood Blood  Preliminary Report (07-16-20 @ 03:01):    No growth to date.    Culture - Blood (collected 07-14-20 @ 20:45)  Source: .Blood Blood  Preliminary Report (07-16-20 @ 03:01):    No growth to date.    Culture - Blood (collected 06-24-20 @ 05:52)  Source: .Blood None  Final Report (06-29-20 @ 13:00):    No Growth Final        Lactate, Blood: 1.4 mmol/L (07-14-20 @ 20:45)      INFECTIOUS DISEASES TESTING  COVID-19 PCR: NotDetec (07-14-20 @ 19:12)  COVID-19 PCR: NotDetec (06-23-20 @ 22:30)  COVID-19 PCR: NotDetec (06-14-20 @ 09:41)  Procalcitonin, Serum: 0.10 (06-12-20 @ 09:03)  COVID-19 PCR: NotDetec (06-08-20 @ 23:20)  Procalcitonin, Serum: 0.17 (06-08-20 @ 10:08)  Procalcitonin, Serum: 0.10 (06-06-20 @ 15:06)  Procalcitonin, Serum: 0.11 (06-05-20 @ 19:14)  COVID-19 PCR: NotDetec (06-04-20 @ 17:04)  Vancomycin Level, Trough: 17.5 (06-04-20 @ 06:44)  Procalcitonin, Serum: 0.17 (06-02-20 @ 09:59)  COVID-19 PCR: NotDetec (05-30-20 @ 11:51)      INFLAMMATORY MARKERS  Sedimentation Rate, Erythrocyte: 67 mm/Hr (07-15-20 @ 06:00)  C-Reactive Protein, Serum: 0.96 mg/dL (07-15-20 @ 04:30)  Sedimentation Rate, Erythrocyte: 56 mm/Hr (07-14-20 @ 20:45)  C-Reactive Protein, Serum: 1.00 mg/dL (07-14-20 @ 20:45)      RADIOLOGY & ADDITIONAL TESTS:  I have personally reviewed the last available Chest xray  CXR      CT      CARDIOLOGY TESTING  12 Lead ECG:   Ventricular Rate 77 BPM    Atrial Rate 77 BPM    P-R Interval 116 ms    QRS Duration 148 ms    Q-T Interval 456 ms    QTC Calculation(Bezet) 516 ms    P Axis 76 degrees    R Axis 254 degrees    T Axis 55 degrees    Diagnosis Line Atrial-sensed ventricular-paced rhythm  Abnormal ECG    Confirmed by Addison Rosario ([id number]) on 7/14/2020 9:30:52 PM (07-14-20 @ 20:16)      MEDICATIONS  atorvastatin Oral Tab/Cap - Peds 40 Oral daily  cefTRIAXone   IVPB 2000 IV Intermittent every 24 hours  chlorhexidine 4% Liquid 1 Topical <User Schedule>  clopidogrel Tablet 75 Oral daily  Dakins Solution - 1/2 Strength 1 Topical daily  dextrose 5%. 1000 IV Continuous <Continuous>  dextrose 50% Injectable 12.5 IV Push once  dextrose 50% Injectable 25 IV Push once  furosemide    Tablet 20 Oral two times a day  hydrALAZINE  Oral Tab/Cap - Peds 100 Oral every 8 hours  insulin glargine Injectable (LANTUS) 30 SubCutaneous at bedtime  insulin lispro (HumaLOG) corrective regimen sliding scale  SubCutaneous three times a day before meals  insulin lispro Injectable (HumaLOG) 10 SubCutaneous three times a day before meals  lactobacillus acidophilus 1 Oral three times a day  lisinopril 40 Oral daily  loratadine 10 Oral daily  metoprolol succinate  Oral daily  pantoprazole    Tablet 40 Oral before breakfast      WEIGHT  Weight (kg): 61.7 (07-16-20 @ 15:46)  Creatinine, Serum: 0.9 mg/dL (07-17-20 @ 05:30)      ANTIBIOTICS:  cefTRIAXone   IVPB 2000 milliGRAM(s) IV Intermittent every 24 hours      All available historical records have been reviewed

## 2020-07-17 NOTE — PROGRESS NOTE ADULT - SUBJECTIVE AND OBJECTIVE BOX
Podiatry Progress Note    Subjective:   DARRION MELENDEZ is a pleasant well-nourished, well developed 74y Female in no acute distress, alert awake, and oriented to person, place and time.  Patient is a 74y old  Female who presents with a chief complaint of foot wound (17 Jul 2020 13:01)    PAST MEDICAL & SURGICAL HISTORY:  Amputation of toe of left foot  PVD (peripheral vascular disease)  CAD (coronary artery disease)  CHF (congestive heart failure)  HTN (hypertension)  DM (diabetes mellitus)  Status post coronary artery stent placement  H/O: hysterectomy  Cardiac defibrillator in place  Artificial cardiac pacemaker  History of cholecystectomy     Objective:  Vital Signs Last 24 Hrs  T(C): 36.7 (17 Jul 2020 13:35), Max: 36.7 (17 Jul 2020 13:35)  T(F): 98.1 (17 Jul 2020 13:35), Max: 98.1 (17 Jul 2020 13:35)  HR: 76 (17 Jul 2020 13:35) (65 - 78)  BP: 115/54 (17 Jul 2020 13:35) (115/54 - 168/73)  BP(mean): --  RR: 18 (17 Jul 2020 13:35) (14 - 18)  SpO2: 98% (17 Jul 2020 11:31) (98% - 100%)                        9.6    14.20 )-----------( 440      ( 17 Jul 2020 05:30 )             30.9                 07-17    135  |  97<L>  |  27<H>  ----------------------------<  114<H>  4.2   |  26  |  0.9    Ca    9.1      17 Jul 2020 05:30  Phos  4.3     07-17  Mg     2.2     07-17    TPro  6.7  /  Alb  3.4<L>  /  TBili  <0.2  /  DBili  <0.2  /  AST  34  /  ALT  32  /  AlkPhos  275<H>  07-17    Physical Exam - Lower Extremity Focused:   Derm:   Left 1st ray resection surgical site suture intact nicely; mild rubor on tai suture sites; 1cm x 0.5cm granular tissue noted on suture site; Mild 0.2cm x 0.1cm fibrotic tissue noted on suture site;  Plantar aspect of left foot without any sign of tenderness;   No drainage, no malodor;  Vascular: DP and PT Pulses Diminished;   Neuro: Protective Sensation Diminished   MSK: Mild pain on palpation on suture site    Assessment:   S/p left 1st ray resection; POD#1    Plan:  Chart reviewed and Patient evaluated. All Questions and Concerns Addressed and Answered  Discussed diagnosis and treatment with patient  Wound Flushed w/ normal saline; suture site Dakins / betadine / DSD / Kerlix / ACE; q24  Local wound care; same as above;  Patient is stable from Podiatry standpoint; good to be D/C;   Attending request Gabapentin for pain control;  Attending request ID consult prior to D/C due to patient's itchyness on upper torso;  F/u w/ ID for Abx regimen upon discharge  Discussed plan w/ attending    Podiatry  Podiatry Progress Note    Subjective:   DARRION MELENDEZ is a pleasant well-nourished, well developed 74y Female in no acute distress, alert awake, and oriented to person, place and time.  Patient is a 74y old  Female who presents with a chief complaint of foot wound (17 Jul 2020 13:01)    PAST MEDICAL & SURGICAL HISTORY:  Amputation of toe of left foot  PVD (peripheral vascular disease)  CAD (coronary artery disease)  CHF (congestive heart failure)  HTN (hypertension)  DM (diabetes mellitus)  Status post coronary artery stent placement  H/O: hysterectomy  Cardiac defibrillator in place  Artificial cardiac pacemaker  History of cholecystectomy     Objective:  Vital Signs Last 24 Hrs  T(C): 36.7 (17 Jul 2020 13:35), Max: 36.7 (17 Jul 2020 13:35)  T(F): 98.1 (17 Jul 2020 13:35), Max: 98.1 (17 Jul 2020 13:35)  HR: 76 (17 Jul 2020 13:35) (65 - 78)  BP: 115/54 (17 Jul 2020 13:35) (115/54 - 168/73)  BP(mean): --  RR: 18 (17 Jul 2020 13:35) (14 - 18)  SpO2: 98% (17 Jul 2020 11:31) (98% - 100%)                        9.6    14.20 )-----------( 440      ( 17 Jul 2020 05:30 )             30.9                 07-17    135  |  97<L>  |  27<H>  ----------------------------<  114<H>  4.2   |  26  |  0.9    Ca    9.1      17 Jul 2020 05:30  Phos  4.3     07-17  Mg     2.2     07-17    TPro  6.7  /  Alb  3.4<L>  /  TBili  <0.2  /  DBili  <0.2  /  AST  34  /  ALT  32  /  AlkPhos  275<H>  07-17  CAPILLARY BLOOD GLUCOSE      POCT Blood Glucose.: 145 mg/dL (17 Jul 2020 22:06)  POCT Blood Glucose.: 70 mg/dL (17 Jul 2020 20:50)  POCT Blood Glucose.: 58 mg/dL (17 Jul 2020 20:34)  POCT Blood Glucose.: 202 mg/dL (17 Jul 2020 16:46)  POCT Blood Glucose.: 102 mg/dL (17 Jul 2020 11:09)  POCT Blood Glucose.: 125 mg/dL (17 Jul 2020 07:49)    Physical Exam - Lower Extremity Focused:   Derm:   Left 1st ray resection surgical site suture intact nicely; mild rubor on tai suture sites; 1cm x 0.5cm granular tissue noted on suture site; Mild 0.2cm x 0.1cm fibrotic tissue noted on suture site; no active drainage   Plantar aspect of left foot without any sign of tenderness;    No drainage, no malodor;  Vascular: DP and PT Pulses Diminished;   Neuro: Protective Sensation Diminished   MSK: Mild pain on palpation on suture site    Assessment:   S/p left 1st ray resection; POD#1    Plan:  Chart reviewed and Patient evaluated. All Questions and Concerns Addressed and Answered  Discussed diagnosis and treatment with patient  Wound Flushed w/ normal saline; suture site Dakins / betadine / DSD / Kerlix / ACE; q24  Local wound care; same as above;  Patient is stable from Podiatry standpoint; good to be D/C after check up tomorrow   Attending request Gabapentin for pain control;  Attending request ID consult prior to D/C due to patient's itchyness on upper torso;  F/u w/ ID for Abx regimen upon discharge  Discussed plan w/ attending    Podiatry

## 2020-07-18 LAB
ANION GAP SERPL CALC-SCNC: 16 MMOL/L — HIGH (ref 7–14)
BASOPHILS # BLD AUTO: 0.02 K/UL — SIGNIFICANT CHANGE UP (ref 0–0.2)
BASOPHILS NFR BLD AUTO: 0.1 % — SIGNIFICANT CHANGE UP (ref 0–1)
BUN SERPL-MCNC: 28 MG/DL — HIGH (ref 10–20)
CALCIUM SERPL-MCNC: 9.3 MG/DL — SIGNIFICANT CHANGE UP (ref 8.5–10.1)
CHLORIDE SERPL-SCNC: 93 MMOL/L — LOW (ref 98–110)
CO2 SERPL-SCNC: 25 MMOL/L — SIGNIFICANT CHANGE UP (ref 17–32)
CREAT SERPL-MCNC: 1.1 MG/DL — SIGNIFICANT CHANGE UP (ref 0.7–1.5)
EOSINOPHIL # BLD AUTO: 0.2 K/UL — SIGNIFICANT CHANGE UP (ref 0–0.7)
EOSINOPHIL NFR BLD AUTO: 1.3 % — SIGNIFICANT CHANGE UP (ref 0–8)
GLUCOSE BLDC GLUCOMTR-MCNC: 137 MG/DL — HIGH (ref 70–99)
GLUCOSE BLDC GLUCOMTR-MCNC: 151 MG/DL — HIGH (ref 70–99)
GLUCOSE BLDC GLUCOMTR-MCNC: 196 MG/DL — HIGH (ref 70–99)
GLUCOSE BLDC GLUCOMTR-MCNC: 69 MG/DL — LOW (ref 70–99)
GLUCOSE SERPL-MCNC: 133 MG/DL — HIGH (ref 70–99)
HCT VFR BLD CALC: 30.7 % — LOW (ref 37–47)
HGB BLD-MCNC: 9.7 G/DL — LOW (ref 12–16)
IMM GRANULOCYTES NFR BLD AUTO: 0.6 % — HIGH (ref 0.1–0.3)
LYMPHOCYTES # BLD AUTO: 15.8 % — LOW (ref 20.5–51.1)
LYMPHOCYTES # BLD AUTO: 2.45 K/UL — SIGNIFICANT CHANGE UP (ref 1.2–3.4)
MAGNESIUM SERPL-MCNC: 1.9 MG/DL — SIGNIFICANT CHANGE UP (ref 1.8–2.4)
MCHC RBC-ENTMCNC: 27.2 PG — SIGNIFICANT CHANGE UP (ref 27–31)
MCHC RBC-ENTMCNC: 31.6 G/DL — LOW (ref 32–37)
MCV RBC AUTO: 86 FL — SIGNIFICANT CHANGE UP (ref 81–99)
MONOCYTES # BLD AUTO: 0.9 K/UL — HIGH (ref 0.1–0.6)
MONOCYTES NFR BLD AUTO: 5.8 % — SIGNIFICANT CHANGE UP (ref 1.7–9.3)
NEUTROPHILS # BLD AUTO: 11.84 K/UL — HIGH (ref 1.4–6.5)
NEUTROPHILS NFR BLD AUTO: 76.4 % — HIGH (ref 42.2–75.2)
NRBC # BLD: 0 /100 WBCS — SIGNIFICANT CHANGE UP (ref 0–0)
PHOSPHATE SERPL-MCNC: 4.4 MG/DL — SIGNIFICANT CHANGE UP (ref 2.1–4.9)
PLATELET # BLD AUTO: 423 K/UL — HIGH (ref 130–400)
POTASSIUM SERPL-MCNC: 4.9 MMOL/L — SIGNIFICANT CHANGE UP (ref 3.5–5)
POTASSIUM SERPL-SCNC: 4.9 MMOL/L — SIGNIFICANT CHANGE UP (ref 3.5–5)
RBC # BLD: 3.57 M/UL — LOW (ref 4.2–5.4)
RBC # FLD: 15.4 % — HIGH (ref 11.5–14.5)
SODIUM SERPL-SCNC: 134 MMOL/L — LOW (ref 135–146)
WBC # BLD: 15.51 K/UL — HIGH (ref 4.8–10.8)
WBC # FLD AUTO: 15.51 K/UL — HIGH (ref 4.8–10.8)

## 2020-07-18 PROCEDURE — 99233 SBSQ HOSP IP/OBS HIGH 50: CPT

## 2020-07-18 RX ORDER — CEFEPIME 1 G/1
2000 INJECTION, POWDER, FOR SOLUTION INTRAMUSCULAR; INTRAVENOUS EVERY 12 HOURS
Refills: 0 | Status: DISCONTINUED | OUTPATIENT
Start: 2020-07-18 | End: 2020-07-19

## 2020-07-18 RX ORDER — METRONIDAZOLE 500 MG
500 TABLET ORAL EVERY 12 HOURS
Refills: 0 | Status: DISCONTINUED | OUTPATIENT
Start: 2020-07-18 | End: 2020-07-20

## 2020-07-18 RX ADMIN — GABAPENTIN 200 MILLIGRAM(S): 400 CAPSULE ORAL at 06:21

## 2020-07-18 RX ADMIN — ATORVASTATIN CALCIUM 40 MILLIGRAM(S): 80 TABLET, FILM COATED ORAL at 11:10

## 2020-07-18 RX ADMIN — Medication 10 UNIT(S): at 07:59

## 2020-07-18 RX ADMIN — Medication 1 APPLICATION(S): at 11:09

## 2020-07-18 RX ADMIN — CEFEPIME 100 MILLIGRAM(S): 1 INJECTION, POWDER, FOR SOLUTION INTRAMUSCULAR; INTRAVENOUS at 17:05

## 2020-07-18 RX ADMIN — GABAPENTIN 200 MILLIGRAM(S): 400 CAPSULE ORAL at 21:55

## 2020-07-18 RX ADMIN — LORATADINE 10 MILLIGRAM(S): 10 TABLET ORAL at 11:10

## 2020-07-18 RX ADMIN — Medication 20 MILLIGRAM(S): at 19:10

## 2020-07-18 RX ADMIN — Medication 1 TABLET(S): at 13:06

## 2020-07-18 RX ADMIN — Medication 500 MILLIGRAM(S): at 17:06

## 2020-07-18 RX ADMIN — Medication 10 UNIT(S): at 12:06

## 2020-07-18 RX ADMIN — Medication 100 MILLIGRAM(S): at 06:21

## 2020-07-18 RX ADMIN — Medication 1 TABLET(S): at 21:54

## 2020-07-18 RX ADMIN — GABAPENTIN 200 MILLIGRAM(S): 400 CAPSULE ORAL at 13:06

## 2020-07-18 RX ADMIN — TRAMADOL HYDROCHLORIDE 50 MILLIGRAM(S): 50 TABLET ORAL at 01:57

## 2020-07-18 RX ADMIN — Medication 100 MILLIGRAM(S): at 13:06

## 2020-07-18 RX ADMIN — Medication 20 MILLIGRAM(S): at 06:20

## 2020-07-18 RX ADMIN — LISINOPRIL 40 MILLIGRAM(S): 2.5 TABLET ORAL at 06:21

## 2020-07-18 RX ADMIN — APIXABAN 5 MILLIGRAM(S): 2.5 TABLET, FILM COATED ORAL at 19:10

## 2020-07-18 RX ADMIN — INSULIN GLARGINE 30 UNIT(S): 100 INJECTION, SOLUTION SUBCUTANEOUS at 21:55

## 2020-07-18 RX ADMIN — Medication 1 TABLET(S): at 06:21

## 2020-07-18 RX ADMIN — CLOPIDOGREL BISULFATE 75 MILLIGRAM(S): 75 TABLET, FILM COATED ORAL at 11:10

## 2020-07-18 RX ADMIN — Medication 100 MILLIGRAM(S): at 21:55

## 2020-07-18 RX ADMIN — PANTOPRAZOLE SODIUM 40 MILLIGRAM(S): 20 TABLET, DELAYED RELEASE ORAL at 06:21

## 2020-07-18 RX ADMIN — TRAMADOL HYDROCHLORIDE 50 MILLIGRAM(S): 50 TABLET ORAL at 01:34

## 2020-07-18 RX ADMIN — APIXABAN 5 MILLIGRAM(S): 2.5 TABLET, FILM COATED ORAL at 06:23

## 2020-07-18 RX ADMIN — Medication 1: at 12:06

## 2020-07-18 NOTE — PHYSICAL THERAPY INITIAL EVALUATION ADULT - GENERAL OBSERVATIONS, REHAB EVAL
13:50 - 14:20.pt encountered semireclined in bed, +IV, + ace wrap left foot, + call bell in reach, + wheels locked, in NAD. Pt agreed to participate in PT IE.

## 2020-07-18 NOTE — PROGRESS NOTE ADULT - SUBJECTIVE AND OBJECTIVE BOX
HPI:  74 Y F with pmh CAD s/p PCI, Chronic HFrEF (now improved) s/p BiVICD, afib on eliquis, HTN, CVA, DM, severe peripheral vascular disease s/p angiogram status post laser atherectomy and balloon of L-distal SFA, L-popliteal artery, L-TPT trunk, and L-peroneal artery, non healing Left foot ulcer: s/p left Hallux amputation May 2020 presents to ED after being sent in by podiatrist for non-healing left foot ulcer. After having left hallux amputation in May, patient was discharged with PICC line and abx and told to follow up with podiatrist. She had the stitches taken out of the wound yesterday, but was still having significant pain, 6/10, sharp, intermittent as well as swelling and redness to the wound area. Thus, she was told to go to ED in order to open the wound again by her podiatrist. She denies any fevers, chills, nausea, vomiting, chest pain, palpitations, shortness of breath, diarrhea, constipation. No purulent drainage of wound.    ED Course:  T 98F, P 81, /78, R 18, S 98% on RA at rest. Given rocephin 2g in ED, podiatry consulted. (15 Jul 2020 01:50)      ICU Vital Signs Last 24 Hrs  T(C): 36.2 (18 Jul 2020 12:58), Max: 36.9 (17 Jul 2020 17:11)  T(F): 97.2 (18 Jul 2020 12:58), Max: 98.4 (17 Jul 2020 17:11)  HR: 54 (18 Jul 2020 12:58) (54 - 87)  BP: 153/67 (18 Jul 2020 12:58) (153/67 - 169/69)  BP(mean): --  ABP: --  ABP(mean): --  RR: 18 (18 Jul 2020 12:58) (18 - 18)  SpO2: --      Vital Signs Last 24 Hrs  T(C): 36.2 (18 Jul 2020 12:58), Max: 36.9 (17 Jul 2020 17:11)  T(F): 97.2 (18 Jul 2020 12:58), Max: 98.4 (17 Jul 2020 17:11)  HR: 54 (18 Jul 2020 12:58) (54 - 87)  BP: 153/67 (18 Jul 2020 12:58) (153/67 - 169/69)  BP(mean): --  RR: 18 (18 Jul 2020 12:58) (18 - 18)  SpO2: --                        9.7    15.51 )-----------( 423      ( 18 Jul 2020 05:16 )             30.7     07-18    134<L>  |  93<L>  |  28<H>  ----------------------------<  133<H>  4.9   |  25  |  1.1    Ca    9.3      18 Jul 2020 05:16  Phos  4.4     07-18  Mg     1.9     07-18    TPro  6.7  /  Alb  3.4<L>  /  TBili  <0.2  /  DBili  <0.2  /  AST  34  /  ALT  32  /  AlkPhos  275<H>  07-17        CAPILLARY BLOOD GLUCOSE      POCT Blood Glucose.: 151 mg/dL (18 Jul 2020 11:27)  POCT Blood Glucose.: 137 mg/dL (18 Jul 2020 07:48)  POCT Blood Glucose.: 145 mg/dL (17 Jul 2020 22:06)  POCT Blood Glucose.: 70 mg/dL (17 Jul 2020 20:50)  POCT Blood Glucose.: 58 mg/dL (17 Jul 2020 20:34)  POCT Blood Glucose.: 202 mg/dL (17 Jul 2020 16:46)      MEDICATIONS  (STANDING):  apixaban 5 milliGRAM(s) Oral every 12 hours  atorvastatin Oral Tab/Cap - Peds 40 milliGRAM(s) Oral daily  cefepime   IVPB 2000 milliGRAM(s) IV Intermittent every 12 hours  chlorhexidine 4% Liquid 1 Application(s) Topical <User Schedule>  clopidogrel Tablet 75 milliGRAM(s) Oral daily  Dakins Solution - 1/2 Strength 1 Application(s) Topical daily  dextrose 5%. 1000 milliLiter(s) (50 mL/Hr) IV Continuous <Continuous>  dextrose 50% Injectable 12.5 Gram(s) IV Push once  dextrose 50% Injectable 25 Gram(s) IV Push once  furosemide    Tablet 20 milliGRAM(s) Oral two times a day  gabapentin 200 milliGRAM(s) Oral every 8 hours  hydrALAZINE  Oral Tab/Cap - Peds 100 milliGRAM(s) Oral every 8 hours  insulin glargine Injectable (LANTUS) 30 Unit(s) SubCutaneous at bedtime  insulin lispro (HumaLOG) corrective regimen sliding scale   SubCutaneous three times a day before meals  insulin lispro Injectable (HumaLOG) 10 Unit(s) SubCutaneous three times a day before meals  lactobacillus acidophilus 1 Tablet(s) Oral three times a day  lisinopril 40 milliGRAM(s) Oral daily  loratadine 10 milliGRAM(s) Oral daily  metoprolol succinate  milliGRAM(s) Oral daily  metroNIDAZOLE    Tablet 500 milliGRAM(s) Oral every 12 hours  pantoprazole    Tablet 40 milliGRAM(s) Oral before breakfast    MEDICATIONS  (PRN):  acetaminophen   Tablet .. 650 milliGRAM(s) Oral every 6 hours PRN Moderate Pain (4 - 6)  dextrose 40% Gel 15 Gram(s) Oral once PRN Blood Glucose LESS THAN 70 milliGRAM(s)/deciliter  diphenhydrAMINE 25 milliGRAM(s) Oral every 6 hours PRN Rash and/or Itching  glucagon  Injectable 1 milliGRAM(s) IntraMuscular once PRN Glucose LESS THAN 70 milligrams/deciliter  oxyCODONE    IR 10 milliGRAM(s) Oral every 6 hours PRN Severe Pain (7 - 10)  traMADol 50 milliGRAM(s) Oral every 8 hours PRN Moderate Pain (4 - 6)    Allergies    codeine (Rash)  penicillin (Rash)    Intolerances      PAST MEDICAL & SURGICAL HISTORY:  Amputation of toe of left foot  PVD (peripheral vascular disease)  CAD (coronary artery disease)  CHF (congestive heart failure)  HTN (hypertension)  DM (diabetes mellitus)  Status post coronary artery stent placement  H/O: hysterectomy  Cardiac defibrillator in place  Artificial cardiac pacemaker  History of cholecystectomy    Social History:  , lives with  and kids  former seamstress  remote history of rare smoking  no alcohol or drugs (15 Jul 2020 01:50)  pt seen and evaluated at bed side  she is awake and oriented ,but complains she feels  lethargic      exam lower extremity  :  left foot surgical wound-very mild erythema  no pain  no tenderness  no active drainage   small area of open skin with some fibrotic tissue the edges  no malodor

## 2020-07-18 NOTE — PROGRESS NOTE ADULT - SUBJECTIVE AND OBJECTIVE BOX
RACHID MELENDEZTA  74y  Female      Patient is a 74y old  Female who presents with a chief complaint of foot wound (18 Jul 2020 06:42)      INTERVAL HPI/OVERNIGHT EVENTS: sx site pain/ phantom pain still present. working with PT. no new complaints      REVIEW OF SYSTEMS:  as above  All other review of systems negative    T(C): 36.2 (07-18-20 @ 12:58), Max: 36.9 (07-17-20 @ 17:11)  HR: 54 (07-18-20 @ 12:58) (54 - 87)  BP: 153/67 (07-18-20 @ 12:58) (153/67 - 169/69)  RR: 18 (07-18-20 @ 12:58) (18 - 18)  SpO2: --  Wt(kg): --Vital Signs Last 24 Hrs  T(C): 36.2 (18 Jul 2020 12:58), Max: 36.9 (17 Jul 2020 17:11)  T(F): 97.2 (18 Jul 2020 12:58), Max: 98.4 (17 Jul 2020 17:11)  HR: 54 (18 Jul 2020 12:58) (54 - 87)  BP: 153/67 (18 Jul 2020 12:58) (153/67 - 169/69)  BP(mean): --  RR: 18 (18 Jul 2020 12:58) (18 - 18)  SpO2: --      07-17-20 @ 07:01  -  07-18-20 @ 07:00  --------------------------------------------------------  IN: 0 mL / OUT: 300 mL / NET: -300 mL        PHYSICAL EXAM:  GENERAL: NAD  PSYCH: no agitation, baseline mentation  NERVOUS SYSTEM:  Alert & Oriented X3, no new focal deficits  PULMONARY: Clear to percussion bilaterally; No rales, rhonchi, wheezing, or rubs  CARDIOVASCULAR: Regular rate and rhythm; No murmurs, rubs, or gallops  GI: Soft, Nontender, Nondistended; Bowel sounds present  EXTREMITIES:  2+ Peripheral Pulses, No clubbing, cyanosis, or edema; foot/ surgical dressing, no strike through bleeding; ue PICC cdi    Consultant(s) Notes Reviewed:  [x ] YES  [ ] NO    Discussed with Consultants/Other Providers [ x] YES     LABS                          9.7    15.51 )-----------( 423      ( 18 Jul 2020 05:16 )             30.7     07-18    134<L>  |  93<L>  |  28<H>  ----------------------------<  133<H>  4.9   |  25  |  1.1    Ca    9.3      18 Jul 2020 05:16  Phos  4.4     07-18  Mg     1.9     07-18    TPro  6.7  /  Alb  3.4<L>  /  TBili  <0.2  /  DBili  <0.2  /  AST  34  /  ALT  32  /  AlkPhos  275<H>  07-17          Lactate Trend  07-14 @ 20:45 Lactate:1.4         CAPILLARY BLOOD GLUCOSE      POCT Blood Glucose.: 151 mg/dL (18 Jul 2020 11:27)        RADIOLOGY & ADDITIONAL TESTS:    Imaging Personally Reviewed:  [ ] YES  [ ] NO    HEALTH ISSUES - PROBLEM Dx:

## 2020-07-18 NOTE — PHYSICAL THERAPY INITIAL EVALUATION ADULT - PATIENT PROFILE REVIEW, REHAB EVAL
[address]  PROGRESS NOTE  Chief Complaint:   Patient presents with: Follow - Up: 2 month f/u, patient needs medication refills      HPI:   This is a 82year old female coming in for medication follow up.    Pt with chronic pain syndrome yes History (reviewed - no changes required):   severe atrophic vaginitis  - lichen sclerosis  - chronic pain syndrome (dr. Meka Weiner)(  chronic facial pain    wrist pain  tooth pain  UTI  HYPOTHYROIDISM NOS (ICD-244.9)  chronic abd. pain - improved  HYPERCHOLES chills, or fatigue,unusual weight gain/loss  EENT:  Eyes:  Denies eye pain, visual changes, blurred vision, double vision . Ears, Nose, Throat:  Denies hearing loss,or disturbance. Denies sore throat  INTEGUMENTARY:  Denies rashes, itching.   CARDIOVASCUL quadrants, no masses, no hepatosplenomegaly. BACK: No tenderness, no spasm, SLR test negative, FROM    EXTREMITIES:  No edema, no cyanosis, no clubbing, FROM, 2+ dorsalis pedis pulses bilaterally.   NEURO:  No deficit, normal gait, strength and tone, senso atherosclerosis     General body deterioration     Pure hypercholesterolemia     Hypothyroidism     Circumscribed scleroderma     Osteopenia of both thighs     Osteopenia of spine     Preiser's disease ([address]     Restless legs syndrome      CHARLEE MATTHEWS,

## 2020-07-18 NOTE — PHYSICAL THERAPY INITIAL EVALUATION ADULT - ADDITIONAL COMMENTS
Pt states she lives in a house with her children and  and has about 3 stairs to enter the home. Patient states she used a cane and RW to ambulate indoors and outdoors. Pt states she was independent with transfers and ADLs.

## 2020-07-18 NOTE — PROGRESS NOTE ADULT - SUBJECTIVE AND OBJECTIVE BOX
Podiatry Progress Note    Subjective:   DARRION MELENDEZ is a pleasant well-nourished, well developed 74y Female in no acute distress, alert awake, and oriented to person, place and time.  Patient is a 74y old  Female who presents with a chief complaint of foot wound (17 Jul 2020 19:21)  Mentioned about overnight nausea due to medication, otherwise stable.    PAST MEDICAL & SURGICAL HISTORY:  Amputation of toe of left foot  PVD (peripheral vascular disease)  CAD (coronary artery disease)  CHF (congestive heart failure)  HTN (hypertension)  DM (diabetes mellitus)  Status post coronary artery stent placement  H/O: hysterectomy  Cardiac defibrillator in place  Artificial cardiac pacemaker  History of cholecystectomy     Objective:  Vital Signs Last 24 Hrs  T(C): 36.2 (18 Jul 2020 05:02), Max: 36.9 (17 Jul 2020 17:11)  T(F): 97.1 (18 Jul 2020 05:02), Max: 98.4 (17 Jul 2020 17:11)  HR: 70 (18 Jul 2020 05:02) (70 - 87)  BP: 169/69 (18 Jul 2020 05:02) (115/54 - 169/69)  BP(mean): --  RR: 18 (18 Jul 2020 05:02) (18 - 18)  SpO2: 98% (17 Jul 2020 11:31) (98% - 98%)                        9.6    14.20 )-----------( 440      ( 17 Jul 2020 05:30 )             30.9                 07-17    135  |  97<L>  |  27<H>  ----------------------------<  114<H>  4.2   |  26  |  0.9    Ca    9.1      17 Jul 2020 05:30  Phos  4.3     07-17  Mg     2.2     07-17    TPro  6.7  /  Alb  3.4<L>  /  TBili  <0.2  /  DBili  <0.2  /  AST  34  /  ALT  32  /  AlkPhos  275<H>  07-17    Physical Exam - Lower Extremity Focused:   Derm:   Left 1st ray resection surgical site suture intact nicely; mild rubor on tai suture sites; 0.5cm x 0.2cm granular tissue noted on suture site; no active drainage   Plantar aspect of left foot without any sign of tenderness;    No drainage, no malodor;  Vascular: DP and PT Pulses Diminished;   Neuro: Protective Sensation Diminished   MSK: Mild pain on palpation on suture site    Assessment:   S/p left 1st ray resection; POD#2    Plan:  Chart reviewed and Patient evaluated. All Questions and Concerns Addressed and Answered  Discussed diagnosis and treatment with patient  Wound Flushed w/ normal saline; suture site Dakins / DSD / Kerlix / ACE; q24  Local wound care; same as above;  Patient is stable from Podiatry standpoint; good to be D/C;  F/u as an outpatient to Dr. Yo in a week;   Attending request Gabapentin for pain control;  Attending request ID consult prior to D/C due to patient's itchyness on upper torso;  F/u w/ ID for Abx regimen upon discharge  Discussed plan w/ attending    Podiatry

## 2020-07-19 LAB
GLUCOSE BLDC GLUCOMTR-MCNC: 118 MG/DL — HIGH (ref 70–99)
GLUCOSE BLDC GLUCOMTR-MCNC: 159 MG/DL — HIGH (ref 70–99)
GLUCOSE BLDC GLUCOMTR-MCNC: 161 MG/DL — HIGH (ref 70–99)
GLUCOSE BLDC GLUCOMTR-MCNC: 163 MG/DL — HIGH (ref 70–99)

## 2020-07-19 PROCEDURE — 99233 SBSQ HOSP IP/OBS HIGH 50: CPT

## 2020-07-19 RX ORDER — CEFEPIME 1 G/1
2000 INJECTION, POWDER, FOR SOLUTION INTRAMUSCULAR; INTRAVENOUS ONCE
Refills: 0 | Status: COMPLETED | OUTPATIENT
Start: 2020-07-19 | End: 2020-07-19

## 2020-07-19 RX ORDER — CALAMINE AND ZINC OXIDE AND PHENOL 160; 10 MG/ML; MG/ML
1 LOTION TOPICAL DAILY
Refills: 0 | Status: DISCONTINUED | OUTPATIENT
Start: 2020-07-19 | End: 2020-07-20

## 2020-07-19 RX ORDER — ERTAPENEM SODIUM 1 G/1
1000 INJECTION, POWDER, LYOPHILIZED, FOR SOLUTION INTRAMUSCULAR; INTRAVENOUS EVERY 24 HOURS
Refills: 0 | Status: DISCONTINUED | OUTPATIENT
Start: 2020-07-19 | End: 2020-07-20

## 2020-07-19 RX ORDER — FAMOTIDINE 10 MG/ML
20 INJECTION INTRAVENOUS
Refills: 0 | Status: DISCONTINUED | OUTPATIENT
Start: 2020-07-19 | End: 2020-07-20

## 2020-07-19 RX ADMIN — Medication 500 MILLIGRAM(S): at 17:30

## 2020-07-19 RX ADMIN — Medication 1 TABLET(S): at 06:14

## 2020-07-19 RX ADMIN — APIXABAN 5 MILLIGRAM(S): 2.5 TABLET, FILM COATED ORAL at 17:29

## 2020-07-19 RX ADMIN — PANTOPRAZOLE SODIUM 40 MILLIGRAM(S): 20 TABLET, DELAYED RELEASE ORAL at 06:14

## 2020-07-19 RX ADMIN — INSULIN GLARGINE 30 UNIT(S): 100 INJECTION, SOLUTION SUBCUTANEOUS at 22:01

## 2020-07-19 RX ADMIN — GABAPENTIN 200 MILLIGRAM(S): 400 CAPSULE ORAL at 22:01

## 2020-07-19 RX ADMIN — Medication 500 MILLIGRAM(S): at 06:14

## 2020-07-19 RX ADMIN — Medication 100 MILLIGRAM(S): at 22:01

## 2020-07-19 RX ADMIN — CEFEPIME 100 MILLIGRAM(S): 1 INJECTION, POWDER, FOR SOLUTION INTRAMUSCULAR; INTRAVENOUS at 18:05

## 2020-07-19 RX ADMIN — GABAPENTIN 200 MILLIGRAM(S): 400 CAPSULE ORAL at 06:14

## 2020-07-19 RX ADMIN — Medication 1: at 07:59

## 2020-07-19 RX ADMIN — Medication 1: at 17:27

## 2020-07-19 RX ADMIN — ATORVASTATIN CALCIUM 40 MILLIGRAM(S): 80 TABLET, FILM COATED ORAL at 11:26

## 2020-07-19 RX ADMIN — CEFEPIME 100 MILLIGRAM(S): 1 INJECTION, POWDER, FOR SOLUTION INTRAMUSCULAR; INTRAVENOUS at 06:13

## 2020-07-19 RX ADMIN — LISINOPRIL 40 MILLIGRAM(S): 2.5 TABLET ORAL at 06:15

## 2020-07-19 RX ADMIN — Medication 100 MILLIGRAM(S): at 15:47

## 2020-07-19 RX ADMIN — Medication 10 UNIT(S): at 07:58

## 2020-07-19 RX ADMIN — CHLORHEXIDINE GLUCONATE 1 APPLICATION(S): 213 SOLUTION TOPICAL at 06:14

## 2020-07-19 RX ADMIN — CLOPIDOGREL BISULFATE 75 MILLIGRAM(S): 75 TABLET, FILM COATED ORAL at 11:26

## 2020-07-19 RX ADMIN — CALAMINE AND ZINC OXIDE AND PHENOL 1 APPLICATION(S): 160; 10 LOTION TOPICAL at 19:16

## 2020-07-19 RX ADMIN — LORATADINE 10 MILLIGRAM(S): 10 TABLET ORAL at 11:26

## 2020-07-19 RX ADMIN — Medication 1 APPLICATION(S): at 11:26

## 2020-07-19 RX ADMIN — GABAPENTIN 200 MILLIGRAM(S): 400 CAPSULE ORAL at 15:48

## 2020-07-19 RX ADMIN — Medication 10 UNIT(S): at 17:27

## 2020-07-19 RX ADMIN — Medication 20 MILLIGRAM(S): at 06:14

## 2020-07-19 RX ADMIN — Medication 20 MILLIGRAM(S): at 17:29

## 2020-07-19 RX ADMIN — Medication 10 UNIT(S): at 11:40

## 2020-07-19 RX ADMIN — Medication 100 MILLIGRAM(S): at 06:14

## 2020-07-19 RX ADMIN — Medication 1 TABLET(S): at 15:48

## 2020-07-19 RX ADMIN — Medication 1: at 11:40

## 2020-07-19 RX ADMIN — APIXABAN 5 MILLIGRAM(S): 2.5 TABLET, FILM COATED ORAL at 06:14

## 2020-07-19 RX ADMIN — Medication 1 TABLET(S): at 22:03

## 2020-07-19 RX ADMIN — Medication 25 MILLIGRAM(S): at 03:19

## 2020-07-19 NOTE — PROGRESS NOTE ADULT - SUBJECTIVE AND OBJECTIVE BOX
DARRION MELENDEZ  74y  Female      Patient is a 74y old  Female who presents with a chief complaint of foot wound (19 Jul 2020 10:20)      INTERVAL HPI/OVERNIGHT EVENTS: no pain at surgical site. has some hives on torso which itch, relieved with benadryl       REVIEW OF SYSTEMS:  no sob/ throat itching  All other review of systems negative    T(C): 36.8 (07-19-20 @ 13:27), Max: 36.8 (07-18-20 @ 21:43)  HR: 78 (07-19-20 @ 13:27) (75 - 80)  BP: 133/69 (07-19-20 @ 13:27) (133/69 - 178/79)  RR: 18 (07-19-20 @ 13:27) (18 - 18)  SpO2: --  Wt(kg): --Vital Signs Last 24 Hrs  T(C): 36.8 (19 Jul 2020 13:27), Max: 36.8 (18 Jul 2020 21:43)  T(F): 98.2 (19 Jul 2020 13:27), Max: 98.3 (18 Jul 2020 21:43)  HR: 78 (19 Jul 2020 13:27) (75 - 80)  BP: 133/69 (19 Jul 2020 13:27) (133/69 - 178/79)  BP(mean): --  RR: 18 (19 Jul 2020 13:27) (18 - 18)  SpO2: --    PHYSICAL EXAM:  GENERAL: NAD  PSYCH: no agitation, baseline mentation  NERVOUS SYSTEM:  Alert & Oriented X3, no new focal deficits  PULMONARY: Clear to percussion bilaterally; No rales, rhonchi, wheezing, or rubs  CARDIOVASCULAR: Regular rate and rhythm; No murmurs, rubs, or gallops  GI: Soft, Nontender, Nondistended; Bowel sounds present  EXTREMITIES:  2+ Peripheral Pulses, No clubbing, cyanosis, or edema, b/l shin hyperpigmentation  SKIN: L foot dressing, no strike through bleeding    Consultant(s) Notes Reviewed:  [x ] YES  [ ] NO    Discussed with Consultants/Other Providers [ x] YES     LABS                          9.7    15.51 )-----------( 423      ( 18 Jul 2020 05:16 )             30.7     07-18    134<L>  |  93<L>  |  28<H>  ----------------------------<  133<H>  4.9   |  25  |  1.1    Ca    9.3      18 Jul 2020 05:16  Phos  4.4     07-18  Mg     1.9     07-18            Lactate Trend  07-14 @ 20:45 Lactate:1.4         CAPILLARY BLOOD GLUCOSE      POCT Blood Glucose.: 159 mg/dL (19 Jul 2020 16:45)        RADIOLOGY & ADDITIONAL TESTS:    Imaging Personally Reviewed:  [ ] YES  [ ] NO    HEALTH ISSUES - PROBLEM Dx:

## 2020-07-19 NOTE — PROGRESS NOTE ADULT - SUBJECTIVE AND OBJECTIVE BOX
Podiatry Progress Note    Subjective:   DARRION MELENDEZ is a pleasant well-nourished, well developed 74y Female in no acute distress, alert awake, and oriented to person, place and time.  Patient is a 74y old  Female who presents with a chief complaint of foot wound (18 Jul 2020 15:58)    PAST MEDICAL & SURGICAL HISTORY:  Amputation of toe of left foot  PVD (peripheral vascular disease)  CAD (coronary artery disease)  CHF (congestive heart failure)  HTN (hypertension)  DM (diabetes mellitus)  Status post coronary artery stent placement  H/O: hysterectomy  Cardiac defibrillator in place  Artificial cardiac pacemaker  History of cholecystectomy     Objective:  Vital Signs Last 24 Hrs  T(C): 36.4 (19 Jul 2020 04:45), Max: 36.8 (18 Jul 2020 21:43)  T(F): 97.6 (19 Jul 2020 04:45), Max: 98.3 (18 Jul 2020 21:43)  HR: 75 (19 Jul 2020 04:45) (54 - 80)  BP: 178/79 (19 Jul 2020 04:45) (153/67 - 178/79)  BP(mean): --  RR: 18 (19 Jul 2020 04:45) (18 - 18)  SpO2: --                        9.7    15.51 )-----------( 423      ( 18 Jul 2020 05:16 )             30.7                 07-18    134<L>  |  93<L>  |  28<H>  ----------------------------<  133<H>  4.9   |  25  |  1.1    Ca    9.3      18 Jul 2020 05:16  Phos  4.4     07-18  Mg     1.9     07-18    Physical Exam - Lower Extremity Focused:   Derm:   Left 1st ray resection surgical site suture intact nicely; mild rubor on tai suture sites; 0.5cm x 0.2cm granular tissue noted on suture site; no active drainage   Plantar aspect of left foot without any sign of tenderness;    No drainage, no malodor;  Vascular: DP and PT Pulses Diminished;   Neuro: Protective Sensation Diminished   MSK: Mild pain on palpation on suture site    Assessment:   S/p left 1st ray resection; POD#3    Plan:  Chart reviewed and Patient evaluated. All Questions and Concerns Addressed and Answered  Discussed diagnosis and treatment with patient  Wound Flushed w/ normal saline; suture site Dakins / DSD / Kerlix / ACE; q24  Local wound care; same as above;  Patient is stable from Podiatry standpoint; good to be D/C;  F/u as an outpatient to Dr. Yo in a week;   Attending request CBC w/ differential;  Attending request Gabapentin for pain control;  Attending request ID consult prior to D/C due to patient's itchyness on upper torso;  F/u w/ ID for Abx regimen upon discharge  Discussed plan w/ attending    Podiatry

## 2020-07-20 ENCOUNTER — TRANSCRIPTION ENCOUNTER (OUTPATIENT)
Age: 75
End: 2020-07-20

## 2020-07-20 VITALS
DIASTOLIC BLOOD PRESSURE: 62 MMHG | SYSTOLIC BLOOD PRESSURE: 113 MMHG | OXYGEN SATURATION: 99 % | TEMPERATURE: 97 F | RESPIRATION RATE: 18 BRPM | HEART RATE: 75 BPM

## 2020-07-20 LAB
ALBUMIN SERPL ELPH-MCNC: 3.3 G/DL — LOW (ref 3.5–5.2)
ALP SERPL-CCNC: 262 U/L — HIGH (ref 30–115)
ALT FLD-CCNC: 25 U/L — SIGNIFICANT CHANGE UP (ref 0–41)
ANION GAP SERPL CALC-SCNC: 13 MMOL/L — SIGNIFICANT CHANGE UP (ref 7–14)
AST SERPL-CCNC: 29 U/L — SIGNIFICANT CHANGE UP (ref 0–41)
BILIRUB SERPL-MCNC: 0.3 MG/DL — SIGNIFICANT CHANGE UP (ref 0.2–1.2)
BUN SERPL-MCNC: 39 MG/DL — HIGH (ref 10–20)
CALCIUM SERPL-MCNC: 9.3 MG/DL — SIGNIFICANT CHANGE UP (ref 8.5–10.1)
CHLORIDE SERPL-SCNC: 97 MMOL/L — LOW (ref 98–110)
CO2 SERPL-SCNC: 27 MMOL/L — SIGNIFICANT CHANGE UP (ref 17–32)
CREAT SERPL-MCNC: 1.1 MG/DL — SIGNIFICANT CHANGE UP (ref 0.7–1.5)
CULTURE RESULTS: SIGNIFICANT CHANGE UP
CULTURE RESULTS: SIGNIFICANT CHANGE UP
GLUCOSE BLDC GLUCOMTR-MCNC: 117 MG/DL — HIGH (ref 70–99)
GLUCOSE BLDC GLUCOMTR-MCNC: 119 MG/DL — HIGH (ref 70–99)
GLUCOSE BLDC GLUCOMTR-MCNC: 94 MG/DL — SIGNIFICANT CHANGE UP (ref 70–99)
GLUCOSE SERPL-MCNC: 97 MG/DL — SIGNIFICANT CHANGE UP (ref 70–99)
HCT VFR BLD CALC: 29.8 % — LOW (ref 37–47)
HGB BLD-MCNC: 9.3 G/DL — LOW (ref 12–16)
MAGNESIUM SERPL-MCNC: 2 MG/DL — SIGNIFICANT CHANGE UP (ref 1.8–2.4)
MCHC RBC-ENTMCNC: 26.9 PG — LOW (ref 27–31)
MCHC RBC-ENTMCNC: 31.2 G/DL — LOW (ref 32–37)
MCV RBC AUTO: 86.1 FL — SIGNIFICANT CHANGE UP (ref 81–99)
NRBC # BLD: 0 /100 WBCS — SIGNIFICANT CHANGE UP (ref 0–0)
PLATELET # BLD AUTO: 393 K/UL — SIGNIFICANT CHANGE UP (ref 130–400)
POTASSIUM SERPL-MCNC: 5 MMOL/L — SIGNIFICANT CHANGE UP (ref 3.5–5)
POTASSIUM SERPL-SCNC: 5 MMOL/L — SIGNIFICANT CHANGE UP (ref 3.5–5)
PROT SERPL-MCNC: 6.6 G/DL — SIGNIFICANT CHANGE UP (ref 6–8)
RBC # BLD: 3.46 M/UL — LOW (ref 4.2–5.4)
RBC # FLD: 15.6 % — HIGH (ref 11.5–14.5)
SODIUM SERPL-SCNC: 137 MMOL/L — SIGNIFICANT CHANGE UP (ref 135–146)
SPECIMEN SOURCE: SIGNIFICANT CHANGE UP
SPECIMEN SOURCE: SIGNIFICANT CHANGE UP
SURGICAL PATHOLOGY STUDY: SIGNIFICANT CHANGE UP
WBC # BLD: 14.19 K/UL — HIGH (ref 4.8–10.8)
WBC # FLD AUTO: 14.19 K/UL — HIGH (ref 4.8–10.8)

## 2020-07-20 PROCEDURE — 99233 SBSQ HOSP IP/OBS HIGH 50: CPT

## 2020-07-20 RX ORDER — GLIMEPIRIDE 1 MG
1 TABLET ORAL
Qty: 0 | Refills: 0 | DISCHARGE

## 2020-07-20 RX ORDER — ERTAPENEM SODIUM 1 G/1
1 INJECTION, POWDER, LYOPHILIZED, FOR SOLUTION INTRAMUSCULAR; INTRAVENOUS
Qty: 0 | Refills: 0 | DISCHARGE
Start: 2020-07-20 | End: 2020-08-03

## 2020-07-20 RX ORDER — FUROSEMIDE 40 MG
1 TABLET ORAL
Qty: 0 | Refills: 0 | DISCHARGE

## 2020-07-20 RX ORDER — GABAPENTIN 400 MG/1
2 CAPSULE ORAL
Qty: 180 | Refills: 0
Start: 2020-07-20 | End: 2020-08-18

## 2020-07-20 RX ORDER — CALAMINE AND ZINC OXIDE AND PHENOL 160; 10 MG/ML; MG/ML
1 LOTION TOPICAL
Qty: 1 | Refills: 0
Start: 2020-07-20 | End: 2020-08-18

## 2020-07-20 RX ORDER — APIXABAN 2.5 MG/1
1 TABLET, FILM COATED ORAL
Qty: 0 | Refills: 0 | DISCHARGE
Start: 2020-07-20

## 2020-07-20 RX ORDER — SODIUM HYPOCHLORITE 0.125 %
1 SOLUTION, NON-ORAL MISCELLANEOUS
Qty: 30 | Refills: 0
Start: 2020-07-20 | End: 2020-08-18

## 2020-07-20 RX ORDER — ATORVASTATIN CALCIUM 80 MG/1
1 TABLET, FILM COATED ORAL
Qty: 0 | Refills: 0 | DISCHARGE

## 2020-07-20 RX ADMIN — ATORVASTATIN CALCIUM 40 MILLIGRAM(S): 80 TABLET, FILM COATED ORAL at 11:41

## 2020-07-20 RX ADMIN — GABAPENTIN 200 MILLIGRAM(S): 400 CAPSULE ORAL at 13:34

## 2020-07-20 RX ADMIN — Medication 100 MILLIGRAM(S): at 05:41

## 2020-07-20 RX ADMIN — APIXABAN 5 MILLIGRAM(S): 2.5 TABLET, FILM COATED ORAL at 05:42

## 2020-07-20 RX ADMIN — LISINOPRIL 40 MILLIGRAM(S): 2.5 TABLET ORAL at 05:42

## 2020-07-20 RX ADMIN — Medication 20 MILLIGRAM(S): at 05:42

## 2020-07-20 RX ADMIN — LORATADINE 10 MILLIGRAM(S): 10 TABLET ORAL at 11:41

## 2020-07-20 RX ADMIN — Medication 1 TABLET(S): at 05:42

## 2020-07-20 RX ADMIN — Medication 1 APPLICATION(S): at 11:44

## 2020-07-20 RX ADMIN — Medication 100 MILLIGRAM(S): at 13:33

## 2020-07-20 RX ADMIN — Medication 10 UNIT(S): at 11:39

## 2020-07-20 RX ADMIN — Medication 100 MILLIGRAM(S): at 05:42

## 2020-07-20 RX ADMIN — Medication 500 MILLIGRAM(S): at 05:41

## 2020-07-20 RX ADMIN — PANTOPRAZOLE SODIUM 40 MILLIGRAM(S): 20 TABLET, DELAYED RELEASE ORAL at 05:42

## 2020-07-20 RX ADMIN — CLOPIDOGREL BISULFATE 75 MILLIGRAM(S): 75 TABLET, FILM COATED ORAL at 11:41

## 2020-07-20 RX ADMIN — GABAPENTIN 200 MILLIGRAM(S): 400 CAPSULE ORAL at 05:42

## 2020-07-20 RX ADMIN — Medication 10 UNIT(S): at 08:10

## 2020-07-20 RX ADMIN — ERTAPENEM SODIUM 120 MILLIGRAM(S): 1 INJECTION, POWDER, LYOPHILIZED, FOR SOLUTION INTRAMUSCULAR; INTRAVENOUS at 05:40

## 2020-07-20 RX ADMIN — Medication 1 TABLET(S): at 13:34

## 2020-07-20 NOTE — PROGRESS NOTE ADULT - ATTENDING COMMENTS
Patient seen and examined independently of resident. My addendum supersedes resident notation. Case discussed with housestaff, nursing and patient
Patient seen independently of resident.  >30 minutes spent coordinating discharge planning, medicine reconciliation, follow up plan, and direct patient encounter    see discharge summary for further recommendation

## 2020-07-20 NOTE — DISCHARGE NOTE PROVIDER - PROVIDER TOKENS
PROVIDER:[TOKEN:[[id number]:[1 week]],PROVIDER:[TOKEN:[[id number]],FOLLOWUP:[1 week]] PROVIDER:[TOKEN:[[id number]],FOLLOWUP:[1 week]],PROVIDER:[TOKEN:[[id number]],FOLLOWUP:[1 week]],PROVIDER:[TOKEN:[[id number]],FOLLOWUP:[2 weeks]]

## 2020-07-20 NOTE — DISCHARGE NOTE PROVIDER - NSDCCPCAREPLAN_GEN_ALL_CORE_FT
PRINCIPAL DISCHARGE DIAGNOSIS  Diagnosis: Postoperative dehiscence of skin wound  Assessment and Plan of Treatment: s/p lef hallux amputation in May 2020  The wound from the toe amputation you had never completely healed. When you came to the hospital, the wound site was likely infected and you had been on one antibiotic for almost 6 weeks. We switched to a stronger antibiotic which you will continue taking for 14 days after you leave the hospital.   A nurse will also come to your home to do local wound care with you.  Please follow up w/ Dr. Tin Salinas, the infectious disease doctor, at . You will have a telehealth appointment with her on 7/28 between 10:30-1:30.   In the meantime be sure to get your weekly labs drawn (script provided), as these will help the doctor determine zarate well the treatment is working.  If you get itchy, continue using the calamine lotion. If you feel that you are having difficulty breathing, promptly return to hospital or reach out to your primary physician.  Please also follow up with podiatry. Information is also provided in this packet. PRINCIPAL DISCHARGE DIAGNOSIS  Diagnosis: Postoperative dehiscence of skin wound  Assessment and Plan of Treatment: s/p lef hallux amputation in May 2020  with chronic osteomyelitis  The wound from the toe amputation you had never completely healed. When you came to the hospital, the wound site was likely infected and you had been on one antibiotic for almost 6 weeks. We switched to a stronger antibiotic which you will continue taking for 14 days after you leave the hospital.   A nurse will also come to your home to do local wound care with you.  Please follow up w/ Dr. Tin Salinas, the infectious disease doctor, at . You will have a telehealth appointment with her on 7/28 between 10:30-1:30.   In the meantime be sure to get your weekly labs drawn (script provided), as these will help the doctor determine zarate well the treatment is working.  If you get itchy, continue using the calamine lotion. If you feel that you are having difficulty breathing, promptly return to hospital or reach out to your primary physician.  Please also follow up with podiatry. Information is also provided in this packet.      SECONDARY DISCHARGE DIAGNOSES  Diagnosis: Peripheral arterial disease  Assessment and Plan of Treatment: continue with antiplatelet and anticoagulation. is contributing to your impaired wound healing    Diagnosis: Diabetes mellitus with hyperglycemia  Assessment and Plan of Treatment: glycemic control at home, goal -180 for optimal wound healing

## 2020-07-20 NOTE — DISCHARGE NOTE PROVIDER - NSDCFUSCHEDAPPT_GEN_ALL_CORE_FT
DARRION MELENDEZ ; 07/21/2020 ; [facility] [address]  DARRION MELENDEZ ; 07/21/2020 ; [facility] [address] DARRION MELENDEZ ; 07/21/2020 ; [facility] [address]  DARRION MELENDEZ ; 07/21/2020 ; [facility] [address] DARRION MELENDEZ ; 07/21/2020 ; [facility] [address]  DARRION MELENDEZ ; 07/21/2020 ; [facility] [address]

## 2020-07-20 NOTE — PROGRESS NOTE ADULT - SUBJECTIVE AND OBJECTIVE BOX
Podiatry Progress Note    Subjective:   DARRION MELENDEZ is a pleasant well-nourished, well developed 74y Female in no acute distress, alert awake, and oriented to person, place and time.  Patient is a 74y old  Female who presents with a chief complaint of foot wound (19 Jul 2020 17:24)      PAST MEDICAL & SURGICAL HISTORY:  Amputation of toe of left foot  PVD (peripheral vascular disease)  CAD (coronary artery disease)  CHF (congestive heart failure)  HTN (hypertension)  DM (diabetes mellitus)  Status post coronary artery stent placement  H/O: hysterectomy  Cardiac defibrillator in place  Artificial cardiac pacemaker  History of cholecystectomy       Objective:  Vital Signs Last 24 Hrs  T(C): 36.3 (20 Jul 2020 04:46), Max: 36.8 (19 Jul 2020 13:27)  T(F): 97.4 (20 Jul 2020 04:46), Max: 98.2 (19 Jul 2020 13:27)  HR: 73 (20 Jul 2020 04:46) (73 - 78)  BP: 131/60 (20 Jul 2020 04:46) (130/62 - 133/69)  BP(mean): --  RR: 18 (20 Jul 2020 04:46) (18 - 18)  SpO2: --                        9.3    14.19 )-----------( 393      ( 20 Jul 2020 06:19 )             29.8                 07-20    137  |  97<L>  |  39<H>  ----------------------------<  97  5.0   |  27  |  1.1    Ca    9.3      20 Jul 2020 06:19  Mg     2.0     07-20    TPro  6.6  /  Alb  3.3<L>  /  TBili  0.3  /  DBili  x   /  AST  29  /  ALT  25  /  AlkPhos  262<H>  07-20    Physical Exam - Lower Extremity Focused:   Derm:   Left 1st ray resection surgical site suture intact    Mild rubor noted to tai suture site  No active drainage, No malodor noted    Vascular: DP and PT Pulses Diminished;   Neuro: Protective Sensation Diminished   MSK: No pain on palpation on suture site    Assessment:   S/p Left 1st ray resection - DOS: 7/16/2020    Plan:  Chart reviewed and Patient evaluated. All Questions and Concerns Addressed and Answered  Discussed diagnosis and treatment with patient  Wound Flushed w/ Dakins; Packed with iodoform / DSD / Kerlix / ACE  Local wound care; Same as above; Q24 Dressing Changes  Request Visiting Nurse Service for Wound Care/Dressing Changes  Patient is stable from Podiatry standpoint; good to be D/C  F/u as outpatient with Dr. Yo in a week for suture removal  Patient will have hyperbaric treatment as outpatient  Discussed plan w/ Attending, Dr. Yo     Podiatry

## 2020-07-20 NOTE — DISCHARGE NOTE PROVIDER - HOSPITAL COURSE
74 Y F with pmh CAD s/p PCI, Chronic HFrEF (now improved) s/p BiVICD, afib on eliquis, HTN, CVA, DM, severe PAD s/p angiogram, laser atherectomy and balloon (L-distal SFA, L-popliteal artery, L-TPT trunk, and L-peroneal artery) and non healing Left foot ulcer s/p left Hallux amputation May 2020 presented to ED after being sent in by podiatrist for non-healing left foot ulcer. After the toe amputation, she was d/c'd w/ PICC line and abx, but on f/u was still having significant pain and swelling, non-purulent.     In the ED, she was afebrile and hemodynamically stable. Admitted for wound dehiscence s/p left hallux amputation.     Arterial duplex showed normal arterial flow on left LE.     Podiatry debrided the wound and followed up with daily wound care. She was treated w/ Rocephin 2 g IV qd. She did experience some pruritus on the rocephin, but no signs of airway compromise. She was given calamine lotion and protonix which relieved the symptoms. Blood cultures from 7/14 are NGTD.     As patient nears 6 weeks of IV ceftriaxone treatment (she came in with PICC) but continued signs of bone necrosis, we decided that she should be d/c'd on ertapenem.    When it was determined that podiatry did not need to debride further, patient was put on eliquis.     Podiatry recommended hyperbaric O2 tx as o/p--she will f/u with podiatry.    She is d/c'd today on ertapenem 1 g IV qd x14d. She will f/u with Dr. Tin Salinas on 7/28 ([employer]; 10:30-1:30).

## 2020-07-20 NOTE — PROGRESS NOTE ADULT - ASSESSMENT
75yo F vasculopath c CAD chronic systolic HF compensated c biVICD, Afib on NOAC, HTN, CVA, DM2, significant PAD c L sided vascular sx interventions, L DFU c osteomyelitis s/p L hallux amputation presents with poor wound healing for further excisional debridement    pod #1 s/p OR-> excisional debridement f/u cx's/ biopsy/ and margins  NWB (probably heel touch only) post op, protective darco shoe and offloading probably with cane- f/u podiatry recs post op->  Wound Flushed w/ normal saline; suture site Dakins / betadine / DSD / Kerlix / ACE; q24  Local wound care; same as above  IV abx per id recommendation-> appreciated f/u today- pt is approaching >6 weeks of IV Ceftriaxone and continued signs of bone necrosis, would D/C with IV ertapenem 1g q24h IV x at least 14 days with close follow-up to determine course. If issues do Cefepime 2g q12h IV and PO flagyl 500mg BID   - Weekly CBC, CMP, ESR/CRP  - ID follow-up with Dr. Tin Jewell for [facility] 7/28 . We will call the patient between 10:30-1:30      [address]       [phone]       Fax [phone]     resuming NOAC now since no further debridement being planned on this admission  glycemic control has been good- goal 130-180  post op incentive spirometry/ analgesia/ bowel regimen        #Progress Note Handoff    Pending (specify): PT session NWB, has PICC, should get ertapenem vs cefepime as above, resuming noac as prior. possible d/c sunday/monday depending on where either snf or home with help    Family discussion: plan of care discussed with patient    Disposition: ACUTE at this moment
A/P   Assessment:   S/p left 1st ray resection; POD#1  PAD  DM  OM   toe amputation       Plan:  Chart reviewed and Patient evaluated. All Questions and Concerns Addressed and Answered  Discussed diagnosis and treatment with patient  Wound Flushed w/ 1/2  Dakins (the open area) /lightly packed with sterile packing / betadine to maceration around medial ulceration  / DSD / Kerlix / ACE; q24  make sure to cover all joan prominences, make sure dressing is not tight   Local wound care; same as above;  call freddy Ji to D/c oxycodone   Patient is good to be D/C after check up tomorrow if no issues   will need close monitoring of wound /vitals  Weekly CBC, CMP, ESR/CRP   request Gabapentin for pain control -as per patient she had pain in this foot before and it was better controlled with Gabapentin   rec  ID consult prior to D/C due to patient's itchyness on upper torso;  F/u w/ ID for Abx regimen upon discharge  Weekly CBC, CMP, ESR/CRP  discussed plan with pt and her daughter , explained to them Mrs. Keating may need additional procedures/debridements   recommended HBO after discharged ASAP if not a candidate will try topical oxygen   pt will need wound care at home   follow up with vascular Dr. REN Hammonds in 2-3 weeks   rec: no weight bearing to left foot (pt has wheelchair at home )   pt and family has my cell for any questions   ID consult appreciated   please call with any pt related questions at [phone]
73yo F vasculopath c CAD chronic systolic HF compensated c biVICD, Afib on NOAC, HTN, CVA, DM2, significant PAD c L sided vascular sx interventions, L DFU c osteomyelitis s/p L hallux amputation presents with poor wound healing for further excisional debridement. has wound dehiscence and acute on chronic osteomyelitis despite recent hallux amputation    underwent excisional debridement f/u cx's/ biopsy/ and margins with podiatry as outpatient  NWB post op, Wound Flushed w/ 1/2  Dakins (the open area) /lightly packed with sterile packing / betadine to maceration around medial ulceration  / DSD / Kerlix / ACE; q24  podiatry attending recommending hyperbaric o2 tx as outpatient-> [facility]  and continued f/u with patient's vascular surgeon Dr. DELIA Hammonds in 2-3wks  Local wound care; same as above  IV abx per id recommendation-> appreciated f/u today- pt is approaching >6 weeks of IV Ceftriaxone and continued signs of bone necrosis, would D/C with IV ertapenem 1g q24h IV x at least 14 days  - Weekly CBC, CMP, ESR/CRP  - ID follow-up with Dr. Tin Jewell for Telehealth 7/28 . We will call the patient between 10:30-1:30      [address]       [phone]       Fax [phone]     resumed NOAC  glycemic control has been good- goal 130-180  added gabapentin to analgesia regimen  post op incentive spirometry/ analgesia/ bowel regimen    medically stable for discharge home with help today
73yo F vasculopath c CAD chronic systolic HF compensated c biVICD, Afib on NOAC, HTN, CVA, DM2, significant PAD c L sided vascular sx interventions, L DFU s/p L hallux amputation presents with poor wound healing for further excisional debridement    OR-> excisional debridement f/u cx's/ biopsy/ and margins  NWB (probably heel touch only) post op, protective darco shoe and offloading probably with cane- f/u podiatry recs post op  IV abx per id recommendation  post op c/w blood thinner per vascular recommendation  f/u repeat art duplex  glycemic control has been good- goal 130-180  post op incentive spirometry/ analgesia/ bowel regimen        #Progress Note Handoff    Pending (specify):  OR today. plan as above    Family discussion: plan of care discussed with patient    Disposition: ACUTE at this moment
74 Y F with pmh CAD s/p PCI, Chronic HFrEF (now improved) s/p BiVICD, afib on eliquis, HTN, CVA, DM, severe peripheral vascular disease s/p angiogram status post laser atherectomy and balloon of L-distal SFA, L-popliteal artery, L-TPT trunk, and L-peroneal artery, p/w wound dehiscence of the foot s/p left hallux amputation (5/2020). Patient was d/c'd on PICC line and abx after amputation.    #Non-healing left foot ulcer s/p left hallux amputation  Patient has a PICC.   Non-necrotic, skin immediately beside the wound is erythematous, warm, edematous   Patient received eliquis @9PM last night, podiatry aware. Eliquis currently held.  Art Duplex shows normal arterial flow on the left LE  Patient felt pruritus last night, likely the abx  > C/w Rocephin 2g iv q24h   > D/Cd doxy  >To OR for debridement with podiatry; they will send wound cultures  > Wound flush w/ NS, betadine w/ sterile dressing  > Re-start eliquis tomorrow, d/w vascular   > Benadryl 25 PRN    #Chronic HFrEF (now improved) s/p BiVICD  euvolemic  > strict i/o, fluid restriction, daily wt  > monitor for signs of volume overload  > c/w lasix 20mg po q12h     #CAD s/p PCI  #Chronic afib with recent CVA  >c/w toprol 100mg qd  >c/w statin    #HTN  >c/w Lisinopril 40mg, toprol ER 100mg, hydralazine 100mg TID     #DM  > lantus 30units qhs  > lispro 10units qac  > f/u fs    #DVT PPx- On hold until after OR  #GI PPx- Protonix 40mg po qd  #Diet- NPO  #CHG  #Activity- WBAT to heel touch with DARCO shoe  #Dispo- Home  #Code- FULL
74 Y F with wound dehiscence of the foot s/p left hallux amputation (5/2020). She is s/p debridement by podiatry, not on AC at this moment.    #Non-healing left foot ulcer s/p left hallux amputation  Patient has a PICC. Non-necrotic, skin immediately beside the wound is erythematous, warm, edematous   Art Duplex shows normal arterial flow on the left LE  > C/w Rocephin 2g iv q24h   > Wound flush w/ NS, betadine w/ sterile dressing  > Will likely d/c on IV ertapenem 1g q24h IV x 14d; otherwise cefepime 2g q12h IV and PO flagyl 500mg BID   > Weekly ESR/CRP, CBC, CMP  > F/u o/p w/ Dr. Salinas   Patient felt pruritus after starting rocephin, resolved w/ benadryl PRN  > C/w Benadryl 25 PRN  No pathology specimen sent by podiatry; Podiatry says there will be no further debridements  > Start Eliquis    #Chronic HFrEF s/p BiVICD (stable)  euvolemic  > strict i/o, fluid restriction, daily wt  > monitor for signs of volume overload  > c/w lasix 20mg po q12h     #CAD s/p PCI  #Chronic afib with recent CVA (stable)  >c/w toprol 100mg qd  >c/w statin    #HTN (controlled)  >c/w Lisinopril 40mg, toprol ER 100mg, hydralazine 100mg TID     #DM (controlled)  > lantus 30units qhs  > lispro 10units qac  > f/u fs    #DVT PPx- Eliquis  #GI PPx- Protonix 40mg po qd  #Diet- DASH/TLC  #CHG  #Activity- Non-WB  #Dispo- Home  #Code- FULL
75yo F vasculopath c CAD chronic systolic HF compensated c biVICD, Afib on NOAC, HTN, CVA, DM2, significant PAD c L sided vascular sx interventions, L DFU c osteomyelitis s/p L hallux amputation presents with poor wound healing for further excisional debridement. has wound dehiscence and acute on chronic osteomyelitis despite recent hallux amputation    pod #2 s/p OR-> excisional debridement f/u cx's/ biopsy/ and margins  NWB post op, Wound Flushed w/ 1/2  Dakins (the open area) /lightly packed with sterile packing / betadine to maceration around medial ulceration  / DSD / Kerlix / ACE; q24  podiatry attending recommending hyperbaric o2 tx as outpatient  and continued f/u with patient's vascular surgeon Dr. DELIA Hammonds in 2-3wks  Local wound care; same as above  IV abx per id recommendation-> appreciated f/u today- pt is approaching >6 weeks of IV Ceftriaxone and continued signs of bone necrosis, would D/C with IV ertapenem 1g q24h IV x at least 14 days with close follow-up to determine course. If issues do Cefepime 2g q12h IV and PO flagyl 500mg BID   - Weekly CBC, CMP, ESR/CRP  - ID follow-up with Dr. Tin Salinas [place] for Telehealth 7/28 . We will call the patient between 10:30-1:30      [address] [facility]       [phone]       Fax [phone]     resumed NOAC now since no further debridement being planned on this admission  glycemic control has been good- goal 130-180  adding gabapentin to analgesia regimen  post op incentive spirometry/ analgesia/ bowel regimen        #Progress Note Handoff    Pending (specify): PT/ NWB, cm to arrange for abx and wound care for home, monitor allergies    Family discussion: plan of care discussed with patient    Disposition: probable discharge monday with home abx and wound care
75yo F vasculopath c CAD chronic systolic HF compensated c biVICD, Afib on NOAC, HTN, CVA, DM2, significant PAD c L sided vascular sx interventions, L DFU c osteomyelitis s/p L hallux amputation presents with poor wound healing for further excisional debridement. has wound dehiscence and acute on chronic osteomyelitis despite recent hallux amputation    pod #2 s/p OR-> excisional debridement f/u cx's/ biopsy/ and margins  NWB post op, Wound Flushed w/ 1/2  Dakins (the open area) /lightly packed with sterile packing / betadine to maceration around medial ulceration  / DSD / Kerlix / ACE; q24  podiatry attending recommending hyperbaric o2 tx as outpatient  and continued f/u with patient's vascular surgeon Dr. DELIA Hammonds in 2-3wks  Local wound care; same as above  IV abx per id recommendation-> appreciated f/u today- pt is approaching >6 weeks of IV Ceftriaxone and continued signs of bone necrosis, would D/C with IV ertapenem 1g q24h IV x at least 14 days-> will give first dose tomorrow morning with close follow-up to determine course. If issues do Cefepime 2g q12h IV and PO flagyl 500mg BID- though I suspect patient is having some minor drug rash from cephalosporins    - Weekly CBC, CMP, ESR/CRP  - ID follow-up with Dr. Tin Briscoedays for Telehealth 7/28 . We will call the patient between 10:30-1:30      [address] [facility]       [phone]       Fax [phone]     resumed NOAC now since no further debridement being planned on this admission  glycemic control has been good- goal 130-180  added gabapentin to analgesia regimen  post op incentive spirometry/ analgesia/ bowel regimen        #Progress Note Handoff    Pending (specify): PT/ NWB, cm to arrange for abx and wound care for home, monitor allergies-> possible discharge tomorrow    Family discussion: plan of care discussed with patient    Disposition: probable discharge monday with home abx and wound care
ASSESSMENT  74 Y F with pmh CAD s/p PCI, Chronic HFrEF (now improved) s/p BiVICD, afib on eliquis, HTN, CVA, DM, severe peripheral vascular disease s/p angiogram status post laser atherectomy and balloon of L-distal SFA, L-popliteal artery, L-TPT trunk, and L-peroneal artery, non healing Left foot ulcer: s/p left Hallux amputation May 2020 sent to hospital by podiatrist for non healing wound and further debridement. Labs show improving leukocytosis but pt afebrile. On inspection, there is scattered necrosis but no signs of purulence or superimposed cellulitis.    IMPRESSION  # L foot osteomyelitis     6/6 WCX MSSA    6/3 WCX MSSA    Was on IV Ceftriaxone + PO doxy and outpatient    Excisional debridement of ulcer of left foot 16-Jul-2020 18:24:23  Dcunha, Ambika  Pulsed lavage with suction 16-Jul-2020 18:24:14  Dcunha, Ambika  Resection, metatarsal bone 16-Jul-2020 18:24:07    "Non-Viable Soft Tissue, Necrotic Bone"  ?Ischemia vs worsening infection  Sedimentation Rate, Erythrocyte: 56 mm/Hr (07-14-20 @ 20:45) <-- Sedimentation Rate, Erythrocyte: 97 mm/Hr (06.26.20 @ 08:05)  C-Reactive Protein, Serum: 1.00 mg/dL (07-14-20 @ 20:45) <--C-Reactive Protein, Serum: 2.41 mg/dL (06.26.20 @ 08:05)  # PICC line shows no signs of phlebitis or infection    RECOMMENDATIONS  - f/u OR path, no cultures sent  - as pt is approaching >6 weeks of IV Ceftriaxone and continued signs of bone necrosis, would D/C with IV ertapenem 1g q24h IV x at least 14 days with close follow-up to determine course. If issues do Cefepime 2g q12h IV and PO flagyl 500mg BID   - Weekly CBC, CMP, ESR/CRP  - ID follow-up with Dr. Tin Jewell for Telehealth 7/28 . We will call the patient between 10:30-1:30      [address] [facility]       [phone]       Fax [phone]
Assessment:   S/p left 1st ray resection; POD#1  PAD  DM  OM   toe amputation       Plan:  Chart reviewed and Patient evaluated. All Questions and Concerns Addressed and Answered  Discussed diagnosis and treatment with patient  Wound Flushed w/ 1/2  Dakins (the open area) /lightly packed with sterile packing / betadine to maceration around medial ulceration  / DSD / Kerlix / ACE; q24  make sure to cover all joan prominences, make sure dressing is not tight   Local wound care; same as above;  Patient is good to be D/C after check up tomorrow if no issues   will need close monitoring of wound /vitals  Weekly CBC, CMP, ESR/CRP   request Gabapentin for pain control -as per patient she had pain in this foot before and it was better controlled with Gabapentin   rec  ID consult prior to D/C due to patient's itchyness on upper torso;  F/u w/ ID for Abx regimen upon discharge  Weekly CBC, CMP, ESR/CRP  discussed plan with pt and her daughter , explained to them Mrs. Keating may need additional procedures/debridements   recommended HBO after discharged ASAP if not a candidate will try topical oxygen   pt will need wound care at home   follow up with vascular Dr. REN Hammonds in 2-3 weeks   rec: no weight bearing to left foot (pt has wheelchair at home )   pt and family has my cell for any questions   ID consult appreciated   please call with any pt related questions at [phone]
Assessment:   S/p left 1st ray resection; POD#1  DM  PAD  OM   none healing ulcer/ Dehiscenced wound  Left hallux stump  cellulitis left foot       Plan:  Chart reviewed and Patient evaluated. All Questions and Concerns Addressed and Answered  Discussed diagnosis and treatment with patient  Wound Flushed w/ normal saline; suture site betadine soaked adaptic / DSD / Kerlix / ACE applied  Local Wound Care; clean w/ betadine soaked adaptic / DSD / Kerlix / ACE; q24  Wound Care Orders: As stated above  F/u w/ ID for Abx regimen upon discharge  Attending to f/u today, 7/17

## 2020-07-20 NOTE — DISCHARGE NOTE PROVIDER - NSDCFUADDINST_GEN_ALL_CORE_FT
NON weight bearing on the L foot  daily wound care recommendations per podiatry recommendations:    VNS will be coming home for wound care and IV antibiotics  you have a PICC line, weekly blood draws of ESR, CRP, CBC, CMP will be drawn by the visiting nursing service, and these values will be closely monitored by infectious disease specialist. If follow up values show evidence of poor wound healing or organ dysfunction, your antibiotic course may be adjusted NON weight bearing on the L foot  daily wound care recommendations per podiatry recommendations:  Flush wound w/ normal saline; suture site Dakins / DSD / Kerlix / ACE; q24    VNS will be coming home for wound care and IV antibiotics  you have a PICC line, weekly blood draws of ESR, CRP, CBC, CMP will be drawn by the visiting nursing service, and these values will be closely monitored by infectious disease specialist. If follow up values show evidence of poor wound healing or organ dysfunction, your antibiotic course may be adjusted

## 2020-07-20 NOTE — PROGRESS NOTE ADULT - SUBJECTIVE AND OBJECTIVE BOX
RACHID MELENDEZTA  74y  Female      Patient is a 74y old  Female who presents with a chief complaint of foot wound (20 Jul 2020 10:48)      INTERVAL HPI/OVERNIGHT EVENTS: felt somewhat weak this morning. then later better. foot pain controlled      REVIEW OF SYSTEMS:  as above  All other review of systems negative    T(C): 35.9 (07-20-20 @ 13:15), Max: 36.3 (07-20-20 @ 04:46)  HR: 75 (07-20-20 @ 13:15) (73 - 75)  BP: 113/62 (07-20-20 @ 13:15) (113/62 - 131/60)  RR: 18 (07-20-20 @ 13:15) (18 - 18)  SpO2: 99% (07-20-20 @ 13:15) (99% - 99%)  Wt(kg): --Vital Signs Last 24 Hrs  T(C): 35.9 (20 Jul 2020 13:15), Max: 36.3 (20 Jul 2020 04:46)  T(F): 96.6 (20 Jul 2020 13:15), Max: 97.4 (20 Jul 2020 04:46)  HR: 75 (20 Jul 2020 13:15) (73 - 75)  BP: 113/62 (20 Jul 2020 13:15) (113/62 - 131/60)  BP(mean): --  RR: 18 (20 Jul 2020 13:15) (18 - 18)  SpO2: 99% (20 Jul 2020 13:15) (99% - 99%)      07-20-20 @ 07:01  -  07-20-20 @ 18:03  --------------------------------------------------------  IN: 0 mL / OUT: 400 mL / NET: -400 mL        PHYSICAL EXAM:  GENERAL: NAD  PSYCH: no agitation, baseline mentation  NERVOUS SYSTEM:  Alert & Oriented X3, no new focal deficits  PULMONARY: Clear to percussion bilaterally; No rales, rhonchi, wheezing, or rubs  CARDIOVASCULAR: Regular rate and rhythm; No murmurs, rubs, or gallops  GI: Soft, Nontender, Nondistended; Bowel sounds present  EXTREMITIES:  2+ Peripheral Pulses, No clubbing, cyanosis, or edema  SKIN b/l shin hyperpigmentation, foot dressed    Consultant(s) Notes Reviewed:  [x ] YES  [ ] NO    Discussed with Consultants/Other Providers [ x] YES     LABS                          9.3    14.19 )-----------( 393      ( 20 Jul 2020 06:19 )             29.8     07-20    137  |  97<L>  |  39<H>  ----------------------------<  97  5.0   |  27  |  1.1    Ca    9.3      20 Jul 2020 06:19  Mg     2.0     07-20    TPro  6.6  /  Alb  3.3<L>  /  TBili  0.3  /  DBili  x   /  AST  29  /  ALT  25  /  AlkPhos  262<H>  07-20          Lactate Trend        CAPILLARY BLOOD GLUCOSE      POCT Blood Glucose.: 119 mg/dL (20 Jul 2020 11:08)        RADIOLOGY & ADDITIONAL TESTS:    Imaging Personally Reviewed:  [ ] YES  [ ] NO    HEALTH ISSUES - PROBLEM Dx:

## 2020-07-20 NOTE — PROGRESS NOTE ADULT - REASON FOR ADMISSION
foot wound

## 2020-07-20 NOTE — DISCHARGE NOTE PROVIDER - NSDCHHNEEDSERVICE_GEN_ALL_CORE
Teaching and training/Medication teaching and assessment Wound care and assessment/Teaching and training/Medication teaching and assessment/Central venous access care

## 2020-07-20 NOTE — DISCHARGE NOTE PROVIDER - NSDCHHATTENDCERT_GEN_ALL_CORE
Sent add on 1811   My signature below certifies that the above stated patient is homebound and upon completion of the Face-To-Face encounter, has the need for intermittent skilled nursing, physical therapy and/or speech or occupational therapy services in their home for their current diagnosis as outlined in their initial plan of care. These services will continue to be monitored by myself or another physician.

## 2020-07-20 NOTE — DISCHARGE NOTE PROVIDER - CARE PROVIDER_API CALL
Tin Salinas  Infectious Disease  [address] [place] [address]  Phone: [phone]  Fax: [phone]  Follow Up Time: 1 week    Rylee Yo  PODIATRIC MEDICINE  [address] [place] [address]   [place] [address]  Phone: [phone]  Fax: [phone]  Follow Up Time: 1 week Tin Salinas  Infectious Disease  [address] [place] [address]  Phone: [phone]  Fax: [phone]  Follow Up Time: 1 week    Rylee Yo  PODIATRIC MEDICINE  [address] [place] [address]   [place] [address]  Phone: [phone]  Fax: [phone]  Follow Up Time: 1 week Tin Salinas  Infectious Disease  [address] [place] [address]  Phone: [phone]  Fax: [phone]  Follow Up Time: 1 week    Rylee Yo  PODIATRIC MEDICINE  [address] [place] [address]   [place] [address]  Phone: [phone]  Fax: [phone]  Follow Up Time: 1 week    FLACO BOLDEN  Internal Medicine  [id number] [place] [address], [address] [address]  Phone: [phone]  Fax: [phone])-  Follow Up Time: 2 weeks

## 2020-07-20 NOTE — DISCHARGE NOTE PROVIDER - NSDCMRMEDTOKEN_GEN_ALL_CORE_FT
apixaban 5 mg oral tablet: 1 tab(s) orally every 12 hours  atorvastatin 40 mg oral tablet: 1 tab(s) orally once a day  cefTRIAXone 2 g intravenous injection: 2 gram(s) intravenous every 24 hours  PLEASE ADMINISTER LAST DOSE ON 7/26.  PLEASE REMOVE PICC LINE UPON COMPLETION OF ANTIBIOTICS.  fexofenadine 180 mg oral tablet: 1 tab(s) orally once a day  furosemide 20 mg oral tablet: 1 tab(s) orally 2 times a day  glimepiride 4 mg oral tablet: 1 tab(s) orally 2 times a day  hydrALAZINE 100 mg oral tablet: 1 tab(s) orally every 8 hours  lactobacillus acidophilus oral capsule: 1 cap(s) orally 3 times a day   Levemir FlexPen 100 units/mL subcutaneous solution: 30 unit(s) subcutaneous once a day (at bedtime)  lisinopril 40 mg oral tablet: 1 tab(s) orally once a day  Metoprolol Succinate  mg oral tablet, extended release: 1 tab(s) orally once a day  NovoLOG FlexPen 100 units/mL injectable solution: 10 unit(s) injectable 3 times a day with meals  omeprazole 40 mg oral delayed release capsule: 1 cap(s) orally once a day  Plavix 75 mg oral tablet: 1 tab(s) orally once a day  senna oral tablet: 2 tab(s) orally once a day (at bedtime)  Weekly bloodwork: liver function test. : Results to Infectious Disease Dr. Reagan (O) [phone]., (Fax) [phone] apixaban 5 mg oral tablet: 1 tab(s) orally every 12 hours  apixaban 5 mg oral tablet: 1 tab(s) orally every 12 hours  atorvastatin 40 mg oral tablet: 1 tab(s) orally once a day (at bedtime)  calamine topical lotion: 1 application topically once a day, As needed, Rash  cefTRIAXone 2 g intravenous injection: 2 gram(s) intravenous every 24 hours  PLEASE ADMINISTER LAST DOSE ON 7/26.  PLEASE REMOVE PICC LINE UPON COMPLETION OF ANTIBIOTICS.  fexofenadine 180 mg oral tablet: 1 tab(s) orally once a day  furosemide 20 mg oral tablet: 1 tab(s) orally once a day. Monitor daily weights.  gabapentin 100 mg oral capsule: 2 cap(s) orally every 8 hours   hydrALAZINE 100 mg oral tablet: 1 tab(s) orally every 8 hours. Hold if systolic blood pressure &lt;120  lactobacillus acidophilus oral capsule: 1 cap(s) orally 3 times a day   Levemir FlexPen 100 units/mL subcutaneous solution: 30 unit(s) subcutaneous once a day (at bedtime)  lisinopril 40 mg oral tablet: 1 tab(s) orally once a day  Metoprolol Succinate  mg oral tablet, extended release: 1 tab(s) orally once a day  NovoLOG FlexPen 100 units/mL injectable solution: 10 unit(s) injectable 3 times a day with meals  omeprazole 40 mg oral delayed release capsule: 1 cap(s) orally once a day  Plavix 75 mg oral tablet: 1 tab(s) orally once a day  senna oral tablet: 2 tab(s) orally once a day (at bedtime)  sodium hypochlorite 0.25% topical solution: 1 application topically once a day apixaban 5 mg oral tablet: 1 tab(s) orally every 12 hours  atorvastatin 40 mg oral tablet: 1 tab(s) orally once a day (at bedtime)  calamine topical lotion: 1 application topically once a day, As needed, Rash  ertapenem 1 g injection: 1 gram(s) by continuous intravenous infusion once a day  fexofenadine 180 mg oral tablet: 1 tab(s) orally once a day  furosemide 20 mg oral tablet: 1 tab(s) orally once a day. Monitor daily weights.  gabapentin 100 mg oral capsule: 2 cap(s) orally every 8 hours   hydrALAZINE 100 mg oral tablet: 1 tab(s) orally every 8 hours. Hold if systolic blood pressure &lt;120  lactobacillus acidophilus oral capsule: 1 cap(s) orally 3 times a day   Levemir FlexPen 100 units/mL subcutaneous solution: 30 unit(s) subcutaneous once a day (at bedtime)  lisinopril 40 mg oral tablet: 1 tab(s) orally once a day  Metoprolol Succinate  mg oral tablet, extended release: 1 tab(s) orally once a day  NovoLOG FlexPen 100 units/mL injectable solution: 10 unit(s) injectable 3 times a day with meals  omeprazole 40 mg oral delayed release capsule: 1 cap(s) orally once a day  Plavix 75 mg oral tablet: 1 tab(s) orally once a day  senna oral tablet: 2 tab(s) orally once a day (at bedtime)  sodium hypochlorite 0.25% topical solution: 1 application topically once a day

## 2020-07-20 NOTE — PROGRESS NOTE ADULT - PROVIDER SPECIALTY LIST ADULT
Hospitalist
Infectious Disease
Internal Medicine
Internal Medicine
Podiatry
Hospitalist

## 2020-07-21 ENCOUNTER — APPOINTMENT (OUTPATIENT)
Dept: UROLOGY | Facility: CLINIC | Age: 75
End: 2020-07-21

## 2020-07-28 ENCOUNTER — APPOINTMENT (OUTPATIENT)
Dept: UROLOGY | Facility: CLINIC | Age: 75
End: 2020-07-28
Payer: MEDICARE

## 2020-07-28 PROCEDURE — 99214 OFFICE O/P EST MOD 30 MIN: CPT | Mod: 25

## 2020-07-28 PROCEDURE — 52000 CYSTOURETHROSCOPY: CPT

## 2020-07-28 NOTE — HISTORY OF PRESENT ILLNESS
[FreeTextEntry1] : Very pleasant 74-year-old woman who presents for follow-up of multiple bladder tumors seen on CT scan and recent cystoscopy.  Patient underwent cystoscopy today which demonstrated a large number of bladder tumors throughout the posterior and anterior wall of the bladder as well as on the left and right lateral wall of the bladder.  She reports no gross hematuria.  No flank pain or suprapubic pain.  She was recently started on Eliquis and Plavix for peripheral artery disease.  Urine cytology was negative for high-grade urothelial carcinoma [Urinary Retention] : no urinary retention [Urinary Urgency] : no urinary urgency [Urinary Frequency] : no urinary frequency [Straining] : no straining [Nocturia] : no nocturia [Dysuria] : no dysuria [Weak Stream] : no weak stream [Hematuria - Gross] : no gross hematuria [Flank Pain] : no flank pain [Abdominal Pain] : no abdominal pain

## 2020-07-28 NOTE — PHYSICAL EXAM
[General Appearance - Well Nourished] : well nourished [General Appearance - Well Developed] : well developed [General Appearance - In No Acute Distress] : no acute distress [Well Groomed] : well groomed [Normal Appearance] : normal appearance [Abdomen Soft] : soft [Costovertebral Angle Tenderness] : no ~M costovertebral angle tenderness [Abdomen Tenderness] : non-tender [Urinary Bladder Findings] : the bladder was normal on palpation [Edema] : no peripheral edema [] : no respiratory distress [Exaggerated Use Of Accessory Muscles For Inspiration] : no accessory muscle use [Respiration, Rhythm And Depth] : normal respiratory rhythm and effort [Oriented To Time, Place, And Person] : oriented to person, place, and time [Affect] : the affect was normal [Not Anxious] : not anxious [Mood] : the mood was normal [FreeTextEntry1] : Uses wheelchair [No Focal Deficits] : no focal deficits [No Palpable Adenopathy] : no palpable adenopathy

## 2020-07-28 NOTE — ASSESSMENT
[FreeTextEntry1] : Very pleasant 74-year-old woman who presents for follow-up of bladder tumors\par -Cystoscopy images reviewed with the patient; we discussed that findings on cystoscopy are highly concerning for bladder cancer\par -We discussed treatment options for bladder cancer, including a formal diagnosis with a biopsy and transurethral resection in the operating room\par -We discussed that she would need to come off of Eliquis and Plavix for the procedure but it is unclear whether she is able to do so at this time\par -Patient will follow-up with her primary care physician and then follow-up in 3 weeks to schedule surgery\par -We discussed the likelihood of malignancy as well as need for accurate diagnosis and treatment with transurethral resection of bladder tumor

## 2020-08-03 ENCOUNTER — APPOINTMENT (OUTPATIENT)
Dept: WOUND CARE | Facility: CLINIC | Age: 75
End: 2020-08-03

## 2020-08-26 ENCOUNTER — APPOINTMENT (OUTPATIENT)
Dept: UROLOGY | Facility: CLINIC | Age: 75
End: 2020-08-26
Payer: MEDICARE

## 2020-08-26 VITALS — SYSTOLIC BLOOD PRESSURE: 144 MMHG | DIASTOLIC BLOOD PRESSURE: 64 MMHG | HEART RATE: 68 BPM

## 2020-08-26 PROCEDURE — 99213 OFFICE O/P EST LOW 20 MIN: CPT

## 2020-08-26 NOTE — ASSESSMENT
[FreeTextEntry1] : Very pleasant 74-year-old woman who presents for follow-up of bladder tumors highly concerning for bladder cancer\par -We again discussed findings on cystoscopy as well as CT scan demonstrating bladder tumors with concern for bladder cancer\par -We discussed that she will need to be off of Eliquis and Plavix for a cystoscopy with transurethral resection of bladder tumor\par -Given significant cardiac and vascular history, I suggested that she have the procedure performed at [facility]\par -I have suggested that she see one of my colleagues for evaluation and to arrange surgery\par -Patient will need cardiac and medical clearance prior to surgery.

## 2020-08-26 NOTE — PHYSICAL EXAM
[General Appearance - Well Developed] : well developed [General Appearance - Well Nourished] : well nourished [Normal Appearance] : normal appearance [Well Groomed] : well groomed [General Appearance - In No Acute Distress] : no acute distress [Abdomen Soft] : soft [Costovertebral Angle Tenderness] : no ~M costovertebral angle tenderness [Abdomen Tenderness] : non-tender [Urinary Bladder Findings] : the bladder was normal on palpation [Edema] : no peripheral edema [] : no respiratory distress [Respiration, Rhythm And Depth] : normal respiratory rhythm and effort [Exaggerated Use Of Accessory Muscles For Inspiration] : no accessory muscle use [Oriented To Time, Place, And Person] : oriented to person, place, and time [Mood] : the mood was normal [Affect] : the affect was normal [Not Anxious] : not anxious [Normal Station and Gait] : the gait and station were normal for the patient's age [No Focal Deficits] : no focal deficits [No Palpable Adenopathy] : no palpable adenopathy

## 2020-08-26 NOTE — HISTORY OF PRESENT ILLNESS
[FreeTextEntry1] : Very pleasant 74-year-old woman who presents for follow-up of bladder tumors concerning for bladder cancer.  She reports that she is still taking both Eliquis and Plavix.  She denies recent gross hematuria.  No dysuria.  No nausea or vomiting.  She presents today after recently seeing her primary care physician.  She has not yet seen her vascular surgeon recently. [Urinary Retention] : no urinary retention [Urinary Urgency] : no urinary urgency [Urinary Frequency] : no urinary frequency [Nocturia] : no nocturia [Straining] : no straining [Weak Stream] : no weak stream [Dysuria] : no dysuria [Hematuria - Gross] : no gross hematuria [Abdominal Pain] : no abdominal pain [Flank Pain] : no flank pain

## 2020-08-31 ENCOUNTER — APPOINTMENT (OUTPATIENT)
Age: 75
End: 2020-08-31
Payer: MEDICARE

## 2020-08-31 VITALS
DIASTOLIC BLOOD PRESSURE: 68 MMHG | WEIGHT: 140 LBS | BODY MASS INDEX: 26.43 KG/M2 | HEART RATE: 68 BPM | TEMPERATURE: 97.2 F | SYSTOLIC BLOOD PRESSURE: 153 MMHG | HEIGHT: 61 IN

## 2020-08-31 DIAGNOSIS — D49.4 NEOPLASM OF UNSPECIFIED BEHAVIOR OF BLADDER: ICD-10-CM

## 2020-08-31 PROCEDURE — 99215 OFFICE O/P EST HI 40 MIN: CPT

## 2020-09-04 PROBLEM — D49.4 BLADDER TUMOR: Status: ACTIVE | Noted: 2020-07-08

## 2020-09-04 NOTE — HISTORY OF PRESENT ILLNESS
[FreeTextEntry1] : 74 yo F found to have multiple bladder tumors on cystoscopy done by Dr. Taylor\par Denies any recent gross hematuria\par Denies any pain, bothersome LUTS\par Tumors were discovered on CT scan in June when she was admitted for nausea and vomiting

## 2020-09-04 NOTE — ASSESSMENT
[FreeTextEntry1] : 74 yo F with multiple bladder tumors\par \par - REviewed CT urogram from June, 2020. Reviewed cysto report.\par - Discussed risks and benefits of a TURBT including infection, bleeding, damage to surrounding structures, bladder perforation. Given her cardiac history and on both Eliquis and Plavix, there is a moderate risk of complications\par - Pt would like to proceed. Will schedule in the near future\par - Will need clearance from medicine as well as vascular/cardiac regarding her anticoagulation

## 2020-09-04 NOTE — PHYSICAL EXAM
[General Appearance - Well Developed] : well developed [General Appearance - Well Nourished] : well nourished [Normal Appearance] : normal appearance [Well Groomed] : well groomed [General Appearance - In No Acute Distress] : no acute distress [Abdomen Soft] : soft [Abdomen Tenderness] : non-tender [Costovertebral Angle Tenderness] : no ~M costovertebral angle tenderness [Urinary Bladder Findings] : the bladder was normal on palpation [Edema] : no peripheral edema [] : no respiratory distress [Respiration, Rhythm And Depth] : normal respiratory rhythm and effort [Oriented To Time, Place, And Person] : oriented to person, place, and time [Exaggerated Use Of Accessory Muscles For Inspiration] : no accessory muscle use [Not Anxious] : not anxious [Affect] : the affect was normal [Mood] : the mood was normal [No Focal Deficits] : no focal deficits [No Palpable Adenopathy] : no palpable adenopathy

## 2020-09-29 ENCOUNTER — APPOINTMENT (OUTPATIENT)
Dept: UROLOGY | Facility: HOSPITAL | Age: 75
End: 2020-09-29

## 2020-10-09 ENCOUNTER — APPOINTMENT (OUTPATIENT)
Dept: CARDIOLOGY | Facility: CLINIC | Age: 75
End: 2020-10-09
Payer: MEDICARE

## 2020-10-09 VITALS
DIASTOLIC BLOOD PRESSURE: 86 MMHG | BODY MASS INDEX: 27.75 KG/M2 | HEIGHT: 61 IN | WEIGHT: 147 LBS | OXYGEN SATURATION: 98 % | RESPIRATION RATE: 16 BRPM | HEART RATE: 70 BPM | SYSTOLIC BLOOD PRESSURE: 160 MMHG

## 2020-10-09 PROCEDURE — 99214 OFFICE O/P EST MOD 30 MIN: CPT

## 2020-10-09 RX ORDER — ONDANSETRON HYDROCHLORIDE 4 MG/1
4 TABLET, FILM COATED ORAL
Refills: 0 | Status: DISCONTINUED | COMMUNITY
End: 2020-10-09

## 2020-10-09 RX ORDER — DOXYCYCLINE HYCLATE 200 MG/1
200 TABLET, DELAYED RELEASE ORAL
Refills: 0 | Status: DISCONTINUED | COMMUNITY
End: 2020-10-09

## 2020-10-09 RX ORDER — L. ACIDOPHILUS/L.BULGARICUS 1MM CELL
TABLET ORAL
Refills: 0 | Status: DISCONTINUED | COMMUNITY
End: 2020-10-09

## 2020-10-09 RX ORDER — INSULIN DETEMIR 100 [IU]/ML
100 INJECTION, SOLUTION SUBCUTANEOUS
Refills: 0 | Status: DISCONTINUED | COMMUNITY
End: 2020-10-09

## 2020-10-09 NOTE — ASSESSMENT
[FreeTextEntry1] : 76 y/o F with h/o tobacco abuse, Afib (john) HTN, HLD, CAD (RCA KENDELL 2011, om2 2006, om 2009) ICD ([place]) 6/20 Cath - Left dSFA, dLeft pop, TPT trunk and left PT/plantar. \par significant improvement in ulcer, and resolution or rest pain. \par bladder mass, awaiting possible surgery.           \par \par Plan:\par - Continue f/u with Dr Yo for wound care. \par - F/u primary cardiology for potential clearance prior to bladed surgery \par - F/u in 4 weeks, will repeat arterial duplex at that time.

## 2020-10-09 NOTE — PHYSICAL EXAM
[General Appearance - Well Developed] : well developed [Normal Conjunctiva] : the conjunctiva exhibited no abnormalities [Normal Oral Mucosa] : normal oral mucosa [Normal Jugular Venous V Waves Present] : normal jugular venous V waves present [Heart Sounds] : normal S1 and S2 [] : no respiratory distress [Bowel Sounds] : normal bowel sounds [Nail Clubbing] : no clubbing of the fingernails [Oriented To Time, Place, And Person] : oriented to person, place, and time [FreeTextEntry1] : left lateral aspect of the foot 5x10 cm ulcer

## 2020-10-09 NOTE — REASON FOR VISIT
[Initial Evaluation] : an initial evaluation of [FreeTextEntry2] : f/u post intervention  [FreeTextEntry1] : 74 y/o F with h/o tobacco abuse, Afib (john) HTN, HLD, CAD (RCA KENDELL 2011, om2 2006, om 2009) ICD ([place]) PAD with recent left dSFA, dLeft pop, TPT trunk and left PT/plantar. For f/u today reports significant improvement in ulcer, and resolution or rest pain. \par Recently diagnosed with bladder mass and awaiting possible surgery.

## 2020-11-06 ENCOUNTER — APPOINTMENT (OUTPATIENT)
Dept: CARDIOLOGY | Facility: CLINIC | Age: 75
End: 2020-11-06
Payer: MEDICARE

## 2020-11-06 ENCOUNTER — LABORATORY RESULT (OUTPATIENT)
Age: 75
End: 2020-11-06

## 2020-11-06 VITALS
RESPIRATION RATE: 16 BRPM | HEART RATE: 73 BPM | WEIGHT: 147 LBS | SYSTOLIC BLOOD PRESSURE: 150 MMHG | HEIGHT: 61 IN | DIASTOLIC BLOOD PRESSURE: 74 MMHG | BODY MASS INDEX: 27.75 KG/M2 | OXYGEN SATURATION: 98 % | TEMPERATURE: 98.2 F

## 2020-11-06 PROCEDURE — 93925 LOWER EXTREMITY STUDY: CPT

## 2020-11-06 PROCEDURE — 93923 UPR/LXTR ART STDY 3+ LVLS: CPT

## 2020-11-06 PROCEDURE — 99072 ADDL SUPL MATRL&STAF TM PHE: CPT

## 2020-11-06 PROCEDURE — 99214 OFFICE O/P EST MOD 30 MIN: CPT

## 2020-11-06 NOTE — REASON FOR VISIT
[Initial Evaluation] : an initial evaluation of [FreeTextEntry2] : f/u post intervention  [FreeTextEntry1] : 76 y/o F with h/o tobacco abuse, Afib (john) HTN, HLD, CAD (RCA KENDELL 2011, om2 2006, om 2009) ICD ([place]) PAD with recent left dSFA, dLeft pop, TPT trunk and left PT/plantar. For f/u today reports  improvement in ulcer, however now healing arrested. Today duplex revealed a new low velocities flow lesion in distal pop/prox peroneal, also has severe disease of right pop. reports occasional rest pain of both LE with occasional difficulty falling asleep due to pain. Denies CP, SOB or palitations.   \par

## 2020-11-06 NOTE — PHYSICAL EXAM
[General Appearance - Well Developed] : well developed [Normal Conjunctiva] : the conjunctiva exhibited no abnormalities [Normal Oral Mucosa] : normal oral mucosa [Normal Jugular Venous V Waves Present] : normal jugular venous V waves present [] : no respiratory distress [Heart Sounds] : normal S1 and S2 [Bowel Sounds] : normal bowel sounds [Nail Clubbing] : no clubbing of the fingernails [FreeTextEntry1] : left lateral aspect of the foot 5x10 cm ulcer  [Oriented To Time, Place, And Person] : oriented to person, place, and time

## 2020-11-06 NOTE — ASSESSMENT
[FreeTextEntry1] : 76 y/o F with h/o tobacco abuse, Afib (john) HTN, HLD, CAD (RCA KENDELL 2011, om2 2006, om 2009) ICD ([place]) 6/20 Cath - Left dSFA, dLeft pop, TPT trunk and left PT/plantar. \par 11/6/2020 LE arterial duplex : Duplex revealed a new low velocities flow lesion in distal pop/prox peroneal, also has severe disease of right pop. reports occasional rest \par bladder mass, awaiting possible surgery.           \par \par Plan:\par - LE angio next week for peroneal intervention due to arrested healing ulcer. \par - Labs and COVID swab today\par - Continue f/u with Dr Yo for wound care and debridement post intervention \par - Risk and benefits discussed in detail \par

## 2020-11-09 ENCOUNTER — OUTPATIENT (OUTPATIENT)
Dept: OUTPATIENT SERVICES | Facility: HOSPITAL | Age: 75
LOS: 1 days | Discharge: HOME | End: 2020-11-09

## 2020-11-09 ENCOUNTER — LABORATORY RESULT (OUTPATIENT)
Age: 75
End: 2020-11-09

## 2020-11-09 DIAGNOSIS — Z95.5 PRESENCE OF CORONARY ANGIOPLASTY IMPLANT AND GRAFT: Chronic | ICD-10-CM

## 2020-11-09 DIAGNOSIS — Z90.710 ACQUIRED ABSENCE OF BOTH CERVIX AND UTERUS: Chronic | ICD-10-CM

## 2020-11-09 DIAGNOSIS — Z95.0 PRESENCE OF CARDIAC PACEMAKER: Chronic | ICD-10-CM

## 2020-11-09 DIAGNOSIS — Z95.810 PRESENCE OF AUTOMATIC (IMPLANTABLE) CARDIAC DEFIBRILLATOR: Chronic | ICD-10-CM

## 2020-11-09 DIAGNOSIS — Z11.59 ENCOUNTER FOR SCREENING FOR OTHER VIRAL DISEASES: ICD-10-CM

## 2020-11-09 DIAGNOSIS — Z90.49 ACQUIRED ABSENCE OF OTHER SPECIFIED PARTS OF DIGESTIVE TRACT: Chronic | ICD-10-CM

## 2020-11-09 NOTE — PHYSICAL THERAPY INITIAL EVALUATION ADULT - IMPAIRMENTS FOUND, PT EVAL
Recommended warm compresses. gait, locomotion, and balance/muscle strength/aerobic capacity/endurance

## 2020-11-12 ENCOUNTER — INPATIENT (INPATIENT)
Facility: HOSPITAL | Age: 75
LOS: 0 days | Discharge: HOME | End: 2020-11-13
Attending: INTERNAL MEDICINE | Admitting: INTERNAL MEDICINE
Payer: MEDICARE

## 2020-11-12 VITALS
HEIGHT: 61 IN | OXYGEN SATURATION: 96 % | RESPIRATION RATE: 18 BRPM | WEIGHT: 147.05 LBS | TEMPERATURE: 96 F | HEART RATE: 78 BPM | DIASTOLIC BLOOD PRESSURE: 76 MMHG | SYSTOLIC BLOOD PRESSURE: 170 MMHG

## 2020-11-12 DIAGNOSIS — I50.22 CHRONIC SYSTOLIC (CONGESTIVE) HEART FAILURE: ICD-10-CM

## 2020-11-12 DIAGNOSIS — Z90.710 ACQUIRED ABSENCE OF BOTH CERVIX AND UTERUS: Chronic | ICD-10-CM

## 2020-11-12 DIAGNOSIS — Z95.810 PRESENCE OF AUTOMATIC (IMPLANTABLE) CARDIAC DEFIBRILLATOR: Chronic | ICD-10-CM

## 2020-11-12 DIAGNOSIS — Z95.5 PRESENCE OF CORONARY ANGIOPLASTY IMPLANT AND GRAFT: Chronic | ICD-10-CM

## 2020-11-12 DIAGNOSIS — Z95.0 PRESENCE OF CARDIAC PACEMAKER: Chronic | ICD-10-CM

## 2020-11-12 DIAGNOSIS — Z90.49 ACQUIRED ABSENCE OF OTHER SPECIFIED PARTS OF DIGESTIVE TRACT: Chronic | ICD-10-CM

## 2020-11-12 LAB
ANION GAP SERPL CALC-SCNC: 14 MMOL/L — SIGNIFICANT CHANGE UP (ref 7–14)
BUN SERPL-MCNC: 55 MG/DL — HIGH (ref 10–20)
CALCIUM SERPL-MCNC: 9.8 MG/DL — SIGNIFICANT CHANGE UP (ref 8.5–10.1)
CHLORIDE SERPL-SCNC: 102 MMOL/L — SIGNIFICANT CHANGE UP (ref 98–110)
CO2 SERPL-SCNC: 21 MMOL/L — SIGNIFICANT CHANGE UP (ref 17–32)
CREAT SERPL-MCNC: 1.5 MG/DL — SIGNIFICANT CHANGE UP (ref 0.7–1.5)
GLUCOSE BLDC GLUCOMTR-MCNC: 149 MG/DL — HIGH (ref 70–99)
GLUCOSE BLDC GLUCOMTR-MCNC: 184 MG/DL — HIGH (ref 70–99)
GLUCOSE SERPL-MCNC: 185 MG/DL — HIGH (ref 70–99)
HCT VFR BLD CALC: 36.7 % — LOW (ref 37–47)
HGB BLD-MCNC: 11.9 G/DL — LOW (ref 12–16)
MCHC RBC-ENTMCNC: 27.9 PG — SIGNIFICANT CHANGE UP (ref 27–31)
MCHC RBC-ENTMCNC: 32.4 G/DL — SIGNIFICANT CHANGE UP (ref 32–37)
MCV RBC AUTO: 85.9 FL — SIGNIFICANT CHANGE UP (ref 81–99)
NRBC # BLD: 0 /100 WBCS — SIGNIFICANT CHANGE UP (ref 0–0)
PLATELET # BLD AUTO: 321 K/UL — SIGNIFICANT CHANGE UP (ref 130–400)
POTASSIUM SERPL-MCNC: 4.9 MMOL/L — SIGNIFICANT CHANGE UP (ref 3.5–5)
POTASSIUM SERPL-SCNC: 4.9 MMOL/L — SIGNIFICANT CHANGE UP (ref 3.5–5)
RBC # BLD: 4.27 M/UL — SIGNIFICANT CHANGE UP (ref 4.2–5.4)
RBC # FLD: 15.4 % — HIGH (ref 11.5–14.5)
SODIUM SERPL-SCNC: 137 MMOL/L — SIGNIFICANT CHANGE UP (ref 135–146)
WBC # BLD: 12.57 K/UL — HIGH (ref 4.8–10.8)
WBC # FLD AUTO: 12.57 K/UL — HIGH (ref 4.8–10.8)

## 2020-11-12 PROCEDURE — 37226: CPT | Mod: LT

## 2020-11-12 PROCEDURE — 37230: CPT | Mod: LT

## 2020-11-12 PROCEDURE — 36246 INS CATH ABD/L-EXT ART 2ND: CPT | Mod: 59

## 2020-11-12 RX ORDER — METOPROLOL TARTRATE 50 MG
100 TABLET ORAL DAILY
Refills: 0 | Status: DISCONTINUED | OUTPATIENT
Start: 2020-11-13 | End: 2020-11-13

## 2020-11-12 RX ORDER — ASPIRIN/CALCIUM CARB/MAGNESIUM 324 MG
81 TABLET ORAL ONCE
Refills: 0 | Status: DISCONTINUED | OUTPATIENT
Start: 2020-11-13 | End: 2020-11-13

## 2020-11-12 RX ORDER — FUROSEMIDE 40 MG
1 TABLET ORAL
Qty: 0 | Refills: 0 | DISCHARGE

## 2020-11-12 RX ORDER — SODIUM CHLORIDE 9 MG/ML
1000 INJECTION INTRAMUSCULAR; INTRAVENOUS; SUBCUTANEOUS
Refills: 0 | Status: DISCONTINUED | OUTPATIENT
Start: 2020-11-12 | End: 2020-11-13

## 2020-11-12 RX ORDER — DEXTROSE 50 % IN WATER 50 %
15 SYRINGE (ML) INTRAVENOUS ONCE
Refills: 0 | Status: DISCONTINUED | OUTPATIENT
Start: 2020-11-12 | End: 2020-11-13

## 2020-11-12 RX ORDER — SODIUM CHLORIDE 9 MG/ML
1000 INJECTION, SOLUTION INTRAVENOUS
Refills: 0 | Status: DISCONTINUED | OUTPATIENT
Start: 2020-11-12 | End: 2020-11-13

## 2020-11-12 RX ORDER — GLUCAGON INJECTION, SOLUTION 0.5 MG/.1ML
1 INJECTION, SOLUTION SUBCUTANEOUS ONCE
Refills: 0 | Status: DISCONTINUED | OUTPATIENT
Start: 2020-11-12 | End: 2020-11-13

## 2020-11-12 RX ORDER — CLOPIDOGREL BISULFATE 75 MG/1
75 TABLET, FILM COATED ORAL DAILY
Refills: 0 | Status: DISCONTINUED | OUTPATIENT
Start: 2020-11-13 | End: 2020-11-13

## 2020-11-12 RX ORDER — INSULIN DETEMIR 100/ML (3)
30 INSULIN PEN (ML) SUBCUTANEOUS
Qty: 0 | Refills: 0 | DISCHARGE

## 2020-11-12 RX ORDER — FUROSEMIDE 40 MG
20 TABLET ORAL
Refills: 0 | Status: DISCONTINUED | OUTPATIENT
Start: 2020-11-13 | End: 2020-11-13

## 2020-11-12 RX ORDER — LISINOPRIL 2.5 MG/1
40 TABLET ORAL DAILY
Refills: 0 | Status: DISCONTINUED | OUTPATIENT
Start: 2020-11-13 | End: 2020-11-13

## 2020-11-12 RX ORDER — DEXTROSE 50 % IN WATER 50 %
12.5 SYRINGE (ML) INTRAVENOUS ONCE
Refills: 0 | Status: DISCONTINUED | OUTPATIENT
Start: 2020-11-12 | End: 2020-11-13

## 2020-11-12 RX ORDER — INSULIN GLARGINE 100 [IU]/ML
20 INJECTION, SOLUTION SUBCUTANEOUS AT BEDTIME
Refills: 0 | Status: DISCONTINUED | OUTPATIENT
Start: 2020-11-12 | End: 2020-11-13

## 2020-11-12 RX ORDER — ASPIRIN/CALCIUM CARB/MAGNESIUM 324 MG
325 TABLET ORAL ONCE
Refills: 0 | Status: COMPLETED | OUTPATIENT
Start: 2020-11-12 | End: 2020-11-12

## 2020-11-12 RX ORDER — DEXTROSE 50 % IN WATER 50 %
25 SYRINGE (ML) INTRAVENOUS ONCE
Refills: 0 | Status: DISCONTINUED | OUTPATIENT
Start: 2020-11-12 | End: 2020-11-13

## 2020-11-12 RX ORDER — INSULIN LISPRO 100/ML
10 VIAL (ML) SUBCUTANEOUS
Refills: 0 | Status: DISCONTINUED | OUTPATIENT
Start: 2020-11-12 | End: 2020-11-13

## 2020-11-12 RX ORDER — HYDRALAZINE HCL 50 MG
100 TABLET ORAL THREE TIMES A DAY
Refills: 0 | Status: DISCONTINUED | OUTPATIENT
Start: 2020-11-12 | End: 2020-11-13

## 2020-11-12 RX ORDER — ATORVASTATIN CALCIUM 80 MG/1
40 TABLET, FILM COATED ORAL AT BEDTIME
Refills: 0 | Status: DISCONTINUED | OUTPATIENT
Start: 2020-11-12 | End: 2020-11-13

## 2020-11-12 RX ADMIN — Medication 100 MILLIGRAM(S): at 22:43

## 2020-11-12 RX ADMIN — Medication 100 MILLIGRAM(S): at 14:19

## 2020-11-12 RX ADMIN — ATORVASTATIN CALCIUM 40 MILLIGRAM(S): 80 TABLET, FILM COATED ORAL at 22:43

## 2020-11-12 RX ADMIN — SODIUM CHLORIDE 75 MILLILITER(S): 9 INJECTION INTRAMUSCULAR; INTRAVENOUS; SUBCUTANEOUS at 14:06

## 2020-11-12 RX ADMIN — Medication 325 MILLIGRAM(S): at 14:19

## 2020-11-12 RX ADMIN — INSULIN GLARGINE 20 UNIT(S): 100 INJECTION, SOLUTION SUBCUTANEOUS at 22:42

## 2020-11-12 NOTE — H&P CARDIOLOGY - HISTORY OF PRESENT ILLNESS
74 Y F with PMH: CAD s/p PCI RCA 2011, OM2 2006, OM 2009, Chronic HFrEF (now improved) s/p BiVICD, afib on eliquis, HTN, CVA, DM, severe peripheral vascular disease s/p angiogram status post laser atherectomy and balloon of L-distal SFA, L-popliteal artery, L-TPT trunk, and L-peroneal artery, non healing Left foot ulcer: s/p left Hallux amputation May 2020. Pt has Left lateral aspect of foot 5x10 cm ulcer with slow healing, LE duplex revealed new low velocities in distal pop/prox peroneal/severe disease of right pop. and reports occasional rest pain of both LE with occasional difficulty falling asleep secondary to pain, peripheral angiogram recommended.         Vital Signs Last 24 Hrs  T(C): --  T(F): --  HR: --  BP: --  BP(mean): --  RR: --  SpO2: --      REVIEW OF SYSTEMS:  CONSTITUTIONAL: No fever, weight loss, or fatigue  CARDIOLOGY: PAtient denies chest pain, shortness of breath or syncopal episodes.   RESPIRATORY: denies shortness of breath, wheezing  NEUROLOGICAL: NO weakness, no focal deficits to report.  ENDOCRINOLOGICAL: no recent change in diabetic medications.   GI: no BRBPR, no N,V,diarrhea.     PHYSICAL EXAM:  · CONSTITUTIONAL:	Well-developed, well nourished     ·RESPIRATORY:   airway patent; breath sounds equal; good air movement; respirations non-labored; clear to auscultation bilaterally; no chest wall tenderness; no intercostal retractions; no rales,rhonchi or wheeze  · CARDIOVASCULAR	regular rate and rhythm  no rub  no murmur    · EXTREMITIES: No cyanosis, clubbing or edema, Left lateral aspect of foot 5x10 cm ulcer  · VASCULAR: 	Equal and normal pulses (carotid, femoral, dorsalis pedis)  	         74 Y F with PMH: CAD s/p PCI RCA 2011, OM2 2006, OM 2009, Chronic HFrEF (now improved) s/p BiVICD, afib on eliquis, HTN, CVA, DM, severe peripheral vascular disease s/p angiogram status post laser atherectomy and balloon of L-distal SFA, L-popliteal artery, L-TPT trunk, and L-peroneal artery, non healing Left foot ulcer: s/p left Hallux amputation May 2020. Pt has Left lateral aspect of foot 5x10 cm ulcer with slow healing, 11/6/2020 LE duplex revealed new low velocities in distal pop/prox peroneal/severe disease of right pop. and reports occasional rest pain of both LE with occasional difficulty falling asleep secondary to pain, peripheral angiogram recommended.         Vital Signs Last 24 Hrs  T(C): --  T(F): --  HR: --  BP: --  BP(mean): --  RR: --  SpO2: --      REVIEW OF SYSTEMS:  CONSTITUTIONAL: No fever, weight loss, or fatigue  CARDIOLOGY: PAtient denies chest pain, shortness of breath or syncopal episodes.   RESPIRATORY: denies shortness of breath, wheezing  NEUROLOGICAL: NO weakness, no focal deficits to report.  ENDOCRINOLOGICAL: no recent change in diabetic medications.   GI: no BRBPR, no N,V,diarrhea.     PHYSICAL EXAM:  · CONSTITUTIONAL:	Well-developed, well nourished     ·RESPIRATORY:   airway patent; breath sounds equal; good air movement; respirations non-labored; clear to auscultation bilaterally; no chest wall tenderness; no intercostal retractions; no rales,rhonchi or wheeze  · CARDIOVASCULAR	regular rate and rhythm  no rub  no murmur    · EXTREMITIES: No cyanosis, clubbing or edema, Left lateral aspect of foot 5x10 cm ulcer  · VASCULAR: 	Equal and normal pulses (carotid, femoral, dorsalis pedis)  	         74 Y F with PMH: CAD s/p PCI RCA 2011, OM2 2006, OM 2009, Chronic HFrEF (now improved) s/p BiVICD, afib on eliquis, HTN, CVA, DM, severe peripheral vascular disease s/p angiogram status post laser atherectomy and balloon of L-distal SFA, L-popliteal artery, L-TPT trunk, and L-peroneal artery, non healing Left foot ulcer: s/p left Hallux amputation May 2020.  11/6/2020 LE duplex revealed new low velocities in distal pop/prox peroneal/severe disease of right pop. and reports occasional rest pain of both LE with occasional difficulty falling asleep secondary to pain, peripheral angiogram recommended for [place] class 4/5 symptoms.     REVIEW OF SYSTEMS:  CONSTITUTIONAL: No fever, weight loss, or fatigue  CARDIOLOGY: PAtient denies chest pain, shortness of breath or syncopal episodes.   RESPIRATORY: denies shortness of breath, wheezing  NEUROLOGICAL: NO weakness, no focal deficits to report.  ENDOCRINOLOGICAL: no recent change in diabetic medications.   GI: no BRBPR, no N,V,diarrhea.     PHYSICAL EXAM:  · CONSTITUTIONAL:	Well-developed, well nourished     ·RESPIRATORY:   airway patent; breath sounds equal; good air movement; respirations non-labored; clear to auscultation bilaterally; no chest wall tenderness; no intercostal retractions; no rales,rhonchi or wheeze  · CARDIOVASCULAR	regular rate and rhythm  no rub  no murmur    · EXTREMITIES: No cyanosis, clubbing or edema, L Hallux amputation site C/D/I, lateral aspect L pinky toe ulcer (1x1 cm)  · VASCULAR: 	b/l LE pulses via doppler

## 2020-11-12 NOTE — CHART NOTE - NSCHARTNOTEFT_GEN_A_CORE
INDICATION:   LLE CLI  Leena class 5    PHYSICIAN: Dr. Hammonds, Dr. Lopez   ASSISTANT/FELLOW: Dr. Castillo    ACCESS: Ultrasound Guided antegrade left superficial femoral artery access    VASCULAR CLOSURE DEVICE (if applicable): manual     ANGIOGRAM:  Left Lower Extremity DSA angiography     DETAILED PROCEDURE SUMMARY:  After obtaining informed consent, the patient was brought to the catheterization suite in a post- absorptive and non-sedated state. After this, a timeout was performed and I administered moderate sedation throughout this procedure. An independent trained observer pushed medications at my direction, and monitored the patient’s level of consciousness and physiological status throughout. The patient was prepped and draped in the usual sterile manner. 2% lidocaine was used for local anesthesia and the patient was sedated as per endovascular lab protocol. Access was obtained in the left Femoral Artery using the modified Seldinger technique  6 [place] Sheath was placed in the artery under fluoroscopy and ultrasound guidance which was utilized for assessment of arterial patency and actual real-time visualization of needle passage to the arterial lumen.       DIAGNOSTIC FINDINGS      Left SFA Artery: mild disease   Left Popliteal Artery: 80 % stenosis   Left Tibial Peroneal Trunk Artery: patent stent   Left Anterior Tibial Artery: occluded   Left Peroneal Artery: 90% stenosis   Left Posterior Tibial Artery: occluded        COMPLICATION: none    Specimens removed: N/A     PERIPHERAL DIAGNOSTIC ANGIOGRAM/INTERVENTION SUMMARY:    Severe left popliteal and infrapopliteal PAD  Significant stenosis of left popliteal artery and peroneal artery (1 vessel distal run-off). Occluded left AT and PT arteries. Retrograde filling of plantar and dorsalis pedis arteries via collaterals.     -In-line flow to LLE was successfully established by PTA with balloon expandable KENDELL placement to Peroneal artery and Self expanding Allison stent to popliteal artery.     RECOMMENDATIONS:  post procedure IV hydration  cont medical therapy and risk factor modification   Cont Aspirin while in-patient. D/c home on Plavix + Eliquis   restart Eliquis on 11/14/20  d/c home in AM if stable   out pt f/u in 1-2 weeks INDICATION:   LLE CLI  Leena class 5    PHYSICIAN: Dr. Hammonds, Dr. Lopez   ASSISTANT/FELLOW: Dr. Castillo    ACCESS: Ultrasound Guided antegrade left femoral artery access    VASCULAR CLOSURE DEVICE (if applicable): manual     ANGIOGRAM:  Left Lower Extremity DSA angiography     DETAILED PROCEDURE SUMMARY:  After obtaining informed consent, the patient was brought to the catheterization suite in a post- absorptive and non-sedated state. After this, a timeout was performed and I administered moderate sedation throughout this procedure. An independent trained observer pushed medications at my direction, and monitored the patient’s level of consciousness and physiological status throughout. The patient was prepped and draped in the usual sterile manner. 2% lidocaine was used for local anesthesia and the patient was sedated as per endovascular lab protocol. Access was obtained in the left Femoral Artery using the modified Seldinger technique  6 [place] Sheath was placed in the artery under fluoroscopy and ultrasound guidance which was utilized for assessment of arterial patency and actual real-time visualization of needle passage to the arterial lumen.       DIAGNOSTIC FINDINGS      Left SFA Artery: mild disease   Left Popliteal Artery: 80 % stenosis   Left Tibial Peroneal Trunk Artery: patent stent   Left Anterior Tibial Artery: occluded   Left Peroneal Artery: 90% stenosis   Left Posterior Tibial Artery: occluded        COMPLICATION: none    Specimens removed: N/A     PERIPHERAL DIAGNOSTIC ANGIOGRAM/INTERVENTION SUMMARY:    Severe left popliteal and infrapopliteal PAD  Significant stenosis of left popliteal artery and peroneal artery (1 vessel distal run-off). Occluded left AT and PT arteries. Retrograde filling of plantar and dorsalis pedis arteries via collaterals.     -In-line flow to LLE was successfully established by PTA with balloon expandable KENDELL placement to Peroneal artery and Self expanding Allison stent to popliteal artery.     RECOMMENDATIONS:  post procedure IV hydration  cont medical therapy and risk factor modification   Cont Aspirin while in-patient. D/c home on Plavix + Eliquis   restart Eliquis on 11/14/20  d/c home in AM if stable   out pt f/u in 1-2 weeks

## 2020-11-12 NOTE — ASU PATIENT PROFILE, ADULT - PSH
Artificial cardiac pacemaker    Cardiac defibrillator in place    H/O: hysterectomy    History of cholecystectomy    Status post coronary artery stent placement

## 2020-11-12 NOTE — H&P CARDIOLOGY - PMH
Amputation of toe of left foot    CAD (coronary artery disease)    CHF (congestive heart failure)    DM (diabetes mellitus)    HTN (hypertension)    PVD (peripheral vascular disease)    Tumor  bladder tumor   Amputation of toe of left foot  L Hallux 5/20  Atrial fibrillation, unspecified type  on eliquis  CAD (coronary artery disease)  PCI RCA 2011, OM2 2006 , OM 2009  CHF (congestive heart failure)    DM (diabetes mellitus)    Hernia    HTN (hypertension)    PVD (peripheral vascular disease)    Tumor  bladder tumor

## 2020-11-13 ENCOUNTER — TRANSCRIPTION ENCOUNTER (OUTPATIENT)
Age: 75
End: 2020-11-13

## 2020-11-13 VITALS — DIASTOLIC BLOOD PRESSURE: 67 MMHG | HEART RATE: 88 BPM | SYSTOLIC BLOOD PRESSURE: 155 MMHG

## 2020-11-13 LAB
ANION GAP SERPL CALC-SCNC: 10 MMOL/L — SIGNIFICANT CHANGE UP (ref 7–14)
BASOPHILS # BLD AUTO: 0.04 K/UL — SIGNIFICANT CHANGE UP (ref 0–0.2)
BASOPHILS NFR BLD AUTO: 0.4 % — SIGNIFICANT CHANGE UP (ref 0–1)
BUN SERPL-MCNC: 40 MG/DL — HIGH (ref 10–20)
CALCIUM SERPL-MCNC: 9.2 MG/DL — SIGNIFICANT CHANGE UP (ref 8.5–10.1)
CHLORIDE SERPL-SCNC: 107 MMOL/L — SIGNIFICANT CHANGE UP (ref 98–110)
CO2 SERPL-SCNC: 20 MMOL/L — SIGNIFICANT CHANGE UP (ref 17–32)
CREAT SERPL-MCNC: 1.2 MG/DL — SIGNIFICANT CHANGE UP (ref 0.7–1.5)
EOSINOPHIL # BLD AUTO: 0.26 K/UL — SIGNIFICANT CHANGE UP (ref 0–0.7)
EOSINOPHIL NFR BLD AUTO: 2.8 % — SIGNIFICANT CHANGE UP (ref 0–8)
GLUCOSE BLDC GLUCOMTR-MCNC: 147 MG/DL — HIGH (ref 70–99)
GLUCOSE BLDC GLUCOMTR-MCNC: 219 MG/DL — HIGH (ref 70–99)
GLUCOSE BLDC GLUCOMTR-MCNC: 228 MG/DL — HIGH (ref 70–99)
GLUCOSE SERPL-MCNC: 138 MG/DL — HIGH (ref 70–99)
HCT VFR BLD CALC: 34.5 % — LOW (ref 37–47)
HGB BLD-MCNC: 10.9 G/DL — LOW (ref 12–16)
IMM GRANULOCYTES NFR BLD AUTO: 0.3 % — SIGNIFICANT CHANGE UP (ref 0.1–0.3)
LYMPHOCYTES # BLD AUTO: 2.01 K/UL — SIGNIFICANT CHANGE UP (ref 1.2–3.4)
LYMPHOCYTES # BLD AUTO: 21.5 % — SIGNIFICANT CHANGE UP (ref 20.5–51.1)
MCHC RBC-ENTMCNC: 27.1 PG — SIGNIFICANT CHANGE UP (ref 27–31)
MCHC RBC-ENTMCNC: 31.6 G/DL — LOW (ref 32–37)
MCV RBC AUTO: 85.8 FL — SIGNIFICANT CHANGE UP (ref 81–99)
MONOCYTES # BLD AUTO: 0.63 K/UL — HIGH (ref 0.1–0.6)
MONOCYTES NFR BLD AUTO: 6.7 % — SIGNIFICANT CHANGE UP (ref 1.7–9.3)
NEUTROPHILS # BLD AUTO: 6.39 K/UL — SIGNIFICANT CHANGE UP (ref 1.4–6.5)
NEUTROPHILS NFR BLD AUTO: 68.3 % — SIGNIFICANT CHANGE UP (ref 42.2–75.2)
NRBC # BLD: 0 /100 WBCS — SIGNIFICANT CHANGE UP (ref 0–0)
PLATELET # BLD AUTO: 263 K/UL — SIGNIFICANT CHANGE UP (ref 130–400)
POTASSIUM SERPL-MCNC: 4.7 MMOL/L — SIGNIFICANT CHANGE UP (ref 3.5–5)
POTASSIUM SERPL-SCNC: 4.7 MMOL/L — SIGNIFICANT CHANGE UP (ref 3.5–5)
RBC # BLD: 4.02 M/UL — LOW (ref 4.2–5.4)
RBC # FLD: 15.4 % — HIGH (ref 11.5–14.5)
SODIUM SERPL-SCNC: 137 MMOL/L — SIGNIFICANT CHANGE UP (ref 135–146)
WBC # BLD: 9.36 K/UL — SIGNIFICANT CHANGE UP (ref 4.8–10.8)
WBC # FLD AUTO: 9.36 K/UL — SIGNIFICANT CHANGE UP (ref 4.8–10.8)

## 2020-11-13 PROCEDURE — 99232 SBSQ HOSP IP/OBS MODERATE 35: CPT

## 2020-11-13 RX ADMIN — Medication 100 MILLIGRAM(S): at 05:17

## 2020-11-13 RX ADMIN — Medication 10 UNIT(S): at 08:40

## 2020-11-13 RX ADMIN — Medication 20 MILLIGRAM(S): at 05:17

## 2020-11-13 RX ADMIN — LISINOPRIL 40 MILLIGRAM(S): 2.5 TABLET ORAL at 05:17

## 2020-11-13 NOTE — PROGRESS NOTE ADULT - SUBJECTIVE AND OBJECTIVE BOX
Vascular Cardiology Follow up    DARRION MELENDEZ   75y Female  PAST MEDICAL & SURGICAL HISTORY:  Atrial fibrillation, unspecified type  on eliquis    Hernia    Tumor  bladder tumor    Amputation of toe of left foot  L Hallux 5/20    PVD (peripheral vascular disease)    CAD (coronary artery disease)  PCI RCA 2011, OM2 2006 , OM 2009    CHF (congestive heart failure)    HTN (hypertension)    DM (diabetes mellitus)    Status post coronary artery stent placement    H/O: hysterectomy    Cardiac defibrillator in place    Artificial cardiac pacemaker    History of cholecystectomy    HPI:  74 Y F with PMH: CAD s/p PCI RCA 2011, OM2 2006, OM 2009, Chronic HFrEF (now improved) s/p BiVICD, afib on eliquis, HTN, CVA, DM, severe peripheral vascular disease s/p angiogram status post laser atherectomy and balloon of L-distal SFA, L-popliteal artery, L-TPT trunk, and L-peroneal artery, non healing Left foot ulcer: s/p left Hallux amputation May 2020.  11/6/2020 LE duplex revealed new low velocities in distal pop/prox peroneal/severe disease of right pop. and reports occasional rest pain of both LE with occasional difficulty falling asleep secondary to pain, peripheral angiogram recommended for [place] class 4/5 symptoms.     REVIEW OF SYSTEMS:  CONSTITUTIONAL: No fever, weight loss, or fatigue  CARDIOLOGY: PAtient denies chest pain, shortness of breath or syncopal episodes.   RESPIRATORY: denies shortness of breath, wheezing  NEUROLOGICAL: NO weakness, no focal deficits to report.  ENDOCRINOLOGICAL: no recent change in diabetic medications.   GI: no BRBPR, no N,V,diarrhea.     PHYSICAL EXAM:  · CONSTITUTIONAL:	Well-developed, well nourished     ·RESPIRATORY:   airway patent; breath sounds equal; good air movement; respirations non-labored; clear to auscultation bilaterally; no chest wall tenderness; no intercostal retractions; no rales,rhonchi or wheeze  · CARDIOVASCULAR	regular rate and rhythm  no rub  no murmur    · EXTREMITIES: No cyanosis, clubbing or edema, L Hallux amputation site C/D/I, lateral aspect L pinky toe ulcer (1x1 cm)  · VASCULAR: 	b/l LE pulses via doppler   (12 Nov 2020 09:41)    Allergies    codeine (Rash)  penicillin (Rash)    Intolerances    Patient examined at bedside  Patient without complaints. Pt ambulated with assistance.   Denies LE pain and swelling,  SOB, palpitations, or dizziness  No events on telemetry overnight    Vital Signs Last 24 Hrs  T(C): 36.1 (13 Nov 2020 05:07), Max: 36.3 (12 Nov 2020 21:04)  T(F): 97 (13 Nov 2020 05:07), Max: 97.3 (12 Nov 2020 21:04)  HR: 88 (13 Nov 2020 08:27) (78 - 88)  BP: 155/67 (13 Nov 2020 08:27) (155/67 - 191/81)  BP(mean): --  RR: 18 (13 Nov 2020 07:59) (16 - 18)  SpO2: 98% (13 Nov 2020 07:59) (96% - 98%)    MEDICATIONS  (STANDING):  aspirin  chewable 81 milliGRAM(s) Oral once  atorvastatin 40 milliGRAM(s) Oral at bedtime  clopidogrel Tablet 75 milliGRAM(s) Oral daily  dextrose 40% Gel 15 Gram(s) Oral once  dextrose 5%. 1000 milliLiter(s) (50 mL/Hr) IV Continuous <Continuous>  dextrose 5%. 1000 milliLiter(s) (100 mL/Hr) IV Continuous <Continuous>  dextrose 50% Injectable 25 Gram(s) IV Push once  dextrose 50% Injectable 12.5 Gram(s) IV Push once  dextrose 50% Injectable 25 Gram(s) IV Push once  furosemide    Tablet 20 milliGRAM(s) Oral two times a day  glucagon  Injectable 1 milliGRAM(s) IntraMuscular once  hydrALAZINE 100 milliGRAM(s) Oral three times a day  insulin glargine Injectable (LANTUS) 20 Unit(s) SubCutaneous at bedtime  insulin lispro Injectable (ADMELOG) 10 Unit(s) SubCutaneous three times a day before meals  lisinopril 40 milliGRAM(s) Oral daily  metoprolol succinate  milliGRAM(s) Oral daily  sodium chloride 0.9%. 1000 milliLiter(s) (75 mL/Hr) IV Continuous <Continuous>    MEDICATIONS  (PRN):    REVIEW OF SYSTEMS:          All negative except as mentioned in HPI    PHYSICAL EXAM:           CONSTITUTIONAL: Well-developed; well-nourished; in no acute distress  	SKIN: warm, dry  	HEAD: Normocephalic; atraumatic  	EYES: PERRL.  	ENT: No nasal discharge, airway clear, mucous membranes moist  	NECK: Supple; non tender.  	CARD: +S1, +S2, no murmurs, gallops, or rubs. Regular rate and rhythm    	RESP: No wheezes, rales or rhonchi. CTA B/L  	ABD: soft ntnd, + BS x 4 quadrants  	EXT: moves all extremities,  no clubbing, cyanosis or edema  	NEURO: Alert and oriented x3, no focal deficits          PSYCH: Cooperative, appropriate          VASCULAR:  + Rad / + PTs / + DPs          EXTREMITY:             Left Groin:  Dressing D/C/I,  access site soft, no hematoma, no pain, + weak pulses, no sign of infection, no numbness             Left foot: scant amount blood oozing from wound, gauze with paper tape applied    LABS:                      10.9   9.36  )-----------( 263      ( 13 Nov 2020 06:26 )             34.5     11-13    137  |  107  |  40<H>  ----------------------------<  138<H>  4.7   |  20  |  1.2    Ca    9.2      13 Nov 2020 06:26    A/P:  I discussed the case with Cardiologist Dr. Hammonds  and recommend the following:    S/P PTA 11/12:    PERIPHERAL DIAGNOSTIC ANGIOGRAM/INTERVENTION SUMMARY:    Severe left popliteal and infrapopliteal PAD  Significant stenosis of left popliteal artery and peroneal artery (1 vessel distal run-off). Occluded left AT and PT arteries. Retrograde filling of plantar and dorsalis pedis arteries via collaterals.     -In-line flow to LLE was successfully established by PTA with balloon expandable KENDELL placement to Peroneal artery and Self expanding Allison stent to popliteal artery.     	     Continue  Plavix 75 mg daily ,  B-Blocker, Statin Therapy, Eliquis                   Patient given 30 day supply of ( Plavix 75 mg daily ) to take at home                   STOP ASA 81mg as outpatient                   Restart Eliquis 5mg po tomorrow 11/14                   Podiatry to see pt prior to d/c, recommendations to follow                   Apply sterile gauze with paper tape and curlex to left foot.                    Renal artery duplex as outpatient                   Monitor access site                   No strenuous activity for 3 days (routine walking okay)                   No driving for 3 days                   No heavy lifting > 30lbs for 2 weeks                   May shower in 24 hours                   Leave dressing in place 24- 48 hours, if does not fall off in 48 hours can remove                   Patient agreeing to take antiplatelet therapy as directed by cardiologist                    Post angiogram instructions, access site care and activity restrictions reviewed with patient                     Discussed with patient to return to hospital if experience severe lower extremity pain and site bleeding                   Aggressive risk factor modification, diet counseling, smoking cessation discussed with patient                      Can discharge patient from vascular standpoint ambulating without symptoms, access site wnl, labs and ECG reviewed                    Follow up with Cardiology Dr. Hammonds in one week.  Instructed to call and make an appointment

## 2020-11-13 NOTE — DISCHARGE NOTE PROVIDER - HOSPITAL COURSE
74 Y F with PMH: CAD s/p PCI RCA 2011, OM2 2006, OM 2009, Chronic HFrEF (now improved) s/p BiVICD, afib on eliquis, HTN, CVA, DM, severe peripheral vascular disease s/p angiogram status post laser atherectomy and balloon of L-distal SFA, L-popliteal artery, L-TPT trunk, and L-peroneal artery, non healing Left foot ulcer: s/p left Hallux amputation May 2020.  11/6/2020 LE duplex revealed new low velocities in distal pop/prox peroneal/severe disease of right pop. and reports occasional rest pain of both LE with occasional difficulty falling asleep secondary to pain, peripheral angiogram recommended for Leena class 4/5 symptoms. PTA performed 11/12 and patient admitted overnight for hydration and monitoring.

## 2020-11-13 NOTE — CONSULT NOTE ADULT - SUBJECTIVE AND OBJECTIVE BOX
Podiatry Consult Note    Subjective:  DARRION MELENDEZ is a pleasant well-nourished, well developed 75y Female in no acute distress, alert awake, and oriented to person, place and time.   Patient is a 75y old  Female who presents with a chief complaint of s/p PTA (13 Nov 2020 08:39)  Seen by bedside; Dr. Yo's patient; possible discharge today; consulted by Vascular prior to discharge for left hallux amputation surgical wound site;     HPI:  74 Y F with PMH: CAD s/p PCI RCA 2011, OM2 2006, OM 2009, Chronic HFrEF (now improved) s/p BiVICD, afib on eliquis, HTN, CVA, DM, severe peripheral vascular disease s/p angiogram status post laser atherectomy and balloon of L-distal SFA, L-popliteal artery, L-TPT trunk, and L-peroneal artery, non healing Left foot ulcer: s/p left Hallux amputation May 2020.  11/6/2020 LE duplex revealed new low velocities in distal pop/prox peroneal/severe disease of right pop. and reports occasional rest pain of both LE with occasional difficulty falling asleep secondary to pain, peripheral angiogram recommended for [place] class 4/5 symptoms.     PAST MEDICAL & SURGICAL HISTORY:  Atrial fibrillation, unspecified type  on eliquis  Hernia  Tumor  bladder tumor  Amputation of toe of left foot  L Hallux 5/20  PVD (peripheral vascular disease)  CAD (coronary artery disease)  PCI RCA 2011, OM2 2006 , OM 2009  CHF (congestive heart failure)  HTN (hypertension)  DM (diabetes mellitus)  Status post coronary artery stent placement  H/O: hysterectomy  Cardiac defibrillator in place  Artificial cardiac pacemaker  History of cholecystectomy    Objective:  Vital Signs Last 24 Hrs  T(C): 36.1 (13 Nov 2020 05:07), Max: 36.3 (12 Nov 2020 21:04)  T(F): 97 (13 Nov 2020 05:07), Max: 97.3 (12 Nov 2020 21:04)  HR: 88 (13 Nov 2020 08:27) (78 - 88)  BP: 155/67 (13 Nov 2020 08:27) (155/67 - 191/81)  BP(mean): --  RR: 18 (13 Nov 2020 07:59) (16 - 18)  SpO2: 98% (13 Nov 2020 07:59) (96% - 98%)                        10.9   9.36  )-----------( 263      ( 13 Nov 2020 06:26 )             34.5                 11-13    137  |  107  |  40<H>  ----------------------------<  138<H>  4.7   |  20  |  1.2    Ca    9.2      13 Nov 2020 06:26    Physical Exam - Lower Extremity Focused:   Derm:   Left hallux amputation surgical wound site; mild drainage upon manual compression; no rubor, no malodor;  Vascular: DP and PT Pulses Diminished;  Neuro: Protective Sensation Diminished;    Assessment:  Left hallux amputation site surgical wound;     Plan:  Chart reviewed and Patient evaluated. All Questions and Concerns Addressed and Answered  Discussed diagnosis and treatment with patient  Wound Flushed w/ normal saline; Xeroform / DSD / Kerlix;  Local Wound Care; As Stated Above; q24  Wound Culture Obtained; Sent to Pathology; Pending Results;  Request ID Consult for discharge abx per attending;  Follow up as an outpatient to Dr. Yo on Thursday 11/19;  Patient is stable from podiatry standpoint; can be discharge with current dressing;   Weight bearing status; partial weight bearing on left foot with heel touch;  Discussed Plan w/ Dr. Yo    Podiatry

## 2020-11-13 NOTE — DISCHARGE NOTE PROVIDER - NSDCFUADDINST_GEN_ALL_CORE_FT
Discharge instructions as follows:  - Can restart Eliquis 5mg tomorrow 11/14  - F/U with podiatrist Dr. Yo  - obtain renal artery duplex as outpatient  - No strenuous activity for 3 days (routine walking okay)  - No driving for 3 days  - No heavy lifting >30lbs for 2 weeks  - Some swelling to the leg is expected  - May shower in 24 hours, no baths or pools  - Leave dressing in place for 24-48 hours, if does not fall off on own may remove after 48 hours  - f/u with Dr. Hammonds in office in 1 week Discharge instructions as follows:  - Can restart Eliquis 5mg tomorrow 11/14  - obtain renal artery duplex as outpatient  - No strenuous activity for 3 days (routine walking okay)  - Weight bearing status: partial weight bearing on left foot with heel touch  - No driving for 3 days  - No heavy lifting >30lbs for 2 weeks  - Some swelling to the leg is expected  - May shower in 24 hours, no baths or pools  - Leave dressing in place for 24-48 hours, if does not fall off on own may remove after 48 hours  - F/U with Dr. Hammonds in office in 1 week  - F/U with podiatrist Dr. Yo Thursday 11/19 Discharge instructions as follows:  - Can restart Eliquis 5mg tomorrow 11/14  - obtain renal artery duplex as outpatient  - No strenuous activity for 3 days (routine walking okay)  - Weight bearing status: partial weight bearing on left foot with heel touch  - No driving for 3 days  - No heavy lifting >30lbs for 2 weeks  - Some swelling to the leg is expected  - May shower in 24 hours, no baths or pools  - Leave groin dressing in place for 24-48 hours, if does not fall off on own may remove after 48 hours  - F/U with Dr. Hammonds in office in 1 week  - F/U with podiatrist Dr. Yo Thursday 11/19

## 2020-11-13 NOTE — DISCHARGE NOTE PROVIDER - CARE PROVIDER_API CALL
Arpan Hammonds (MD)  Cardiovascular Disease; Endovascular Medicine; Interventional Cardiology; Vascular Medicine  [address] [place] [address], [place] [address]  Phone: [phone]  Fax: [phone]  Established Patient  Follow Up Time: 1 week   Arpan Hammonds (MD)  Cardiovascular Disease; Endovascular Medicine; Interventional Cardiology; Vascular Medicine  [address] [place] [address], [address]  [place] [address]  Phone: [phone]  Fax: [phone]  Established Patient  Follow Up Time: 1 week    Rylee Yo  PODIATRIC MEDICINE  [address] [place] [address]  Phone: [phone]  Fax: [phone]  Established Patient  Follow Up Time: 1 week

## 2020-11-13 NOTE — DISCHARGE NOTE PROVIDER - NSDCMRMEDTOKEN_GEN_ALL_CORE_FT
apixaban 5 mg oral tablet: 1 tab(s) orally every 12 hours  Can restart tomorrow 11/14  atorvastatin 40 mg oral tablet: 1 tab(s) orally once a day (at bedtime)  fexofenadine 180 mg oral tablet: 1 tab(s) orally once a day  furosemide 20 mg oral tablet: 1 tab(s) orally 2 times a day  gabapentin 100 mg oral capsule: 2 cap(s) orally every 8 hours   glimepiride 4 mg oral tablet: 1 tab(s) orally 2 times a day  hydrALAZINE 100 mg oral tablet: 1 tab(s) orally every 8 hours. Hold if systolic blood pressure &lt;120  Levemir FlexPen 100 units/mL subcutaneous solution: 20 unit(s) subcutaneous once a day (at bedtime)  lisinopril 40 mg oral tablet: 1 tab(s) orally once a day  Metoprolol Succinate  mg oral tablet, extended release: 1 tab(s) orally once a day  NovoLOG FlexPen 100 units/mL injectable solution: 10 unit(s) injectable 3 times a day with meals  omeprazole 40 mg oral delayed release capsule: 1 cap(s) orally once a day  Plavix 75 mg oral tablet: 1 tab(s) orally once a day  senna oral tablet: 2 tab(s) orally once a day (at bedtime)  sodium hypochlorite 0.25% topical solution: 1 application topically once a day

## 2020-11-13 NOTE — DISCHARGE NOTE PROVIDER - NSDCCPTREATMENT_GEN_ALL_CORE_FT
PRINCIPAL PROCEDURE  Procedure: PTA, artery, popliteal  Findings and Treatment: PTA w/ balloon expandable KENDELL to peroneal artery and self expanding Allison stent to popliteal artery

## 2020-11-13 NOTE — DISCHARGE NOTE PROVIDER - NSDCCPCAREPLAN_GEN_ALL_CORE_FT
PRINCIPAL DISCHARGE DIAGNOSIS  Diagnosis: PAD (peripheral artery disease)  Assessment and Plan of Treatment: continue medical regimen to prevent lower extremity pain

## 2020-11-13 NOTE — DISCHARGE NOTE PROVIDER - PROVIDER TOKENS
PROVIDER:[TOKEN:[[id number]],FOLLOWUP:[1 week],ESTABLISHEDPATIENT:[T]] PROVIDER:[TOKEN:[[id number]],FOLLOWUP:[1 week],ESTABLISHEDPATIENT:[T]],PROVIDER:[TOKEN:[[id number]],FOLLOWUP:[1 week],ESTABLISHEDPATIENT:[T]]

## 2020-11-13 NOTE — DISCHARGE NOTE NURSING/CASE MANAGEMENT/SOCIAL WORK - PATIENT PORTAL LINK FT
You can access the FollowMyHealth Patient Portal offered by [facility] by registering at the following website: [email or web address]. By joining [facility] [employer]’s FollowMyHealth portal, you will also be able to view your health information using other applications (apps) compatible with our system.

## 2020-11-13 NOTE — DISCHARGE NOTE PROVIDER - CARE PROVIDERS DIRECT ADDRESSES
[email or web address]

## 2020-11-15 LAB
-  AMPICILLIN/SULBACTAM: SIGNIFICANT CHANGE UP
-  CEFAZOLIN: SIGNIFICANT CHANGE UP
-  CLINDAMYCIN: SIGNIFICANT CHANGE UP
-  DAPTOMYCIN: SIGNIFICANT CHANGE UP
-  ERYTHROMYCIN: SIGNIFICANT CHANGE UP
-  GENTAMICIN: SIGNIFICANT CHANGE UP
-  LINEZOLID: SIGNIFICANT CHANGE UP
-  OXACILLIN: SIGNIFICANT CHANGE UP
-  PENICILLIN: SIGNIFICANT CHANGE UP
-  RIFAMPIN: SIGNIFICANT CHANGE UP
-  TETRACYCLINE: SIGNIFICANT CHANGE UP
-  TRIMETHOPRIM/SULFAMETHOXAZOLE: SIGNIFICANT CHANGE UP
-  VANCOMYCIN: SIGNIFICANT CHANGE UP
CULTURE RESULTS: SIGNIFICANT CHANGE UP
METHOD TYPE: SIGNIFICANT CHANGE UP
ORGANISM # SPEC MICROSCOPIC CNT: SIGNIFICANT CHANGE UP
ORGANISM # SPEC MICROSCOPIC CNT: SIGNIFICANT CHANGE UP
SPECIMEN SOURCE: SIGNIFICANT CHANGE UP

## 2020-11-16 LAB
ALBUMIN SERPL ELPH-MCNC: 4.3 G/DL
ALP BLD-CCNC: 240 U/L
ALT SERPL-CCNC: 25 U/L
ANION GAP SERPL CALC-SCNC: 16 MMOL/L
AST SERPL-CCNC: 28 U/L
BILIRUB SERPL-MCNC: 0.2 MG/DL
BUN SERPL-MCNC: 54 MG/DL
CALCIUM SERPL-MCNC: 10.5 MG/DL
CHLORIDE SERPL-SCNC: 101 MMOL/L
CO2 SERPL-SCNC: 22 MMOL/L
CREAT SERPL-MCNC: 1.2 MG/DL
GLUCOSE SERPL-MCNC: 167 MG/DL
POTASSIUM SERPL-SCNC: 5 MMOL/L
PROT SERPL-MCNC: 8.1 G/DL
SODIUM SERPL-SCNC: 138 MMOL/L

## 2020-11-17 DIAGNOSIS — I73.9 PERIPHERAL VASCULAR DISEASE, UNSPECIFIED: ICD-10-CM

## 2020-11-17 DIAGNOSIS — Z95.5 PRESENCE OF CORONARY ANGIOPLASTY IMPLANT AND GRAFT: ICD-10-CM

## 2020-11-17 DIAGNOSIS — Z95.820 PERIPHERAL VASCULAR ANGIOPLASTY STATUS WITH IMPLANTS AND GRAFTS: ICD-10-CM

## 2020-11-17 DIAGNOSIS — Z88.0 ALLERGY STATUS TO PENICILLIN: ICD-10-CM

## 2020-11-17 DIAGNOSIS — Z79.01 LONG TERM (CURRENT) USE OF ANTICOAGULANTS: ICD-10-CM

## 2020-11-17 DIAGNOSIS — I70.222 ATHEROSCLEROSIS OF NATIVE ARTERIES OF EXTREMITIES WITH REST PAIN, LEFT LEG: ICD-10-CM

## 2020-11-17 DIAGNOSIS — I25.10 ATHEROSCLEROTIC HEART DISEASE OF NATIVE CORONARY ARTERY WITHOUT ANGINA PECTORIS: ICD-10-CM

## 2020-11-17 DIAGNOSIS — I11.0 HYPERTENSIVE HEART DISEASE WITH HEART FAILURE: ICD-10-CM

## 2020-11-17 DIAGNOSIS — Z79.02 LONG TERM (CURRENT) USE OF ANTITHROMBOTICS/ANTIPLATELETS: ICD-10-CM

## 2020-11-17 DIAGNOSIS — Z86.73 PERSONAL HISTORY OF TRANSIENT ISCHEMIC ATTACK (TIA), AND CEREBRAL INFARCTION WITHOUT RESIDUAL DEFICITS: ICD-10-CM

## 2020-11-17 DIAGNOSIS — Z87.891 PERSONAL HISTORY OF NICOTINE DEPENDENCE: ICD-10-CM

## 2020-11-17 DIAGNOSIS — Z95.810 PRESENCE OF AUTOMATIC (IMPLANTABLE) CARDIAC DEFIBRILLATOR: ICD-10-CM

## 2020-11-17 DIAGNOSIS — E11.51 TYPE 2 DIABETES MELLITUS WITH DIABETIC PERIPHERAL ANGIOPATHY WITHOUT GANGRENE: ICD-10-CM

## 2020-11-17 DIAGNOSIS — Z90.710 ACQUIRED ABSENCE OF BOTH CERVIX AND UTERUS: ICD-10-CM

## 2020-11-17 DIAGNOSIS — Z95.0 PRESENCE OF CARDIAC PACEMAKER: ICD-10-CM

## 2020-11-17 DIAGNOSIS — I50.9 HEART FAILURE, UNSPECIFIED: ICD-10-CM

## 2020-11-17 DIAGNOSIS — I48.91 UNSPECIFIED ATRIAL FIBRILLATION: ICD-10-CM

## 2020-11-17 DIAGNOSIS — Z88.6 ALLERGY STATUS TO ANALGESIC AGENT: ICD-10-CM

## 2020-11-17 DIAGNOSIS — Z89.422 ACQUIRED ABSENCE OF OTHER LEFT TOE(S): ICD-10-CM

## 2020-11-17 DIAGNOSIS — Z90.49 ACQUIRED ABSENCE OF OTHER SPECIFIED PARTS OF DIGESTIVE TRACT: ICD-10-CM

## 2020-11-17 PROBLEM — K46.9 UNSPECIFIED ABDOMINAL HERNIA WITHOUT OBSTRUCTION OR GANGRENE: Chronic | Status: ACTIVE | Noted: 2020-11-12

## 2020-11-17 PROBLEM — S98.132A: Chronic | Status: ACTIVE | Noted: 2020-06-23

## 2020-11-20 ENCOUNTER — APPOINTMENT (OUTPATIENT)
Dept: CARDIOLOGY | Facility: CLINIC | Age: 75
End: 2020-11-20
Payer: MEDICARE

## 2020-11-20 VITALS
BODY MASS INDEX: 27.94 KG/M2 | HEIGHT: 61 IN | DIASTOLIC BLOOD PRESSURE: 90 MMHG | OXYGEN SATURATION: 98 % | WEIGHT: 148 LBS | SYSTOLIC BLOOD PRESSURE: 160 MMHG | HEART RATE: 82 BPM | TEMPERATURE: 97.9 F

## 2020-11-20 DIAGNOSIS — L03.90 CELLULITIS, UNSPECIFIED: ICD-10-CM

## 2020-11-20 PROCEDURE — 93975 VASCULAR STUDY: CPT

## 2020-11-20 PROCEDURE — 99214 OFFICE O/P EST MOD 30 MIN: CPT

## 2020-11-20 NOTE — ASSESSMENT
[FreeTextEntry1] : 76 y/o F with h/o tobacco abuse, Afib (eliquis) HTN, HLD, CAD (RCA KENDELL 2011, om2 2006, om 2009) ICD ([place]) PAD s/p  left dSFA, dLeft pop, TPT trunk and left PT/plantar for left foot ulcer. Due to incomplete healing of ulcer pt underwent angiogram 1 week ago with intervention of left popliteal and peroneal arteries with PTA and KENDELL.\par -pain resolved\par -ulcer was evaluated by podiatry after the procedure 1 week ago\par -Currently ulcer (small sized) with mild purulence with surrounding mild erythema and swelling. + Doppler pedal pulses around the ulcer site \par \par plan:\par Doxycycline 100 mg BID x 10 days\par cont. wound care\par podiatry f/u\par repeat ESTRELLA/PVR prior to next visit in 2 weeks.\par renal art duplex for evaluation of poorly controlled HTN\par \par  Addendum : Severe B/L Renal artery stenosis (b/l velocities 500cm/sec)\par         Refer to Dr Panda for bladder Ca surgery timing as pt will need renal art. intervention. \par \par

## 2020-11-20 NOTE — PHYSICAL EXAM
[General Appearance - Well Developed] : well developed [General Appearance - Well Nourished] : well nourished [Normal Conjunctiva] : the conjunctiva exhibited no abnormalities [No Oral Pallor] : no oral pallor [Auscultation Breath Sounds / Voice Sounds] : lungs were clear to auscultation bilaterally [Heart Sounds] : normal S1 and S2 [Bowel Sounds] : normal bowel sounds [Abdomen Soft] : soft [Nail Clubbing] : no clubbing of the fingernails [Cyanosis, Localized] : no localized cyanosis [Oriented To Time, Place, And Person] : oriented to person, place, and time [FreeTextEntry1] : healed ulcer on lateral left foot. small ulcer on medial left foot over first metatarsal area with mild purulence and surrounding erythema/ swelling.

## 2020-11-20 NOTE — HISTORY OF PRESENT ILLNESS
[FreeTextEntry1] : 74 y/o F with h/o tobacco abuse, Afib (eliquis) HTN, HLD, CAD (RCA KENDELL 2011, om2 2006, om 2009) ICD ([place]) PAD s/p  left dSFA, dLeft pop, TPT trunk and left PT/plantar for left foot ulcer. Due to incomplete healing of ulcer pt underwent angiogram 1 week ago with intervention of left popliteal and peroneal arteries with PTA and KENDELL. pt reports improvement in pain but reports persistent ulcer with surrounding erythema and swelling. pt will be following up with her podiatrist tomorrow. pt denies any fever/ chills. pt was also found to have  poorly controlled BP during her hospitalization 1 week ago.

## 2020-11-23 ENCOUNTER — LABORATORY RESULT (OUTPATIENT)
Age: 75
End: 2020-11-23

## 2020-11-23 ENCOUNTER — APPOINTMENT (OUTPATIENT)
Dept: UROLOGY | Facility: CLINIC | Age: 75
End: 2020-11-23
Payer: MEDICARE

## 2020-11-23 VITALS
HEIGHT: 61 IN | TEMPERATURE: 97.3 F | SYSTOLIC BLOOD PRESSURE: 137 MMHG | WEIGHT: 148 LBS | BODY MASS INDEX: 27.94 KG/M2 | DIASTOLIC BLOOD PRESSURE: 67 MMHG | HEART RATE: 74 BPM

## 2020-11-23 DIAGNOSIS — Z83.3 FAMILY HISTORY OF DIABETES MELLITUS: ICD-10-CM

## 2020-11-23 DIAGNOSIS — Z78.9 OTHER SPECIFIED HEALTH STATUS: ICD-10-CM

## 2020-11-23 PROCEDURE — 99072 ADDL SUPL MATRL&STAF TM PHE: CPT

## 2020-11-23 PROCEDURE — 99213 OFFICE O/P EST LOW 20 MIN: CPT

## 2020-11-23 NOTE — PHYSICAL EXAM
[General Appearance - Well Developed] : well developed [General Appearance - Well Nourished] : well nourished [Normal Appearance] : normal appearance [Well Groomed] : well groomed [General Appearance - In No Acute Distress] : no acute distress [Edema] : no peripheral edema [Respiration, Rhythm And Depth] : normal respiratory rhythm and effort [Exaggerated Use Of Accessory Muscles For Inspiration] : no accessory muscle use [Abdomen Soft] : soft [Abdomen Tenderness] : non-tender [Costovertebral Angle Tenderness] : no ~M costovertebral angle tenderness [Urinary Bladder Findings] : the bladder was normal on palpation [FreeTextEntry1] : toe amputation - LEFT [] : no rash [No Focal Deficits] : no focal deficits [Oriented To Time, Place, And Person] : oriented to person, place, and time [Affect] : the affect was normal [Mood] : the mood was normal [Not Anxious] : not anxious [No Palpable Adenopathy] : no palpable adenopathy

## 2020-11-23 NOTE — LETTER BODY
[Dear  ___] : Dear ~VALENTINA, [Consult Letter:] : I had the pleasure of evaluating your patient, [unfilled]. [Please see my note below.] : Please see my note below. [Sincerely,] : Sincerely, [FreeTextEntry3] : Martin Panda MD, FACS\par

## 2020-11-23 NOTE — ASSESSMENT
[FreeTextEntry1] : 76 yo with bladder cancer\par patient with sever PVD\par s/p Toe amputation\par \par - surgery discussed and scheduled \par - full R+B explained\par - all options provided

## 2020-11-23 NOTE — HISTORY OF PRESENT ILLNESS
[FreeTextEntry1] : 74 yo with recent diagnosis of bladder cancer\par she is here with her daughter to continue care\par she was diagnosed at [facility] following the diagnosis she was scheduled to have a recetion - having had an abnormal office cystoscopy \par \par CT scan 5/2020\par \par Impression: \par \par Small bilateral pleural effusions. \par \par Multiple nodular peripheral filling defect in the urinary bladder measuring \par up to 1.3 cm, suspicious for transitional cell carcinomas. Cystoscopy is \par recommended for further evaluation. \par \par Stable mild dilatation of the appendix measuring up to 8 mm in caliber. No \par evidence for periappendiceal stranding. Given its stability, mucocele of the \par appendix is considered. Acute appendicitis is possible but less likely due \par to no interval development of periappendiceal stranding. \par \par Trace perihepatic ascites. \par \par she has nocturia and frequency\par \par s/p toe amputation - LEFT\par  [Urinary Urgency] : urinary urgency [Urinary Frequency] : urinary frequency [Nocturia] : nocturia

## 2020-11-28 ENCOUNTER — EMERGENCY (EMERGENCY)
Facility: HOSPITAL | Age: 75
LOS: 1 days | Discharge: HOME | End: 2020-11-28
Attending: EMERGENCY MEDICINE
Payer: MEDICARE

## 2020-11-28 VITALS
SYSTOLIC BLOOD PRESSURE: 196 MMHG | RESPIRATION RATE: 19 BRPM | HEART RATE: 93 BPM | TEMPERATURE: 98 F | DIASTOLIC BLOOD PRESSURE: 91 MMHG | HEIGHT: 61 IN | OXYGEN SATURATION: 97 %

## 2020-11-28 VITALS — HEART RATE: 93 BPM | SYSTOLIC BLOOD PRESSURE: 176 MMHG | DIASTOLIC BLOOD PRESSURE: 75 MMHG

## 2020-11-28 DIAGNOSIS — Z90.49 ACQUIRED ABSENCE OF OTHER SPECIFIED PARTS OF DIGESTIVE TRACT: Chronic | ICD-10-CM

## 2020-11-28 DIAGNOSIS — Z95.0 PRESENCE OF CARDIAC PACEMAKER: Chronic | ICD-10-CM

## 2020-11-28 DIAGNOSIS — Z95.5 PRESENCE OF CORONARY ANGIOPLASTY IMPLANT AND GRAFT: Chronic | ICD-10-CM

## 2020-11-28 DIAGNOSIS — Z90.710 ACQUIRED ABSENCE OF BOTH CERVIX AND UTERUS: Chronic | ICD-10-CM

## 2020-11-28 DIAGNOSIS — Z95.810 PRESENCE OF AUTOMATIC (IMPLANTABLE) CARDIAC DEFIBRILLATOR: Chronic | ICD-10-CM

## 2020-11-28 LAB
ANION GAP SERPL CALC-SCNC: 10 MMOL/L — SIGNIFICANT CHANGE UP (ref 5–17)
APTT BLD: 56.3 SEC — HIGH (ref 27.5–35.5)
BASOPHILS # BLD AUTO: 0.06 K/UL — SIGNIFICANT CHANGE UP (ref 0–0.2)
BASOPHILS NFR BLD AUTO: 0.5 % — SIGNIFICANT CHANGE UP (ref 0–2)
BUN SERPL-MCNC: 64 MG/DL — HIGH (ref 7–18)
CALCIUM SERPL-MCNC: 9.4 MG/DL — SIGNIFICANT CHANGE UP (ref 8.4–10.5)
CHLORIDE SERPL-SCNC: 103 MMOL/L — SIGNIFICANT CHANGE UP (ref 96–108)
CO2 SERPL-SCNC: 22 MMOL/L — SIGNIFICANT CHANGE UP (ref 22–31)
CREAT SERPL-MCNC: 1.36 MG/DL — HIGH (ref 0.5–1.3)
EOSINOPHIL # BLD AUTO: 0.14 K/UL — SIGNIFICANT CHANGE UP (ref 0–0.5)
EOSINOPHIL NFR BLD AUTO: 1.1 % — SIGNIFICANT CHANGE UP (ref 0–6)
GLUCOSE SERPL-MCNC: 230 MG/DL — HIGH (ref 70–99)
HCT VFR BLD CALC: 35.2 % — SIGNIFICANT CHANGE UP (ref 34.5–45)
HGB BLD-MCNC: 11.6 G/DL — SIGNIFICANT CHANGE UP (ref 11.5–15.5)
IMM GRANULOCYTES NFR BLD AUTO: 0.4 % — SIGNIFICANT CHANGE UP (ref 0–1.5)
INR BLD: 1.42 RATIO — HIGH (ref 0.88–1.16)
LYMPHOCYTES # BLD AUTO: 2.72 K/UL — SIGNIFICANT CHANGE UP (ref 1–3.3)
LYMPHOCYTES # BLD AUTO: 21.8 % — SIGNIFICANT CHANGE UP (ref 13–44)
MCHC RBC-ENTMCNC: 28 PG — SIGNIFICANT CHANGE UP (ref 27–34)
MCHC RBC-ENTMCNC: 33 GM/DL — SIGNIFICANT CHANGE UP (ref 32–36)
MCV RBC AUTO: 85 FL — SIGNIFICANT CHANGE UP (ref 80–100)
MONOCYTES # BLD AUTO: 0.68 K/UL — SIGNIFICANT CHANGE UP (ref 0–0.9)
MONOCYTES NFR BLD AUTO: 5.5 % — SIGNIFICANT CHANGE UP (ref 2–14)
NEUTROPHILS # BLD AUTO: 8.82 K/UL — HIGH (ref 1.8–7.4)
NEUTROPHILS NFR BLD AUTO: 70.7 % — SIGNIFICANT CHANGE UP (ref 43–77)
NRBC # BLD: 0 /100 WBCS — SIGNIFICANT CHANGE UP (ref 0–0)
PLATELET # BLD AUTO: 380 K/UL — SIGNIFICANT CHANGE UP (ref 150–400)
POTASSIUM SERPL-MCNC: 4.1 MMOL/L — SIGNIFICANT CHANGE UP (ref 3.5–5.3)
POTASSIUM SERPL-SCNC: 4.1 MMOL/L — SIGNIFICANT CHANGE UP (ref 3.5–5.3)
PROTHROM AB SERPL-ACNC: 16.6 SEC — HIGH (ref 10.6–13.6)
RBC # BLD: 4.14 M/UL — SIGNIFICANT CHANGE UP (ref 3.8–5.2)
RBC # FLD: 15.2 % — HIGH (ref 10.3–14.5)
SODIUM SERPL-SCNC: 135 MMOL/L — SIGNIFICANT CHANGE UP (ref 135–145)
WBC # BLD: 12.47 K/UL — HIGH (ref 3.8–10.5)
WBC # FLD AUTO: 12.47 K/UL — HIGH (ref 3.8–10.5)

## 2020-11-28 PROCEDURE — 85025 COMPLETE CBC W/AUTO DIFF WBC: CPT

## 2020-11-28 PROCEDURE — 36415 COLL VENOUS BLD VENIPUNCTURE: CPT

## 2020-11-28 PROCEDURE — 85610 PROTHROMBIN TIME: CPT

## 2020-11-28 PROCEDURE — 85730 THROMBOPLASTIN TIME PARTIAL: CPT

## 2020-11-28 PROCEDURE — 80048 BASIC METABOLIC PNL TOTAL CA: CPT

## 2020-11-28 PROCEDURE — 30903 CONTROL OF NOSEBLEED: CPT | Mod: 50

## 2020-11-28 PROCEDURE — 99283 EMERGENCY DEPT VISIT LOW MDM: CPT | Mod: 25

## 2020-11-28 RX ORDER — OXYMETAZOLINE HYDROCHLORIDE 0.5 MG/ML
2 SPRAY NASAL ONCE
Refills: 0 | Status: COMPLETED | OUTPATIENT
Start: 2020-11-28 | End: 2020-11-28

## 2020-11-28 RX ORDER — TRANEXAMIC ACID 100 MG/ML
5 INJECTION, SOLUTION INTRAVENOUS ONCE
Refills: 0 | Status: COMPLETED | OUTPATIENT
Start: 2020-11-28 | End: 2020-11-28

## 2020-11-28 RX ORDER — HYDRALAZINE HCL 50 MG
100 TABLET ORAL ONCE
Refills: 0 | Status: COMPLETED | OUTPATIENT
Start: 2020-11-28 | End: 2020-11-28

## 2020-11-28 RX ADMIN — TRANEXAMIC ACID 5 MILLILITER(S): 100 INJECTION, SOLUTION INTRAVENOUS at 19:25

## 2020-11-28 RX ADMIN — OXYMETAZOLINE HYDROCHLORIDE 2 SPRAY(S): 0.5 SPRAY NASAL at 21:35

## 2020-11-28 RX ADMIN — Medication 100 MILLIGRAM(S): at 21:35

## 2020-11-28 NOTE — ED PROVIDER NOTE - PROGRESS NOTE DETAILS
Chief Complaint   Patient presents with     Pre-Op Exam     Phacoemulsification with Intraocular Lens, Dexamethasone. Left: 10/21/19 Right:11/4/19 with Dr. Dominic Purdy @  [facility]     Kimberly Nissen, EMT at 10:09 AM on 10/4/2019     ADDENDUM by Coco Fenton M.D.: I received sign-off from Dr. YUNIEL Phan. 75yoF with h/o afib on apixaban, CAD, CHF, DM, HTN, presents with epistaxis, s/p TXA cotton packing, I was to reassess. On my assessment pt actively snorting to attempt to dislodge clot in her nose, anxious. No active bleeding, no vomiting. Instructed need for direct pressure and education about hemostasis with good effect. Will reassess. Pt now with R nostril bleeding as well. Attempted Afrin however the R nasal packing came out and pt rebleeding. Placed b/l Rhinorockets with Afrin with good hemostasis to this point. Bleeding stopped. Pt comfortable with discharge. D/w daughter Leanna as well and will take her home, return in 2 days or ENT f/u for packing removal. Patient is well appearing, NAD, afebrile, hemodynamically stable. Any available tests and studies were discussed with patient and daughter. Discharged with instructions in further symptomatic care, return precautions.

## 2020-11-28 NOTE — ED PROVIDER NOTE - SHIFT CHANGE DETAILS
Patient with epistaxis. I was unable to place 5.5 or 4.5cm Rhino Rocket. I treated with TXA in cotton ball with achievement of hemostasis. Signed out to Dr. Fenton pending labs and observation for repeat bleeding.

## 2020-11-28 NOTE — ED PROVIDER NOTE - COVID-19 RESULT
From: Faizan Rivera  To: Lorna Khan MD  Sent: 8/24/2020 6:11 PM CDT  Subject: Test Results Question    I am curious why I did not have a lipid (cholesterol/triglycerides) panel done for my most recent lab test for my upcoming physical. I've a
NEGATIVE

## 2020-11-28 NOTE — ED PROVIDER NOTE - CARE PLAN
Principal Discharge DX:	Epistaxis   Principal Discharge DX:	Epistaxis  Secondary Diagnosis:	HTN (hypertension)

## 2020-11-28 NOTE — ED PROVIDER NOTE - ENMT, MLM
Airway patent, Nasal mucosa clear. Mouth with normal mucosa. Throat has no vesicles, no oropharyngeal exudates and uvula is midline. slow blood oozing from left naris. deviated septum to the left.

## 2020-11-28 NOTE — ED PROVIDER NOTE - NSFOLLOWUPINSTRUCTIONS_ED_ALL_ED_FT
Please follow up with your primary care doctor in 1-2 days.  Please return to the emergency department (or see an ENT doctor) in 2 days for removal of the packing.  Please return to the emergency department if you have more bleeding, vomiting, fever, or any other symptoms.

## 2020-11-28 NOTE — ED PROVIDER NOTE - OBJECTIVE STATEMENT
Patient came in for nose bleed for the past 2 hours. Also had some nose bleed last night, for about an hour. Is on apixiban for afib. Bleeding is from left naris. no fever, ha, cp, sob, ap, n/v/d. BP was high at home, took extra dose of BP med.

## 2020-11-28 NOTE — ED PROVIDER NOTE - CARE PROVIDER_API CALL
Dejuan Pearson  OTOLARYNGOLOGY  [address] [place] [address], [address]  [place] [address]  Phone: [phone]  Fax: [phone]  Follow Up Time: 1-3 Days

## 2020-11-28 NOTE — ED PROVIDER NOTE - PATIENT PORTAL LINK FT
You can access the FollowMyHealth Patient Portal offered by [facility] by registering at the following website: [email or web address]. By joining [employer]’s FollowMyHealth portal, you will also be able to view your health information using other applications (apps) compatible with our system.

## 2020-11-28 NOTE — ED PROVIDER NOTE - CLINICAL SUMMARY MEDICAL DECISION MAKING FREE TEXT BOX
Patient with epistaxis, treated with TXA. Will check labs. Also with hypertension, no cp, sob, dizziness, ha.

## 2020-11-28 NOTE — ED PROVIDER NOTE - PMH
Amputation of toe of left foot  L Hall[place] 5/20  Atrial fibrillation, unspecified type  on eliquis  CAD (coronary artery disease)  PCI RCA 2011, OM2 2006 , OM 2009  CHF (congestive heart failure)    DM (diabetes mellitus)    Hernia    HTN (hypertension)    PVD (peripheral vascular disease)    Tumor  bladder tumor

## 2020-11-29 ENCOUNTER — EMERGENCY (EMERGENCY)
Facility: HOSPITAL | Age: 75
LOS: 1 days | Discharge: HOME | End: 2020-11-29
Attending: EMERGENCY MEDICINE | Admitting: EMERGENCY MEDICINE
Payer: MEDICARE

## 2020-11-29 ENCOUNTER — EMERGENCY (EMERGENCY)
Facility: HOSPITAL | Age: 75
LOS: 1 days | Discharge: ANOTHER HOSPITAL | End: 2020-11-29
Attending: EMERGENCY MEDICINE
Payer: MEDICARE

## 2020-11-29 ENCOUNTER — EMERGENCY (EMERGENCY)
Facility: HOSPITAL | Age: 75
LOS: 1 days | Discharge: HOME | End: 2020-11-29
Attending: EMERGENCY MEDICINE
Payer: MEDICARE

## 2020-11-29 VITALS
SYSTOLIC BLOOD PRESSURE: 166 MMHG | TEMPERATURE: 98 F | OXYGEN SATURATION: 99 % | HEART RATE: 105 BPM | HEIGHT: 61 IN | RESPIRATION RATE: 16 BRPM | DIASTOLIC BLOOD PRESSURE: 80 MMHG

## 2020-11-29 VITALS
HEIGHT: 61 IN | HEART RATE: 97 BPM | SYSTOLIC BLOOD PRESSURE: 157 MMHG | OXYGEN SATURATION: 97 % | TEMPERATURE: 98 F | RESPIRATION RATE: 18 BRPM | WEIGHT: 160.06 LBS | DIASTOLIC BLOOD PRESSURE: 71 MMHG

## 2020-11-29 VITALS
TEMPERATURE: 98 F | DIASTOLIC BLOOD PRESSURE: 77 MMHG | RESPIRATION RATE: 18 BRPM | HEART RATE: 87 BPM | SYSTOLIC BLOOD PRESSURE: 151 MMHG | OXYGEN SATURATION: 97 %

## 2020-11-29 VITALS
SYSTOLIC BLOOD PRESSURE: 127 MMHG | RESPIRATION RATE: 17 BRPM | OXYGEN SATURATION: 96 % | HEART RATE: 93 BPM | DIASTOLIC BLOOD PRESSURE: 75 MMHG | TEMPERATURE: 97 F

## 2020-11-29 DIAGNOSIS — Z95.5 PRESENCE OF CORONARY ANGIOPLASTY IMPLANT AND GRAFT: Chronic | ICD-10-CM

## 2020-11-29 DIAGNOSIS — Z90.710 ACQUIRED ABSENCE OF BOTH CERVIX AND UTERUS: Chronic | ICD-10-CM

## 2020-11-29 DIAGNOSIS — Z90.49 ACQUIRED ABSENCE OF OTHER SPECIFIED PARTS OF DIGESTIVE TRACT: Chronic | ICD-10-CM

## 2020-11-29 DIAGNOSIS — Z95.0 PRESENCE OF CARDIAC PACEMAKER: Chronic | ICD-10-CM

## 2020-11-29 DIAGNOSIS — Z95.810 PRESENCE OF AUTOMATIC (IMPLANTABLE) CARDIAC DEFIBRILLATOR: Chronic | ICD-10-CM

## 2020-11-29 LAB
ALBUMIN SERPL ELPH-MCNC: 3.5 G/DL — SIGNIFICANT CHANGE UP (ref 3.3–5)
ALBUMIN SERPL ELPH-MCNC: 3.5 G/DL — SIGNIFICANT CHANGE UP (ref 3.5–5)
ALP SERPL-CCNC: 188 U/L — HIGH (ref 40–120)
ALP SERPL-CCNC: 243 U/L — HIGH (ref 40–120)
ALT FLD-CCNC: 21 U/L — SIGNIFICANT CHANGE UP (ref 4–33)
ALT FLD-CCNC: 34 U/L DA — SIGNIFICANT CHANGE UP (ref 10–60)
ANION GAP SERPL CALC-SCNC: 11 MMO/L — SIGNIFICANT CHANGE UP (ref 7–14)
ANION GAP SERPL CALC-SCNC: 11 MMOL/L — SIGNIFICANT CHANGE UP (ref 5–17)
AST SERPL-CCNC: 22 U/L — SIGNIFICANT CHANGE UP (ref 4–32)
AST SERPL-CCNC: 24 U/L — SIGNIFICANT CHANGE UP (ref 10–40)
BASOPHILS # BLD AUTO: 0.03 K/UL — SIGNIFICANT CHANGE UP (ref 0–0.2)
BASOPHILS # BLD AUTO: 0.04 K/UL — SIGNIFICANT CHANGE UP (ref 0–0.2)
BASOPHILS NFR BLD AUTO: 0.2 % — SIGNIFICANT CHANGE UP (ref 0–2)
BASOPHILS NFR BLD AUTO: 0.3 % — SIGNIFICANT CHANGE UP (ref 0–2)
BILIRUB SERPL-MCNC: 0.4 MG/DL — SIGNIFICANT CHANGE UP (ref 0.2–1.2)
BILIRUB SERPL-MCNC: 0.5 MG/DL — SIGNIFICANT CHANGE UP (ref 0.2–1.2)
BUN SERPL-MCNC: 58 MG/DL — HIGH (ref 7–23)
BUN SERPL-MCNC: 66 MG/DL — HIGH (ref 7–18)
CALCIUM SERPL-MCNC: 9.3 MG/DL — SIGNIFICANT CHANGE UP (ref 8.4–10.5)
CALCIUM SERPL-MCNC: 9.7 MG/DL — SIGNIFICANT CHANGE UP (ref 8.4–10.5)
CHLORIDE SERPL-SCNC: 106 MMOL/L — SIGNIFICANT CHANGE UP (ref 96–108)
CHLORIDE SERPL-SCNC: 108 MMOL/L — HIGH (ref 98–107)
CO2 SERPL-SCNC: 21 MMOL/L — LOW (ref 22–31)
CO2 SERPL-SCNC: 23 MMOL/L — SIGNIFICANT CHANGE UP (ref 22–31)
CREAT SERPL-MCNC: 1.09 MG/DL — SIGNIFICANT CHANGE UP (ref 0.5–1.3)
CREAT SERPL-MCNC: 1.32 MG/DL — HIGH (ref 0.5–1.3)
EOSINOPHIL # BLD AUTO: 0.03 K/UL — SIGNIFICANT CHANGE UP (ref 0–0.5)
EOSINOPHIL # BLD AUTO: 0.06 K/UL — SIGNIFICANT CHANGE UP (ref 0–0.5)
EOSINOPHIL NFR BLD AUTO: 0.2 % — SIGNIFICANT CHANGE UP (ref 0–6)
EOSINOPHIL NFR BLD AUTO: 0.5 % — SIGNIFICANT CHANGE UP (ref 0–6)
GLUCOSE SERPL-MCNC: 138 MG/DL — HIGH (ref 70–99)
GLUCOSE SERPL-MCNC: 177 MG/DL — HIGH (ref 70–99)
HCT VFR BLD CALC: 32 % — LOW (ref 34.5–45)
HCT VFR BLD CALC: 35.6 % — SIGNIFICANT CHANGE UP (ref 34.5–45)
HGB BLD-MCNC: 10.2 G/DL — LOW (ref 11.5–15.5)
HGB BLD-MCNC: 11.6 G/DL — SIGNIFICANT CHANGE UP (ref 11.5–15.5)
IMM GRANULOCYTES NFR BLD AUTO: 0.4 % — SIGNIFICANT CHANGE UP (ref 0–1.5)
IMM GRANULOCYTES NFR BLD AUTO: 0.4 % — SIGNIFICANT CHANGE UP (ref 0–1.5)
LIDOCAIN IGE QN: 103 U/L — SIGNIFICANT CHANGE UP (ref 73–393)
LYMPHOCYTES # BLD AUTO: 15.9 % — SIGNIFICANT CHANGE UP (ref 13–44)
LYMPHOCYTES # BLD AUTO: 19.6 % — SIGNIFICANT CHANGE UP (ref 13–44)
LYMPHOCYTES # BLD AUTO: 2.13 K/UL — SIGNIFICANT CHANGE UP (ref 1–3.3)
LYMPHOCYTES # BLD AUTO: 2.4 K/UL — SIGNIFICANT CHANGE UP (ref 1–3.3)
MCHC RBC-ENTMCNC: 27.6 PG — SIGNIFICANT CHANGE UP (ref 27–34)
MCHC RBC-ENTMCNC: 27.7 PG — SIGNIFICANT CHANGE UP (ref 27–34)
MCHC RBC-ENTMCNC: 31.9 % — LOW (ref 32–36)
MCHC RBC-ENTMCNC: 32.6 GM/DL — SIGNIFICANT CHANGE UP (ref 32–36)
MCV RBC AUTO: 85 FL — SIGNIFICANT CHANGE UP (ref 80–100)
MCV RBC AUTO: 86.5 FL — SIGNIFICANT CHANGE UP (ref 80–100)
MONOCYTES # BLD AUTO: 0.68 K/UL — SIGNIFICANT CHANGE UP (ref 0–0.9)
MONOCYTES # BLD AUTO: 0.75 K/UL — SIGNIFICANT CHANGE UP (ref 0–0.9)
MONOCYTES NFR BLD AUTO: 5.1 % — SIGNIFICANT CHANGE UP (ref 2–14)
MONOCYTES NFR BLD AUTO: 6.1 % — SIGNIFICANT CHANGE UP (ref 2–14)
NEUTROPHILS # BLD AUTO: 10.48 K/UL — HIGH (ref 1.8–7.4)
NEUTROPHILS # BLD AUTO: 8.97 K/UL — HIGH (ref 1.8–7.4)
NEUTROPHILS NFR BLD AUTO: 73.2 % — SIGNIFICANT CHANGE UP (ref 43–77)
NEUTROPHILS NFR BLD AUTO: 78.1 % — HIGH (ref 43–77)
NRBC # BLD: 0 /100 WBCS — SIGNIFICANT CHANGE UP (ref 0–0)
NRBC # FLD: 0 K/UL — SIGNIFICANT CHANGE UP (ref 0–0)
PLATELET # BLD AUTO: 322 K/UL — SIGNIFICANT CHANGE UP (ref 150–400)
PLATELET # BLD AUTO: 369 K/UL — SIGNIFICANT CHANGE UP (ref 150–400)
PMV BLD: 10.6 FL — SIGNIFICANT CHANGE UP (ref 7–13)
POTASSIUM SERPL-MCNC: 3.9 MMOL/L — SIGNIFICANT CHANGE UP (ref 3.5–5.3)
POTASSIUM SERPL-MCNC: 4.4 MMOL/L — SIGNIFICANT CHANGE UP (ref 3.5–5.3)
POTASSIUM SERPL-SCNC: 3.9 MMOL/L — SIGNIFICANT CHANGE UP (ref 3.5–5.3)
POTASSIUM SERPL-SCNC: 4.4 MMOL/L — SIGNIFICANT CHANGE UP (ref 3.5–5.3)
PROT SERPL-MCNC: 7.4 G/DL — SIGNIFICANT CHANGE UP (ref 6–8.3)
PROT SERPL-MCNC: 8.7 G/DL — HIGH (ref 6–8.3)
RBC # BLD: 3.7 M/UL — LOW (ref 3.8–5.2)
RBC # BLD: 4.19 M/UL — SIGNIFICANT CHANGE UP (ref 3.8–5.2)
RBC # FLD: 15 % — HIGH (ref 10.3–14.5)
RBC # FLD: 15.3 % — HIGH (ref 10.3–14.5)
SODIUM SERPL-SCNC: 140 MMOL/L — SIGNIFICANT CHANGE UP (ref 135–145)
SODIUM SERPL-SCNC: 140 MMOL/L — SIGNIFICANT CHANGE UP (ref 135–145)
WBC # BLD: 12.26 K/UL — HIGH (ref 3.8–10.5)
WBC # BLD: 13.42 K/UL — HIGH (ref 3.8–10.5)
WBC # FLD AUTO: 12.26 K/UL — HIGH (ref 3.8–10.5)
WBC # FLD AUTO: 13.42 K/UL — HIGH (ref 3.8–10.5)

## 2020-11-29 PROCEDURE — 96375 TX/PRO/DX INJ NEW DRUG ADDON: CPT

## 2020-11-29 PROCEDURE — 96374 THER/PROPH/DIAG INJ IV PUSH: CPT

## 2020-11-29 PROCEDURE — 83690 ASSAY OF LIPASE: CPT

## 2020-11-29 PROCEDURE — 99291 CRITICAL CARE FIRST HOUR: CPT

## 2020-11-29 PROCEDURE — 36415 COLL VENOUS BLD VENIPUNCTURE: CPT

## 2020-11-29 PROCEDURE — 96376 TX/PRO/DX INJ SAME DRUG ADON: CPT

## 2020-11-29 PROCEDURE — 99282 EMERGENCY DEPT VISIT SF MDM: CPT

## 2020-11-29 PROCEDURE — 85025 COMPLETE CBC W/AUTO DIFF WBC: CPT

## 2020-11-29 PROCEDURE — 80053 COMPREHEN METABOLIC PANEL: CPT

## 2020-11-29 PROCEDURE — 99291 CRITICAL CARE FIRST HOUR: CPT | Mod: 25

## 2020-11-29 PROCEDURE — 99220: CPT

## 2020-11-29 PROCEDURE — 99053 MED SERV 10PM-8AM 24 HR FAC: CPT

## 2020-11-29 PROCEDURE — U0003: CPT

## 2020-11-29 PROCEDURE — 93005 ELECTROCARDIOGRAM TRACING: CPT

## 2020-11-29 PROCEDURE — 82962 GLUCOSE BLOOD TEST: CPT

## 2020-11-29 RX ORDER — MORPHINE SULFATE 50 MG/1
4 CAPSULE, EXTENDED RELEASE ORAL ONCE
Refills: 0 | Status: DISCONTINUED | OUTPATIENT
Start: 2020-11-29 | End: 2020-11-29

## 2020-11-29 RX ORDER — CLOPIDOGREL BISULFATE 75 MG/1
75 TABLET, FILM COATED ORAL DAILY
Refills: 0 | Status: DISCONTINUED | OUTPATIENT
Start: 2020-11-29 | End: 2020-12-03

## 2020-11-29 RX ORDER — APIXABAN 2.5 MG/1
5 TABLET, FILM COATED ORAL ONCE
Refills: 0 | Status: COMPLETED | OUTPATIENT
Start: 2020-11-30 | End: 2020-11-30

## 2020-11-29 RX ORDER — DEXTROSE 50 % IN WATER 50 %
25 SYRINGE (ML) INTRAVENOUS ONCE
Refills: 0 | Status: DISCONTINUED | OUTPATIENT
Start: 2020-11-29 | End: 2020-12-03

## 2020-11-29 RX ORDER — FUROSEMIDE 40 MG
20 TABLET ORAL DAILY
Refills: 0 | Status: DISCONTINUED | OUTPATIENT
Start: 2020-11-29 | End: 2020-12-03

## 2020-11-29 RX ORDER — GLUCAGON INJECTION, SOLUTION 0.5 MG/.1ML
1 INJECTION, SOLUTION SUBCUTANEOUS ONCE
Refills: 0 | Status: DISCONTINUED | OUTPATIENT
Start: 2020-11-29 | End: 2020-12-03

## 2020-11-29 RX ORDER — SODIUM CHLORIDE 9 MG/ML
1000 INJECTION, SOLUTION INTRAVENOUS
Refills: 0 | Status: DISCONTINUED | OUTPATIENT
Start: 2020-11-29 | End: 2020-12-03

## 2020-11-29 RX ORDER — INSULIN LISPRO 100/ML
VIAL (ML) SUBCUTANEOUS AT BEDTIME
Refills: 0 | Status: DISCONTINUED | OUTPATIENT
Start: 2020-11-29 | End: 2020-12-03

## 2020-11-29 RX ORDER — HYDRALAZINE HCL 50 MG
100 TABLET ORAL DAILY
Refills: 0 | Status: DISCONTINUED | OUTPATIENT
Start: 2020-11-29 | End: 2020-12-03

## 2020-11-29 RX ORDER — INSULIN LISPRO 100/ML
10 VIAL (ML) SUBCUTANEOUS
Refills: 0 | Status: DISCONTINUED | OUTPATIENT
Start: 2020-11-29 | End: 2020-12-03

## 2020-11-29 RX ORDER — FENTANYL CITRATE 50 UG/ML
25 INJECTION INTRAVENOUS ONCE
Refills: 0 | Status: DISCONTINUED | OUTPATIENT
Start: 2020-11-29 | End: 2020-12-03

## 2020-11-29 RX ORDER — SODIUM CHLORIDE 9 MG/ML
1000 INJECTION, SOLUTION INTRAVENOUS ONCE
Refills: 0 | Status: COMPLETED | OUTPATIENT
Start: 2020-11-29 | End: 2020-11-29

## 2020-11-29 RX ORDER — LISINOPRIL 2.5 MG/1
40 TABLET ORAL DAILY
Refills: 0 | Status: DISCONTINUED | OUTPATIENT
Start: 2020-11-29 | End: 2020-12-03

## 2020-11-29 RX ORDER — DEXTROSE 50 % IN WATER 50 %
12.5 SYRINGE (ML) INTRAVENOUS ONCE
Refills: 0 | Status: DISCONTINUED | OUTPATIENT
Start: 2020-11-29 | End: 2020-12-03

## 2020-11-29 RX ORDER — DEXTROSE 50 % IN WATER 50 %
15 SYRINGE (ML) INTRAVENOUS ONCE
Refills: 0 | Status: DISCONTINUED | OUTPATIENT
Start: 2020-11-29 | End: 2020-12-03

## 2020-11-29 RX ORDER — ATORVASTATIN CALCIUM 80 MG/1
40 TABLET, FILM COATED ORAL AT BEDTIME
Refills: 0 | Status: DISCONTINUED | OUTPATIENT
Start: 2020-11-29 | End: 2020-12-03

## 2020-11-29 RX ORDER — ONDANSETRON 8 MG/1
4 TABLET, FILM COATED ORAL ONCE
Refills: 0 | Status: COMPLETED | OUTPATIENT
Start: 2020-11-29 | End: 2020-11-29

## 2020-11-29 RX ORDER — SODIUM CHLORIDE 9 MG/ML
1000 INJECTION INTRAMUSCULAR; INTRAVENOUS; SUBCUTANEOUS ONCE
Refills: 0 | Status: COMPLETED | OUTPATIENT
Start: 2020-11-29 | End: 2020-11-29

## 2020-11-29 RX ORDER — INSULIN LISPRO 100/ML
VIAL (ML) SUBCUTANEOUS
Refills: 0 | Status: DISCONTINUED | OUTPATIENT
Start: 2020-11-29 | End: 2020-12-03

## 2020-11-29 RX ORDER — INSULIN GLARGINE 100 [IU]/ML
20 INJECTION, SOLUTION SUBCUTANEOUS AT BEDTIME
Refills: 0 | Status: DISCONTINUED | OUTPATIENT
Start: 2020-11-29 | End: 2020-12-03

## 2020-11-29 RX ORDER — METOPROLOL TARTRATE 50 MG
100 TABLET ORAL DAILY
Refills: 0 | Status: DISCONTINUED | OUTPATIENT
Start: 2020-11-29 | End: 2020-12-03

## 2020-11-29 RX ADMIN — CLOPIDOGREL BISULFATE 75 MILLIGRAM(S): 75 TABLET, FILM COATED ORAL at 22:38

## 2020-11-29 RX ADMIN — SODIUM CHLORIDE 1000 MILLILITER(S): 9 INJECTION INTRAMUSCULAR; INTRAVENOUS; SUBCUTANEOUS at 11:08

## 2020-11-29 RX ADMIN — Medication 100 MILLIGRAM(S): at 22:38

## 2020-11-29 RX ADMIN — MORPHINE SULFATE 4 MILLIGRAM(S): 50 CAPSULE, EXTENDED RELEASE ORAL at 11:30

## 2020-11-29 RX ADMIN — LISINOPRIL 40 MILLIGRAM(S): 2.5 TABLET ORAL at 22:38

## 2020-11-29 RX ADMIN — ATORVASTATIN CALCIUM 40 MILLIGRAM(S): 80 TABLET, FILM COATED ORAL at 22:38

## 2020-11-29 RX ADMIN — MORPHINE SULFATE 4 MILLIGRAM(S): 50 CAPSULE, EXTENDED RELEASE ORAL at 15:11

## 2020-11-29 RX ADMIN — MORPHINE SULFATE 4 MILLIGRAM(S): 50 CAPSULE, EXTENDED RELEASE ORAL at 11:08

## 2020-11-29 RX ADMIN — MORPHINE SULFATE 4 MILLIGRAM(S): 50 CAPSULE, EXTENDED RELEASE ORAL at 15:17

## 2020-11-29 RX ADMIN — SODIUM CHLORIDE 1000 MILLILITER(S): 9 INJECTION, SOLUTION INTRAVENOUS at 17:24

## 2020-11-29 RX ADMIN — ONDANSETRON 4 MILLIGRAM(S): 8 TABLET, FILM COATED ORAL at 11:08

## 2020-11-29 NOTE — ED ADULT NURSE NOTE - CHIEF COMPLAINT QUOTE
Pt brought in by EMS from [place] for ENT, Pt has had a nose bleed since friday and is on Eliquis and Plavix.

## 2020-11-29 NOTE — ED CDU PROVIDER INITIAL DAY NOTE - MEDICAL DECISION MAKING DETAILS
Patient presenting with epistaxis with b/l rhinorockets, hemostatic for ENT evaluation. Will f/u ENT recommendations, observe in CDU with repeat CBC, tele monitoring, pain control.

## 2020-11-29 NOTE — ED PROVIDER NOTE - CLINICAL SUMMARY MEDICAL DECISION MAKING FREE TEXT BOX
Noted mass in posterior L oropharynx, removed. Appears to be a completely congealed blood clot. No further bleeding. Discharged.

## 2020-11-29 NOTE — ED ADULT NURSE REASSESSMENT NOTE - NS ED NURSE REASSESS COMMENT FT1
Bleeding noted on nasal dressing and spitting up blood, CDU provider notified, reapplied dressing, will continue to monitor.

## 2020-11-29 NOTE — ED PROVIDER NOTE - PHYSICAL EXAMINATION
Afebrile, hemodynamically stable, saturating well  NAD, well appearing, sitting comfortably on bed  Head NCAT  EOMI grossly, anicteric  B/l nasal packing still in place  MMM, noted blood clot hanging behind tonsil  Breathing comfortably on RA  AAO, CN's 3-12 grossly intact  BAIG spontaneously  Skin warm

## 2020-11-29 NOTE — ED PROVIDER NOTE - CARE PROVIDER_API CALL
Djeuan Pearson  OTOLARYNGOLOGY  [address] [place] [address], [address]  [place] [address]  Phone: [phone]  Fax: [phone]  Follow Up Time: Urgent

## 2020-11-29 NOTE — ED CDU PROVIDER INITIAL DAY NOTE - PROGRESS NOTE DETAILS
patient evaluated by ENT with b/l rhinorocket removal with subsequent cauterization. Recommendation for observation for rebleeding. Pt with recurrence of bleeding. Bleeding through packing placed by ENT. Removed packing and tried placing rhino rocket per ENT recommendations. Unable to pass rhino rocket. ENT will see patient.

## 2020-11-29 NOTE — ED PROVIDER NOTE - CARE PLAN
Principal Discharge DX:	Epistaxis  Secondary Diagnosis:	Non-intractable vomiting with nausea  Secondary Diagnosis:	Diarrhea, unspecified type

## 2020-11-29 NOTE — ED PROVIDER NOTE - NSFOLLOWUPINSTRUCTIONS_ED_ALL_ED_FT
Epistaxis    Epistaxis is the medical term for a nosebleed. Nosebleeds are common and can be caused by many conditions, such as injury, infections, dry environments, medicines, nose picking, and home heating and cooling systems. Try controlling your nosebleed by pinching your nose continuously for at least 10 minutes. Avoid lying down while you are having a nosebleed. Sit up and lean forward. Avoid blowing or sniffing your nose for a number of hours after having a nosebleed. Resume your normal activities as you are able, but avoid straining, lifting, or bending at the waist for several days. Maintain humidity in your home by using less air conditioning or by using a humidifier.     If your nose was packed by your health care provider, keep the packing inside of your nose until a health care provider removes it. If a balloon catheter was used to pack your nose, do not cut or remove it unless your health care provider has instructed you to do that.     Aspirin and blood thinners make bleeding more likely. If you are prescribed these medicines and you suffer from nosebleeds, ask your health care provider if you should stop taking the medicines or adjust the dose. Do not stop medicines unless directed by your health care provider.    SEEK IMMEDIATE MEDICAL CARE IF YOU HAVE ANY OF THE FOLLOWING SYMPTOMS: nosebleed lasting longer than 20 minutes, unusual bleeding from or bruising on other parts of your body, dizziness or lightheadedness, fainting, nosebleed occurring after a head injury, or fever.    1. TAKE ALL MEDICATIONS AS DIRECTED.    2. FOR PAIN OR FEVER YOU CAN TAKE IBUPROFEN (MOTRIN, ADVIL) OR ACETAMINOPHEN (TYLENOL) AS NEEDED, AS DIRECTED ON PACKAGING.  3. FOLLOW UP WITH AN EAR NOSE AND THROAT DOCTOR WITHIN 5 DAYS AS DIRECTED.  4. IF YOU HAD LABS OR IMAGING DONE, YOU WERE GIVEN COPIES OF ALL LABS AND/OR IMAGING RESULTS FROM YOUR ER VISIT--PLEASE TAKE THEM WITH YOU TO YOUR FOLLOW UP APPOINTMENTS.  5. RETURN TO THE ER FOR ANY WORSENING SYMPTOMS OR CONCERNS.

## 2020-11-29 NOTE — ED PROVIDER NOTE - OBJECTIVE STATEMENT
75yoF with h/o afib on apixaban was here earlier for epistaxis, re-presents as her daughter thinks there is a piece of gauze stuck at the back of the throat. Bleeding is controlled.

## 2020-11-29 NOTE — ED ADULT NURSE NOTE - TEMPLATE
Regarding:  fever 102.4  ----- Message from Amira Sabillon sent at 2/12/2020  6:25 PM CST -----  Patient Name: Sarthak Ramos  Specialist or PCP Full Name:Lee Barahona  Pregnant (If Yes, how long?):  Symptoms: fever 102.4       If fever or Respiratory symptoms (cough, shortness of breath), has the patient traveled         to [place] within the last 14 days or has had close contact with someone who has        traveled to [place]? N/A  Call Back #:[phone]  Which state are you currently located in?: [place]       Enter State abbreviation in Summary field along with chief complaint.  Call Center Account #:[id number]        General

## 2020-11-29 NOTE — ED PROVIDER NOTE - ENMT, MLM
Rhino rockets to b/l nares, no active bleeding. Airway patent, Mouth with normal mucosa. Throat has no vesicles, no oropharyngeal exudates and uvula is midline.

## 2020-11-29 NOTE — ED PROVIDER NOTE - ATTENDING CONTRIBUTION TO CARE
This patient was evaluated by the medical student with my supervision. I performed an independent face-to-face evaluation of this patient including HPI, ROS, and PE. See my documentation above. I reviewed all laboratory and radiologic results.    Patient seen by me last night for epistaxis. Initially treated with intranasal TXA and signed out to Dr. Fenton, who packed both nares. Patient returned around 1am with blood clot in throat. Returns now for vomiting, diarrhea, and headache. reports severe pain to left naris and left cheek and forehead. Vomited x 2, NBNB  Diarrhea x3, black. No fever, cp, sob, cough, dysuria, urgency, frequency. Patient was not prescribed antibiotics as she is already taking doxycycine.    Gen: Well-developed, well-nourished, NAD, VS as noted by nursing. HEENT: NCAT, mmm. Rhino Rockets to both nares with no bleeding. No blood in back of throat  Chest: Regular, tachycardic, nl S1 and S2, no m/r/g. Resp: CTAB, no w/r/r  Abd: nl BS, soft, nt/nd. Ext: Warm, dry  Neuro: CN II-XII intact, normal and equal strength, sensation, and reflexes bilaterally, normal gait  Psych: AAOx3     MDM: Patient with b/l nasal packing presenting with ha, vomiting, diarrhea. HA likely due to pressure from nasal packing. Patient asked me to remove packing, but I explained that if bleeding returns, packing would need to be placed again. patient agreed to keep packing in. I partly deflated balloons for comfort. Cannot fully deflate as that would packing falling out and rebleeding. Black stool likely due to swallowed blood from epistaxis last night. abdominal exam benign. Vomiting and diarrhea likely due to GI irritation from blood. will check labs, give pain medicine and antiemetics, reassess.

## 2020-11-29 NOTE — ED PROVIDER NOTE - CLINICAL SUMMARY MEDICAL DECISION MAKING FREE TEXT BOX
76 yo F pmhx afib on apixaban pw epistaxis to osh yesterday and then again today. yesterday had bilateral rhino rockets placed today having dark stools. Nose bleeding has stopped since packing but her face hurts from the packing. xferred here for further eval. no bleeding at present will discuss with ent, labs, fluids, analgesia prn

## 2020-11-29 NOTE — CONSULT NOTE ADULT - SUBJECTIVE AND OBJECTIVE BOX
76 yo F pmhx afib on apixaban pw epistaxis to osh yesterday and then again today. yesterday had bilateral rhino rockets placed today having dark stools. Nose bleeding has stopped since packing but her face hurts from the packing. osh discussed with dr. miller ent, xferred here for further eval. Denies recent trauma, fevers, chills, headache, dizziness, nausea, vomiting, dysuria, freq, hematuria, diarrhea, constipation, chest pain, shortness of breath, cough. left nare>r nare on initial presentation, hx of epistaxis 30 years ago.    ENT consulted for epistaxis. hgb drop from 13 to 10. s/p rhino rockets b/l. no sob , hemoptysis currently. on apixaban for AF. patient unable to quantify amount of bleeding but she says it was significant. bleeding mostly from the L side.     Physical Exam  T(C): 36.9 (11-29-20 @ 15:51), Max: 36.9 (11-29-20 @ 15:51)  HR: 89 (11-29-20 @ 19:12) (87 - 94)  BP: 159/58 (11-29-20 @ 19:12) (151/77 - 159/58)  RR: 19 (11-29-20 @ 19:12) (16 - 19)  SpO2: 100% (11-29-20 @ 19:12) (97% - 100%)    OC: minimal bleeding in oropharynx    PROCEDURE: b/l rhino rockets removed to examine nose. L anterior septum oozing spot of bleeding identified - cauterized with silver nitrate. patient continued to bleed after this, patient did not tolerate merocel insertion and declined to have packing on the L, so dissolvable fibrillar packing was used. pressure and afrin terminated the L sided bleed. on the R, no bleeding identified.     A/P: 74yo F w epistaxis on apixaban for AF s/p fibrillar packing to the L and silver nitrate cauterization  - if patient continues to bleed through the fibrillar packing, insert rhino rocket and observe. if no further bleeding, continue rhino rocket for 1-2 days and remove in PCP or urgent care or ENT clinic. if rhino rocket inserted, keflex 500mg BID needed  - nasal saline sprays QID 2gtt each nostril  - continue anticoagulation as needed  - plan discussed with attending  - please call ENT for further questions    --------------------------------------------------------------  Thank you for the consult,    Florin Zaldivar MD  Resident  Department of Otolaryngology - Head and Neck Surgery  Peds Page #[phone]  Adult Page #[phone]  ---------------------------------------------------------------

## 2020-11-29 NOTE — ED PROVIDER NOTE - NS ED ROS FT

## 2020-11-29 NOTE — ED CDU PROVIDER INITIAL DAY NOTE - PHYSICAL EXAMINATION
GEN: NAD, awake, well appearing, tolerating secretions  HEENT: MMM, pale conjunctiva, b/l rhinorocket in place, no bleeding noted around packing or in posterior oropharynx   CHEST/LUNGS: Non-tachypneic, CTAB, bilateral breath sounds  CARDIAC: Non-tachycardic, s1s2, normal perfusion, no peripheral edema  ABDOMEN: Soft, NTND, No rebound/guarding  MSK: No joint tenderness, no gross deformity of extremities, left foot noted in boot with chronic wounds   SKIN: No rashes, no petechiae, no vesicles  NEURO: CN grossly intact, normal coordination, no focal motor or sensory deficits  PSYCH: Alert, appropriate, cooperative

## 2020-11-29 NOTE — ED ADULT NURSE NOTE - OBJECTIVE STATEMENT
Received PT to room 1, A&Ox 3, ambulatory, transfer from [place] c/o nosebleed since Friday (on Elliquis and Plavix - does not remember when she took it last). PT states she was previously at [place] and was discharged Friday for similar complaint. Returned to [place] this AM for continued nosebleed and transferred for specialty ENT. Presents to ED with right wrist 20g  and x2 rhinojets in b/l nares, placed earlier today at [place]. No active bleeding noted. Denies trauma to area. Denies chest pain, abdominal pain, N/V/D, chills, dysuria. Labs drawn, bed in lowest position, awaiting MD orders, will continue to monitor. Received PT to room 1, A&Ox 3, ambulatory, transfer from [place] c/o nosebleed since Friday (on Elliquis and Plavix - does not remember when she took it last). PT states she was previously at [place] and was discharged Friday for similar complaint. Returned to [place] this AM for continued nosebleed and transferred for specialty ENT. Presents with pacemaker, right wrist 20g  and x2 rhinojets in b/l nares, placed earlier today at [place]. No active bleeding noted. Denies trauma to area. Denies chest pain, abdominal pain, N/V/D, chills, dysuria. Labs drawn, bed in lowest position, awaiting MD orders, will continue to monitor. Received PT to room 1, A&Ox 3, ambulatory, transfer from [place] c/o nosebleed since Friday (on Elliquis and Plavix - does not remember when she took it last). PT states she was previously at [place] and was discharged Friday for similar complaint. Returned to [place] this AM for continued nosebleed and transferred for specialty ENT. Presents with pacemaker, right wrist 20g  and x2 rhinorockets in b/l nares, placed earlier today at [place]. No active bleeding noted. Denies trauma to area. Denies chest pain, abdominal pain, N/V/D, chills, dysuria. Labs drawn, bed in lowest position, awaiting MD orders, will continue to monitor. Received PT to room 1, A&Ox 3, ambulatory, transfer from [place] c/o nosebleed since Friday (on Elliquis and Plavix - does not remember when she took it last). PT states she was previously at [place] and was discharged Friday for similar complaint. Returned to [place] this AM for continued nosebleed and transferred for specialty ENT. Presents with pacemaker, right wrist 20g  and x2 rhinorockets in b/l nares, placed earlier today at [place], left foot podiatry shoe, PT states for diabetic healing foot ulcer, no bleeding noted on foot, left foot big toe amputation. No active bleeding noted. Denies trauma to area. Denies chest pain, abdominal pain, N/V/D, chills, dysuria. Labs drawn, bed in lowest position, awaiting MD orders, will continue to monitor.

## 2020-11-29 NOTE — ED CDU PROVIDER INITIAL DAY NOTE - FAMILY HISTORY
Father  Still living? Unknown  Family history of atherosclerosis, Age at diagnosis: Age Unknown     Mother  Still living? Unknown  Family history of atherosclerosis, Age at diagnosis: Age Unknown

## 2020-11-29 NOTE — ED PROVIDER NOTE - OBJECTIVE STATEMENT
75yoF with h/o afib on apixaban was here earlier for epistaxis, re-presents for new onset diarrhea, nausea/vomiting. Her nose has been packed since last night and she additionally complains of facial pain. Associated with chills. Denies fevers. Diarrhea is very dark. Nose bleeding has stopped since packing but her face hurts from the packing.

## 2020-11-29 NOTE — ED ADULT NURSE REASSESSMENT NOTE - NS ED NURSE REASSESS COMMENT FT1
Pt resting in bed, A&OX3, awaiting dispo. No acute distress noted, denies chest pain, no shortness of breath indicated.

## 2020-11-29 NOTE — ED ADULT NURSE NOTE - NSIMPLEMENTINTERV_GEN_ALL_ED
Implemented All Universal Safety Interventions:  [place] to call system. Call bell, personal items and telephone within reach. Instruct patient to call for assistance. Room bathroom lighting operational. Non-slip footwear when patient is off stretcher. Physically safe environment: no spills, clutter or unnecessary equipment. Stretcher in lowest position, wheels locked, appropriate side rails in place.

## 2020-11-29 NOTE — ED ADULT TRIAGE NOTE - CHIEF COMPLAINT QUOTE
Pt brought in by EMS from [place] for ENT, Pt has had a nose bleed since friday and is on Eliquis and Plavix.

## 2020-11-29 NOTE — ED PROVIDER NOTE - CHPI ED SYMPTOMS NEG
Physical Therapy Progress Note     09/28/18 1220   Pain Assessment   Pain Assessment No/denies pain   Pain Score No Pain   Restrictions/Precautions   Weight Bearing Precautions Per Order No   Other Precautions Contact/isolation;Cognitive; Fall Risk;Multiple lines; Bed Alarm; Chair Alarm   General   Chart Reviewed Yes   Response to Previous Treatment Patient reporting fatigue but able to participate  Family/Caregiver Present No   Subjective   Subjective Willing to participate in therapy this PM    Bed Mobility   Sit to Supine 4  Minimal assistance   Additional items Assist x 1;HOB elevated; Bedrails;Leg ; Increased time required;Verbal cues;LE management   Transfers   Sit to Stand 4  Minimal assistance   Additional items Assist x 1; Armrests; Increased time required;Verbal cues; Bedrails  (SBA of 2nd for safety)   Stand to Sit 4  Minimal assistance   Additional items Assist x 1;Bedrails;Armrests; Increased time required;Verbal cues  (SBA of 2nd for safety)   Toilet transfer 4  Minimal assistance   Additional items Assist x 1; Armrests; Increased time required;Verbal cues; Commode   Ambulation/Elevation   Gait pattern Decreased foot clearance; Forward Flexion; Short stride; Step to;Excessively slow; Shuffling   Gait Assistance 4  Minimal assist   Additional items Assist x 1;Verbal cues; Tactile cues; Other (Comment)  (with second present for safety)   Assistive Device Rolling walker   Distance 25'   Balance   Static Sitting Fair +   Dynamic Sitting Fair   Static Standing Fair   Dynamic Standing Fair -   Ambulatory Fair -   Endurance Deficit   Endurance Deficit Yes   Endurance Deficit Description fatigue   Activity Tolerance   Activity Tolerance Patient limited by fatigue   Nurse Made Aware Yes   Exercises   TKR Sitting;10 reps;AAROM; Bilateral   Assessment   Prognosis Fair   Problem List Decreased strength;Decreased range of motion;Decreased endurance; Impaired balance;Decreased mobility; Decreased safety awareness;Decreased cognition   Assessment Pt  seated in bedside chair upon my arrival  Pt  reports increased fatigue, however willing to participate in therapy this PM  Progressed with transfers continuing to require A of therapist with cues provided for hand placement/technique  Pt  requested to use bedside commode  Required A of therapist with second present for pericare  Progressed with a limited amb  trial with use of RW and Jamey of therapist with second present for safety  Returned to supine in bed  Pt  remained supine in bed with alarm active at end of treatment session  PT will continue to recommend rehab upon d/c for continued improvement of noted impairments above  Barriers to Discharge Inaccessible home environment   Barriers to Discharge Comments MONA   Goals   Patient Goals To go to rehab  STG Expiration Date 10/04/18   Treatment Day 4   Plan   Treatment/Interventions Functional transfer training;LE strengthening/ROM; Therapeutic exercise; Endurance training;Bed mobility;Gait training;Spoke to nursing;Spoke to case management;OT   Progress Progressing toward goals   PT Frequency Other (Comment)  (4-5x/wk)   Recommendation   Recommendation Other (Comment)  (rehab)   Equipment Recommended Walker  (RW)   PT - OK to Discharge Yes  (if d/c to rehab when medically stable )     Doreen Mejia, PTA no fever

## 2020-11-29 NOTE — ED PROVIDER NOTE - PSH
Artificial cardiac pacemaker    Cardiac defibrillator in place    Cardiac pacemaker    H/O: hysterectomy    History of cholecystectomy    Status post coronary artery stent placement

## 2020-11-29 NOTE — ED PROVIDER NOTE - PROGRESS NOTE DETAILS
Patient resting comfortably, feels moderately improved. Patient initially tachycardic with increased WBC count. I suspect tachycardia is due to pain from nasal packing. Vomiting and diarrhea most likely GI irritation from nasal blood. Afebrile rectally. Patient resting comfortably, feels mildly improved. I spoke with Dr. Pearson who accepted pt for transfer to [facility]. Transfer center called.

## 2020-11-29 NOTE — ED PROVIDER NOTE - PATIENT PORTAL LINK FT
You can access the FollowMyHealth Patient Portal offered by [facility] by registering at the following website: [email or web address]. By joining [employer]’s FollowMyHealth portal, you will also be able to view your health information using other applications (apps) compatible with our system.

## 2020-11-29 NOTE — ED ADULT NURSE NOTE - NSIMPLEMENTINTERV_GEN_ALL_ED
Implemented All Universal Safety Interventions:  [place] to call system. Call bell, personal items and telephone within reach. Instruct patient to call for assistance. Room bathroom lighting operational. Non-slip footwear when patient is off stretcher. Physically safe environment: no spills, clutter or unnecessary equipment. Stretcher in lowest position, wheels locked, appropriate side rails in place.

## 2020-11-29 NOTE — ED CDU PROVIDER INITIAL DAY NOTE - PMH
Afib    CAD (coronary artery disease)    CHF (congestive heart failure)    DM (diabetes mellitus)    HTN (hypertension)    PVD (peripheral vascular disease)

## 2020-11-29 NOTE — ED PROVIDER NOTE - ATTENDING CONTRIBUTION TO CARE
75F p/w sent in by OSH for ENT eval.  Pt on eliquis for DVT, had epistaxis x 2 days, worssening, went to [facility], yest had bilat rhino rockets was d/c, today came back, was choking on something at [facility] found to have blood clot there, xferred here for ENT.  Not oozing in the back, some pooling blood in pharynx.  No distress here.  HR wnl.  plan rx pain meds, fluids, check labs, ENT consult, reass.  other MRN [id number]. 75F p/w sent in by OSH for ENT eval.  Pt on eliquis for DVT, had epistaxis x 2 days, worssening[facility] rhino rockets was d/c, today came back, [facility] there, xferred here for ENT.  Not oozing in the back, some pooling blood in pharynx.  Mild distress here.  HR wnl.  plan rx pain meds, fluids, check labs, ENT consult, reass.  other MRN [id number].  Pt does report black stools so concern for significant bleeding - e.g. swallowed blood - would place in CDU for observation  VS:  unremarkable except HTN    GEN - mild distress face pain;   A+O x3   HEAD - NC/AT     ENT - PEERL, EOMI, mucous membranes    moist , no discharge    (+)bilat rhino rockets in place with (+)hemostasis.  Pharynx with some blood in it.  Voice normal, airway patent, managing secretions, no trismus.  NECK: Neck supple, non-tender without lymphadenopathy, no masses, no JVD  PULM - CTA b/l,  symmetric breath sounds  COR -  normal heart sounds    ABD - , ND, NT, soft,  BACK - no CVA tenderness, nontender spine     EXTREMS - no edema, no deformity, warm and well perfused (+)L foot in post op shoe, no leg swelling there.  SKIN - no rash    or bruising      NEUROLOGIC - alert, face symmetric, speech fluent, sensation nl, motor no focal deficit.

## 2020-11-29 NOTE — ED CDU PROVIDER INITIAL DAY NOTE - OBJECTIVE STATEMENT
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 Patient presented to [facility] 2 days prior for epistaxis with return visit today with continued with epistaxis, nausea, melenotic stool with b/l rhinorockets placed and transferred to [facility] for ENT evaluation. No additional prophylactic abx was started by transferring team given patient already on doxycycline. 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 Patient presented to [facility] 2 days prior for epistaxis with return visit today with continued with epistaxis, nausea, melenotic stool with b/l rhinorockets placed and transferred to [facility] for ENT evaluation. No additional prophylactic abx was started by transferring team given patient already on doxycycline. Denies fever, chills, cp, sob, dizziness, headache  [facility] H/H 11/29 prior to transfer 11.6/35.6 with 1L IVF administered > 10.2/32.0 upon arrival in main ED.   [place] MR# [id number]

## 2020-11-29 NOTE — ED PROVIDER NOTE - PMH
Afib    Amputation of toe of left foot  L Hallux 5/20  Atrial fibrillation, unspecified type  on eliquis  CAD (coronary artery disease)    CAD (coronary artery disease)  PCI RCA 2011, OM2 2006 , OM 2009  CHF (congestive heart failure)    CHF (congestive heart failure)    DM (diabetes mellitus)    DM (diabetes mellitus)    Hernia    HTN (hypertension)    HTN (hypertension)    PVD (peripheral vascular disease)    PVD (peripheral vascular disease)    Tumor  bladder tumor

## 2020-11-29 NOTE — ED PROVIDER NOTE - OBJECTIVE STATEMENT
76 yo F pmhx afib on apixaban pw epistaxis to osh yesterday and then again today. yesterday had bilateral rhino rockets placed today having dark stools. Nose bleeding has stopped since packing but her face hurts from the packing. osh discussed with dr. miller ent, xferred here for further eval. Denies recent trauma, fevers, chills, headache, dizziness, nausea, vomiting, dysuria, freq, hematuria, diarrhea, constipation, chest pain, shortness of breath, cough. left nare>r nare on initial presentation, hx of epistaxis 30 years ago.

## 2020-11-29 NOTE — ED ADULT NURSE NOTE - OBJECTIVE STATEMENT
Patient came to the ED a/o x 3 ambulates c/o R sided facial pain x today. Pt was in the ED last night for mamadou nosebleed, pt has a rhino packing on both nostrils.

## 2020-11-29 NOTE — ED PROVIDER NOTE - NSFOLLOWUPINSTRUCTIONS_ED_ALL_ED_FT
Please follow up with your the emergency department or ENT doctor in 1-2 days.  Please return to the emergency department if you have more bleeding, dizziness, or any other symptoms.

## 2020-11-29 NOTE — ED ADULT NURSE REASSESSMENT NOTE - NS ED NURSE REASSESS COMMENT FT1
Pt resting in bed, pt to be transferred to [facility] ED for ENT evaluation. No acute distress noted, denies chest pain, no shortness of breath indicated.

## 2020-11-29 NOTE — ED PROVIDER NOTE - CLINICAL SUMMARY MEDICAL DECISION MAKING FREE TEXT BOX
75y F with hx of afib on eliquis re-presents following nasal packing for facial pain, nausea/vomiting and diarrhea. The most likely cause of the diarrhea and nausea/vomiting is swallowing of the blood from epistaxis. Tachycardia can be from pain. WBC is elevated however no fever. Pt already on doxycycline from foot abscess. Will COVID swab.

## 2020-11-29 NOTE — ED ADULT TRIAGE NOTE - CHIEF COMPLAINT QUOTE
biba from home with c/o r sided facial pain and headache and diarrhea and vomiting since morning, s/p b/l nasal pack placed last night,

## 2020-11-29 NOTE — ED PROVIDER NOTE - PHYSICAL EXAMINATION
Physical Exam:  Gen: NAD, AOx3, non-toxic appearing  Head: normal appearing  HEENT: normal conjunctiva, oral mucosa moist, facial pain around nares, bilateral rhino rockets in place, no bleeding in posterior oropharynx   Lung:  no respiratory distress, speaking in full sentences, clear to ascultation bilaterally     CV: regular rate and rhythm   Abd: soft, ND, NT  MSK: no visible deformities  Neuro: No focal deficits  Skin: Warm  Psych: normal affect  ~Rick Alonso D.O. -Resident

## 2020-11-29 NOTE — ED PROVIDER NOTE - ENDOCRINE NEGATIVE STATEMENT, MLM
no diabetes and no thyroid trouble. Simple: Patient demonstrates quick and easy understanding/Verbalized Understanding

## 2020-11-30 VITALS
HEART RATE: 72 BPM | TEMPERATURE: 98 F | RESPIRATION RATE: 16 BRPM | SYSTOLIC BLOOD PRESSURE: 134 MMHG | DIASTOLIC BLOOD PRESSURE: 56 MMHG | OXYGEN SATURATION: 100 %

## 2020-11-30 LAB
HCT VFR BLD CALC: 30.9 % — LOW (ref 34.5–45)
HCT VFR BLD CALC: 31.6 % — LOW (ref 34.5–45)
HGB BLD-MCNC: 10 G/DL — LOW (ref 11.5–15.5)
HGB BLD-MCNC: 9.6 G/DL — LOW (ref 11.5–15.5)
MCHC RBC-ENTMCNC: 27.3 PG — SIGNIFICANT CHANGE UP (ref 27–34)
MCHC RBC-ENTMCNC: 27.4 PG — SIGNIFICANT CHANGE UP (ref 27–34)
MCHC RBC-ENTMCNC: 31.1 % — LOW (ref 32–36)
MCHC RBC-ENTMCNC: 31.6 % — LOW (ref 32–36)
MCV RBC AUTO: 86.3 FL — SIGNIFICANT CHANGE UP (ref 80–100)
MCV RBC AUTO: 88.3 FL — SIGNIFICANT CHANGE UP (ref 80–100)
NRBC # FLD: 0 K/UL — SIGNIFICANT CHANGE UP (ref 0–0)
NRBC # FLD: 0 K/UL — SIGNIFICANT CHANGE UP (ref 0–0)
PLATELET # BLD AUTO: 309 K/UL — SIGNIFICANT CHANGE UP (ref 150–400)
PLATELET # BLD AUTO: 330 K/UL — SIGNIFICANT CHANGE UP (ref 150–400)
PMV BLD: 10.4 FL — SIGNIFICANT CHANGE UP (ref 7–13)
PMV BLD: 10.6 FL — SIGNIFICANT CHANGE UP (ref 7–13)
RBC # BLD: 3.5 M/UL — LOW (ref 3.8–5.2)
RBC # BLD: 3.66 M/UL — LOW (ref 3.8–5.2)
RBC # FLD: 15.3 % — HIGH (ref 10.3–14.5)
RBC # FLD: 15.3 % — HIGH (ref 10.3–14.5)
SARS-COV-2 RNA SPEC QL NAA+PROBE: SIGNIFICANT CHANGE UP
SARS-COV-2 RNA SPEC QL NAA+PROBE: SIGNIFICANT CHANGE UP
WBC # BLD: 12.6 K/UL — HIGH (ref 3.8–10.5)
WBC # BLD: 13.37 K/UL — HIGH (ref 3.8–10.5)
WBC # FLD AUTO: 12.6 K/UL — HIGH (ref 3.8–10.5)
WBC # FLD AUTO: 13.37 K/UL — HIGH (ref 3.8–10.5)

## 2020-11-30 PROCEDURE — 99217: CPT

## 2020-11-30 RX ORDER — CEPHALEXIN 500 MG
500 CAPSULE ORAL EVERY 12 HOURS
Refills: 0 | Status: DISCONTINUED | OUTPATIENT
Start: 2020-11-30 | End: 2020-12-03

## 2020-11-30 RX ORDER — CEPHALEXIN 500 MG
1 CAPSULE ORAL
Qty: 10 | Refills: 0
Start: 2020-11-30 | End: 2020-12-04

## 2020-11-30 RX ADMIN — Medication 500 MILLIGRAM(S): at 05:53

## 2020-11-30 RX ADMIN — CLOPIDOGREL BISULFATE 75 MILLIGRAM(S): 75 TABLET, FILM COATED ORAL at 12:40

## 2020-11-30 RX ADMIN — INSULIN GLARGINE 20 UNIT(S): 100 INJECTION, SOLUTION SUBCUTANEOUS at 00:08

## 2020-11-30 RX ADMIN — Medication 20 MILLIGRAM(S): at 05:28

## 2020-11-30 NOTE — ED CDU PROVIDER DISPOSITION NOTE - CLINICAL COURSE
as per prior note  "74 yo F pmhx afib on apixaban pw epistaxis to osh yesterday and then again today. Seen by ENT and cauterized and placed  fibrillar packing. Pt with recurrence of bleeding overnight through packing. Packing removed and ENT placed b/l merocels. Pt started on Keflex 500 mg BID. Will continue to monitor for recurrence of bleeding. Repeat CBC shows hgb 9.6 from 10.2 on prior CBC."  merocel packing removed from right nare; left nare no longer bleeding; H/H stable; pt without complaints; ENT has cleared for d/c; abx and to remove packing in 3-5 days; pt stable for d/c

## 2020-11-30 NOTE — ED CDU PROVIDER DISPOSITION NOTE - NSFOLLOWUPINSTRUCTIONS_ED_ALL_ED_FT
Follow up with ENT in clinic to have packing removed in 3-5 days, call  to schedule appointment  Follow up with your primary care doctor within 1 week  Continue taking all medications as prescribed to you  Take Keflex 500mg (1 tablet) twice daily  Use Nasal saline, 2 sprays to both nares 4 times a day  Use Afrin, 2 sprays each nostril two times a day, 3 days total  Vaseline, via cotton tip to inside tip of each nostril two times day  Avoid nasal trauma; no nose rubbing, blowing or manipulating nasal packing.  Sneeze with mouth open and pinching nares. Avoid bending with head blow the waist.  No heavy lifting.  Return to the ER with any worsening or concerning symptoms, return of nose bleed, headache, fever, weakness, shortness of breath, chest pain or any other concerns.

## 2020-11-30 NOTE — ED CDU PROVIDER SUBSEQUENT DAY NOTE - ATTENDING CONTRIBUTION TO CARE
agree with PA note  no overnight events  ENT saw this morning removed merocel from right nare; left rhinorocket left in place    will observe till early afternoon  if no active bleeding will d/c with nasal saline, afrin, follow up at ENT in 3-5 days  resume eliquis

## 2020-11-30 NOTE — ED CDU PROVIDER DISPOSITION NOTE - PATIENT PORTAL LINK FT
You can access the FollowMyHealth Patient Portal offered by [facility] by registering at the following website: [email or web address]. By joining [employer]’s FollowMyHealth portal, you will also be able to view your health information using other applications (apps) compatible with our system.

## 2020-11-30 NOTE — ED CDU PROVIDER SUBSEQUENT DAY NOTE - HISTORY
76 yo F pmhx afib on apixaban pw epistaxis to osh yesterday and then again today. Seen by ENT and cauterized and placed  fibrillar packing. Pt with recurrence of bleeding overnight through packing. Packing removed and ENT placed b/l merocels. Pt started on Keflex 500 mg BID. Will continue to monitor for recurrence of bleeding. Repeat CBC shows hgb 9.6 from 10.2 on prior CBC.

## 2020-11-30 NOTE — ED ADULT NURSE REASSESSMENT NOTE - NS ED NURSE REASSESS COMMENT FT1
pt COVID - reprot given to CDU rn. patient sleeping breathing even and unlabored. repeat CBC drawn and sent.

## 2020-11-30 NOTE — PROGRESS NOTE ADULT - SUBJECTIVE AND OBJECTIVE BOX
ENT FOLLOW UP CONSULT NOTE    Interval Events  No further epistaxis  Right merocel nasal packing removed, no evidence of bleeding    Vital Signs Last 24 Hrs  T(C): 36.7 (30 Nov 2020 05:23), Max: 36.9 (29 Nov 2020 15:51)  T(F): 98 (30 Nov 2020 05:23), Max: 98.4 (29 Nov 2020 15:51)  HR: 77 (30 Nov 2020 05:23) (71 - 100)  BP: 149/69 (30 Nov 2020 05:23) (145/60 - 167/69)  BP(mean): --  RR: 15 (30 Nov 2020 05:23) (15 - 19)  SpO2: 100% (30 Nov 2020 05:23) (97% - 100%)    PHYSICAL EXAM:  Gen: NAD, A/Ox3  Breathing comfortably on RA  Left merocel in place  no bleeding  OP no bleeding    A/P  75F on eliquis presented with epistaxis, left sided    - monitor for a few more hours, okay to DC by 11am from ORL perspective  - will need to have packing removed in 3-5 days in ORL clinic. Call  to schedule/confirm appointment   - gram-positive abx coverage for duration of packing placement  - recommend keflex  - Nasal saline, 2 sprays to both nares 4 times a day  - Afrin, 2 sprays each nostril two times a day, 3 days total  - vaseline, via cotton tip to inside tip of each nostril two times day  - Strict blood pressure control  - Avoid nasal trauma; no nose rubbing, blowing or manipulating nasal packing.  Sneeze with mouth open and pinching nares.  - Avoid bending with head blow the waist.    -No heavy lifting.  -follow up with PCP regarding anticoagulation risks/benefits   -page/call with questions      Beto Diamond MD  Department of Otolaryngology - Head and Neck Surgery  Peds Page #[phone]  Adult Page #[phone]

## 2020-11-30 NOTE — ED CDU PROVIDER SUBSEQUENT DAY NOTE - PROGRESS NOTE DETAILS
Pt seen by ENT this morning, right merocel removed, repeat cbc sent, plan to observe until 11 and will discuss discharge with ENT. If no re-bleeding pt will f/u in clinic in 3-5 days to have left merocel removed, PO Keflex. Pt is resting comfortably in bed, VSS No evidence of re-bleeding, Hbg stable. Will d/c home with ENT clinic follow up in 3 days to have packing removed. Discharge sevene is arranging follow up. Will d/c home with Keflex, Afrin and Nasal Saline Spray. Spoke with pts daughter who understands plan. Pt can continue taking all home medication as previously prescribed to her.

## 2020-11-30 NOTE — ED CDU PROVIDER SUBSEQUENT DAY NOTE - MEDICAL DECISION MAKING DETAILS
Epistaxis  - will continue to monitor  - pt refusing Eliquis at this time 2/2 bleeding   - ENT following.

## 2020-12-01 ENCOUNTER — EMERGENCY (EMERGENCY)
Facility: HOSPITAL | Age: 75
LOS: 1 days | Discharge: HOME | End: 2020-12-01
Attending: EMERGENCY MEDICINE | Admitting: EMERGENCY MEDICINE
Payer: MEDICARE

## 2020-12-01 VITALS
DIASTOLIC BLOOD PRESSURE: 49 MMHG | OXYGEN SATURATION: 100 % | HEART RATE: 72 BPM | SYSTOLIC BLOOD PRESSURE: 135 MMHG | RESPIRATION RATE: 16 BRPM

## 2020-12-01 VITALS
HEART RATE: 87 BPM | DIASTOLIC BLOOD PRESSURE: 87 MMHG | SYSTOLIC BLOOD PRESSURE: 192 MMHG | HEIGHT: 61 IN | TEMPERATURE: 97 F | RESPIRATION RATE: 18 BRPM | OXYGEN SATURATION: 100 %

## 2020-12-01 DIAGNOSIS — Z95.0 PRESENCE OF CARDIAC PACEMAKER: Chronic | ICD-10-CM

## 2020-12-01 DIAGNOSIS — Z90.710 ACQUIRED ABSENCE OF BOTH CERVIX AND UTERUS: Chronic | ICD-10-CM

## 2020-12-01 DIAGNOSIS — Z95.810 PRESENCE OF AUTOMATIC (IMPLANTABLE) CARDIAC DEFIBRILLATOR: Chronic | ICD-10-CM

## 2020-12-01 DIAGNOSIS — Z95.5 PRESENCE OF CORONARY ANGIOPLASTY IMPLANT AND GRAFT: Chronic | ICD-10-CM

## 2020-12-01 DIAGNOSIS — Z90.49 ACQUIRED ABSENCE OF OTHER SPECIFIED PARTS OF DIGESTIVE TRACT: Chronic | ICD-10-CM

## 2020-12-01 LAB
ALBUMIN SERPL ELPH-MCNC: 3.8 G/DL — SIGNIFICANT CHANGE UP (ref 3.3–5)
ALP SERPL-CCNC: 223 U/L — HIGH (ref 40–120)
ALT FLD-CCNC: 22 U/L — SIGNIFICANT CHANGE UP (ref 4–33)
ANION GAP SERPL CALC-SCNC: 11 MMO/L — SIGNIFICANT CHANGE UP (ref 7–14)
APTT BLD: 48.6 SEC — HIGH (ref 27–36.3)
AST SERPL-CCNC: 21 U/L — SIGNIFICANT CHANGE UP (ref 4–32)
BASE EXCESS BLDV CALC-SCNC: 2.3 MMOL/L — SIGNIFICANT CHANGE UP
BASOPHILS # BLD AUTO: 0.04 K/UL — SIGNIFICANT CHANGE UP (ref 0–0.2)
BASOPHILS NFR BLD AUTO: 0.3 % — SIGNIFICANT CHANGE UP (ref 0–2)
BILIRUB SERPL-MCNC: 0.2 MG/DL — SIGNIFICANT CHANGE UP (ref 0.2–1.2)
BLD GP AB SCN SERPL QL: NEGATIVE — SIGNIFICANT CHANGE UP
BLOOD GAS VENOUS - CREATININE: 1.12 MG/DL — SIGNIFICANT CHANGE UP (ref 0.5–1.3)
BUN SERPL-MCNC: 44 MG/DL — HIGH (ref 7–23)
CALCIUM SERPL-MCNC: 9.9 MG/DL — SIGNIFICANT CHANGE UP (ref 8.4–10.5)
CHLORIDE BLDV-SCNC: 101 MMOL/L — SIGNIFICANT CHANGE UP (ref 96–108)
CHLORIDE SERPL-SCNC: 99 MMOL/L — SIGNIFICANT CHANGE UP (ref 98–107)
CO2 SERPL-SCNC: 26 MMOL/L — SIGNIFICANT CHANGE UP (ref 22–31)
CREAT SERPL-MCNC: 1.06 MG/DL — SIGNIFICANT CHANGE UP (ref 0.5–1.3)
EOSINOPHIL # BLD AUTO: 0.19 K/UL — SIGNIFICANT CHANGE UP (ref 0–0.5)
EOSINOPHIL NFR BLD AUTO: 1.4 % — SIGNIFICANT CHANGE UP (ref 0–6)
GAS PNL BLDV: 139 MMOL/L — SIGNIFICANT CHANGE UP (ref 136–146)
GLUCOSE BLDV-MCNC: 150 MG/DL — HIGH (ref 70–99)
GLUCOSE SERPL-MCNC: 155 MG/DL — HIGH (ref 70–99)
HCO3 BLDV-SCNC: 24 MMOL/L — SIGNIFICANT CHANGE UP (ref 20–27)
HCT VFR BLD CALC: 34 % — LOW (ref 34.5–45)
HCT VFR BLDV CALC: 36.7 % — SIGNIFICANT CHANGE UP (ref 34.5–45)
HGB BLD-MCNC: 11 G/DL — LOW (ref 11.5–15.5)
HGB BLDV-MCNC: 11.9 G/DL — SIGNIFICANT CHANGE UP (ref 11.5–15.5)
IMM GRANULOCYTES NFR BLD AUTO: 0.5 % — SIGNIFICANT CHANGE UP (ref 0–1.5)
INR BLD: 1.54 — HIGH (ref 0.88–1.16)
LACTATE BLDV-MCNC: 1.3 MMOL/L — SIGNIFICANT CHANGE UP (ref 0.5–2)
LYMPHOCYTES # BLD AUTO: 23.5 % — SIGNIFICANT CHANGE UP (ref 13–44)
LYMPHOCYTES # BLD AUTO: 3.12 K/UL — SIGNIFICANT CHANGE UP (ref 1–3.3)
MCHC RBC-ENTMCNC: 27.7 PG — SIGNIFICANT CHANGE UP (ref 27–34)
MCHC RBC-ENTMCNC: 32.4 % — SIGNIFICANT CHANGE UP (ref 32–36)
MCV RBC AUTO: 85.6 FL — SIGNIFICANT CHANGE UP (ref 80–100)
MONOCYTES # BLD AUTO: 0.77 K/UL — SIGNIFICANT CHANGE UP (ref 0–0.9)
MONOCYTES NFR BLD AUTO: 5.8 % — SIGNIFICANT CHANGE UP (ref 2–14)
NEUTROPHILS # BLD AUTO: 9.12 K/UL — HIGH (ref 1.8–7.4)
NEUTROPHILS NFR BLD AUTO: 68.5 % — SIGNIFICANT CHANGE UP (ref 43–77)
NRBC # FLD: 0 K/UL — SIGNIFICANT CHANGE UP (ref 0–0)
PCO2 BLDV: 51 MMHG — SIGNIFICANT CHANGE UP (ref 41–51)
PH BLDV: 7.35 PH — SIGNIFICANT CHANGE UP (ref 7.32–7.43)
PLATELET # BLD AUTO: 375 K/UL — SIGNIFICANT CHANGE UP (ref 150–400)
PMV BLD: 10.7 FL — SIGNIFICANT CHANGE UP (ref 7–13)
PO2 BLDV: 21 MMHG — LOW (ref 35–40)
POTASSIUM BLDV-SCNC: 3.8 MMOL/L — SIGNIFICANT CHANGE UP (ref 3.4–4.5)
POTASSIUM SERPL-MCNC: 3.8 MMOL/L — SIGNIFICANT CHANGE UP (ref 3.5–5.3)
POTASSIUM SERPL-SCNC: 3.8 MMOL/L — SIGNIFICANT CHANGE UP (ref 3.5–5.3)
PROT SERPL-MCNC: 8 G/DL — SIGNIFICANT CHANGE UP (ref 6–8.3)
PROTHROM AB SERPL-ACNC: 17.2 SEC — HIGH (ref 10.6–13.6)
RBC # BLD: 3.97 M/UL — SIGNIFICANT CHANGE UP (ref 3.8–5.2)
RBC # FLD: 15 % — HIGH (ref 10.3–14.5)
RH IG SCN BLD-IMP: POSITIVE — SIGNIFICANT CHANGE UP
SAO2 % BLDV: 26.5 % — LOW (ref 60–85)
SODIUM SERPL-SCNC: 136 MMOL/L — SIGNIFICANT CHANGE UP (ref 135–145)
WBC # BLD: 13.3 K/UL — HIGH (ref 3.8–10.5)
WBC # FLD AUTO: 13.3 K/UL — HIGH (ref 3.8–10.5)

## 2020-12-01 PROCEDURE — 99284 EMERGENCY DEPT VISIT MOD MDM: CPT

## 2020-12-01 RX ORDER — SODIUM CHLORIDE 0.65 %
1 AEROSOL, SPRAY (ML) NASAL ONCE
Refills: 0 | Status: DISCONTINUED | OUTPATIENT
Start: 2020-12-01 | End: 2020-12-01

## 2020-12-01 RX ORDER — HYDRALAZINE HCL 50 MG
10 TABLET ORAL ONCE
Refills: 0 | Status: COMPLETED | OUTPATIENT
Start: 2020-12-01 | End: 2020-12-01

## 2020-12-01 RX ADMIN — Medication 10 MILLIGRAM(S): at 17:13

## 2020-12-01 NOTE — ED PROVIDER NOTE - NSFOLLOWUPINSTRUCTIONS_ED_ALL_ED_FT
- Nasal saline, 2 sprays to both nares 4 times a day  - Afrin, 2 sprays each nostril two times a day, 3 days total  - vaseline, via cotton tip to inside tip of each nostril two times day  - Strict blood pressure control  - Avoid nasal trauma; no nose rubbing, blowing or manipulating nasal packing.  Sneeze with mouth open and pinching nares.  - Avoid bending with head blow the waist.    -No heavy lifting.    Make sure to call ENT for follow up: [phone]    Return to the emergency department for any worsening symptoms.

## 2020-12-01 NOTE — ED ADULT NURSE NOTE - NSIMPLEMENTINTERV_GEN_ALL_ED
Implemented All Fall Risk Interventions:  [place] to call system. Call bell, personal items and telephone within reach. Instruct patient to call for assistance. Room bathroom lighting operational. Non-slip footwear when patient is off stretcher. Physically safe environment: no spills, clutter or unnecessary equipment. Stretcher in lowest position, wheels locked, appropriate side rails in place. Provide visual cue, wrist band, yellow gown, etc. Monitor gait and stability. Monitor for mental status changes and reorient to person, place, and time. Review medications for side effects contributing to fall risk. Reinforce activity limits and safety measures with patient and family.

## 2020-12-01 NOTE — ED PROVIDER NOTE - CLINICAL SUMMARY MEDICAL DECISION MAKING FREE TEXT BOX
- epistaxis likely secondary to Eliquis and uncontrolled hypertension  - basic labs  - appreciate ENT recs

## 2020-12-01 NOTE — ED PROVIDER NOTE - PATIENT PORTAL LINK FT
You can access the FollowMyHealth Patient Portal offered by [facility] by registering at the following website: [email or web address]. By joining [employer]’s FollowMyHealth portal, you will also be able to view your health information using other applications (apps) compatible with our system.

## 2020-12-01 NOTE — ED ADULT NURSE NOTE - OBJECTIVE STATEMENT
Pt presents to room 26, A&ox3, ambulatory at baseline with a walker, pmhx of cad, htn, chf, afib on eliquis, bladder tumor, here for evaluation of a nose bleed. Pt has had multiple nose bleeds over the past 4 days requiring cauterization. Pt states she has not taken eliquis for the past 3 days. denies any falls or trauma. nosebleed started again this morning at 130am. Pt presents to room 26, A&ox3, ambulatory at baseline with a walker, pmhx of cad, htn, chf, afib on eliquis, bladder tumor, here for evaluation of a nose bleed. Pt has had multiple nose bleeds over the past 4 days requiring cauterization. Pt states she has not taken eliquis for the past 3 days. denies any falls or trauma. nosebleed started again this morning at 130am. IV established in right wrist with a 20g, labs drawn and sent, call bell in reach, side rails up, bed in locked position, md evaluation in progress, will continue to monitor.

## 2020-12-01 NOTE — ED ADULT TRIAGE NOTE - CHIEF COMPLAINT QUOTE
has had multiple nose bleeds for 4 days pt on Eliquis  pt has nose cauterized multiple times on Eliquis because of stroke in june b/p in field 208/100

## 2020-12-01 NOTE — ED PROVIDER NOTE - OBJECTIVE STATEMENT
75F h/o afib on Eliquis and HTN presents with epistaxis since 1am overnight.  Pt has been seen multiple times over past several days for epistaxis, most recently discharged from CDU yesterday after being seen by ENT, s/p cauterization, and s/p Merocel packing now removed.  Pt denies any other associated symptoms, notably hypertensive (200/60s).  Last took Eliquis 11/28/2020.    PMH: CAD s/p PCI RCA 2011, OM2 2006, OM 2009, Chronic HFrEF (now improved) s/p BiVICD, afib on eliquis, HTN, CVA, DM, severe peripheral vascular disease s/p angiogram status post laser atherectomy and balloon of L-distal SFA, L-popliteal artery, L-TPT trunk, and L-peroneal artery, non healing Left foot ulcer: s/p left Hallux amputation May 2020

## 2020-12-01 NOTE — CONSULT NOTE ADULT - SUBJECTIVE AND OBJECTIVE BOX
75F on eliquis w previous ED admission for bleeding 2 days ago s/p merocel. L merocel was dc'd today at home accidentally. pt then started bleeding from L, 'large' amount but unable to quantify. on arrival to ED, SBP in 200s. now well controlled. on HTN medications at home and adherent. has stopped eliquis per ED  on ENT arrival, patient had stopped bleeding for a few hours    Vital Signs Last 24 Hrs  T(C): 36.4 (01 Dec 2020 17:04), Max: 36.4 (01 Dec 2020 17:04)  T(F): 97.6 (01 Dec 2020 17:04), Max: 97.6 (01 Dec 2020 17:04)  HR: 72 (01 Dec 2020 18:11) (72 - 87)  BP: 135/49 (01 Dec 2020 18:11) (135/49 - 208/60)  BP(mean): --  RR: 16 (01 Dec 2020 18:11) (16 - 18)  SpO2: 100% (01 Dec 2020 18:11) (100% - 100%)    PHYSICAL EXAM:  Gen: NAD, A/Ox3  Breathing comfortably on RA  clots on L in place  OC/ OP no bleeding, dry    A/P  75F on eliquis presents w epistaxis on L, currently not bleeding for few hours  - we will attempt reinsertion of packing possibly L merocel if patient bleeds again  - may observe in ED for a period of time before dc if no further bleeding  - a/c eliquis per ED  - HTN and BP control needed  - Nasal saline, 2 sprays to both nares 4 times a day  - Afrin, 2 sprays each nostril two times a day, 3 days total  - vaseline, via cotton tip to inside tip of each nostril two times day  - Strict blood pressure control  - Avoid nasal trauma; no nose rubbing, blowing or manipulating nasal packing.  Sneeze with mouth open and pinching nares.  - Avoid bending with head blow the waist.    -No heavy lifting.  -follow up with PCP regarding anticoagulation risks/benefits   -page/call with questions  - discussed w attending

## 2020-12-03 ENCOUNTER — APPOINTMENT (OUTPATIENT)
Dept: OTOLARYNGOLOGY | Facility: CLINIC | Age: 75
End: 2020-12-03

## 2020-12-03 LAB
BACTERIA UR CULT: NORMAL
URINE CYTOLOGY: NORMAL

## 2020-12-04 ENCOUNTER — APPOINTMENT (OUTPATIENT)
Dept: CARDIOLOGY | Facility: CLINIC | Age: 75
End: 2020-12-04
Payer: MEDICARE

## 2020-12-04 ENCOUNTER — RESULT CHARGE (OUTPATIENT)
Age: 75
End: 2020-12-04

## 2020-12-04 VITALS
DIASTOLIC BLOOD PRESSURE: 70 MMHG | WEIGHT: 140 LBS | HEIGHT: 61 IN | SYSTOLIC BLOOD PRESSURE: 140 MMHG | BODY MASS INDEX: 26.43 KG/M2

## 2020-12-04 VITALS
RESPIRATION RATE: 16 BRPM | SYSTOLIC BLOOD PRESSURE: 140 MMHG | OXYGEN SATURATION: 99 % | BODY MASS INDEX: 26.43 KG/M2 | HEART RATE: 73 BPM | HEIGHT: 61 IN | DIASTOLIC BLOOD PRESSURE: 70 MMHG | WEIGHT: 140 LBS | TEMPERATURE: 97.2 F

## 2020-12-04 DIAGNOSIS — Z98.61 CORONARY ANGIOPLASTY STATUS: ICD-10-CM

## 2020-12-04 DIAGNOSIS — Z01.810 ENCOUNTER FOR PREPROCEDURAL CARDIOVASCULAR EXAMINATION: ICD-10-CM

## 2020-12-04 PROCEDURE — 99204 OFFICE O/P NEW MOD 45 MIN: CPT

## 2020-12-04 PROCEDURE — 99214 OFFICE O/P EST MOD 30 MIN: CPT

## 2020-12-04 PROCEDURE — 99072 ADDL SUPL MATRL&STAF TM PHE: CPT

## 2020-12-04 PROCEDURE — 93000 ELECTROCARDIOGRAM COMPLETE: CPT

## 2020-12-04 RX ORDER — DOXYCYCLINE HYCLATE 100 MG/1
100 TABLET ORAL
Qty: 20 | Refills: 0 | Status: DISCONTINUED | COMMUNITY
Start: 2020-11-20 | End: 2020-12-04

## 2020-12-04 NOTE — ASSESSMENT
[FreeTextEntry1] : CAD, s/p PCIs.\par PAD, healing left foot ulcer after recent intervention.\par HTN, DM, HLD.\par PAF, [id number] Vasc score 5.\par Poor functional capacity.\par Preoperative evaluation for resection of bladder tumor.\par \par Plan:\par Continue treatment.\par Patient advised to resume Eliquis, schedule ENT f/u, hold Eliquis as needed for nose bleed.\par Continue Plavix.\par 2D ECHO and adenosine MPI prior to bladder surgery.\par F/u in 2 weeks.\par \par Scott Toussaint MD\par

## 2020-12-04 NOTE — HISTORY OF PRESENT ILLNESS
[FreeTextEntry1] : 75-yo female with extensive medical history:\par -CAD, s/p RCA KENDELL in 2011, OM2 KENDELL 2006, OM1 KENDELL in 2009.\par -severe PAD involving left dSFA, distal left pop and left TP trunk (recent PTA and KENDELL for non-healing foot ulcer, clear improvement since then).\par -PAF, recently had to stop Eliquis due to nose bleed.\par -s/p ICD\par -HTN, hyperlipidemia, DM.\par -recently discovered bladder tumor.\par \par Patient is for preoperative cardiac evaluation for resection of bladder tumor. She walks slowly with a walker, denies CP, SOB.

## 2020-12-04 NOTE — REVIEW OF SYSTEMS
[see HPI] : see HPI [Leg Claudication] : intermittent leg claudication [Easy Bleeding] : a tendency for easy bleeding [Negative] : Endocrine [Blurry Vision] : no blurred vision [Seeing Double (Diplopia)] : no diplopia [Lower Ext Edema] : no extremity edema [Palpitations] : no palpitations [Skin: A Rash] : no rash: [Anxiety] : no anxiety [Easy Bruising] : no tendency for easy bruising

## 2020-12-04 NOTE — PHYSICAL EXAM
[General Appearance - Well Developed] : well developed [Normal Appearance] : normal appearance [Well Groomed] : well groomed [General Appearance - Well Nourished] : well nourished [No Deformities] : no deformities [General Appearance - In No Acute Distress] : no acute distress [Normal Conjunctiva] : the conjunctiva exhibited no abnormalities [Eyelids - No Xanthelasma] : the eyelids demonstrated no xanthelasmas [Normal Rate] : normal [Rhythm Regular] : regular [Normal S1] : normal S1 [Normal S2] : normal S2 [S4] : an S4 was heard [II] : a grade 2 [1+] : right 1+ [No Pitting Edema] : no pitting edema present [Respiration, Rhythm And Depth] : normal respiratory rhythm and effort [Auscultation Breath Sounds / Voice Sounds] : lungs were clear to auscultation bilaterally [Bowel Sounds] : normal bowel sounds [Abdomen Soft] : soft [Abdomen Tenderness] : non-tender [Abdomen Mass (___ Cm)] : no abdominal mass palpated [Cyanosis, Localized] : no localized cyanosis [Skin Color & Pigmentation] : normal skin color and pigmentation [] : no rash [Oriented To Time, Place, And Person] : oriented to person, place, and time [S3] : no S3 [FreeTextEntry1] : left foot warm

## 2020-12-06 NOTE — REVIEW OF SYSTEMS
[see HPI] : see HPI [Negative] : Respiratory [Shortness Of Breath] : no shortness of breath [Chest Pain] : no chest pain [Lower Ext Edema] : no extremity edema [Nausea] : no nausea [Vomiting] : no vomiting [Joint Pain] : no joint pain

## 2020-12-06 NOTE — PHYSICAL EXAM
[General Appearance - Well Developed] : well developed TRAUMA ACTIVATION LEVEL:  Alert    MECHANISM OF INJURY:      [] Blunt  	[] MVC	[x] Fall	[] Pedestrian Struck	[] Motorcycle   [] Assault   [] Bicycle collision  [] Sports injury     [] Penetrating  	[] Gun Shot Wound 		[] Stab Wound    GCS:15 	E: 4	V: 5	M: 6    66y old m s/p fall  HPI: 66 year old male with a medical hx of DM, HTN, parkinson's disease dementia, presented as a trauma transfer from [facility] for a mechanical fall. He was brought in by EMS from [facility] for complaints of a fall this morning. Patient states that he was making breakfast and tripped and fell, hitting his head on the ground. Denies LOC or anticoagulation. Currently complaining of pain to the back of his head and right chest wall. Patient endorses a remote history of multiple falls the past few weeks, increasing in frequency. He was transferred from [facility] for FAST findings of mild pericardial effusion in setting of hypotension of 90's / 50's. Denies fever chills, chest pain, sob, cough, wheeze, abdominal pain, dizziness.     PAST MEDICAL & SURGICAL HISTORY:  Mild dementia  Parkinson's disease  Diabetes 1.5, managed as type 2  No significant past surgical history      Allergies    No Known Allergies    Intolerances    Home Medications:  Aspir 81 oral delayed release tablet: 1 tab(s) orally once a day (18 May 2020 17:05)  atorvastatin 20 mg oral tablet: 1 tab(s) orally once a day (18 May 2020 17:05)  carbidopa-levodopa 25 mg-100 mg oral tablet: 1 tab(s) orally 3 times a day (18 May 2020 17:05)  docusate sodium 100 mg oral tablet:  (18 May 2020 17:05)  famotidine 20 mg oral tablet:  (18 May 2020 17:05)  famotidine 20 mg oral tablet: orally once a day (18 May 2020 17:05)  fludrocortisone 0.1 mg oral tablet: 1 tab(s) orally once a day (18 May 2020 17:05)  glipiZIDE 5 mg oral tablet: 1 tab(s) orally once a day (18 May 2020 17:05)  haloperidol 1 mg oral tablet:  (18 May 2020 17:05)  Lutein 20 mg oral capsule: 1 cap(s) orally once a day (18 May 2020 17:05)  metFORMIN 1000 mg oral tablet: 1 tab(s) orally 2 times a day (18 May 2020 17:05)  PreserVision AREDS 2 oral capsule: orally 2 times a day (18 May 2020 17:05)  QUEtiapine 25 mg oral tablet: orally once a day (at bedtime) (18 May 2020 17:05)  SEROquel 25 mg oral tablet: 2 tab(s) orally , As Needed (18 May 2020 17:05)  timolol-latanoprost 0.5%-0.005% ophthalmic solution: to each affected eye once a day (at bedtime) (18 May 2020 17:05)    ROS: 10-system review is otherwise negative except HPI above.      Primary Survey:    A - airway intact  B - bilateral breath sounds and good chest rise  C - palpable pulses in all extremities  D - GCS 15 on arrival, BROWNE  Exposure obtained    Vital Signs Last 24 Hrs  T(C): 36.9 (18 May 2020 18:12), Max: 37.7 (18 May 2020 16:41)  T(F): 98.4 (18 May 2020 18:12), Max: 99.8 (18 May 2020 16:41)  HR: 112 (18 May 2020 18:12) (94 - 112)  BP: 165/84 (18 May 2020 18:12) (86/54 - 165/84)  BP(mean): --  RR: 20 (18 May 2020 18:12) (19 - 20)  SpO2: 100% (18 May 2020 18:12) (97% - 100%)    Secondary Survey:   General: NAD  HEENT: Normocephalic, atraumatic, EOMI, PEERLA. no scalp lacerations   Neck: Soft, midline trachea. no c-spine tenderness  Chest: Mild right sided lower rib tenderness. No subq emphysema  Cardiac: S1, S2, RRR  Respiratory: Bilateral breath sounds, clear and equal bilaterally  Abdomen: Soft, non-distended, non-tender, no rebound,   Groin: Normal appearing, pelvis stable   Ext: palp radial b/l UE, b/l DP palp in Lower Extrem.   Back: no TTP, no palpable runoff/stepoff/deformity    LABS:  Labs:  CAPILLARY BLOOD GLUCOSE      POCT Blood Glucose.: 108 mg/dL (18 May 2020 16:41)                          11.9   9.75  )-----------( 202      ( 18 May 2020 17:00 )             34.5       Auto Neutrophil %: 83.5 % (05-18-20 @ 17:00)  Auto Immature Granulocyte %: 0.5 % (05-18-20 @ 17:00)    05-18    142  |  101  |  41<H>  ----------------------------<  84  5.0   |  19  |  2.0<H>      Calcium, Total Serum: 9.4 mg/dL (05-18-20 @ 17:00)      LFTs:             7.2  | 0.5  | 27       ------------------[105     ( 18 May 2020 17:00 )  4.2  | x    | <5          Lipase:x      Amylase:x         Lactate, Blood: 6.8 mmol/L (05-18-20 @ 17:00)      Coags:     15.30  ----< 1.33    ( 18 May 2020 17:00 )     25.8        CARDIAC MARKERS ( 18 May 2020 17:00 )  x     / 0.02 ng/mL / x     / x     / x          Serum Pro-Brain Natriuretic Peptide: 1002 pg/mL (05-18-20 @ 17:00)      RADIOLOGY & ADDITIONAL STUDIES:  ***F/u trauma imaging: pan scan      < from: CT Head No Cont (05.06.20 @ 11:25) >    IMPRESSION:     No evidence of acute intracranial pathology.      < end of copied text >      --------------------------------------------------------------------------------------- [Normal Appearance] : normal appearance [Well Groomed] : well groomed [General Appearance - Well Nourished] : well nourished [No Deformities] : no deformities [General Appearance - In No Acute Distress] : no acute distress [Normal Oral Mucosa] : normal oral mucosa TRAUMA ACTIVATION LEVEL:  Alert    MECHANISM OF INJURY:      [] Blunt  	[] MVC	[x] Fall	[] Pedestrian Struck	[] Motorcycle   [] Assault   [] Bicycle collision  [] Sports injury     [] Penetrating  	[] Gun Shot Wound 		[] Stab Wound    GCS:15 	E: 4	V: 5	M: 6    66y old m s/p fall  HPI: 66 year old male with a medical hx of DM, HTN, parkinson's disease dementia, presented as a trauma transfer from [facility] for a mechanical fall. He was brought in by EMS from [facility] for complaints of a fall this morning. Patient states that he was making breakfast and tripped and fell, hitting his head on the ground. Denies LOC or anticoagulation. Currently complaining of pain to the back of his head and right chest wall. Patient endorses a remote history of multiple falls the past few weeks, increasing in frequency. He was transferred from [facility] for FAST findings of mild pericardial effusion in setting of hypotension of 90's / 50's. Denies fever chills, chest pain, sob, cough, wheeze, abdominal pain, dizziness.     PAST MEDICAL & SURGICAL HISTORY:  Mild dementia  Parkinson's disease  Diabetes 1.5, managed as type 2  No significant past surgical history      Allergies    No Known Allergies    Intolerances    Home Medications:  Aspir 81 oral delayed release tablet: 1 tab(s) orally once a day (18 May 2020 17:05)  atorvastatin 20 mg oral tablet: 1 tab(s) orally once a day (18 May 2020 17:05)  carbidopa-levodopa 25 mg-100 mg oral tablet: 1 tab(s) orally 3 times a day (18 May 2020 17:05)  docusate sodium 100 mg oral tablet:  (18 May 2020 17:05)  famotidine 20 mg oral tablet:  (18 May 2020 17:05)  famotidine 20 mg oral tablet: orally once a day (18 May 2020 17:05)  fludrocortisone 0.1 mg oral tablet: 1 tab(s) orally once a day (18 May 2020 17:05)  glipiZIDE 5 mg oral tablet: 1 tab(s) orally once a day (18 May 2020 17:05)  haloperidol 1 mg oral tablet:  (18 May 2020 17:05)  Lutein 20 mg oral capsule: 1 cap(s) orally once a day (18 May 2020 17:05)  metFORMIN 1000 mg oral tablet: 1 tab(s) orally 2 times a day (18 May 2020 17:05)  PreserVision AREDS 2 oral capsule: orally 2 times a day (18 May 2020 17:05)  QUEtiapine 25 mg oral tablet: orally once a day (at bedtime) (18 May 2020 17:05)  SEROquel 25 mg oral tablet: 2 tab(s) orally , As Needed (18 May 2020 17:05)  timolol-latanoprost 0.5%-0.005% ophthalmic solution: to each affected eye once a day (at bedtime) (18 May 2020 17:05)    ROS: 10-system review is otherwise negative except HPI above.      Primary Survey:    A - airway intact  B - bilateral breath sounds and good chest rise  C - palpable pulses in all extremities  D - GCS 15 on arrival, BROWNE  Exposure obtained    Vital Signs Last 24 Hrs  T(C): 36.9 (18 May 2020 18:12), Max: 37.7 (18 May 2020 16:41)  T(F): 98.4 (18 May 2020 18:12), Max: 99.8 (18 May 2020 16:41)  HR: 112 (18 May 2020 18:12) (94 - 112)  BP: 165/84 (18 May 2020 18:12) (86/54 - 165/84)  BP(mean): --  RR: 20 (18 May 2020 18:12) (19 - 20)  SpO2: 100% (18 May 2020 18:12) (97% - 100%)    Secondary Survey:   General: NAD  HEENT: Normocephalic, atraumatic, EOMI, PEERLA. no scalp lacerations   Neck: Soft, midline trachea. no c-spine tenderness  Chest: Mild right sided lower rib tenderness. No subq emphysema  Cardiac: S1, S2, RRR  Respiratory: Bilateral breath sounds, clear and equal bilaterally  Abdomen: Soft, non-distended, non-tender, no rebound,   Groin: Normal appearing, pelvis stable   Ext: palp radial b/l UE, b/l DP palp in Lower Extrem.   Back: no TTP, no palpable runoff/stepoff/deformity    LABS:  Labs:  CAPILLARY BLOOD GLUCOSE      POCT Blood Glucose.: 108 mg/dL (18 May 2020 16:41)                          11.9   9.75  )-----------( 202      ( 18 May 2020 17:00 )             34.5       Auto Neutrophil %: 83.5 % (05-18-20 @ 17:00)  Auto Immature Granulocyte %: 0.5 % (05-18-20 @ 17:00)    05-18    142  |  101  |  41<H>  ----------------------------<  84  5.0   |  19  |  2.0<H>      Calcium, Total Serum: 9.4 mg/dL (05-18-20 @ 17:00)      LFTs:             7.2  | 0.5  | 27       ------------------[105     ( 18 May 2020 17:00 )  4.2  | x    | <5          Lipase:x      Amylase:x         Lactate, Blood: 6.8 mmol/L (05-18-20 @ 17:00)      Coags:     15.30  ----< 1.33    ( 18 May 2020 17:00 )     25.8        CARDIAC MARKERS ( 18 May 2020 17:00 )  x     / 0.02 ng/mL / x     / x     / x          Serum Pro-Brain Natriuretic Peptide: 1002 pg/mL (05-18-20 @ 17:00)      RADIOLOGY & ADDITIONAL STUDIES:  ***F/u trauma imaging: pan scan      < from: CT Head No Cont (05.06.20 @ 11:25) >    IMPRESSION:     No evidence of acute intracranial pathology.      < end of copied text >    < from: CT Angio Chest Dissection Protocol (05.18.20 @ 19:33) >  IMPRESSION: No evidence of aortic aneurysm or dissection.    There is mild deformity of the right lateral seventh rib compatible with a nondisplaced fracture.     < end of copied text >  < from: CT Cervical Spine No Cont (05.18.20 @ 19:32) >      IMPRESSION:    1. No acute osseous abnormality.  2. Diffuse degenerative change as above.    < end of copied text >  < from: CT Head No Cont (05.18.20 @ 19:31) >    IMPRESSION:    No acute intracranial abnormalities.    < end of copied text >        --------------------------------------------------------- [No Oral Pallor] : no oral pallor [No Oral Cyanosis] : no oral cyanosis TRAUMA ACTIVATION LEVEL:  Alert    MECHANISM OF INJURY:   [x] Fall    GCS:15 	E: 4	V: 5	M: 6    66y old m s/p fall  HPI: 66 year old male with a medical hx of DM, HTN, parkinson's disease dementia, presented as a trauma transfer from [facility] for a mechanical fall. He was brought in by EMS from [facility] for complaints of a fall this morning. Patient states that he was making breakfast and tripped and fell, hitting his head on the ground. Denies LOC or anticoagulation. Currently complaining of pain to the back of his head and right chest wall. Patient endorses a remote history of multiple falls the past few weeks, increasing in frequency. He was transferred from [facility] for FAST findings of mild pericardial effusion in setting of hypotension of 90's / 50's. Denies fever chills, chest pain, sob, cough, wheeze, abdominal pain, dizziness.     PAST MEDICAL & SURGICAL HISTORY:  Mild dementia  Parkinson's disease  Diabetes 1.5, managed as type 2  No significant past surgical history    Allergies  No Known Allergies    Home Medications:  Aspir 81 oral delayed release tablet: 1 tab(s) orally once a day (18 May 2020 17:05)  atorvastatin 20 mg oral tablet: 1 tab(s) orally once a day (18 May 2020 17:05)  carbidopa-levodopa 25 mg-100 mg oral tablet: 1 tab(s) orally 3 times a day (18 May 2020 17:05)  docusate sodium 100 mg oral tablet:  (18 May 2020 17:05)  famotidine 20 mg oral tablet:  (18 May 2020 17:05)  famotidine 20 mg oral tablet: orally once a day (18 May 2020 17:05)  fludrocortisone 0.1 mg oral tablet: 1 tab(s) orally once a day (18 May 2020 17:05)  glipiZIDE 5 mg oral tablet: 1 tab(s) orally once a day (18 May 2020 17:05)  haloperidol 1 mg oral tablet:  (18 May 2020 17:05)  Lutein 20 mg oral capsule: 1 cap(s) orally once a day (18 May 2020 17:05)  metFORMIN 1000 mg oral tablet: 1 tab(s) orally 2 times a day (18 May 2020 17:05)  PreserVision AREDS 2 oral capsule: orally 2 times a day (18 May 2020 17:05)  QUEtiapine 25 mg oral tablet: orally once a day (at bedtime) (18 May 2020 17:05)  SEROquel 25 mg oral tablet: 2 tab(s) orally , As Needed (18 May 2020 17:05)  timolol-latanoprost 0.5%-0.005% ophthalmic solution: to each affected eye once a day (at bedtime) (18 May 2020 17:05)    ROS: 10-system review is otherwise negative except HPI above.      Primary Survey:    A - airway intact  B - bilateral breath sounds and good chest rise  C - palpable pulses in all extremities  D - GCS 15 on arrival, BROWNE  Exposure obtained    Vital Signs Last 24 Hrs  T(C): 36.9 (18 May 2020 18:12), Max: 37.7 (18 May 2020 16:41)  T(F): 98.4 (18 May 2020 18:12), Max: 99.8 (18 May 2020 16:41)  HR: 112 (18 May 2020 18:12) (94 - 112)  BP: 165/84 (18 May 2020 18:12) (86/54 - 165/84)  RR: 20 (18 May 2020 18:12) (19 - 20)  SpO2: 100% (18 May 2020 18:12) (97% - 100%)    Secondary Survey:   General: NAD  HEENT: Normocephalic, atraumatic, EOMI, PEERLA. no scalp lacerations   Neck: Soft, midline trachea. no c-spine tenderness  Chest: Mild right sided lower rib tenderness. No subq emphysema  Cardiac: S1, S2, RRR  Respiratory: Bilateral breath sounds, clear and equal bilaterally  Abdomen: Soft, non-distended, non-tender, no rebound,   Groin: Normal appearing, pelvis stable   Ext: palp radial b/l UE, b/l DP palp in Lower Extrem.   Back: no TTP, no palpable runoff/stepoff/deformity    LABS:                        11.9   9.75  )-----------( 202      ( 18 May 2020 17:00 )             34.5       Auto Neutrophil %: 83.5 % (05-18-20 @ 17:00)  Auto Immature Granulocyte %: 0.5 % (05-18-20 @ 17:00)    05-18    142  |  101  |  41<H>  ----------------------------<  84  5.0   |  19  |  2.0<H>      Calcium, Total Serum: 9.4 mg/dL (05-18-20 @ 17:00)    LFTs:             7.2  | 0.5  | 27       ------------------[105     ( 18 May 2020 17:00 )  4.2  | x    | <5          Lipase:x      Amylase:x         Lactate, Blood: 6.8 mmol/L (05-18-20 @ 17:00)      Coags:     15.30  ----< 1.33    ( 18 May 2020 17:00 )     25.8        CARDIAC MARKERS ( 18 May 2020 17:00 )  x     / 0.02 ng/mL / x     / x     / x          Serum Pro-Brain Natriuretic Peptide: 1002 pg/mL (05-18-20 @ 17:00)    RADIOLOGY & ADDITIONAL STUDIES:  CT Head No Cont (05.06.20 @ 11:25) >    IMPRESSION:     No evidence of acute intracranial pathology.      < end of copied text >    < from: CT Angio Chest Dissection Protocol (05.18.20 @ 19:33) >  IMPRESSION: No evidence of aortic aneurysm or dissection.    There is mild deformity of the right lateral seventh rib compatible with a nondisplaced fracture.     < end of copied text >  < from: CT Cervical Spine No Cont (05.18.20 @ 19:32) >      IMPRESSION:    1. No acute osseous abnormality.  2. Diffuse degenerative change as above.    < end of copied text >  < from: CT Head No Cont (05.18.20 @ 19:31) >    IMPRESSION:    No acute intracranial abnormalities.    < end of copied text >        --------------------------------------------------------- [Normal Jugular Venous A Waves Present] : normal jugular venous A waves present [Normal Jugular Venous V Waves Present] : normal jugular venous V waves present [No Jugular Venous [place] A Waves] : no jugular venous ruvalcaba A waves [Respiration, Rhythm And Depth] : normal respiratory rhythm and effort [Exaggerated Use Of Accessory Muscles For Inspiration] : no accessory muscle use [Auscultation Breath Sounds / Voice Sounds] : lungs were clear to auscultation bilaterally [Heart Sounds] : normal S1 and S2 [Abdomen Soft] : soft [Abdomen Tenderness] : non-tender [] : no hepato-splenomegaly [Abdomen Mass (___ Cm)] : no abdominal mass palpated [FreeTextEntry1] : warm left leg, with healing ulcer , no active purulent discharge, weak DP pulse, pulses ( DP and digital pulse) were detected by doppler

## 2020-12-06 NOTE — HISTORY OF PRESENT ILLNESS
[FreeTextEntry1] : 74 y/o F with h/o tobacco abuse, Afib (eliquis) HTN, HLD, CAD (RCA KENDELL 2011, om2 2006, om 2009) ICD ([place]) PAD s/p  left dSFA, dLeft pop, TPT trunk and left PT/plantar for left foot ulcer. Due to incomplete healing of ulcer pt underwent angiogram 3  weeks ago with intervention of left popliteal and peroneal arteries with PTA and KENDELL.pt reports improvement of LE pain , and ulcer healing , no current purulent discharge , finished course of PO AB.\par pt was complaining of nose bleeding that prompted hospitalization last saturday, she stopped eliquis.

## 2020-12-06 NOTE — ASSESSMENT
[FreeTextEntry1] : 74 y/o F with \par h/o tobacco abuse, \par Afib (eliquis) HTN, HLD, \par CAD (RCA KENDELL 2011, om2 2006, om 2009), \par Cardiomyopathy, ICD ([place]) \par PAD/CLI [place] 6 s/p  left dSFA, dLeft pop, TPT trunk and left PT/plantar for left foot ulcer. s/p PTA with stent placement in  left pop artery with KENDELL-->now elfego 5\par Epistaxis- eliquis stopped\par Bladder Cancer: acute issue--following with uro , plan for bladder resection, awaiting preoperate cardiac eval\par Secondary Uncontrolled HTN/labile: 150-200mmHg systolic. Secondary hypertension evaluation demonstrated bilateral renal artery stenosis. \par \par plan:\par -to see cardiology (david) today for preop risk stratification (anticipate pharma stress test)\par -ENT eval this week to address epistaxis. \par cardiology eval prior to bladder Sx\par -I plan to perform renal angiography and revascularization once her blader CA is addressed.  \par -wound care with DPM (Sanaz)\par -c/w same meds for now\par -will repeat ESTRELLA/PVR later on\par \par \par

## 2020-12-07 ENCOUNTER — OUTPATIENT (OUTPATIENT)
Dept: OUTPATIENT SERVICES | Facility: HOSPITAL | Age: 75
LOS: 1 days | Discharge: HOME | End: 2020-12-07
Payer: MEDICARE

## 2020-12-07 DIAGNOSIS — Z90.49 ACQUIRED ABSENCE OF OTHER SPECIFIED PARTS OF DIGESTIVE TRACT: Chronic | ICD-10-CM

## 2020-12-07 DIAGNOSIS — Z95.0 PRESENCE OF CARDIAC PACEMAKER: Chronic | ICD-10-CM

## 2020-12-07 DIAGNOSIS — Z95.5 PRESENCE OF CORONARY ANGIOPLASTY IMPLANT AND GRAFT: Chronic | ICD-10-CM

## 2020-12-07 DIAGNOSIS — Z90.710 ACQUIRED ABSENCE OF BOTH CERVIX AND UTERUS: Chronic | ICD-10-CM

## 2020-12-07 DIAGNOSIS — C67.9 MALIGNANT NEOPLASM OF BLADDER, UNSPECIFIED: ICD-10-CM

## 2020-12-07 DIAGNOSIS — Z95.810 PRESENCE OF AUTOMATIC (IMPLANTABLE) CARDIAC DEFIBRILLATOR: Chronic | ICD-10-CM

## 2020-12-07 PROCEDURE — 74177 CT ABD & PELVIS W/CONTRAST: CPT | Mod: 26

## 2020-12-11 ENCOUNTER — APPOINTMENT (OUTPATIENT)
Dept: OTOLARYNGOLOGY | Facility: CLINIC | Age: 75
End: 2020-12-11
Payer: MEDICARE

## 2020-12-11 VITALS — TEMPERATURE: 97.7 F

## 2020-12-11 DIAGNOSIS — R04.0 EPISTAXIS: ICD-10-CM

## 2020-12-11 DIAGNOSIS — Z79.01 LONG TERM (CURRENT) USE OF ANTICOAGULANTS: ICD-10-CM

## 2020-12-11 DIAGNOSIS — H61.23 IMPACTED CERUMEN, BILATERAL: ICD-10-CM

## 2020-12-11 DIAGNOSIS — H90.3 SENSORINEURAL HEARING LOSS, BILATERAL: ICD-10-CM

## 2020-12-11 PROCEDURE — 92557 COMPREHENSIVE HEARING TEST: CPT

## 2020-12-11 PROCEDURE — 99072 ADDL SUPL MATRL&STAF TM PHE: CPT

## 2020-12-11 PROCEDURE — 31238 NSL/SINS NDSC SRG NSL HEMRRG: CPT

## 2020-12-11 PROCEDURE — 92550 TYMPANOMETRY & REFLEX THRESH: CPT

## 2020-12-11 PROCEDURE — 99204 OFFICE O/P NEW MOD 45 MIN: CPT | Mod: 25

## 2020-12-11 PROCEDURE — G0268 REMOVAL OF IMPACTED WAX MD: CPT

## 2020-12-11 NOTE — ASSESSMENT
[FreeTextEntry1] : Counseled regarding avoiding any trauma to the nasal mucosa and use bacitracin. \par \par Audiogram reviewed and discussed.

## 2020-12-11 NOTE — PHYSICAL EXAM
[de-identified] : bilateral impacted wax cleaned with a curette [Midline] : trachea located in midline position [Normal] : no rashes

## 2020-12-14 ENCOUNTER — APPOINTMENT (OUTPATIENT)
Dept: CARDIOLOGY | Facility: CLINIC | Age: 75
End: 2020-12-14
Payer: MEDICARE

## 2020-12-14 PROCEDURE — 99072 ADDL SUPL MATRL&STAF TM PHE: CPT

## 2020-12-14 PROCEDURE — 93306 TTE W/DOPPLER COMPLETE: CPT

## 2020-12-18 ENCOUNTER — APPOINTMENT (OUTPATIENT)
Dept: OTOLARYNGOLOGY | Facility: CLINIC | Age: 75
End: 2020-12-18

## 2020-12-23 PROBLEM — Z01.810 ENCOUNTER FOR PRE-OPERATIVE CARDIOVASCULAR CLEARANCE: Status: RESOLVED | Noted: 2020-12-04 | Resolved: 2020-12-23

## 2021-01-01 ENCOUNTER — APPOINTMENT (OUTPATIENT)
Dept: CARDIOLOGY | Facility: CLINIC | Age: 76
End: 2021-01-01
Payer: MEDICARE

## 2021-01-01 ENCOUNTER — INPATIENT (INPATIENT)
Facility: HOSPITAL | Age: 76
LOS: 8 days | Discharge: HOME | DRG: 280 | End: 2021-12-03
Attending: INTERNAL MEDICINE | Admitting: HOSPITALIST
Payer: MEDICARE

## 2021-01-01 ENCOUNTER — EMERGENCY (EMERGENCY)
Facility: HOSPITAL | Age: 76
LOS: 1 days | Discharge: ANOTHER HOSPITAL | End: 2021-01-01
Attending: EMERGENCY MEDICINE
Payer: MEDICARE

## 2021-01-01 ENCOUNTER — TRANSCRIPTION ENCOUNTER (OUTPATIENT)
Age: 76
End: 2021-01-01

## 2021-01-01 ENCOUNTER — INPATIENT (INPATIENT)
Facility: HOSPITAL | Age: 76
LOS: 4 days | Discharge: HOME HEALTH | End: 2021-12-21
Attending: STUDENT IN AN ORGANIZED HEALTH CARE EDUCATION/TRAINING PROGRAM | Admitting: STUDENT IN AN ORGANIZED HEALTH CARE EDUCATION/TRAINING PROGRAM
Payer: MEDICARE

## 2021-01-01 ENCOUNTER — APPOINTMENT (OUTPATIENT)
Dept: UROLOGY | Facility: CLINIC | Age: 76
End: 2021-01-01
Payer: MEDICARE

## 2021-01-01 ENCOUNTER — RESULT REVIEW (OUTPATIENT)
Age: 76
End: 2021-01-01

## 2021-01-01 ENCOUNTER — APPOINTMENT (OUTPATIENT)
Dept: CARDIOLOGY | Facility: CLINIC | Age: 76
End: 2021-01-01

## 2021-01-01 ENCOUNTER — APPOINTMENT (OUTPATIENT)
Dept: UROLOGY | Facility: CLINIC | Age: 76
End: 2021-01-01

## 2021-01-01 ENCOUNTER — INPATIENT (INPATIENT)
Facility: HOSPITAL | Age: 76
LOS: 1 days | Discharge: HOME | End: 2021-11-20
Attending: INTERNAL MEDICINE | Admitting: INTERNAL MEDICINE
Payer: MEDICARE

## 2021-01-01 VITALS
DIASTOLIC BLOOD PRESSURE: 65 MMHG | RESPIRATION RATE: 18 BRPM | OXYGEN SATURATION: 95 % | HEIGHT: 61 IN | HEART RATE: 115 BPM | TEMPERATURE: 98 F | SYSTOLIC BLOOD PRESSURE: 155 MMHG | WEIGHT: 147.05 LBS

## 2021-01-01 VITALS
TEMPERATURE: 97 F | SYSTOLIC BLOOD PRESSURE: 145 MMHG | RESPIRATION RATE: 20 BRPM | HEART RATE: 71 BPM | DIASTOLIC BLOOD PRESSURE: 65 MMHG

## 2021-01-01 VITALS
DIASTOLIC BLOOD PRESSURE: 91 MMHG | SYSTOLIC BLOOD PRESSURE: 159 MMHG | HEART RATE: 114 BPM | OXYGEN SATURATION: 95 % | TEMPERATURE: 98 F | RESPIRATION RATE: 22 BRPM

## 2021-01-01 VITALS
HEIGHT: 61 IN | HEART RATE: 77 BPM | DIASTOLIC BLOOD PRESSURE: 83 MMHG | SYSTOLIC BLOOD PRESSURE: 194 MMHG | WEIGHT: 149.91 LBS | OXYGEN SATURATION: 98 % | RESPIRATION RATE: 12 BRPM

## 2021-01-01 VITALS
DIASTOLIC BLOOD PRESSURE: 86 MMHG | BODY MASS INDEX: 27.75 KG/M2 | RESPIRATION RATE: 14 BRPM | SYSTOLIC BLOOD PRESSURE: 140 MMHG | TEMPERATURE: 97.2 F | OXYGEN SATURATION: 96 % | WEIGHT: 147 LBS | HEIGHT: 61 IN | HEART RATE: 63 BPM

## 2021-01-01 VITALS
DIASTOLIC BLOOD PRESSURE: 61 MMHG | HEART RATE: 86 BPM | OXYGEN SATURATION: 98 % | SYSTOLIC BLOOD PRESSURE: 149 MMHG | RESPIRATION RATE: 37 BRPM

## 2021-01-01 VITALS
RESPIRATION RATE: 14 BRPM | WEIGHT: 143 LBS | OXYGEN SATURATION: 98 % | DIASTOLIC BLOOD PRESSURE: 88 MMHG | TEMPERATURE: 97.3 F | SYSTOLIC BLOOD PRESSURE: 140 MMHG | HEIGHT: 61 IN | HEART RATE: 88 BPM | BODY MASS INDEX: 27 KG/M2

## 2021-01-01 VITALS
HEART RATE: 83 BPM | DIASTOLIC BLOOD PRESSURE: 70 MMHG | SYSTOLIC BLOOD PRESSURE: 160 MMHG | OXYGEN SATURATION: 98 % | WEIGHT: 160.06 LBS | HEIGHT: 61 IN | RESPIRATION RATE: 18 BRPM | TEMPERATURE: 99 F

## 2021-01-01 VITALS
HEART RATE: 70 BPM | HEIGHT: 61 IN | SYSTOLIC BLOOD PRESSURE: 155 MMHG | RESPIRATION RATE: 18 BRPM | WEIGHT: 145.51 LBS | OXYGEN SATURATION: 95 % | DIASTOLIC BLOOD PRESSURE: 66 MMHG

## 2021-01-01 VITALS — DIASTOLIC BLOOD PRESSURE: 70 MMHG | HEART RATE: 85 BPM | SYSTOLIC BLOOD PRESSURE: 130 MMHG

## 2021-01-01 DIAGNOSIS — L97.529 NON-PRESSURE CHRONIC ULCER OF OTHER PART OF LEFT FOOT WITH UNSPECIFIED SEVERITY: ICD-10-CM

## 2021-01-01 DIAGNOSIS — Z90.710 ACQUIRED ABSENCE OF BOTH CERVIX AND UTERUS: ICD-10-CM

## 2021-01-01 DIAGNOSIS — I50.31 ACUTE DIASTOLIC (CONGESTIVE) HEART FAILURE: ICD-10-CM

## 2021-01-01 DIAGNOSIS — I48.20 CHRONIC ATRIAL FIBRILLATION, UNSPECIFIED: ICD-10-CM

## 2021-01-01 DIAGNOSIS — E11.9 TYPE 2 DIABETES MELLITUS WITHOUT COMPLICATIONS: ICD-10-CM

## 2021-01-01 DIAGNOSIS — I48.0 PAROXYSMAL ATRIAL FIBRILLATION: ICD-10-CM

## 2021-01-01 DIAGNOSIS — Z79.4 LONG TERM (CURRENT) USE OF INSULIN: ICD-10-CM

## 2021-01-01 DIAGNOSIS — I96 GANGRENE, NOT ELSEWHERE CLASSIFIED: ICD-10-CM

## 2021-01-01 DIAGNOSIS — Z88.8 ALLERGY STATUS TO OTHER DRUGS, MEDICAMENTS AND BIOLOGICAL SUBSTANCES STATUS: ICD-10-CM

## 2021-01-01 DIAGNOSIS — I10 ESSENTIAL (PRIMARY) HYPERTENSION: ICD-10-CM

## 2021-01-01 DIAGNOSIS — T82.856A STENOSIS OF PERIPHERAL VASCULAR STENT, INITIAL ENCOUNTER: ICD-10-CM

## 2021-01-01 DIAGNOSIS — I70.92 CHRONIC TOTAL OCCLUSION OF ARTERY OF THE EXTREMITIES: ICD-10-CM

## 2021-01-01 DIAGNOSIS — Z95.810 PRESENCE OF AUTOMATIC (IMPLANTABLE) CARDIAC DEFIBRILLATOR: Chronic | ICD-10-CM

## 2021-01-01 DIAGNOSIS — E11.621 TYPE 2 DIABETES MELLITUS WITH FOOT ULCER: ICD-10-CM

## 2021-01-01 DIAGNOSIS — Z90.710 ACQUIRED ABSENCE OF BOTH CERVIX AND UTERUS: Chronic | ICD-10-CM

## 2021-01-01 DIAGNOSIS — Z95.5 PRESENCE OF CORONARY ANGIOPLASTY IMPLANT AND GRAFT: Chronic | ICD-10-CM

## 2021-01-01 DIAGNOSIS — I11.0 HYPERTENSIVE HEART DISEASE WITH HEART FAILURE: ICD-10-CM

## 2021-01-01 DIAGNOSIS — Z95.810 PRESENCE OF AUTOMATIC (IMPLANTABLE) CARDIAC DEFIBRILLATOR: ICD-10-CM

## 2021-01-01 DIAGNOSIS — Z90.49 ACQUIRED ABSENCE OF OTHER SPECIFIED PARTS OF DIGESTIVE TRACT: Chronic | ICD-10-CM

## 2021-01-01 DIAGNOSIS — I50.30 UNSPECIFIED DIASTOLIC (CONGESTIVE) HEART FAILURE: ICD-10-CM

## 2021-01-01 DIAGNOSIS — Z89.422 ACQUIRED ABSENCE OF OTHER LEFT TOE(S): ICD-10-CM

## 2021-01-01 DIAGNOSIS — I70.222 ATHEROSCLEROSIS OF NATIVE ARTERIES OF EXTREMITIES WITH REST PAIN, LEFT LEG: ICD-10-CM

## 2021-01-01 DIAGNOSIS — I50.22 CHRONIC SYSTOLIC (CONGESTIVE) HEART FAILURE: ICD-10-CM

## 2021-01-01 DIAGNOSIS — Z95.9 PRESENCE OF CARDIAC AND VASCULAR IMPLANT AND GRAFT, UNSPECIFIED: Chronic | ICD-10-CM

## 2021-01-01 DIAGNOSIS — N18.31 CHRONIC KIDNEY DISEASE, STAGE 3A: ICD-10-CM

## 2021-01-01 DIAGNOSIS — N17.9 ACUTE KIDNEY FAILURE, UNSPECIFIED: ICD-10-CM

## 2021-01-01 DIAGNOSIS — I21.4 NON-ST ELEVATION (NSTEMI) MYOCARDIAL INFARCTION: ICD-10-CM

## 2021-01-01 DIAGNOSIS — Z88.0 ALLERGY STATUS TO PENICILLIN: ICD-10-CM

## 2021-01-01 DIAGNOSIS — I70.262 ATHEROSCLEROSIS OF NATIVE ARTERIES OF EXTREMITIES WITH GANGRENE, LEFT LEG: ICD-10-CM

## 2021-01-01 DIAGNOSIS — Z95.5 PRESENCE OF CORONARY ANGIOPLASTY IMPLANT AND GRAFT: ICD-10-CM

## 2021-01-01 DIAGNOSIS — Z87.891 PERSONAL HISTORY OF NICOTINE DEPENDENCE: ICD-10-CM
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
## 2021-01-01 DIAGNOSIS — I99.9 UNSPECIFIED DISORDER OF CIRCULATORY SYSTEM: ICD-10-CM

## 2021-01-01 DIAGNOSIS — E11.51 TYPE 2 DIABETES MELLITUS WITH DIABETIC PERIPHERAL ANGIOPATHY WITHOUT GANGRENE: ICD-10-CM

## 2021-01-01 DIAGNOSIS — I25.10 ATHEROSCLEROTIC HEART DISEASE OF NATIVE CORONARY ARTERY WITHOUT ANGINA PECTORIS: ICD-10-CM

## 2021-01-01 DIAGNOSIS — Z88.1 ALLERGY STATUS TO OTHER ANTIBIOTIC AGENTS STATUS: ICD-10-CM

## 2021-01-01 DIAGNOSIS — E11.52 TYPE 2 DIABETES MELLITUS WITH DIABETIC PERIPHERAL ANGIOPATHY WITH GANGRENE: ICD-10-CM
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
## 2021-01-01 DIAGNOSIS — I13.0 HYPERTENSIVE HEART AND CHRONIC KIDNEY DISEASE WITH HEART FAILURE AND STAGE 1 THROUGH STAGE 4 CHRONIC KIDNEY DISEASE, OR UNSPECIFIED CHRONIC KIDNEY DISEASE: ICD-10-CM

## 2021-01-01 DIAGNOSIS — E78.5 HYPERLIPIDEMIA, UNSPECIFIED: ICD-10-CM

## 2021-01-01 DIAGNOSIS — Z79.82 LONG TERM (CURRENT) USE OF ASPIRIN: ICD-10-CM

## 2021-01-01 DIAGNOSIS — Z85.51 PERSONAL HISTORY OF MALIGNANT NEOPLASM OF BLADDER: ICD-10-CM

## 2021-01-01 DIAGNOSIS — D64.9 ANEMIA, UNSPECIFIED: ICD-10-CM

## 2021-01-01 DIAGNOSIS — Z79.01 LONG TERM (CURRENT) USE OF ANTICOAGULANTS: ICD-10-CM

## 2021-01-01 DIAGNOSIS — C67.9 MALIGNANT NEOPLASM OF BLADDER, UNSPECIFIED: ICD-10-CM

## 2021-01-01 DIAGNOSIS — Z89.412 ACQUIRED ABSENCE OF LEFT GREAT TOE: ICD-10-CM

## 2021-01-01 LAB
A1C WITH ESTIMATED AVERAGE GLUCOSE RESULT: 10.3 % — HIGH (ref 4–5.6)
A1C WITH ESTIMATED AVERAGE GLUCOSE RESULT: 11 % — HIGH (ref 4–5.6)
ALBUMIN SERPL ELPH-MCNC: 2.5 G/DL — LOW (ref 3.5–5)
ALBUMIN SERPL ELPH-MCNC: 3 G/DL — LOW (ref 3.3–5)
ALBUMIN SERPL ELPH-MCNC: 3 G/DL — LOW (ref 3.5–5.2)
ALBUMIN SERPL ELPH-MCNC: 3.2 G/DL — LOW (ref 3.5–5.2)
ALBUMIN SERPL ELPH-MCNC: 3.3 G/DL — SIGNIFICANT CHANGE UP (ref 3.3–5)
ALBUMIN SERPL ELPH-MCNC: 3.4 G/DL — LOW (ref 3.5–5.2)
ALBUMIN SERPL ELPH-MCNC: 3.4 G/DL — LOW (ref 3.5–5.2)
ALBUMIN SERPL ELPH-MCNC: 3.5 G/DL — SIGNIFICANT CHANGE UP (ref 3.5–5.2)
ALBUMIN SERPL ELPH-MCNC: 3.7 G/DL — SIGNIFICANT CHANGE UP (ref 3.5–5.2)
ALBUMIN SERPL ELPH-MCNC: 3.8 G/DL — SIGNIFICANT CHANGE UP (ref 3.5–5.2)
ALP SERPL-CCNC: 161 U/L — HIGH (ref 30–115)
ALP SERPL-CCNC: 165 U/L — HIGH (ref 30–115)
ALP SERPL-CCNC: 170 U/L — HIGH (ref 30–115)
ALP SERPL-CCNC: 175 U/L — HIGH (ref 30–115)
ALP SERPL-CCNC: 176 U/L — HIGH (ref 30–115)
ALP SERPL-CCNC: 178 U/L — HIGH (ref 30–115)
ALP SERPL-CCNC: 181 U/L — HIGH (ref 40–120)
ALP SERPL-CCNC: 190 U/L — HIGH (ref 30–115)
ALP SERPL-CCNC: 193 U/L — HIGH (ref 40–120)
ALP SERPL-CCNC: 200 U/L — HIGH (ref 30–115)
ALP SERPL-CCNC: 204 U/L — HIGH (ref 30–115)
ALP SERPL-CCNC: 212 U/L — HIGH (ref 40–120)
ALT FLD-CCNC: 15 U/L — SIGNIFICANT CHANGE UP (ref 0–41)
ALT FLD-CCNC: 16 U/L — SIGNIFICANT CHANGE UP (ref 0–41)
ALT FLD-CCNC: 16 U/L — SIGNIFICANT CHANGE UP (ref 10–45)
ALT FLD-CCNC: 17 U/L — SIGNIFICANT CHANGE UP (ref 0–41)
ALT FLD-CCNC: 17 U/L — SIGNIFICANT CHANGE UP (ref 10–45)
ALT FLD-CCNC: 18 U/L — SIGNIFICANT CHANGE UP (ref 0–41)
ALT FLD-CCNC: 20 U/L — SIGNIFICANT CHANGE UP (ref 0–41)
ALT FLD-CCNC: 20 U/L — SIGNIFICANT CHANGE UP (ref 0–41)
ALT FLD-CCNC: 23 U/L DA — SIGNIFICANT CHANGE UP (ref 10–60)
ANION GAP SERPL CALC-SCNC: 11 MMOL/L — SIGNIFICANT CHANGE UP (ref 5–17)
ANION GAP SERPL CALC-SCNC: 11 MMOL/L — SIGNIFICANT CHANGE UP (ref 7–14)
ANION GAP SERPL CALC-SCNC: 12 MMOL/L — SIGNIFICANT CHANGE UP (ref 5–17)
ANION GAP SERPL CALC-SCNC: 13 MMOL/L — SIGNIFICANT CHANGE UP (ref 5–17)
ANION GAP SERPL CALC-SCNC: 13 MMOL/L — SIGNIFICANT CHANGE UP (ref 5–17)
ANION GAP SERPL CALC-SCNC: 14 MMOL/L — SIGNIFICANT CHANGE UP (ref 5–17)
ANION GAP SERPL CALC-SCNC: 14 MMOL/L — SIGNIFICANT CHANGE UP (ref 5–17)
ANION GAP SERPL CALC-SCNC: 14 MMOL/L — SIGNIFICANT CHANGE UP (ref 7–14)
ANION GAP SERPL CALC-SCNC: 14 MMOL/L — SIGNIFICANT CHANGE UP (ref 7–14)
ANION GAP SERPL CALC-SCNC: 15 MMOL/L — HIGH (ref 7–14)
ANION GAP SERPL CALC-SCNC: 15 MMOL/L — HIGH (ref 7–14)
ANION GAP SERPL CALC-SCNC: 15 MMOL/L — SIGNIFICANT CHANGE UP (ref 5–17)
ANION GAP SERPL CALC-SCNC: 16 MMOL/L — HIGH (ref 7–14)
ANION GAP SERPL CALC-SCNC: 17 MMOL/L — HIGH (ref 7–14)
ANION GAP SERPL CALC-SCNC: 18 MMOL/L — HIGH (ref 7–14)
ANION GAP SERPL CALC-SCNC: 6 MMOL/L — SIGNIFICANT CHANGE UP (ref 5–17)
APTT BLD: 128.8 SEC — CRITICAL HIGH (ref 27.5–35.5)
APTT BLD: 181 SEC — CRITICAL HIGH (ref 27.5–35.5)
APTT BLD: 189.6 SEC — CRITICAL HIGH (ref 27.5–35.5)
APTT BLD: 34.2 SEC — SIGNIFICANT CHANGE UP (ref 27.5–35.5)
APTT BLD: 36.1 SEC — HIGH (ref 27.5–35.5)
APTT BLD: 36.4 SEC — SIGNIFICANT CHANGE UP (ref 27–39.2)
APTT BLD: 37.4 SEC — SIGNIFICANT CHANGE UP (ref 27–39.2)
APTT BLD: 39.8 SEC — HIGH (ref 27–39.2)
APTT BLD: 39.9 SEC — HIGH (ref 27.5–35.5)
APTT BLD: 41.8 SEC — HIGH (ref 27–39.2)
APTT BLD: 50.4 SEC — HIGH (ref 27.5–35.5)
APTT BLD: 64.1 SEC — HIGH (ref 27.5–35.5)
APTT BLD: 67.2 SEC — HIGH (ref 27.5–35.5)
AST SERPL-CCNC: 17 U/L — SIGNIFICANT CHANGE UP (ref 0–41)
AST SERPL-CCNC: 18 U/L — SIGNIFICANT CHANGE UP (ref 0–41)
AST SERPL-CCNC: 18 U/L — SIGNIFICANT CHANGE UP (ref 0–41)
AST SERPL-CCNC: 18 U/L — SIGNIFICANT CHANGE UP (ref 10–40)
AST SERPL-CCNC: 19 U/L — SIGNIFICANT CHANGE UP (ref 0–41)
AST SERPL-CCNC: 19 U/L — SIGNIFICANT CHANGE UP (ref 10–40)
AST SERPL-CCNC: 20 U/L — SIGNIFICANT CHANGE UP (ref 0–41)
AST SERPL-CCNC: 20 U/L — SIGNIFICANT CHANGE UP (ref 0–41)
AST SERPL-CCNC: 30 U/L — SIGNIFICANT CHANGE UP (ref 10–40)
BASOPHILS # BLD AUTO: 0.01 K/UL — SIGNIFICANT CHANGE UP (ref 0–0.2)
BASOPHILS # BLD AUTO: 0.02 K/UL — SIGNIFICANT CHANGE UP (ref 0–0.2)
BASOPHILS # BLD AUTO: 0.03 K/UL — SIGNIFICANT CHANGE UP (ref 0–0.2)
BASOPHILS # BLD AUTO: 0.04 K/UL — SIGNIFICANT CHANGE UP (ref 0–0.2)
BASOPHILS # BLD AUTO: 0.05 K/UL — SIGNIFICANT CHANGE UP (ref 0–0.2)
BASOPHILS NFR BLD AUTO: 0.1 % — SIGNIFICANT CHANGE UP (ref 0–1)
BASOPHILS NFR BLD AUTO: 0.2 % — SIGNIFICANT CHANGE UP (ref 0–1)
BASOPHILS NFR BLD AUTO: 0.3 % — SIGNIFICANT CHANGE UP (ref 0–1)
BASOPHILS NFR BLD AUTO: 0.3 % — SIGNIFICANT CHANGE UP (ref 0–2)
BASOPHILS NFR BLD AUTO: 0.3 % — SIGNIFICANT CHANGE UP (ref 0–2)
BASOPHILS NFR BLD AUTO: 0.4 % — SIGNIFICANT CHANGE UP (ref 0–1)
BASOPHILS NFR BLD AUTO: 0.5 % — SIGNIFICANT CHANGE UP (ref 0–1)
BILIRUB SERPL-MCNC: 0.2 MG/DL — SIGNIFICANT CHANGE UP (ref 0.2–1.2)
BILIRUB SERPL-MCNC: 0.3 MG/DL — SIGNIFICANT CHANGE UP (ref 0.2–1.2)
BILIRUB SERPL-MCNC: 0.5 MG/DL — SIGNIFICANT CHANGE UP (ref 0.2–1.2)
BILIRUB SERPL-MCNC: 0.5 MG/DL — SIGNIFICANT CHANGE UP (ref 0.2–1.2)
BILIRUB SERPL-MCNC: 0.6 MG/DL — SIGNIFICANT CHANGE UP (ref 0.2–1.2)
BILIRUB SERPL-MCNC: <0.2 MG/DL — SIGNIFICANT CHANGE UP (ref 0.2–1.2)
BLD GP AB SCN SERPL QL: SIGNIFICANT CHANGE UP
BLD GP AB SCN SERPL QL: SIGNIFICANT CHANGE UP
BUN SERPL-MCNC: 23 MG/DL — SIGNIFICANT CHANGE UP (ref 7–23)
BUN SERPL-MCNC: 23 MG/DL — SIGNIFICANT CHANGE UP (ref 7–23)
BUN SERPL-MCNC: 24 MG/DL — HIGH (ref 7–23)
BUN SERPL-MCNC: 25 MG/DL — HIGH (ref 10–20)
BUN SERPL-MCNC: 25 MG/DL — HIGH (ref 7–18)
BUN SERPL-MCNC: 26 MG/DL — HIGH (ref 10–20)
BUN SERPL-MCNC: 26 MG/DL — HIGH (ref 7–23)
BUN SERPL-MCNC: 27 MG/DL — HIGH (ref 10–20)
BUN SERPL-MCNC: 27 MG/DL — HIGH (ref 10–20)
BUN SERPL-MCNC: 28 MG/DL — HIGH (ref 10–20)
BUN SERPL-MCNC: 29 MG/DL — HIGH (ref 10–20)
BUN SERPL-MCNC: 29 MG/DL — HIGH (ref 10–20)
BUN SERPL-MCNC: 30 MG/DL — HIGH (ref 7–23)
BUN SERPL-MCNC: 32 MG/DL — HIGH (ref 10–20)
BUN SERPL-MCNC: 35 MG/DL — HIGH (ref 7–23)
BUN SERPL-MCNC: 36 MG/DL — HIGH (ref 10–20)
BUN SERPL-MCNC: 36 MG/DL — HIGH (ref 10–20)
BUN SERPL-MCNC: 38 MG/DL — HIGH (ref 7–23)
BUN SERPL-MCNC: 41 MG/DL — HIGH (ref 7–23)
BUN SERPL-MCNC: 45 MG/DL — HIGH (ref 7–23)
CALCIUM SERPL-MCNC: 8.5 MG/DL — SIGNIFICANT CHANGE UP (ref 8.4–10.5)
CALCIUM SERPL-MCNC: 8.6 MG/DL — SIGNIFICANT CHANGE UP (ref 8.4–10.5)
CALCIUM SERPL-MCNC: 8.6 MG/DL — SIGNIFICANT CHANGE UP (ref 8.5–10.1)
CALCIUM SERPL-MCNC: 8.7 MG/DL — SIGNIFICANT CHANGE UP (ref 8.5–10.1)
CALCIUM SERPL-MCNC: 8.8 MG/DL — SIGNIFICANT CHANGE UP (ref 8.4–10.5)
CALCIUM SERPL-MCNC: 8.9 MG/DL — SIGNIFICANT CHANGE UP (ref 8.4–10.5)
CALCIUM SERPL-MCNC: 8.9 MG/DL — SIGNIFICANT CHANGE UP (ref 8.5–10.1)
CALCIUM SERPL-MCNC: 8.9 MG/DL — SIGNIFICANT CHANGE UP (ref 8.5–10.1)
CALCIUM SERPL-MCNC: 9 MG/DL — SIGNIFICANT CHANGE UP (ref 8.4–10.5)
CALCIUM SERPL-MCNC: 9.1 MG/DL — SIGNIFICANT CHANGE UP (ref 8.4–10.5)
CALCIUM SERPL-MCNC: 9.2 MG/DL — SIGNIFICANT CHANGE UP (ref 8.5–10.1)
CALCIUM SERPL-MCNC: 9.3 MG/DL — SIGNIFICANT CHANGE UP (ref 8.5–10.1)
CALCIUM SERPL-MCNC: 9.3 MG/DL — SIGNIFICANT CHANGE UP (ref 8.5–10.1)
CALCIUM SERPL-MCNC: 9.4 MG/DL — SIGNIFICANT CHANGE UP (ref 8.4–10.5)
CALCIUM SERPL-MCNC: 9.4 MG/DL — SIGNIFICANT CHANGE UP (ref 8.5–10.1)
CALCIUM SERPL-MCNC: 9.4 MG/DL — SIGNIFICANT CHANGE UP (ref 8.5–10.1)
CALCIUM SERPL-MCNC: 9.5 MG/DL — SIGNIFICANT CHANGE UP (ref 8.4–10.5)
CALCIUM SERPL-MCNC: 9.5 MG/DL — SIGNIFICANT CHANGE UP (ref 8.5–10.1)
CALCIUM SERPL-MCNC: 9.6 MG/DL — SIGNIFICANT CHANGE UP (ref 8.4–10.5)
CALCIUM SERPL-MCNC: 9.6 MG/DL — SIGNIFICANT CHANGE UP (ref 8.4–10.5)
CHLORIDE SERPL-SCNC: 100 MMOL/L — SIGNIFICANT CHANGE UP (ref 96–108)
CHLORIDE SERPL-SCNC: 100 MMOL/L — SIGNIFICANT CHANGE UP (ref 98–110)
CHLORIDE SERPL-SCNC: 101 MMOL/L — SIGNIFICANT CHANGE UP (ref 98–110)
CHLORIDE SERPL-SCNC: 103 MMOL/L — SIGNIFICANT CHANGE UP (ref 98–110)
CHLORIDE SERPL-SCNC: 104 MMOL/L — SIGNIFICANT CHANGE UP (ref 96–108)
CHLORIDE SERPL-SCNC: 107 MMOL/L — SIGNIFICANT CHANGE UP (ref 98–110)
CHLORIDE SERPL-SCNC: 94 MMOL/L — LOW (ref 96–108)
CHLORIDE SERPL-SCNC: 94 MMOL/L — LOW (ref 96–108)
CHLORIDE SERPL-SCNC: 95 MMOL/L — LOW (ref 96–108)
CHLORIDE SERPL-SCNC: 95 MMOL/L — LOW (ref 96–108)
CHLORIDE SERPL-SCNC: 96 MMOL/L — SIGNIFICANT CHANGE UP (ref 96–108)
CHLORIDE SERPL-SCNC: 96 MMOL/L — SIGNIFICANT CHANGE UP (ref 96–108)
CHLORIDE SERPL-SCNC: 97 MMOL/L — LOW (ref 98–110)
CHLORIDE SERPL-SCNC: 98 MMOL/L — SIGNIFICANT CHANGE UP (ref 96–108)
CHLORIDE SERPL-SCNC: 99 MMOL/L — SIGNIFICANT CHANGE UP (ref 96–108)
CHLORIDE SERPL-SCNC: 99 MMOL/L — SIGNIFICANT CHANGE UP (ref 98–110)
CK MB BLD-MCNC: 5.1 % — HIGH (ref 0–3.5)
CK MB BLD-MCNC: 7.2 % — HIGH (ref 0–3.5)
CK MB BLD-MCNC: 9.2 % — HIGH (ref 0–3.5)
CK MB CFR SERPL CALC: 1.7 NG/ML — SIGNIFICANT CHANGE UP (ref 0–3.8)
CK MB CFR SERPL CALC: 10.4 NG/ML — HIGH (ref 0–3.8)
CK MB CFR SERPL CALC: 11.7 NG/ML — HIGH (ref 0–3.8)
CK MB CFR SERPL CALC: 6.1 NG/ML — HIGH (ref 0–3.8)
CK SERPL-CCNC: 120 U/L — SIGNIFICANT CHANGE UP (ref 25–170)
CK SERPL-CCNC: 127 U/L — SIGNIFICANT CHANGE UP (ref 25–170)
CK SERPL-CCNC: 145 U/L — SIGNIFICANT CHANGE UP (ref 25–170)
CO2 SERPL-SCNC: 18 MMOL/L — SIGNIFICANT CHANGE UP (ref 17–32)
CO2 SERPL-SCNC: 19 MMOL/L — LOW (ref 22–31)
CO2 SERPL-SCNC: 19 MMOL/L — SIGNIFICANT CHANGE UP (ref 17–32)
CO2 SERPL-SCNC: 19 MMOL/L — SIGNIFICANT CHANGE UP (ref 17–32)
CO2 SERPL-SCNC: 20 MMOL/L — SIGNIFICANT CHANGE UP (ref 17–32)
CO2 SERPL-SCNC: 21 MMOL/L — SIGNIFICANT CHANGE UP (ref 17–32)
CO2 SERPL-SCNC: 22 MMOL/L — SIGNIFICANT CHANGE UP (ref 17–32)
CO2 SERPL-SCNC: 23 MMOL/L — SIGNIFICANT CHANGE UP (ref 22–31)
CO2 SERPL-SCNC: 24 MMOL/L — SIGNIFICANT CHANGE UP (ref 22–31)
CO2 SERPL-SCNC: 24 MMOL/L — SIGNIFICANT CHANGE UP (ref 22–31)
CO2 SERPL-SCNC: 25 MMOL/L — SIGNIFICANT CHANGE UP (ref 22–31)
CO2 SERPL-SCNC: 26 MMOL/L — SIGNIFICANT CHANGE UP (ref 22–31)
CO2 SERPL-SCNC: 26 MMOL/L — SIGNIFICANT CHANGE UP (ref 22–31)
CO2 SERPL-SCNC: 27 MMOL/L — SIGNIFICANT CHANGE UP (ref 22–31)
COVID-19 NUCLEOCAPSID GAM AB INTERP: POSITIVE
COVID-19 NUCLEOCAPSID GAM AB INTERP: POSITIVE
COVID-19 NUCLEOCAPSID TOTAL GAM ANTIBODY RESULT: 171 INDEX — HIGH
COVID-19 NUCLEOCAPSID TOTAL GAM ANTIBODY RESULT: 188 INDEX — HIGH
COVID-19 SPIKE DOMAIN AB INTERP: POSITIVE
COVID-19 SPIKE DOMAIN AB INTERP: POSITIVE
COVID-19 SPIKE DOMAIN ANTIBODY RESULT: >250 U/ML — HIGH
COVID-19 SPIKE DOMAIN ANTIBODY RESULT: >250 U/ML — HIGH
CREAT SERPL-MCNC: 0.99 MG/DL — SIGNIFICANT CHANGE UP (ref 0.5–1.3)
CREAT SERPL-MCNC: 1 MG/DL — SIGNIFICANT CHANGE UP (ref 0.7–1.5)
CREAT SERPL-MCNC: 1 MG/DL — SIGNIFICANT CHANGE UP (ref 0.7–1.5)
CREAT SERPL-MCNC: 1.08 MG/DL — SIGNIFICANT CHANGE UP (ref 0.5–1.3)
CREAT SERPL-MCNC: 1.1 MG/DL — SIGNIFICANT CHANGE UP (ref 0.7–1.5)
CREAT SERPL-MCNC: 1.1 MG/DL — SIGNIFICANT CHANGE UP (ref 0.7–1.5)
CREAT SERPL-MCNC: 1.2 MG/DL — SIGNIFICANT CHANGE UP (ref 0.5–1.3)
CREAT SERPL-MCNC: 1.2 MG/DL — SIGNIFICANT CHANGE UP (ref 0.7–1.5)
CREAT SERPL-MCNC: 1.24 MG/DL — SIGNIFICANT CHANGE UP (ref 0.5–1.3)
CREAT SERPL-MCNC: 1.25 MG/DL — SIGNIFICANT CHANGE UP (ref 0.5–1.3)
CREAT SERPL-MCNC: 1.3 MG/DL — SIGNIFICANT CHANGE UP (ref 0.7–1.5)
CREAT SERPL-MCNC: 1.34 MG/DL — HIGH (ref 0.5–1.3)
CREAT SERPL-MCNC: 1.35 MG/DL — HIGH (ref 0.5–1.3)
CREAT SERPL-MCNC: 1.39 MG/DL — HIGH (ref 0.5–1.3)
CREAT SERPL-MCNC: 1.4 MG/DL — HIGH (ref 0.5–1.3)
CREAT SERPL-MCNC: 1.46 MG/DL — HIGH (ref 0.5–1.3)
CRP SERPL-MCNC: 9 MG/L — HIGH
EOSINOPHIL # BLD AUTO: 0 K/UL — SIGNIFICANT CHANGE UP (ref 0–0.7)
EOSINOPHIL # BLD AUTO: 0.04 K/UL — SIGNIFICANT CHANGE UP (ref 0–0.7)
EOSINOPHIL # BLD AUTO: 0.11 K/UL — SIGNIFICANT CHANGE UP (ref 0–0.7)
EOSINOPHIL # BLD AUTO: 0.15 K/UL — SIGNIFICANT CHANGE UP (ref 0–0.7)
EOSINOPHIL # BLD AUTO: 0.18 K/UL — SIGNIFICANT CHANGE UP (ref 0–0.5)
EOSINOPHIL # BLD AUTO: 0.21 K/UL — SIGNIFICANT CHANGE UP (ref 0–0.5)
EOSINOPHIL # BLD AUTO: 0.23 K/UL — SIGNIFICANT CHANGE UP (ref 0–0.7)
EOSINOPHIL # BLD AUTO: 0.27 K/UL — SIGNIFICANT CHANGE UP (ref 0–0.7)
EOSINOPHIL # BLD AUTO: 0.3 K/UL — SIGNIFICANT CHANGE UP (ref 0–0.7)
EOSINOPHIL # BLD AUTO: 0.3 K/UL — SIGNIFICANT CHANGE UP (ref 0–0.7)
EOSINOPHIL # BLD AUTO: 0.33 K/UL — SIGNIFICANT CHANGE UP (ref 0–0.7)
EOSINOPHIL # BLD AUTO: 0.35 K/UL — SIGNIFICANT CHANGE UP (ref 0–0.7)
EOSINOPHIL NFR BLD AUTO: 0 % — SIGNIFICANT CHANGE UP (ref 0–8)
EOSINOPHIL NFR BLD AUTO: 0.3 % — SIGNIFICANT CHANGE UP (ref 0–8)
EOSINOPHIL NFR BLD AUTO: 0.9 % — SIGNIFICANT CHANGE UP (ref 0–8)
EOSINOPHIL NFR BLD AUTO: 1.2 % — SIGNIFICANT CHANGE UP (ref 0–6)
EOSINOPHIL NFR BLD AUTO: 1.4 % — SIGNIFICANT CHANGE UP (ref 0–6)
EOSINOPHIL NFR BLD AUTO: 1.4 % — SIGNIFICANT CHANGE UP (ref 0–8)
EOSINOPHIL NFR BLD AUTO: 1.8 % — SIGNIFICANT CHANGE UP (ref 0–8)
EOSINOPHIL NFR BLD AUTO: 2.6 % — SIGNIFICANT CHANGE UP (ref 0–8)
EOSINOPHIL NFR BLD AUTO: 3.3 % — SIGNIFICANT CHANGE UP (ref 0–8)
EOSINOPHIL NFR BLD AUTO: 3.3 % — SIGNIFICANT CHANGE UP (ref 0–8)
EOSINOPHIL NFR BLD AUTO: 3.5 % — SIGNIFICANT CHANGE UP (ref 0–8)
EOSINOPHIL NFR BLD AUTO: 4.1 % — SIGNIFICANT CHANGE UP (ref 0–8)
ERYTHROCYTE [SEDIMENTATION RATE] IN BLOOD: 80 MM/HR — HIGH (ref 0–20)
ESTIMATED AVERAGE GLUCOSE: 249 MG/DL — HIGH (ref 68–114)
ESTIMATED AVERAGE GLUCOSE: 269 MG/DL — HIGH (ref 68–114)
FERRITIN SERPL-MCNC: 127 NG/ML — SIGNIFICANT CHANGE UP (ref 15–150)
GLUCOSE BLDC GLUCOMTR-MCNC: 107 MG/DL — HIGH (ref 70–99)
GLUCOSE BLDC GLUCOMTR-MCNC: 111 MG/DL — HIGH (ref 70–99)
GLUCOSE BLDC GLUCOMTR-MCNC: 120 MG/DL — HIGH (ref 70–99)
GLUCOSE BLDC GLUCOMTR-MCNC: 124 MG/DL — HIGH (ref 70–99)
GLUCOSE BLDC GLUCOMTR-MCNC: 128 MG/DL — HIGH (ref 70–99)
GLUCOSE BLDC GLUCOMTR-MCNC: 132 MG/DL — HIGH (ref 70–99)
GLUCOSE BLDC GLUCOMTR-MCNC: 144 MG/DL — HIGH (ref 70–99)
GLUCOSE BLDC GLUCOMTR-MCNC: 145 MG/DL — HIGH (ref 70–99)
GLUCOSE BLDC GLUCOMTR-MCNC: 146 MG/DL — HIGH (ref 70–99)
GLUCOSE BLDC GLUCOMTR-MCNC: 148 MG/DL — HIGH (ref 70–99)
GLUCOSE BLDC GLUCOMTR-MCNC: 148 MG/DL — HIGH (ref 70–99)
GLUCOSE BLDC GLUCOMTR-MCNC: 151 MG/DL — HIGH (ref 70–99)
GLUCOSE BLDC GLUCOMTR-MCNC: 152 MG/DL — HIGH (ref 70–99)
GLUCOSE BLDC GLUCOMTR-MCNC: 154 MG/DL — HIGH (ref 70–99)
GLUCOSE BLDC GLUCOMTR-MCNC: 154 MG/DL — HIGH (ref 70–99)
GLUCOSE BLDC GLUCOMTR-MCNC: 161 MG/DL — HIGH (ref 70–99)
GLUCOSE BLDC GLUCOMTR-MCNC: 161 MG/DL — HIGH (ref 70–99)
GLUCOSE BLDC GLUCOMTR-MCNC: 165 MG/DL — HIGH (ref 70–99)
GLUCOSE BLDC GLUCOMTR-MCNC: 166 MG/DL — HIGH (ref 70–99)
GLUCOSE BLDC GLUCOMTR-MCNC: 166 MG/DL — HIGH (ref 70–99)
GLUCOSE BLDC GLUCOMTR-MCNC: 167 MG/DL — HIGH (ref 70–99)
GLUCOSE BLDC GLUCOMTR-MCNC: 168 MG/DL — HIGH (ref 70–99)
GLUCOSE BLDC GLUCOMTR-MCNC: 170 MG/DL — HIGH (ref 70–99)
GLUCOSE BLDC GLUCOMTR-MCNC: 173 MG/DL — HIGH (ref 70–99)
GLUCOSE BLDC GLUCOMTR-MCNC: 177 MG/DL — HIGH (ref 70–99)
GLUCOSE BLDC GLUCOMTR-MCNC: 178 MG/DL — HIGH (ref 70–99)
GLUCOSE BLDC GLUCOMTR-MCNC: 178 MG/DL — HIGH (ref 70–99)
GLUCOSE BLDC GLUCOMTR-MCNC: 179 MG/DL — HIGH (ref 70–99)
GLUCOSE BLDC GLUCOMTR-MCNC: 180 MG/DL — HIGH (ref 70–99)
GLUCOSE BLDC GLUCOMTR-MCNC: 184 MG/DL — HIGH (ref 70–99)
GLUCOSE BLDC GLUCOMTR-MCNC: 186 MG/DL — HIGH (ref 70–99)
GLUCOSE BLDC GLUCOMTR-MCNC: 192 MG/DL — HIGH (ref 70–99)
GLUCOSE BLDC GLUCOMTR-MCNC: 194 MG/DL — HIGH (ref 70–99)
GLUCOSE BLDC GLUCOMTR-MCNC: 196 MG/DL — HIGH (ref 70–99)
GLUCOSE BLDC GLUCOMTR-MCNC: 200 MG/DL — HIGH (ref 70–99)
GLUCOSE BLDC GLUCOMTR-MCNC: 206 MG/DL — HIGH (ref 70–99)
GLUCOSE BLDC GLUCOMTR-MCNC: 211 MG/DL — HIGH (ref 70–99)
GLUCOSE BLDC GLUCOMTR-MCNC: 211 MG/DL — HIGH (ref 70–99)
GLUCOSE BLDC GLUCOMTR-MCNC: 213 MG/DL — HIGH (ref 70–99)
GLUCOSE BLDC GLUCOMTR-MCNC: 216 MG/DL — HIGH (ref 70–99)
GLUCOSE BLDC GLUCOMTR-MCNC: 218 MG/DL — HIGH (ref 70–99)
GLUCOSE BLDC GLUCOMTR-MCNC: 222 MG/DL — HIGH (ref 70–99)
GLUCOSE BLDC GLUCOMTR-MCNC: 224 MG/DL — HIGH (ref 70–99)
GLUCOSE BLDC GLUCOMTR-MCNC: 225 MG/DL — HIGH (ref 70–99)
GLUCOSE BLDC GLUCOMTR-MCNC: 226 MG/DL — HIGH (ref 70–99)
GLUCOSE BLDC GLUCOMTR-MCNC: 235 MG/DL — HIGH (ref 70–99)
GLUCOSE BLDC GLUCOMTR-MCNC: 236 MG/DL — HIGH (ref 70–99)
GLUCOSE BLDC GLUCOMTR-MCNC: 237 MG/DL — HIGH (ref 70–99)
GLUCOSE BLDC GLUCOMTR-MCNC: 237 MG/DL — HIGH (ref 70–99)
GLUCOSE BLDC GLUCOMTR-MCNC: 239 MG/DL — HIGH (ref 70–99)
GLUCOSE BLDC GLUCOMTR-MCNC: 244 MG/DL — HIGH (ref 70–99)
GLUCOSE BLDC GLUCOMTR-MCNC: 244 MG/DL — HIGH (ref 70–99)
GLUCOSE BLDC GLUCOMTR-MCNC: 252 MG/DL — HIGH (ref 70–99)
GLUCOSE BLDC GLUCOMTR-MCNC: 253 MG/DL — HIGH (ref 70–99)
GLUCOSE BLDC GLUCOMTR-MCNC: 272 MG/DL — HIGH (ref 70–99)
GLUCOSE BLDC GLUCOMTR-MCNC: 279 MG/DL — HIGH (ref 70–99)
GLUCOSE BLDC GLUCOMTR-MCNC: 287 MG/DL — HIGH (ref 70–99)
GLUCOSE BLDC GLUCOMTR-MCNC: 312 MG/DL — HIGH (ref 70–99)
GLUCOSE SERPL-MCNC: 125 MG/DL — HIGH (ref 70–99)
GLUCOSE SERPL-MCNC: 144 MG/DL — HIGH (ref 70–99)
GLUCOSE SERPL-MCNC: 144 MG/DL — HIGH (ref 70–99)
GLUCOSE SERPL-MCNC: 147 MG/DL — HIGH (ref 70–99)
GLUCOSE SERPL-MCNC: 154 MG/DL — HIGH (ref 70–99)
GLUCOSE SERPL-MCNC: 159 MG/DL — HIGH (ref 70–99)
GLUCOSE SERPL-MCNC: 162 MG/DL — HIGH (ref 70–99)
GLUCOSE SERPL-MCNC: 168 MG/DL — HIGH (ref 70–99)
GLUCOSE SERPL-MCNC: 168 MG/DL — HIGH (ref 70–99)
GLUCOSE SERPL-MCNC: 175 MG/DL — HIGH (ref 70–99)
GLUCOSE SERPL-MCNC: 175 MG/DL — HIGH (ref 70–99)
GLUCOSE SERPL-MCNC: 181 MG/DL — HIGH (ref 70–99)
GLUCOSE SERPL-MCNC: 182 MG/DL — HIGH (ref 70–99)
GLUCOSE SERPL-MCNC: 189 MG/DL — HIGH (ref 70–99)
GLUCOSE SERPL-MCNC: 215 MG/DL — HIGH (ref 70–99)
GLUCOSE SERPL-MCNC: 217 MG/DL — HIGH (ref 70–99)
GLUCOSE SERPL-MCNC: 221 MG/DL — HIGH (ref 70–99)
GLUCOSE SERPL-MCNC: 224 MG/DL — HIGH (ref 70–99)
GLUCOSE SERPL-MCNC: 226 MG/DL — HIGH (ref 70–99)
GLUCOSE SERPL-MCNC: 276 MG/DL — HIGH (ref 70–99)
HCT VFR BLD CALC: 25.4 % — LOW (ref 34.5–45)
HCT VFR BLD CALC: 26.3 % — LOW (ref 34.5–45)
HCT VFR BLD CALC: 26.7 % — LOW (ref 34.5–45)
HCT VFR BLD CALC: 26.9 % — LOW (ref 34.5–45)
HCT VFR BLD CALC: 27.1 % — LOW (ref 34.5–45)
HCT VFR BLD CALC: 27.3 % — LOW (ref 34.5–45)
HCT VFR BLD CALC: 27.6 % — LOW (ref 34.5–45)
HCT VFR BLD CALC: 27.9 % — LOW (ref 34.5–45)
HCT VFR BLD CALC: 28 % — LOW (ref 34.5–45)
HCT VFR BLD CALC: 28.1 % — LOW (ref 34.5–45)
HCT VFR BLD CALC: 28.2 % — LOW (ref 34.5–45)
HCT VFR BLD CALC: 29.1 % — LOW (ref 34.5–45)
HCT VFR BLD CALC: 29.2 % — LOW (ref 37–47)
HCT VFR BLD CALC: 29.6 % — LOW (ref 37–47)
HCT VFR BLD CALC: 30.2 % — LOW (ref 37–47)
HCT VFR BLD CALC: 30.2 % — LOW (ref 37–47)
HCT VFR BLD CALC: 31 % — LOW (ref 37–47)
HCT VFR BLD CALC: 31.1 % — LOW (ref 37–47)
HCT VFR BLD CALC: 31.2 % — LOW (ref 37–47)
HCT VFR BLD CALC: 31.3 % — LOW (ref 34.5–45)
HCT VFR BLD CALC: 32.7 % — LOW (ref 37–47)
HCT VFR BLD CALC: 37.5 % — SIGNIFICANT CHANGE UP (ref 37–47)
HCV AB S/CO SERPL IA: 0.14 S/CO — SIGNIFICANT CHANGE UP (ref 0–0.99)
HCV AB SERPL-IMP: SIGNIFICANT CHANGE UP
HGB BLD-MCNC: 10 G/DL — LOW (ref 12–16)
HGB BLD-MCNC: 10 G/DL — LOW (ref 12–16)
HGB BLD-MCNC: 10.5 G/DL — LOW (ref 12–16)
HGB BLD-MCNC: 12.4 G/DL — SIGNIFICANT CHANGE UP (ref 12–16)
HGB BLD-MCNC: 8.2 G/DL — LOW (ref 11.5–15.5)
HGB BLD-MCNC: 8.6 G/DL — LOW (ref 11.5–15.5)
HGB BLD-MCNC: 8.7 G/DL — LOW (ref 11.5–15.5)
HGB BLD-MCNC: 8.7 G/DL — LOW (ref 11.5–15.5)
HGB BLD-MCNC: 8.9 G/DL — LOW (ref 11.5–15.5)
HGB BLD-MCNC: 9 G/DL — LOW (ref 11.5–15.5)
HGB BLD-MCNC: 9 G/DL — LOW (ref 11.5–15.5)
HGB BLD-MCNC: 9.1 G/DL — LOW (ref 11.5–15.5)
HGB BLD-MCNC: 9.3 G/DL — LOW (ref 11.5–15.5)
HGB BLD-MCNC: 9.4 G/DL — LOW (ref 11.5–15.5)
HGB BLD-MCNC: 9.4 G/DL — LOW (ref 12–16)
HGB BLD-MCNC: 9.6 G/DL — LOW (ref 12–16)
HGB BLD-MCNC: 9.6 G/DL — LOW (ref 12–16)
HGB BLD-MCNC: 9.8 G/DL — LOW (ref 11.5–15.5)
HGB BLD-MCNC: 9.8 G/DL — LOW (ref 12–16)
IMM GRANULOCYTES NFR BLD AUTO: 0.3 % — SIGNIFICANT CHANGE UP (ref 0.1–0.3)
IMM GRANULOCYTES NFR BLD AUTO: 0.4 % — HIGH (ref 0.1–0.3)
IMM GRANULOCYTES NFR BLD AUTO: 0.5 % — HIGH (ref 0.1–0.3)
IMM GRANULOCYTES NFR BLD AUTO: 0.7 % — SIGNIFICANT CHANGE UP (ref 0–1.5)
IMM GRANULOCYTES NFR BLD AUTO: 1 % — SIGNIFICANT CHANGE UP (ref 0–1.5)
INR BLD: 1.06 RATIO — SIGNIFICANT CHANGE UP (ref 0.65–1.3)
INR BLD: 1.08 RATIO — SIGNIFICANT CHANGE UP (ref 0.65–1.3)
INR BLD: 1.11 RATIO — SIGNIFICANT CHANGE UP (ref 0.88–1.16)
INR BLD: 1.17 RATIO — HIGH (ref 0.88–1.16)
INR BLD: 1.23 RATIO — HIGH (ref 0.88–1.16)
IRON SATN MFR SERPL: 20 % — SIGNIFICANT CHANGE UP (ref 14–50)
IRON SATN MFR SERPL: 42 UG/DL — SIGNIFICANT CHANGE UP (ref 30–160)
LYMPHOCYTES # BLD AUTO: 1.57 K/UL — SIGNIFICANT CHANGE UP (ref 1.2–3.4)
LYMPHOCYTES # BLD AUTO: 1.69 K/UL — SIGNIFICANT CHANGE UP (ref 1.2–3.4)
LYMPHOCYTES # BLD AUTO: 1.75 K/UL — SIGNIFICANT CHANGE UP (ref 1.2–3.4)
LYMPHOCYTES # BLD AUTO: 1.85 K/UL — SIGNIFICANT CHANGE UP (ref 1–3.3)
LYMPHOCYTES # BLD AUTO: 12.7 % — LOW (ref 13–44)
LYMPHOCYTES # BLD AUTO: 14.6 % — SIGNIFICANT CHANGE UP (ref 13–44)
LYMPHOCYTES # BLD AUTO: 14.8 % — LOW (ref 20.5–51.1)
LYMPHOCYTES # BLD AUTO: 17.8 % — LOW (ref 20.5–51.1)
LYMPHOCYTES # BLD AUTO: 19.6 % — LOW (ref 20.5–51.1)
LYMPHOCYTES # BLD AUTO: 2.28 K/UL — SIGNIFICANT CHANGE UP (ref 1.2–3.4)
LYMPHOCYTES # BLD AUTO: 2.28 K/UL — SIGNIFICANT CHANGE UP (ref 1–3.3)
LYMPHOCYTES # BLD AUTO: 2.46 K/UL — SIGNIFICANT CHANGE UP (ref 1.2–3.4)
LYMPHOCYTES # BLD AUTO: 2.48 K/UL — SIGNIFICANT CHANGE UP (ref 1.2–3.4)
LYMPHOCYTES # BLD AUTO: 2.82 K/UL — SIGNIFICANT CHANGE UP (ref 1.2–3.4)
LYMPHOCYTES # BLD AUTO: 2.87 K/UL — SIGNIFICANT CHANGE UP (ref 1.2–3.4)
LYMPHOCYTES # BLD AUTO: 20.9 % — SIGNIFICANT CHANGE UP (ref 20.5–51.1)
LYMPHOCYTES # BLD AUTO: 22 % — SIGNIFICANT CHANGE UP (ref 20.5–51.1)
LYMPHOCYTES # BLD AUTO: 25.2 % — SIGNIFICANT CHANGE UP (ref 20.5–51.1)
LYMPHOCYTES # BLD AUTO: 26.4 % — SIGNIFICANT CHANGE UP (ref 20.5–51.1)
LYMPHOCYTES # BLD AUTO: 27.8 % — SIGNIFICANT CHANGE UP (ref 20.5–51.1)
LYMPHOCYTES # BLD AUTO: 3.12 K/UL — SIGNIFICANT CHANGE UP (ref 1.2–3.4)
LYMPHOCYTES # BLD AUTO: 3.72 K/UL — HIGH (ref 1.2–3.4)
LYMPHOCYTES # BLD AUTO: 34.2 % — SIGNIFICANT CHANGE UP (ref 20.5–51.1)
LYMPHOCYTES # BLD AUTO: 34.8 % — SIGNIFICANT CHANGE UP (ref 20.5–51.1)
MAGNESIUM SERPL-MCNC: 1.6 MG/DL — LOW (ref 1.8–2.4)
MAGNESIUM SERPL-MCNC: 1.8 MG/DL — SIGNIFICANT CHANGE UP (ref 1.6–2.6)
MAGNESIUM SERPL-MCNC: 1.8 MG/DL — SIGNIFICANT CHANGE UP (ref 1.6–2.6)
MAGNESIUM SERPL-MCNC: 1.8 MG/DL — SIGNIFICANT CHANGE UP (ref 1.8–2.4)
MAGNESIUM SERPL-MCNC: 1.9 MG/DL — SIGNIFICANT CHANGE UP (ref 1.8–2.4)
MAGNESIUM SERPL-MCNC: 2 MG/DL — SIGNIFICANT CHANGE UP (ref 1.6–2.6)
MAGNESIUM SERPL-MCNC: 2.2 MG/DL — SIGNIFICANT CHANGE UP (ref 1.6–2.6)
MCHC RBC-ENTMCNC: 28.2 PG — SIGNIFICANT CHANGE UP (ref 27–31)
MCHC RBC-ENTMCNC: 28.2 PG — SIGNIFICANT CHANGE UP (ref 27–34)
MCHC RBC-ENTMCNC: 28.3 PG — SIGNIFICANT CHANGE UP (ref 27–31)
MCHC RBC-ENTMCNC: 28.4 PG — SIGNIFICANT CHANGE UP (ref 27–31)
MCHC RBC-ENTMCNC: 28.4 PG — SIGNIFICANT CHANGE UP (ref 27–31)
MCHC RBC-ENTMCNC: 28.5 PG — SIGNIFICANT CHANGE UP (ref 27–31)
MCHC RBC-ENTMCNC: 28.5 PG — SIGNIFICANT CHANGE UP (ref 27–34)
MCHC RBC-ENTMCNC: 28.6 PG — SIGNIFICANT CHANGE UP (ref 27–34)
MCHC RBC-ENTMCNC: 28.6 PG — SIGNIFICANT CHANGE UP (ref 27–34)
MCHC RBC-ENTMCNC: 28.7 PG — SIGNIFICANT CHANGE UP (ref 27–31)
MCHC RBC-ENTMCNC: 28.8 PG — SIGNIFICANT CHANGE UP (ref 27–31)
MCHC RBC-ENTMCNC: 28.8 PG — SIGNIFICANT CHANGE UP (ref 27–34)
MCHC RBC-ENTMCNC: 29 PG — SIGNIFICANT CHANGE UP (ref 27–34)
MCHC RBC-ENTMCNC: 29.2 PG — SIGNIFICANT CHANGE UP (ref 27–34)
MCHC RBC-ENTMCNC: 29.2 PG — SIGNIFICANT CHANGE UP (ref 27–34)
MCHC RBC-ENTMCNC: 31.3 GM/DL — LOW (ref 32–36)
MCHC RBC-ENTMCNC: 31.4 G/DL — LOW (ref 32–37)
MCHC RBC-ENTMCNC: 31.5 GM/DL — LOW (ref 32–36)
MCHC RBC-ENTMCNC: 31.6 G/DL — LOW (ref 32–37)
MCHC RBC-ENTMCNC: 31.6 G/DL — LOW (ref 32–37)
MCHC RBC-ENTMCNC: 31.8 G/DL — LOW (ref 32–37)
MCHC RBC-ENTMCNC: 31.9 GM/DL — LOW (ref 32–36)
MCHC RBC-ENTMCNC: 32 GM/DL — SIGNIFICANT CHANGE UP (ref 32–36)
MCHC RBC-ENTMCNC: 32.1 G/DL — SIGNIFICANT CHANGE UP (ref 32–37)
MCHC RBC-ENTMCNC: 32.2 G/DL — SIGNIFICANT CHANGE UP (ref 32–37)
MCHC RBC-ENTMCNC: 32.2 G/DL — SIGNIFICANT CHANGE UP (ref 32–37)
MCHC RBC-ENTMCNC: 32.3 G/DL — SIGNIFICANT CHANGE UP (ref 32–37)
MCHC RBC-ENTMCNC: 32.3 GM/DL — SIGNIFICANT CHANGE UP (ref 32–36)
MCHC RBC-ENTMCNC: 32.4 G/DL — SIGNIFICANT CHANGE UP (ref 32–37)
MCHC RBC-ENTMCNC: 32.5 G/DL — SIGNIFICANT CHANGE UP (ref 32–37)
MCHC RBC-ENTMCNC: 32.5 GM/DL — SIGNIFICANT CHANGE UP (ref 32–36)
MCHC RBC-ENTMCNC: 32.7 GM/DL — SIGNIFICANT CHANGE UP (ref 32–36)
MCHC RBC-ENTMCNC: 32.8 GM/DL — SIGNIFICANT CHANGE UP (ref 32–36)
MCHC RBC-ENTMCNC: 33 GM/DL — SIGNIFICANT CHANGE UP (ref 32–36)
MCHC RBC-ENTMCNC: 33 GM/DL — SIGNIFICANT CHANGE UP (ref 32–36)
MCHC RBC-ENTMCNC: 33.1 G/DL — SIGNIFICANT CHANGE UP (ref 32–37)
MCHC RBC-ENTMCNC: 33.3 GM/DL — SIGNIFICANT CHANGE UP (ref 32–36)
MCV RBC AUTO: 87 FL — SIGNIFICANT CHANGE UP (ref 81–99)
MCV RBC AUTO: 87.1 FL — SIGNIFICANT CHANGE UP (ref 80–100)
MCV RBC AUTO: 87.2 FL — SIGNIFICANT CHANGE UP (ref 80–100)
MCV RBC AUTO: 87.4 FL — SIGNIFICANT CHANGE UP (ref 80–100)
MCV RBC AUTO: 87.5 FL — SIGNIFICANT CHANGE UP (ref 80–100)
MCV RBC AUTO: 87.8 FL — SIGNIFICANT CHANGE UP (ref 81–99)
MCV RBC AUTO: 87.8 FL — SIGNIFICANT CHANGE UP (ref 81–99)
MCV RBC AUTO: 87.9 FL — SIGNIFICANT CHANGE UP (ref 80–100)
MCV RBC AUTO: 88.6 FL — SIGNIFICANT CHANGE UP (ref 80–100)
MCV RBC AUTO: 88.6 FL — SIGNIFICANT CHANGE UP (ref 81–99)
MCV RBC AUTO: 89 FL — SIGNIFICANT CHANGE UP (ref 81–99)
MCV RBC AUTO: 89.1 FL — SIGNIFICANT CHANGE UP (ref 80–100)
MCV RBC AUTO: 89.1 FL — SIGNIFICANT CHANGE UP (ref 81–99)
MCV RBC AUTO: 89.3 FL — SIGNIFICANT CHANGE UP (ref 80–100)
MCV RBC AUTO: 89.4 FL — SIGNIFICANT CHANGE UP (ref 81–99)
MCV RBC AUTO: 89.9 FL — SIGNIFICANT CHANGE UP (ref 80–100)
MCV RBC AUTO: 89.9 FL — SIGNIFICANT CHANGE UP (ref 81–99)
MCV RBC AUTO: 90.1 FL — SIGNIFICANT CHANGE UP (ref 80–100)
MCV RBC AUTO: 90.3 FL — SIGNIFICANT CHANGE UP (ref 80–100)
MCV RBC AUTO: 90.3 FL — SIGNIFICANT CHANGE UP (ref 80–100)
MCV RBC AUTO: 90.4 FL — SIGNIFICANT CHANGE UP (ref 81–99)
MCV RBC AUTO: 90.5 FL — SIGNIFICANT CHANGE UP (ref 80–100)
MONOCYTES # BLD AUTO: 0.48 K/UL — SIGNIFICANT CHANGE UP (ref 0.1–0.6)
MONOCYTES # BLD AUTO: 0.65 K/UL — HIGH (ref 0.1–0.6)
MONOCYTES # BLD AUTO: 0.66 K/UL — HIGH (ref 0.1–0.6)
MONOCYTES # BLD AUTO: 0.69 K/UL — HIGH (ref 0.1–0.6)
MONOCYTES # BLD AUTO: 0.75 K/UL — HIGH (ref 0.1–0.6)
MONOCYTES # BLD AUTO: 0.86 K/UL — SIGNIFICANT CHANGE UP (ref 0–0.9)
MONOCYTES # BLD AUTO: 0.88 K/UL — HIGH (ref 0.1–0.6)
MONOCYTES # BLD AUTO: 0.92 K/UL — HIGH (ref 0–0.9)
MONOCYTES NFR BLD AUTO: 5.4 % — SIGNIFICANT CHANGE UP (ref 1.7–9.3)
MONOCYTES NFR BLD AUTO: 5.8 % — SIGNIFICANT CHANGE UP (ref 1.7–9.3)
MONOCYTES NFR BLD AUTO: 5.9 % — SIGNIFICANT CHANGE UP (ref 1.7–9.3)
MONOCYTES NFR BLD AUTO: 5.9 % — SIGNIFICANT CHANGE UP (ref 2–14)
MONOCYTES NFR BLD AUTO: 5.9 % — SIGNIFICANT CHANGE UP (ref 2–14)
MONOCYTES NFR BLD AUTO: 6.4 % — SIGNIFICANT CHANGE UP (ref 1.7–9.3)
MONOCYTES NFR BLD AUTO: 6.7 % — SIGNIFICANT CHANGE UP (ref 1.7–9.3)
MONOCYTES NFR BLD AUTO: 6.9 % — SIGNIFICANT CHANGE UP (ref 1.7–9.3)
MONOCYTES NFR BLD AUTO: 7.3 % — SIGNIFICANT CHANGE UP (ref 1.7–9.3)
MONOCYTES NFR BLD AUTO: 7.5 % — SIGNIFICANT CHANGE UP (ref 1.7–9.3)
MONOCYTES NFR BLD AUTO: 7.6 % — SIGNIFICANT CHANGE UP (ref 1.7–9.3)
MONOCYTES NFR BLD AUTO: 8 % — SIGNIFICANT CHANGE UP (ref 1.7–9.3)
NEUTROPHILS # BLD AUTO: 11.55 K/UL — HIGH (ref 1.8–7.4)
NEUTROPHILS # BLD AUTO: 12 K/UL — HIGH (ref 1.8–7.4)
NEUTROPHILS # BLD AUTO: 4.94 K/UL — SIGNIFICANT CHANGE UP (ref 1.4–6.5)
NEUTROPHILS # BLD AUTO: 5.75 K/UL — SIGNIFICANT CHANGE UP (ref 1.4–6.5)
NEUTROPHILS # BLD AUTO: 5.77 K/UL — SIGNIFICANT CHANGE UP (ref 1.4–6.5)
NEUTROPHILS # BLD AUTO: 5.89 K/UL — SIGNIFICANT CHANGE UP (ref 1.4–6.5)
NEUTROPHILS # BLD AUTO: 6.05 K/UL — SIGNIFICANT CHANGE UP (ref 1.4–6.5)
NEUTROPHILS # BLD AUTO: 6.39 K/UL — SIGNIFICANT CHANGE UP (ref 1.4–6.5)
NEUTROPHILS # BLD AUTO: 6.71 K/UL — HIGH (ref 1.4–6.5)
NEUTROPHILS # BLD AUTO: 8.48 K/UL — HIGH (ref 1.4–6.5)
NEUTROPHILS # BLD AUTO: 8.8 K/UL — HIGH (ref 1.4–6.5)
NEUTROPHILS # BLD AUTO: 9.17 K/UL — HIGH (ref 1.4–6.5)
NEUTROPHILS NFR BLD AUTO: 54.1 % — SIGNIFICANT CHANGE UP (ref 42.2–75.2)
NEUTROPHILS NFR BLD AUTO: 56.5 % — SIGNIFICANT CHANGE UP (ref 42.2–75.2)
NEUTROPHILS NFR BLD AUTO: 61.3 % — SIGNIFICANT CHANGE UP (ref 42.2–75.2)
NEUTROPHILS NFR BLD AUTO: 62 % — SIGNIFICANT CHANGE UP (ref 42.2–75.2)
NEUTROPHILS NFR BLD AUTO: 63.6 % — SIGNIFICANT CHANGE UP (ref 42.2–75.2)
NEUTROPHILS NFR BLD AUTO: 68.1 % — SIGNIFICANT CHANGE UP (ref 42.2–75.2)
NEUTROPHILS NFR BLD AUTO: 68.5 % — SIGNIFICANT CHANGE UP (ref 42.2–75.2)
NEUTROPHILS NFR BLD AUTO: 72.3 % — SIGNIFICANT CHANGE UP (ref 42.2–75.2)
NEUTROPHILS NFR BLD AUTO: 76.2 % — HIGH (ref 42.2–75.2)
NEUTROPHILS NFR BLD AUTO: 77 % — SIGNIFICANT CHANGE UP (ref 43–77)
NEUTROPHILS NFR BLD AUTO: 77.9 % — HIGH (ref 42.2–75.2)
NEUTROPHILS NFR BLD AUTO: 79 % — HIGH (ref 43–77)
NRBC # BLD: 0 /100 WBCS — SIGNIFICANT CHANGE UP (ref 0–0)
NT-PROBNP SERPL-SCNC: 3647 PG/ML — HIGH (ref 0–450)
NT-PROBNP SERPL-SCNC: 9034 PG/ML — HIGH (ref 0–300)
PHOSPHATE SERPL-MCNC: 2.6 MG/DL — SIGNIFICANT CHANGE UP (ref 2.5–4.5)
PHOSPHATE SERPL-MCNC: 3 MG/DL — SIGNIFICANT CHANGE UP (ref 2.5–4.5)
PLATELET # BLD AUTO: 283 K/UL — SIGNIFICANT CHANGE UP (ref 130–400)
PLATELET # BLD AUTO: 316 K/UL — SIGNIFICANT CHANGE UP (ref 130–400)
PLATELET # BLD AUTO: 319 K/UL — SIGNIFICANT CHANGE UP (ref 130–400)
PLATELET # BLD AUTO: 326 K/UL — SIGNIFICANT CHANGE UP (ref 130–400)
PLATELET # BLD AUTO: 332 K/UL — SIGNIFICANT CHANGE UP (ref 150–400)
PLATELET # BLD AUTO: 334 K/UL — SIGNIFICANT CHANGE UP (ref 130–400)
PLATELET # BLD AUTO: 347 K/UL — SIGNIFICANT CHANGE UP (ref 130–400)
PLATELET # BLD AUTO: 349 K/UL — SIGNIFICANT CHANGE UP (ref 150–400)
PLATELET # BLD AUTO: 351 K/UL — SIGNIFICANT CHANGE UP (ref 150–400)
PLATELET # BLD AUTO: 367 K/UL — SIGNIFICANT CHANGE UP (ref 150–400)
PLATELET # BLD AUTO: 371 K/UL — SIGNIFICANT CHANGE UP (ref 130–400)
PLATELET # BLD AUTO: 377 K/UL — SIGNIFICANT CHANGE UP (ref 130–400)
PLATELET # BLD AUTO: 380 K/UL — SIGNIFICANT CHANGE UP (ref 130–400)
PLATELET # BLD AUTO: 383 K/UL — SIGNIFICANT CHANGE UP (ref 150–400)
PLATELET # BLD AUTO: 391 K/UL — SIGNIFICANT CHANGE UP (ref 130–400)
PLATELET # BLD AUTO: 391 K/UL — SIGNIFICANT CHANGE UP (ref 150–400)
PLATELET # BLD AUTO: 417 K/UL — HIGH (ref 150–400)
PLATELET # BLD AUTO: 420 K/UL — HIGH (ref 130–400)
PLATELET # BLD AUTO: 458 K/UL — HIGH (ref 150–400)
PLATELET # BLD AUTO: 459 K/UL — HIGH (ref 150–400)
PLATELET # BLD AUTO: 473 K/UL — HIGH (ref 150–400)
PLATELET # BLD AUTO: 499 K/UL — HIGH (ref 150–400)
PLATELET # BLD AUTO: 517 K/UL — HIGH (ref 150–400)
PLATELET # BLD AUTO: 534 K/UL — HIGH (ref 150–400)
POTASSIUM SERPL-MCNC: 3.5 MMOL/L — SIGNIFICANT CHANGE UP (ref 3.5–5)
POTASSIUM SERPL-MCNC: 3.5 MMOL/L — SIGNIFICANT CHANGE UP (ref 3.5–5.3)
POTASSIUM SERPL-MCNC: 3.6 MMOL/L — SIGNIFICANT CHANGE UP (ref 3.5–5.3)
POTASSIUM SERPL-MCNC: 3.7 MMOL/L — SIGNIFICANT CHANGE UP (ref 3.5–5)
POTASSIUM SERPL-MCNC: 3.7 MMOL/L — SIGNIFICANT CHANGE UP (ref 3.5–5.3)
POTASSIUM SERPL-MCNC: 3.8 MMOL/L — SIGNIFICANT CHANGE UP (ref 3.5–5.3)
POTASSIUM SERPL-MCNC: 3.9 MMOL/L — SIGNIFICANT CHANGE UP (ref 3.5–5)
POTASSIUM SERPL-MCNC: 3.9 MMOL/L — SIGNIFICANT CHANGE UP (ref 3.5–5)
POTASSIUM SERPL-MCNC: 3.9 MMOL/L — SIGNIFICANT CHANGE UP (ref 3.5–5.3)
POTASSIUM SERPL-MCNC: 3.9 MMOL/L — SIGNIFICANT CHANGE UP (ref 3.5–5.3)
POTASSIUM SERPL-MCNC: 4 MMOL/L — SIGNIFICANT CHANGE UP (ref 3.5–5.3)
POTASSIUM SERPL-MCNC: 4.1 MMOL/L — SIGNIFICANT CHANGE UP (ref 3.5–5.3)
POTASSIUM SERPL-MCNC: 4.2 MMOL/L — SIGNIFICANT CHANGE UP (ref 3.5–5)
POTASSIUM SERPL-MCNC: 4.2 MMOL/L — SIGNIFICANT CHANGE UP (ref 3.5–5.3)
POTASSIUM SERPL-MCNC: 4.4 MMOL/L — SIGNIFICANT CHANGE UP (ref 3.5–5)
POTASSIUM SERPL-MCNC: 4.6 MMOL/L — SIGNIFICANT CHANGE UP (ref 3.5–5.3)
POTASSIUM SERPL-MCNC: 4.7 MMOL/L — SIGNIFICANT CHANGE UP (ref 3.5–5)
POTASSIUM SERPL-MCNC: 4.8 MMOL/L — SIGNIFICANT CHANGE UP (ref 3.5–5)
POTASSIUM SERPL-MCNC: 4.9 MMOL/L — SIGNIFICANT CHANGE UP (ref 3.5–5)
POTASSIUM SERPL-MCNC: 5.4 MMOL/L — HIGH (ref 3.5–5)
POTASSIUM SERPL-SCNC: 3.5 MMOL/L — SIGNIFICANT CHANGE UP (ref 3.5–5)
POTASSIUM SERPL-SCNC: 3.5 MMOL/L — SIGNIFICANT CHANGE UP (ref 3.5–5.3)
POTASSIUM SERPL-SCNC: 3.6 MMOL/L — SIGNIFICANT CHANGE UP (ref 3.5–5.3)
POTASSIUM SERPL-SCNC: 3.7 MMOL/L — SIGNIFICANT CHANGE UP (ref 3.5–5)
POTASSIUM SERPL-SCNC: 3.7 MMOL/L — SIGNIFICANT CHANGE UP (ref 3.5–5.3)
POTASSIUM SERPL-SCNC: 3.8 MMOL/L — SIGNIFICANT CHANGE UP (ref 3.5–5.3)
POTASSIUM SERPL-SCNC: 3.9 MMOL/L — SIGNIFICANT CHANGE UP (ref 3.5–5)
POTASSIUM SERPL-SCNC: 3.9 MMOL/L — SIGNIFICANT CHANGE UP (ref 3.5–5)
POTASSIUM SERPL-SCNC: 3.9 MMOL/L — SIGNIFICANT CHANGE UP (ref 3.5–5.3)
POTASSIUM SERPL-SCNC: 3.9 MMOL/L — SIGNIFICANT CHANGE UP (ref 3.5–5.3)
POTASSIUM SERPL-SCNC: 4 MMOL/L — SIGNIFICANT CHANGE UP (ref 3.5–5.3)
POTASSIUM SERPL-SCNC: 4.1 MMOL/L — SIGNIFICANT CHANGE UP (ref 3.5–5.3)
POTASSIUM SERPL-SCNC: 4.2 MMOL/L — SIGNIFICANT CHANGE UP (ref 3.5–5)
POTASSIUM SERPL-SCNC: 4.2 MMOL/L — SIGNIFICANT CHANGE UP (ref 3.5–5.3)
POTASSIUM SERPL-SCNC: 4.4 MMOL/L — SIGNIFICANT CHANGE UP (ref 3.5–5)
POTASSIUM SERPL-SCNC: 4.6 MMOL/L — SIGNIFICANT CHANGE UP (ref 3.5–5.3)
POTASSIUM SERPL-SCNC: 4.7 MMOL/L — SIGNIFICANT CHANGE UP (ref 3.5–5)
POTASSIUM SERPL-SCNC: 4.8 MMOL/L — SIGNIFICANT CHANGE UP (ref 3.5–5)
POTASSIUM SERPL-SCNC: 4.9 MMOL/L — SIGNIFICANT CHANGE UP (ref 3.5–5)
POTASSIUM SERPL-SCNC: 5.4 MMOL/L — HIGH (ref 3.5–5)
PROT SERPL-MCNC: 6.1 G/DL — SIGNIFICANT CHANGE UP (ref 6–8)
PROT SERPL-MCNC: 6.3 G/DL — SIGNIFICANT CHANGE UP (ref 6–8)
PROT SERPL-MCNC: 6.8 G/DL — SIGNIFICANT CHANGE UP (ref 6–8)
PROT SERPL-MCNC: 7 G/DL — SIGNIFICANT CHANGE UP (ref 6–8)
PROT SERPL-MCNC: 7.1 G/DL — SIGNIFICANT CHANGE UP (ref 6–8)
PROT SERPL-MCNC: 7.2 G/DL — SIGNIFICANT CHANGE UP (ref 6–8)
PROT SERPL-MCNC: 7.3 G/DL — SIGNIFICANT CHANGE UP (ref 6–8)
PROT SERPL-MCNC: 7.3 G/DL — SIGNIFICANT CHANGE UP (ref 6–8.3)
PROT SERPL-MCNC: 7.5 G/DL — SIGNIFICANT CHANGE UP (ref 6–8)
PROT SERPL-MCNC: 7.5 G/DL — SIGNIFICANT CHANGE UP (ref 6–8.3)
PROT SERPL-MCNC: 7.7 G/DL — SIGNIFICANT CHANGE UP (ref 6–8)
PROT SERPL-MCNC: 8.1 G/DL — SIGNIFICANT CHANGE UP (ref 6–8.3)
PROTHROM AB SERPL-ACNC: 12.2 SEC — SIGNIFICANT CHANGE UP (ref 9.95–12.87)
PROTHROM AB SERPL-ACNC: 12.4 SEC — SIGNIFICANT CHANGE UP (ref 9.95–12.87)
PROTHROM AB SERPL-ACNC: 13.1 SEC — SIGNIFICANT CHANGE UP (ref 10.6–13.6)
PROTHROM AB SERPL-ACNC: 13.9 SEC — HIGH (ref 10.6–13.6)
PROTHROM AB SERPL-ACNC: 14.6 SEC — HIGH (ref 10.6–13.6)
RBC # BLD: 2.85 M/UL — LOW (ref 3.8–5.2)
RBC # BLD: 2.98 M/UL — LOW (ref 3.8–5.2)
RBC # BLD: 3.01 M/UL — LOW (ref 3.8–5.2)
RBC # BLD: 3.05 M/UL — LOW (ref 3.8–5.2)
RBC # BLD: 3.05 M/UL — LOW (ref 3.8–5.2)
RBC # BLD: 3.09 M/UL — LOW (ref 3.8–5.2)
RBC # BLD: 3.11 M/UL — LOW (ref 3.8–5.2)
RBC # BLD: 3.12 M/UL — LOW (ref 3.8–5.2)
RBC # BLD: 3.13 M/UL — LOW (ref 3.8–5.2)
RBC # BLD: 3.16 M/UL — LOW (ref 3.8–5.2)
RBC # BLD: 3.21 M/UL — LOW (ref 3.8–5.2)
RBC # BLD: 3.26 M/UL — LOW (ref 3.8–5.2)
RBC # BLD: 3.28 M/UL — LOW (ref 4.2–5.4)
RBC # BLD: 3.37 M/UL — LOW (ref 4.2–5.4)
RBC # BLD: 3.39 M/UL — LOW (ref 4.2–5.4)
RBC # BLD: 3.44 M/UL — LOW (ref 4.2–5.4)
RBC # BLD: 3.45 M/UL — LOW (ref 4.2–5.4)
RBC # BLD: 3.45 M/UL — LOW (ref 4.2–5.4)
RBC # BLD: 3.48 M/UL — LOW (ref 3.8–5.2)
RBC # BLD: 3.48 M/UL — LOW (ref 4.2–5.4)
RBC # BLD: 3.69 M/UL — LOW (ref 4.2–5.4)
RBC # BLD: 4.31 M/UL — SIGNIFICANT CHANGE UP (ref 4.2–5.4)
RBC # FLD: 13.6 % — SIGNIFICANT CHANGE UP (ref 11.5–14.5)
RBC # FLD: 13.9 % — SIGNIFICANT CHANGE UP (ref 11.5–14.5)
RBC # FLD: 14 % — SIGNIFICANT CHANGE UP (ref 11.5–14.5)
RBC # FLD: 14.1 % — SIGNIFICANT CHANGE UP (ref 11.5–14.5)
RBC # FLD: 14.2 % — SIGNIFICANT CHANGE UP (ref 10.3–14.5)
RBC # FLD: 14.2 % — SIGNIFICANT CHANGE UP (ref 10.3–14.5)
RBC # FLD: 14.3 % — SIGNIFICANT CHANGE UP (ref 10.3–14.5)
RBC # FLD: 14.3 % — SIGNIFICANT CHANGE UP (ref 11.5–14.5)
RBC # FLD: 14.4 % — SIGNIFICANT CHANGE UP (ref 10.3–14.5)
RBC # FLD: 14.5 % — SIGNIFICANT CHANGE UP (ref 10.3–14.5)
RBC # FLD: 14.5 % — SIGNIFICANT CHANGE UP (ref 11.5–14.5)
RBC # FLD: 14.6 % — HIGH (ref 10.3–14.5)
RBC # FLD: 14.6 % — HIGH (ref 11.5–14.5)
RBC # FLD: 14.6 % — HIGH (ref 11.5–14.5)
RBC # FLD: 14.7 % — HIGH (ref 11.5–14.5)
SARS-COV-2 IGG+IGM SERPL QL IA: 171 INDEX — HIGH
SARS-COV-2 IGG+IGM SERPL QL IA: 188 INDEX — HIGH
SARS-COV-2 IGG+IGM SERPL QL IA: >250 U/ML — HIGH
SARS-COV-2 IGG+IGM SERPL QL IA: >250 U/ML — HIGH
SARS-COV-2 IGG+IGM SERPL QL IA: POSITIVE
SARS-COV-2 RNA SPEC QL NAA+PROBE: SIGNIFICANT CHANGE UP
SODIUM SERPL-SCNC: 131 MMOL/L — LOW (ref 135–145)
SODIUM SERPL-SCNC: 132 MMOL/L — LOW (ref 135–145)
SODIUM SERPL-SCNC: 133 MMOL/L — LOW (ref 135–146)
SODIUM SERPL-SCNC: 134 MMOL/L — LOW (ref 135–145)
SODIUM SERPL-SCNC: 135 MMOL/L — SIGNIFICANT CHANGE UP (ref 135–145)
SODIUM SERPL-SCNC: 135 MMOL/L — SIGNIFICANT CHANGE UP (ref 135–145)
SODIUM SERPL-SCNC: 135 MMOL/L — SIGNIFICANT CHANGE UP (ref 135–146)
SODIUM SERPL-SCNC: 136 MMOL/L — SIGNIFICANT CHANGE UP (ref 135–146)
SODIUM SERPL-SCNC: 137 MMOL/L — SIGNIFICANT CHANGE UP (ref 135–146)
SODIUM SERPL-SCNC: 138 MMOL/L — SIGNIFICANT CHANGE UP (ref 135–146)
SODIUM SERPL-SCNC: 138 MMOL/L — SIGNIFICANT CHANGE UP (ref 135–146)
SODIUM SERPL-SCNC: 139 MMOL/L — SIGNIFICANT CHANGE UP (ref 135–146)
SODIUM SERPL-SCNC: 140 MMOL/L — SIGNIFICANT CHANGE UP (ref 135–146)
SURGICAL PATHOLOGY STUDY: SIGNIFICANT CHANGE UP
TIBC SERPL-MCNC: 213 UG/DL — LOW (ref 220–430)
TROPONIN I, HIGH SENSITIVITY RESULT: 818 NG/L — HIGH
TROPONIN T, HIGH SENSITIVITY RESULT: 187 NG/L — HIGH (ref 0–51)
TROPONIN T, HIGH SENSITIVITY RESULT: 203 NG/L — HIGH (ref 0–51)
TROPONIN T, HIGH SENSITIVITY RESULT: 334 NG/L — HIGH (ref 0–51)
TROPONIN T, HIGH SENSITIVITY RESULT: 480 NG/L — HIGH (ref 0–51)
TROPONIN T, HIGH SENSITIVITY RESULT: 489 NG/L — HIGH (ref 0–51)
TROPONIN T, HIGH SENSITIVITY RESULT: 502 NG/L — HIGH (ref 0–51)
TROPONIN T, HIGH SENSITIVITY RESULT: 661 NG/L — HIGH (ref 0–51)
UIBC SERPL-MCNC: 172 UG/DL — SIGNIFICANT CHANGE UP (ref 110–370)
WBC # BLD: 10.2 K/UL — SIGNIFICANT CHANGE UP (ref 3.8–10.5)
WBC # BLD: 10.31 K/UL — SIGNIFICANT CHANGE UP (ref 4.8–10.8)
WBC # BLD: 10.7 K/UL — SIGNIFICANT CHANGE UP (ref 4.8–10.8)
WBC # BLD: 10.96 K/UL — HIGH (ref 3.8–10.5)
WBC # BLD: 10.97 K/UL — HIGH (ref 4.8–10.8)
WBC # BLD: 11.75 K/UL — HIGH (ref 4.8–10.8)
WBC # BLD: 11.8 K/UL — HIGH (ref 4.8–10.8)
WBC # BLD: 11.85 K/UL — HIGH (ref 3.8–10.5)
WBC # BLD: 12.34 K/UL — HIGH (ref 3.8–10.5)
WBC # BLD: 12.84 K/UL — HIGH (ref 4.8–10.8)
WBC # BLD: 12.93 K/UL — HIGH (ref 3.8–10.5)
WBC # BLD: 13.4 K/UL — HIGH (ref 3.8–10.5)
WBC # BLD: 13.62 K/UL — HIGH (ref 3.8–10.5)
WBC # BLD: 14 K/UL — HIGH (ref 3.8–10.5)
WBC # BLD: 14.51 K/UL — HIGH (ref 3.8–10.5)
WBC # BLD: 14.62 K/UL — HIGH (ref 3.8–10.5)
WBC # BLD: 14.96 K/UL — HIGH (ref 3.8–10.5)
WBC # BLD: 15.57 K/UL — HIGH (ref 3.8–10.5)
WBC # BLD: 8.64 K/UL — SIGNIFICANT CHANGE UP (ref 4.8–10.8)
WBC # BLD: 8.81 K/UL — SIGNIFICANT CHANGE UP (ref 4.8–10.8)
WBC # BLD: 9.05 K/UL — SIGNIFICANT CHANGE UP (ref 4.8–10.8)
WBC # BLD: 9.13 K/UL — SIGNIFICANT CHANGE UP (ref 4.8–10.8)
WBC # BLD: 9.41 K/UL — SIGNIFICANT CHANGE UP (ref 4.8–10.8)
WBC # BLD: 9.91 K/UL — SIGNIFICANT CHANGE UP (ref 3.8–10.5)
WBC # FLD AUTO: 10.2 K/UL — SIGNIFICANT CHANGE UP (ref 3.8–10.5)
WBC # FLD AUTO: 10.31 K/UL — SIGNIFICANT CHANGE UP (ref 4.8–10.8)
WBC # FLD AUTO: 10.7 K/UL — SIGNIFICANT CHANGE UP (ref 4.8–10.8)
WBC # FLD AUTO: 10.96 K/UL — HIGH (ref 3.8–10.5)
WBC # FLD AUTO: 10.97 K/UL — HIGH (ref 4.8–10.8)
WBC # FLD AUTO: 11.75 K/UL — HIGH (ref 4.8–10.8)
WBC # FLD AUTO: 11.8 K/UL — HIGH (ref 4.8–10.8)
WBC # FLD AUTO: 11.85 K/UL — HIGH (ref 3.8–10.5)
WBC # FLD AUTO: 12.34 K/UL — HIGH (ref 3.8–10.5)
WBC # FLD AUTO: 12.84 K/UL — HIGH (ref 4.8–10.8)
WBC # FLD AUTO: 12.93 K/UL — HIGH (ref 3.8–10.5)
WBC # FLD AUTO: 13.4 K/UL — HIGH (ref 3.8–10.5)
WBC # FLD AUTO: 13.62 K/UL — HIGH (ref 3.8–10.5)
WBC # FLD AUTO: 14 K/UL — HIGH (ref 3.8–10.5)
WBC # FLD AUTO: 14.51 K/UL — HIGH (ref 3.8–10.5)
WBC # FLD AUTO: 14.62 K/UL — HIGH (ref 3.8–10.5)
WBC # FLD AUTO: 14.96 K/UL — HIGH (ref 3.8–10.5)
WBC # FLD AUTO: 15.57 K/UL — HIGH (ref 3.8–10.5)
WBC # FLD AUTO: 8.64 K/UL — SIGNIFICANT CHANGE UP (ref 4.8–10.8)
WBC # FLD AUTO: 8.81 K/UL — SIGNIFICANT CHANGE UP (ref 4.8–10.8)
WBC # FLD AUTO: 9.05 K/UL — SIGNIFICANT CHANGE UP (ref 4.8–10.8)
WBC # FLD AUTO: 9.13 K/UL — SIGNIFICANT CHANGE UP (ref 4.8–10.8)
WBC # FLD AUTO: 9.41 K/UL — SIGNIFICANT CHANGE UP (ref 4.8–10.8)
WBC # FLD AUTO: 9.91 K/UL — SIGNIFICANT CHANGE UP (ref 3.8–10.5)

## 2021-01-01 PROCEDURE — 99285 EMERGENCY DEPT VISIT HI MDM: CPT | Mod: 25

## 2021-01-01 PROCEDURE — 85730 THROMBOPLASTIN TIME PARTIAL: CPT

## 2021-01-01 PROCEDURE — 99232 SBSQ HOSP IP/OBS MODERATE 35: CPT

## 2021-01-01 PROCEDURE — 97161 PT EVAL LOW COMPLEX 20 MIN: CPT

## 2021-01-01 PROCEDURE — 73630 X-RAY EXAM OF FOOT: CPT | Mod: 26,LT

## 2021-01-01 PROCEDURE — 71045 X-RAY EXAM CHEST 1 VIEW: CPT

## 2021-01-01 PROCEDURE — 88304 TISSUE EXAM BY PATHOLOGIST: CPT | Mod: 26

## 2021-01-01 PROCEDURE — 99233 SBSQ HOSP IP/OBS HIGH 50: CPT

## 2021-01-01 PROCEDURE — 36415 COLL VENOUS BLD VENIPUNCTURE: CPT

## 2021-01-01 PROCEDURE — 99053 MED SERV 10PM-8AM 24 HR FAC: CPT

## 2021-01-01 PROCEDURE — U0003: CPT

## 2021-01-01 PROCEDURE — 97116 GAIT TRAINING THERAPY: CPT

## 2021-01-01 PROCEDURE — 96375 TX/PRO/DX INJ NEW DRUG ADDON: CPT

## 2021-01-01 PROCEDURE — 96374 THER/PROPH/DIAG INJ IV PUSH: CPT

## 2021-01-01 PROCEDURE — 99214 OFFICE O/P EST MOD 30 MIN: CPT

## 2021-01-01 PROCEDURE — 83036 HEMOGLOBIN GLYCOSYLATED A1C: CPT

## 2021-01-01 PROCEDURE — 87635 SARS-COV-2 COVID-19 AMP PRB: CPT

## 2021-01-01 PROCEDURE — 93925 LOWER EXTREMITY STUDY: CPT | Mod: 26

## 2021-01-01 PROCEDURE — 93005 ELECTROCARDIOGRAM TRACING: CPT

## 2021-01-01 PROCEDURE — 88311 DECALCIFY TISSUE: CPT | Mod: 26

## 2021-01-01 PROCEDURE — 97530 THERAPEUTIC ACTIVITIES: CPT

## 2021-01-01 PROCEDURE — 99221 1ST HOSP IP/OBS SF/LOW 40: CPT

## 2021-01-01 PROCEDURE — 96374 THER/PROPH/DIAG INJ IV PUSH: CPT | Mod: XU

## 2021-01-01 PROCEDURE — 73630 X-RAY EXAM OF FOOT: CPT

## 2021-01-01 PROCEDURE — 99222 1ST HOSP IP/OBS MODERATE 55: CPT

## 2021-01-01 PROCEDURE — 86803 HEPATITIS C AB TEST: CPT

## 2021-01-01 PROCEDURE — 37211 THROMBOLYTIC ART THERAPY: CPT | Mod: 59

## 2021-01-01 PROCEDURE — 93923 UPR/LXTR ART STDY 3+ LVLS: CPT | Mod: 26

## 2021-01-01 PROCEDURE — 71275 CT ANGIOGRAPHY CHEST: CPT | Mod: MA

## 2021-01-01 PROCEDURE — 85025 COMPLETE CBC W/AUTO DIFF WBC: CPT

## 2021-01-01 PROCEDURE — 70450 CT HEAD/BRAIN W/O DYE: CPT

## 2021-01-01 PROCEDURE — 85610 PROTHROMBIN TIME: CPT

## 2021-01-01 PROCEDURE — 93926 LOWER EXTREMITY STUDY: CPT

## 2021-01-01 PROCEDURE — 84484 ASSAY OF TROPONIN QUANT: CPT

## 2021-01-01 PROCEDURE — 37184 PRIM ART M-THRMBC 1ST VSL: CPT | Mod: 59

## 2021-01-01 PROCEDURE — 82550 ASSAY OF CK (CPK): CPT

## 2021-01-01 PROCEDURE — 99239 HOSP IP/OBS DSCHRG MGMT >30: CPT

## 2021-01-01 PROCEDURE — 80053 COMPREHEN METABOLIC PANEL: CPT

## 2021-01-01 PROCEDURE — 99232 SBSQ HOSP IP/OBS MODERATE 35: CPT | Mod: GC

## 2021-01-01 PROCEDURE — 36246 INS CATH ABD/L-EXT ART 2ND: CPT | Mod: 59

## 2021-01-01 PROCEDURE — 93971 EXTREMITY STUDY: CPT | Mod: 26,RT

## 2021-01-01 PROCEDURE — 52000 CYSTOURETHROSCOPY: CPT

## 2021-01-01 PROCEDURE — 83550 IRON BINDING TEST: CPT

## 2021-01-01 PROCEDURE — 99291 CRITICAL CARE FIRST HOUR: CPT

## 2021-01-01 PROCEDURE — 80048 BASIC METABOLIC PNL TOTAL CA: CPT

## 2021-01-01 PROCEDURE — C8929: CPT

## 2021-01-01 PROCEDURE — 83735 ASSAY OF MAGNESIUM: CPT

## 2021-01-01 PROCEDURE — 82728 ASSAY OF FERRITIN: CPT

## 2021-01-01 PROCEDURE — 93010 ELECTROCARDIOGRAM REPORT: CPT

## 2021-01-01 PROCEDURE — 99223 1ST HOSP IP/OBS HIGH 75: CPT

## 2021-01-01 PROCEDURE — 93971 EXTREMITY STUDY: CPT

## 2021-01-01 PROCEDURE — 84100 ASSAY OF PHOSPHORUS: CPT

## 2021-01-01 PROCEDURE — 71045 X-RAY EXAM CHEST 1 VIEW: CPT | Mod: 26

## 2021-01-01 PROCEDURE — 85027 COMPLETE CBC AUTOMATED: CPT

## 2021-01-01 PROCEDURE — 94640 AIRWAY INHALATION TREATMENT: CPT

## 2021-01-01 PROCEDURE — 93010 ELECTROCARDIOGRAM REPORT: CPT | Mod: 76

## 2021-01-01 PROCEDURE — 83880 ASSAY OF NATRIURETIC PEPTIDE: CPT

## 2021-01-01 PROCEDURE — 37229: CPT | Mod: LT

## 2021-01-01 PROCEDURE — 75710 ARTERY X-RAYS ARM/LEG: CPT | Mod: 26,XU

## 2021-01-01 PROCEDURE — 82962 GLUCOSE BLOOD TEST: CPT

## 2021-01-01 PROCEDURE — 37227: CPT | Mod: LT

## 2021-01-01 PROCEDURE — 99291 CRITICAL CARE FIRST HOUR: CPT | Mod: 25

## 2021-01-01 PROCEDURE — 93306 TTE W/DOPPLER COMPLETE: CPT | Mod: 26

## 2021-01-01 PROCEDURE — 12345: CPT | Mod: NC

## 2021-01-01 PROCEDURE — 99285 EMERGENCY DEPT VISIT HI MDM: CPT

## 2021-01-01 PROCEDURE — 86769 SARS-COV-2 COVID-19 ANTIBODY: CPT

## 2021-01-01 PROCEDURE — U0005: CPT

## 2021-01-01 PROCEDURE — 82553 CREATINE MB FRACTION: CPT

## 2021-01-01 PROCEDURE — 70450 CT HEAD/BRAIN W/O DYE: CPT | Mod: 26

## 2021-01-01 PROCEDURE — 83540 ASSAY OF IRON: CPT

## 2021-01-01 PROCEDURE — 71275 CT ANGIOGRAPHY CHEST: CPT | Mod: 26,MA

## 2021-01-01 RX ORDER — SODIUM CHLORIDE 9 MG/ML
1000 INJECTION, SOLUTION INTRAVENOUS
Refills: 0 | Status: DISCONTINUED | OUTPATIENT
Start: 2021-01-01 | End: 2021-01-01

## 2021-01-01 RX ORDER — AMLODIPINE BESYLATE 2.5 MG/1
5 TABLET ORAL DAILY
Refills: 0 | Status: DISCONTINUED | OUTPATIENT
Start: 2021-01-01 | End: 2021-01-01

## 2021-01-01 RX ORDER — OXYCODONE AND ACETAMINOPHEN 5; 325 MG/1; MG/1
1 TABLET ORAL EVERY 4 HOURS
Refills: 0 | Status: DISCONTINUED | OUTPATIENT
Start: 2021-01-01 | End: 2021-01-01

## 2021-01-01 RX ORDER — APIXABAN 2.5 MG/1
5 TABLET, FILM COATED ORAL EVERY 12 HOURS
Refills: 0 | Status: DISCONTINUED | OUTPATIENT
Start: 2021-01-01 | End: 2021-01-01

## 2021-01-01 RX ORDER — SODIUM CHLORIDE 0.65 %
1 AEROSOL, SPRAY (ML) NASAL
Refills: 0 | Status: DISCONTINUED | OUTPATIENT
Start: 2021-01-01 | End: 2021-01-01

## 2021-01-01 RX ORDER — HYDRALAZINE HCL 50 MG
1 TABLET ORAL
Qty: 0 | Refills: 0 | DISCHARGE

## 2021-01-01 RX ORDER — LORATADINE 10 MG/1
10 TABLET ORAL DAILY
Refills: 0 | Status: DISCONTINUED | OUTPATIENT
Start: 2021-01-01 | End: 2021-01-01

## 2021-01-01 RX ORDER — COLLAGENASE CLOSTRIDIUM HIST. 250 UNIT/G
1 OINTMENT (GRAM) TOPICAL
Refills: 0 | Status: DISCONTINUED | OUTPATIENT
Start: 2021-01-01 | End: 2021-01-01

## 2021-01-01 RX ORDER — METOPROLOL TARTRATE 50 MG
100 TABLET ORAL DAILY
Refills: 0 | Status: DISCONTINUED | OUTPATIENT
Start: 2021-01-01 | End: 2021-01-01

## 2021-01-01 RX ORDER — FEXOFENADINE HCL 30 MG
1 TABLET ORAL
Qty: 0 | Refills: 0 | DISCHARGE

## 2021-01-01 RX ORDER — HEPARIN SODIUM 5000 [USP'U]/ML
5000 INJECTION INTRAVENOUS; SUBCUTANEOUS EVERY 8 HOURS
Refills: 0 | Status: DISCONTINUED | OUTPATIENT
Start: 2021-01-01 | End: 2021-01-01

## 2021-01-01 RX ORDER — SENNA PLUS 8.6 MG/1
2 TABLET ORAL AT BEDTIME
Refills: 0 | Status: DISCONTINUED | OUTPATIENT
Start: 2021-01-01 | End: 2021-01-01

## 2021-01-01 RX ORDER — HYDRALAZINE HCL 50 MG
50 TABLET ORAL EVERY 8 HOURS
Refills: 0 | Status: DISCONTINUED | OUTPATIENT
Start: 2021-01-01 | End: 2021-01-01

## 2021-01-01 RX ORDER — FUROSEMIDE 40 MG
1 TABLET ORAL
Qty: 0 | Refills: 0 | DISCHARGE

## 2021-01-01 RX ORDER — INSULIN LISPRO 100/ML
8 VIAL (ML) SUBCUTANEOUS
Refills: 0 | Status: DISCONTINUED | OUTPATIENT
Start: 2021-01-01 | End: 2021-01-01

## 2021-01-01 RX ORDER — FUROSEMIDE 40 MG
20 TABLET ORAL DAILY
Refills: 0 | Status: DISCONTINUED | OUTPATIENT
Start: 2021-01-01 | End: 2021-01-01

## 2021-01-01 RX ORDER — ASPIRIN/CALCIUM CARB/MAGNESIUM 324 MG
162 TABLET ORAL ONCE
Refills: 0 | Status: COMPLETED | OUTPATIENT
Start: 2021-01-01 | End: 2021-01-01

## 2021-01-01 RX ORDER — ASPIRIN/CALCIUM CARB/MAGNESIUM 324 MG
1 TABLET ORAL
Qty: 0 | Refills: 0 | DISCHARGE

## 2021-01-01 RX ORDER — HEPARIN SODIUM 5000 [USP'U]/ML
4100 INJECTION INTRAVENOUS; SUBCUTANEOUS EVERY 6 HOURS
Refills: 0 | Status: DISCONTINUED | OUTPATIENT
Start: 2021-01-01 | End: 2021-01-01

## 2021-01-01 RX ORDER — ACETAMINOPHEN 500 MG
650 TABLET ORAL EVERY 6 HOURS
Refills: 0 | Status: DISCONTINUED | OUTPATIENT
Start: 2021-01-01 | End: 2021-01-01

## 2021-01-01 RX ORDER — PANTOPRAZOLE SODIUM 20 MG/1
40 TABLET, DELAYED RELEASE ORAL
Refills: 0 | Status: DISCONTINUED | OUTPATIENT
Start: 2021-01-01 | End: 2021-01-01

## 2021-01-01 RX ORDER — ASPIRIN/CALCIUM CARB/MAGNESIUM 324 MG
325 TABLET ORAL ONCE
Refills: 0 | Status: DISCONTINUED | OUTPATIENT
Start: 2021-01-01 | End: 2021-01-01

## 2021-01-01 RX ORDER — HEPARIN SODIUM 5000 [USP'U]/ML
INJECTION INTRAVENOUS; SUBCUTANEOUS
Qty: 25000 | Refills: 0 | Status: DISCONTINUED | OUTPATIENT
Start: 2021-01-01 | End: 2021-01-01

## 2021-01-01 RX ORDER — ENOXAPARIN SODIUM 100 MG/ML
70 INJECTION SUBCUTANEOUS
Refills: 0 | Status: DISCONTINUED | OUTPATIENT
Start: 2021-01-01 | End: 2021-01-01

## 2021-01-01 RX ORDER — DEXTROSE 50 % IN WATER 50 %
15 SYRINGE (ML) INTRAVENOUS ONCE
Refills: 0 | Status: DISCONTINUED | OUTPATIENT
Start: 2021-01-01 | End: 2021-01-01

## 2021-01-01 RX ORDER — INSULIN LISPRO 100/ML
VIAL (ML) SUBCUTANEOUS
Refills: 0 | Status: DISCONTINUED | OUTPATIENT
Start: 2021-01-01 | End: 2021-01-01

## 2021-01-01 RX ORDER — HYDRALAZINE HCL 50 MG
50 TABLET ORAL ONCE
Refills: 0 | Status: COMPLETED | OUTPATIENT
Start: 2021-01-01 | End: 2021-01-01

## 2021-01-01 RX ORDER — DEXTROSE 50 % IN WATER 50 %
12.5 SYRINGE (ML) INTRAVENOUS ONCE
Refills: 0 | Status: DISCONTINUED | OUTPATIENT
Start: 2021-01-01 | End: 2021-01-01

## 2021-01-01 RX ORDER — ATORVASTATIN CALCIUM 80 MG/1
1 TABLET, FILM COATED ORAL
Qty: 0 | Refills: 0 | DISCHARGE

## 2021-01-01 RX ORDER — FUROSEMIDE 40 MG
40 TABLET ORAL ONCE
Refills: 0 | Status: COMPLETED | OUTPATIENT
Start: 2021-01-01 | End: 2021-01-01

## 2021-01-01 RX ORDER — OXYCODONE AND ACETAMINOPHEN 5; 325 MG/1; MG/1
1 TABLET ORAL ONCE
Refills: 0 | Status: DISCONTINUED | OUTPATIENT
Start: 2021-01-01 | End: 2021-01-01

## 2021-01-01 RX ORDER — METOPROLOL TARTRATE 50 MG
50 TABLET ORAL
Refills: 0 | Status: DISCONTINUED | OUTPATIENT
Start: 2021-01-01 | End: 2021-01-01

## 2021-01-01 RX ORDER — SODIUM CHLORIDE 9 MG/ML
1000 INJECTION INTRAMUSCULAR; INTRAVENOUS; SUBCUTANEOUS
Refills: 0 | Status: DISCONTINUED | OUTPATIENT
Start: 2021-01-01 | End: 2021-01-01

## 2021-01-01 RX ORDER — GLUCAGON INJECTION, SOLUTION 0.5 MG/.1ML
1 INJECTION, SOLUTION SUBCUTANEOUS ONCE
Refills: 0 | Status: DISCONTINUED | OUTPATIENT
Start: 2021-01-01 | End: 2021-01-01

## 2021-01-01 RX ORDER — ASPIRIN/CALCIUM CARB/MAGNESIUM 324 MG
81 TABLET ORAL DAILY
Refills: 0 | Status: DISCONTINUED | OUTPATIENT
Start: 2021-01-01 | End: 2021-01-01

## 2021-01-01 RX ORDER — ATORVASTATIN CALCIUM 80 MG/1
40 TABLET, FILM COATED ORAL AT BEDTIME
Refills: 0 | Status: DISCONTINUED | OUTPATIENT
Start: 2021-01-01 | End: 2021-01-01

## 2021-01-01 RX ORDER — DEXTROSE 50 % IN WATER 50 %
25 SYRINGE (ML) INTRAVENOUS ONCE
Refills: 0 | Status: DISCONTINUED | OUTPATIENT
Start: 2021-01-01 | End: 2021-01-01

## 2021-01-01 RX ORDER — POTASSIUM CHLORIDE 20 MEQ
40 PACKET (EA) ORAL ONCE
Refills: 0 | Status: COMPLETED | OUTPATIENT
Start: 2021-01-01 | End: 2021-01-01

## 2021-01-01 RX ORDER — CLOPIDOGREL BISULFATE 75 MG/1
75 TABLET, FILM COATED ORAL DAILY
Refills: 0 | Status: DISCONTINUED | OUTPATIENT
Start: 2021-01-01 | End: 2021-01-01

## 2021-01-01 RX ORDER — COLLAGENASE CLOSTRIDIUM HIST. 250 UNIT/G
1 OINTMENT (GRAM) TOPICAL
Qty: 0 | Refills: 0 | DISCHARGE
Start: 2021-01-01

## 2021-01-01 RX ORDER — VANCOMYCIN HCL 1 G
1000 VIAL (EA) INTRAVENOUS ONCE
Refills: 0 | Status: COMPLETED | OUTPATIENT
Start: 2021-01-01 | End: 2021-01-01

## 2021-01-01 RX ORDER — FUROSEMIDE 40 MG
60 TABLET ORAL
Refills: 0 | Status: DISCONTINUED | OUTPATIENT
Start: 2021-01-01 | End: 2021-01-01

## 2021-01-01 RX ORDER — POLYETHYLENE GLYCOL 3350 17 G/17G
17 POWDER, FOR SOLUTION ORAL DAILY
Refills: 0 | Status: DISCONTINUED | OUTPATIENT
Start: 2021-01-01 | End: 2021-01-01

## 2021-01-01 RX ORDER — CHLORHEXIDINE GLUCONATE 213 G/1000ML
1 SOLUTION TOPICAL DAILY
Refills: 0 | Status: DISCONTINUED | OUTPATIENT
Start: 2021-01-01 | End: 2021-01-01

## 2021-01-01 RX ORDER — MAGNESIUM SULFATE 500 MG/ML
1 VIAL (ML) INJECTION ONCE
Refills: 0 | Status: COMPLETED | OUTPATIENT
Start: 2021-01-01 | End: 2021-01-01

## 2021-01-01 RX ORDER — CLOPIDOGREL BISULFATE 75 MG/1
1 TABLET, FILM COATED ORAL
Qty: 30 | Refills: 1
Start: 2021-01-01 | End: 2022-01-01

## 2021-01-01 RX ORDER — INSULIN GLARGINE 100 [IU]/ML
16 INJECTION, SOLUTION SUBCUTANEOUS AT BEDTIME
Refills: 0 | Status: DISCONTINUED | OUTPATIENT
Start: 2021-01-01 | End: 2021-01-01

## 2021-01-01 RX ORDER — INSULIN GLARGINE 100 [IU]/ML
20 INJECTION, SOLUTION SUBCUTANEOUS AT BEDTIME
Refills: 0 | Status: DISCONTINUED | OUTPATIENT
Start: 2021-01-01 | End: 2021-01-01

## 2021-01-01 RX ORDER — METOPROLOL TARTRATE 50 MG
50 TABLET ORAL ONCE
Refills: 0 | Status: COMPLETED | OUTPATIENT
Start: 2021-01-01 | End: 2021-01-01

## 2021-01-01 RX ORDER — LORATADINE 10 MG/1
1 TABLET ORAL
Qty: 0 | Refills: 0 | DISCHARGE
Start: 2021-01-01

## 2021-01-01 RX ORDER — INSULIN LISPRO 100/ML
4 VIAL (ML) SUBCUTANEOUS
Refills: 0 | Status: DISCONTINUED | OUTPATIENT
Start: 2021-01-01 | End: 2021-01-01

## 2021-01-01 RX ORDER — POTASSIUM CHLORIDE 20 MEQ
20 PACKET (EA) ORAL ONCE
Refills: 0 | Status: COMPLETED | OUTPATIENT
Start: 2021-01-01 | End: 2021-01-01

## 2021-01-01 RX ORDER — HEPARIN SODIUM 5000 [USP'U]/ML
400 INJECTION INTRAVENOUS; SUBCUTANEOUS
Qty: 25000 | Refills: 0 | Status: DISCONTINUED | OUTPATIENT
Start: 2021-01-01 | End: 2021-01-01

## 2021-01-01 RX ORDER — SENNA PLUS 8.6 MG/1
2 TABLET ORAL
Qty: 0 | Refills: 0 | DISCHARGE
Start: 2021-01-01

## 2021-01-01 RX ORDER — POLYETHYLENE GLYCOL 3350 17 G/17G
17 POWDER, FOR SOLUTION ORAL ONCE
Refills: 0 | Status: COMPLETED | OUTPATIENT
Start: 2021-01-01 | End: 2021-01-01

## 2021-01-01 RX ORDER — FUROSEMIDE 40 MG
40 TABLET ORAL
Refills: 0 | Status: DISCONTINUED | OUTPATIENT
Start: 2021-01-01 | End: 2021-01-01

## 2021-01-01 RX ORDER — ONDANSETRON 8 MG/1
4 TABLET, FILM COATED ORAL ONCE
Refills: 0 | Status: COMPLETED | OUTPATIENT
Start: 2021-01-01 | End: 2021-01-01

## 2021-01-01 RX ORDER — INSULIN LISPRO 100/ML
10 VIAL (ML) SUBCUTANEOUS
Refills: 0 | Status: DISCONTINUED | OUTPATIENT
Start: 2021-01-01 | End: 2021-01-01

## 2021-01-01 RX ORDER — GLIMEPIRIDE 1 MG
1 TABLET ORAL
Qty: 0 | Refills: 0 | DISCHARGE

## 2021-01-01 RX ORDER — ASPIRIN/CALCIUM CARB/MAGNESIUM 324 MG
1 TABLET ORAL
Qty: 30 | Refills: 1
Start: 2021-01-01 | End: 2022-01-01

## 2021-01-01 RX ORDER — FUROSEMIDE 40 MG
40 TABLET ORAL DAILY
Refills: 0 | Status: DISCONTINUED | OUTPATIENT
Start: 2021-01-01 | End: 2021-01-01

## 2021-01-01 RX ORDER — FENTANYL CITRATE 50 UG/ML
50 INJECTION INTRAVENOUS ONCE
Refills: 0 | Status: DISCONTINUED | OUTPATIENT
Start: 2021-01-01 | End: 2021-01-01

## 2021-01-01 RX ORDER — HEPARIN SODIUM 5000 [USP'U]/ML
4100 INJECTION INTRAVENOUS; SUBCUTANEOUS ONCE
Refills: 0 | Status: COMPLETED | OUTPATIENT
Start: 2021-01-01 | End: 2021-01-01

## 2021-01-01 RX ORDER — AMLODIPINE BESYLATE 2.5 MG/1
1 TABLET ORAL
Qty: 0 | Refills: 0 | DISCHARGE

## 2021-01-01 RX ORDER — METOPROLOL TARTRATE 50 MG
100 TABLET ORAL ONCE
Refills: 0 | Status: COMPLETED | OUTPATIENT
Start: 2021-01-01 | End: 2021-01-01

## 2021-01-01 RX ORDER — SODIUM CHLORIDE 0.65 %
1 AEROSOL, SPRAY (ML) NASAL
Qty: 0 | Refills: 0 | DISCHARGE
Start: 2021-01-01

## 2021-01-01 RX ORDER — INSULIN ASPART 100 [IU]/ML
10 INJECTION, SOLUTION SUBCUTANEOUS
Qty: 0 | Refills: 0 | DISCHARGE

## 2021-01-01 RX ORDER — FUROSEMIDE 40 MG
1 TABLET ORAL
Qty: 0 | Refills: 0 | DISCHARGE
Start: 2021-01-01

## 2021-01-01 RX ORDER — NITROGLYCERIN 6.5 MG
0.4 CAPSULE, EXTENDED RELEASE ORAL
Refills: 0 | Status: DISCONTINUED | OUTPATIENT
Start: 2021-01-01 | End: 2021-01-01

## 2021-01-01 RX ORDER — FUROSEMIDE 40 MG
20 TABLET ORAL ONCE
Refills: 0 | Status: COMPLETED | OUTPATIENT
Start: 2021-01-01 | End: 2021-01-01

## 2021-01-01 RX ORDER — METOPROLOL TARTRATE 50 MG
1.5 TABLET ORAL
Qty: 0 | Refills: 0 | DISCHARGE

## 2021-01-01 RX ORDER — ALTEPLASE 100 MG
2.5 KIT INTRAVENOUS ONCE
Refills: 0 | Status: COMPLETED | OUTPATIENT
Start: 2021-01-01 | End: 2021-01-01

## 2021-01-01 RX ORDER — METOPROLOL TARTRATE 50 MG
1 TABLET ORAL
Qty: 0 | Refills: 0 | DISCHARGE
Start: 2021-01-01

## 2021-01-01 RX ORDER — AMLODIPINE BESYLATE 2.5 MG/1
5 TABLET ORAL EVERY 24 HOURS
Refills: 0 | Status: DISCONTINUED | OUTPATIENT
Start: 2021-01-01 | End: 2021-01-01

## 2021-01-01 RX ORDER — INSULIN LISPRO 100/ML
5 VIAL (ML) SUBCUTANEOUS
Refills: 0 | Status: DISCONTINUED | OUTPATIENT
Start: 2021-01-01 | End: 2021-01-01

## 2021-01-01 RX ORDER — ACETAMINOPHEN 500 MG
650 TABLET ORAL ONCE
Refills: 0 | Status: COMPLETED | OUTPATIENT
Start: 2021-01-01 | End: 2021-01-01

## 2021-01-01 RX ORDER — LANOLIN ALCOHOL/MO/W.PET/CERES
3 CREAM (GRAM) TOPICAL ONCE
Refills: 0 | Status: COMPLETED | OUTPATIENT
Start: 2021-01-01 | End: 2021-01-01

## 2021-01-01 RX ORDER — METOPROLOL TARTRATE 50 MG
1.5 TABLET ORAL
Qty: 45 | Refills: 1
Start: 2021-01-01 | End: 2022-01-01

## 2021-01-01 RX ORDER — ALTEPLASE 100 MG
0.4 KIT INTRAVENOUS
Qty: 5 | Refills: 0 | Status: DISCONTINUED | OUTPATIENT
Start: 2021-01-01 | End: 2021-01-01

## 2021-01-01 RX ORDER — AMLODIPINE BESYLATE 2.5 MG/1
1 TABLET ORAL
Qty: 30 | Refills: 1
Start: 2021-01-01 | End: 2022-01-01

## 2021-01-01 RX ORDER — SODIUM ZIRCONIUM CYCLOSILICATE 10 G/10G
5 POWDER, FOR SUSPENSION ORAL ONCE
Refills: 0 | Status: COMPLETED | OUTPATIENT
Start: 2021-01-01 | End: 2021-01-01

## 2021-01-01 RX ORDER — APIXABAN 2.5 MG/1
1 TABLET, FILM COATED ORAL
Qty: 0 | Refills: 0 | DISCHARGE
Start: 2021-01-01

## 2021-01-01 RX ORDER — HEPARIN SODIUM 5000 [USP'U]/ML
800 INJECTION INTRAVENOUS; SUBCUTANEOUS
Qty: 25000 | Refills: 0 | Status: DISCONTINUED | OUTPATIENT
Start: 2021-01-01 | End: 2021-01-01

## 2021-01-01 RX ORDER — AMLODIPINE BESYLATE 2.5 MG/1
1 TABLET ORAL
Qty: 0 | Refills: 0 | DISCHARGE
Start: 2021-01-01

## 2021-01-01 RX ORDER — FLUTICASONE PROPIONATE 50 MCG
1 SPRAY, SUSPENSION NASAL ONCE
Refills: 0 | Status: COMPLETED | OUTPATIENT
Start: 2021-01-01 | End: 2021-01-01

## 2021-01-01 RX ORDER — ENOXAPARIN SODIUM 100 MG/ML
70 INJECTION SUBCUTANEOUS
Refills: 0 | Status: COMPLETED | OUTPATIENT
Start: 2021-01-01 | End: 2021-01-01

## 2021-01-01 RX ORDER — INFLUENZA VIRUS VACCINE 15; 15; 15; 15 UG/.5ML; UG/.5ML; UG/.5ML; UG/.5ML
0.7 SUSPENSION INTRAMUSCULAR ONCE
Refills: 0 | Status: DISCONTINUED | OUTPATIENT
Start: 2021-01-01 | End: 2021-01-01

## 2021-01-01 RX ORDER — INSULIN LISPRO 100/ML
VIAL (ML) SUBCUTANEOUS AT BEDTIME
Refills: 0 | Status: DISCONTINUED | OUTPATIENT
Start: 2021-01-01 | End: 2021-01-01

## 2021-01-01 RX ORDER — METOPROLOL TARTRATE 50 MG
150 TABLET ORAL DAILY
Refills: 0 | Status: DISCONTINUED | OUTPATIENT
Start: 2021-01-01 | End: 2021-01-01

## 2021-01-01 RX ORDER — CLOPIDOGREL BISULFATE 75 MG/1
1 TABLET, FILM COATED ORAL
Qty: 0 | Refills: 0 | DISCHARGE

## 2021-01-01 RX ORDER — HYDRALAZINE HCL 50 MG
50 TABLET ORAL THREE TIMES A DAY
Refills: 0 | Status: DISCONTINUED | OUTPATIENT
Start: 2021-01-01 | End: 2021-01-01

## 2021-01-01 RX ORDER — CLOPIDOGREL BISULFATE 75 MG/1
1 TABLET, FILM COATED ORAL
Qty: 0 | Refills: 0 | DISCHARGE
Start: 2021-01-01

## 2021-01-01 RX ADMIN — CLOPIDOGREL BISULFATE 75 MILLIGRAM(S): 75 TABLET, FILM COATED ORAL at 11:47

## 2021-01-01 RX ADMIN — Medication 50 MILLIGRAM(S): at 05:17

## 2021-01-01 RX ADMIN — LORATADINE 10 MILLIGRAM(S): 10 TABLET ORAL at 12:20

## 2021-01-01 RX ADMIN — SENNA PLUS 2 TABLET(S): 8.6 TABLET ORAL at 21:21

## 2021-01-01 RX ADMIN — Medication 1 APPLICATION(S): at 18:23

## 2021-01-01 RX ADMIN — PANTOPRAZOLE SODIUM 40 MILLIGRAM(S): 20 TABLET, DELAYED RELEASE ORAL at 05:32

## 2021-01-01 RX ADMIN — Medication 50 MILLIGRAM(S): at 21:16

## 2021-01-01 RX ADMIN — Medication 1 APPLICATION(S): at 17:31

## 2021-01-01 RX ADMIN — ONDANSETRON 4 MILLIGRAM(S): 8 TABLET, FILM COATED ORAL at 23:44

## 2021-01-01 RX ADMIN — SENNA PLUS 2 TABLET(S): 8.6 TABLET ORAL at 21:05

## 2021-01-01 RX ADMIN — LORATADINE 10 MILLIGRAM(S): 10 TABLET ORAL at 12:19

## 2021-01-01 RX ADMIN — Medication 1: at 08:08

## 2021-01-01 RX ADMIN — INSULIN GLARGINE 20 UNIT(S): 100 INJECTION, SOLUTION SUBCUTANEOUS at 21:06

## 2021-01-01 RX ADMIN — Medication 20 MILLIGRAM(S): at 09:07

## 2021-01-01 RX ADMIN — Medication 10 UNIT(S): at 11:59

## 2021-01-01 RX ADMIN — Medication 50 MILLIGRAM(S): at 21:05

## 2021-01-01 RX ADMIN — Medication 1 APPLICATION(S): at 17:17

## 2021-01-01 RX ADMIN — Medication 100 GRAM(S): at 22:09

## 2021-01-01 RX ADMIN — Medication 60 MILLIGRAM(S): at 05:37

## 2021-01-01 RX ADMIN — Medication 10 UNIT(S): at 12:21

## 2021-01-01 RX ADMIN — Medication 50 MILLIGRAM(S): at 22:10

## 2021-01-01 RX ADMIN — Medication 50 MILLIGRAM(S): at 05:37

## 2021-01-01 RX ADMIN — Medication 40 MILLIGRAM(S): at 03:26

## 2021-01-01 RX ADMIN — PANTOPRAZOLE SODIUM 40 MILLIGRAM(S): 20 TABLET, DELAYED RELEASE ORAL at 05:27

## 2021-01-01 RX ADMIN — ATORVASTATIN CALCIUM 40 MILLIGRAM(S): 80 TABLET, FILM COATED ORAL at 21:25

## 2021-01-01 RX ADMIN — SENNA PLUS 2 TABLET(S): 8.6 TABLET ORAL at 21:01

## 2021-01-01 RX ADMIN — APIXABAN 5 MILLIGRAM(S): 2.5 TABLET, FILM COATED ORAL at 04:53

## 2021-01-01 RX ADMIN — Medication 50 MILLIGRAM(S): at 21:25

## 2021-01-01 RX ADMIN — Medication 50 MILLIGRAM(S): at 13:34

## 2021-01-01 RX ADMIN — Medication 50 MILLIGRAM(S): at 17:41

## 2021-01-01 RX ADMIN — Medication 50 MILLIGRAM(S): at 13:03

## 2021-01-01 RX ADMIN — Medication 81 MILLIGRAM(S): at 11:47

## 2021-01-01 RX ADMIN — Medication 50 MILLIGRAM(S): at 13:24

## 2021-01-01 RX ADMIN — HEPARIN SODIUM 5000 UNIT(S): 5000 INJECTION INTRAVENOUS; SUBCUTANEOUS at 05:12

## 2021-01-01 RX ADMIN — Medication 1 SPRAY(S): at 22:00

## 2021-01-01 RX ADMIN — Medication 81 MILLIGRAM(S): at 11:35

## 2021-01-01 RX ADMIN — AMLODIPINE BESYLATE 5 MILLIGRAM(S): 2.5 TABLET ORAL at 06:24

## 2021-01-01 RX ADMIN — SODIUM ZIRCONIUM CYCLOSILICATE 5 GRAM(S): 10 POWDER, FOR SUSPENSION ORAL at 10:10

## 2021-01-01 RX ADMIN — Medication 650 MILLIGRAM(S): at 18:24

## 2021-01-01 RX ADMIN — SODIUM CHLORIDE 30 MILLILITER(S): 9 INJECTION, SOLUTION INTRAVENOUS at 14:00

## 2021-01-01 RX ADMIN — Medication 50 MILLIGRAM(S): at 15:20

## 2021-01-01 RX ADMIN — OXYCODONE AND ACETAMINOPHEN 1 TABLET(S): 5; 325 TABLET ORAL at 01:25

## 2021-01-01 RX ADMIN — LORATADINE 10 MILLIGRAM(S): 10 TABLET ORAL at 12:22

## 2021-01-01 RX ADMIN — Medication 50 MILLIGRAM(S): at 17:21

## 2021-01-01 RX ADMIN — FENTANYL CITRATE 50 MICROGRAM(S): 50 INJECTION INTRAVENOUS at 23:44

## 2021-01-01 RX ADMIN — Medication 10 UNIT(S): at 12:19

## 2021-01-01 RX ADMIN — Medication 50 MILLIGRAM(S): at 21:56

## 2021-01-01 RX ADMIN — Medication 2: at 11:26

## 2021-01-01 RX ADMIN — Medication 10 UNIT(S): at 17:36

## 2021-01-01 RX ADMIN — Medication 50 MILLIGRAM(S): at 21:20

## 2021-01-01 RX ADMIN — Medication 100 MILLIGRAM(S): at 03:58

## 2021-01-01 RX ADMIN — HEPARIN SODIUM 5000 UNIT(S): 5000 INJECTION INTRAVENOUS; SUBCUTANEOUS at 12:18

## 2021-01-01 RX ADMIN — Medication 50 MILLIGRAM(S): at 05:11

## 2021-01-01 RX ADMIN — Medication 50 MILLIGRAM(S): at 06:03

## 2021-01-01 RX ADMIN — Medication 50 MILLIGRAM(S): at 22:07

## 2021-01-01 RX ADMIN — Medication 4 UNIT(S): at 11:37

## 2021-01-01 RX ADMIN — Medication 1 APPLICATION(S): at 05:27

## 2021-01-01 RX ADMIN — Medication 81 MILLIGRAM(S): at 17:04

## 2021-01-01 RX ADMIN — Medication 50 MILLIGRAM(S): at 21:51

## 2021-01-01 RX ADMIN — Medication 50 MILLIGRAM(S): at 17:32

## 2021-01-01 RX ADMIN — Medication 8 UNIT(S): at 08:19

## 2021-01-01 RX ADMIN — LORATADINE 10 MILLIGRAM(S): 10 TABLET ORAL at 12:06

## 2021-01-01 RX ADMIN — Medication 10 UNIT(S): at 12:05

## 2021-01-01 RX ADMIN — Medication 50 MILLIGRAM(S): at 14:46

## 2021-01-01 RX ADMIN — CLOPIDOGREL BISULFATE 75 MILLIGRAM(S): 75 TABLET, FILM COATED ORAL at 13:03

## 2021-01-01 RX ADMIN — HEPARIN SODIUM 600 UNIT(S)/HR: 5000 INJECTION INTRAVENOUS; SUBCUTANEOUS at 03:04

## 2021-01-01 RX ADMIN — Medication 1: at 22:00

## 2021-01-01 RX ADMIN — Medication 81 MILLIGRAM(S): at 12:12

## 2021-01-01 RX ADMIN — Medication 2: at 12:21

## 2021-01-01 RX ADMIN — Medication 10 UNIT(S): at 08:07

## 2021-01-01 RX ADMIN — CLOPIDOGREL BISULFATE 75 MILLIGRAM(S): 75 TABLET, FILM COATED ORAL at 11:23

## 2021-01-01 RX ADMIN — Medication 81 MILLIGRAM(S): at 12:24

## 2021-01-01 RX ADMIN — HEPARIN SODIUM 550 UNIT(S)/HR: 5000 INJECTION INTRAVENOUS; SUBCUTANEOUS at 19:04

## 2021-01-01 RX ADMIN — HEPARIN SODIUM 5000 UNIT(S): 5000 INJECTION INTRAVENOUS; SUBCUTANEOUS at 22:36

## 2021-01-01 RX ADMIN — Medication 2: at 17:36

## 2021-01-01 RX ADMIN — HEPARIN SODIUM 4100 UNIT(S): 5000 INJECTION INTRAVENOUS; SUBCUTANEOUS at 00:40

## 2021-01-01 RX ADMIN — PANTOPRAZOLE SODIUM 40 MILLIGRAM(S): 20 TABLET, DELAYED RELEASE ORAL at 05:37

## 2021-01-01 RX ADMIN — Medication 10 UNIT(S): at 08:22

## 2021-01-01 RX ADMIN — OXYCODONE AND ACETAMINOPHEN 1 TABLET(S): 5; 325 TABLET ORAL at 02:00

## 2021-01-01 RX ADMIN — Medication 50 MILLIGRAM(S): at 05:46

## 2021-01-01 RX ADMIN — Medication 1 SPRAY(S): at 06:20

## 2021-01-01 RX ADMIN — Medication 1 APPLICATION(S): at 05:21

## 2021-01-01 RX ADMIN — Medication 50 MILLIGRAM(S): at 13:17

## 2021-01-01 RX ADMIN — Medication 50 MILLIGRAM(S): at 17:11

## 2021-01-01 RX ADMIN — Medication 1 APPLICATION(S): at 05:36

## 2021-01-01 RX ADMIN — Medication 20 MILLIGRAM(S): at 06:00

## 2021-01-01 RX ADMIN — Medication 50 MILLIGRAM(S): at 13:56

## 2021-01-01 RX ADMIN — ATORVASTATIN CALCIUM 40 MILLIGRAM(S): 80 TABLET, FILM COATED ORAL at 22:10

## 2021-01-01 RX ADMIN — PANTOPRAZOLE SODIUM 40 MILLIGRAM(S): 20 TABLET, DELAYED RELEASE ORAL at 05:17

## 2021-01-01 RX ADMIN — Medication 10 UNIT(S): at 08:08

## 2021-01-01 RX ADMIN — Medication 50 MILLIGRAM(S): at 04:53

## 2021-01-01 RX ADMIN — Medication 50 MILLIGRAM(S): at 05:33

## 2021-01-01 RX ADMIN — Medication 100 MILLIGRAM(S): at 05:30

## 2021-01-01 RX ADMIN — Medication 50 MILLIGRAM(S): at 13:33

## 2021-01-01 RX ADMIN — Medication 2: at 08:22

## 2021-01-01 RX ADMIN — Medication 50 MILLIGRAM(S): at 06:24

## 2021-01-01 RX ADMIN — Medication 100 MILLIGRAM(S): at 05:21

## 2021-01-01 RX ADMIN — LORATADINE 10 MILLIGRAM(S): 10 TABLET ORAL at 11:58

## 2021-01-01 RX ADMIN — ATORVASTATIN CALCIUM 40 MILLIGRAM(S): 80 TABLET, FILM COATED ORAL at 21:50

## 2021-01-01 RX ADMIN — CLOPIDOGREL BISULFATE 75 MILLIGRAM(S): 75 TABLET, FILM COATED ORAL at 12:20

## 2021-01-01 RX ADMIN — Medication 60 MILLIGRAM(S): at 05:30

## 2021-01-01 RX ADMIN — Medication 2: at 08:18

## 2021-01-01 RX ADMIN — Medication 81 MILLIGRAM(S): at 12:05

## 2021-01-01 RX ADMIN — CLOPIDOGREL BISULFATE 75 MILLIGRAM(S): 75 TABLET, FILM COATED ORAL at 11:16

## 2021-01-01 RX ADMIN — HEPARIN SODIUM 800 UNIT(S)/HR: 5000 INJECTION INTRAVENOUS; SUBCUTANEOUS at 17:47

## 2021-01-01 RX ADMIN — HEPARIN SODIUM 0 UNIT(S)/HR: 5000 INJECTION INTRAVENOUS; SUBCUTANEOUS at 11:19

## 2021-01-01 RX ADMIN — AMLODIPINE BESYLATE 5 MILLIGRAM(S): 2.5 TABLET ORAL at 05:59

## 2021-01-01 RX ADMIN — CLOPIDOGREL BISULFATE 75 MILLIGRAM(S): 75 TABLET, FILM COATED ORAL at 12:05

## 2021-01-01 RX ADMIN — ONDANSETRON 4 MILLIGRAM(S): 8 TABLET, FILM COATED ORAL at 01:20

## 2021-01-01 RX ADMIN — Medication 40 MILLIGRAM(S): at 05:32

## 2021-01-01 RX ADMIN — APIXABAN 5 MILLIGRAM(S): 2.5 TABLET, FILM COATED ORAL at 17:24

## 2021-01-01 RX ADMIN — HEPARIN SODIUM 5000 UNIT(S): 5000 INJECTION INTRAVENOUS; SUBCUTANEOUS at 21:25

## 2021-01-01 RX ADMIN — Medication 50 MILLIGRAM(S): at 13:32

## 2021-01-01 RX ADMIN — Medication 2: at 08:23

## 2021-01-01 RX ADMIN — CLOPIDOGREL BISULFATE 75 MILLIGRAM(S): 75 TABLET, FILM COATED ORAL at 12:13

## 2021-01-01 RX ADMIN — Medication 650 MILLIGRAM(S): at 01:00

## 2021-01-01 RX ADMIN — Medication 60 MILLIGRAM(S): at 05:22

## 2021-01-01 RX ADMIN — Medication 1 APPLICATION(S): at 17:10

## 2021-01-01 RX ADMIN — PANTOPRAZOLE SODIUM 40 MILLIGRAM(S): 20 TABLET, DELAYED RELEASE ORAL at 05:59

## 2021-01-01 RX ADMIN — Medication 81 MILLIGRAM(S): at 11:24

## 2021-01-01 RX ADMIN — HEPARIN SODIUM 800 UNIT(S)/HR: 5000 INJECTION INTRAVENOUS; SUBCUTANEOUS at 00:41

## 2021-01-01 RX ADMIN — Medication 50 MILLIGRAM(S): at 05:22

## 2021-01-01 RX ADMIN — ATORVASTATIN CALCIUM 40 MILLIGRAM(S): 80 TABLET, FILM COATED ORAL at 21:01

## 2021-01-01 RX ADMIN — Medication 50 MILLIGRAM(S): at 06:02

## 2021-01-01 RX ADMIN — Medication 50 MILLIGRAM(S): at 05:26

## 2021-01-01 RX ADMIN — Medication 1: at 12:22

## 2021-01-01 RX ADMIN — Medication 650 MILLIGRAM(S): at 19:47

## 2021-01-01 RX ADMIN — Medication 50 MILLIGRAM(S): at 14:17

## 2021-01-01 RX ADMIN — AMLODIPINE BESYLATE 5 MILLIGRAM(S): 2.5 TABLET ORAL at 05:22

## 2021-01-01 RX ADMIN — Medication 6: at 16:38

## 2021-01-01 RX ADMIN — Medication 2: at 18:12

## 2021-01-01 RX ADMIN — LORATADINE 10 MILLIGRAM(S): 10 TABLET ORAL at 12:25

## 2021-01-01 RX ADMIN — ATORVASTATIN CALCIUM 40 MILLIGRAM(S): 80 TABLET, FILM COATED ORAL at 21:05

## 2021-01-01 RX ADMIN — Medication 50 MILLIGRAM(S): at 11:25

## 2021-01-01 RX ADMIN — PANTOPRAZOLE SODIUM 40 MILLIGRAM(S): 20 TABLET, DELAYED RELEASE ORAL at 06:00

## 2021-01-01 RX ADMIN — Medication 650 MILLIGRAM(S): at 21:46

## 2021-01-01 RX ADMIN — Medication 50 MILLIGRAM(S): at 21:01

## 2021-01-01 RX ADMIN — Medication 50 MILLIGRAM(S): at 21:48

## 2021-01-01 RX ADMIN — OXYCODONE AND ACETAMINOPHEN 1 TABLET(S): 5; 325 TABLET ORAL at 22:19

## 2021-01-01 RX ADMIN — Medication 1: at 08:26

## 2021-01-01 RX ADMIN — SENNA PLUS 2 TABLET(S): 8.6 TABLET ORAL at 21:29

## 2021-01-01 RX ADMIN — Medication 10 UNIT(S): at 12:11

## 2021-01-01 RX ADMIN — SENNA PLUS 2 TABLET(S): 8.6 TABLET ORAL at 21:19

## 2021-01-01 RX ADMIN — Medication 10 UNIT(S): at 13:45

## 2021-01-01 RX ADMIN — Medication 50 MILLIGRAM(S): at 18:27

## 2021-01-01 RX ADMIN — Medication 40 MILLIEQUIVALENT(S): at 08:10

## 2021-01-01 RX ADMIN — APIXABAN 5 MILLIGRAM(S): 2.5 TABLET, FILM COATED ORAL at 18:27

## 2021-01-01 RX ADMIN — Medication 8 UNIT(S): at 12:20

## 2021-01-01 RX ADMIN — Medication 50 MILLIGRAM(S): at 05:32

## 2021-01-01 RX ADMIN — Medication 10 UNIT(S): at 17:01

## 2021-01-01 RX ADMIN — Medication 1 APPLICATION(S): at 04:54

## 2021-01-01 RX ADMIN — Medication 1 APPLICATION(S): at 17:37

## 2021-01-01 RX ADMIN — Medication 81 MILLIGRAM(S): at 12:18

## 2021-01-01 RX ADMIN — Medication 5 UNIT(S): at 11:25

## 2021-01-01 RX ADMIN — Medication 1: at 17:24

## 2021-01-01 RX ADMIN — Medication 60 MILLIGRAM(S): at 17:50

## 2021-01-01 RX ADMIN — PANTOPRAZOLE SODIUM 40 MILLIGRAM(S): 20 TABLET, DELAYED RELEASE ORAL at 05:21

## 2021-01-01 RX ADMIN — Medication 10 UNIT(S): at 17:24

## 2021-01-01 RX ADMIN — Medication 50 MILLIGRAM(S): at 13:49

## 2021-01-01 RX ADMIN — Medication 5 UNIT(S): at 08:17

## 2021-01-01 RX ADMIN — LORATADINE 10 MILLIGRAM(S): 10 TABLET ORAL at 12:24

## 2021-01-01 RX ADMIN — Medication 2: at 12:05

## 2021-01-01 RX ADMIN — CLOPIDOGREL BISULFATE 75 MILLIGRAM(S): 75 TABLET, FILM COATED ORAL at 11:31

## 2021-01-01 RX ADMIN — Medication 1: at 17:02

## 2021-01-01 RX ADMIN — APIXABAN 5 MILLIGRAM(S): 2.5 TABLET, FILM COATED ORAL at 05:32

## 2021-01-01 RX ADMIN — Medication 10 UNIT(S): at 08:16

## 2021-01-01 RX ADMIN — AMLODIPINE BESYLATE 5 MILLIGRAM(S): 2.5 TABLET ORAL at 04:53

## 2021-01-01 RX ADMIN — Medication 2: at 08:08

## 2021-01-01 RX ADMIN — Medication 60 MILLIGRAM(S): at 17:37

## 2021-01-01 RX ADMIN — Medication 50 MILLIGRAM(S): at 05:30

## 2021-01-01 RX ADMIN — Medication 20 MILLIEQUIVALENT(S): at 12:20

## 2021-01-01 RX ADMIN — AMLODIPINE BESYLATE 5 MILLIGRAM(S): 2.5 TABLET ORAL at 06:09

## 2021-01-01 RX ADMIN — PANTOPRAZOLE SODIUM 40 MILLIGRAM(S): 20 TABLET, DELAYED RELEASE ORAL at 04:52

## 2021-01-01 RX ADMIN — Medication 100 MILLIGRAM(S): at 06:00

## 2021-01-01 RX ADMIN — Medication 100 MILLIGRAM(S): at 22:10

## 2021-01-01 RX ADMIN — Medication 50 MILLIGRAM(S): at 15:50

## 2021-01-01 RX ADMIN — Medication 1 APPLICATION(S): at 05:47

## 2021-01-01 RX ADMIN — Medication 50 MILLIGRAM(S): at 17:08

## 2021-01-01 RX ADMIN — Medication 250 MILLIGRAM(S): at 17:59

## 2021-01-01 RX ADMIN — Medication 8: at 07:44

## 2021-01-01 RX ADMIN — CLOPIDOGREL BISULFATE 75 MILLIGRAM(S): 75 TABLET, FILM COATED ORAL at 12:24

## 2021-01-01 RX ADMIN — Medication 40 MILLIGRAM(S): at 05:27

## 2021-01-01 RX ADMIN — CLOPIDOGREL BISULFATE 75 MILLIGRAM(S): 75 TABLET, FILM COATED ORAL at 15:50

## 2021-01-01 RX ADMIN — Medication 10 UNIT(S): at 08:18

## 2021-01-01 RX ADMIN — Medication 2: at 08:17

## 2021-01-01 RX ADMIN — LORATADINE 10 MILLIGRAM(S): 10 TABLET ORAL at 15:50

## 2021-01-01 RX ADMIN — Medication 100 MILLIGRAM(S): at 05:11

## 2021-01-01 RX ADMIN — Medication 50 MILLIGRAM(S): at 21:29

## 2021-01-01 RX ADMIN — INSULIN GLARGINE 20 UNIT(S): 100 INJECTION, SOLUTION SUBCUTANEOUS at 21:15

## 2021-01-01 RX ADMIN — Medication 650 MILLIGRAM(S): at 17:54

## 2021-01-01 RX ADMIN — APIXABAN 5 MILLIGRAM(S): 2.5 TABLET, FILM COATED ORAL at 17:21

## 2021-01-01 RX ADMIN — AMLODIPINE BESYLATE 5 MILLIGRAM(S): 2.5 TABLET ORAL at 05:27

## 2021-01-01 RX ADMIN — ALTEPLASE 150 MILLIGRAM(S): KIT at 21:00

## 2021-01-01 RX ADMIN — Medication 2: at 08:00

## 2021-01-01 RX ADMIN — CLOPIDOGREL BISULFATE 75 MILLIGRAM(S): 75 TABLET, FILM COATED ORAL at 11:57

## 2021-01-01 RX ADMIN — Medication 40 MILLIGRAM(S): at 05:59

## 2021-01-01 RX ADMIN — HEPARIN SODIUM 550 UNIT(S)/HR: 5000 INJECTION INTRAVENOUS; SUBCUTANEOUS at 10:41

## 2021-01-01 RX ADMIN — CHLORHEXIDINE GLUCONATE 1 APPLICATION(S): 213 SOLUTION TOPICAL at 11:27

## 2021-01-01 RX ADMIN — PANTOPRAZOLE SODIUM 40 MILLIGRAM(S): 20 TABLET, DELAYED RELEASE ORAL at 06:25

## 2021-01-01 RX ADMIN — LORATADINE 10 MILLIGRAM(S): 10 TABLET ORAL at 12:05

## 2021-01-01 RX ADMIN — Medication 10 UNIT(S): at 08:25

## 2021-01-01 RX ADMIN — HEPARIN SODIUM 550 UNIT(S)/HR: 5000 INJECTION INTRAVENOUS; SUBCUTANEOUS at 02:03

## 2021-01-01 RX ADMIN — Medication 10 UNIT(S): at 07:59

## 2021-01-01 RX ADMIN — POLYETHYLENE GLYCOL 3350 17 GRAM(S): 17 POWDER, FOR SOLUTION ORAL at 05:22

## 2021-01-01 RX ADMIN — ATORVASTATIN CALCIUM 40 MILLIGRAM(S): 80 TABLET, FILM COATED ORAL at 21:55

## 2021-01-01 RX ADMIN — ATORVASTATIN CALCIUM 40 MILLIGRAM(S): 80 TABLET, FILM COATED ORAL at 22:35

## 2021-01-01 RX ADMIN — Medication 8 UNIT(S): at 08:21

## 2021-01-01 RX ADMIN — Medication 1: at 12:11

## 2021-01-01 RX ADMIN — HEPARIN SODIUM 400 UNIT(S)/HR: 5000 INJECTION INTRAVENOUS; SUBCUTANEOUS at 12:23

## 2021-01-01 RX ADMIN — Medication 40 MILLIGRAM(S): at 05:37

## 2021-01-01 RX ADMIN — Medication 100 MILLIGRAM(S): at 05:37

## 2021-01-01 RX ADMIN — Medication 650 MILLIGRAM(S): at 00:22

## 2021-01-01 RX ADMIN — Medication 10 UNIT(S): at 12:18

## 2021-01-01 RX ADMIN — Medication 81 MILLIGRAM(S): at 15:50

## 2021-01-01 RX ADMIN — APIXABAN 5 MILLIGRAM(S): 2.5 TABLET, FILM COATED ORAL at 05:27

## 2021-01-01 RX ADMIN — Medication 81 MILLIGRAM(S): at 12:22

## 2021-01-01 RX ADMIN — APIXABAN 5 MILLIGRAM(S): 2.5 TABLET, FILM COATED ORAL at 05:17

## 2021-01-01 RX ADMIN — PANTOPRAZOLE SODIUM 40 MILLIGRAM(S): 20 TABLET, DELAYED RELEASE ORAL at 08:35

## 2021-01-01 RX ADMIN — Medication 1: at 17:57

## 2021-01-01 RX ADMIN — Medication 5 UNIT(S): at 16:38

## 2021-01-01 RX ADMIN — Medication 40 MILLIGRAM(S): at 05:17

## 2021-01-01 RX ADMIN — OXYCODONE AND ACETAMINOPHEN 1 TABLET(S): 5; 325 TABLET ORAL at 21:16

## 2021-01-01 RX ADMIN — ATORVASTATIN CALCIUM 40 MILLIGRAM(S): 80 TABLET, FILM COATED ORAL at 21:16

## 2021-01-01 RX ADMIN — ATORVASTATIN CALCIUM 40 MILLIGRAM(S): 80 TABLET, FILM COATED ORAL at 22:05

## 2021-01-01 RX ADMIN — APIXABAN 5 MILLIGRAM(S): 2.5 TABLET, FILM COATED ORAL at 17:11

## 2021-01-01 RX ADMIN — HEPARIN SODIUM 5000 UNIT(S): 5000 INJECTION INTRAVENOUS; SUBCUTANEOUS at 05:22

## 2021-01-01 RX ADMIN — POLYETHYLENE GLYCOL 3350 17 GRAM(S): 17 POWDER, FOR SOLUTION ORAL at 11:36

## 2021-01-01 RX ADMIN — ATORVASTATIN CALCIUM 40 MILLIGRAM(S): 80 TABLET, FILM COATED ORAL at 21:19

## 2021-01-01 RX ADMIN — Medication 650 MILLIGRAM(S): at 06:01

## 2021-01-01 RX ADMIN — Medication 81 MILLIGRAM(S): at 12:21

## 2021-01-01 RX ADMIN — Medication 50 MILLIGRAM(S): at 21:14

## 2021-01-01 RX ADMIN — Medication 50 MILLIGRAM(S): at 12:18

## 2021-01-01 RX ADMIN — PANTOPRAZOLE SODIUM 40 MILLIGRAM(S): 20 TABLET, DELAYED RELEASE ORAL at 05:11

## 2021-01-01 RX ADMIN — CLOPIDOGREL BISULFATE 75 MILLIGRAM(S): 75 TABLET, FILM COATED ORAL at 13:49

## 2021-01-01 RX ADMIN — ATORVASTATIN CALCIUM 40 MILLIGRAM(S): 80 TABLET, FILM COATED ORAL at 21:13

## 2021-01-01 RX ADMIN — Medication 1: at 08:19

## 2021-01-01 RX ADMIN — HEPARIN SODIUM 800 UNIT(S)/HR: 5000 INJECTION INTRAVENOUS; SUBCUTANEOUS at 02:24

## 2021-01-01 RX ADMIN — Medication 1: at 17:38

## 2021-01-01 RX ADMIN — SENNA PLUS 2 TABLET(S): 8.6 TABLET ORAL at 21:48

## 2021-01-01 RX ADMIN — Medication 100 MILLIGRAM(S): at 05:46

## 2021-01-01 RX ADMIN — Medication 1 APPLICATION(S): at 06:00

## 2021-01-01 RX ADMIN — Medication 10 UNIT(S): at 17:29

## 2021-01-01 RX ADMIN — Medication 8 UNIT(S): at 17:05

## 2021-01-01 RX ADMIN — PANTOPRAZOLE SODIUM 40 MILLIGRAM(S): 20 TABLET, DELAYED RELEASE ORAL at 06:03

## 2021-01-01 RX ADMIN — Medication 2: at 12:18

## 2021-01-01 RX ADMIN — Medication 50 MILLIGRAM(S): at 17:18

## 2021-01-01 RX ADMIN — Medication 2: at 11:50

## 2021-01-01 RX ADMIN — POLYETHYLENE GLYCOL 3350 17 GRAM(S): 17 POWDER, FOR SOLUTION ORAL at 11:18

## 2021-01-01 RX ADMIN — Medication 1 APPLICATION(S): at 17:19

## 2021-01-01 RX ADMIN — AMLODIPINE BESYLATE 5 MILLIGRAM(S): 2.5 TABLET ORAL at 05:32

## 2021-01-01 RX ADMIN — Medication 40 MILLIGRAM(S): at 04:53

## 2021-01-01 RX ADMIN — AMLODIPINE BESYLATE 5 MILLIGRAM(S): 2.5 TABLET ORAL at 09:43

## 2021-01-01 RX ADMIN — Medication 50 MILLIGRAM(S): at 06:01

## 2021-01-01 RX ADMIN — Medication 50 MILLIGRAM(S): at 14:05

## 2021-01-01 RX ADMIN — Medication 162 MILLIGRAM(S): at 00:20

## 2021-01-01 RX ADMIN — ATORVASTATIN CALCIUM 40 MILLIGRAM(S): 80 TABLET, FILM COATED ORAL at 21:48

## 2021-01-01 RX ADMIN — APIXABAN 5 MILLIGRAM(S): 2.5 TABLET, FILM COATED ORAL at 06:00

## 2021-01-01 RX ADMIN — Medication 2: at 09:51

## 2021-01-01 RX ADMIN — Medication 3: at 11:54

## 2021-01-01 RX ADMIN — APIXABAN 5 MILLIGRAM(S): 2.5 TABLET, FILM COATED ORAL at 05:59

## 2021-01-01 RX ADMIN — HEPARIN SODIUM 5000 UNIT(S): 5000 INJECTION INTRAVENOUS; SUBCUTANEOUS at 15:50

## 2021-01-01 RX ADMIN — PANTOPRAZOLE SODIUM 40 MILLIGRAM(S): 20 TABLET, DELAYED RELEASE ORAL at 05:30

## 2021-01-01 RX ADMIN — Medication 10 UNIT(S): at 11:55

## 2021-01-01 RX ADMIN — Medication 1 APPLICATION(S): at 05:44

## 2021-01-01 RX ADMIN — CLOPIDOGREL BISULFATE 75 MILLIGRAM(S): 75 TABLET, FILM COATED ORAL at 12:22

## 2021-01-01 RX ADMIN — Medication 10 UNIT(S): at 09:51

## 2021-01-01 RX ADMIN — Medication 81 MILLIGRAM(S): at 13:03

## 2021-01-01 RX ADMIN — ATORVASTATIN CALCIUM 40 MILLIGRAM(S): 80 TABLET, FILM COATED ORAL at 21:52

## 2021-01-01 RX ADMIN — APIXABAN 5 MILLIGRAM(S): 2.5 TABLET, FILM COATED ORAL at 17:32

## 2021-01-01 RX ADMIN — Medication 2: at 17:32

## 2021-01-01 RX ADMIN — Medication 650 MILLIGRAM(S): at 07:02

## 2021-01-01 RX ADMIN — HEPARIN SODIUM 0 UNIT(S)/HR: 5000 INJECTION INTRAVENOUS; SUBCUTANEOUS at 02:04

## 2021-01-01 RX ADMIN — PANTOPRAZOLE SODIUM 40 MILLIGRAM(S): 20 TABLET, DELAYED RELEASE ORAL at 22:05

## 2021-01-01 RX ADMIN — Medication 6: at 11:38

## 2021-01-01 RX ADMIN — SENNA PLUS 2 TABLET(S): 8.6 TABLET ORAL at 21:56

## 2021-01-01 RX ADMIN — SENNA PLUS 2 TABLET(S): 8.6 TABLET ORAL at 21:25

## 2021-01-01 RX ADMIN — APIXABAN 5 MILLIGRAM(S): 2.5 TABLET, FILM COATED ORAL at 17:31

## 2021-01-01 RX ADMIN — AMLODIPINE BESYLATE 5 MILLIGRAM(S): 2.5 TABLET ORAL at 05:18

## 2021-01-01 RX ADMIN — Medication 10 UNIT(S): at 17:17

## 2021-01-01 RX ADMIN — Medication 10 UNIT(S): at 17:32

## 2021-01-01 RX ADMIN — APIXABAN 5 MILLIGRAM(S): 2.5 TABLET, FILM COATED ORAL at 17:18

## 2021-01-01 RX ADMIN — Medication 2: at 12:23

## 2021-01-01 RX ADMIN — Medication 50 MILLIGRAM(S): at 22:35

## 2021-01-01 RX ADMIN — AMLODIPINE BESYLATE 5 MILLIGRAM(S): 2.5 TABLET ORAL at 05:12

## 2021-01-01 RX ADMIN — Medication 2: at 17:06

## 2021-01-01 RX ADMIN — Medication 8 UNIT(S): at 17:04

## 2021-01-01 RX ADMIN — CLOPIDOGREL BISULFATE 75 MILLIGRAM(S): 75 TABLET, FILM COATED ORAL at 12:25

## 2021-01-01 RX ADMIN — Medication 81 MILLIGRAM(S): at 11:31

## 2021-01-01 RX ADMIN — Medication 40 MILLIGRAM(S): at 17:32

## 2021-01-01 RX ADMIN — Medication 50 MILLIGRAM(S): at 05:59

## 2021-01-01 RX ADMIN — APIXABAN 5 MILLIGRAM(S): 2.5 TABLET, FILM COATED ORAL at 17:07

## 2021-01-01 RX ADMIN — Medication 10 UNIT(S): at 17:56

## 2021-01-01 RX ADMIN — Medication 40 MILLIGRAM(S): at 05:12

## 2021-01-01 RX ADMIN — Medication 10 UNIT(S): at 17:23

## 2021-01-01 RX ADMIN — AMLODIPINE BESYLATE 5 MILLIGRAM(S): 2.5 TABLET ORAL at 05:37

## 2021-01-01 RX ADMIN — Medication 81 MILLIGRAM(S): at 11:16

## 2021-01-01 RX ADMIN — Medication 1: at 11:58

## 2021-01-01 RX ADMIN — Medication 1: at 17:29

## 2021-01-01 RX ADMIN — Medication 50 MILLIGRAM(S): at 13:19

## 2021-01-01 RX ADMIN — Medication 50 MILLIGRAM(S): at 18:12

## 2021-01-01 RX ADMIN — Medication 1: at 12:19

## 2021-01-01 RX ADMIN — PANTOPRAZOLE SODIUM 40 MILLIGRAM(S): 20 TABLET, DELAYED RELEASE ORAL at 05:26

## 2021-01-01 RX ADMIN — Medication 40 MILLIGRAM(S): at 06:01

## 2021-01-01 RX ADMIN — ATORVASTATIN CALCIUM 40 MILLIGRAM(S): 80 TABLET, FILM COATED ORAL at 21:29

## 2021-01-01 RX ADMIN — Medication 40 MILLIGRAM(S): at 06:24

## 2021-01-01 RX ADMIN — CLOPIDOGREL BISULFATE 75 MILLIGRAM(S): 75 TABLET, FILM COATED ORAL at 12:18

## 2021-01-01 RX ADMIN — HEPARIN SODIUM 5000 UNIT(S): 5000 INJECTION INTRAVENOUS; SUBCUTANEOUS at 13:55

## 2021-01-01 RX ADMIN — Medication 3 MILLIGRAM(S): at 22:00

## 2021-01-01 RX ADMIN — Medication 81 MILLIGRAM(S): at 12:25

## 2021-01-01 RX ADMIN — Medication 10 UNIT(S): at 12:22

## 2021-01-01 RX ADMIN — Medication 40 MILLIGRAM(S): at 15:51

## 2021-01-01 RX ADMIN — Medication 50 MILLIGRAM(S): at 17:28

## 2021-01-01 RX ADMIN — Medication 10 UNIT(S): at 17:38

## 2021-01-01 RX ADMIN — FENTANYL CITRATE 50 MICROGRAM(S): 50 INJECTION INTRAVENOUS at 00:14

## 2021-01-01 RX ADMIN — Medication 60 MILLIGRAM(S): at 17:20

## 2021-01-01 RX ADMIN — LORATADINE 10 MILLIGRAM(S): 10 TABLET ORAL at 12:13

## 2021-01-01 RX ADMIN — INSULIN GLARGINE 16 UNIT(S): 100 INJECTION, SOLUTION SUBCUTANEOUS at 21:26

## 2021-01-01 RX ADMIN — Medication 10 UNIT(S): at 12:23

## 2021-01-01 RX ADMIN — AMLODIPINE BESYLATE 5 MILLIGRAM(S): 2.5 TABLET ORAL at 21:33

## 2021-01-05 ENCOUNTER — OUTPATIENT (OUTPATIENT)
Dept: OUTPATIENT SERVICES | Facility: HOSPITAL | Age: 76
LOS: 1 days | Discharge: HOME | End: 2021-01-05
Payer: MEDICARE

## 2021-01-05 ENCOUNTER — RESULT REVIEW (OUTPATIENT)
Age: 76
End: 2021-01-05

## 2021-01-05 VITALS
SYSTOLIC BLOOD PRESSURE: 138 MMHG | HEIGHT: 61 IN | TEMPERATURE: 97 F | HEART RATE: 68 BPM | WEIGHT: 139.99 LBS | OXYGEN SATURATION: 100 % | DIASTOLIC BLOOD PRESSURE: 76 MMHG | RESPIRATION RATE: 16 BRPM

## 2021-01-05 DIAGNOSIS — C67.9 MALIGNANT NEOPLASM OF BLADDER, UNSPECIFIED: ICD-10-CM

## 2021-01-05 DIAGNOSIS — Z95.810 PRESENCE OF AUTOMATIC (IMPLANTABLE) CARDIAC DEFIBRILLATOR: Chronic | ICD-10-CM

## 2021-01-05 DIAGNOSIS — Z90.49 ACQUIRED ABSENCE OF OTHER SPECIFIED PARTS OF DIGESTIVE TRACT: Chronic | ICD-10-CM

## 2021-01-05 DIAGNOSIS — Z90.710 ACQUIRED ABSENCE OF BOTH CERVIX AND UTERUS: Chronic | ICD-10-CM

## 2021-01-05 DIAGNOSIS — Z95.0 PRESENCE OF CARDIAC PACEMAKER: Chronic | ICD-10-CM

## 2021-01-05 DIAGNOSIS — Z95.5 PRESENCE OF CORONARY ANGIOPLASTY IMPLANT AND GRAFT: Chronic | ICD-10-CM

## 2021-01-05 DIAGNOSIS — Z01.818 ENCOUNTER FOR OTHER PREPROCEDURAL EXAMINATION: ICD-10-CM

## 2021-01-05 LAB
A1C WITH ESTIMATED AVERAGE GLUCOSE RESULT: 8.3 % — HIGH (ref 4–5.6)
ALBUMIN SERPL ELPH-MCNC: 4 G/DL — SIGNIFICANT CHANGE UP (ref 3.5–5.2)
ALP SERPL-CCNC: 244 U/L — HIGH (ref 30–115)
ALT FLD-CCNC: 25 U/L — SIGNIFICANT CHANGE UP (ref 0–41)
ANION GAP SERPL CALC-SCNC: 12 MMOL/L — SIGNIFICANT CHANGE UP (ref 7–14)
APPEARANCE UR: CLEAR — SIGNIFICANT CHANGE UP
APTT BLD: 50 SEC — HIGH (ref 27–39.2)
AST SERPL-CCNC: 21 U/L — SIGNIFICANT CHANGE UP (ref 0–41)
BACTERIA # UR AUTO: NEGATIVE — SIGNIFICANT CHANGE UP
BASOPHILS # BLD AUTO: 0.04 K/UL — SIGNIFICANT CHANGE UP (ref 0–0.2)
BASOPHILS NFR BLD AUTO: 0.4 % — SIGNIFICANT CHANGE UP (ref 0–1)
BILIRUB SERPL-MCNC: <0.2 MG/DL — SIGNIFICANT CHANGE UP (ref 0.2–1.2)
BILIRUB UR-MCNC: NEGATIVE — SIGNIFICANT CHANGE UP
BUN SERPL-MCNC: 58 MG/DL — HIGH (ref 10–20)
CALCIUM SERPL-MCNC: 9.4 MG/DL — SIGNIFICANT CHANGE UP (ref 8.5–10.1)
CHLORIDE SERPL-SCNC: 99 MMOL/L — SIGNIFICANT CHANGE UP (ref 98–110)
CO2 SERPL-SCNC: 23 MMOL/L — SIGNIFICANT CHANGE UP (ref 17–32)
COLOR SPEC: SIGNIFICANT CHANGE UP
CREAT SERPL-MCNC: 1.4 MG/DL — SIGNIFICANT CHANGE UP (ref 0.7–1.5)
DIFF PNL FLD: NEGATIVE — SIGNIFICANT CHANGE UP
EOSINOPHIL # BLD AUTO: 0.18 K/UL — SIGNIFICANT CHANGE UP (ref 0–0.7)
EOSINOPHIL NFR BLD AUTO: 1.7 % — SIGNIFICANT CHANGE UP (ref 0–8)
EPI CELLS # UR: 3 /HPF — SIGNIFICANT CHANGE UP (ref 0–5)
ESTIMATED AVERAGE GLUCOSE: 192 MG/DL — HIGH (ref 68–114)
GLUCOSE SERPL-MCNC: 212 MG/DL — HIGH (ref 70–99)
GLUCOSE UR QL: ABNORMAL
HCT VFR BLD CALC: 33.9 % — LOW (ref 37–47)
HGB BLD-MCNC: 10.8 G/DL — LOW (ref 12–16)
HYALINE CASTS # UR AUTO: 2 /LPF — SIGNIFICANT CHANGE UP (ref 0–7)
IMM GRANULOCYTES NFR BLD AUTO: 0.5 % — HIGH (ref 0.1–0.3)
INR BLD: 1.23 RATIO — SIGNIFICANT CHANGE UP (ref 0.65–1.3)
KETONES UR-MCNC: NEGATIVE — SIGNIFICANT CHANGE UP
LEUKOCYTE ESTERASE UR-ACNC: ABNORMAL
LYMPHOCYTES # BLD AUTO: 2.63 K/UL — SIGNIFICANT CHANGE UP (ref 1.2–3.4)
LYMPHOCYTES # BLD AUTO: 24.2 % — SIGNIFICANT CHANGE UP (ref 20.5–51.1)
MCHC RBC-ENTMCNC: 28.6 PG — SIGNIFICANT CHANGE UP (ref 27–31)
MCHC RBC-ENTMCNC: 31.9 G/DL — LOW (ref 32–37)
MCV RBC AUTO: 89.9 FL — SIGNIFICANT CHANGE UP (ref 81–99)
MONOCYTES # BLD AUTO: 0.63 K/UL — HIGH (ref 0.1–0.6)
MONOCYTES NFR BLD AUTO: 5.8 % — SIGNIFICANT CHANGE UP (ref 1.7–9.3)
NEUTROPHILS # BLD AUTO: 7.32 K/UL — HIGH (ref 1.4–6.5)
NEUTROPHILS NFR BLD AUTO: 67.4 % — SIGNIFICANT CHANGE UP (ref 42.2–75.2)
NITRITE UR-MCNC: NEGATIVE — SIGNIFICANT CHANGE UP
NRBC # BLD: 0 /100 WBCS — SIGNIFICANT CHANGE UP (ref 0–0)
PH UR: 6 — SIGNIFICANT CHANGE UP (ref 5–8)
PLATELET # BLD AUTO: 394 K/UL — SIGNIFICANT CHANGE UP (ref 130–400)
POTASSIUM SERPL-MCNC: 5 MMOL/L — SIGNIFICANT CHANGE UP (ref 3.5–5)
POTASSIUM SERPL-SCNC: 5 MMOL/L — SIGNIFICANT CHANGE UP (ref 3.5–5)
PROT SERPL-MCNC: 7.5 G/DL — SIGNIFICANT CHANGE UP (ref 6–8)
PROT UR-MCNC: ABNORMAL
PROTHROM AB SERPL-ACNC: 14.2 SEC — HIGH (ref 9.95–12.87)
RBC # BLD: 3.77 M/UL — LOW (ref 4.2–5.4)
RBC # FLD: 14.7 % — HIGH (ref 11.5–14.5)
RBC CASTS # UR COMP ASSIST: 0 /HPF — SIGNIFICANT CHANGE UP (ref 0–4)
SODIUM SERPL-SCNC: 134 MMOL/L — LOW (ref 135–146)
SP GR SPEC: 1.01 — SIGNIFICANT CHANGE UP (ref 1.01–1.03)
UROBILINOGEN FLD QL: SIGNIFICANT CHANGE UP
WBC # BLD: 10.85 K/UL — HIGH (ref 4.8–10.8)
WBC # FLD AUTO: 10.85 K/UL — HIGH (ref 4.8–10.8)
WBC UR QL: 9 /HPF — HIGH (ref 0–5)

## 2021-01-05 PROCEDURE — 71046 X-RAY EXAM CHEST 2 VIEWS: CPT | Mod: 26

## 2021-01-05 RX ORDER — INSULIN DETEMIR 100/ML (3)
20 INSULIN PEN (ML) SUBCUTANEOUS
Qty: 0 | Refills: 0 | DISCHARGE

## 2021-01-05 NOTE — H&P PST ADULT - NSICDXPASTMEDICALHX_GEN_ALL_CORE_FT
PAST MEDICAL HISTORY:  Amputation of toe of left foot L Hallux 5/20    Atrial fibrillation, unspecified type paf recently stopped eliquis s/t epistatxis    Bladder tumor     CAD (coronary artery disease) RCA KENDELL  2011 OM2 KENDELL 2006  OM1 KENDELL 2009    CHF (congestive heart failure)     DM (diabetes mellitus) 30 yr    Dyslipidemia     H/O ischemic heart disease     Hernia     History of implantable cardiac defibrillator (ICD) [employer]    HTN (hypertension)     PAD (peripheral artery disease) severe s/p stent

## 2021-01-05 NOTE — H&P PST ADULT - NSANTHOSAYNRD_GEN_A_CORE
No. HARRY screening performed.  STOP BANG Legend: 0-2 = LOW Risk; 3-4 = INTERMEDIATE Risk; 5-8 = HIGH Risk

## 2021-01-05 NOTE — H&P PST ADULT - NSICDXPASTSURGICALHX_GEN_ALL_CORE_FT
PAST SURGICAL HISTORY:  Cardiac defibrillator in place 2010    H/O: hysterectomy     History of cholecystectomy     Status post coronary artery stent placement

## 2021-01-05 NOTE — H&P PST ADULT - NSICDXFAMILYHX_GEN_ALL_CORE_FT
FAMILY HISTORY:  Father  Still living? Unknown  Family history of atherosclerosis, Age at diagnosis: Age Unknown    Mother  Still living? Unknown  Family history of atherosclerosis, Age at diagnosis: Age Unknown

## 2021-01-05 NOTE — H&P PST ADULT - NEUROLOGICAL
Detail Level: Generalized
Detail Level: Simple
Alert & oriented; no sensory, motor or coordination deficits, normal reflexes

## 2021-01-05 NOTE — H&P PST ADULT - HISTORY OF PRESENT ILLNESS
75-yo female with extensive medical history:  -CAD, s/p RCA KENDELL in , OM2 KENDELL , OM1 KENDELL in .  -severe PAD involving left dSFA, distal left pop and left TP trunk (recent PTA and KENDELL for non-healing foot ulcer, clear improvement since then).  -PAF, recently had to stop Eliquis due to nose bleed.  -s/p ICD  -HTN, hyperlipidemia, DM.  -recently discovered bladder tumor.    Patient is for preoperative cardiac evaluation for resection of bladder tumor. She walks slowly with a walker, denies CP, SOB.     Cardiology Summary    EK20, SR, ventricular pacing      75 yr old female to past for  cystoscopy transurethral resection of bladder tumor   Dr Farzad Bernard under general anesthesia on 1/13/21  Pt recently dx bladder tumor < 6mos hematuria noted now for this sx no pain no fever no dysuria pt states feels stable for procedure had recent intervention leg for severe pad   Pt reports no cardiopulmonary issues denies sob/davis/cp/palpitations. Pt states no recent infections no fever no cough no uti uri. Stated exercise tolerance is  1-2    flights no changes. Micah screen revd.  Pt denies any s/s covid 19 and reports no contact with known positive people. Pt has appointment for repeat covid testing pre op and instructed to continue to self monitor and report any concerns to MD. Pt will continue to practice self isolation and  exposure control measures pre op  Anesthesia Alert  NO--Difficult Airway  NO--History of neck surgery or radiation  NO--Limited ROM of neck  NO--History of Malignant hyperthermia  NO--No personal or family history of Pseudocholinesterase deficiency.  NO--Prior Anesthesia Complication  NO--Latex Allergy  NO--Loose teeth  NO--History of Rheumatoid Arthritis  NO--MICAH  YES  [place] [facility] AICD -Other_____  Encounter for other preprocedural examination    Malignant neoplasm of urinary bladder    ^C67.9, 61294                                                                  1/13/21 [facility]    Family history of atherosclerosis (Father, Mother)    Encounter for other preprocedural examination    Malignant neoplasm of urinary bladder    DM (diabetes mellitus)    H/O ischemic heart disease    H/O ischemic heart disease    Bladder tumor    Dyslipidemia    HTN (hypertension)    History of implantable cardiac defibrillator (ICD)    PAD (peripheral artery disease)    CHF (congestive heart failure)    PVD (peripheral vascular disease)    DM (diabetes mellitus)    Afib    HTN (hypertension)    CAD (coronary artery disease)    Atrial fibrillation, unspecified type    Hernia    Tumor    Amputation of toe of left foot    PVD (peripheral vascular disease)    CAD (coronary artery disease)    CHF (congestive heart failure)    HTN (hypertension)    DM (diabetes mellitus)    CAD (coronary artery disease)    CHF (congestive heart failure)    HTN (hypertension)    DM (diabetes mellitus)    Cardiac pacemaker    Status post coronary artery stent placement    H/O: hysterectomy    Cardiac defibrillator in place    Artificial cardiac pacemaker    History of cholecystectomy    H/O: hysterectomy    Cardiac defibrillator in place    Artificial cardiac pacemaker    History of cholecystectomy        75-yo female with extensive medical history:  -CAD, s/p RCA KENDELL in 2011, OM2 KENDELL 2006, OM1 KENDELL in 2009.  -severe PAD involving left dSFA, distal left pop and left TP trunk (recent PTA and KENDELL for non-healing foot ulcer, clear improvement since then).  -PAF, recently had to stop Eliquis due to nose bleed.  -s/p ICD  -HTN, hyperlipidemia, DM.  -recently discovered bladder tumor.

## 2021-01-05 NOTE — H&P PST ADULT - REASON FOR ADMISSION
75 yr old female to past for  cystoscopy transurethral resection of bladder tumor   Dr Panda Or Marco Antonio under general anesthesia on 1/13/21

## 2021-01-06 LAB
CULTURE RESULTS: SIGNIFICANT CHANGE UP
SPECIMEN SOURCE: SIGNIFICANT CHANGE UP

## 2021-01-07 ENCOUNTER — OUTPATIENT (OUTPATIENT)
Dept: OUTPATIENT SERVICES | Facility: HOSPITAL | Age: 76
LOS: 1 days | Discharge: HOME | End: 2021-01-07
Payer: MEDICARE

## 2021-01-07 ENCOUNTER — RESULT REVIEW (OUTPATIENT)
Age: 76
End: 2021-01-07

## 2021-01-07 DIAGNOSIS — Z95.5 PRESENCE OF CORONARY ANGIOPLASTY IMPLANT AND GRAFT: Chronic | ICD-10-CM

## 2021-01-07 DIAGNOSIS — Z01.810 ENCOUNTER FOR PREPROCEDURAL CARDIOVASCULAR EXAMINATION: ICD-10-CM

## 2021-01-07 DIAGNOSIS — Z90.49 ACQUIRED ABSENCE OF OTHER SPECIFIED PARTS OF DIGESTIVE TRACT: Chronic | ICD-10-CM

## 2021-01-07 DIAGNOSIS — Z90.710 ACQUIRED ABSENCE OF BOTH CERVIX AND UTERUS: Chronic | ICD-10-CM

## 2021-01-07 DIAGNOSIS — Z95.810 PRESENCE OF AUTOMATIC (IMPLANTABLE) CARDIAC DEFIBRILLATOR: Chronic | ICD-10-CM

## 2021-01-07 PROCEDURE — 93018 CV STRESS TEST I&R ONLY: CPT

## 2021-01-07 PROCEDURE — 78452 HT MUSCLE IMAGE SPECT MULT: CPT | Mod: 26

## 2021-01-10 ENCOUNTER — OUTPATIENT (OUTPATIENT)
Dept: OUTPATIENT SERVICES | Facility: HOSPITAL | Age: 76
LOS: 1 days | Discharge: HOME | End: 2021-01-10

## 2021-01-10 ENCOUNTER — LABORATORY RESULT (OUTPATIENT)
Age: 76
End: 2021-01-10

## 2021-01-10 DIAGNOSIS — Z95.5 PRESENCE OF CORONARY ANGIOPLASTY IMPLANT AND GRAFT: Chronic | ICD-10-CM

## 2021-01-10 DIAGNOSIS — Z90.710 ACQUIRED ABSENCE OF BOTH CERVIX AND UTERUS: Chronic | ICD-10-CM

## 2021-01-10 DIAGNOSIS — Z90.49 ACQUIRED ABSENCE OF OTHER SPECIFIED PARTS OF DIGESTIVE TRACT: Chronic | ICD-10-CM

## 2021-01-10 DIAGNOSIS — Z11.59 ENCOUNTER FOR SCREENING FOR OTHER VIRAL DISEASES: ICD-10-CM

## 2021-01-10 DIAGNOSIS — Z95.810 PRESENCE OF AUTOMATIC (IMPLANTABLE) CARDIAC DEFIBRILLATOR: Chronic | ICD-10-CM

## 2021-01-10 PROBLEM — I25.10 ATHEROSCLEROTIC HEART DISEASE OF NATIVE CORONARY ARTERY WITHOUT ANGINA PECTORIS: Chronic | Status: ACTIVE | Noted: 2020-11-29

## 2021-01-10 PROBLEM — Z86.79 PERSONAL HISTORY OF OTHER DISEASES OF THE CIRCULATORY SYSTEM: Chronic | Status: ACTIVE | Noted: 2021-01-05

## 2021-01-10 PROBLEM — I73.9 PERIPHERAL VASCULAR DISEASE, UNSPECIFIED: Chronic | Status: ACTIVE | Noted: 2021-01-05

## 2021-01-10 PROBLEM — E11.9 TYPE 2 DIABETES MELLITUS WITHOUT COMPLICATIONS: Chronic | Status: ACTIVE | Noted: 2021-01-05

## 2021-01-10 PROBLEM — D49.4 NEOPLASM OF UNSPECIFIED BEHAVIOR OF BLADDER: Chronic | Status: ACTIVE | Noted: 2021-01-05

## 2021-01-10 PROBLEM — I48.91 UNSPECIFIED ATRIAL FIBRILLATION: Chronic | Status: ACTIVE | Noted: 2020-11-12

## 2021-01-10 PROBLEM — E78.5 HYPERLIPIDEMIA, UNSPECIFIED: Chronic | Status: ACTIVE | Noted: 2021-01-05

## 2021-01-12 NOTE — ASU PATIENT PROFILE, ADULT - PMH
Amputation of toe of left foot  L Hallux 5/20  Atrial fibrillation, unspecified type  paf recently stopped eliquis s/t epistatxis  Bladder tumor    CAD (coronary artery disease)  RCA KENDELL  2011 OM2 KENDELL 2006  OM1 KENDELL 2009  CHF (congestive heart failure)    DM (diabetes mellitus)  30 yr  Dyslipidemia    H/O ischemic heart disease    Hernia    History of implantable cardiac defibrillator (ICD)  [employer]  HTN (hypertension)    PAD (peripheral artery disease)  severe s/p stent   Amputation of toe of left foot  L Hallux 5/20  Atrial fibrillation, unspecified type  paf recently stopped eliquis s/t epistatxis  Bladder tumor    CAD (coronary artery disease)  RCA KENDELL  2011 OM2 KENDELL 2006  OM1 KENDELL 2009  CHF (congestive heart failure)    DM (diabetes mellitus)  30 yr  Dyslipidemia    H/O ischemic heart disease    Hernia    History of implantable cardiac defibrillator (ICD)  and pacemaker- [employer]  HTN (hypertension)    PAD (peripheral artery disease)  severe s/p stent

## 2021-01-12 NOTE — ASU PATIENT PROFILE, ADULT - PSH
Cardiac defibrillator in place  2010  H/O: hysterectomy    History of cholecystectomy    Status post coronary artery stent placement

## 2021-01-13 ENCOUNTER — RESULT REVIEW (OUTPATIENT)
Age: 76
End: 2021-01-13

## 2021-01-13 ENCOUNTER — OUTPATIENT (OUTPATIENT)
Dept: OUTPATIENT SERVICES | Facility: HOSPITAL | Age: 76
LOS: 1 days | Discharge: HOME | End: 2021-01-13
Payer: MEDICARE

## 2021-01-13 ENCOUNTER — APPOINTMENT (OUTPATIENT)
Dept: UROLOGY | Facility: HOSPITAL | Age: 76
End: 2021-01-13
Payer: MEDICARE

## 2021-01-13 VITALS
WEIGHT: 141.98 LBS | HEART RATE: 103 BPM | TEMPERATURE: 96 F | OXYGEN SATURATION: 99 % | HEIGHT: 61 IN | SYSTOLIC BLOOD PRESSURE: 145 MMHG | RESPIRATION RATE: 18 BRPM | DIASTOLIC BLOOD PRESSURE: 66 MMHG

## 2021-01-13 VITALS — DIASTOLIC BLOOD PRESSURE: 85 MMHG | HEART RATE: 80 BPM | SYSTOLIC BLOOD PRESSURE: 150 MMHG | RESPIRATION RATE: 20 BRPM

## 2021-01-13 DIAGNOSIS — Z90.49 ACQUIRED ABSENCE OF OTHER SPECIFIED PARTS OF DIGESTIVE TRACT: Chronic | ICD-10-CM

## 2021-01-13 DIAGNOSIS — Z95.810 PRESENCE OF AUTOMATIC (IMPLANTABLE) CARDIAC DEFIBRILLATOR: Chronic | ICD-10-CM

## 2021-01-13 DIAGNOSIS — Z95.5 PRESENCE OF CORONARY ANGIOPLASTY IMPLANT AND GRAFT: Chronic | ICD-10-CM

## 2021-01-13 DIAGNOSIS — Z90.710 ACQUIRED ABSENCE OF BOTH CERVIX AND UTERUS: Chronic | ICD-10-CM

## 2021-01-13 LAB — GLUCOSE BLDC GLUCOMTR-MCNC: 153 MG/DL — HIGH (ref 70–99)

## 2021-01-13 PROCEDURE — 88307 TISSUE EXAM BY PATHOLOGIST: CPT | Mod: 26

## 2021-01-13 PROCEDURE — 52240 CYSTOSCOPY AND TREATMENT: CPT

## 2021-01-13 RX ORDER — SODIUM CHLORIDE 9 MG/ML
1000 INJECTION, SOLUTION INTRAVENOUS
Refills: 0 | Status: DISCONTINUED | OUTPATIENT
Start: 2021-01-13 | End: 2021-01-28

## 2021-01-13 RX ORDER — PHENAZOPYRIDINE HCL 100 MG
200 TABLET ORAL ONCE
Refills: 0 | Status: COMPLETED | OUTPATIENT
Start: 2021-01-13 | End: 2021-01-13

## 2021-01-13 RX ORDER — ACETAMINOPHEN 500 MG
1000 TABLET ORAL ONCE
Refills: 0 | Status: COMPLETED | OUTPATIENT
Start: 2021-01-13 | End: 2021-01-13

## 2021-01-13 RX ADMIN — SODIUM CHLORIDE 100 MILLILITER(S): 9 INJECTION, SOLUTION INTRAVENOUS at 18:29

## 2021-01-13 RX ADMIN — Medication 400 MILLIGRAM(S): at 18:28

## 2021-01-13 RX ADMIN — Medication 200 MILLIGRAM(S): at 18:50

## 2021-01-13 NOTE — CHART NOTE - NSCHARTNOTEFT_GEN_A_CORE
PACU ANESTHESIA ADMISSION NOTE      Procedure:   Post op diagnosis:      ____  Intubated  TV:______       Rate: ______      FiO2: ______    __x__  Patent Airway    _x___  Full return of protective reflexes    ___x_  Full recovery from anesthesia / back to baseline     Vitals:   T:     97.7      R:     18             BP:    177/71              Sat:     99              P: 89      Mental Status:  __x__ Awake   _____ Alert   _____ Drowsy   _____ Sedated    Nausea/Vomiting:  _x___ NO  ______Yes,   See Post - Op Orders          Pain Scale (0-10):  __0___    Treatment: ____ None    ____ See Post - Op/PCA Orders    Post - Operative Fluids:   ____ Oral   __x__ See Post - Op Orders    Plan: Discharge:   __x__Home       _____Floor     _____Critical Care    _____  Other:_________________    Comments:

## 2021-01-13 NOTE — ASU PREOP CHECKLIST - AICD PRESENT
RN DIABETES EDUCATION PROGRESS NOTE    Julissa Batista was seen from 1400 to 1500 for diabetes self-management training in an individual setting. The instruction was given to the patient.    Material was presented using verbal, written, demonstration and return demonstration.     Learning needs were assessed and the patient requires education in diabetes disease process/treatment options, medical nutrition therapy, physical activity, blood glucose monitoring, diabetes medications, acute complications, chronic complications, diabetes psychosocial adjustment and strategies and goal setting.     Highlights of today’s visit:  The patient is here for the initial visit with the RN. The patient states that she has had diabetes for about 10 years. She has never had diabetes education. The patient has never had a blood sugar meter. Her most recent A1C is 7.9%. Her A1C has ranged from 7.5% to 8.0% over the past several years. She is currently taking a low dose of metformin because she cannot tolerate the higher dose and glipizide 5 mg every morning. The patient states that she does forget to take the glipizide at times.  The patient states that she has had times when she has felt shaky and weak. It sounds like she may have been having some low blood sugars.  The patient was given and instructed on a one touch verio blood sugar meter. She will test her blood sugars once a day varying her testing times. Her random blood sugar was 134 today. We reviewed meal planning. The patient does eat 3 meals a day. She feels her biggest down fall is sweets. She enjoys having a cup of sherbet every night before bed. We did discuss meal planning using the plate method for portion control and basic CHO counting. The patient was encouraged to have a form of protein with her meals and with her snacks. She was encouraged to have a CHO and protein snack before bed and not the sherbet. She states that she does drink a lot of water through out the  day. The patient has a gym nearby and has been trying to walk for 30 minutes several times a week. We discussed how to recognize and treat hypoglycemia. The patient was given a diabetes alert ID card to carry at all times. I did discuss possibly adding a GLP1 to the patients regimen. She said she would like to think about this. She is going to see the RD next month. I have encouraged her to make an appointment to see me about 6 weeks from that appointment. The patient was encouraged to call with any other questions or concerns.     Medication Update/Changes:  Possibly adding a GLP1    ASSESSMENT:  Home blood sugar ranges:   Fasting blood sugar   Ranges of other home blood sugars:    Blood sugar at time of office visit:  134 random    Current Labs:  Hemoglobin A1C (%)   Date Value   04/08/2019 7.9 (H)     No results found for: 5GLYH     CHOLESTEROL (mg/dL)   Date Value   12/26/2018 155     CALCULATED LDL (mg/dL)   Date Value   12/26/2018 67     HDL (mg/dL)   Date Value   12/26/2018 52      TRIGLYCERIDE (mg/dL)   Date Value   12/26/2018 179 (H)       Glucose (mg/dL)   Date Value   04/08/2019 211 (H)     Creatinine (mg/dL)   Date Value   04/08/2019 0.99 (H)     GFR Estimate,  (no units)   Date Value   04/08/2019 73     GFR Estimate, Non  (no units)   Date Value   04/08/2019 63     Potassium (mmol/L)   Date Value   04/08/2019 4.3     AST/SGOT (Units/L)   Date Value   04/08/2019 16     ALT/SGPT (Units/L)   Date Value   04/08/2019 31     MICROALBUMIN/CREATININE (mg/g)   Date Value   06/29/2018 230.5 (H)     TSH (mcUnits/mL)   Date Value   04/08/2019 1.403      VITAMIND, 25 HYDROXY (ng/mL)   Date Value   10/22/2015 24.3 (L)       Medications:   Current Outpatient Medications   Medication Sig Dispense Refill   • blood glucose (ONETOUCH VERIO) test strip Test blood sugar  1 times daily as directed. Diagnosis: Type 2 Diabetes. Meter: One touch verio flex 50 each 12   • ONETOUCH DELICA LANCETS  33G Misc To test once a day 100 each 12   • losartan (COZAAR) 50 MG tablet Take 1 tablet by mouth daily. 90 tablet 1   • metFORMIN (GLUCOPHAGE) 500 MG tablet Take 1 tablet by mouth daily (with breakfast). 90 tablet 1   • glipiZIDE (GLUCOTROL) 5 MG tablet Take 1 tablet by mouth daily (before breakfast). 90 tablet 1   • Multiple Vitamin (MULTI-DAY PO)      • omeprazole (PRILOSEC) 20 MG capsule Take 20 mg by mouth daily.     • albuterol 108 (90 BASE) MCG/ACT inhaler Inhale 2 puffs into the lungs every 4 hours as needed for Shortness of Breath or Wheezing. 1 Inhaler 0     No current facility-administered medications for this visit.             Diabetes Care Plan:  To see the RD next month      Written materials provided were:  Diabetes identification card, A1c - For Your Well-Being, Blood Sugar Logbook and Diabetes in Control Booklet, Herc Booklet     NAJMA Gong  Provider order located in EMR.      Diabetes Self Management Education Assessment    Learning Style  When learning something new, do you prefer:   looking at diagrams, charts, or graphs, reading pamphlets, books, or articles, question and answer, talk, group discussion, or guest speakers, demonstrations or models and utilize a smart phone or internet to gather health information    Health History  Do you know what type of diabetes you have?  Yes  Does anyone in your family have diabetes? Yes  Have you had diabetes education before? No    Nutrition / Meal Planning  Do you eat three meals a day?  Yes  Do you eat at about the same time each day?  No  Do have diet limitations? (low salt, no dairy products, no wheat products)  No  Do you drink regular soda or fruit drinks (orange juice, fruit punch)?  Yes  Do you eat desserts/sweets more than once or twice a week?  Yes  Do you drink alcohol? No     Do you \"eat out\" or get \"carry out\" more than once per week? Yes  Do you do your own cooking? Yes  Do you do your own grocery shopping?  Yes    Activity  Do you  exercise at least 30 minutes/3 or more times per week?  No  If yes, what type of activity do you do?  walk  Is there a medical reason for limiting activity?  No    Monitoring  Do you have a blood sugar monitor? No  How many times per day are you testing? Not at all  Do you check your blood sugar level before driving?   Do you have a low blood sugar more than 1 time per week?   In the past 12 months, have you had a high or low blood sugar that required treatment help from another person or hospitalization?    Do you have a sharps container?      Medication  Do you miss or forget to take your pills or insulin?  Yes  Do you carry a current medication list or card in your wallet?  No  Do you wear diabetes identification?  No  Do you carry a form of fast-acting sugar with you?  No    Complications/Problems  Do you have numbness, tingling, sores, or pain in your hands or feet?  No  Do you check your feet daily for any signs of problems?  Yes  Have you had a dilated eye exam in the past year?  No  Have you seen a dentist in the past year?  Yes  Have you had a change or loss in hearing?  No  Do you sleep 6-8 hours per night?  Yes  Do you have sleep apnea?  Yes  Do you have any sexual problems?  No    Socioeconomic  Do you live alone?  No  Do you feel safe in relationships at home?  Yes  Do you work outside the home?  Yes  Do you work second or third shift? No  Do money concerns keep you from:   Taking your medications as prescribed? No   Attending medical appointments?  Yes  Within the past 12 months, have you worried whether food would run out before you had money to buy more? Yes   Do you have cultural or Scientologist practices or beliefs that influence how you care for your diabetes?  No  Over the past two weeks have you felt little interest or pleasure in doing things? No  Over the past two weeks have you felt down, depressed or hopeless? No  To what degree, during the past month, have you been distressed or bothered by  the following:     Feeling overwhelmed with the diagnosis of diabetes? A moderate problem   Feeling overwhelmed by the demands of living with diabetes? A moderate problem    Woman's Health  Are you planning a pregnancy?  No  Have you ever had gestational diabetes or a baby weighing 9 pounds or more at birth?  No    How do you get support?         Which Topics Would You Like to Learn About?  planning Meals and managing stress       yes

## 2021-01-13 NOTE — ASU PREOP CHECKLIST - HEIGHT IN INCHES
ULTRASOUND OF THE CAROTID ARTERIES



CLINICAL HISTORY: Stroke    



COMPARISON STUDY: None.



TECHNIQUE: Real-time, grayscale, and color Doppler sonography of the carotid

arteries was performed. Imaging reviewed in the transverse and longitudinal

planes. NASCET criteria was utilized for stenosis calcification.



FINDINGS:



There is minimal atherosclerotic plaque present    . 

The peak systolic velocity within the right internal carotid artery is 110

cm/sec.

The systolic velocity ratio of right internal to common carotid artery is 1.9.

The peak systolic velocity within the left internal carotid artery is 118

cm/sec.

The systolic velocity ratio left internal to common carotid artery is 1.7.



Antegrade flow is seen in the vertebral arteries. The external carotid arteries

are patent.







IMPRESSION: No evidence of hemodynamically significant carotid stenosis.







Electronically signed by:  Cory Ojeda M.D.

5/31/2017 9:03 PM



Dictated Date/Time:  5/31/2017 9:02 PM 1

## 2021-01-13 NOTE — BRIEF OPERATIVE NOTE - NSICDXBRIEFPROCEDURE_GEN_ALL_CORE_FT
PROCEDURES:  Cystourethroscopy with bladder tumor resection, large 13-Jan-2021 19:07:01  Jr Panda T

## 2021-01-14 PROBLEM — Z95.810 PRESENCE OF AUTOMATIC (IMPLANTABLE) CARDIAC DEFIBRILLATOR: Chronic | Status: ACTIVE | Noted: 2021-01-05

## 2021-01-15 LAB — SURGICAL PATHOLOGY STUDY: SIGNIFICANT CHANGE UP

## 2021-01-18 DIAGNOSIS — I25.10 ATHEROSCLEROTIC HEART DISEASE OF NATIVE CORONARY ARTERY WITHOUT ANGINA PECTORIS: ICD-10-CM

## 2021-01-18 DIAGNOSIS — Z87.891 PERSONAL HISTORY OF NICOTINE DEPENDENCE: ICD-10-CM

## 2021-01-18 DIAGNOSIS — E11.51 TYPE 2 DIABETES MELLITUS WITH DIABETIC PERIPHERAL ANGIOPATHY WITHOUT GANGRENE: ICD-10-CM

## 2021-01-18 DIAGNOSIS — C67.9 MALIGNANT NEOPLASM OF BLADDER, UNSPECIFIED: ICD-10-CM

## 2021-01-18 DIAGNOSIS — I48.0 PAROXYSMAL ATRIAL FIBRILLATION: ICD-10-CM

## 2021-01-18 DIAGNOSIS — I50.9 HEART FAILURE, UNSPECIFIED: ICD-10-CM

## 2021-01-18 DIAGNOSIS — Z90.710 ACQUIRED ABSENCE OF BOTH CERVIX AND UTERUS: ICD-10-CM

## 2021-01-18 DIAGNOSIS — Z88.0 ALLERGY STATUS TO PENICILLIN: ICD-10-CM

## 2021-01-18 DIAGNOSIS — I11.0 HYPERTENSIVE HEART DISEASE WITH HEART FAILURE: ICD-10-CM

## 2021-01-18 DIAGNOSIS — E78.5 HYPERLIPIDEMIA, UNSPECIFIED: ICD-10-CM

## 2021-01-18 DIAGNOSIS — Z88.5 ALLERGY STATUS TO NARCOTIC AGENT: ICD-10-CM

## 2021-01-18 DIAGNOSIS — Z89.412 ACQUIRED ABSENCE OF LEFT GREAT TOE: ICD-10-CM

## 2021-01-18 DIAGNOSIS — Z95.5 PRESENCE OF CORONARY ANGIOPLASTY IMPLANT AND GRAFT: ICD-10-CM

## 2021-01-18 DIAGNOSIS — Z95.810 PRESENCE OF AUTOMATIC (IMPLANTABLE) CARDIAC DEFIBRILLATOR: ICD-10-CM

## 2021-01-28 ENCOUNTER — LABORATORY RESULT (OUTPATIENT)
Age: 76
End: 2021-01-28

## 2021-01-28 ENCOUNTER — APPOINTMENT (OUTPATIENT)
Dept: UROLOGY | Facility: CLINIC | Age: 76
End: 2021-01-28
Payer: MEDICARE

## 2021-01-28 PROCEDURE — 99072 ADDL SUPL MATRL&STAF TM PHE: CPT

## 2021-01-28 PROCEDURE — 99214 OFFICE O/P EST MOD 30 MIN: CPT

## 2021-01-28 NOTE — PHYSICAL EXAM
[General Appearance - Well Developed] : well developed [General Appearance - Well Nourished] : well nourished [Normal Appearance] : normal appearance [Well Groomed] : well groomed [General Appearance - In No Acute Distress] : no acute distress [Abdomen Soft] : soft [Costovertebral Angle Tenderness] : no ~M costovertebral angle tenderness [Abdomen Tenderness] : non-tender [Urinary Bladder Findings] : the bladder was normal on palpation [Edema] : no peripheral edema [] : no respiratory distress [Exaggerated Use Of Accessory Muscles For Inspiration] : no accessory muscle use [Respiration, Rhythm And Depth] : normal respiratory rhythm and effort [Oriented To Time, Place, And Person] : oriented to person, place, and time [Affect] : the affect was normal [Mood] : the mood was normal [Not Anxious] : not anxious [FreeTextEntry1] : toe amputation - LEFT [No Focal Deficits] : no focal deficits [No Palpable Adenopathy] : no palpable adenopathy

## 2021-01-28 NOTE — ASSESSMENT
[FreeTextEntry1] : 76 yo with multifocal bladder cancer\par \par the pathology was reviewed in detail\par the absence of muscle was addressed\par I explained the findings in detail\par I explained the benefit of re-TURBT to obtain muscle\par \par all question were answered\par \par - the patient will provide urine \par = Gemcitabine 6 ttmnts at the [facility]\par

## 2021-01-28 NOTE — HISTORY OF PRESENT ILLNESS
[FreeTextEntry1] : 76 yo with  bladder cancer\par she is here with her daughter to review pathology\par multifocal bladder cancer\par \par pathology 1/19/2021\par high grade bladder cancer\par no LP invasion seen\par no muscle present in specimen\par \par \par CT scan 5/2020\par \par Impression: \par \par Small bilateral pleural effusions. \par \par Multiple nodular peripheral filling defect in the urinary bladder measuring \par up to 1.3 cm, suspicious for transitional cell carcinomas. Cystoscopy is \par recommended for further evaluation. \par \par Stable mild dilatation of the appendix measuring up to 8 mm in caliber. No \par evidence for periappendiceal stranding. Given its stability, mucocele of the \par appendix is considered. Acute appendicitis is possible but less likely due \par to no interval development of periappendiceal stranding. \par \par Trace perihepatic ascites. \par \par she has nocturia and frequency\par \par s/p toe amputation - LEFT\par

## 2021-02-03 LAB
APPEARANCE: CLEAR
BACTERIA UR CULT: NORMAL
BILIRUBIN URINE: NEGATIVE
BLOOD URINE: NORMAL
COLOR: NORMAL
GLUCOSE QUALITATIVE U: ABNORMAL
KETONES URINE: NEGATIVE
LEUKOCYTE ESTERASE URINE: ABNORMAL
NITRITE URINE: NEGATIVE
PH URINE: 6
PROTEIN URINE: NORMAL
SPECIFIC GRAVITY URINE: 1.01
UROBILINOGEN URINE: NORMAL

## 2021-02-05 ENCOUNTER — OUTPATIENT (OUTPATIENT)
Dept: OUTPATIENT SERVICES | Facility: HOSPITAL | Age: 76
LOS: 1 days | Discharge: HOME | End: 2021-02-05

## 2021-02-05 ENCOUNTER — LABORATORY RESULT (OUTPATIENT)
Age: 76
End: 2021-02-05

## 2021-02-05 DIAGNOSIS — Z95.810 PRESENCE OF AUTOMATIC (IMPLANTABLE) CARDIAC DEFIBRILLATOR: Chronic | ICD-10-CM

## 2021-02-05 DIAGNOSIS — Z95.5 PRESENCE OF CORONARY ANGIOPLASTY IMPLANT AND GRAFT: Chronic | ICD-10-CM

## 2021-02-05 DIAGNOSIS — Z11.59 ENCOUNTER FOR SCREENING FOR OTHER VIRAL DISEASES: ICD-10-CM

## 2021-02-05 DIAGNOSIS — Z90.49 ACQUIRED ABSENCE OF OTHER SPECIFIED PARTS OF DIGESTIVE TRACT: Chronic | ICD-10-CM

## 2021-02-05 DIAGNOSIS — Z90.710 ACQUIRED ABSENCE OF BOTH CERVIX AND UTERUS: Chronic | ICD-10-CM

## 2021-02-08 ENCOUNTER — APPOINTMENT (OUTPATIENT)
Dept: UROLOGY | Facility: CLINIC | Age: 76
End: 2021-02-08
Payer: MEDICARE

## 2021-02-08 PROCEDURE — 99448 NTRPROF PH1/NTRNET/EHR 21-30: CPT

## 2021-02-09 NOTE — HISTORY OF PRESENT ILLNESS
[Verbal consent obtained from patient] : the patient, [unfilled] [Home] : at home, [unfilled] , at the time of the visit. [Medical Office: ([facility])___] : at the medical office located in  [FreeTextEntry1] : 74 yo with  bladder cancer\par she is here with her daughter to review pathology\par multifocal bladder cancer\par \par pathology 1/19/2021\par high grade bladder cancer\par no LP invasion seen\par no muscle present in specimen\par \par \par CT scan 5/2020\par \par Impression: \par \par Small bilateral pleural effusions. \par \par Multiple nodular peripheral filling defect in the urinary bladder measuring \par up to 1.3 cm, suspicious for transitional cell carcinomas. Cystoscopy is \par recommended for further evaluation. \par \par Stable mild dilatation of the appendix measuring up to 8 mm in caliber. No \par evidence for periappendiceal stranding. Given its stability, mucocele of the \par appendix is considered. Acute appendicitis is possible but less likely due \par to no interval development of periappendiceal stranding. \par \par Trace perihepatic ascites. \par \par she has nocturia and frequency\par \par s/p toe amputation - LEFT\par \par called to discuss gemvitabine intravesical treatments\par

## 2021-02-09 NOTE — PHYSICAL EXAM
[Oriented To Time, Place, And Person] : oriented to person, place, and time [Not Anxious] : not anxious

## 2021-02-10 ENCOUNTER — OUTPATIENT (OUTPATIENT)
Dept: OUTPATIENT SERVICES | Facility: HOSPITAL | Age: 76
LOS: 1 days | Discharge: HOME | End: 2021-02-10

## 2021-02-10 ENCOUNTER — APPOINTMENT (OUTPATIENT)
Dept: UROLOGY | Facility: CLINIC | Age: 76
End: 2021-02-10
Payer: MEDICARE

## 2021-02-10 VITALS
SYSTOLIC BLOOD PRESSURE: 141 MMHG | RESPIRATION RATE: 14 BRPM | BODY MASS INDEX: 26.81 KG/M2 | WEIGHT: 142 LBS | HEART RATE: 74 BPM | HEIGHT: 61 IN | TEMPERATURE: 97.6 F | DIASTOLIC BLOOD PRESSURE: 66 MMHG

## 2021-02-10 DIAGNOSIS — Z95.810 PRESENCE OF AUTOMATIC (IMPLANTABLE) CARDIAC DEFIBRILLATOR: Chronic | ICD-10-CM

## 2021-02-10 DIAGNOSIS — Z90.49 ACQUIRED ABSENCE OF OTHER SPECIFIED PARTS OF DIGESTIVE TRACT: Chronic | ICD-10-CM

## 2021-02-10 DIAGNOSIS — Z95.5 PRESENCE OF CORONARY ANGIOPLASTY IMPLANT AND GRAFT: Chronic | ICD-10-CM

## 2021-02-10 DIAGNOSIS — Z90.710 ACQUIRED ABSENCE OF BOTH CERVIX AND UTERUS: Chronic | ICD-10-CM

## 2021-02-10 DIAGNOSIS — C67.9 MALIGNANT NEOPLASM OF BLADDER, UNSPECIFIED: ICD-10-CM

## 2021-02-10 PROCEDURE — 51720 TREATMENT OF BLADDER LESION: CPT

## 2021-02-10 RX ORDER — PHENAZOPYRIDINE 100 MG/1
100 TABLET, FILM COATED ORAL 3 TIMES DAILY
Qty: 15 | Refills: 0 | Status: COMPLETED | COMMUNITY
Start: 2021-01-13 | End: 2021-02-10

## 2021-02-10 RX ORDER — SULFAMETHOXAZOLE AND TRIMETHOPRIM 800; 160 MG/1; MG/1
800-160 TABLET ORAL TWICE DAILY
Qty: 6 | Refills: 0 | Status: COMPLETED | COMMUNITY
Start: 2021-01-13 | End: 2021-02-10

## 2021-02-10 RX ORDER — GEMCITABINE 38 MG/ML
2000 INJECTION, SOLUTION INTRAVENOUS ONCE
Refills: 0 | Status: DISCONTINUED | OUTPATIENT
Start: 2021-02-10 | End: 2021-05-16

## 2021-02-17 ENCOUNTER — OUTPATIENT (OUTPATIENT)
Dept: OUTPATIENT SERVICES | Facility: HOSPITAL | Age: 76
LOS: 1 days | Discharge: HOME | End: 2021-02-17

## 2021-02-17 ENCOUNTER — APPOINTMENT (OUTPATIENT)
Dept: UROLOGY | Facility: CLINIC | Age: 76
End: 2021-02-17
Payer: MEDICARE

## 2021-02-17 VITALS
WEIGHT: 142 LBS | HEART RATE: 79 BPM | SYSTOLIC BLOOD PRESSURE: 150 MMHG | HEIGHT: 61 IN | BODY MASS INDEX: 26.81 KG/M2 | RESPIRATION RATE: 14 BRPM | DIASTOLIC BLOOD PRESSURE: 78 MMHG

## 2021-02-17 DIAGNOSIS — Z95.5 PRESENCE OF CORONARY ANGIOPLASTY IMPLANT AND GRAFT: Chronic | ICD-10-CM

## 2021-02-17 DIAGNOSIS — Z90.49 ACQUIRED ABSENCE OF OTHER SPECIFIED PARTS OF DIGESTIVE TRACT: Chronic | ICD-10-CM

## 2021-02-17 DIAGNOSIS — Z95.810 PRESENCE OF AUTOMATIC (IMPLANTABLE) CARDIAC DEFIBRILLATOR: Chronic | ICD-10-CM

## 2021-02-17 DIAGNOSIS — Z90.710 ACQUIRED ABSENCE OF BOTH CERVIX AND UTERUS: Chronic | ICD-10-CM

## 2021-02-17 PROCEDURE — 51720 TREATMENT OF BLADDER LESION: CPT

## 2021-02-17 RX ORDER — GEMCITABINE 38 MG/ML
2000 INJECTION, SOLUTION INTRAVENOUS ONCE
Refills: 0 | Status: DISCONTINUED | OUTPATIENT
Start: 2021-02-17 | End: 2021-05-23

## 2021-02-24 ENCOUNTER — OUTPATIENT (OUTPATIENT)
Dept: OUTPATIENT SERVICES | Facility: HOSPITAL | Age: 76
LOS: 1 days | Discharge: HOME | End: 2021-02-24

## 2021-02-24 ENCOUNTER — APPOINTMENT (OUTPATIENT)
Dept: UROLOGY | Facility: CLINIC | Age: 76
End: 2021-02-24
Payer: MEDICARE

## 2021-02-24 VITALS
BODY MASS INDEX: 26.81 KG/M2 | TEMPERATURE: 98.3 F | SYSTOLIC BLOOD PRESSURE: 180 MMHG | WEIGHT: 142 LBS | DIASTOLIC BLOOD PRESSURE: 69 MMHG | RESPIRATION RATE: 14 BRPM | HEART RATE: 73 BPM | HEIGHT: 61 IN

## 2021-02-24 DIAGNOSIS — C67.9 MALIGNANT NEOPLASM OF BLADDER, UNSPECIFIED: ICD-10-CM

## 2021-02-24 DIAGNOSIS — Z90.49 ACQUIRED ABSENCE OF OTHER SPECIFIED PARTS OF DIGESTIVE TRACT: Chronic | ICD-10-CM

## 2021-02-24 DIAGNOSIS — Z95.810 PRESENCE OF AUTOMATIC (IMPLANTABLE) CARDIAC DEFIBRILLATOR: Chronic | ICD-10-CM

## 2021-02-24 DIAGNOSIS — Z90.710 ACQUIRED ABSENCE OF BOTH CERVIX AND UTERUS: Chronic | ICD-10-CM

## 2021-02-24 DIAGNOSIS — Z95.5 PRESENCE OF CORONARY ANGIOPLASTY IMPLANT AND GRAFT: Chronic | ICD-10-CM

## 2021-02-24 PROCEDURE — 51720 TREATMENT OF BLADDER LESION: CPT

## 2021-02-24 RX ORDER — GEMCITABINE 38 MG/ML
2000 INJECTION, SOLUTION INTRAVENOUS ONCE
Refills: 0 | Status: DISCONTINUED | OUTPATIENT
Start: 2021-02-24 | End: 2021-05-30

## 2021-02-24 NOTE — ED ADULT TRIAGE NOTE - LOCATION:
Left arm; You can access the FollowMyHealth Patient Portal offered by [facility] by registering at the following website: [email or web address]. By joining [facility]’s FollowMyHealth portal, you will also be able to view your health information using other applications (apps) compatible with our system.

## 2021-03-03 ENCOUNTER — OUTPATIENT (OUTPATIENT)
Dept: OUTPATIENT SERVICES | Facility: HOSPITAL | Age: 76
LOS: 1 days | Discharge: HOME | End: 2021-03-03

## 2021-03-03 ENCOUNTER — APPOINTMENT (OUTPATIENT)
Dept: UROLOGY | Facility: CLINIC | Age: 76
End: 2021-03-03
Payer: MEDICARE

## 2021-03-03 VITALS
DIASTOLIC BLOOD PRESSURE: 66 MMHG | RESPIRATION RATE: 14 BRPM | BODY MASS INDEX: 26.81 KG/M2 | SYSTOLIC BLOOD PRESSURE: 142 MMHG | HEART RATE: 70 BPM | HEIGHT: 61 IN | WEIGHT: 142 LBS | TEMPERATURE: 97.8 F

## 2021-03-03 DIAGNOSIS — Z90.710 ACQUIRED ABSENCE OF BOTH CERVIX AND UTERUS: Chronic | ICD-10-CM

## 2021-03-03 DIAGNOSIS — Z90.49 ACQUIRED ABSENCE OF OTHER SPECIFIED PARTS OF DIGESTIVE TRACT: Chronic | ICD-10-CM

## 2021-03-03 DIAGNOSIS — Z95.810 PRESENCE OF AUTOMATIC (IMPLANTABLE) CARDIAC DEFIBRILLATOR: Chronic | ICD-10-CM

## 2021-03-03 DIAGNOSIS — Z95.5 PRESENCE OF CORONARY ANGIOPLASTY IMPLANT AND GRAFT: Chronic | ICD-10-CM

## 2021-03-03 PROCEDURE — 51720 TREATMENT OF BLADDER LESION: CPT

## 2021-03-03 RX ORDER — GEMCITABINE 38 MG/ML
2000 INJECTION, SOLUTION INTRAVENOUS ONCE
Refills: 0 | Status: DISCONTINUED | OUTPATIENT
Start: 2021-03-03 | End: 2021-06-06

## 2021-03-04 DIAGNOSIS — C67.9 MALIGNANT NEOPLASM OF BLADDER, UNSPECIFIED: ICD-10-CM

## 2021-03-05 ENCOUNTER — LABORATORY RESULT (OUTPATIENT)
Age: 76
End: 2021-03-05

## 2021-03-05 ENCOUNTER — OUTPATIENT (OUTPATIENT)
Dept: OUTPATIENT SERVICES | Facility: HOSPITAL | Age: 76
LOS: 1 days | Discharge: HOME | End: 2021-03-05

## 2021-03-05 DIAGNOSIS — Z90.710 ACQUIRED ABSENCE OF BOTH CERVIX AND UTERUS: Chronic | ICD-10-CM

## 2021-03-05 DIAGNOSIS — Z95.5 PRESENCE OF CORONARY ANGIOPLASTY IMPLANT AND GRAFT: Chronic | ICD-10-CM

## 2021-03-05 DIAGNOSIS — Z11.59 ENCOUNTER FOR SCREENING FOR OTHER VIRAL DISEASES: ICD-10-CM

## 2021-03-05 DIAGNOSIS — Z95.810 PRESENCE OF AUTOMATIC (IMPLANTABLE) CARDIAC DEFIBRILLATOR: Chronic | ICD-10-CM

## 2021-03-05 DIAGNOSIS — Z90.49 ACQUIRED ABSENCE OF OTHER SPECIFIED PARTS OF DIGESTIVE TRACT: Chronic | ICD-10-CM

## 2021-03-10 ENCOUNTER — OUTPATIENT (OUTPATIENT)
Dept: OUTPATIENT SERVICES | Facility: HOSPITAL | Age: 76
LOS: 1 days | Discharge: HOME | End: 2021-03-10

## 2021-03-10 ENCOUNTER — APPOINTMENT (OUTPATIENT)
Dept: UROLOGY | Facility: CLINIC | Age: 76
End: 2021-03-10
Payer: MEDICARE

## 2021-03-10 VITALS
WEIGHT: 142 LBS | HEART RATE: 70 BPM | BODY MASS INDEX: 26.81 KG/M2 | TEMPERATURE: 97.8 F | RESPIRATION RATE: 14 BRPM | DIASTOLIC BLOOD PRESSURE: 73 MMHG | HEIGHT: 61 IN | SYSTOLIC BLOOD PRESSURE: 139 MMHG

## 2021-03-10 DIAGNOSIS — Z95.810 PRESENCE OF AUTOMATIC (IMPLANTABLE) CARDIAC DEFIBRILLATOR: Chronic | ICD-10-CM

## 2021-03-10 DIAGNOSIS — Z90.49 ACQUIRED ABSENCE OF OTHER SPECIFIED PARTS OF DIGESTIVE TRACT: Chronic | ICD-10-CM

## 2021-03-10 DIAGNOSIS — Z95.5 PRESENCE OF CORONARY ANGIOPLASTY IMPLANT AND GRAFT: Chronic | ICD-10-CM

## 2021-03-10 DIAGNOSIS — Z90.710 ACQUIRED ABSENCE OF BOTH CERVIX AND UTERUS: Chronic | ICD-10-CM

## 2021-03-10 PROCEDURE — 51720 TREATMENT OF BLADDER LESION: CPT

## 2021-03-10 RX ORDER — GEMCITABINE 38 MG/ML
2000 INJECTION, SOLUTION INTRAVENOUS ONCE
Refills: 0 | Status: DISCONTINUED | OUTPATIENT
Start: 2021-03-10 | End: 2021-06-06

## 2021-03-11 DIAGNOSIS — C67.9 MALIGNANT NEOPLASM OF BLADDER, UNSPECIFIED: ICD-10-CM

## 2021-03-17 ENCOUNTER — OUTPATIENT (OUTPATIENT)
Dept: OUTPATIENT SERVICES | Facility: HOSPITAL | Age: 76
LOS: 1 days | Discharge: HOME | End: 2021-03-17

## 2021-03-17 ENCOUNTER — APPOINTMENT (OUTPATIENT)
Dept: UROLOGY | Facility: CLINIC | Age: 76
End: 2021-03-17
Payer: MEDICARE

## 2021-03-17 VITALS
SYSTOLIC BLOOD PRESSURE: 129 MMHG | BODY MASS INDEX: 27.75 KG/M2 | RESPIRATION RATE: 14 BRPM | WEIGHT: 147 LBS | HEIGHT: 61 IN | HEART RATE: 80 BPM | DIASTOLIC BLOOD PRESSURE: 66 MMHG | TEMPERATURE: 97.1 F

## 2021-03-17 DIAGNOSIS — Z95.5 PRESENCE OF CORONARY ANGIOPLASTY IMPLANT AND GRAFT: Chronic | ICD-10-CM

## 2021-03-17 DIAGNOSIS — Z95.810 PRESENCE OF AUTOMATIC (IMPLANTABLE) CARDIAC DEFIBRILLATOR: Chronic | ICD-10-CM

## 2021-03-17 DIAGNOSIS — Z90.710 ACQUIRED ABSENCE OF BOTH CERVIX AND UTERUS: Chronic | ICD-10-CM

## 2021-03-17 DIAGNOSIS — Z90.49 ACQUIRED ABSENCE OF OTHER SPECIFIED PARTS OF DIGESTIVE TRACT: Chronic | ICD-10-CM

## 2021-03-17 PROCEDURE — 51720 TREATMENT OF BLADDER LESION: CPT

## 2021-03-17 RX ORDER — GEMCITABINE 38 MG/ML
2000 INJECTION, SOLUTION INTRAVENOUS ONCE
Refills: 0 | Status: DISCONTINUED | OUTPATIENT
Start: 2021-03-17 | End: 2021-06-20

## 2021-03-18 DIAGNOSIS — C67.9 MALIGNANT NEOPLASM OF BLADDER, UNSPECIFIED: ICD-10-CM

## 2021-03-22 NOTE — CONSULT NOTE ADULT - CONSULT REQUESTED BY NAME
Medicine Yaya Tariq Xolair Pregnancy And Lactation Text: This medication is Pregnancy Category B and is considered safe during pregnancy. This medication is excreted in breast milk.

## 2021-04-02 ENCOUNTER — APPOINTMENT (OUTPATIENT)
Dept: CARDIOLOGY | Facility: CLINIC | Age: 76
End: 2021-04-02

## 2021-04-23 NOTE — ED ADULT NURSE NOTE - NURSING MUSC STRENGTH
Interval/Overnight Events: No issues overnight    GUILLERMO MAURER is a 16y Male    VITAL SIGNS:  T(C): 37 (04-23-21 @ 08:00), Max: 37 (04-23-21 @ 08:00)  HR: 87 (04-23-21 @ 08:00) (68 - 87)  BP: 109/62 (04-23-21 @ 08:00) (102/78 - 126/79)  ABP: --  ABP(mean): --  RR: 18 (04-23-21 @ 08:00) (14 - 20)  SpO2: 97% (04-23-21 @ 08:00) (94% - 98%)  CVP(mm Hg): --  End-Tidal CO2:  NIRS:    ===============================RESPIRATORY==============================  [x ] FiO2: _ra__ 	[ ] Heliox: ____ 		[ ] BiPAP: ___   [ ] NC: __  Liters			[ ] HFNC: __ 	Liters, FiO2: __  [ ] Mechanical Ventilation:   [ ] Inhaled Nitric Oxide:    Respiratory Medications:  cetirizine Oral Tab/Cap - Peds 10 milliGRAM(s) Oral daily PRN    [ ] Extubation Readiness Assessed  Comments:    =============================CARDIOVASCULAR============================  Cardiovascular Medications:    Cardiac Rhythm:	[x] NSR		[ ] Other:  Comments:    =========================HEMATOLOGY/ONCOLOGY=========================                                            12.7                  Neurophils% (auto):   50.0   (04-23 @ 02:59):    3.38 )-----------(43           Lymphocytes% (auto):  27.8                                          38.7                   Eosinphils% (auto):   0.0      Manual%: Neutrophils x    ; Lymphocytes x    ; Eosinophils x    ; Bands%: x    ; Blasts x          Transfusions:	[ ] PRBC	[ ] Platelets	[ ] FFP		[ ] Cryoprecipitate    Hematologic/Oncologic Medications:  defibrotide IV Intermittent - Peds 525 milliGRAM(s) IV Intermittent every 6 hours    DVT Prophylaxis:  Comments:    ============================INFECTIOUS DISEASE===========================  Antimicrobials/Immunologic Medications:  clotrimazole  Oral Lozenge - Peds 1 Lozenge Oral two times a day  pentamidine IV Intermittent - Peds 300 milliGRAM(s) IV Intermittent every 2 weeks    RECENT CULTURES:        ======================FLUIDS/ELECTROLYTES/NUTRITION=====================  I&O's Summary    22 Apr 2021 07:01  -  23 Apr 2021 07:00  --------------------------------------------------------  IN: 1792 mL / OUT: 3300 mL / NET: -1508 mL      Daily                             139    |  103    |  8                   Calcium: 10.3  / iCa: x      (04-23 @ 02:59)    ----------------------------<  107       Magnesium: 1.8                              4.1     |  24     |  0.32             Phosphorous: 5.5      TPro  7.9    /  Alb  4.3    /  TBili  4.7    /  DBili  2.9    /  AST  40     /  ALT  55     /  AlkPhos  163    23 Apr 2021 02:59    Diet:	[x ] Regular	[ ] Soft		[ ] Clears	[ ] NPO  .	[ ] Other:  .	[ ] NGT		[ ] NDT		[ ] GT		[ ] GJT    Gastrointestinal Medications:  dextrose 5% + sodium chloride 0.9%. - Pediatric 1000 milliLiter(s) IV Continuous <Continuous>  famotidine  Oral Tab/Cap - Peds 20 milliGRAM(s) Oral two times a day  phytonadione  Oral Liquid - Peds 5 milliGRAM(s) Oral daily  polyethylene glycol 3350 Oral Powder - Peds 17 Gram(s) Oral at bedtime PRN  senna 8.6 milliGRAM(s) Oral Tablet - Peds 1 Tablet(s) Oral at bedtime PRN  ursodiol Oral Tab/Cap - Peds 300 milliGRAM(s) Oral every 12 hours    Comments:    ==============================NEUROLOGY===============================  [ ] SBS:		[ ] ANU-1:	[ ] BIS:  [x] Adequacy of sedation and pain control has been assessed and adjusted    Neurologic Medications:  acetaminophen   Oral Tab/Cap - Peds. 650 milliGRAM(s) Oral every 6 hours PRN  clonazePAM Oral Disintegrating Tablet - Peds 0.5 milliGRAM(s) Oral daily  diphenhydrAMINE IV Intermittent - Peds 50 milliGRAM(s) IV Intermittent every 6 hours PRN  gabapentin Oral Liquid - Peds 450 milliGRAM(s) Oral <User Schedule>  hydrOXYzine  Oral Tab/Cap - Peds 25 milliGRAM(s) Oral every 6 hours PRN  melatonin Oral Tab/Cap - Peds 5 milliGRAM(s) Oral at bedtime PRN  ondansetron IV Intermittent - Peds 8 milliGRAM(s) IV Intermittent every 8 hours PRN  oxyCODONE   IR Oral Tab/Cap - Peds 7.5 milliGRAM(s) Oral every 6 hours PRN  risperiDONE  Oral Tab/Cap - Peds 0.5 milliGRAM(s) Oral two times a day    Comments:    OTHER MEDICATIONS:  Endocrine/Metabolic Medications:  Genitourinary Medications:  Topical/Other Medications:  chlorhexidine 0.12% Oral Liquid - Peds 15 milliLiter(s) Swish and Spit three times a day  chlorhexidine 2% Topical Cloths - Peds 1 Application(s) Topical daily  FIRST- Mouthwash  BLM - Peds 15 milliLiter(s) Swish and Spit two times a day  mupirocin 2% Topical Ointment - Peds 1 Application(s) Topical daily        General:	In no acute distress  Respiratory:	Lungs clear to auscultation bilaterally. Good aeration. No rales,   .		rhonchi, retractions or wheezing. Effort even and unlabored.  CV:		Regular rate and rhythm. Normal S1/S2. No murmurs, rubs, or   .		gallop. Capillary refill < 2 seconds. Distal pulses 2+ and equal.  Abdomen:	Soft, non-distended. Bowel sounds present. No palpable   .		hepatosplenomegaly.  Skin:		No rash.  Extremities:	Warm and well perfused. No gross extremity deformities.  Neurologic:	Alert and oriented. Ambulatory, very active. No acute change from baseline exam.      ________________________________________________________________  Patient and Parent/Guardian was updated as to the progress/plan of care.  Father on rounds.    The patient is improving but requires continued monitoring and adjustment of therapy. hand grasp, leg strength strong and equal bilaterally

## 2021-05-20 ENCOUNTER — APPOINTMENT (OUTPATIENT)
Dept: UROLOGY | Facility: CLINIC | Age: 76
End: 2021-05-20
Payer: MEDICARE

## 2021-05-20 ENCOUNTER — LABORATORY RESULT (OUTPATIENT)
Age: 76
End: 2021-05-20

## 2021-05-20 DIAGNOSIS — C67.9 MALIGNANT NEOPLASM OF BLADDER, UNSPECIFIED: ICD-10-CM

## 2021-05-20 PROCEDURE — 52000 CYSTOURETHROSCOPY: CPT

## 2021-05-21 ENCOUNTER — APPOINTMENT (OUTPATIENT)
Dept: CARDIOLOGY | Facility: CLINIC | Age: 76
End: 2021-05-21
Payer: MEDICARE

## 2021-05-21 VITALS
OXYGEN SATURATION: 99 % | DIASTOLIC BLOOD PRESSURE: 84 MMHG | SYSTOLIC BLOOD PRESSURE: 134 MMHG | TEMPERATURE: 97.9 F | BODY MASS INDEX: 28.32 KG/M2 | HEIGHT: 61 IN | HEART RATE: 67 BPM | WEIGHT: 150 LBS

## 2021-05-21 LAB
APPEARANCE: CLEAR
BILIRUBIN URINE: NEGATIVE
BLOOD URINE: NEGATIVE
COLOR: NORMAL
GLUCOSE QUALITATIVE U: ABNORMAL
KETONES URINE: NEGATIVE
LEUKOCYTE ESTERASE URINE: ABNORMAL
NITRITE URINE: NEGATIVE
PH URINE: 6.5
PROTEIN URINE: ABNORMAL
SPECIFIC GRAVITY URINE: 1.01
UROBILINOGEN URINE: NORMAL

## 2021-05-21 PROCEDURE — 93000 ELECTROCARDIOGRAM COMPLETE: CPT

## 2021-05-21 PROCEDURE — 99214 OFFICE O/P EST MOD 30 MIN: CPT

## 2021-05-21 NOTE — REVIEW OF SYSTEMS
[Leg Claudication] : intermittent leg claudication [Fever] : no fever [Chills] : no chills [SOB] : no shortness of breath [Chest Discomfort] : no chest discomfort [Cough] : no cough [Abdominal Pain] : no abdominal pain [Nausea] : no nausea [Vomiting] : no vomiting

## 2021-05-21 NOTE — PHYSICAL EXAM
[General Appearance - Well Developed] : well developed [] : no respiratory distress [Respiration, Rhythm And Depth] : normal respiratory rhythm and effort [Exaggerated Use Of Accessory Muscles For Inspiration] : no accessory muscle use [Auscultation Breath Sounds / Voice Sounds] : lungs were clear to auscultation bilaterally [Heart Rate And Rhythm] : heart rate and rhythm were normal [Heart Sounds] : normal S1 and S2 [Murmurs] : no murmurs present [Edema] : no peripheral edema present [2+] : left 2+ [1+] : left 1+ [Abdomen Soft] : soft [Abdomen Tenderness] : non-tender [Oriented To Time, Place, And Person] : oriented to person, place, and time [FreeTextEntry1] : Closed, dry ulcer on the lateral aspect of left foot

## 2021-05-21 NOTE — ASSESSMENT
[FreeTextEntry1] : 75 y.o female patient presents to the office for a follow-up visit of PAD and renal artery stenosis\par \par # PAD\par - Patient feeling better\par - [place] 5\par - Left foot ulcer (dry, closed)\par - Will check ESTRELLA/PVR\par - Podiatry follow-up\par \par # Severe ostial bilateral renal stenoses\par - Will check home meds for anti-hypertensive\par - Patient instructed to keep log of BP at home and bring numbers to next visit\par - Will check renal artery US to check stenosis and size of kidneys\par \par \par F/U in 2 weeks

## 2021-05-21 NOTE — HISTORY OF PRESENT ILLNESS
[FreeTextEntry1] : 75 y.o female patient with PMH of tobacco abuse (quit), Afib (stopped Eliquis due to epistaxis) HTN, HLD, CAD (RCA KENDELL 2011, om2 2006, om 2009), DM, ICD ([place]), PAD s/p left dSFA, dLeft pop, TPT trunk and left PT/plantar for left foot ulcer. Due to incomplete healing of ulcer pt underwent another angiogram with intervention of left popliteal and peroneal arteries with PTA and KENDELL, high grade bladder cancer with intravesical treatment, Gemcitabine presents to the office for a follow-up visit of PAD and renal artery stenosis\par \par Patient reports that her PAD symptoms significantly improved. She doesn't walk a lot but is able to go shop although she has to stop a few times because of LE pain; at rest she sometimes reports heaviness in her feet. She has an ulcer on the lateral aspect of the left foot\par \par Patient also known to have severe ostial stenoses of right and left renal arteries; she states that her BP is controlled at home; no change in urination or other significant symptoms.

## 2021-05-24 LAB
BACTERIA UR CULT: NORMAL
URINE CYTOLOGY: NORMAL

## 2021-05-28 ENCOUNTER — APPOINTMENT (OUTPATIENT)
Dept: CARDIOLOGY | Facility: CLINIC | Age: 76
End: 2021-05-28
Payer: MEDICARE

## 2021-05-28 ENCOUNTER — APPOINTMENT (OUTPATIENT)
Dept: CARDIOLOGY | Facility: CLINIC | Age: 76
End: 2021-05-28

## 2021-05-28 PROCEDURE — 93975 VASCULAR STUDY: CPT

## 2021-06-16 ENCOUNTER — APPOINTMENT (OUTPATIENT)
Dept: CARDIOLOGY | Facility: CLINIC | Age: 76
End: 2021-06-16

## 2021-06-18 ENCOUNTER — APPOINTMENT (OUTPATIENT)
Dept: CARDIOLOGY | Facility: CLINIC | Age: 76
End: 2021-06-18
Payer: MEDICARE

## 2021-06-18 VITALS
BODY MASS INDEX: 28.32 KG/M2 | OXYGEN SATURATION: 98 % | SYSTOLIC BLOOD PRESSURE: 162 MMHG | WEIGHT: 150 LBS | HEART RATE: 62 BPM | HEIGHT: 61 IN | TEMPERATURE: 97.9 F | DIASTOLIC BLOOD PRESSURE: 80 MMHG

## 2021-06-18 PROCEDURE — 99214 OFFICE O/P EST MOD 30 MIN: CPT

## 2021-06-18 NOTE — HISTORY OF PRESENT ILLNESS
[FreeTextEntry1] : 75F\par h/o tobacco abuse (quit), Afib (stopped Eliquis due to epistaxis) HTN, HLD, DM, high grade bladder cancer\par h/o CAD (RCA KENDELL 2011, om2 2006, om 2009), ICD ([place])\par h/o PAD s/p left dSFA, dLeft pop, TPT trunk and left PT/plantar for left foot ulcer. Due to incomplete healing of ulcer pt underwent another angiogram with intervention of left popliteal and peroneal arteries with PTA and KENDELL\par Presents to the office for a follow-up visit of PAD and renal artery stenosis\par Continues to have BLE pain and burning with mild-moderate exertion\par BP uncontrolled at home\par Unable to do ESTRELLA/PVR as there was a equipment malfunction on her day of appointment\par Endorses having SOB intermittently at rest and when speaks too much \par \par 5/28/21 Renal US doppler showed severe ostial stenoses of right and left renal arteries. Peak ostium systolic velocity > 400 cm/sec\par 1/7/21 NM stress test did not show any reperfusion defects\par 12/14/20 TTE LVEF 63% Mod MR, Mild TR, G2DD\par 11/6/20 ESTRELLA 0.59 Severe R popliteal A stenosis, ESTRELLA 0.67 Severe L tibial A stenosis

## 2021-06-18 NOTE — DISCUSSION/SUMMARY
[FreeTextEntry1] : 75F\par h/o tobacco abuse (quit), Afib (stopped Eliquis due to epistaxis) HTN, HLD, DM, high grade bladder cancer\par h/o CAD (RCA KENDELL 2011, om2 2006, om 2009), ICD ([place])\par h/o PAD s/p left dSFA, dLeft pop, TPT trunk and left PT/plantar for left foot ulcer. Due to incomplete healing of ulcer pt underwent another angiogram with intervention of left popliteal and peroneal arteries with PTA and KENDELL\par \par 5/28/21 Renal US doppler showed severe ostial stenoses of right and left renal arteries. Peak ostium systolic velocity > 400 cm/sec\par 1/7/21 NM stress test did not show any reperfusion defects\par 12/14/20 TTE LVEF 63% Mod MR, Mild TR, G2DD\par 11/6/20 ESTRELLA 0.59 Severe R popliteal A stenosis, ESTRELLA 0.67 Severe L tibial A stenosis \par \par PAD \par Severe b/l Renal artery stenosis\par Uncontrolled HTN\par \par Plan:\par - ESTRELLA with PVR\par - Renal arteries intervention planning   \par - CMP, CBC, Coags

## 2021-06-18 NOTE — PHYSICAL EXAM
[General Appearance - Well Developed] : well developed [General Appearance - Well Nourished] : well nourished [Normal Conjunctiva] : the conjunctiva exhibited no abnormalities [Normal Oral Mucosa] : normal oral mucosa [Respiration, Rhythm And Depth] : normal respiratory rhythm and effort [Auscultation Breath Sounds / Voice Sounds] : lungs were clear to auscultation bilaterally [Heart Sounds] : normal S1 and S2 [Murmurs] : no murmurs present [Abdomen Soft] : soft [Abnormal Walk] : normal gait [Skin Color & Pigmentation] : normal skin color and pigmentation [Oriented To Time, Place, And Person] : oriented to person, place, and time [Affect] : the affect was normal [FreeTextEntry1] : L lateral foot ulceration. R lateral and medial foot erythema with tenderness

## 2021-06-24 LAB
ALBUMIN SERPL ELPH-MCNC: 4 G/DL
ALP BLD-CCNC: 205 U/L
ALT SERPL-CCNC: 17 U/L
ANION GAP SERPL CALC-SCNC: 15 MMOL/L
APTT BLD: 31.9 SEC
AST SERPL-CCNC: 18 U/L
BASOPHILS # BLD AUTO: 0.05 K/UL
BASOPHILS NFR BLD AUTO: 0.4 %
BILIRUB SERPL-MCNC: 0.2 MG/DL
BUN SERPL-MCNC: 49 MG/DL
CALCIUM SERPL-MCNC: 9.8 MG/DL
CHLORIDE SERPL-SCNC: 100 MMOL/L
CO2 SERPL-SCNC: 21 MMOL/L
CREAT SERPL-MCNC: 1.6 MG/DL
EOSINOPHIL # BLD AUTO: 0.26 K/UL
EOSINOPHIL NFR BLD AUTO: 2.1 %
GLUCOSE SERPL-MCNC: 210 MG/DL
HCT VFR BLD CALC: 36.5 %
HGB BLD-MCNC: 11.5 G/DL
IMM GRANULOCYTES NFR BLD AUTO: 0.6 %
INR PPP: 0.99 RATIO
LYMPHOCYTES # BLD AUTO: 3.46 K/UL
LYMPHOCYTES NFR BLD AUTO: 28.5 %
MAN DIFF?: NORMAL
MCHC RBC-ENTMCNC: 29.2 PG
MCHC RBC-ENTMCNC: 31.5 G/DL
MCV RBC AUTO: 92.6 FL
MONOCYTES # BLD AUTO: 0.74 K/UL
MONOCYTES NFR BLD AUTO: 6.1 %
NEUTROPHILS # BLD AUTO: 7.55 K/UL
NEUTROPHILS NFR BLD AUTO: 62.3 %
PLATELET # BLD AUTO: 321 K/UL
POTASSIUM SERPL-SCNC: 5 MMOL/L
PROT SERPL-MCNC: 7.7 G/DL
PT BLD: 11.4 SEC
RBC # BLD: 3.94 M/UL
RBC # FLD: 13.6 %
SODIUM SERPL-SCNC: 136 MMOL/L
WBC # FLD AUTO: 12.13 K/UL

## 2021-06-29 ENCOUNTER — RX RENEWAL (OUTPATIENT)
Age: 76
End: 2021-06-29

## 2021-06-29 RX ORDER — METOPROLOL SUCCINATE 100 MG/1
100 TABLET, EXTENDED RELEASE ORAL
Refills: 0 | Status: ACTIVE | COMMUNITY

## 2021-06-29 RX ORDER — OMEPRAZOLE 40 MG/1
40 CAPSULE, DELAYED RELEASE ORAL
Refills: 0 | Status: ACTIVE | COMMUNITY

## 2021-06-29 RX ORDER — CLOPIDOGREL 75 MG/1
75 TABLET, FILM COATED ORAL
Refills: 0 | Status: ACTIVE | COMMUNITY

## 2021-06-29 RX ORDER — INSULIN LISPRO 100 [IU]/ML
100 INJECTION, SOLUTION INTRAVENOUS; SUBCUTANEOUS
Refills: 0 | Status: ACTIVE | COMMUNITY

## 2021-06-29 RX ORDER — GLIMEPIRIDE 4 MG/1
4 TABLET ORAL
Refills: 0 | Status: ACTIVE | COMMUNITY

## 2021-06-29 RX ORDER — INSULIN GLARGINE 100 [IU]/ML
INJECTION, SOLUTION SUBCUTANEOUS
Refills: 0 | Status: ACTIVE | COMMUNITY

## 2021-06-29 RX ORDER — AMLODIPINE BESYLATE 10 MG/1
10 TABLET ORAL
Qty: 90 | Refills: 0 | Status: DISCONTINUED | COMMUNITY
Start: 2021-06-29 | End: 2021-06-29

## 2021-07-08 ENCOUNTER — INPATIENT (INPATIENT)
Facility: HOSPITAL | Age: 76
LOS: 1 days | Discharge: HOME | End: 2021-07-10
Attending: INTERNAL MEDICINE | Admitting: INTERNAL MEDICINE
Payer: MEDICARE

## 2021-07-08 VITALS
DIASTOLIC BLOOD PRESSURE: 86 MMHG | WEIGHT: 149.91 LBS | HEIGHT: 61 IN | SYSTOLIC BLOOD PRESSURE: 170 MMHG | RESPIRATION RATE: 12 BRPM | OXYGEN SATURATION: 98 % | HEART RATE: 79 BPM

## 2021-07-08 DIAGNOSIS — Z95.5 PRESENCE OF CORONARY ANGIOPLASTY IMPLANT AND GRAFT: Chronic | ICD-10-CM

## 2021-07-08 DIAGNOSIS — Z95.810 PRESENCE OF AUTOMATIC (IMPLANTABLE) CARDIAC DEFIBRILLATOR: Chronic | ICD-10-CM

## 2021-07-08 DIAGNOSIS — Z90.49 ACQUIRED ABSENCE OF OTHER SPECIFIED PARTS OF DIGESTIVE TRACT: Chronic | ICD-10-CM

## 2021-07-08 DIAGNOSIS — Z90.710 ACQUIRED ABSENCE OF BOTH CERVIX AND UTERUS: Chronic | ICD-10-CM

## 2021-07-08 LAB
ANION GAP SERPL CALC-SCNC: 11 MMOL/L — SIGNIFICANT CHANGE UP (ref 7–14)
APTT BLD: 41.4 SEC — HIGH (ref 27–39.2)
BUN SERPL-MCNC: 38 MG/DL — HIGH (ref 10–20)
CALCIUM SERPL-MCNC: 9.5 MG/DL — SIGNIFICANT CHANGE UP (ref 8.5–10.1)
CHLORIDE SERPL-SCNC: 103 MMOL/L — SIGNIFICANT CHANGE UP (ref 98–110)
CO2 SERPL-SCNC: 21 MMOL/L — SIGNIFICANT CHANGE UP (ref 17–32)
CREAT SERPL-MCNC: 1.2 MG/DL — SIGNIFICANT CHANGE UP (ref 0.7–1.5)
GLUCOSE BLDC GLUCOMTR-MCNC: 169 MG/DL — HIGH (ref 70–99)
GLUCOSE BLDC GLUCOMTR-MCNC: 181 MG/DL — HIGH (ref 70–99)
GLUCOSE SERPL-MCNC: 182 MG/DL — HIGH (ref 70–99)
HCT VFR BLD CALC: 36.6 % — LOW (ref 37–47)
HGB BLD-MCNC: 12.1 G/DL — SIGNIFICANT CHANGE UP (ref 12–16)
INR BLD: 1.13 RATIO — SIGNIFICANT CHANGE UP (ref 0.65–1.3)
MCHC RBC-ENTMCNC: 28.5 PG — SIGNIFICANT CHANGE UP (ref 27–31)
MCHC RBC-ENTMCNC: 33.1 G/DL — SIGNIFICANT CHANGE UP (ref 32–37)
MCV RBC AUTO: 86.3 FL — SIGNIFICANT CHANGE UP (ref 81–99)
NRBC # BLD: 0 /100 WBCS — SIGNIFICANT CHANGE UP (ref 0–0)
PLATELET # BLD AUTO: 344 K/UL — SIGNIFICANT CHANGE UP (ref 130–400)
POTASSIUM SERPL-MCNC: 4.3 MMOL/L — SIGNIFICANT CHANGE UP (ref 3.5–5)
POTASSIUM SERPL-SCNC: 4.3 MMOL/L — SIGNIFICANT CHANGE UP (ref 3.5–5)
PROTHROM AB SERPL-ACNC: 13 SEC — HIGH (ref 9.95–12.87)
RBC # BLD: 4.24 M/UL — SIGNIFICANT CHANGE UP (ref 4.2–5.4)
RBC # FLD: 13.5 % — SIGNIFICANT CHANGE UP (ref 11.5–14.5)
SODIUM SERPL-SCNC: 135 MMOL/L — SIGNIFICANT CHANGE UP (ref 135–146)
WBC # BLD: 12.82 K/UL — HIGH (ref 4.8–10.8)
WBC # FLD AUTO: 12.82 K/UL — HIGH (ref 4.8–10.8)

## 2021-07-08 PROCEDURE — 37236 OPEN/PERQ PLACE STENT 1ST: CPT | Mod: 50

## 2021-07-08 PROCEDURE — 36252 INS CATH REN ART 1ST BILAT: CPT

## 2021-07-08 RX ORDER — METOPROLOL TARTRATE 50 MG
100 TABLET ORAL DAILY
Refills: 0 | Status: DISCONTINUED | OUTPATIENT
Start: 2021-07-08 | End: 2021-07-10

## 2021-07-08 RX ORDER — SODIUM CHLORIDE 9 MG/ML
1000 INJECTION INTRAMUSCULAR; INTRAVENOUS; SUBCUTANEOUS
Refills: 0 | Status: DISCONTINUED | OUTPATIENT
Start: 2021-07-08 | End: 2021-07-10

## 2021-07-08 RX ORDER — ASPIRIN/CALCIUM CARB/MAGNESIUM 324 MG
81 TABLET ORAL DAILY
Refills: 0 | Status: DISCONTINUED | OUTPATIENT
Start: 2021-07-08 | End: 2021-07-10

## 2021-07-08 RX ORDER — CLOPIDOGREL BISULFATE 75 MG/1
75 TABLET, FILM COATED ORAL DAILY
Refills: 0 | Status: DISCONTINUED | OUTPATIENT
Start: 2021-07-09 | End: 2021-07-10

## 2021-07-08 RX ORDER — PANTOPRAZOLE SODIUM 20 MG/1
40 TABLET, DELAYED RELEASE ORAL
Refills: 0 | Status: DISCONTINUED | OUTPATIENT
Start: 2021-07-08 | End: 2021-07-10

## 2021-07-08 RX ORDER — ATORVASTATIN CALCIUM 80 MG/1
40 TABLET, FILM COATED ORAL AT BEDTIME
Refills: 0 | Status: DISCONTINUED | OUTPATIENT
Start: 2021-07-08 | End: 2021-07-10

## 2021-07-08 RX ORDER — HYDRALAZINE HCL 50 MG
50 TABLET ORAL EVERY 8 HOURS
Refills: 0 | Status: DISCONTINUED | OUTPATIENT
Start: 2021-07-08 | End: 2021-07-10

## 2021-07-08 RX ORDER — LISINOPRIL 2.5 MG/1
1 TABLET ORAL
Qty: 0 | Refills: 0 | DISCHARGE

## 2021-07-08 RX ORDER — INSULIN GLARGINE 100 [IU]/ML
20 INJECTION, SOLUTION SUBCUTANEOUS
Qty: 0 | Refills: 0 | DISCHARGE

## 2021-07-08 RX ADMIN — Medication 50 MILLIGRAM(S): at 22:56

## 2021-07-08 RX ADMIN — SODIUM CHLORIDE 100 MILLILITER(S): 9 INJECTION INTRAMUSCULAR; INTRAVENOUS; SUBCUTANEOUS at 22:56

## 2021-07-08 RX ADMIN — ATORVASTATIN CALCIUM 40 MILLIGRAM(S): 80 TABLET, FILM COATED ORAL at 22:56

## 2021-07-08 NOTE — CHART NOTE - NSCHARTNOTEFT_GEN_A_CORE
INDICATION: bilateral renal artery stenosis on US doppler, CKD, uncontrolled BP       PHYSICIAN: Dr Hammonds  ASSISTANT/FELLOW: Everett Gallardo    ACCESS: Ultrasound Guided  right femoral artery access    VASCULAR CLOSURE DEVICE (if applicable):    ANGIOGRAM:  Selective Abdominal Aorta, Right Iliac, Right SFA, Left Iliac, Left SFA, Left Lower Extremity DSA angiography     DETAILED PROCEDURE SUMMARY:  After obtaining informed consent, the patient was brought to the catheterization suite in a post- absorptive and non-sedated state. After this, a timeout was performed and I administered moderate sedation throughout this 45-minute procedure. An independent trained observer pushed medications at my direction, and monitored the patient’s level of consciousness and physiological status throughout. The patient was prepped and draped in the usual sterile manner. 1% lidocaine was used for local anesthesia and the patient was sedated as per endovascular lab protocol. Access was obtained in the  right Femoral Artery using the modified Seldinger technique  6 [place] Sheath was placed in the artery under fluoroscopy and ultrasound guidance which was utilized for assessment of arterial patency and actual real-time visualization of needle passage to the arterial lumen.     CONTRAST: 80 ml    DIAGNOSTIC FINDINGS    Right prox renal artery 90% calcified lesion   Left prox renal artery 90% calcified lesion         COMPLICATION: none     PERIPHERAL DIAGNOSTIC ANGIOGRAM/INTERVENTION SUMMARY:    Diagnostic Summary:  -Left renal artery: 90% prox calcified lesion   -Right renal artery: 90% prox calcified lesion     Intervention Summary:  Right renal artery: Successful PTA with balloon angioplasty Sprinter 3.5 x 20  and Express SD Renal/Biliary stent 5 x 19 mm  Left renal artery: Successful PTA with balloon angioplasty Sprinter 3.5 x 20 , cutting balloon angiosculpt 3.5 x 15 and Express SD Renal/Biliary Stent 5 x 19 mm, post stent balloon angioplasty with NC Emerge 5 x 12 mm      RECOMMENDATIONs:  Admit to 3C for observation   IV Hydration Monitor kidney function   Monitor BP  Antiplatelets: ASA, Plavix  Aggressive risk factor modification INDICATION: bilateral renal artery stenosis on US doppler, CKD, uncontrolled BP       PHYSICIAN: Dr Hammonds  ASSISTANT/FELLOW: Everett Gallardo    ACCESS: Ultrasound Guided  right femoral artery access    VASCULAR CLOSURE DEVICE (if applicable):    ANGIOGRAM:  Selective Abdominal Aorta, bilateral renal arteries    DETAILED PROCEDURE SUMMARY:  After obtaining informed consent, the patient was brought to the catheterization suite in a post- absorptive and non-sedated state. After this, a timeout was performed and I administered moderate sedation throughout this 45-minute procedure. An independent trained observer pushed medications at my direction, and monitored the patient’s level of consciousness and physiological status throughout. The patient was prepped and draped in the usual sterile manner. 1% lidocaine was used for local anesthesia and the patient was sedated as per endovascular lab protocol. Access was obtained in the  right Femoral Artery using the modified Seldinger technique  6 [place] Sheath was placed in the artery under fluoroscopy and ultrasound guidance which was utilized for assessment of arterial patency and actual real-time visualization of needle passage to the arterial lumen.     CONTRAST: 80 ml    DIAGNOSTIC FINDINGS    Right prox renal artery 90% calcified lesion   Left prox renal artery 90% calcified lesion         COMPLICATION: none     PERIPHERAL DIAGNOSTIC ANGIOGRAM/INTERVENTION SUMMARY:    Diagnostic Summary:  -Left renal artery: 90% prox calcified lesion   -Right renal artery: 90% prox calcified lesion     Intervention Summary:  Right renal artery: Successful PTA with balloon angioplasty Sprinter 3.5 x 20  and Express SD Renal/Biliary stent 5 x 19 mm  Left renal artery: Successful PTA with balloon angioplasty Sprinter 3.5 x 20 , cutting balloon angiosculpt 3.5 x 15 and Express SD Renal/Biliary Stent 5 x 19 mm, post stent balloon angioplasty with NC Emerge 5 x 12 mm      RECOMMENDATIONs:  Admit to 3C for observation   IV Hydration Monitor kidney function   Monitor BP  Antiplatelets: ASA, Plavix  Aggressive risk factor modification

## 2021-07-08 NOTE — ASU PATIENT PROFILE, ADULT - PMH
Amputation of toe of left foot  L [place] 5/20  Atrial fibrillation, unspecified type  paf recently stopped eliquis s/t epistatxis  Bladder tumor    CAD (coronary artery disease)  RCA KENDELL  2011 OM2 KENDELL 2006  OM1 KENDELL 2009  CHF (congestive heart failure)    DM (diabetes mellitus)  30 yr  Dyslipidemia    H/O ischemic heart disease    Hernia    History of implantable cardiac defibrillator (ICD)  and pacemaker- [employer]  HTN (hypertension)    PAD (peripheral artery disease)  severe s/p stent

## 2021-07-08 NOTE — H&P CARDIOLOGY - PRESSURE ULCER(S)
Progress Note    Subjective:   Doing well. No issues or concerns. Patient is breastfeeding well. No c/o any sig vaginal bleeding or discharge    Objective Data:  Recent Labs     06/14/21  1150 06/15/21  0141   WBC 12.3* 13.5*   RBC 4.49 4.20   HEMOGLOBIN 11.3* 10.4*   HEMATOCRIT 35.6* 33.9*   MCV 79.3* 80.7*   MCH 25.2* 24.8*   MCHC 31.7* 30.7*   RDW 43.5 43.6   PLATELETCT 206 182   MPV 12.1 12.8           Vitals:    06/14/21 1917 06/14/21 2115 06/15/21 0200 06/15/21 0600   BP: 123/71 125/77 108/57 119/73   Pulse: 72 84 68 64   Resp:  17 17 18   Temp:  36.9 °C (98.4 °F) 36.3 °C (97.4 °F) 35.8 °C (96.5 °F)   TempSrc:  Temporal Temporal Temporal   SpO2:  96% 97% 96%   Weight:       Height:         ABdomen: soft non tender fundus at umbilicus  Perineum: No sig bleeding or discharge noted on the pad  Ext: non tender calves    Intake/Output Summary (Last 24 hours) at 6/15/2021 0949  Last data filed at 6/14/2021 1733  Gross per 24 hour   Intake --   Output 450 ml   Net -450 ml       Current Facility-Administered Medications   Medication Dose Route Frequency Provider Last Rate Last Admin   • D5LR infusion   Intravenous Continuous Reece Blevins M.D.       • ondansetron (ZOFRAN ODT) dispertab 4 mg  4 mg Oral Q6HRS PRN Reece Blevins M.D.        Or   • ondansetron (ZOFRAN) syringe/vial injection 4 mg  4 mg Intravenous Q6HRS PRN Reece Blevins M.D.       • oxytocin (PITOCIN) infusion (for postpartum)   mL/hr Intravenous Continuous Reece Blevins M.D. 125 mL/hr at 06/14/21 1922 125 mL/hr at 06/14/21 1922   • ibuprofen (MOTRIN) tablet 600 mg  600 mg Oral Q6HRS PRN Reece Blevins M.D.   600 mg at 06/15/21 0730   • HYDROcodone-acetaminophen (NORCO) 5-325 MG per tablet 1 tablet  1 tablet Oral Q4HRS PRN Reece Blevins M.D.   1 tablet at 06/15/21 0023   • HYDROcodone/acetaminophen (NORCO)  MG per tablet 1 tablet  1 tablet Oral Q4HRS PRN Reece Blevins M.D.       • ropivacaine 0.2 % (NAROPIN) injection    Epidural Continuous Eloy Magana M.D.   New Bag at 21 1313   • oxytocin (PITOCIN) 20 UNITS/1000ML LR (induction of labor)  0.5-20 dhruv-units/min Intravenous Continuous Reece Blevins M.D.   Stopped at 21 1737   • LR infusion   Intravenous PRN Reece Blevins M.D.       • tetanus-dipth-acell pertussis (Tdap) inj 0.5 mL  0.5 mL Intramuscular Once PRN Reece Blevins M.D.       • measles, mumps and rubella vaccine (MMR) injection 0.5 mL  0.5 mL Subcutaneous Once PRN Reece Blevins M.D.       • PRN oxytocin (PITOCIN) (20 Units/1000 mL) PRN for excessive uterine bleeding - See Admin Instr  125-999 mL/hr Intravenous Once PRN Reece Blevins M.D.       • miSOPROStol (CYTOTEC) tablet 600 mcg  600 mcg Rectal Once PRN Reece Blevins M.D.       • methylergonovine (METHERGINE) injection 0.2 mg  0.2 mg Intramuscular Once PRN Reece Blevins M.D.       • docusate sodium (COLACE) capsule 100 mg  100 mg Oral BID PRN Reece Blevins M.D.       • bisacodyl (DULCOLAX) suppository 10 mg  10 mg Rectal PRN Reece Blevisn M.D.       • prenatal plus vitamin (STUARTNATAL 1+1) 27-1 MG tablet 1 tablet  1 tablet Oral Daily-0800 Reece Blevins M.D.   1 tablet at 06/15/21 0729       A/P S/P  now PPD #1. She is doing well without any complaints or concerns and wishes to go home. Plan to proceed with the discharge home with the f/u in 6 weeks. Discharge instructions given.    no

## 2021-07-08 NOTE — H&P CARDIOLOGY - HISTORY OF PRESENT ILLNESS
75 y/old F here for renal arteries intervention   h/o tobacco abuse(quit), A-Fib(stopped Eliquis due to epistaxis), HTN, HLD, DM, High grade cancer, CAD(RCA KENDELL 2011, OM 2006, OM 2009), ICD([place]), PAD s/p left dSFA, dLeft pop, TPT trunk and PT/plantar for left foot ulcer. Due to incomplete healing of ulcer pt underwent another angiogram with intervention of left popliteal and peroneal arteries with PTA and KENDELL. B/P uncontrolled at home. Continues to have BLE pain and burning with mild-moderate exertion.      5/28/21 Renal US doppler showed severe ostial stenoses of right and left renal arteries. Peak ostium systolic velocity > 400 cm/sec  1/7/21 NM stress test did not show any reperfusion defects  12/14/20 TTE LVEF 63% Mod MR, Mild TR, G2DD  11/6/20 ESTRELLA 0.59 Severe R popliteal A stenosis, ESTRELLA 0.67 Severe L tibial A stenosis    PMH:  Bladder Ca, CIHD, CAD, PPM, PAD, Renal artery stenosis, SNHL, Epistaxis - recurrent  PSH:  Gallbladder Sx, Hernia Sx, Hysterectomy, Vascular problem, Toe amputation - Left   FH: DM, HTN, CAD

## 2021-07-09 ENCOUNTER — TRANSCRIPTION ENCOUNTER (OUTPATIENT)
Age: 76
End: 2021-07-09

## 2021-07-09 LAB
ANION GAP SERPL CALC-SCNC: 11 MMOL/L — SIGNIFICANT CHANGE UP (ref 7–14)
BASOPHILS # BLD AUTO: 0.03 K/UL — SIGNIFICANT CHANGE UP (ref 0–0.2)
BASOPHILS NFR BLD AUTO: 0.2 % — SIGNIFICANT CHANGE UP (ref 0–1)
BUN SERPL-MCNC: 26 MG/DL — HIGH (ref 10–20)
CALCIUM SERPL-MCNC: 9.2 MG/DL — SIGNIFICANT CHANGE UP (ref 8.5–10.1)
CHLORIDE SERPL-SCNC: 105 MMOL/L — SIGNIFICANT CHANGE UP (ref 98–110)
CO2 SERPL-SCNC: 23 MMOL/L — SIGNIFICANT CHANGE UP (ref 17–32)
COVID-19 SPIKE DOMAIN AB INTERP: POSITIVE
COVID-19 SPIKE DOMAIN ANTIBODY RESULT: >250 U/ML — HIGH
CREAT SERPL-MCNC: 1 MG/DL — SIGNIFICANT CHANGE UP (ref 0.7–1.5)
EOSINOPHIL # BLD AUTO: 0.08 K/UL — SIGNIFICANT CHANGE UP (ref 0–0.7)
EOSINOPHIL NFR BLD AUTO: 0.7 % — SIGNIFICANT CHANGE UP (ref 0–8)
GLUCOSE BLDC GLUCOMTR-MCNC: 175 MG/DL — HIGH (ref 70–99)
GLUCOSE BLDC GLUCOMTR-MCNC: 226 MG/DL — HIGH (ref 70–99)
GLUCOSE BLDC GLUCOMTR-MCNC: 231 MG/DL — HIGH (ref 70–99)
GLUCOSE BLDC GLUCOMTR-MCNC: 314 MG/DL — HIGH (ref 70–99)
GLUCOSE SERPL-MCNC: 178 MG/DL — HIGH (ref 70–99)
HCT VFR BLD CALC: 34.9 % — LOW (ref 37–47)
HGB BLD-MCNC: 11.4 G/DL — LOW (ref 12–16)
IMM GRANULOCYTES NFR BLD AUTO: 0.4 % — HIGH (ref 0.1–0.3)
LYMPHOCYTES # BLD AUTO: 19.8 % — LOW (ref 20.5–51.1)
LYMPHOCYTES # BLD AUTO: 2.38 K/UL — SIGNIFICANT CHANGE UP (ref 1.2–3.4)
MCHC RBC-ENTMCNC: 28.3 PG — SIGNIFICANT CHANGE UP (ref 27–31)
MCHC RBC-ENTMCNC: 32.7 G/DL — SIGNIFICANT CHANGE UP (ref 32–37)
MCV RBC AUTO: 86.6 FL — SIGNIFICANT CHANGE UP (ref 81–99)
MONOCYTES # BLD AUTO: 0.78 K/UL — HIGH (ref 0.1–0.6)
MONOCYTES NFR BLD AUTO: 6.5 % — SIGNIFICANT CHANGE UP (ref 1.7–9.3)
NEUTROPHILS # BLD AUTO: 8.71 K/UL — HIGH (ref 1.4–6.5)
NEUTROPHILS NFR BLD AUTO: 72.4 % — SIGNIFICANT CHANGE UP (ref 42.2–75.2)
NRBC # BLD: 0 /100 WBCS — SIGNIFICANT CHANGE UP (ref 0–0)
PLATELET # BLD AUTO: 317 K/UL — SIGNIFICANT CHANGE UP (ref 130–400)
POTASSIUM SERPL-MCNC: 4.3 MMOL/L — SIGNIFICANT CHANGE UP (ref 3.5–5)
POTASSIUM SERPL-SCNC: 4.3 MMOL/L — SIGNIFICANT CHANGE UP (ref 3.5–5)
RBC # BLD: 4.03 M/UL — LOW (ref 4.2–5.4)
RBC # FLD: 13.3 % — SIGNIFICANT CHANGE UP (ref 11.5–14.5)
SARS-COV-2 IGG+IGM SERPL QL IA: >250 U/ML — HIGH
SARS-COV-2 IGG+IGM SERPL QL IA: POSITIVE
SODIUM SERPL-SCNC: 139 MMOL/L — SIGNIFICANT CHANGE UP (ref 135–146)
WBC # BLD: 12.03 K/UL — HIGH (ref 4.8–10.8)
WBC # FLD AUTO: 12.03 K/UL — HIGH (ref 4.8–10.8)

## 2021-07-09 RX ADMIN — ATORVASTATIN CALCIUM 40 MILLIGRAM(S): 80 TABLET, FILM COATED ORAL at 22:22

## 2021-07-09 RX ADMIN — Medication 100 MILLIGRAM(S): at 05:59

## 2021-07-09 RX ADMIN — Medication 50 MILLIGRAM(S): at 13:05

## 2021-07-09 RX ADMIN — Medication 50 MILLIGRAM(S): at 22:22

## 2021-07-09 RX ADMIN — Medication 50 MILLIGRAM(S): at 06:00

## 2021-07-09 RX ADMIN — Medication 81 MILLIGRAM(S): at 11:19

## 2021-07-09 RX ADMIN — CLOPIDOGREL BISULFATE 75 MILLIGRAM(S): 75 TABLET, FILM COATED ORAL at 11:18

## 2021-07-09 RX ADMIN — PANTOPRAZOLE SODIUM 40 MILLIGRAM(S): 20 TABLET, DELAYED RELEASE ORAL at 06:00

## 2021-07-09 NOTE — DISCHARGE NOTE PROVIDER - CARE PROVIDER_API CALL
Arpan Hammonds (MD)  Cardiovascular Disease; Endovascular Medicine; Interventional Cardiology; Vascular Medicine  [address] [place] [address], [place] [address]  Phone: [phone]  Fax: [phone]  Established Patient  Follow Up Time: 2 weeks

## 2021-07-09 NOTE — DISCHARGE NOTE PROVIDER - NSDCFUSCHEDAPPT_GEN_ALL_CORE_FT
DARRION MELENDEZ ; 07/30/2021 ; NPP Cardio [facility]  DARRION MELENDEZ ; 08/30/2021 ; NPP Urology [address] [facility]

## 2021-07-09 NOTE — DISCHARGE NOTE PROVIDER - HOSPITAL COURSE
75 y/old F here for renal arteries intervention   h/o tobacco abuse(quit), A-Fib(stopped Eliquis due to epistaxis), HTN, HLD, DM, High grade cancer, CAD(RCA KENDELL 2011, OM 2006, OM 2009), ICD([place]), PAD s/p left dSFA, dLeft pop, TPT trunk and PT/plantar for left foot ulcer. Due to incomplete healing of ulcer pt underwent another angiogram with intervention of left popliteal and peroneal arteries with PTA and KENDELL. B/P uncontrolled at home. Continues to have BLE pain and burning with mild-moderate exertion.      5/28/21 Renal US doppler showed severe ostial stenoses of right and left renal arteries. Peak ostium systolic velocity > 400 cm/sec  1/7/21 NM stress test did not show any reperfusion defects  12/14/20 TTE LVEF 63% Mod MR, Mild TR, G2DD  11/6/20 ESTRELLA 0.59 Severe R popliteal A stenosis, ESTRELLA 0.67 Severe L tibial A stenosis    PMH:  Bladder Ca, CIHD, CAD, PPM, PAD, Renal artery stenosis, SNHL, Epistaxis - recurrent  PSH:  Gallbladder Sx, Hernia Sx, Hysterectomy, Vascular problem, Toe amputation - Left   FH: DM, HTN, CAD    Angiogram performed 7/8:  Diagnostic Summary:  -Left renal artery: 90% prox calcified lesion   -Right renal artery: 90% prox calcified lesion     Intervention Summary:  Right renal artery: Successful PTA with balloon angioplasty Sprinter 3.5 x 20  and Express SD Renal/Biliary stent 5 x 19 mm  Left renal artery: Successful PTA with balloon angioplasty Sprinter 3.5 x 20 , cutting balloon angio-sculpt 3.5 x 15 and Express SD Renal/Biliary Stent 5 x 19 mm, post stent balloon angioplasty with NC Emerge 5 x 12 mm    Patient admitted for observation, hydration and BP monitoring. 75 y/old F here for renal arteries intervention   h/o tobacco abuse(quit), A-Fib(stopped Eliquis due to epistaxis), HTN, HLD, DM, High grade cancer, CAD(RCA KENDELL 2011, OM 2006, OM 2009), ICD([place]), PAD s/p left dSFA, dLeft pop, TPT trunk and PT/plantar for left foot ulcer. Due to incomplete healing of ulcer pt underwent another angiogram with intervention of left popliteal and peroneal arteries with PTA and KENDELL. B/P uncontrolled at home. Continues to have BLE pain and burning with mild-moderate exertion.      5/28/21 Renal US doppler showed severe ostial stenoses of right and left renal arteries. Peak ostium systolic velocity > 400 cm/sec  1/7/21 NM stress test did not show any reperfusion defects  12/14/20 TTE LVEF 63% Mod MR, Mild TR, G2DD  11/6/20 ESTRELLA 0.59 Severe R popliteal A stenosis, ESTRELLA 0.67 Severe L tibial A stenosis    PMH:  Bladder Ca, CIHD, CAD, PPM, PAD, Renal artery stenosis, SNHL, Epistaxis - recurrent  PSH:  Gallbladder Sx, Hernia Sx, Hysterectomy, Vascular problem, Toe amputation - Left   FH: DM, HTN, CAD    Angiogram performed 7/8:  Diagnostic Summary:  -Left renal artery: 90% prox calcified lesion   -Right renal artery: 90% prox calcified lesion     Intervention Summary:  Right renal artery: Successful PTA with balloon angioplasty Sprinter 3.5 x 20  and Express SD Renal/Biliary stent 5 x 19 mm  Left renal artery: Successful PTA with balloon angioplasty Sprinter 3.5 x 20 , cutting balloon angio-sculpt 3.5 x 15 and Express SD Renal/Biliary Stent 5 x 19 mm, post stent balloon angioplasty with NC Emerge 5 x 12 mm    Patient admitted for observation, hydration and BP monitoring. Pt stable for discharge as discussed with Dr. Hammonds, pt instructed to f/u with Dr. Hammonds in 1 week .

## 2021-07-09 NOTE — DISCHARGE NOTE PROVIDER - NSDCCPCAREPLAN_GEN_ALL_CORE_FT
PRINCIPAL DISCHARGE DIAGNOSIS  Diagnosis: KERRIE (renal artery stenosis)  Assessment and Plan of Treatment: to better control BP

## 2021-07-09 NOTE — PROGRESS NOTE ADULT - SUBJECTIVE AND OBJECTIVE BOX
Vascular Cardiology Follow up    DARRION MELENDEZ   75y Female  PAST MEDICAL & SURGICAL HISTORY:  HTN (hypertension)    CHF (congestive heart failure)    Amputation of toe of left foot  L Hallux 5/20    Hernia    Atrial fibrillation, unspecified type  paf recently stopped eliquis s/t epistatxis    CAD (coronary artery disease)  RCA KENDELL  2011 OM2 KENDELL 2006  OM1 KENDELL 2009    PAD (peripheral artery disease)  severe s/p stent    History of implantable cardiac defibrillator (ICD)  and pacemaker- [employer]    Dyslipidemia    Bladder tumor    H/O ischemic heart disease    DM (diabetes mellitus)  30 yr    History of cholecystectomy    Cardiac defibrillator in place  2010    H/O: hysterectomy    Status post coronary artery stent placement         HPI:  75 y/old F here for renal arteries intervention   h/o tobacco abuse(quit), A-Fib(stopped Eliquis due to epistaxis), HTN, HLD, DM, High grade cancer, CAD(RCA KENDELL 2011, OM 2006, OM 2009), ICD([place]), PAD s/p left dSFA, dLeft pop, TPT trunk and PT/plantar for left foot ulcer. Due to incomplete healing of ulcer pt underwent another angiogram with intervention of left popliteal and peroneal arteries with PTA and KENDELL. B/P uncontrolled at home. Continues to have BLE pain and burning with mild-moderate exertion.      5/28/21 Renal US doppler showed severe ostial stenoses of right and left renal arteries. Peak ostium systolic velocity > 400 cm/sec  1/7/21 NM stress test did not show any reperfusion defects  12/14/20 TTE LVEF 63% Mod MR, Mild TR, G2DD  11/6/20 ESTRELLA 0.59 Severe R popliteal A stenosis, ESTRELLA 0.67 Severe L tibial A stenosis    PMH:  Bladder Ca, CIHD, CAD, PPM, PAD, Renal artery stenosis, SNHL, Epistaxis - recurrent  PSH:  Gallbladder Sx, Hernia Sx, Hysterectomy, Vascular problem, Toe amputation - Left   FH: DM, HTN, CAD (08 Jul 2021 16:13)    Allergies    codeine (Rash)  penicillin (Rash)  penicillin (Unknown)    Intolerances      Patient examined at bedside.   Patient without complaints.   Denies LE pain and swelling,  SOB, palpitations, or dizziness  No events on telemetry overnight    Vital Signs Last 24 Hrs  T(C): 36.5 (09 Jul 2021 05:22), Max: 36.6 (08 Jul 2021 22:42)  T(F): 97.7 (09 Jul 2021 05:22), Max: 97.9 (08 Jul 2021 22:42)  HR: 92 (09 Jul 2021 05:22) (79 - 93)  BP: 163/80 (09 Jul 2021 05:22) (148/68 - 194/90)  BP(mean): 114 (08 Jul 2021 16:13) (114 - 114)  RR: 18 (09 Jul 2021 05:22) (12 - 18)  SpO2: 96% (08 Jul 2021 22:52) (96% - 98%)    MEDICATIONS  (STANDING):  aspirin  chewable 81 milliGRAM(s) Oral daily  atorvastatin 40 milliGRAM(s) Oral at bedtime  clopidogrel Tablet 75 milliGRAM(s) Oral daily  hydrALAZINE 50 milliGRAM(s) Oral every 8 hours  metoprolol succinate  milliGRAM(s) Oral daily  pantoprazole    Tablet 40 milliGRAM(s) Oral before breakfast  sodium chloride 0.9%. 1000 milliLiter(s) (100 mL/Hr) IV Continuous <Continuous>    MEDICATIONS  (PRN):      REVIEW OF SYSTEMS:          All negative except as mentioned in HPI    PHYSICAL EXAM:           CONSTITUTIONAL: Well-developed; well-nourished; in no acute distress  	SKIN: warm, dry  	HEAD: Normocephalic; atraumatic  	EYES: PERRL.  	ENT: No nasal discharge, airway clear, mucous membranes moist  	NECK: Supple; non tender.  	CARD: +S1, +S2, no murmurs, gallops, or rubs. Regular rate and rhythm    	RESP: No wheezes, rales or rhonchi. CTA B/L  	ABD: soft ntnd, + BS x 4 quadrants  	EXT: moves all extremities,  no clubbing, cyanosis or edema  	NEURO: Alert and oriented x3, no focal deficits          PSYCH: Cooperative, appropriate          VASCULAR:  + Rad / + PTs / + DPs          EXTREMITY:             Right Groin:  Dressing D/C/I, access site soft, no hematoma, no pain, + pulses, no sign of infection, no numbness  	       LABS:                        11.4   12.03 )-----------( 317      ( 09 Jul 2021 07:07 )             34.9     07-09    139  |  105  |  26<H>  ----------------------------<  178<H>  4.3   |  23  |  1.0    Ca    9.2      09 Jul 2021 07:07    A/P:  I discussed the case with Cardiologist Dr. Hammonds  and recommend the following:    S/P PTA 7/8:    Diagnostic Summary:  -Left renal artery: 90% prox calcified lesion   -Right renal artery: 90% prox calcified lesion     Intervention Summary:  Right renal artery: Successful PTA with balloon angioplasty Sprinter 3.5 x 20  and Express SD Renal/Biliary stent 5 x 19 mm  Left renal artery: Successful PTA with balloon angioplasty Sprinter 3.5 x 20 , cutting balloon angiosculpt 3.5 x 15 and Express SD Renal/Biliary Stent 5 x 19 mm, post stent balloon angioplasty with NC Emerge 5 x 12 mm    	     Continue DAPT ( Plavix 75 mg daily  ),  B-Blocker, Statin Therapy                   Patient given 30 day supply of (  Plavix 75 mg daily ) to take at home                   d/c wesley                   OOB with assistance                   Monitor access site/distal pulses                   No strenuous activity for 3 days (routine walking okay)                   No driving for 3 days                   No heavy lifting > 30lbs for 2 weeks                   May shower in 24 hours                   Leave dressing in place 24- 48 hours, if does not fall off in 48 hours can remove                   Patient agreeing to take DAPT as directed by cardiologist                    Post angiogram instructions, access site care and activity restrictions reviewed with patient                     Discussed with patient to return to hospital if experience severe lower extremity pain and site bleeding                   Aggressive risk factor modification, diet counseling, smoking cessation discussed with patient                      monitor on 3c for possible discharge in AM tomorrow                                      Vascular Cardiology Follow up    DARRION MELENDEZ   75y Female  PAST MEDICAL & SURGICAL HISTORY:  HTN (hypertension)    HFpEF    Amputation of toe of left foot  L Hallux 5/20    Hernia    PAF recently stopped eliquis s/t epistatxis    CAD (coronary artery disease)  RCA KENDELL  2011 OM2 KENDELL 2006  OM1 KENDELL 2009    PAD (peripheral artery disease)  severe s/p stent    History of implantable cardiac defibrillator (ICD)  and pacemaker- [employer]    Dyslipidemia    Bladder tumor    H/O ischemic heart disease    DM (diabetes mellitus)  30 yr    History of cholecystectomy    Cardiac defibrillator in place  2010    H/O: hysterectomy    Status post coronary artery stent placement         HPI:  75 y/old F here for renal arteries intervention   h/o tobacco abuse(quit), PAF(stopped Eliquis due to epistaxis), HTN, HLD, DM, High grade cancer, CAD(RCA KENDELL 2011, OM 2006, OM 2009), ICD([place]), PAD s/p left dSFA, dLeft pop, TPT trunk and PT/plantar for left foot ulcer. Due to incomplete healing of ulcer pt underwent another angiogram with intervention of left popliteal and peroneal arteries with PTA and KENDELL. B/P uncontrolled at home. Continues to have BLE pain and burning with mild-moderate exertion.      5/28/21 Renal US doppler showed severe ostial stenoses of right and left renal arteries. Peak ostium systolic velocity > 400 cm/sec  1/7/21 NM stress test did not show any reperfusion defects  12/14/20 TTE LVEF 63% Mod MR, Mild TR, G2DD  11/6/20 ESTRELLA 0.59 Severe R popliteal A stenosis, ESTRELLA 0.67 Severe L tibial A stenosis    PMH:  Bladder Ca, CIHD, CAD, PPM, PAD, Renal artery stenosis, SNHL, Epistaxis - recurrent  PSH:  Gallbladder Sx, Hernia Sx, Hysterectomy, Vascular problem, Toe amputation - Left   FH: DM, HTN, CAD (08 Jul 2021 16:13)    Allergies    codeine (Rash)  penicillin (Rash)  penicillin (Unknown)    Intolerances      Patient examined at bedside.   Patient without complaints.   Denies LE pain and swelling,  SOB, palpitations, or dizziness  No events on telemetry overnight    Vital Signs Last 24 Hrs  T(C): 36.5 (09 Jul 2021 05:22), Max: 36.6 (08 Jul 2021 22:42)  T(F): 97.7 (09 Jul 2021 05:22), Max: 97.9 (08 Jul 2021 22:42)  HR: 92 (09 Jul 2021 05:22) (79 - 93)  BP: 163/80 (09 Jul 2021 05:22) (148/68 - 194/90)  BP(mean): 114 (08 Jul 2021 16:13) (114 - 114)  RR: 18 (09 Jul 2021 05:22) (12 - 18)  SpO2: 96% (08 Jul 2021 22:52) (96% - 98%)    MEDICATIONS  (STANDING):  aspirin  chewable 81 milliGRAM(s) Oral daily  atorvastatin 40 milliGRAM(s) Oral at bedtime  clopidogrel Tablet 75 milliGRAM(s) Oral daily  hydrALAZINE 50 milliGRAM(s) Oral every 8 hours  metoprolol succinate  milliGRAM(s) Oral daily  pantoprazole    Tablet 40 milliGRAM(s) Oral before breakfast  sodium chloride 0.9%. 1000 milliLiter(s) (100 mL/Hr) IV Continuous <Continuous>    MEDICATIONS  (PRN):      REVIEW OF SYSTEMS:          All negative except as mentioned in HPI    PHYSICAL EXAM:           CONSTITUTIONAL: Well-developed; well-nourished; in no acute distress  	SKIN: warm, dry  	HEAD: Normocephalic; atraumatic  	EYES: PERRL.  	ENT: No nasal discharge, airway clear, mucous membranes moist  	NECK: Supple; non tender.  	CARD: +S1, +S2, no murmurs, gallops, or rubs. Regular rate and rhythm    	RESP: No wheezes, rales or rhonchi. CTA B/L  	ABD: soft ntnd, + BS x 4 quadrants  	EXT: moves all extremities,  no clubbing, cyanosis or edema  	NEURO: Alert and oriented x3, no focal deficits          PSYCH: Cooperative, appropriate          VASCULAR:  + Rad / + PTs / + DPs          EXTREMITY:             Right Groin:  Dressing D/C/I, access site soft, no hematoma, no pain, + pulses, no sign of infection, no numbness  	       LABS:                        11.4   12.03 )-----------( 317      ( 09 Jul 2021 07:07 )             34.9     07-09    139  |  105  |  26<H>  ----------------------------<  178<H>  4.3   |  23  |  1.0    Ca    9.2      09 Jul 2021 07:07    A/P:  I discussed the case with Cardiologist Dr. Hammonds  and recommend the following:    S/P PTA 7/8:    Diagnostic Summary:  -Left renal artery: 90% prox calcified lesion   -Right renal artery: 90% prox calcified lesion     Intervention Summary:  Right renal artery: Successful PTA with balloon angioplasty Sprinter 3.5 x 20  and Express SD Renal/Biliary stent 5 x 19 mm  Left renal artery: Successful PTA with balloon angioplasty Sprinter 3.5 x 20 , cutting balloon angiosculpt 3.5 x 15 and Express SD Renal/Biliary Stent 5 x 19 mm, post stent balloon angioplasty with NC Emerge 5 x 12 mm    	     Continue DAPT ( Plavix 75 mg daily  ),  B-Blocker, Statin Therapy                   Patient given 30 day supply of (  Plavix 75 mg daily ) to take at home                   d/c wesley                   OOB with assistance                   Monitor access site/distal pulses                   No strenuous activity for 3 days (routine walking okay)                   No driving for 3 days                   No heavy lifting > 30lbs for 2 weeks                   May shower in 24 hours                   Leave dressing in place 24- 48 hours, if does not fall off in 48 hours can remove                   Patient agreeing to take DAPT as directed by cardiologist                    Post angiogram instructions, access site care and activity restrictions reviewed with patient                     Discussed with patient to return to hospital if experience severe lower extremity pain and site bleeding                   Aggressive risk factor modification, diet counseling, smoking cessation discussed with patient                      monitor on 3c for possible discharge in AM tomorrow

## 2021-07-09 NOTE — DISCHARGE NOTE PROVIDER - NSDCCPTREATMENT_GEN_ALL_CORE_FT
PRINCIPAL PROCEDURE  Procedure: PTA, artery, renal  Findings and Treatment: Right renal artery: Successful PTA with balloon angioplasty Sprinter 3.5 x 20  and Express SD Renal/Biliary stent 5 x 19 mm  Left renal artery: Successful PTA with balloon angioplasty Sprinter 3.5 x 20 , cutting balloon angiosculpt 3.5 x 15 and Express SD Renal/Biliary Stent 5 x 19 mm, post stent balloon angioplasty with NC Emerge 5 x 12 mm

## 2021-07-09 NOTE — DISCHARGE NOTE PROVIDER - NSDCMRMEDTOKEN_GEN_ALL_CORE_FT
atorvastatin 40 mg oral tablet: 1 tab(s) orally once a day (at bedtime)  fexofenadine 180 mg oral tablet: 1 tab(s) orally once a day  furosemide 20 mg oral tablet: 1 tab(s) orally 2 times a day  glimepiride 4 mg oral tablet: 1 tab(s) orally 2 times a day  hydrALAZINE 100 mg oral tablet: 1 tab(s) orally every 8 hours. Hold if systolic blood pressure &lt;120  Metoprolol Succinate  mg oral tablet, extended release: 1 tab(s) orally once a day  NovoLOG FlexPen 100 units/mL injectable solution: 10 unit(s) injectable 3 times a day with meals  omeprazole 40 mg oral delayed release capsule: 1 cap(s) orally once a day  Plavix 75 mg oral tablet: 1 tab(s) orally once a day   atorvastatin 40 mg oral tablet: 1 tab(s) orally once a day (at bedtime)  clopidogrel 75 mg oral tablet: 1 tab(s) orally once a day MDD:1  fexofenadine 180 mg oral tablet: 1 tab(s) orally once a day  furosemide 20 mg oral tablet: 1 tab(s) orally once a day  glimepiride 4 mg oral tablet: 1 tab(s) orally 2 times a day  hydrALAZINE 50 mg oral tablet: 1 tab(s) orally every 8 hours MDD:1  Metoprolol Succinate  mg oral tablet, extended release: 1 tab(s) orally once a day  NovoLOG FlexPen 100 units/mL injectable solution: 10 unit(s) injectable 3 times a day with meals  pantoprazole 40 mg oral delayed release tablet: 1 tab(s) orally once a day (before a meal) MDD:1

## 2021-07-09 NOTE — DISCHARGE NOTE PROVIDER - NSDCFUADDINST_GEN_ALL_CORE_FT
Discharge instructions as follows:  - Continue Plavix  - No strenuous activity for 3 days (routine walking okay)  - May increase activity and walking as tolerated on Monday  - No driving for 3 days  - No heavy lifting >30lbs for 2 weeks  - Some swelling to the leg is expected  - May shower in 24 hours, no baths or pools  - Leave dressing in place for 24-48 hours, if does not fall off on own may remove after 48 hours

## 2021-07-10 ENCOUNTER — TRANSCRIPTION ENCOUNTER (OUTPATIENT)
Age: 76
End: 2021-07-10

## 2021-07-10 VITALS
TEMPERATURE: 97 F | SYSTOLIC BLOOD PRESSURE: 148 MMHG | DIASTOLIC BLOOD PRESSURE: 58 MMHG | HEART RATE: 79 BPM | RESPIRATION RATE: 18 BRPM

## 2021-07-10 LAB
ANION GAP SERPL CALC-SCNC: 12 MMOL/L — SIGNIFICANT CHANGE UP (ref 7–14)
BUN SERPL-MCNC: 28 MG/DL — HIGH (ref 10–20)
CALCIUM SERPL-MCNC: 8.9 MG/DL — SIGNIFICANT CHANGE UP (ref 8.5–10.1)
CHLORIDE SERPL-SCNC: 99 MMOL/L — SIGNIFICANT CHANGE UP (ref 98–110)
CO2 SERPL-SCNC: 23 MMOL/L — SIGNIFICANT CHANGE UP (ref 17–32)
CREAT SERPL-MCNC: 1.1 MG/DL — SIGNIFICANT CHANGE UP (ref 0.7–1.5)
GLUCOSE BLDC GLUCOMTR-MCNC: 190 MG/DL — HIGH (ref 70–99)
GLUCOSE BLDC GLUCOMTR-MCNC: 265 MG/DL — HIGH (ref 70–99)
GLUCOSE SERPL-MCNC: 193 MG/DL — HIGH (ref 70–99)
HCT VFR BLD CALC: 34 % — LOW (ref 37–47)
HGB BLD-MCNC: 11.3 G/DL — LOW (ref 12–16)
MCHC RBC-ENTMCNC: 28.4 PG — SIGNIFICANT CHANGE UP (ref 27–31)
MCHC RBC-ENTMCNC: 33.2 G/DL — SIGNIFICANT CHANGE UP (ref 32–37)
MCV RBC AUTO: 85.4 FL — SIGNIFICANT CHANGE UP (ref 81–99)
NRBC # BLD: 0 /100 WBCS — SIGNIFICANT CHANGE UP (ref 0–0)
PLATELET # BLD AUTO: 302 K/UL — SIGNIFICANT CHANGE UP (ref 130–400)
POTASSIUM SERPL-MCNC: 4 MMOL/L — SIGNIFICANT CHANGE UP (ref 3.5–5)
POTASSIUM SERPL-SCNC: 4 MMOL/L — SIGNIFICANT CHANGE UP (ref 3.5–5)
RBC # BLD: 3.98 M/UL — LOW (ref 4.2–5.4)
RBC # FLD: 13.2 % — SIGNIFICANT CHANGE UP (ref 11.5–14.5)
SODIUM SERPL-SCNC: 134 MMOL/L — LOW (ref 135–146)
WBC # BLD: 12.03 K/UL — HIGH (ref 4.8–10.8)
WBC # FLD AUTO: 12.03 K/UL — HIGH (ref 4.8–10.8)

## 2021-07-10 PROCEDURE — 93010 ELECTROCARDIOGRAM REPORT: CPT

## 2021-07-10 RX ORDER — FUROSEMIDE 40 MG
1 TABLET ORAL
Qty: 0 | Refills: 0 | DISCHARGE
Start: 2021-07-10

## 2021-07-10 RX ORDER — CLOPIDOGREL BISULFATE 75 MG/1
1 TABLET, FILM COATED ORAL
Qty: 30 | Refills: 1
Start: 2021-07-10 | End: 2021-01-01

## 2021-07-10 RX ORDER — HYDRALAZINE HCL 50 MG
1 TABLET ORAL
Qty: 90 | Refills: 1
Start: 2021-07-10 | End: 2021-01-01

## 2021-07-10 RX ORDER — OMEPRAZOLE 10 MG/1
1 CAPSULE, DELAYED RELEASE ORAL
Qty: 0 | Refills: 0 | DISCHARGE

## 2021-07-10 RX ORDER — PANTOPRAZOLE SODIUM 20 MG/1
1 TABLET, DELAYED RELEASE ORAL
Qty: 30 | Refills: 0
Start: 2021-07-10 | End: 2021-08-08

## 2021-07-10 RX ORDER — CLOPIDOGREL BISULFATE 75 MG/1
1 TABLET, FILM COATED ORAL
Qty: 0 | Refills: 0 | DISCHARGE

## 2021-07-10 RX ORDER — FUROSEMIDE 40 MG
1 TABLET ORAL
Qty: 0 | Refills: 0 | DISCHARGE

## 2021-07-10 RX ORDER — INSULIN LISPRO 100/ML
5 VIAL (ML) SUBCUTANEOUS ONCE
Refills: 0 | Status: COMPLETED | OUTPATIENT
Start: 2021-07-10 | End: 2021-07-10

## 2021-07-10 RX ADMIN — CLOPIDOGREL BISULFATE 75 MILLIGRAM(S): 75 TABLET, FILM COATED ORAL at 11:27

## 2021-07-10 RX ADMIN — Medication 81 MILLIGRAM(S): at 11:27

## 2021-07-10 RX ADMIN — Medication 50 MILLIGRAM(S): at 05:20

## 2021-07-10 RX ADMIN — Medication 100 MILLIGRAM(S): at 05:20

## 2021-07-10 RX ADMIN — Medication 5 UNIT(S): at 12:34

## 2021-07-10 RX ADMIN — PANTOPRAZOLE SODIUM 40 MILLIGRAM(S): 20 TABLET, DELAYED RELEASE ORAL at 05:20

## 2021-07-10 NOTE — DISCHARGE NOTE NURSING/CASE MANAGEMENT/SOCIAL WORK - PATIENT PORTAL LINK FT
You can access the FollowMyHealth Patient Portal offered by [facility] by registering at the following website: [email or web address]. By joining [employer]’s FollowMyHealth portal, you will also be able to view your health information using other applications (apps) compatible with our system.

## 2021-07-12 LAB
ANION GAP SERPL CALC-SCNC: 13 MMOL/L
BUN SERPL-MCNC: 36 MG/DL
CALCIUM SERPL-MCNC: 9.5 MG/DL
CHLORIDE SERPL-SCNC: 99 MMOL/L
CO2 SERPL-SCNC: 21 MMOL/L
CREAT SERPL-MCNC: 1.3 MG/DL
GLUCOSE SERPL-MCNC: 185 MG/DL
POTASSIUM SERPL-SCNC: 4 MMOL/L
SODIUM SERPL-SCNC: 133 MMOL/L

## 2021-07-16 DIAGNOSIS — E78.5 HYPERLIPIDEMIA, UNSPECIFIED: ICD-10-CM

## 2021-07-16 DIAGNOSIS — Z83.3 FAMILY HISTORY OF DIABETES MELLITUS: ICD-10-CM

## 2021-07-16 DIAGNOSIS — Z89.422 ACQUIRED ABSENCE OF OTHER LEFT TOE(S): ICD-10-CM

## 2021-07-16 DIAGNOSIS — E11.22 TYPE 2 DIABETES MELLITUS WITH DIABETIC CHRONIC KIDNEY DISEASE: ICD-10-CM

## 2021-07-16 DIAGNOSIS — E11.51 TYPE 2 DIABETES MELLITUS WITH DIABETIC PERIPHERAL ANGIOPATHY WITHOUT GANGRENE: ICD-10-CM

## 2021-07-16 DIAGNOSIS — Z87.891 PERSONAL HISTORY OF NICOTINE DEPENDENCE: ICD-10-CM

## 2021-07-16 DIAGNOSIS — Z79.4 LONG TERM (CURRENT) USE OF INSULIN: ICD-10-CM

## 2021-07-16 DIAGNOSIS — I13.0 HYPERTENSIVE HEART AND CHRONIC KIDNEY DISEASE WITH HEART FAILURE AND STAGE 1 THROUGH STAGE 4 CHRONIC KIDNEY DISEASE, OR UNSPECIFIED CHRONIC KIDNEY DISEASE: ICD-10-CM

## 2021-07-16 DIAGNOSIS — I50.30 UNSPECIFIED DIASTOLIC (CONGESTIVE) HEART FAILURE: ICD-10-CM

## 2021-07-16 DIAGNOSIS — Z88.5 ALLERGY STATUS TO NARCOTIC AGENT: ICD-10-CM

## 2021-07-16 DIAGNOSIS — I70.1 ATHEROSCLEROSIS OF RENAL ARTERY: ICD-10-CM

## 2021-07-16 DIAGNOSIS — Z95.5 PRESENCE OF CORONARY ANGIOPLASTY IMPLANT AND GRAFT: ICD-10-CM

## 2021-07-16 DIAGNOSIS — Z95.820 PERIPHERAL VASCULAR ANGIOPLASTY STATUS WITH IMPLANTS AND GRAFTS: ICD-10-CM

## 2021-07-16 DIAGNOSIS — Z79.02 LONG TERM (CURRENT) USE OF ANTITHROMBOTICS/ANTIPLATELETS: ICD-10-CM

## 2021-07-16 DIAGNOSIS — I25.10 ATHEROSCLEROTIC HEART DISEASE OF NATIVE CORONARY ARTERY WITHOUT ANGINA PECTORIS: ICD-10-CM

## 2021-07-16 DIAGNOSIS — Z86.73 PERSONAL HISTORY OF TRANSIENT ISCHEMIC ATTACK (TIA), AND CEREBRAL INFARCTION WITHOUT RESIDUAL DEFICITS: ICD-10-CM

## 2021-07-16 DIAGNOSIS — Z95.810 PRESENCE OF AUTOMATIC (IMPLANTABLE) CARDIAC DEFIBRILLATOR: ICD-10-CM

## 2021-07-16 DIAGNOSIS — Z88.0 ALLERGY STATUS TO PENICILLIN: ICD-10-CM

## 2021-07-16 DIAGNOSIS — N18.9 CHRONIC KIDNEY DISEASE, UNSPECIFIED: ICD-10-CM

## 2021-07-16 DIAGNOSIS — H90.5 UNSPECIFIED SENSORINEURAL HEARING LOSS: ICD-10-CM

## 2021-07-16 DIAGNOSIS — I48.91 UNSPECIFIED ATRIAL FIBRILLATION: ICD-10-CM

## 2021-07-16 DIAGNOSIS — Z82.49 FAMILY HISTORY OF ISCHEMIC HEART DISEASE AND OTHER DISEASES OF THE CIRCULATORY SYSTEM: ICD-10-CM

## 2021-07-16 DIAGNOSIS — I47.2 VENTRICULAR TACHYCARDIA: ICD-10-CM

## 2021-07-30 ENCOUNTER — APPOINTMENT (OUTPATIENT)
Dept: CARDIOLOGY | Facility: CLINIC | Age: 76
End: 2021-07-30

## 2021-10-13 NOTE — ED PROVIDER NOTE - COVID-19 RESULT DATE/TIME
10-Nov-2020 15:39 Well appearing, awake, alert, oriented to person, place, time/situation and in no apparent distress. normal...

## 2021-11-05 NOTE — HISTORY OF PRESENT ILLNESS
[FreeTextEntry1] : 75F\par h/o tobacco abuse (quit), Afib (stopped Eliquis due to epistaxis) HTN, HLD, DM, high grade bladder cancer\par h/o CAD (RCA KENDELL 2011, om2 2006, om 2009), ICD ([place])\par h/o PAD s/p left dSFA, dLeft pop, TPT trunk and left PT/plantar for left foot ulcer. Due to incomplete healing of ulcer pt underwent another angiogram with intervention of left popliteal and peroneal arteries with PTA and KENDELL\par Presents to the office for a follow-up visit of PAD and renal artery stenosis\par Continues to have BLE pain and burning with mild-moderate exertion\par BP uncontrolled at home\par Unable to do ESTRELLA/PVR as there was a equipment malfunction on her day of appointment\par Endorses having SOB intermittently at rest and when speaks too much \par \par 5/28/21 Renal US doppler showed severe ostial stenoses of right and left renal arteries. Peak ostium systolic velocity > 400 cm/sec\par 1/7/21 NM stress test did not show any reperfusion defects\par 12/14/20 TTE LVEF 63% Mod MR, Mild TR, G2DD\par 11/6/20 ESTRELLA 0.59 Severe R popliteal A stenosis, ESTRELLA 0.67 Severe L tibial A stenosis

## 2021-11-05 NOTE — DISCUSSION/SUMMARY
[FreeTextEntry1] : 76 F\par h/o tobacco abuse (quit), Afib (stopped Eliquis due to epistaxis) HTN, HLD, DM, high grade bladder cancer\par h/o CAD (RCA KENDELL 2011, om2 2006, om 2009), ICD ([place])\par h/o PAD s/p left dSFA, dLeft pop, TPT trunk and left PT/plantar for left foot ulcer. Due to incomplete healing of ulcer pt underwent another angiogram with intervention of left popliteal and peroneal arteries with PTA and KENDELL\par \par 5/28/21 Renal US doppler showed severe ostial stenoses of right and left renal arteries. Peak ostium systolic velocity > 400 cm/sec\par 1/7/21 NM stress test did not show any reperfusion defects\par 12/14/20 TTE LVEF 63% Mod MR, Mild TR, G2DD\par 11/6/20 ESTRELLA 0.59 Severe R popliteal A stenosis, ESTRELLA 0.67 Severe L tibial A stenosis \par \par PAD \par Severe b/l Renal artery stenosis s/p b/l renal stenting now normotensive \par new left foot pain and 2nd digit ulcer \par \par Plan:\par - ESTRELLA with PVR\par - LE arterial duplex\par - F/u endocrine re A1C

## 2021-11-18 NOTE — H&P CARDIOLOGY - HISTORY OF PRESENT ILLNESS
75 y/old F here for new left foot pain and 2nd digit ulcer    h/o tobacco abuse(quit), A-Fib(stopped Eliquis due to epistaxis), HTN, HLD, DM, High grade cancer, CAD(RCA KENDELL 2011, OM 2006, OM 2009), ICD([place]), PAD s/p left dSFA, dLeft pop, TPT trunk and PT/plantar for left foot ulcer. Due to incomplete healing of ulcer pt underwent another angiogram with intervention of left popliteal and peroneal arteries with PTA and KENDELL. B/P uncontrolled at home. Continues to have BLE pain and burning with mild-moderate exertion.      5/28/21 Renal US doppler showed severe ostial stenoses of right and left renal arteries. Peak ostium systolic velocity > 400 cm/sec  1/7/21 NM stress test did not show any reperfusion defects  12/14/20 TTE LVEF 63% Mod MR, Mild TR, G2DD  11/6/20 ESTRELLA 0.59 Severe R popliteal A stenosis, ESTRELLA 0.67 Severe L tibial A stenosis    PAD Severe b/l Renal artery stenosis s/p b/l renal stenting, now normotensive      PMH:  Bladder Ca, CIHD, CAD, PPM, PAD, Renal artery stenosis, SNHL, Epistaxis - recurrent  PSH:  Gallbladder Sx, Hernia Sx, Hysterectomy, Vascular problem, Toe amputation - Left   FH: DM, HTN, CAD

## 2021-11-18 NOTE — CHART NOTE - NSCHARTNOTEFT_GEN_A_CORE
INDICATION:   LLE CLI  Leena class 6 (left foot ulcers with dry gangrene on 2nd toe and lateral aspect of foot)  Known severe left BTK disease with 1 vessel runoff (peroneal)      PHYSICIAN: Dr. Hammonds   ASSISTANT/FELLOW: Everett Harrington    ACCESS: Ultrasound Guided antegrade left  femoral artery access    VASCULAR CLOSURE DEVICE (if applicable): manual     ANGIOGRAM:  left Lower Extremity DSA angiography       DIAGNOSTIC FINDINGS      Left SFA Artery: Severe disease of prox SFA. Occluded distal SFA in-stent. intervention was performed  Left Popliteal Artery: occluded in-stent. intervention was performed.   Left Anterior Tibial Artery: occluded   Left Peroneal Artery: occluded in-stent proximally. severe disease in mid and distal segments which were partially reconstituted by collaterals. Intervention was performed.   Left Posterior Tibial Artery: occluded       COMPLICATION: none    Specimens removed: N/A     PERIPHERAL DIAGNOSTIC ANGIOGRAM/INTERVENTION SUMMARY:    Severe left proximal SFA disease. In-stent occlusion of left distal SFA, popliteal and peroneal arteries with no in-line flow to LLE.   -s/p PTA of peroneal artery    -Laser atherectomy + PTA of popliteal artery  -Laser atherectomy + PTA + Allison stent to distal SFA  -DCB angioplasty of prox SFA     Following the above interventions, pt had slow flow to the left leg. Aspiration thrombectomy was attempted but was unsuccessful. Pt was subsequently given catheter directed tPA (2.5 mg bolus) along with heparin infusion.   Antegrade left femoral sheath was subsequently removed in CCU and hemostasis was achieved with manual pressure. Pt had prominent peroneal pulse on doppler after the sheath removal.       RECOMMENDATIONS:  CCU monitoring   closely monitor access site and limb perfusion   post procedure IV hydration  cont medical therapy.  will follow INDICATION:   LLE CLI  Leena class 6 (left foot ulcers with dry gangrene on 2nd toe and lateral aspect of foot)  Known severe left BTK disease with 1 vessel runoff (peroneal)        PHYSICIAN: Dr. Hammonds   ASSISTANT/FELLOW: Everett Harrington    ACCESS: Ultrasound Guided antegrade left  femoral artery access    VASCULAR CLOSURE DEVICE (if applicable): manual     ANGIOGRAM:  left Lower Extremity DSA angiography       DIAGNOSTIC FINDINGS      Left SFA Artery: Severe disease of prox SFA. Occluded distal SFA in-stent. intervention was performed  Left Popliteal Artery: occluded in-stent. intervention was performed.   Left Anterior Tibial Artery: occluded   Left Peroneal Artery: occluded in-stent proximally. severe disease in mid and distal segments which were partially reconstituted by collaterals. Intervention was performed.   Left Posterior Tibial Artery: occluded       COMPLICATION: none    Specimens removed: N/A     PERIPHERAL DIAGNOSTIC ANGIOGRAM/INTERVENTION SUMMARY:    Severe left proximal SFA disease. In-stent occlusion of left distal SFA, popliteal and peroneal arteries with no in-line flow to LLE.   -s/p PTA of peroneal artery    -Laser atherectomy + PTA of popliteal artery  -Laser atherectomy + PTA + Allison stent to distal SFA  -DCB angioplasty of prox SFA     Following the above interventions, pt had slow flow to the left leg. Aspiration thrombectomy was attempted but was unsuccessful. Pt was subsequently given catheter directed tPA (2.5 mg bolus) along with heparin infusion.   Antegrade left femoral sheath was subsequently removed in CCU and hemostasis was achieved with manual pressure. Pt had prominent peroneal pulse on doppler after the sheath removal.       RECOMMENDATIONS:  CCU monitoring   closely monitor access site and limb perfusion   post procedure IV hydration  cont medical therapy.  will follow

## 2021-11-18 NOTE — ASU PATIENT PROFILE, ADULT - NSICDXPASTMEDICALHX_GEN_ALL_CORE_FT
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

## 2021-11-19 NOTE — PROGRESS NOTE ADULT - SUBJECTIVE AND OBJECTIVE BOX
INTERVAL EVENTS:  No acute events overnight. Groin site CDI without hematoma. Denied access site pain in the am.    PAST MEDICAL & SURGICAL HISTORY:  DM (diabetes mellitus)    HTN (hypertension)    CHF (congestive heart failure)    CAD (coronary artery disease)  PCI RCA 2011, OM2 2006 , OM 2009    PVD (peripheral vascular disease)    Amputation of toe of left foot  L Hallux 5/20    Tumor  bladder tumor    Hernia    Atrial fibrillation, unspecified type  paf recently stopped eliquis s/t epistatxis    CAD (coronary artery disease)  RCA KENDELL  2011 OM2 KENDELL 2006  OM1 KENDELL 2009    HTN (hypertension)    Afib    DM (diabetes mellitus)    PVD (peripheral vascular disease)    CHF (congestive heart failure)    PAD (peripheral artery disease)  severe s/p stent    History of implantable cardiac defibrillator (ICD)  and pacemaker- [employer]    HTN (hypertension)    Dyslipidemia    Bladder tumor    H/O ischemic heart disease    H/O ischemic heart disease    DM (diabetes mellitus)  30 yr    History of cholecystectomy    Artificial cardiac pacemaker    Cardiac defibrillator in place  2010    H/O: hysterectomy    Status post coronary artery stent placement    Cardiac pacemaker        MEDICATIONS  (STANDING):  aspirin 325 milliGRAM(s) Oral once  aspirin enteric coated 81 milliGRAM(s) Oral daily  atorvastatin 40 milliGRAM(s) Oral at bedtime  clopidogrel Tablet 75 milliGRAM(s) Oral daily  hydrALAZINE 50 milliGRAM(s) Oral every 8 hours  metoprolol succinate  milliGRAM(s) Oral daily  pantoprazole    Tablet 40 milliGRAM(s) Oral before breakfast  sodium chloride 0.9%. 1000 milliLiter(s) (75 mL/Hr) IV Continuous <Continuous>    MEDICATIONS  (PRN):      PHYSICAL EXAM:  Vital Signs Last 24 Hrs  T(C): 36 (19 Nov 2021 04:00), Max: 36.1 (19 Nov 2021 00:00)  T(F): 96.8 (19 Nov 2021 04:00), Max: 97 (19 Nov 2021 00:00)  HR: 68 (19 Nov 2021 08:00) (68 - 97)  BP: 138/62 (19 Nov 2021 08:00) (101/58 - 194/83)  BP(mean): 89 (19 Nov 2021 08:00) (74 - 120)  RR: 18 (19 Nov 2021 08:00) (12 - 20)  SpO2: 95% (19 Nov 2021 08:00) (95% - 99%)  GEN: Awake, alert. NAD.   HEENT: NCAT, PERRL, EOMI. Mucosa moist. No JVD.  RESP: CTA b/l  CV: RRR. Normal S1/S2. No m/r/g.  ABD: Soft. NT/ND. BS+  EXT: 2+ DP pulses bilaterally, groin site CDI.  NEURO: AAOx3. No focal deficits.     LABS:                        10.0   11.75 )-----------( 319      ( 19 Nov 2021 05:30 )             31.0     11-19    138  |  107  |  25<H>  ----------------------------<  175<H>  5.4<H>   |  20  |  1.1    Ca    8.7      19 Nov 2021 05:30    TPro  6.1  /  Alb  3.0<L>  /  TBili  0.3  /  DBili  x   /  AST  19  /  ALT  16  /  AlkPhos  161<H>  11-19            I&O's Summary    18 Nov 2021 07:01  -  19 Nov 2021 07:00  --------------------------------------------------------  IN: 925 mL / OUT: 0 mL / NET: 925 mL

## 2021-11-19 NOTE — PROGRESS NOTE ADULT - SUBJECTIVE AND OBJECTIVE BOX
Vascular Cardiology Follow up s/p PTA L leg    DARRION MELENDEZ   76y Female  PAST MEDICAL & SURGICAL HISTORY:  HTN (hypertension)    CHF (congestive heart failure)    Amputation of toe of left foot  L Hallux 5/20    Hernia    Atrial fibrillation, unspecified type  paf recently stopped eliquis s/t epistatxis    CAD (coronary artery disease)  RCA KENDELL  2011 OM2 KENDELL 2006  OM1 KENDELL 2009    PAD (peripheral artery disease)  severe s/p stent    History of implantable cardiac defibrillator (ICD)  and pacemaker- [employer]    Dyslipidemia    Bladder tumor    H/O ischemic heart disease    DM (diabetes mellitus)  30 yr    History of cholecystectomy    Cardiac defibrillator in place  2010    H/O: hysterectomy    Status post coronary artery stent placement         HPI:  75 y/old F here for new left foot pain and 2nd digit ulcer    h/o tobacco abuse(quit), A-Fib(stopped Eliquis due to epistaxis), HTN, HLD, DM, High grade cancer, CAD(RCA KENDELL 2011, OM 2006, OM 2009), ICD([place]), PAD s/p left dSFA, dLeft pop, TPT trunk and PT/plantar for left foot ulcer. Due to incomplete healing of ulcer pt underwent another angiogram with intervention of left popliteal and peroneal arteries with PTA and KENDELL. B/P uncontrolled at home. Continues to have BLE pain and burning with mild-moderate exertion.      5/28/21 Renal US doppler showed severe ostial stenoses of right and left renal arteries. Peak ostium systolic velocity > 400 cm/sec  1/7/21 NM stress test did not show any reperfusion defects  12/14/20 TTE LVEF 63% Mod MR, Mild TR, G2DD  11/6/20 ESTRELLA 0.59 Severe R popliteal A stenosis, ESTRELLA 0.67 Severe L tibial A stenosis    PAD Severe b/l Renal artery stenosis s/p b/l renal stenting, now normotensive      PMH:  Bladder Ca, CIHD, CAD, PPM, PAD, Renal artery stenosis, SNHL, Epistaxis - recurrent  PSH:  Gallbladder Sx, Hernia Sx, Hysterectomy, Vascular problem, Toe amputation - Left   FH: DM, HTN, CAD     (18 Nov 2021 16:37)    Allergies    codeine (Rash)  penicillin (Rash)  penicillin (Unknown)    Intolerances      Patient without complaints.  Denies LE pain and swelling,  SOB, palpitations, or dizziness  No events on telemetry overnight    Vital Signs Last 24 Hrs  T(C): 36 (19 Nov 2021 04:00), Max: 36.1 (19 Nov 2021 00:00)  T(F): 96.8 (19 Nov 2021 04:00), Max: 97 (19 Nov 2021 00:00)  HR: 68 (19 Nov 2021 08:00) (68 - 97)  BP: 138/62 (19 Nov 2021 08:00) (101/58 - 194/83)  BP(mean): 89 (19 Nov 2021 08:00) (74 - 120)  RR: 18 (19 Nov 2021 08:00) (12 - 20)  SpO2: 95% (19 Nov 2021 08:00) (95% - 99%)    MEDICATIONS  (STANDING):  aspirin enteric coated 81 milliGRAM(s) Oral daily  atorvastatin 40 milliGRAM(s) Oral at bedtime  clopidogrel Tablet 75 milliGRAM(s) Oral daily  hydrALAZINE 50 milliGRAM(s) Oral every 8 hours  metoprolol succinate  milliGRAM(s) Oral daily  pantoprazole    Tablet 40 milliGRAM(s) Oral before breakfast    MEDICATIONS  (PRN):      REVIEW OF SYSTEMS:          All negative except as mentioned in HPI    PHYSICAL EXAM:           CONSTITUTIONAL: Well-developed; well-nourished; in no acute distress  	SKIN: warm, dry  	HEAD: Normocephalic; atraumatic  	EYES: PERRL.  	ENT: No nasal discharge, airway clear, mucous membranes moist  	NECK: Supple; non tender.  	CARD: +S1, +S2, no murmurs, gallops, or rubs. Regular rate and rhythm    	RESP: No wheezes, rales or rhonchi. CTA B/L  	ABD: soft ntnd, + BS x 4 quadrants  	EXT: moves all extremities,  no clubbing, cyanosis or edema, + b/l heel protectors in place, L 2nd toe ulcer  	NEURO: Alert and oriented x3, no focal deficits          PSYCH: Cooperative, appropriate          VASCULAR:  + Rad / + PTs / + DPs          EXTREMITY:             left Groin:  Dressing D/C/I, access site soft, no hematoma, no pain, + pulses, no sign of infection, no numbness             LABS:                        10.0   11.75 )-----------( 319      ( 19 Nov 2021 05:30 )             31.0     11-19    138  |  107  |  25<H>  ----------------------------<  175<H>  5.4<H>   |  20  |  1.1    Ca    8.7      19 Nov 2021 05:30    TPro  6.1  /  Alb  3.0<L>  /  TBili  0.3  /  DBili  x   /  AST  19  /  ALT  16  /  AlkPhos  161<H>  11-19        LIVER FUNCTIONS - ( 19 Nov 2021 05:30 )  Alb: 3.0 g/dL / Pro: 6.1 g/dL / ALK PHOS: 161 U/L / ALT: 16 U/L / AST: 19 U/L / GGT: x             A/P:  I discussed the case with Cardiologist Dr. Hammonds and recommend the following:    S/P PTA:    PERIPHERAL DIAGNOSTIC ANGIOGRAM/INTERVENTION SUMMARY:    Severe left proximal SFA disease. In-stent occlusion of left distal SFA, popliteal and peroneal arteries with no in-line flow to LLE.   -s/p PTA of peroneal artery    -Laser atherectomy + PTA of popliteal artery  -Laser atherectomy + PTA + Allison stent to distal SFA  -DCB angioplasty of prox SFA     Following the above interventions, pt had slow flow to the left leg. Aspiration thrombectomy was attempted but was unsuccessful. Pt was subsequently given catheter directed tPA (2.5 mg bolus) along with heparin infusion.   Antegrade left femoral sheath was subsequently removed in CCU and hemostasis was achieved with manual pressure. Pt had prominent peroneal pulse on doppler after the sheath removal.       RECOMMENDATIONS:  CCU monitoring   closely monitor access site and limb perfusion   post procedure IV hydration  cont medical therapy.  will follow.      Electronic Signatures:  Lc Castillo)  (Signed 19-Nov-2021 00:41)  	Authored: Note Type, Note  Arpan Hammonds)  (Signature Pending)  	Co-Signer: Note Type, Note      Last Updated: 19-Nov-2021 00:41 by Lc Castillo)    	     Continue DAPT ( Aspirin 81 mg PO Daily and Plavix 75 mg daily  ),  B-Blocker, Statin Therapy                   Patient given 30 day supply of ( Aspirin 81 mg daily and Plavix 75 mg daily ) to take at home                   OOB-CH in afternoon, monitor access site closely                   Monitor access site                   minimal ambulation this evening if access site stable                    No strenuous activity for 3 days (routine walking okay)                   No driving for 3 days                   No heavy lifting > 30lbs for 2 weeks                   May shower in 24 hours                   Leave dressing in place 24- 48 hours, if does not fall off in 48 hours can remove                   Patient agreeing to take DAPT as directed by cardiologist                    Post angiogram instructions, access site care and activity restrictions reviewed with patient                     Discussed with patient to return to hospital if experience severe lower extremity pain and site bleeding                   Aggressive risk factor modification, diet counseling, smoking cessation discussed with patient                      continue CCU monitoring, anticipate d/c in am if stable

## 2021-11-19 NOTE — PROGRESS NOTE ADULT - ASSESSMENT
IMPRESSION:  Severe left proximal SFA disease. In-stent occlusion of left distal SFA, popliteal and peroneal arteries with no in-line flow to LLE.   -s/p PTA of peroneal artery    -Laser atherectomy + PTA of popliteal artery  -Laser atherectomy + PTA + Allison stent to distal SFA  -DCB angioplasty of prox SFA     CAD s/p PCI, Chronic HFrEF (now improved) s/p BiVICD, afib on eliquis, HTN, DM    PLAN:    CNS: Avoid sedatives    HEENT: Oral care    PULMONARY:  HOB @ 45 degrees. ORA    CARDIOVASCULAR:  - Will continue monitoring patient in CCU today.  - C/w DAPT, statin, BB and ACEI.  - Hydralazine for BP control.  - Closely monitor CBC.    GI: GI prophylaxis.     RENAL:   Strict I/O. Avoid nephrotoxic agents    INFECTIOUS DISEASE:     HEMATOLOGICAL:   Monitor CBC    ENDOCRINE:  Follow up FS.  Insulin protocol; F/U A1c  f/u Lipid profile    Dispo: CCU  Code status: FULL

## 2021-11-19 NOTE — PROGRESS NOTE ADULT - SUBJECTIVE AND OBJECTIVE BOX
INTERVAL HISTORY:  Pt sp angiography .  In AM c/o left knee/leg soreness. Denies cp, sob.     PAST MEDICAL & SURGICAL HISTORY  HTN (hypertension)    CHF (congestive heart failure)    Amputation of toe of left foot  L Hallux     Hernia    Atrial fibrillation, unspecified type  paf recently stopped eliquis s/t epistatxis    CAD (coronary artery disease)  RCA KENDELL   OM2 KENDELL   OM1 KENDELL     PAD (peripheral artery disease)  severe s/p stent    History of implantable cardiac defibrillator (ICD)  and pacemaker- [employer]    Dyslipidemia    Bladder tumor    H/O ischemic heart disease    DM (diabetes mellitus)  30 yr    History of cholecystectomy    Cardiac defibrillator in place      H/O: hysterectomy    Status post coronary artery stent placement        ALLERGIES:  codeine (Rash)  penicillin (Rash)  penicillin (Unknown)      MEDICATIONS:  MEDICATIONS  (STANDING):  aspirin 325 milliGRAM(s) Oral once  aspirin enteric coated 81 milliGRAM(s) Oral daily  atorvastatin 40 milliGRAM(s) Oral at bedtime  clopidogrel Tablet 75 milliGRAM(s) Oral daily  hydrALAZINE 50 milliGRAM(s) Oral every 8 hours  metoprolol succinate  milliGRAM(s) Oral daily  pantoprazole    Tablet 40 milliGRAM(s) Oral before breakfast  sodium chloride 0.9%. 1000 milliLiter(s) (75 mL/Hr) IV Continuous <Continuous>    MEDICATIONS  (PRN):      HOME MEDICATIONS:  Home Medications:  atorvastatin 40 mg oral tablet: 1 tab(s) orally once a day (at bedtime) (2021 17:02)  fexofenadine 180 mg oral tablet: 1 tab(s) orally once a day (2021 17:02)  furosemide 20 mg oral tablet: 1 tab(s) orally once a day (2021 17:02)  glimepiride 4 mg oral tablet: 1 tab(s) orally 2 times a day (2021 17:02)  NovoLOG FlexPen 100 units/mL injectable solution: 10 unit(s) injectable 3 times a day with meals (2021 17:02)        OBJECTIVE:  ICU Vital Signs Last 24 Hrs  T(C): 36 (2021 04:00), Max: 36.1 (2021 00:00)  T(F): 96.8 (2021 04:00), Max: 97 (2021 00:00)  HR: 68 (2021 08:00) (68 - 97)  BP: 138/62 (2021 08:00) (101/58 - 194/83)  BP(mean): 89 (2021 08:00) (74 - 120)  ABP: --  ABP(mean): --  RR: 18 (2021 08:00) (12 - 20)  SpO2: 95% (2021 08:00) (95% - 99%)      Adult Advanced Hemodynamics Last 24 Hrs  CVP(mm Hg): --  CVP(cm H2O): --  CO: --  CI: --  PA: --  PA(mean): --  PCWP: --  SVR: --  SVRI: --  PVR: --  PVRI: --  I&O's Summary    2021 07:01  -  2021 07:00  --------------------------------------------------------  IN: 925 mL / OUT: 0 mL / NET: 925 mL      Daily Height in cm: 154.94 (2021 21:30)    Daily Weight in k.6 (2021 05:00)    PHYSICAL EXAM:  NEURO: patient is awake , alert and oriented  GEN: Not in acute distress  NECK: no thyroid enlargement, no JVD  LUNGS: Clear to auscultation bilaterally   CARDIOVASCULAR: S1/S2 present, RRR , no murmurs or rubs, no carotid bruits,  + PP bilaterally  ABD: Soft, non-tender, non-distended, +BS  EXT: No SUSAN  SKIN: Intact, +LT 2nd digit distal ulcer, b/l extremities warm   ACCESS Site:    LABS:                        10.0   11.75 )-----------( 319      ( 2021 05:30 )             31.0     11-19    138  |  107  |  25<H>  ----------------------------<  175<H>  5.4<H>   |  20  |  1.1    Ca    8.7      2021 05:30    TPro  6.1  /  Alb  3.0<L>  /  TBili  0.3  /  DBili  x   /  AST  19  /  ALT  16  /  AlkPhos  161<H>        RADIOLOGY:  -CXR:  -TTE:  -STRESS TEST:  -CATHETERIZATION:    ECG:    TELEMETRY EVENTS:

## 2021-11-19 NOTE — CHART NOTE - NSCHARTNOTEFT_GEN_A_CORE
Sheath taken out of R groin earlier tonight.   Groin site rechecked overnight - no issues observed. groin soft without areas of induration or bleeding. Sheath taken out of L groin earlier tonight.   Groin site rechecked overnight - no issues observed. groin soft without areas of induration or bleeding.

## 2021-11-20 NOTE — PROGRESS NOTE ADULT - SUBJECTIVE AND OBJECTIVE BOX
Vascular Cardiology Follow up s/p PTA L leg    DARRION MELENDEZ   76y Female  PAST MEDICAL & SURGICAL HISTORY:  HTN (hypertension)    CHF (congestive heart failure)    Amputation of toe of left foot  L Hallux 5/20    Hernia    Atrial fibrillation, unspecified type  paf recently stopped eliquis s/t epistatxis    CAD (coronary artery disease)  RCA KENDELL  2011 OM2 KENDELL 2006  OM1 KENDELL 2009    PAD (peripheral artery disease)  severe s/p stent    History of implantable cardiac defibrillator (ICD)  and pacemaker- [employer]    Dyslipidemia    Bladder tumor    H/O ischemic heart disease    DM (diabetes mellitus)  30 yr    History of cholecystectomy    Cardiac defibrillator in place  2010    H/O: hysterectomy    Status post coronary artery stent placement         HPI:  75 y/old F here for new left foot pain and 2nd digit ulcer    h/o tobacco abuse(quit), A-Fib(stopped Eliquis due to epistaxis), HTN, HLD, DM, High grade cancer, CAD(RCA KENDELL 2011, OM 2006, OM 2009), ICD([employer]), PAD s/p left dSFA, dLeft pop, TPT trunk and PT/plantar for left foot ulcer. Due to incomplete healing of ulcer pt underwent another angiogram with intervention of left popliteal and peroneal arteries with PTA and KENDELL. B/P uncontrolled at home. Continues to have BLE pain and burning with mild-moderate exertion.      5/28/21 Renal US doppler showed severe ostial stenoses of right and left renal arteries. Peak ostium systolic velocity > 400 cm/sec  1/7/21 NM stress test did not show any reperfusion defects  12/14/20 TTE LVEF 63% Mod MR, Mild TR, G2DD  11/6/20 ESTRELLA 0.59 Severe R popliteal A stenosis, ESTRELLA 0.67 Severe L tibial A stenosis    PAD Severe b/l Renal artery stenosis s/p b/l renal stenting, now normotensive      PMH:  Bladder Ca, CIHD, CAD, PPM, PAD, Renal artery stenosis, SNHL, Epistaxis - recurrent  PSH:  Gallbladder Sx, Hernia Sx, Hysterectomy, Vascular problem, Toe amputation - Left   FH: DM, HTN, CAD     (18 Nov 2021 16:37)    Allergies    codeine (Rash)  penicillin (Rash)  penicillin (Unknown)    Intolerances      Patient without complaints.  Denies LE pain and swelling,  SOB, palpitations, or dizziness  NSVT ~ 4 secs on telemetry overnight    Vital Signs Last 24 Hrs  T(C): 36 (19 Nov 2021 04:00), Max: 36.1 (19 Nov 2021 00:00)  T(F): 96.8 (19 Nov 2021 04:00), Max: 97 (19 Nov 2021 00:00)  HR: 68 (19 Nov 2021 08:00) (68 - 97)  BP: 138/62 (19 Nov 2021 08:00) (101/58 - 194/83)  BP(mean): 89 (19 Nov 2021 08:00) (74 - 120)  RR: 18 (19 Nov 2021 08:00) (12 - 20)  SpO2: 95% (19 Nov 2021 08:00) (95% - 99%)    MEDICATIONS  (STANDING):  aspirin enteric coated 81 milliGRAM(s) Oral daily  atorvastatin 40 milliGRAM(s) Oral at bedtime  clopidogrel Tablet 75 milliGRAM(s) Oral daily  hydrALAZINE 50 milliGRAM(s) Oral every 8 hours  metoprolol succinate  milliGRAM(s) Oral daily  pantoprazole    Tablet 40 milliGRAM(s) Oral before breakfast    MEDICATIONS  (PRN):      REVIEW OF SYSTEMS:          All negative except as mentioned in HPI    PHYSICAL EXAM:           CONSTITUTIONAL: Well-developed; well-nourished; in no acute distress  	SKIN: warm, dry  	HEAD: Normocephalic; atraumatic  	EYES: PERRL.  	ENT: No nasal discharge, airway clear, mucous membranes moist  	NECK: Supple; non tender.  	CARD: +S1, +S2, no murmurs, gallops, or rubs. Regular rate and rhythm    	RESP: No wheezes, rales or rhonchi. CTA B/L  	ABD: soft ntnd, + BS x 4 quadrants  	EXT: moves all extremities,  no clubbing, cyanosis or edema, + b/l heel protectors in place, L 2nd toe ulcer  	NEURO: Alert and oriented x3, no focal deficits          PSYCH: Cooperative, appropriate          VASCULAR:  + Rad / + PTs / + DPs          EXTREMITY:             left Groin:  Dressing removed, access site soft, no hematoma, no pain, + pulses, no sign of infection, no numbness             LABS: Comprehensive Metabolic Panel in AM (11.20.21 @ 04:30)   Sodium, Serum: 135 mmol/L   Potassium, Serum: 4.7 mmol/L   Chloride, Serum: 103 mmol/L   Carbon Dioxide, Serum: 18 mmol/L   Anion Gap, Serum: 14 mmol/L   Blood Urea Nitrogen, Serum: 28 mg/dL   Creatinine, Serum: 1.2 mg/dL   Glucose, Serum: 175 mg/dL   Calcium, Total Serum: 8.6 mg/dL   Protein Total, Serum: 6.3 g/dL   Albumin, Serum: 3.2 g/dL   Bilirubin Total, Serum: 0.3 mg/dL   Alkaline Phosphatase, Serum: 170 U/L   Aspartate Aminotransferase (AST/SGOT): 19 U/L   Alanine Aminotransferase (ALT/SGPT): 16 U/L           Complete Blood Count + Automated Diff in AM (11.20.21 @ 04:30)   WBC Count: 12.84 K/uL   RBC Count: 3.37 M/uL   Hemoglobin: 9.6 g/dL   Hematocrit: 29.6 %   Mean Cell Volume: 87.8 fL   Mean Cell Hemoglobin: 28.5 pg   Mean Cell Hemoglobin Conc: 32.4 g/dL   Red Cell Distrib Width: 14.0 %   Platelet Count - Automated: 283 K/uL   Auto Neutrophil #: 8.80 K/uL   Auto Lymphocyte #: 2.82 K/uL   Auto Monocyte #: 0.88 K/uL   Auto Eosinophil #: 0.23 K/uL   Auto Basophil #: 0.04 K/uL   Auto Neutrophil %: 68.5: Differential percentages must be correlated with absolute numbers for   clinical significance. %   Auto Lymphocyte %: 22.0 %   Auto Monocyte %: 6.9 %   Auto Eosinophil %: 1.8 %   Auto Basophil %: 0.3 %   Auto Immature Granulocyte %: 0.5: (Includes meta, myelo and promyelocytes) %   Nucleated RBC: 0 /100 WBCs            A/P:  I discussed the case with Cardiologist Dr. Hammonds and recommend the following:    S/P PTA:    PERIPHERAL DIAGNOSTIC ANGIOGRAM/INTERVENTION SUMMARY:    Severe left proximal SFA disease. In-stent occlusion of left distal SFA, popliteal and peroneal arteries with no in-line flow to LLE.   -s/p PTA of peroneal artery    -Laser atherectomy + PTA of popliteal artery  -Laser atherectomy + PTA + Allison stent to distal SFA  -DCB angioplasty of prox SFA     Following the above interventions, pt had slow flow to the left leg. Aspiration thrombectomy was attempted but was unsuccessful. Pt was subsequently given catheter directed tPA (2.5 mg bolus) along with heparin infusion.   Antegrade left femoral sheath was subsequently removed in CCU and hemostasis was achieved with manual pressure. Pt had prominent peroneal pulse on doppler after the sheath removal.       RECOMMENDATIONS:  CCU monitoring   closely monitor access site and limb perfusion   post procedure IV hydration  cont medical therapy.  will follow.      Electronic Signatures:  Lc Castillo)  (Signed 19-Nov-2021 00:41)  	Authored: Note Type, Note  Arpan Hammonds)  (Signature Pending)  	Co-Signer: Note Type, Note      Last Updated: 19-Nov-2021 00:41 by Lc Castillo)    	     Continue DAPT ( Aspirin 81 mg PO Daily and Plavix 75 mg daily  ),  B-Blocker, Statin Therapy                   Patient given 30 day supply of ( Aspirin 81 mg daily and Plavix 75 mg daily ) to take at home                   OOB-CH, ambulate with assistance                   Monitor access site                   No strenuous activity for 3 days (routine walking okay)                   No driving for 3 days                   No heavy lifting > 30lbs for 2 weeks                   May shower in 24 hours                   Patient agreeing to take DAPT as directed by cardiologist                    Post angiogram instructions, access site care and activity restrictions reviewed with patient                     Discussed with patient to return to hospital if experience severe lower extremity pain and site bleeding                   Aggressive risk factor modification, diet counseling, smoking cessation discussed with patient                      can d/c from vascular cardiology standpoint if ambulating without symptoms and access site stable                   pt instructed to f/u with Dr. Hammonds in 1 week

## 2021-11-20 NOTE — PROGRESS NOTE ADULT - SUBJECTIVE AND OBJECTIVE BOX
INTERVAL HISTORY:      PAST MEDICAL & SURGICAL HISTORY  HTN (hypertension)    CHF (congestive heart failure)    Amputation of toe of left foot  L Hallux     Hernia    Atrial fibrillation, unspecified type  paf recently stopped eliquis s/t epistatxis    CAD (coronary artery disease)  RCA KENDELL   OM2 KENDELL 2006  OM1 KENDELL 2009    PAD (peripheral artery disease)  severe s/p stent    History of implantable cardiac defibrillator (ICD)  and pacemaker- [employer]    Dyslipidemia    Bladder tumor    H/O ischemic heart disease    DM (diabetes mellitus)  30 yr    History of cholecystectomy    Cardiac defibrillator in place      H/O: hysterectomy    Status post coronary artery stent placement        ALLERGIES:  codeine (Rash)  penicillin (Rash)  penicillin (Unknown)      MEDICATIONS:  MEDICATIONS  (STANDING):  amLODIPine   Tablet 5 milliGRAM(s) Oral every 24 hours  aspirin enteric coated 81 milliGRAM(s) Oral daily  atorvastatin 40 milliGRAM(s) Oral at bedtime  chlorhexidine 4% Liquid 1 Application(s) Topical daily  clopidogrel Tablet 75 milliGRAM(s) Oral daily  dextrose 40% Gel 15 Gram(s) Oral once  dextrose 5%. 1000 milliLiter(s) (50 mL/Hr) IV Continuous <Continuous>  dextrose 5%. 1000 milliLiter(s) (100 mL/Hr) IV Continuous <Continuous>  dextrose 50% Injectable 25 Gram(s) IV Push once  dextrose 50% Injectable 12.5 Gram(s) IV Push once  dextrose 50% Injectable 25 Gram(s) IV Push once  furosemide    Tablet 20 milliGRAM(s) Oral daily  glucagon  Injectable 1 milliGRAM(s) IntraMuscular once  hydrALAZINE 50 milliGRAM(s) Oral every 8 hours  insulin glargine Injectable (LANTUS) 16 Unit(s) SubCutaneous at bedtime  insulin lispro (ADMELOG) corrective regimen sliding scale   SubCutaneous three times a day before meals  insulin lispro Injectable (ADMELOG) 5 Unit(s) SubCutaneous three times a day before meals  metoprolol succinate  milliGRAM(s) Oral daily  pantoprazole    Tablet 40 milliGRAM(s) Oral before breakfast    MEDICATIONS  (PRN):      HOME MEDICATIONS:  Home Medications:  atorvastatin 40 mg oral tablet: 1 tab(s) orally once a day (at bedtime) (2021 17:02)  fexofenadine 180 mg oral tablet: 1 tab(s) orally once a day (2021 17:02)  furosemide 20 mg oral tablet: 1 tab(s) orally once a day (2021 17:02)  glimepiride 4 mg oral tablet: 1 tab(s) orally 2 times a day (2021 17:02)  NovoLOG FlexPen 100 units/mL injectable solution: 10 unit(s) injectable 3 times a day with meals (2021 17:02)        OBJECTIVE:  ICU Vital Signs Last 24 Hrs  T(C): 36.7 (2021 06:00), Max: 37.7 (2021 20:00)  T(F): 98.1 (2021 06:00), Max: 99.8 (2021 20:00)  HR: 93 (2021 06:00) (68 - 100)  BP: 144/65 (2021 06:00) (109/55 - 190/74)  BP(mean): 93 (2021 06:00) (79 - 114)  RR: 24 (2021 06:00) (13 - 38)  SpO2: 95% (2021 06:00) (91% - 99%)      I&O's Summary    2021 07:01  -  2021 07:00  --------------------------------------------------------  IN: 925 mL / OUT: 0 mL / NET: 925 mL    2021 07:01  -  2021 06:42  --------------------------------------------------------  IN: 480 mL / OUT: 700 mL / NET: -220 mL      Daily Height in cm: 154.94 (2021 11:15)    Daily Weight in k.3 (2021 06:00)    PHYSICAL EXAM:  NEURO: patient is awake , alert and oriented  GEN: Not in acute distress  NECK: no thyroid enlargement, no JVD  LUNGS: Clear to auscultation bilaterally   CARDIOVASCULAR: S1/S2 present, RRR , no murmurs or rubs, no carotid bruits,  + PP bilaterally  ABD: Soft, non-tender, non-distended, +BS  EXT: No SUSAN  SKIN: Intact  ACCESS Site:    LABS:                        9.6    12.84 )-----------( 283      ( 2021 04:30 )             29.6     -    138  |  107  |  25<H>  ----------------------------<  175<H>  5.4<H>   |  20  |  1.1    Ca    8.7      2021 05:30    TPro  6.1  /  Alb  3.0<L>  /  TBili  0.3  /  DBili  x   /  AST  19  /  ALT  16  /  AlkPhos  161<H>  11-19    PTT - ( 2021 04:30 )  PTT:39.8 sec      TELEMETRY EVENTS:       INTERVAL HISTORY:      PAST MEDICAL & SURGICAL HISTORY  HTN (hypertension)    CHF (congestive heart failure)    Amputation of toe of left foot  L Hallux     Hernia    Atrial fibrillation, unspecified type  paf recently stopped eliquis s/t epistatxis    CAD (coronary artery disease)  RCA KENDELL   OM2 KENDELL 2006  OM1 KENDELL 2009    PAD (peripheral artery disease)  severe s/p stent    History of implantable cardiac defibrillator (ICD)  and pacemaker- [employer]    Dyslipidemia    Bladder tumor    H/O ischemic heart disease    DM (diabetes mellitus)  30 yr    History of cholecystectomy    Cardiac defibrillator in place      H/O: hysterectomy    Status post coronary artery stent placement        ALLERGIES:  codeine (Rash)  penicillin (Rash)  penicillin (Unknown)      MEDICATIONS:  MEDICATIONS  (STANDING):  amLODIPine   Tablet 5 milliGRAM(s) Oral every 24 hours  aspirin enteric coated 81 milliGRAM(s) Oral daily  atorvastatin 40 milliGRAM(s) Oral at bedtime  chlorhexidine 4% Liquid 1 Application(s) Topical daily  clopidogrel Tablet 75 milliGRAM(s) Oral daily  dextrose 40% Gel 15 Gram(s) Oral once  dextrose 5%. 1000 milliLiter(s) (50 mL/Hr) IV Continuous <Continuous>  dextrose 5%. 1000 milliLiter(s) (100 mL/Hr) IV Continuous <Continuous>  dextrose 50% Injectable 25 Gram(s) IV Push once  dextrose 50% Injectable 12.5 Gram(s) IV Push once  dextrose 50% Injectable 25 Gram(s) IV Push once  furosemide    Tablet 20 milliGRAM(s) Oral daily  glucagon  Injectable 1 milliGRAM(s) IntraMuscular once  hydrALAZINE 50 milliGRAM(s) Oral every 8 hours  insulin glargine Injectable (LANTUS) 16 Unit(s) SubCutaneous at bedtime  insulin lispro (ADMELOG) corrective regimen sliding scale   SubCutaneous three times a day before meals  insulin lispro Injectable (ADMELOG) 5 Unit(s) SubCutaneous three times a day before meals  metoprolol succinate  milliGRAM(s) Oral daily  pantoprazole    Tablet 40 milliGRAM(s) Oral before breakfast    MEDICATIONS  (PRN):      HOME MEDICATIONS:  Home Medications:  atorvastatin 40 mg oral tablet: 1 tab(s) orally once a day (at bedtime) (2021 17:02)  fexofenadine 180 mg oral tablet: 1 tab(s) orally once a day (2021 17:02)  furosemide 20 mg oral tablet: 1 tab(s) orally once a day (2021 17:02)  glimepiride 4 mg oral tablet: 1 tab(s) orally 2 times a day (2021 17:02)  NovoLOG FlexPen 100 units/mL injectable solution: 10 unit(s) injectable 3 times a day with meals (2021 17:02)        OBJECTIVE:  ICU Vital Signs Last 24 Hrs  T(C): 36.7 (2021 06:00), Max: 37.7 (2021 20:00)  T(F): 98.1 (2021 06:00), Max: 99.8 (2021 20:00)  HR: 93 (2021 06:00) (68 - 100)  BP: 144/65 (2021 06:00) (109/55 - 190/74)  BP(mean): 93 (2021 06:00) (79 - 114)  RR: 24 (2021 06:00) (13 - 38)  SpO2: 95% (2021 06:00) (91% - 99%)      I&O's Summary    2021 07:01  -  2021 07:00  --------------------------------------------------------  IN: 925 mL / OUT: 0 mL / NET: 925 mL    2021 07:01  -  2021 06:42  --------------------------------------------------------  IN: 480 mL / OUT: 700 mL / NET: -220 mL      Daily Height in cm: 154.94 (2021 11:15)    Daily Weight in k.3 (2021 06:00)    PHYSICAL EXAM:  NEURO: patient is awake , alert and oriented  GEN: Not in acute distress  NECK: no thyroid enlargement, no JVD  LUNGS: Clear to auscultation bilaterally   CARDIOVASCULAR: S1/S2 present, RRR , no murmurs or rubs, no carotid bruits,  + PP bilaterally  ABD: Soft, non-tender, non-distended, +BS  EXT: No SUSAN  SKIN: Intact  ACCESS Site:    LABS:                        9.6    12.84 )-----------( 283      ( 2021 04:30 )             29.6     -    138  |  107  |  25<H>  ----------------------------<  175<H>  5.4<H>   |  20  |  1.1    Ca    8.7      2021 05:30    TPro  6.1  /  Alb  3.0<L>  /  TBili  0.3  /  DBili  x   /  AST  19  /  ALT  16  /  AlkPhos  161<H>  11-19    PTT - ( 2021 04:30 )  PTT:39.8 sec      TELEMETRY EVENTS: NSVT       INTERVAL HISTORY:    no events overnight    PAST MEDICAL & SURGICAL HISTORY  HTN (hypertension)    CHF (congestive heart failure)    Amputation of toe of left foot  L Hallux     Hernia    Atrial fibrillation, unspecified type  paf recently stopped eliquis s/t epistatxis    CAD (coronary artery disease)  RCA KENDELL   OM2 KENDELL 2006  OM1 KENDELL 2009    PAD (peripheral artery disease)  severe s/p stent    History of implantable cardiac defibrillator (ICD)  and pacemaker- [employer]    Dyslipidemia    Bladder tumor    H/O ischemic heart disease    DM (diabetes mellitus)  30 yr    History of cholecystectomy    Cardiac defibrillator in place      H/O: hysterectomy    Status post coronary artery stent placement        ALLERGIES:  codeine (Rash)  penicillin (Rash)  penicillin (Unknown)      MEDICATIONS:  MEDICATIONS  (STANDING):  amLODIPine   Tablet 5 milliGRAM(s) Oral every 24 hours  aspirin enteric coated 81 milliGRAM(s) Oral daily  atorvastatin 40 milliGRAM(s) Oral at bedtime  chlorhexidine 4% Liquid 1 Application(s) Topical daily  clopidogrel Tablet 75 milliGRAM(s) Oral daily  dextrose 40% Gel 15 Gram(s) Oral once  dextrose 5%. 1000 milliLiter(s) (50 mL/Hr) IV Continuous <Continuous>  dextrose 5%. 1000 milliLiter(s) (100 mL/Hr) IV Continuous <Continuous>  dextrose 50% Injectable 25 Gram(s) IV Push once  dextrose 50% Injectable 12.5 Gram(s) IV Push once  dextrose 50% Injectable 25 Gram(s) IV Push once  furosemide    Tablet 20 milliGRAM(s) Oral daily  glucagon  Injectable 1 milliGRAM(s) IntraMuscular once  hydrALAZINE 50 milliGRAM(s) Oral every 8 hours  insulin glargine Injectable (LANTUS) 16 Unit(s) SubCutaneous at bedtime  insulin lispro (ADMELOG) corrective regimen sliding scale   SubCutaneous three times a day before meals  insulin lispro Injectable (ADMELOG) 5 Unit(s) SubCutaneous three times a day before meals  metoprolol succinate  milliGRAM(s) Oral daily  pantoprazole    Tablet 40 milliGRAM(s) Oral before breakfast    MEDICATIONS  (PRN):      HOME MEDICATIONS:  Home Medications:  atorvastatin 40 mg oral tablet: 1 tab(s) orally once a day (at bedtime) (2021 17:02)  fexofenadine 180 mg oral tablet: 1 tab(s) orally once a day (2021 17:02)  furosemide 20 mg oral tablet: 1 tab(s) orally once a day (2021 17:02)  glimepiride 4 mg oral tablet: 1 tab(s) orally 2 times a day (2021 17:02)  NovoLOG FlexPen 100 units/mL injectable solution: 10 unit(s) injectable 3 times a day with meals (2021 17:02)        OBJECTIVE:  ICU Vital Signs Last 24 Hrs  T(C): 36.7 (2021 06:00), Max: 37.7 (2021 20:00)  T(F): 98.1 (2021 06:00), Max: 99.8 (2021 20:00)  HR: 93 (2021 06:00) (68 - 100)  BP: 144/65 (2021 06:00) (109/55 - 190/74)  BP(mean): 93 (2021 06:00) (79 - 114)  RR: 24 (2021 06:00) (13 - 38)  SpO2: 95% (2021 06:00) (91% - 99%)      I&O's Summary    2021 07:01  -  2021 07:00  --------------------------------------------------------  IN: 925 mL / OUT: 0 mL / NET: 925 mL    2021 07:01  -  2021 06:42  --------------------------------------------------------  IN: 480 mL / OUT: 700 mL / NET: -220 mL      Daily Height in cm: 154.94 (2021 11:15)    Daily Weight in k.3 (2021 06:00)    PHYSICAL EXAM:  NEURO: patient is awake , alert and oriented  GEN: Not in acute distress  NECK: no thyroid enlargement, no JVD  LUNGS: Clear to auscultation bilaterally   CARDIOVASCULAR: S1/S2 present, RRR , no murmurs or rubs, no carotid bruits,  + PP bilaterally  ABD: Soft, non-tender, non-distended, +BS  EXT: No SUSAN  SKIN: Intact  ACCESS Site:    LABS:                        9.6    12.84 )-----------( 283      ( 2021 04:30 )             29.6     11-    138  |  107  |  25<H>  ----------------------------<  175<H>  5.4<H>   |  20  |  1.1    Ca    8.7      2021 05:30    TPro  6.1  /  Alb  3.0<L>  /  TBili  0.3  /  DBili  x   /  AST  19  /  ALT  16  /  AlkPhos  161<H>  11-19    PTT - ( 2021 04:30 )  PTT:39.8 sec      TELEMETRY EVENTS: NSVT

## 2021-11-20 NOTE — DISCHARGE NOTE PROVIDER - NSDCCPCAREPLAN_GEN_ALL_CORE_FT
PRINCIPAL DISCHARGE DIAGNOSIS  Diagnosis: Left foot pain  Assessment and Plan of Treatment: You came to the hospital with left foot pain and 2nd toe ulcer. You underwent a left lower extremity angiogram which revealed severe occlusions to the superficial femoral artery, popliteal, and peroneal arteries. Your peroneal artery was opned with a balloon, your popliteal artery was opened with a laser and balloon, your distal SFA was opened with laser, balloon, and stent, and your proximal SFA was opened with a balloon. You will need to continue aspirin, plavix, atorvastatin, and metoprolol. We are increasing your metoprolol to 150mg once daily. You were also started on amlodipine 5mg once daily to help control your blood pressure. You should follow up with Dr. Hammonds in about 2 weeks.

## 2021-11-20 NOTE — DISCHARGE NOTE NURSING/CASE MANAGEMENT/SOCIAL WORK - NSTRANSFERBELONGINGSRESP_GEN_A_NUR
Operative Report    PreOp Diagnosis: Invasive Ductal Carcinoma, clinical stage 1    PostOp Diagnosis: Same    Procedure(s):  Right Breast LUMPECTOMY - Wound Class: Clean  Right Breast NODE BIOPSY SENTINEL - Wound Class: Clean with Drain    Surgeon(s):  MELISSA Ding [facility]    Anesthesiologist/Type of Anesthesia:  Anesthesiologist: Radha Harris M.D./General    Surgical Staff:  Circulator: Cecilia Tee R.N.  Scrub Person: Nael Castro R.N.; Andres Waldrop    Specimens:  Silt Node 1 and 2  Axillary tissue  6:301 cm  breast mass 5 cm from nipple, extra margins obtained    Estimated Blood Loss: Minimal    Findings: 1 cm irregular, firm mass 6:30 position 5 cm from nipple  2 sentinel nodes appear grossly normal    Complications: None        8/29/2017 4:40 PM Chava Atwood     yes

## 2021-11-20 NOTE — DISCHARGE NOTE PROVIDER - NSDCMRMEDTOKEN_GEN_ALL_CORE_FT
atorvastatin 40 mg oral tablet: 1 tab(s) orally once a day (at bedtime)  fexofenadine 180 mg oral tablet: 1 tab(s) orally once a day  furosemide 20 mg oral tablet: 1 tab(s) orally once a day  glimepiride 4 mg oral tablet: 1 tab(s) orally 2 times a day  hydrALAZINE 50 mg oral tablet: 1 tab(s) orally every 8 hours MDD:1  NovoLOG FlexPen 100 units/mL injectable solution: 10 unit(s) injectable 3 times a day with meals  pantoprazole 40 mg oral delayed release tablet: 1 tab(s) orally once a day (before a meal) MDD:1   amLODIPine 5 mg oral tablet: 1 tab(s) orally every 24 hours  aspirin 81 mg oral delayed release tablet: 1 tab(s) orally once a day  atorvastatin 40 mg oral tablet: 1 tab(s) orally once a day (at bedtime)  clopidogrel 75 mg oral tablet: 1 tab(s) orally once a day MDD:1  fexofenadine 180 mg oral tablet: 1 tab(s) orally once a day  furosemide 20 mg oral tablet: 1 tab(s) orally once a day  glimepiride 4 mg oral tablet: 1 tab(s) orally 2 times a day  hydrALAZINE 50 mg oral tablet: 1 tab(s) orally every 8 hours MDD:1  metoprolol succinate 100 mg oral tablet, extended release: 1.5 tab(s) orally once a day   NovoLOG FlexPen 100 units/mL injectable solution: 10 unit(s) injectable 3 times a day with meals  pantoprazole 40 mg oral delayed release tablet: 1 tab(s) orally once a day (before a meal) MDD:1   amLODIPine 5 mg oral tablet: 1 tab(s) orally once a day  apixaban 5 mg oral tablet: 1 tab(s) orally every 12 hours  aspirin 81 mg oral delayed release tablet: 1 tab(s) orally once a day, stop after 30 days   atorvastatin 40 mg oral tablet: 1 tab(s) orally once a day (at bedtime)  clopidogrel 75 mg oral tablet: 1 tab(s) orally once a day  collagenase 250 units/g topical ointment: 1 application topically 2 times a day  furosemide 40 mg oral tablet: 1 tab(s) orally once a day  glimepiride 4 mg oral tablet: 1 tab(s) orally 2 times a day  hydrALAZINE 50 mg oral tablet: 1 tab(s) orally every 8 hours MDD:1  loratadine 10 mg oral tablet: 1 tab(s) orally once a day  metoprolol tartrate 50 mg oral tablet: 1 tab(s) orally 2 times a day  NovoLOG FlexPen 100 units/mL injectable solution: 10 unit(s) injectable 3 times a day with meals  pantoprazole 40 mg oral delayed release tablet: 1 tab(s) orally once a day  senna oral tablet: 2 tab(s) orally once a day (at bedtime)  sodium chloride 0.65% nasal spray: 1 dose(s) nasal once a day

## 2021-11-20 NOTE — DISCHARGE NOTE PROVIDER - CARE PROVIDER_API CALL
Arpan Hammonds (MD)  Cardiovascular Disease; Endovascular Medicine; Interventional Cardiology; Vascular Medicine  [address] [place] [address], [place] [address]  Phone: [phone]  Fax: [phone]  Follow Up Time: 2 weeks

## 2021-11-20 NOTE — PROGRESS NOTE ADULT - ASSESSMENT
IMPRESSION:  Severe left proximal SFA disease. In-stent occlusion of left distal SFA, popliteal and peroneal arteries with no in-line flow to LLE.   -s/p PTA of peroneal artery    -Laser atherectomy + PTA of popliteal artery  -Laser atherectomy + PTA + Allison stent to distal SFA  -DCB angioplasty of prox SFA     CAD s/p PCI, Chronic HFrEF (now improved) s/p BiVICD, afib on eliquis, HTN, DM    PLAN:    CNS: Avoid sedatives    HEENT: Oral care    PULMONARY:  HOB @ 45 degrees. ORA    CARDIOVASCULAR:  - Will continue monitoring patient in CCU today.  - C/w DAPT, statin, BB and ACEI.  - Hydralazine for BP control.  - Closely monitor CBC.    GI: GI prophylaxis.     RENAL:   Strict I/O. Avoid nephrotoxic agents    INFECTIOUS DISEASE:     HEMATOLOGICAL:   Monitor CBC    ENDOCRINE:  Follow up FS.  Insulin protocol; F/U A1c  f/u Lipid profile    Dispo: d/c home today  Code status: FULL  IMPRESSION:  Severe left proximal SFA disease. In-stent occlusion of left distal SFA, popliteal and peroneal arteries with no in-line flow to LLE.   -s/p PTA of peroneal artery    -Laser atherectomy + PTA of popliteal artery  -Laser atherectomy + PTA + Allison stent to distal SFA  -DCB angioplasty of prox SFA     CAD s/p PCI, Chronic HFrEF (now improved) s/p BiVICD, afib on eliquis, HTN, DM    PLAN:    CNS: Avoid sedatives    HEENT: Oral care    PULMONARY:  HOB @ 45 degrees. ORA    CARDIOVASCULAR:  - Will continue monitoring patient in CCU today.  - C/w DAPT, statin  - increase to metoprolol 150mg  - Hydralazine for BP control.  - Closely monitor CBC.    GI: GI prophylaxis.     RENAL:   Strict I/O. Avoid nephrotoxic agents    INFECTIOUS DISEASE:     HEMATOLOGICAL:   Monitor CBC    ENDOCRINE:  Follow up FS.  Insulin protocol; F/U A1c  f/u Lipid profile    Dispo: d/c home today  Code status: FULL  IMPRESSION:  Severe left proximal SFA disease. In-stent occlusion of left distal SFA, popliteal and peroneal arteries with no in-line flow to LLE.   -s/p PTA of peroneal artery    -Laser atherectomy + PTA of popliteal artery  -Laser atherectomy + PTA + Allison stent to distal SFA  -DCB angioplasty of prox SFA     CAD s/p PCI, Chronic HFrEF (now improved) s/p BiVICD, afib on eliquis, HTN, DM    PLAN:    CNS: Avoid sedatives    HEENT: Oral care    PULMONARY:  HOB @ 45 degrees. ORA    CARDIOVASCULAR:  - C/w DAPT, statin  - increase to metoprolol 150mg  - Hydralazine for BP control.    GI: GI prophylaxis.     RENAL:   Strict I/O. Avoid nephrotoxic agents    INFECTIOUS DISEASE:     HEMATOLOGICAL:   Monitor CBC    ENDOCRINE:  Follow up FS.  Insulin protocol; F/U A1c    Dispo: d/c home today  Code status: FULL

## 2021-11-20 NOTE — DISCHARGE NOTE NURSING/CASE MANAGEMENT/SOCIAL WORK - PATIENT PORTAL LINK FT
You can access the FollowMyHealth Patient Portal offered by [facility] by registering at the following website: [email or web address]. By joining [employer]’s FollowMyHealth portal, you will also be able to view your health information using other applications (apps) compatible with our system.

## 2021-11-20 NOTE — PROGRESS NOTE ADULT - ATTENDING COMMENTS
Patient seen, case d/w CCU team on rounds.  Patient with severe PAD, CLI of left foot.  S/p successful PTA with DCB of left SFA, pop artery, peroneal artery.  Femoral access site is free of bleeding or hematoma. Left foot is warm.  Patient is stable since procedure.  BP is elevated in the afternoon, will add Amlodipine 5 mg at bedtime.  Repeat BMP, CBC in AM.  Continue CCU monitoring.  Anticipated d/c home in AM.
Patient seen, case d/w CCU team on rounds.  Patient with h/o severe PAD, CLI of left foot.  S/p complex LE intervention.  Left foot warm, pedal pulses confirmed by Doppler.  Left groin: no hematoma.  Renal function stable.  BP is still mildly elevated, will increase Metoprolol to 150 mg daily.  Continue Hydralazine and Amlodipine.  D/c home today.  F/u with Dr. Hammonds in 1-2 weeks.

## 2021-11-20 NOTE — DISCHARGE NOTE PROVIDER - NSDCFUADDINST_GEN_ALL_CORE_FT
No strenuous activity for 3 days (routine walking okay)                   No driving for 3 days                   No heavy lifting > 30lbs for 2 weeks                   May shower in 24 hours                   Leave dressing in place 24- 48 hours, if does not fall off in 48 hours can remove

## 2021-11-20 NOTE — DISCHARGE NOTE PROVIDER - HOSPITAL COURSE
75 y/old F here for new left foot pain and 2nd digit ulcer    h/o tobacco abuse(quit), A-Fib(stopped Eliquis due to epistaxis), HTN, HLD, DM, High grade cancer, CAD(RCA KENDELL 2011, OM 2006, OM 2009), ICD([place]), PAD s/p left dSFA, dLeft pop, TPT trunk and PT/plantar for left foot ulcer. Due to incomplete healing of ulcer pt underwent another angiogram with intervention of left popliteal and peroneal arteries with PTA and KENDELL. B/P uncontrolled at home. Continues to have BLE pain and burning with mild-moderate exertion.      5/28/21 Renal US doppler showed severe ostial stenoses of right and left renal arteries. Peak ostium systolic velocity > 400 cm/sec  1/7/21 NM stress test did not show any reperfusion defects  12/14/20 TTE LVEF 63% Mod MR, Mild TR, G2DD  11/6/20 ESTRELLA 0.59 Severe R popliteal A stenosis, ESTRELLA 0.67 Severe L tibial A stenosis    PAD Severe b/l Renal artery stenosis s/p b/l renal stenting, now normotensive      PMH:  Bladder Ca, CIHD, CAD, PPM, PAD, Renal artery stenosis, SNHL, Epistaxis - recurrent  PSH:  Gallbladder Sx, Hernia Sx, Hysterectomy, Vascular problem, Toe amputation - Left   FH: DM, HTN, CAD    Admitted for management of PAD. S/p LLE angiogram w/ Dr. Hammonds on 11/18. Prevealed severe L proximal SFA disease. In stent occlusion of left distal SFA, popliteal and peroneal arteries with no in-line flow to LLE. S/p PTA of peroneal artery, ricky atherectomy + PTA of popliteal artery, ricky atherectomy + PTA + Allison stent to distal SFA, DCB to prox SFA.     Pt will continue DAPT, BB, statin. F/u OP w/ Dr. Hammonds. Toprol increased to 150 qd today. Amlodipine started yesterday. Will continue those medications and home medications.     75 y/old F here for new left foot pain and 2nd digit ulcer    h/o tobacco abuse(quit), A-Fib(stopped Eliquis due to epistaxis), HTN, HLD, DM, High grade cancer, CAD(RCA KENDELL 2011, OM 2006, OM 2009), ICD([place]), PAD s/p left dSFA, dLeft pop, TPT trunk and PT/plantar for left foot ulcer. Due to incomplete healing of ulcer pt underwent another angiogram with intervention of left popliteal and peroneal arteries with PTA and KENDELL. B/P uncontrolled at home. Continues to have BLE pain and burning with mild-moderate exertion.      5/28/21 Renal US doppler showed severe ostial stenoses of right and left renal arteries. Peak ostium systolic velocity > 400 cm/sec  1/7/21 NM stress test did not show any reperfusion defects  12/14/20 TTE LVEF 63% Mod MR, Mild TR, G2DD  11/6/20 ESTRELLA 0.59 Severe R popliteal A stenosis, ESTRELLA 0.67 Severe L tibial A stenosis    PAD Severe b/l Renal artery stenosis s/p b/l renal stenting, now normotensive      PMH:  Bladder Ca, CIHD, CAD, PPM, PAD, Renal artery stenosis, SNHL, Epistaxis - recurrent  PSH:  Gallbladder Sx, Hernia Sx, Hysterectomy, Vascular problem, Toe amputation - Left   FH: DM, HTN, CAD    Admitted for management of PAD. S/p LLE angiogram w/ Dr. Hammonds on 11/18. Angio revealed severe L proximal SFA disease. In stent occlusion of left distal SFA, popliteal and peroneal arteries with no in-line flow to LLE. S/p PTA of peroneal artery, ricky atherectomy + PTA of popliteal artery, ricky atherectomy + PTA + Allison stent to distal SFA, DCB to prox SFA.     Pt will continue DAPT, BB, statin. F/u OP w/ Dr. Hammonds. Toprol increased to 150 qd today. Amlodipine started yesterday. Will continue those medications and home medications.     76 y/old F here for new left foot pain and 2nd digit ulcer    h/o tobacco abuse(quit), A-Fib(stopped Eliquis due to epistaxis), HTN, HLD, DM, High grade cancer, CAD(RCA KENDELL 2011, OM 2006, OM 2009), ICD([place]), PAD s/p left dSFA, dLeft pop, TPT trunk and PT/plantar for left foot ulcer. Due to incomplete healing of ulcer pt underwent another angiogram with intervention of left popliteal and peroneal arteries with PTA and KENDELL. B/P uncontrolled at home. Continues to have BLE pain and burning with mild-moderate exertion.      5/28/21 Renal US doppler showed severe ostial stenoses of right and left renal arteries. Peak ostium systolic velocity > 400 cm/sec  1/7/21 NM stress test did not show any reperfusion defects  12/14/20 TTE LVEF 63% Mod MR, Mild TR, G2DD  11/6/20 ESTRELLA 0.59 Severe R popliteal A stenosis, ESTRELLA 0.67 Severe L tibial A stenosis    PAD Severe b/l Renal artery stenosis s/p b/l renal stenting, now normotensive      PMH:  Bladder Ca, CIHD, CAD, PPM, PAD, Renal artery stenosis, SNHL, Epistaxis - recurrent  PSH:  Gallbladder Sx, Hernia Sx, Hysterectomy, Vascular problem, Toe amputation - Left   FH: DM, HTN, CAD    Admitted for management of PAD. S/p LLE angiogram w/ Dr. Hammonds on 11/18. Angio revealed severe L proximal SFA disease. In stent occlusion of left distal SFA, popliteal and peroneal arteries with no in-line flow to LLE. S/p PTA of peroneal artery, ricky atherectomy + PTA of popliteal artery, ricky atherectomy + PTA + Allison stent to distal SFA, DCB to prox SFA.     Pt will continue DAPT, BB, statin. F/u OP w/ Dr. Hammonds. Toprol increased to 150 qd today. Amlodipine started yesterday. Will continue those medications and home medications.

## 2021-11-23 NOTE — ED ADULT NURSE NOTE - OBJECTIVE STATEMENT
Pt presents to ED with c/o shortness of breath, nausea, vomiting, and generalized weakness. Pt reports left great toe amputation. Dressing noted to left lower extremity is clean, dry, and intact. Bruise noted to left lower extremity.

## 2021-11-23 NOTE — ED PROVIDER NOTE - CLINICAL SUMMARY MEDICAL DECISION MAKING FREE TEXT BOX
sob, given clear lungs / hypoxic / tachy will do cta. ro acs. unlikely dissection or intra-abdominal . labs. ekg , re-assess.

## 2021-11-23 NOTE — ED PROVIDER NOTE - OBJECTIVE STATEMENT
76 female cad, atrial fibrillation pacemaker, not on ac, rest of hx below, about a week ago had le angioplasty, now w 2-3 days sob w associated n/v but no cp or abdominal pain. no fever. was intermittent now more constant. no cough or uri. no fever.

## 2021-11-23 NOTE — ED ADULT NURSE NOTE - INTERVENTIONS DEFINITIONS
Call bell, personal items and telephone within reach/Instruct patient to call for assistance/Room bathroom lighting operational/Physically safe environment: no spills, clutter or unnecessary equipment/Stretcher in lowest position, wheels locked, appropriate side rails in place/Reinforce activity limits and safety measures with patient and family

## 2021-11-23 NOTE — ED PROVIDER NOTE - NSICDXPASTSURGICALHX_GEN_ALL_CORE_FT
Return to the clinic in 3 month/s.  Will contact with results as needed.    
PAST SURGICAL HISTORY:  Cardiac defibrillator in place 2010    H/O: hysterectomy     History of cholecystectomy     Status post coronary artery stent placement

## 2021-11-23 NOTE — ED PROVIDER NOTE - CRITICAL CARE ATTENDING CONTRIBUTION TO CARE
Evaluating for critical conditions noted above. Ordering tests. Reviewing results. Ordering medications as noted in MAR. Discussions with consultant.

## 2021-11-24 NOTE — PROGRESS NOTE ADULT - SUBJECTIVE AND OBJECTIVE BOX
[facility] Division of Hospital Medicine  Mallory Miranda MD  Pager (DELIA-ERIC, 5A-6I): [phone]  Other Times:  [phone]      SUBJECTIVE / OVERNIGHT EVENTS: Pt seen and examined at bedside. She is complaining of nausea. She says she is spitting up salty saliva. This episode occurred after eating a can of spam.    MEDICATIONS  (STANDING):  amLODIPine   Tablet 5 milliGRAM(s) Oral daily  aspirin enteric coated 81 milliGRAM(s) Oral daily  atorvastatin 40 milliGRAM(s) Oral at bedtime  clopidogrel Tablet 75 milliGRAM(s) Oral daily  dextrose 40% Gel 15 Gram(s) Oral once  dextrose 5%. 1000 milliLiter(s) (50 mL/Hr) IV Continuous <Continuous>  dextrose 5%. 1000 milliLiter(s) (100 mL/Hr) IV Continuous <Continuous>  dextrose 50% Injectable 25 Gram(s) IV Push once  dextrose 50% Injectable 12.5 Gram(s) IV Push once  dextrose 50% Injectable 25 Gram(s) IV Push once  furosemide   Injectable 40 milliGRAM(s) IV Push once  furosemide   Injectable 40 milliGRAM(s) IV Push two times a day  glucagon  Injectable 1 milliGRAM(s) IntraMuscular once  heparin   Injectable 5000 Unit(s) SubCutaneous every 8 hours  hydrALAZINE 50 milliGRAM(s) Oral three times a day  insulin lispro (ADMELOG) corrective regimen sliding scale   SubCutaneous three times a day before meals  insulin lispro (ADMELOG) corrective regimen sliding scale   SubCutaneous at bedtime  insulin lispro Injectable (ADMELOG) 10 Unit(s) SubCutaneous before breakfast  insulin lispro Injectable (ADMELOG) 10 Unit(s) SubCutaneous before lunch  insulin lispro Injectable (ADMELOG) 10 Unit(s) SubCutaneous before dinner  loratadine 10 milliGRAM(s) Oral daily  metoprolol succinate  milliGRAM(s) Oral daily  pantoprazole    Tablet 40 milliGRAM(s) Oral before breakfast    MEDICATIONS  (PRN):      I&O's Summary    24 Nov 2021 07:01  -  24 Nov 2021 12:12  --------------------------------------------------------  IN: 200 mL / OUT: 0 mL / NET: 200 mL        PHYSICAL EXAM:  Vital Signs Last 24 Hrs  T(C): 36.9 (24 Nov 2021 04:00), Max: 36.9 (24 Nov 2021 04:00)  T(F): 98.4 (24 Nov 2021 04:00), Max: 98.4 (24 Nov 2021 04:00)  HR: 99 (24 Nov 2021 04:00) (70 - 105)  BP: 154/82 (24 Nov 2021 04:00) (148/72 - 155/66)  BP(mean): 84 (24 Nov 2021 02:15) (84 - 84)  RR: 19 (24 Nov 2021 04:00) (18 - 20)  SpO2: 96% (24 Nov 2021 04:00) (95% - 96%)  CONSTITUTIONAL: NAD, well-developed, well-groomed  EYES: PERRLA; conjunctiva and sclera clear  ENMT: Moist oral mucosa, no pharyngeal injection or exudates;   NECK: Supple, no palpable masses, no JVD  RESPIRATORY: Normal respiratory effort; bilateral rales  CARDIOVASCULAR: Regular rate and rhythm, normal S1 and S2, no murmur/rub/gallop; No lower extremity edema;   ABDOMEN: Nontender to palpation, normoactive bowel sounds, no rebound/guarding;  MUSCULOSKELETAL:  Normal gait; no clubbing or cyanosis of digits; no joint swelling or tenderness to palpation  PSYCH: A+O to person, place, and time; affect appropriate  NEUROLOGY: CN 2-12 are intact and symmetric; no gross sensory deficits   SKIN: No rashes; no palpable lesions    LABS:                        8.9    14.96 )-----------( 349      ( 24 Nov 2021 11:07 )             27.9     11-24    135  |  99  |  23  ----------------------------<  226<H>  3.9   |  24  |  1.08    Ca    8.9      24 Nov 2021 11:07  Phos  2.6     11-24  Mg     1.8     11-24    TPro  7.3  /  Alb  3.3  /  TBili  0.5  /  DBili  x   /  AST  19  /  ALT  16  /  AlkPhos  181<H>  11-24    PT/INR - ( 24 Nov 2021 06:31 )   PT: 13.9 sec;   INR: 1.17 ratio         PTT - ( 24 Nov 2021 11:09 )  PTT:36.1 sec  CARDIAC MARKERS ( 24 Nov 2021 11:07 )  x     / x     / 127 U/L / x     / 11.7 ng/mL            RADIOLOGY & ADDITIONAL TESTS:  Results Reviewed:   Imaging Personally Reviewed:  Electrocardiogram Personally Reviewed:    COORDINATION OF CARE:  Care Discussed with Consultants/Other Providers [Y/N]:  Prior or Outpatient Records Reviewed [Y/N]:

## 2021-11-24 NOTE — H&P ADULT - NSICDXPASTMEDICALHX_GEN_ALL_CORE_FT
PAST MEDICAL HISTORY:  Chronic atrial fibrillation     DM2 (diabetes mellitus, type 2)     Hypertension     PAD (peripheral artery disease)

## 2021-11-24 NOTE — H&P ADULT - NSHPLABSRESULTS_GEN_ALL_CORE
Personally reviewed labs, EKG and images    EKG: v-paced, no ST depressions or elevations, t-wave inversions only in V1 and aVR

## 2021-11-24 NOTE — CONSULT NOTE ADULT - SUBJECTIVE AND OBJECTIVE BOX
74 yo F w/ PMHx afib (stopped Eliquis due to epistaxis), HTN, HLD, DM, bladder CA, hysterectomy, cholecystectomy, CAD(RCA KENDELL 2011, OM 2006, OM 2009), ICD ([place]), BIV PPM, PAD s/p left dSFA, dLeft pop, TPT trunk, and PT/plantar for left foot ulcer, recent admission to [facility] 11/18-11/20 for non healing left foot ulcer and LLE pain, now s/p PTA of peroneal artery, ricky atherectomy + PTA of popliteal artery, ricky atherectomy + PTA + Allison stent to distal SFA, and DCB to prox SFA - discahrged home 11/20 - presenting to [facility] ED tonight as transfer from [facility] for NSTEMI. Pt reporting symptoms of progressive SOB associated w/ N/V x 2-3 days. She denies CP, cough/congestion, and fevers. In ED at [facility] trop elevated to 818. Pt loaded w/ Brillinta/ASA, and started on hep gtt.     Review of chart shows last NM stress test Jan 2021 did not show any reperfusion defects. TTE on 12/14/20 w/ LVEF 63% Mod MR, Mild TR, G2DD.    REVIEW OF SYSTEMS:    CONSTITUTIONAL: No weakness, fevers or chills  EYES/ENT: No visual changes;  No vertigo or throat pain   NECK: No pain or stiffness  RESPIRATORY: No cough, wheezing, hemoptysis; No shortness of breath  CARDIOVASCULAR: No chest pain or palpitations  GASTROINTESTINAL: No abdominal or epigastric pain. No nausea, vomiting, or hematemesis; No diarrhea or constipation. No melena or hematochezia.  GENITOURINARY: No dysuria, frequency or hematuria  NEUROLOGICAL: No numbness or weakness  SKIN: No itching, rashes    Vital Signs Last 24 Hrs  T(C): 36.8 (24 Nov 2021 02:15), Max: 36.8 (24 Nov 2021 02:15)  T(F): 98.2 (24 Nov 2021 02:15), Max: 98.2 (24 Nov 2021 02:15)  HR: 105 (24 Nov 2021 02:15) (70 - 105)  BP: 148/72 (24 Nov 2021 02:15) (148/72 - 155/66)  BP(mean): 84 (24 Nov 2021 02:15) (84 - 84)  ABP: --  ABP(mean): --  RR: 20 (24 Nov 2021 02:15) (18 - 20)  SpO2: 96% (24 Nov 2021 02:15) (95% - 96%)    PHYSICAL EXAM:  GENERAL: No acute distress, well-developed  HEAD:  Atraumatic, Normocephalic  EYES: EOMI, PERRLA, conjunctiva and sclera clear  NECK: Supple, no lymphadenopathy, no JVD  CHEST/LUNG: CTAB; No wheezes, rales, or rhonchi  HEART: Regular rate and rhythm. Normal S1/S2. No murmurs, rubs, or gallops  ABDOMEN: Soft, non-tender, non-distended; normal bowel sounds, no organomegaly  EXTREMITIES:  2+ peripheral pulses b/l, No clubbing, cyanosis, or edema  NEUROLOGY: A&O x 3, no focal deficits  SKIN: No rashes or lesions                        8.9    15.57 )-----------( 351      ( 24 Nov 2021 02:50 )             27.1                        76 yo F w/ PMHx afib (stopped Eliquis due to epistaxis), HTN, HLD, DM, bladder CA, hysterectomy, cholecystectomy, CAD(RCA KENDELL 2011, OM 2006, OM 2009), ICD ([place]), BIV PPM, PAD s/p left dSFA, dLeft pop, TPT trunk, and PT/plantar for left foot ulcer, recent admission to [facility] 11/18-11/20 for non healing left foot ulcer and LLE pain, now s/p PTA of peroneal artery, ricky atherectomy + PTA of popliteal artery, ricky atherectomy + PTA + Allison stent to distal SFA, and DCB to prox SFA - discahrged home 11/20 - presenting to [facility] ED tonight as transfer from [facility] for NSTEMI. Pt reporting symptoms of progressive SOB associated w/ nausea x 2-3 days. One episode of vomiting today. She denies CP, cough/congestion, and fevers. In ED at [facility] trop elevated to 818. Repeat troponin at [facility] 187. Pt loaded w/ Brillinta/ASA, and started on hep gtt.     Review of chart shows last NM stress test Jan 2021 did not show any reperfusion defects. TTE on 12/14/20 w/ LVEF 63% Mod MR, Mild TR, G2DD.      REVIEW OF SYSTEMS:    CONSTITUTIONAL: +weakness. No fevers or chills  EYES/ENT: No visual changes;  No vertigo or throat pain   NECK: No pain or stiffness  RESPIRATORY: No cough, wheezing, hemoptysis; +shortness of breath  CARDIOVASCULAR: No chest pain or palpitations  GASTROINTESTINAL: +abdominal/epigastric pain. +nausea. No vomiting, or hematemesis; No diarrhea or constipation. No melena or hematochezia.  GENITOURINARY: No dysuria, frequency or hematuria  NEUROLOGICAL: No numbness or weakness  SKIN: No itching, rashes    Vital Signs Last 24 Hrs  T(C): 36.8 (24 Nov 2021 02:15), Max: 36.8 (24 Nov 2021 02:15)  T(F): 98.2 (24 Nov 2021 02:15), Max: 98.2 (24 Nov 2021 02:15)  HR: 105 (24 Nov 2021 02:15) (70 - 105)  BP: 148/72 (24 Nov 2021 02:15) (148/72 - 155/66)  BP(mean): 84 (24 Nov 2021 02:15) (84 - 84)  ABP: --  ABP(mean): --  RR: 20 (24 Nov 2021 02:15) (18 - 20)  SpO2: 96% (24 Nov 2021 02:15) (95% - 96%)    PHYSICAL EXAM:  GENERAL: No acute distress, well-developed  HEAD:  Atraumatic, Normocephalic  EYES: EOMI, PERRLA, conjunctiva and sclera clear  NECK: Supple, no lymphadenopathy, no JVD  CHEST/LUNG: Scattered crackles bilaterally. No wheezes. Tachypnea.  HEART: Tachycardic. Normal S1/S2. No murmurs, rubs, or gallops  ABDOMEN: Soft, non-tender, non-distended; normal bowel sounds, no organomegaly  EXTREMITIES:  2+ peripheral pulses b/l upper extremities, and RLE. Dopplerable pulses in LLE, No clubbing, cyanosis, or edema  NEUROLOGY: A&O x 3, no focal deficits  SKIN: No rashes or lesions                        8.9    15.57 )-----------( 351      ( 24 Nov 2021 02:50 )             27.1

## 2021-11-24 NOTE — ED ADULT NURSE NOTE - OBJECTIVE STATEMENT
Pt is a 75 y/o F, PMH CAD, Afib with pacemaker in place (not on any AC), presenting to ED as transfer from [facility] for NSTEMI. Pt states she called EMS because she was feeling increasingly nauseous and SOB at home yesterday 11/23 a/w chest pain. Pt found to have NSTEMI at [facility], started on Heparin prior to transfer to [facility]. Pt noted to have started Heparin drip at 2252 at [facility]. Second IV placed on arrival to [facility], labs sent as per MD orders, actual standing weight documented in sunrise. Pt noted to have amputated L great toe which she states was recent, and is scheduled to have second toe amputated sometime this week, black discoloration to top of toe. Pt denies headache, dizziness, palpitations, cough, abdominal pain, v/d, urinary symptoms, fevers, chills at this time. Pt is A&Ox4, moves all extremities, normally ambulatory independently, resting in bed comfortably on CCM, primafit, with call bell at bedside and safety maintained. Pt is a 75 y/o F, PMH CAD, Afib with pacemaker in place (not on any AC), presenting to ED as transfer from [facility] for NSTEMI. Pt states she called EMS because she was feeling increasingly nauseous and SOB at home yesterday 11/23 a/w chest pain. Pt found to have NSTEMI at [facility], started on Heparin prior to transfer to [facility]. Pt noted to have started Heparin drip at 2352 at [facility]. Second IV placed on arrival to [facility], labs sent as per MD orders, actual standing weight documented in sunrise. Pt noted to have amputated L great toe which she states was recent, and is scheduled to have second toe amputated sometime this week, black discoloration to top of toe. Pt denies headache, dizziness, palpitations, cough, abdominal pain, v/d, urinary symptoms, fevers, chills at this time. Pt is A&Ox4, moves all extremities, normally ambulatory independently, resting in bed comfortably on CCM, primafit, with call bell at bedside and safety maintained.

## 2021-11-24 NOTE — PROGRESS NOTE ADULT - ASSESSMENT
77yo F w/ PMHx afib (Eliquis d/c due to epistaxis), HTN, HLD, IDDM, bladder CA, CAD (s/p PCI to RCA 2011, OM 2006, OM 2009), ICD ([place]), BIV PPM, PAD s/p multiple interventions (recent admission 11/2021), presents as transfer from [place] for elevated troponin concern for heart failure exacerbation

## 2021-11-24 NOTE — ED PROVIDER NOTE - ATTENDING CONTRIBUTION TO CARE
MD Castro:  patient seen and evaluated personally.   I agree with the History & Physical,  Impression & Plan other than what was detailed in my note.  MD Castro   76 female cad, atrial fibrillation pacemaker, not on ac, rest of hx below, about a week ago had le angioplasty, presented to [place] w/ cc of sob/nausea and found to have an nstemi trop of 818, stating that she had abd discomfort, nausea, sob, no cp, palpitations, afebrile vitals stable,  non toxic well appearing, NC/AT,  conjunctiva non conjected, sclera anicteric, moist mucous membranes, neck supple, heart sounds, normal, no mrg, lungs w mild rales b/l , abd soft non distended w/ no tenderness, no visual deformities of extremities, axox3, , normal mood and affect. pt's left lower extrem is warm, amputation of great toe, slight disocloration of l second toe, decreased pt pulse, repeat ekg is paced 103 bpm, lad, no scarbossa's criteria, pt had ct scan that was neg for pe, pt on nc w/ b/l rales, likely chf, pt given lasix (takes 20 at home). WIll have cardiology evaluate pt for nstemi, received asa/plavix, on hep gtt

## 2021-11-24 NOTE — H&P ADULT - PROBLEM/PLAN-4
Nutrition Assessment    Type and Reason for Visit: Reassess (Follow up)    Nutrition Recommendations: Advance diet as allowed by clinical course. Malnutrition Assessment:  · Malnutrition Status: At risk for malnutrition    Nutrition Diagnosis:   · Problem: Inadequate oral intake  · Etiology: related to Acute injury/trauma     Signs and symptoms:  as evidenced by NPO status due to medical condition    Nutrition Assessment:  · Subjective Assessment: Pt is NPO this day for pleurex catheter placement. Last seen on 11/22/17 and started on ONS; unable to assess r/t current NPO status. Expect advancement of diet after procedure. Note order for strict bedrest.  Current weight is 133 lbs 9.6 oz (estimated bed scale) comparted to admit weight of 153 lbs 6.4 oz (standing scale). Question accuracy of current weight. Will order a new weight to verify accuracy. · Wound Type: Stage II, Pressure Ulcer  · Current Nutrition Therapies:  · Oral Diet Orders: NPO   · Oral Diet intake: Unable to assess  · Oral Nutrition Supplement (ONS) Orders: Standard High Calorie Oral Supplement (NPO currently; added Enlive tid - will resume once diet is upgraded.)  · ONS intake: Unable to assess  · Anthropometric Measures:  · Ht: 5' 8\" (172.7 cm)   · Current Body Wt: 133 lb 10 oz (60.6 kg)  · Admission Body Wt: 153 lb (69.4 kg)  · Ideal Body Wt: 140 lb (63.5 kg), % Ideal Body 95.4%  · BMI Classification: BMI 18.5 - 24.9 Normal Weight    Estimated Intake vs Estimated Needs: Intake Less Than Needs (Due to NPO status.)    Nutrition Risk Level: Moderate    Nutrition Interventions:   Continue NPO  Continued Inpatient Monitoring    Nutrition Evaluation:   · Evaluation: No progress toward goals   · Goals: New goal; advance diet as allowed by clinical course. · Monitoring: Diet Progression, NPO Status, Meal Intake, Supplement Intake, Skin Integrity, Wound Healing, Weight, Pertinent Labs    See Adult Nutrition Doc Flowsheet for more detail. Electronically signed by Kenny Sams RD, [place] on 11/24/17 at 8:34 AM    Contact Number: [phone]  - DISPLAY PLAN FREE TEXT

## 2021-11-24 NOTE — H&P ADULT - EXTREMITIES COMMENTS
LLE healed surgical incision s/p left great toe amputation, no drainage, small eschar and left 2nd toe

## 2021-11-24 NOTE — ED PROVIDER NOTE - NS ED ROS FT
Gen: Denies fevers  CV: chest pain, resolved  Resp: Denies SOB, cough  GI: + nausea  all other ROS negative unless indicated in HPI

## 2021-11-24 NOTE — CONSULT NOTE ADULT - ATTENDING COMMENTS
Appreciate general cardiology followup  Diuresis and echo     Groin is stable   DP and PT are loudly audible  Santyl to the toe  No additional vascular testing at this time    d/w Dr. Cheng and Dr. Cassius Oro

## 2021-11-24 NOTE — H&P ADULT - HISTORY OF PRESENT ILLNESS
77yo F w/ PMHx afib (Eliquis d/c due to epistaxis), HTN, HLD, IDDM, bladder CA, CAD (s/p PCI to RCA 2011, OM 2006, OM 2009), ICD ([place]), BIV PPM, PAD s/p left dSFA, dLeft pop, TPT trunk, and PT/plantar for left foot ulcer, recent admission to [place] 11/18-11/20 for non healing left foot ulcer and LLE pain, now s/p PTA of peroneal artery, laser atherectomy + PTA of popliteal artery, laser atherectomy + PTA + Allison stent to distal SFA, and DCB to prox SFA, presents to [facility] ED as transfer from [facility] for elevated troponin. Patient reports that starting yesterday she began having shortness of breath worsened by exertion, she reports that anytime she lay on her right side she also had worsening SOB, she did not try taking anything for her symptoms, she developed headache and clear sputum production but denies any leg swelling, patient had a reported troponin of ~800 at [place] and was aspirin loaded and started on heparin gtt and subsequently transferred to [facility] for further management, in the ED she was mildly hypertensive but otherwise hemodynamically stable, afebrile, saturating well on 4L NC, labs were significant for troponin 187 that biju to 203, patient was seen by cardiology in the ED and admitted to general medicine for further management.

## 2021-11-24 NOTE — H&P ADULT - PROBLEM SELECTOR PLAN 3
-c/w home dose pre-meal insulin 10mg TID before meals  -start sliding scale  -carb controlled diet  -obtain A1c

## 2021-11-24 NOTE — H&P ADULT - ASSESSMENT
77yo F w/ PMHx afib (Eliquis d/c due to epistaxis), HTN, HLD, IDDM, bladder CA, CAD (s/p PCI to RCA 2011, OM 2006, OM 2009), ICD ([place]), BIV PPM, PAD s/p multiple interventions (recent admission 11/2021), presents as transfer from [place] for elevated troponin concern for heart failure exacerbation

## 2021-11-24 NOTE — PROGRESS NOTE ADULT - PROBLEM SELECTOR PLAN 3
-c/w home dose pre-meal insulin 10mg TID before meals  -continue sliding scale  -carb controlled diet  -obtain A1c

## 2021-11-24 NOTE — H&P ADULT - PROBLEM SELECTOR PLAN 1
-discussed case directly with on call cardiology fellow Dr. Andre, at this time there is a low suspicion for acute ischemia/NSTEMI despite troponin elevation and based on findings there is a higher concern for acute heart failure exacerbation, no ischemic findings on EKG, patient currently without chest pain, possibly related to diastolic (preserved EF) heart failure as previous documented EF 65%  -s/p aspirin load  -will d/c heparin gtt  -obtain echo complete  -trend troponins to peak (~800 at outside hospital, so far 203 here)  -s/p lasix 40mg x1  -next dose lasix at 3pm   -fluid restriction, monitor I/O's  -obtain BNP

## 2021-11-24 NOTE — ED PROVIDER NOTE - OBJECTIVE STATEMENT
76 yo F w/ PMHx afib (stopped Eliquis due to epistaxis), HTN, HLD, DM, bladder CA, hysterectomy, cholecystectomy, CAD(RCA KENDELL 2011, OM 2006, OM 2009), ICD ([place]), BIV PPM, PAD s/p left dSFA, dLeft pop, TPT trunk, and PT/plantar for left foot ulcer, recent admission to [place] 11/18-11/20 for non healing left foot ulcer and LLE pain, now s/p PTA of peroneal artery, ricky atherectomy + PTA of popliteal artery, ricky atherectomy + PTA + Allison stent to distal SFA, and DCB to prox SFA - discahrged home 11/20 - presenting to [facility] ED tonight as transfer from [facility] for NSTEMI. Pt reporting symptoms of progressive SOB associated w/ nausea x 2-3 days. One episode of vomiting today. She denies CP, cough/congestion, and fevers. In ED at [place] trop elevated to 818. Repeat troponin at [facility] 187. Pt loaded w/ Brillinta/ASA, and started on hep gtt.

## 2021-11-24 NOTE — CONSULT NOTE ADULT - SUBJECTIVE AND OBJECTIVE BOX
Vascular Cardiology Consult Note    SERVICE CONSULT: [phone]              OFFICE [phone]    CC:  SOB    HPI:    76 y/o F w/ PMHX Afib (Eliquis was discontinued due to epistaxis), HTN, HLD, DM, H/o Bladder CA,  CAD s/p prior PCI, S/p BIV PPM/ICD, PAD s/p prior PTA, recent admission to [place] 11/18-11/20 for non healing left foot 2nd digit ulcer and CLI, prior L hallux amputation, s/p PTA of L peroneal artery, ricky atherectomy /PTA of : popliteal artery, ricky atherectomy/PTA/Allison stent to L distal SFA, and DCB to proximal L SFA, discharged home on 11/20 who was transferred to [facility] due to concern for NSTEMI and HFpEF. Denies current LE pain or groin pain. Vascular cardiology consulted.    Allergies  codeine (Unknown)  penicillin (Hives)    MEDICATIONS:  amLODIPine   Tablet 5 milliGRAM(s) Oral daily  aspirin enteric coated 81 milliGRAM(s) Oral daily  clopidogrel Tablet 75 milliGRAM(s) Oral daily  furosemide   Injectable 40 milliGRAM(s) IV Push once  furosemide   Injectable 40 milliGRAM(s) IV Push two times a day  heparin   Injectable 5000 Unit(s) SubCutaneous every 8 hours  hydrALAZINE 50 milliGRAM(s) Oral three times a day  metoprolol succinate  milliGRAM(s) Oral daily  loratadine 10 milliGRAM(s) Oral daily  pantoprazole    Tablet 40 milliGRAM(s) Oral before breakfast  atorvastatin 40 milliGRAM(s) Oral at bedtime  glucagon  Injectable 1 milliGRAM(s) IntraMuscular once  insulin lispro (ADMELOG) corrective regimen sliding scale   SubCutaneous three times a day before meals  insulin lispro (ADMELOG) corrective regimen sliding scale   SubCutaneous at bedtime  insulin lispro Injectable (ADMELOG) 10 Unit(s) SubCutaneous before breakfast  insulin lispro Injectable (ADMELOG) 10 Unit(s) SubCutaneous before lunch  insulin lispro Injectable (ADMELOG) 10 Unit(s) SubCutaneous before dinner    PAST MEDICAL & SURGICAL HISTORY:  Chronic atrial fibrillation  DM2 (diabetes mellitus, type 2)  PAD (peripheral artery disease)  Hypertension  S/P cholecystectomy  S/P hysterectomy  S/P arterial stent    FAMILY HISTORY:  FH: heart disease (Father, Mother)    SOCIAL HISTORY:  unchanged    REVIEW OF SYSTEMS:  CONSTITUTIONAL: No fever  EYES: No eye pain  ENT:  No throat pain  NECK: No pain  RESPIRATORY: SOB   CARDIOVASCULAR: No current C/P reported  GASTROINTESTINAL: No abdominal pain  GENITOURINARY: No hematuria  NEUROLOGICAL: No memory loss  SKIN: L toe ulcer  LYMPH Nodes: No enlarged glands noted  ENDOCRINE: No heat or cold intolerance noted  MUSCULOSKELETAL: No  extremity pain  PSYCHIATRIC: No depression, anxiety  HEME/LYMPH: No  bleeding gums  ALLERGY AND IMMUNOLOGIC: No hives    [ x] All others negative	    PHYSICAL EXAM:  T(C): 36.9 (11-24-21 @ 04:00), Max: 36.9 (11-24-21 @ 04:00)  HR: 99 (11-24-21 @ 04:00) (70 - 105)  BP: 154/82 (11-24-21 @ 04:00) (148/72 - 155/66)  RR: 19 (11-24-21 @ 04:00) (18 - 20)  SpO2: 96% (11-24-21 @ 04:00) (95% - 96%  I&O's Summary    24 Nov 2021 07:01  -  24 Nov 2021 14:37  --------------------------------------------------------  IN: 440 mL / OUT: 400 mL / NET: 40 mL    Appearance: NAD 	  HEENT:  NC/AT  Cardiovascular: RRR, S1 and S2   Respiratory: Bibasilar Crackles  Psychiatry:  AAO x 3  Gastrointestinal:  Soft, Non-tender, + BS	  Skin: No rashes,No cyanosis	  Neurologic:  No focal deficit noted  Extremities:  no LE edema, B/L calves soft   Vascular Pulse Exam:  Right DP: palpable  Left DP:  audible  Right PT: palpable  Left PT:  audible    Foot Exam: small eschar to L 2nd toe, s/p L hallux amputation     LABS:	 	    CBC Full  -  ( 24 Nov 2021 11:07 )  WBC Count : 14.96 K/uL  Hemoglobin : 8.9 g/dL  Hematocrit : 27.9 %  Platelet Count - Automated : 349 K/uL  Mean Cell Volume : 90.3 fl  Mean Cell Hemoglobin : 28.8 pg  Mean Cell Hemoglobin Concentration : 31.9 gm/dL  Auto Neutrophil # : x  Auto Lymphocyte # : x  Auto Monocyte # : x  Auto Eosinophil # : x  Auto Basophil # : x  Auto Neutrophil % : x  Auto Lymphocyte % : x  Auto Monocyte % : x  Auto Eosinophil % : x  Auto Basophil % : x    11-24    135  |  99  |  23  ----------------------------<  226<H>  3.9   |  24  |  1.08  11-24    131<L>  |  100  |  23  ----------------------------<  221<H>  4.6   |  19<L>  |  0.99    Ca    8.9      24 Nov 2021 11:07  Ca    8.8      24 Nov 2021 02:50  Phos  2.6     11-24  Mg     1.8     11-24    TPro  7.3  /  Alb  3.3  /  TBili  0.5  /  DBili  x   /  AST  19  /  ALT  16  /  AlkPhos  181<H>  11-24  TPro  7.5  /  Alb  3.0<L>  /  TBili  0.5  /  DBili  x   /  AST  30  /  ALT  17  /  AlkPhos  193<H>  11-24        Assessment:  1. PAD s/p recent peripheral intervention last week to left LE     s/p PTA of peroneal artery, ricky atherectomy /PTA of popliteal artery, ricky atherectomy/PTA/Allison stent to distal SFA, and DCB to proximal SFA  2. CLI Leena 5       Small Eschar to L 2nd toe  3. HFpEF  4. CAD  5. HTN/HLD/DM     Plan:  1. Doing very well post recent peripheral intervention.  2. Left groin stable. Leg asymptomatic   3. Audible pulses to L DP and PT.  4. Continue ASA/Plavix and Statin.  5. Recommend Santyl to L 2nd toe.  6. Cardiac management and diuresis as per Dr. Cheng  7. Discussed with Primary Team.     Thank you  BRANDON Irizarry, MPAS  Vascular Cardiology Service    Please call with any questions:   Service Line: [phone]  Office [phone]

## 2021-11-24 NOTE — CONSULT NOTE ADULT - ASSESSMENT
74 yo F w/ PMHx afib (stopped Eliquis due to epistaxis), HTN, HLD, DM, bladder CA, hysterectomy, cholecystectomy, CAD(RCA KENDELL 2011, OM 2006, OM 2009), ICD ([place]), BIV PPM, PAD s/p LLE angioplasty during recent admission 11/18-11/20, now presenting to [facility] ED from [place] [facility] for NSTEMI. Pt reporting symptoms of progressive SOB associated w/ N/V x 2-3 days. She denies CP, cough/congestion, and fevers. In ED at [place] trop elevated to 818. CTA chest at [place] suggestive of heart failure. Pt loaded w/ Brillinta/ASA, and started on hep gtt. In ED at [facility] pt gien additional 40 mg Lasix IV w/ concern for CHF exacerbation.     1) CAD    2) HFrEF    3) PAD  76 yo F w/ PMHx afib (stopped Eliquis due to epistaxis), HTN, HLD, DM, bladder CA, hysterectomy, cholecystectomy, CAD(RCA KENDELL 2011, OM 2006, OM 2009), ICD ([place]), BIV PPM, PAD s/p LLE angioplasty during recent admission 11/18-11/20, now presenting to [facility] ED from [facility] for NSTEMI. Pt reporting symptoms of progressive SOB associated w/ N/V x 2-3 days. She denies CP, cough/congestion, and fevers. In ED at [place] trop elevated to 818. CTA chest at [place] suggestive of heart failure. Pt loaded w/ Brillinta/ASA, and started on hep gtt. In ED at [facility] pt given additional 40 mg lasix IV w/ concern for CHF exacerbation.     1) NSTEMI,   - Hx of CAD, RCA KENDELL 2011, OM 2006, OM 2009  - Troponin elevated to 818 at OSH, ECG w/o acute ischemic changes  - Loaded w/ Brillinta/ASA, hep gtt at [place]  - Continue metoprolol succinate 100 mg daily  - TTE  - Admit to telemetry    2) Atrial fibrillation s/p ICD/BIV-PPM  - Off AC due to epistaxis  - 100% paced on tele in ED  - Missed dose metoprolol at home; may give now  - Monitor on tele    2) HFpEF  - TTE on 12/14/20 w/ LVEF 63% Mod MR, Mild TR, G2DD.  - CTA chest at [place] suggestive of pulmonary congestion  - S/p Lasix 40 mg IV in ED    3) PAD   - Distal pulses intact; no signs of cath site complication  - LLE warm and well perfused  - Continue Brillinta/ASA 74 yo F w/ PMHx afib (stopped Eliquis due to epistaxis), HTN, HLD, DM, bladder CA, hysterectomy, cholecystectomy, CAD(RCA KENDELL 2011, OM 2006, OM 2009), ICD ([place]), BIV PPM, PAD s/p LLE angioplasty during recent admission 11/18-11/20, now presenting to [facility] ED from [facility] for NSTEMI. Pt reporting symptoms of progressive SOB associated w/ N/V x 2-3 days. She denies CP, cough/congestion, and fevers. In ED at [place] trop elevated to 818. CTA chest at [place] suggestive of heart failure. Pt loaded w/ Brillinta/ASA, and started on hep gtt. In ED at [facility] pt given additional 40 mg lasix IV w/ concern for CHF exacerbation.     1) NSTEMI  - Hx of CAD, RCA KENDELL 2011, OM 2006, OM 2009  - Troponin elevated to 818 at OSH, ECG w/o acute ischemic changes, BiV paced  - Loaded w/ ASA, and started on hep gtt at [place]  - Continue DAPT and heparin gtt  - Continue metoprolol succinate 100 mg daily, and statin; hold home Norvasc  - TTE  - Admit to telemetry    2) Atrial fibrillation s/p ICD/BIV-PPM  - Off AC due to epistaxis  - 100% paced on tele in ED  - Missed dose metoprolol at home; may give dose now  - Monitor on tele    2) HFpEF  - Presentation w/ SOB to OSH  - TTE on 12/14/20 w/ LVEF 63% Mod MR, Mild TR, G2DD.  - CTA chest at [place] suggestive of pulmonary congestion  - S/p Lasix 40 mg IV in ED  - F/u repeat TTE  - Trend lytes, SCr    3) PAD   - Distal pulses intact; no signs of cath site complication  - LLE warm and well perfused  - Continue DAPT and statin  - Continue Brillinta/ASA 74 yo F w/ PMHx afib (stopped Eliquis due to epistaxis), HTN, HLD, DM, bladder CA, hysterectomy, cholecystectomy, CAD(RCA KENDELL 2011, OM 2006, OM 2009), ICD ([place]), BIV PPM, PAD s/p LLE angioplasty during recent admission 11/18-11/20, now presenting to [facility] ED from [facility] for NSTEMI. Pt reporting symptoms of progressive SOB associated w/ N/V x 2-3 days. She denies CP, cough/congestion, and fevers. In ED at [place] trop elevated to 818. CTA chest at [place] suggestive of heart failure. Pt loaded w/ Brillinta/ASA, and started on hep gtt. In ED at [facility] pt given additional 40 mg lasix IV w/ concern for CHF exacerbation.     1) NSTEMI  - Hx of CAD, RCA KENDELL 2011, OM 2006, OM 2009  - Troponin elevated to 818 at OSH, ECG w/o acute ischemic changes, BiV paced  - Loaded w/ ASA, and started on hep gtt at [place]  - Continue DAPT and heparin gtt  - Continue metoprolol succinate 100 mg daily, and statin; hold home Norvasc  - TTE  - Admit to telemetry    2) Atrial fibrillation s/p ICD/BIV-PPM  - Off AC due to epistaxis  - 100% paced on tele in ED  - Missed dose metoprolol at home; may give dose now  - Monitor on tele    2) HFpEF  - Presentation w/ SOB to OSH  - TTE on 12/14/20 w/ LVEF 63% Mod MR, Mild TR, G2DD.  - CTA chest at [place] suggestive of pulmonary congestion  - S/p Lasix 40 mg IV in ED  - F/u repeat TTE  - Trend lytes, SCr    3) PAD   - Distal pulses intact; no signs of cath site complication  - LLE warm and well perfused  - Continue DAPT and statin  - Continue Brillinta/ASA    Patient seen and examined with the fellow, the note above has been edited to reflect my independent history, physical exam, assessment and plan.      Fred Cheng MD, PhD  Cardiology Attending  [facility]/ [facility]    For day time coverage Mon-Fri see Non-Service Consult Attending on [email or web address], password: [email or web address]; daytime weekends covered by general cardiology consult service attending.)

## 2021-11-24 NOTE — H&P ADULT - NSHPPHYSICALEXAM_GEN_ALL_CORE
Vital Signs Last 24 Hrs  T(C): 36.9 (24 Nov 2021 04:00), Max: 36.9 (24 Nov 2021 04:00)  T(F): 98.4 (24 Nov 2021 04:00), Max: 98.4 (24 Nov 2021 04:00)  HR: 99 (24 Nov 2021 04:00) (70 - 105)  BP: 154/82 (24 Nov 2021 04:00) (148/72 - 155/66)  BP(mean): 84 (24 Nov 2021 02:15) (84 - 84)  RR: 19 (24 Nov 2021 04:00) (18 - 20)  SpO2: 96% (24 Nov 2021 04:00) (95% - 96%)

## 2021-11-24 NOTE — CONSULT NOTE ADULT - NSCONSULTADDITIONALINFOA_GEN_ALL_CORE
75F w/ history of CAD S/p stents (RCA 2011, OM 2009, om 2006), Bi-V ICD 2009, AFIB not on ac, htn hld, bladder CA, PAD b/l renal stenting with toe amputation with recent LLE angioplasty 11/18-11/20 (patient had angio with severe L proximal SFA disease with in stent occlusion of left distal SFA, popliteal, and peroneal arteries with no flow to LLE. Patient had PTA of peroneal artery, ricky atherectomy + pta of popliteal artery, laser atherectomy + PTA + allison stent to distal SFA, DCB to proximal SFA) transferred from [place] for elevated troponin. Here found to be fluid overloaded with history of progressive dyspnea on exertion, orthopnea, PND with nausea and vomiting. Patient also with BONNY on CKD which may be due to angio contrast? Patient's presentation not consistent with NSTEMI, more consistent with HF exacerbation.     Recent card history:     PERIPHERAL DIAGNOSTIC ANGIOGRAM/INTERVENTION SUMMARY:    Severe left proximal SF disease. In-stent occlusion of left distal SFA,  popliteal and peroneal arteries with no in-line flow to LLE.  -s/p PTA of peroneal artery  -Laser atherectomy + PTA of popliteal artery  -Laser atherectomy + PTA + Allison stent to distal SFA  -DCB angioplasty of prox SFA      5/28/21 Renal US doppler showed severe ostial stenoses of right and left renal  arteries. Peak ostium systolic velocity > 400 cm/sec  1/7/21 NM stress test did not show any reperfusion defects  12/14/20 TTE LVEF 63% Mod MR, Mild TR, G2DD  11/6/20 ESTRELLA 0.59 Severe R popliteal A stenosis, ESTRELLA 0.67 Severe L tibial A  stenosis    Plan:   Would stop heparin  C/w patient's DAPT  C/w metoprolol  Monitor I/Os, daily weights, lytes (K >4, Mg >2)   TTE in AM   Can give another dose of lasix 40 IV 12 hours from last dose

## 2021-11-24 NOTE — PROGRESS NOTE ADULT - PROBLEM SELECTOR PLAN 1
Cardiology recommendations appreciated.   Patient currently without chest pain, possibly related to diastolic (preserved EF) heart failure as previous documented EF 65%  -s/p aspirin load, continue Aspirin 81mg PO daily and Plavix 75mg PO qd  -obtain echo complete  -trend troponins to peak (~800 at outside hospital, so far 203 here)  -s/p lasix 40mg x1  Will give one dose now and continue Lasix 40mg BID  -fluid restriction, monitor I/O's  -obtain BNP - labs to be obtained by phlebotomy

## 2021-11-24 NOTE — ED PROVIDER NOTE - PHYSICAL EXAMINATION
Gen: WDWN, NAD  HEENT: EOMI, no nasal discharge, mucous membranes moist  CV: 2+ radial pulses b/l, palpable R DP pulse, L doppler DP pulse   Resp: no accessory muscle use, no increased work of breathing  MSK: no bruising, no LE edema. 1cm ulceration to L LE; L foot toe 2 with ulceration, + TTP, + warm/well perfused L foot.   Neuro: A&Ox4, following commands, moving all four extremities spontaneously  Psych: appropriate mood

## 2021-11-24 NOTE — H&P ADULT - NSHPADDITIONALINFOADULT_GEN_ALL_CORE
dvt ppx: heparin sq  GI ppx: home pantoprazole  Ambulation: with assistance  Diet: Carb controlled/DASH     FULL CODE

## 2021-11-24 NOTE — ED PROVIDER NOTE - CLINICAL SUMMARY MEDICAL DECISION MAKING FREE TEXT BOX
Yue Mason MD, PGY-2: 74 yo F w/ PMHx afib (stopped Eliquis due to epistaxis), HTN, HLD, DM, bladder CA, hysterectomy, cholecystectomy, CAD(RCA KENDELL 2011, OM 2006, OM 2009), ICD ([place]), BIV PPM, PAD s/p LLE angioplasty during recent admission 11/18-11/20, now presenting to [facility] ED from [facility] for NSTEMI, endorses recent CP and SOB. initial trop 818, no signs of ischemia on ekg. now s/p ASA/Brillinta/heparin load and gtt. suspect NSTEMI and CHF exacerbation. plan to trend troponin, c/w heparin, cardiology c/s, admission, echo.

## 2021-11-25 NOTE — PROGRESS NOTE ADULT - PROBLEM SELECTOR PLAN 3
-c/w home dose pre-meal insulin 10mg TID before meals  -continue sliding scale  -carb controlled diet  A1c 10.3%

## 2021-11-25 NOTE — PROGRESS NOTE ADULT - SUBJECTIVE AND OBJECTIVE BOX
[facility] Division of Hospital Medicine  Mallory Miranda MD  Pager (M-F, -9C): [phone]  Other Times:  [phone]      SUBJECTIVE / OVERNIGHT EVENTS: Pt seen and examined at bedside this morning. NAD. She appears improved from yesterday and is feeling better.    MEDICATIONS  (STANDING):  amLODIPine   Tablet 5 milliGRAM(s) Oral daily  aspirin enteric coated 81 milliGRAM(s) Oral daily  atorvastatin 40 milliGRAM(s) Oral at bedtime  clopidogrel Tablet 75 milliGRAM(s) Oral daily  collagenase Ointment 1 Application(s) Topical two times a day  dextrose 40% Gel 15 Gram(s) Oral once  dextrose 5%. 1000 milliLiter(s) (50 mL/Hr) IV Continuous <Continuous>  dextrose 5%. 1000 milliLiter(s) (100 mL/Hr) IV Continuous <Continuous>  dextrose 50% Injectable 25 Gram(s) IV Push once  dextrose 50% Injectable 12.5 Gram(s) IV Push once  dextrose 50% Injectable 25 Gram(s) IV Push once  furosemide   Injectable 60 milliGRAM(s) IV Push two times a day  glucagon  Injectable 1 milliGRAM(s) IntraMuscular once  heparin   Injectable 5000 Unit(s) SubCutaneous every 8 hours  hydrALAZINE 50 milliGRAM(s) Oral three times a day  influenza  Vaccine (HIGH DOSE) 0.7 milliLiter(s) IntraMuscular once  insulin lispro (ADMELOG) corrective regimen sliding scale   SubCutaneous three times a day before meals  insulin lispro (ADMELOG) corrective regimen sliding scale   SubCutaneous at bedtime  insulin lispro Injectable (ADMELOG) 10 Unit(s) SubCutaneous before breakfast  insulin lispro Injectable (ADMELOG) 10 Unit(s) SubCutaneous before lunch  insulin lispro Injectable (ADMELOG) 10 Unit(s) SubCutaneous before dinner  loratadine 10 milliGRAM(s) Oral daily  metoprolol succinate  milliGRAM(s) Oral daily  pantoprazole    Tablet 40 milliGRAM(s) Oral before breakfast    MEDICATIONS  (PRN):      I&O's Summary    24 Nov 2021 07:01  -  25 Nov 2021 07:00  --------------------------------------------------------  IN: 640 mL / OUT: 1150 mL / NET: -510 mL    25 Nov 2021 07:01  -  25 Nov 2021 15:22  --------------------------------------------------------  IN: 600 mL / OUT: 700 mL / NET: -100 mL        PHYSICAL EXAM:  Vital Signs Last 24 Hrs  T(C): 36.8 (25 Nov 2021 14:15), Max: 37.6 (24 Nov 2021 15:46)  T(F): 98.3 (25 Nov 2021 14:15), Max: 99.7 (24 Nov 2021 15:46)  HR: 74 (25 Nov 2021 14:15) (74 - 103)  BP: 113/68 (25 Nov 2021 14:15) (113/68 - 159/71)  BP(mean): --  RR: 17 (25 Nov 2021 14:15) (16 - 19)  SpO2: 98% (25 Nov 2021 14:15) (94% - 98%)  CONSTITUTIONAL: NAD, well-developed, well-groomed  EYES: PERRLA; conjunctiva and sclera clear  ENMT: Moist oral mucosa, no pharyngeal injection or exudates;  NECK: Supple, no palpable masses;  RESPIRATORY: Normal respiratory effort; lungs are clear to auscultation bilaterally  CARDIOVASCULAR: Regular rate and rhythm, normal S1 and S2, no murmur/rub/gallop; No lower extremity edema;   ABDOMEN: Nontender to palpation, normoactive bowel sounds, no rebound/guarding;   MUSCULOSKELETAL:  Normal gait; no clubbing or cyanosis of digits; no joint swelling or tenderness to palpation  PSYCH: A+O to person, place, and time; affect appropriate  NEUROLOGY: CN 2-12 are intact and symmetric; no gross sensory deficits   SKIN: No rashes; no palpable lesions    LABS:                        8.9    13.40 )-----------( 367      ( 25 Nov 2021 05:36 )             26.7     11-25    134<L>  |  95<L>  |  24<H>  ----------------------------<  147<H>  3.6   |  25  |  1.20    Ca    9.0      25 Nov 2021 12:27  Phos  2.6     11-24  Mg     1.8     11-24    TPro  7.3  /  Alb  3.3  /  TBili  0.5  /  DBili  x   /  AST  19  /  ALT  16  /  AlkPhos  181<H>  11-24    PT/INR - ( 24 Nov 2021 06:31 )   PT: 13.9 sec;   INR: 1.17 ratio         PTT - ( 24 Nov 2021 11:09 )  PTT:36.1 sec  CARDIAC MARKERS ( 25 Nov 2021 00:29 )  x     / x     / 120 U/L / x     / 6.1 ng/mL  CARDIAC MARKERS ( 24 Nov 2021 17:54 )  x     / x     / 145 U/L / x     / 10.4 ng/mL  CARDIAC MARKERS ( 24 Nov 2021 11:07 )  x     / x     / 127 U/L / x     / 11.7 ng/mL

## 2021-11-25 NOTE — PROGRESS NOTE ADULT - ASSESSMENT
76 yo F w/ PMHx afib (stopped Eliquis due to epistaxis), HTN, HLD, DM, bladder CA, hysterectomy, cholecystectomy, CAD (RCA KENDELL 2011, OM 2006, OM 2009), ICD ([place]), BIV PPM, PAD s/p LLE angioplasty during recent admission 11/18-11/20, now presenting to [facility] ED from [facility] for NSTEMI. Pt reporting symptoms of progressive SOB associated w/ N/V x 2-3 days. She denies CP, cough/congestion, and fevers. In ED at [place] trop elevated to 818. CTA chest at [place] suggestive of heart failure. Pt loaded w/ Brillinta/ASA, and started on hep gtt. In ED at [facility] pt given additional 40 mg lasix IV w/ concern for CHF exacerbation.     #NSTEMI  - Hx of CAD, RCA KENDELL 2011, OM 2006, OM 2009  - Troponin elevated to 818 at OSH, ECG w/o acute ischemic changes, BiV paced  - Loaded w/ ASA, and started on hep gtt at [place]  - Continue DAPT, heparin gtt   - Continue metoprolol succinate 100 mg daily, norvasc 5 mg QD, and statin  - c/w telemetry    #Atrial fibrillation s/p ICD/BIV-PPM  - CHADSVASC: 6  - Off AC due to epistaxis; c/w hep gtt for now due to high CHADSVASC score, monitoring for epistaxis or other e/o hemorrhage  - 100% paced on tele in ED  - c/w BB  - Monitor on tele    #HFpEF/severe pHTN  - Presentation w/ SOB to OSH  - preserved EF, stage III diastolic dysfunction, severe pHTN (PASP 70)  - c/w Lasix 40 mg IV BID  - Trend lytes, SCr    #PAD   - Distal pulses intact; no signs of cath site complication  - LLE warm and well perfused  - Continue DAPT and statin 76 yo F w/ PMHx afib (stopped Eliquis due to epistaxis), HTN, HLD, DM, bladder CA, hysterectomy, cholecystectomy, CAD (RCA KENDELL 2011, OM 2006, OM 2009), ICD ([place]), BIV PPM, PAD s/p LLE angioplasty during recent admission 11/18-11/20, now presenting to [facility] ED from [facility] for NSTEMI. Pt reporting symptoms of progressive SOB associated w/ N/V x 2-3 days. She denies CP, cough/congestion, and fevers. In ED at [place] trop elevated to 818. CTA chest at [place] suggestive of heart failure. Pt loaded w/ Brillinta/ASA, and started on hep gtt. In ED at [facility] pt given additional 40 mg lasix IV w/ concern for CHF exacerbation.     #NSTEMI  - Hx of CAD, RCA KENDELL 2011, OM 2006, OM 2009  - Troponin elevated to 818 at OSH, ECG w/o acute ischemic changes, BiV paced  - Loaded w/ ASA, and started on hep gtt at [place]  - Continue DAPT, heparin gtt   - Continue metoprolol succinate 100 mg daily, norvasc 5 mg QD, and statin  - c/w telemetry    #Atrial fibrillation s/p ICD/BIV-PPM  - CHADSVASC: 6  - Off AC due to epistaxis; c/w hep gtt for now due to high CHADSVASC score, monitoring for epistaxis or other e/o hemorrhage  - 100% paced on tele in ED  - c/w BB  - Monitor on tele    #HFpEF/severe pHTN  - Presentation w/ SOB to OSH  - preserved EF, stage III diastolic dysfunction, severe pHTN (PASP 70)  - subjectively improved though still w/ crackles and requiring supplemental O2; net negative 500cc; increase to IV Lasix 60 mg BID  - Trend lytes, SCr  - strict I/O's, daily standing weights; 1L fluid restriction    #PAD   - Distal pulses intact; no signs of cath site complication  - LLE warm and well perfused  - Continue DAPT and statin 74 yo F w/ PMHx afib (stopped Eliquis due to epistaxis), HTN, HLD, DM, bladder CA, hysterectomy, cholecystectomy, CAD (RCA KENDELL 2011, OM 2006, OM 2009), ICD ([place]), BIV PPM, PAD s/p LLE angioplasty during recent admission 11/18-11/20, now presenting to [facility] ED from [facility] for NSTEMI. Pt reporting symptoms of progressive SOB associated w/ N/V x 2-3 days. She denies CP, cough/congestion, and fevers. In ED at [place] trop elevated to 818. CTA chest at [place] suggestive of heart failure. Pt loaded w/ ASA, home plavix continued, and started on hep gtt. In ED at [facility] pt given additional 40 mg lasix IV w/ concern for CHF exacerbation.     #NSTEMI  - Hx of CAD, RCA KENDELL 2011, OM 2006, OM 2009  - Troponin elevated to 818 at OSH, ECG w/o acute ischemic changes, BiV paced  - Loaded w/ ASA, and started on hep gtt at [place]  - Continue DAPT, heparin gtt x 48hrs total (however, in the setting of AF, will continue)  - Continue metoprolol succinate 100 mg daily, norvasc 5 mg QD, and statin  - c/w telemetry    #Atrial fibrillation s/p ICD/BIV-PPM  - CHADSVASC: 6  - Off AC due to epistaxis; c/w hep gtt for now due to high CHADSVASC score, monitoring for epistaxis or other e/o hemorrhage  - 100% paced on tele in ED  - c/w BB  - Monitor on tele    #HFpEF/severe pHTN  - Presentation w/ SOB to OSH  - preserved EF, stage III diastolic dysfunction, severe pHTN (PASP 70)  - subjectively improved though still w/ crackles and requiring supplemental O2; net negative 500cc; increase to IV Lasix 60 mg BID  - Trend lytes, SCr  - strict I/O's, daily standing weights; 1L fluid restriction    #PAD   - Distal pulses intact; no signs of cath site complication  - LLE warm and well perfused  - Continue DAPT and statin 74 yo F w/ PMHx afib (stopped Eliquis due to epistaxis), HTN, HLD, DM, bladder CA, hysterectomy, cholecystectomy, CAD (RCA KENDELL 2011, OM 2006, OM 2009), ICD ([place]), BIV PPM, PAD s/p LLE angioplasty during recent admission 11/18-11/20, now presenting to [facility] ED from [facility] for NSTEMI. Pt reporting symptoms of progressive SOB associated w/ N/V x 2-3 days. She denies CP, cough/congestion, and fevers. In ED at [place] trop elevated to 818. CTA chest at [place] suggestive of heart failure. Pt loaded w/ ASA, home plavix continued, and started on hep gtt. In ED at [facility] pt given additional 40 mg lasix IV w/ concern for CHF exacerbation.     #NSTEMI  - Hx of CAD, RCA KENDELL 2011, OM 2006, OM 2009  - Troponin elevated to 818 at OSH, ECG w/o acute ischemic changes, BiV paced  - Loaded w/ ASA, and started on hep gtt at [place]  - Continue DAPT, heparin gtt x 48hrs total (however, in the setting of AF, will continue)  - Continue metoprolol succinate 100 mg daily, norvasc 5 mg QD, and statin  - c/w telemetry    #Atrial fibrillation s/p ICD/BIV-PPM  - CHADSVASC: 6  - Off AC due to epistaxis; If remains stable would resume.   - 100% paced on tele in ED  - c/w BB  - Monitor on tele    #HFpEF/severe pHTN  - Presentation w/ SOB to OSH  - preserved EF, stage III diastolic dysfunction, severe pHTN (PASP 70)  - subjectively improved though still w/ crackles and requiring supplemental O2; net negative 500cc; increase to IV Lasix 60 mg BID  - Trend lytes, SCr  - strict I/O's, daily standing weights; 1L fluid restriction    #PAD   - Distal pulses intact; no signs of cath site complication  - LLE warm and well perfused  - Continue DAPT and statin

## 2021-11-25 NOTE — PROGRESS NOTE ADULT - ASSESSMENT
77yo F w/ PMHx afib (Eliquis d/c due to epistaxis), HTN, HLD, IDDM, bladder CA, CAD (s/p PCI to RCA 2011, OM 2006, OM 2009), ICD ([place]), BIV PPM, PAD s/p multiple interventions (recent admission 11/2021), presents as transfer from [place] for elevated troponin concern for heart failure exacerbation

## 2021-11-25 NOTE — PROGRESS NOTE ADULT - PROBLEM SELECTOR PLAN 1
Cardiology recommendations appreciated.   Patient currently without chest pain, possibly related to diastolic (preserved EF) heart failure as previous documented EF 65%  -s/p aspirin load, continue Aspirin 81mg PO daily and Plavix 75mg PO qd  TTE reviewed. Grade III diastolic dysfunction.   Trops trending down - peaked at 502  Lasix increased to 60mg BID.   -fluid restriction, monitor I/O's  BNP 9K  Cardiology following

## 2021-11-25 NOTE — PROGRESS NOTE ADULT - SUBJECTIVE AND OBJECTIVE BOX
INTERVAL EVENTS: No o/n events. Denies CP, dyspnea, palpitations, presyncope, syncope, f/c/n/v.     REVIEW OF SYSTEMS:  Constitutional:     [X] negative [ ] fevers [ ] chills [ ] weight loss [ ] weight gain  HEENT:                  [X] negative [ ] dry eyes [ ] eye irritation [ ] postnasal drip [ ] nasal congestion  CV:                         [X] negative  [ ] chest pain [ ] orthopnea [ ] palpitations [ ] murmur  Resp:                     [X] negative [ ] cough [ ] shortness of breath [ ] wheezing [ ] sputum [ ] hemoptysis  GI:                          [X] negative [ ] nausea [ ] vomiting [ ] diarrhea [ ] constipation [ ] abd pain [ ] dysphagia   :                        [X] negative [ ] dysuria [ ] nocturia [ ] hematuria [ ] increased urinary frequency  MSK:                      [X] negative [ ] back pain [ ] myalgias [ ] arthralgias [ ] fracture  Skin:                       [X] negative [ ] rash [ ] itch  Neuro:                   [X] negative [ ] headache [ ] dizziness [ ] syncope [ ] weakness [ ] numbness  Psych:                    [X] negative [ ] anxiety [ ] depression  Endo:                     [X] negative [ ] diabetes [ ] thyroid problem  Heme/Lymph:      [X] negative [ ] anemia [ ] bleeding problem  Allergic/Immune: [X] negative [ ] itchy eyes [ ] nasal discharge [ ] hives [ ] angioedema    [X] All other systems negative or otherwise described above.  [ ] Unable to assess ROS because ________.    PAST MEDICAL & SURGICAL HISTORY:  Chronic atrial fibrillation    DM2 (diabetes mellitus, type 2)    PAD (peripheral artery disease)    Hypertension    S/P cholecystectomy    S/P hysterectomy    S/P arterial stent      MEDICATIONS  (STANDING):  amLODIPine   Tablet 5 milliGRAM(s) Oral daily  aspirin enteric coated 81 milliGRAM(s) Oral daily  atorvastatin 40 milliGRAM(s) Oral at bedtime  clopidogrel Tablet 75 milliGRAM(s) Oral daily  collagenase Ointment 1 Application(s) Topical two times a day  dextrose 40% Gel 15 Gram(s) Oral once  dextrose 5%. 1000 milliLiter(s) (50 mL/Hr) IV Continuous <Continuous>  dextrose 5%. 1000 milliLiter(s) (100 mL/Hr) IV Continuous <Continuous>  dextrose 50% Injectable 25 Gram(s) IV Push once  dextrose 50% Injectable 12.5 Gram(s) IV Push once  dextrose 50% Injectable 25 Gram(s) IV Push once  furosemide   Injectable 40 milliGRAM(s) IV Push two times a day  glucagon  Injectable 1 milliGRAM(s) IntraMuscular once  heparin   Injectable 5000 Unit(s) SubCutaneous every 8 hours  hydrALAZINE 50 milliGRAM(s) Oral three times a day  influenza  Vaccine (HIGH DOSE) 0.7 milliLiter(s) IntraMuscular once  insulin lispro (ADMELOG) corrective regimen sliding scale   SubCutaneous three times a day before meals  insulin lispro (ADMELOG) corrective regimen sliding scale   SubCutaneous at bedtime  insulin lispro Injectable (ADMELOG) 10 Unit(s) SubCutaneous before breakfast  insulin lispro Injectable (ADMELOG) 10 Unit(s) SubCutaneous before lunch  insulin lispro Injectable (ADMELOG) 10 Unit(s) SubCutaneous before dinner  loratadine 10 milliGRAM(s) Oral daily  metoprolol succinate  milliGRAM(s) Oral daily  pantoprazole    Tablet 40 milliGRAM(s) Oral before breakfast    MEDICATIONS  (PRN):    ICU Vital Signs Last 24 Hrs  T(C): 36.9 (2021 04:22), Max: 37.6 (2021 15:46)  T(F): 98.5 (2021 04:22), Max: 99.7 (2021 15:46)  HR: 95 (2021 04:22) (86 - 99)  BP: 135/66 (2021 04:22) (124/71 - 159/71)  BP(mean): --  ABP: --  ABP(mean): --  RR: 18 (2021 04:22) (16 - 19)  SpO2: 97% (2021 04:22) (94% - 98%)    Orthostatic VS    Daily     Daily Weight in k.5 (2021 17:55)  I&O's Summary    2021 07:01  -  2021 07:00  --------------------------------------------------------  IN: 640 mL / OUT: 1150 mL / NET: -510 mL        PHYSICAL EXAM:  GENERAL: No acute distress, well-developed  HEAD:  Atraumatic, Normocephalic  EYES: EOMI, PERRLA, conjunctiva and sclera clear  NECK: Supple, no lymphadenopathy, no JVD  CHEST/LUNG: Scattered crackles bilaterally. No wheezes. Tachypnea.  HEART: Tachycardic. Normal S1/S2. No murmurs, rubs, or gallops  ABDOMEN: Soft, non-tender, non-distended; normal bowel sounds, no organomegaly  EXTREMITIES:  2+ peripheral pulses b/l upper extremities, and RLE. Dopplerable pulses in LLE, No clubbing, cyanosis, or edema  NEUROLOGY: A&O x 3, no focal deficits  SKIN: No rashes or lesions    LABS:                        8.9    13.40 )-----------( 367      ( 2021 05:36 )             26.7     PT/INR - ( 2021 06:31 )   PT: 13.9 sec;   INR: 1.17 ratio         PTT - ( 2021 11:09 )  PTT:36.1 sec      135  |  99  |  23  ----------------------------<  226<H>  3.9   |  24  |  1.08    Ca    8.9      2021 11:07  Phos  2.6       Mg     1.8         TPro  7.3  /  Alb  3.3  /  TBili  0.5  /  DBili  x   /  AST  19  /  ALT  16  /  AlkPhos  181<H>  24    CARDIAC MARKERS ( 2021 00:29 )  x     / x     / 120 U/L / x     / 6.1 ng/mL  CARDIAC MARKERS ( 2021 17:54 )  x     / x     / 145 U/L / x     / 10.4 ng/mL  CARDIAC MARKERS ( 2021 11:07 )  x     / x     / 127 U/L / x     / 11.7 ng/mL    Troponin T, High Sensitivity (21 @ 00:29)    Troponin T, High Sensitivity Result: 480 ng/L    Troponin T, High Sensitivity (21 @ 17:54)    Troponin T, High Sensitivity Result: 502 ng/L    Troponin T, High Sensitivity (21 @ 11:07)    Troponin T, High Sensitivity Result: 334 ng/L    proBNP: Serum Pro-Brain Natriuretic Peptide: 9034 pg/mL (21 @ 11:07)    Lipid Profile:     HgA1c:   TSH:         RADIOLOGY & ADDITIONAL STUDIES:    Cardiovascular Diagnostic Testing    ECG: Personally reviewed    Telemetry: reviewed    Echo: Personally reviewed  < from: TTE with Doppler (w/Cont) (21 @ 10:18) >  ------------------------------------------------------------------------  Dimensions:    Normal Values:  LA:     4.1    2.0 - 4.0 cm  Ao:     2.4    2.0 - 3.8 cm  SEPTUM: 1.0    0.6 - 1.2 cm  PWT:    0.8    0.6 - 1.1 cm  LVIDd:  4.4    3.0 - 5.6 cm  LVIDs:  2.6    1.8 - 4.0 cm  Derived variables:  LVMI: 76 g/m2  RWT: 0.36  Fractional short: 41 %  EF (Hansen Rule): 57 %Doppler Peak Velocity (m/sec):  AoV=1.4  ------------------------------------------------------------------------  Observations:  Mitral Valve: Thickened mitral valve. Mild mitral  regurgitation.  Aortic Valve/Aorta: Calcified trileaflet aortic valve with  normal opening. Peak transaortic valve gradient equals 8 mm  Hg, mean transaortic valve gradient equals 4 mm Hg, aortic  valve velocity time integral equals 28 cm. Peak left  ventricular outflow tract gradient equals 3 mm Hg, mean  gradient is equal to 2 mm Hg, LVOT velocity time integral  equals 17 cm.  Aortic Root: 2.4 cm.  LVOT diameter: 1.9 cm.  Left Atrium: Mildly dilated left atrium.  LA volume index =  37 cc/m2.  Left Ventricle: Normal left ventricular systolic function.   Endocardial visualization enhanced with intravenous  injection of Ultrasonic Enhancing Agent (Definity). Normal  left ventricular internal dimensions and wall thicknesses.  Severe diastolic dysfunction (Stage III).   Increased E/e'  is consistent with elevated left ventricular filling  pressure.  Right Heart: Normal right atrium. A device wire is noted in  the right heart. The right ventricle is not well  visualized; grossly normal right ventricular systolic  function.   A device wire is noted in the right heart.  Normal tricuspid valve. Mild tricuspid regurgitation.  Normal pulmonic valve.  Pericardium/Pleura: Normal pericardium with no pericardial  effusion.  Hemodynamic: Estimated right ventricular systolic pressure  equals 70 mm Hg, assuming right atrial pressure equals 12  mm Hg, consistent with severe pulmonary hypertension.  ------------------------------------------------------------------------  Conclusions:  1. Normal left ventricular systolic function.   Endocardial  visualization enhanced with intravenous injection of  Ultrasonic Enhancing Agent (Definity).  2. Severe diastolic dysfunction (Stage III).   Increased  E/e'  is consistent with elevated left ventricular filling  pressure.  3. The right ventricle is not well visualized; grossly  normal right ventricular systolic function.   A device wire  is noted in the right heart.  4. Estimated pulmonary artery systolic pressure equals 70  mm Hg, assuming right atrial pressure equals 12 mm Hg,  consistent with severe pulmonary pressures.  *** No previous Echo exam.  ------------------------------------------------------------------------  Confirmed on  2021 - 12:12:50 by CECILLE Hodgson  ------------------------------------------------------------------------    < end of copied text >    Stress Testing: none    Cath: none    CXR: Personally reviewed  < from: Xray Chest 1 View- PORTABLE-Urgent (21 @ 02:22) >  IMPRESSION:  Bibasilar opacities may represent atelectasis and/or infection.    --- End of Report ---      < end of copied text >      Other cardiac imaging: none    Other misc imaging: none       INTERVAL EVENTS: Improved dyspnea and overall subjectively feels better this AM. Denies CP, palpitations, presyncope, syncope, f/c/n/v.     REVIEW OF SYSTEMS:  Constitutional:     [X] negative [ ] fevers [ ] chills [ ] weight loss [ ] weight gain  HEENT:                  [X] negative [ ] dry eyes [ ] eye irritation [ ] postnasal drip [ ] nasal congestion  CV:                         [X] negative  [ ] chest pain [ ] orthopnea [ ] palpitations [ ] murmur  Resp:                     [ ] negative [ ] cough [X] shortness of breath [ ] wheezing [ ] sputum [ ] hemoptysis  GI:                          [X] negative [ ] nausea [ ] vomiting [ ] diarrhea [ ] constipation [ ] abd pain [ ] dysphagia   :                        [X] negative [ ] dysuria [ ] nocturia [ ] hematuria [ ] increased urinary frequency  MSK:                      [X] negative [ ] back pain [ ] myalgias [ ] arthralgias [ ] fracture  Skin:                       [X] negative [ ] rash [ ] itch  Neuro:                   [X] negative [ ] headache [ ] dizziness [ ] syncope [ ] weakness [ ] numbness  Psych:                    [X] negative [ ] anxiety [ ] depression  Endo:                     [X] negative [ ] diabetes [ ] thyroid problem  Heme/Lymph:      [X] negative [ ] anemia [ ] bleeding problem  Allergic/Immune: [X] negative [ ] itchy eyes [ ] nasal discharge [ ] hives [ ] angioedema    [X] All other systems negative or otherwise described above.  [ ] Unable to assess ROS because ________.    PAST MEDICAL & SURGICAL HISTORY:  Chronic atrial fibrillation    DM2 (diabetes mellitus, type 2)    PAD (peripheral artery disease)    Hypertension    S/P cholecystectomy    S/P hysterectomy    S/P arterial stent      MEDICATIONS  (STANDING):  amLODIPine   Tablet 5 milliGRAM(s) Oral daily  aspirin enteric coated 81 milliGRAM(s) Oral daily  atorvastatin 40 milliGRAM(s) Oral at bedtime  clopidogrel Tablet 75 milliGRAM(s) Oral daily  collagenase Ointment 1 Application(s) Topical two times a day  dextrose 40% Gel 15 Gram(s) Oral once  dextrose 5%. 1000 milliLiter(s) (50 mL/Hr) IV Continuous <Continuous>  dextrose 5%. 1000 milliLiter(s) (100 mL/Hr) IV Continuous <Continuous>  dextrose 50% Injectable 25 Gram(s) IV Push once  dextrose 50% Injectable 12.5 Gram(s) IV Push once  dextrose 50% Injectable 25 Gram(s) IV Push once  furosemide   Injectable 40 milliGRAM(s) IV Push two times a day  glucagon  Injectable 1 milliGRAM(s) IntraMuscular once  heparin   Injectable 5000 Unit(s) SubCutaneous every 8 hours  hydrALAZINE 50 milliGRAM(s) Oral three times a day  influenza  Vaccine (HIGH DOSE) 0.7 milliLiter(s) IntraMuscular once  insulin lispro (ADMELOG) corrective regimen sliding scale   SubCutaneous three times a day before meals  insulin lispro (ADMELOG) corrective regimen sliding scale   SubCutaneous at bedtime  insulin lispro Injectable (ADMELOG) 10 Unit(s) SubCutaneous before breakfast  insulin lispro Injectable (ADMELOG) 10 Unit(s) SubCutaneous before lunch  insulin lispro Injectable (ADMELOG) 10 Unit(s) SubCutaneous before dinner  loratadine 10 milliGRAM(s) Oral daily  metoprolol succinate  milliGRAM(s) Oral daily  pantoprazole    Tablet 40 milliGRAM(s) Oral before breakfast    MEDICATIONS  (PRN):    ICU Vital Signs Last 24 Hrs  T(C): 36.9 (2021 04:22), Max: 37.6 (2021 15:46)  T(F): 98.5 (2021 04:22), Max: 99.7 (2021 15:46)  HR: 95 (2021 04:22) (86 - 99)  BP: 135/66 (2021 04:22) (124/71 - 159/71)  BP(mean): --  ABP: --  ABP(mean): --  RR: 18 (2021 04:22) (16 - 19)  SpO2: 97% (2021 04:22) (94% - 98%)    Orthostatic VS    Daily     Daily Weight in k.5 (2021 17:55)  I&O's Summary    2021 07:01  -  2021 07:00  --------------------------------------------------------  IN: 640 mL / OUT: 1150 mL / NET: -510 mL        PHYSICAL EXAM:  GENERAL: No acute distress, well-developed  HEAD:  Atraumatic, Normocephalic  EYES: EOMI, PERRLA, conjunctiva and sclera clear  NECK: Supple, no lymphadenopathy, no JVD  CHEST/LUNG: Bibasilar crackles bilaterally. No wheezes.   HEART: Regular rate/rhythm. Normal S1/S2. No murmurs, rubs, or gallops  ABDOMEN: Soft, non-tender, non-distended; normal bowel sounds, no organomegaly  EXTREMITIES:  2+ peripheral pulses b/l upper extremities, and RLE. Dopplerable pulses in LLE, No clubbing, cyanosis, or edema  NEUROLOGY: A&O x 3, no focal deficits  SKIN: No rashes or lesions    LABS:                        8.9    13.40 )-----------( 367      ( 2021 05:36 )             26.7     PT/INR - ( 2021 06:31 )   PT: 13.9 sec;   INR: 1.17 ratio         PTT - ( 2021 11:09 )  PTT:36.1 sec      135  |  99  |  23  ----------------------------<  226<H>  3.9   |  24  |  1.08    Ca    8.9      2021 11:07  Phos  2.6       Mg     1.8         TPro  7.3  /  Alb  3.3  /  TBili  0.5  /  DBili  x   /  AST  19  /  ALT  16  /  AlkPhos  181<H>  24    CARDIAC MARKERS ( 2021 00:29 )  x     / x     / 120 U/L / x     / 6.1 ng/mL  CARDIAC MARKERS ( 2021 17:54 )  x     / x     / 145 U/L / x     / 10.4 ng/mL  CARDIAC MARKERS ( 2021 11:07 )  x     / x     / 127 U/L / x     / 11.7 ng/mL    Troponin T, High Sensitivity (21 @ 00:29)    Troponin T, High Sensitivity Result: 480 ng/L    Troponin T, High Sensitivity (21 @ 17:54)    Troponin T, High Sensitivity Result: 502 ng/L    Troponin T, High Sensitivity (21 @ 11:07)    Troponin T, High Sensitivity Result: 334 ng/L    proBNP: Serum Pro-Brain Natriuretic Peptide: 9034 pg/mL (21 @ 11:07)    Lipid Profile:     HgA1c:   TSH:         RADIOLOGY & ADDITIONAL STUDIES:    Cardiovascular Diagnostic Testing    ECG: Personally reviewed  : v-paced    Telemetry: reviewed; A-sensed, V-paced    Echo: Personally reviewed  < from: TTE with Doppler (w/Cont) (21 @ 10:18) >  ------------------------------------------------------------------------  Dimensions:    Normal Values:  LA:     4.1    2.0 - 4.0 cm  Ao:     2.4    2.0 - 3.8 cm  SEPTUM: 1.0    0.6 - 1.2 cm  PWT:    0.8    0.6 - 1.1 cm  LVIDd:  4.4    3.0 - 5.6 cm  LVIDs:  2.6    1.8 - 4.0 cm  Derived variables:  LVMI: 76 g/m2  RWT: 0.36  Fractional short: 41 %  EF (Hansen Rule): 57 %Doppler Peak Velocity (m/sec):  AoV=1.4  ------------------------------------------------------------------------  Observations:  Mitral Valve: Thickened mitral valve. Mild mitral  regurgitation.  Aortic Valve/Aorta: Calcified trileaflet aortic valve with  normal opening. Peak transaortic valve gradient equals 8 mm  Hg, mean transaortic valve gradient equals 4 mm Hg, aortic  valve velocity time integral equals 28 cm. Peak left  ventricular outflow tract gradient equals 3 mm Hg, mean  gradient is equal to 2 mm Hg, LVOT velocity time integral  equals 17 cm.  Aortic Root: 2.4 cm.  LVOT diameter: 1.9 cm.  Left Atrium: Mildly dilated left atrium.  LA volume index =  37 cc/m2.  Left Ventricle: Normal left ventricular systolic function.   Endocardial visualization enhanced with intravenous  injection of Ultrasonic Enhancing Agent (Definity). Normal  left ventricular internal dimensions and wall thicknesses.  Severe diastolic dysfunction (Stage III).   Increased E/e'  is consistent with elevated left ventricular filling  pressure.  Right Heart: Normal right atrium. A device wire is noted in  the right heart. The right ventricle is not well  visualized; grossly normal right ventricular systolic  function.   A device wire is noted in the right heart.  Normal tricuspid valve. Mild tricuspid regurgitation.  Normal pulmonic valve.  Pericardium/Pleura: Normal pericardium with no pericardial  effusion.  Hemodynamic: Estimated right ventricular systolic pressure  equals 70 mm Hg, assuming right atrial pressure equals 12  mm Hg, consistent with severe pulmonary hypertension.  ------------------------------------------------------------------------  Conclusions:  1. Normal left ventricular systolic function.   Endocardial  visualization enhanced with intravenous injection of  Ultrasonic Enhancing Agent (Definity).  2. Severe diastolic dysfunction (Stage III).   Increased  E/e'  is consistent with elevated left ventricular filling  pressure.  3. The right ventricle is not well visualized; grossly  normal right ventricular systolic function.   A device wire  is noted in the right heart.  4. Estimated pulmonary artery systolic pressure equals 70  mm Hg, assuming right atrial pressure equals 12 mm Hg,  consistent with severe pulmonary pressures.  *** No previous Echo exam.  ------------------------------------------------------------------------  Confirmed on  2021 - 12:12:50 by CECILLE Hodgson  ------------------------------------------------------------------------    < end of copied text >    Stress Testing: none    Cath: none    CXR: Personally reviewed  < from: Xray Chest 1 View- PORTABLE-Urgent (. @ 02:22) >  IMPRESSION:  Bibasilar opacities may represent atelectasis and/or infection.    --- End of Report ---      < end of copied text >      Other cardiac imaging: none    Other misc imaging: none

## 2021-11-26 NOTE — PROGRESS NOTE ADULT - SUBJECTIVE AND OBJECTIVE BOX
Patient with some generalized weakness this morning- sbp noted to be in the 90s. She states that it has no resolved.    GENERAL: No fevers, no chills.  EYES: No blurry vision,  No photophobia  ENT: No sore throat.  No dysphagia  Cardiovascular: No chest pain, palpitations, orthopnea  Pulmonary: No cough, no wheezing. No shortness of breath  Gastrointestinal: No abdominal pain, no diarrhea, no constipation.   Musculoskeletal: No weakness.  No myalgias.  Dermatology:  No rashes.  Neuro: No Headache.  No vertigo.  No dizziness.  Psych: No anxiety, no depression.  Denies suicidal thoughts.    MEDICATIONS  (STANDING):  aspirin enteric coated 81 milliGRAM(s) Oral daily  atorvastatin 40 milliGRAM(s) Oral at bedtime  clopidogrel Tablet 75 milliGRAM(s) Oral daily  collagenase Ointment 1 Application(s) Topical two times a day  dextrose 40% Gel 15 Gram(s) Oral once  dextrose 5%. 1000 milliLiter(s) (50 mL/Hr) IV Continuous <Continuous>  dextrose 5%. 1000 milliLiter(s) (100 mL/Hr) IV Continuous <Continuous>  dextrose 50% Injectable 25 Gram(s) IV Push once  dextrose 50% Injectable 12.5 Gram(s) IV Push once  dextrose 50% Injectable 25 Gram(s) IV Push once  furosemide   Injectable 60 milliGRAM(s) IV Push two times a day  glucagon  Injectable 1 milliGRAM(s) IntraMuscular once  heparin   Injectable 5000 Unit(s) SubCutaneous every 8 hours  heparin  Infusion.  Unit(s)/Hr (8 mL/Hr) IV Continuous <Continuous>  hydrALAZINE 50 milliGRAM(s) Oral three times a day  influenza  Vaccine (HIGH DOSE) 0.7 milliLiter(s) IntraMuscular once  insulin lispro (ADMELOG) corrective regimen sliding scale   SubCutaneous three times a day before meals  insulin lispro (ADMELOG) corrective regimen sliding scale   SubCutaneous at bedtime  insulin lispro Injectable (ADMELOG) 10 Unit(s) SubCutaneous before breakfast  insulin lispro Injectable (ADMELOG) 10 Unit(s) SubCutaneous before lunch  insulin lispro Injectable (ADMELOG) 10 Unit(s) SubCutaneous before dinner  loratadine 10 milliGRAM(s) Oral daily  metoprolol succinate  milliGRAM(s) Oral daily  pantoprazole    Tablet 40 milliGRAM(s) Oral before breakfast  senna 2 Tablet(s) Oral at bedtime    MEDICATIONS  (PRN):    Vital Signs Last 24 Hrs  T(C): 36.5 (26 Nov 2021 12:40), Max: 37.1 (25 Nov 2021 20:37)  T(F): 97.7 (26 Nov 2021 12:40), Max: 98.8 (25 Nov 2021 20:37)  HR: 80 (26 Nov 2021 12:40) (74 - 86)  BP: 138/59 (26 Nov 2021 12:40) (97/62 - 155/75)  BP(mean): --  RR: 18 (26 Nov 2021 12:40) (16 - 18)  SpO2: 97% (26 Nov 2021 12:40) (94% - 99%)    GENERAL: NAD  HEAD:  Atraumatic, Normocephalic  EYES: EOMI, PERRLA, conjunctiva and sclera clear  ENT: Pharynx not erythematous  PULMONARY: Clear to auscultation bilaterally; No wheeze  CARDIOVASCULAR: Regular rate and rhythm; No murmurs, rubs, or gallops  ABDOMEN: Soft, Nontender, Nondistended; Bowel sounds present  EXTREMITIES:  2+ Peripheral Pulses, No clubbing, cyanosis, or edema  MUSCULOSKELETAL: No calf tenderness  PSYCH: AAOx3, normal affect  SKIN: warm and dry, No rashes or lesions    .  LABS:                         8.6    12.93 )-----------( 383      ( 26 Nov 2021 05:49 )             26.3     11-26    134<L>  |  98  |  26<H>  ----------------------------<  159<H>  3.5   |  23  |  1.25    Ca    8.5      26 Nov 2021 05:49                RADIOLOGY, EKG & ADDITIONAL TESTS: Reviewed.

## 2021-11-26 NOTE — PHYSICAL THERAPY INITIAL EVALUATION ADULT - PLANNED THERAPY INTERVENTIONS, PT EVAL
GOAL: Pt will be able to negotiate 6 steps independently in 2 weeks./balance training/bed mobility training/gait training/transfer training

## 2021-11-26 NOTE — PROGRESS NOTE ADULT - ASSESSMENT
75yo F w/ PMHx afib (Eliquis d/c due to epistaxis), HTN, HLD, IDDM, bladder CA, CAD (s/p PCI to RCA 2011, OM 2006, OM 2009), ICD ([place]), BIV PPM, PAD s/p multiple interventions (recent admission 11/2021), presents as transfer from [place] for elevated troponin concern for heart failure exacerbation.

## 2021-11-26 NOTE — PROGRESS NOTE ADULT - PROBLEM SELECTOR PLAN 3
- fs controlled  - c/w home dose pre-meal insulin 10mg TID before meals and with sliding scale  - carbohydrate controlled diet  - A1c 10.3%

## 2021-11-26 NOTE — PHYSICAL THERAPY INITIAL EVALUATION ADULT - PERTINENT HX OF CURRENT PROBLEM, REHAB EVAL
Pt is a 75 y/o F with PMH of afib (Eliquis d/c due to epistaxis), HTN, HLD, IDDM, bladder CA, CAD (s/p PCI to RCA 2011, OM 2006, OM 2009), ICD, BIV PPM, PAD s/p multiple interventions (recent admission 11/2021), presents as transfer from [place] for elevated troponin concern for heart failure exacerbation.

## 2021-11-26 NOTE — PROGRESS NOTE ADULT - PROBLEM SELECTOR PLAN 1
- s/p aspirin load, continue Aspirin 81mg PO daily and Plavix 75mg PO qd  - TTE reviewed. Grade III diastolic dysfunction.   - C/W Lasix 60mg BID  - fluid restriction, monitor I/O's  - daily weights  - PT pending  - appreciate cards consult

## 2021-11-26 NOTE — PROGRESS NOTE ADULT - SUBJECTIVE AND OBJECTIVE BOX
CARDIOLOGY CONSULT PROGRESS NOTE  DARRION MELENDEZ  MRN-[id number]    INTERVAL EVENTS:  - tele:   -     MEDICATIONS  (STANDING):  amLODIPine   Tablet 5 milliGRAM(s) Oral daily  aspirin enteric coated 81 milliGRAM(s) Oral daily  atorvastatin 40 milliGRAM(s) Oral at bedtime  clopidogrel Tablet 75 milliGRAM(s) Oral daily  collagenase Ointment 1 Application(s) Topical two times a day  dextrose 40% Gel 15 Gram(s) Oral once  dextrose 5%. 1000 milliLiter(s) (50 mL/Hr) IV Continuous <Continuous>  dextrose 5%. 1000 milliLiter(s) (100 mL/Hr) IV Continuous <Continuous>  dextrose 50% Injectable 25 Gram(s) IV Push once  dextrose 50% Injectable 12.5 Gram(s) IV Push once  dextrose 50% Injectable 25 Gram(s) IV Push once  furosemide   Injectable 60 milliGRAM(s) IV Push two times a day  glucagon  Injectable 1 milliGRAM(s) IntraMuscular once  heparin   Injectable 5000 Unit(s) SubCutaneous every 8 hours  hydrALAZINE 50 milliGRAM(s) Oral three times a day  influenza  Vaccine (HIGH DOSE) 0.7 milliLiter(s) IntraMuscular once  insulin lispro (ADMELOG) corrective regimen sliding scale   SubCutaneous three times a day before meals  insulin lispro (ADMELOG) corrective regimen sliding scale   SubCutaneous at bedtime  insulin lispro Injectable (ADMELOG) 10 Unit(s) SubCutaneous before breakfast  insulin lispro Injectable (ADMELOG) 10 Unit(s) SubCutaneous before lunch  insulin lispro Injectable (ADMELOG) 10 Unit(s) SubCutaneous before dinner  loratadine 10 milliGRAM(s) Oral daily  metoprolol succinate  milliGRAM(s) Oral daily  pantoprazole    Tablet 40 milliGRAM(s) Oral before breakfast  senna 2 Tablet(s) Oral at bedtime    Allergies  codeine (Unknown)  penicillin (Hives)    P/E:  Vital Signs Last 24 Hrs  T(C): 36.9 (26 Nov 2021 04:18), Max: 37.1 (25 Nov 2021 20:37)  T(F): 98.5 (26 Nov 2021 04:18), Max: 98.8 (25 Nov 2021 20:37)  HR: 81 (26 Nov 2021 04:18) (74 - 103)  BP: 155/75 (26 Nov 2021 04:18) (113/68 - 155/75)  RR: 16 (26 Nov 2021 04:18) (16 - 18)  SpO2: 94% (26 Nov 2021 04:18) (94% - 99%)    I&O's Summary  24 Nov 2021 07:01  -  25 Nov 2021 07:00  --------------------------------------------------------  IN: 640 mL / OUT: 1150 mL / NET: -510 mL    25 Nov 2021 07:01  -  26 Nov 2021 06:38  --------------------------------------------------------  IN: 840 mL / OUT: 2275 mL / NET: -1435 mL    GENERAL: No acute distress, well-developed  HEAD:  Atraumatic, Normocephalic  EYES: EOMI, PERRLA, conjunctiva and sclera clear  NECK: Supple, no lymphadenopathy, no JVD  CHEST/LUNG: Bibasilar crackles bilaterally. No wheezes.   HEART: Regular rate/rhythm. Normal S1/S2. No murmurs, rubs, or gallops  ABDOMEN: Soft, non-tender, non-distended; normal bowel sounds, no organomegaly  EXTREMITIES:  2+ peripheral pulses b/l upper extremities, and RLE. Dopplerable pulses in LLE, No clubbing, cyanosis, or edema  NEUROLOGY: A&O x 3, no focal deficits  SKIN: No rashes or lesions    RELEVANT RECENT LABS/IMAGING/STUDIES:                        8.6    12.93 )-----------( 383      ( 26 Nov 2021 05:49 )             26.3     11-26    134<L>  |  98  |  26<H>  ----------------------------<  159<H>  3.5   |  23  |  1.25    Ca    8.5      26 Nov 2021 05:49  Phos  2.6     11-24  Mg     1.8     11-24    TPro  7.3  /  Alb  3.3  /  TBili  0.5  /  DBili  x   /  AST  19  /  ALT  16  /  AlkPhos  181<H>  11-24    LIVER FUNCTIONS - ( 24 Nov 2021 11:07 )  Alb: 3.3 g/dL / Pro: 7.3 g/dL / ALK PHOS: 181 U/L / ALT: 16 U/L / AST: 19 U/L / GGT: x           PTT - ( 24 Nov 2021 11:09 )  PTT:36.1 sec    CARDIAC MARKERS ( 25 Nov 2021 00:29 )  x     / x     / 120 U/L / x     / 6.1 ng/mL  CARDIAC MARKERS ( 24 Nov 2021 17:54 )  x     / x     / 145 U/L / x     / 10.4 ng/mL  CARDIAC MARKERS ( 24 Nov 2021 11:07 )  x     / x     / 127 U/L / x     / 11.7 ng/mL CARDIOLOGY CONSULT PROGRESS NOTE  DARRION MELENDEZ  MRN-[id number]    INTERVAL EVENTS:  - tele: brief runs of atach, otherwise paced rhythm 70--80s  - feeling improved from breathing perspective, still having significant constipation, cardiac ROS otherwise neg    MEDICATIONS  (STANDING):  amLODIPine   Tablet 5 milliGRAM(s) Oral daily  aspirin enteric coated 81 milliGRAM(s) Oral daily  atorvastatin 40 milliGRAM(s) Oral at bedtime  clopidogrel Tablet 75 milliGRAM(s) Oral daily  collagenase Ointment 1 Application(s) Topical two times a day  dextrose 40% Gel 15 Gram(s) Oral once  dextrose 5%. 1000 milliLiter(s) (50 mL/Hr) IV Continuous <Continuous>  dextrose 5%. 1000 milliLiter(s) (100 mL/Hr) IV Continuous <Continuous>  dextrose 50% Injectable 25 Gram(s) IV Push once  dextrose 50% Injectable 12.5 Gram(s) IV Push once  dextrose 50% Injectable 25 Gram(s) IV Push once  furosemide   Injectable 60 milliGRAM(s) IV Push two times a day  glucagon  Injectable 1 milliGRAM(s) IntraMuscular once  heparin   Injectable 5000 Unit(s) SubCutaneous every 8 hours  hydrALAZINE 50 milliGRAM(s) Oral three times a day  influenza  Vaccine (HIGH DOSE) 0.7 milliLiter(s) IntraMuscular once  insulin lispro (ADMELOG) corrective regimen sliding scale   SubCutaneous three times a day before meals  insulin lispro (ADMELOG) corrective regimen sliding scale   SubCutaneous at bedtime  insulin lispro Injectable (ADMELOG) 10 Unit(s) SubCutaneous before breakfast  insulin lispro Injectable (ADMELOG) 10 Unit(s) SubCutaneous before lunch  insulin lispro Injectable (ADMELOG) 10 Unit(s) SubCutaneous before dinner  loratadine 10 milliGRAM(s) Oral daily  metoprolol succinate  milliGRAM(s) Oral daily  pantoprazole    Tablet 40 milliGRAM(s) Oral before breakfast  senna 2 Tablet(s) Oral at bedtime    Allergies  codeine (Unknown)  penicillin (Hives)    P/E:  Vital Signs Last 24 Hrs  T(C): 36.9 (26 Nov 2021 04:18), Max: 37.1 (25 Nov 2021 20:37)  T(F): 98.5 (26 Nov 2021 04:18), Max: 98.8 (25 Nov 2021 20:37)  HR: 81 (26 Nov 2021 04:18) (74 - 103)  BP: 155/75 (26 Nov 2021 04:18) (113/68 - 155/75)  RR: 16 (26 Nov 2021 04:18) (16 - 18)  SpO2: 94% (26 Nov 2021 04:18) (94% - 99%)    I&O's Summary  24 Nov 2021 07:01  -  25 Nov 2021 07:00  --------------------------------------------------------  IN: 640 mL / OUT: 1150 mL / NET: -510 mL    25 Nov 2021 07:01  -  26 Nov 2021 06:38  --------------------------------------------------------  IN: 840 mL / OUT: 2275 mL / NET: -1435 mL    GENERAL: No acute distress, well-developed  HEAD:  Atraumatic, Normocephalic  EYES: EOMI, PERRLA, conjunctiva and sclera clear  NECK: Supple, no lymphadenopathy, no JVD  CHEST/LUNG: Bibasilar crackles bilaterally. No wheezes.   HEART: Regular rate/rhythm. Normal S1/S2. No murmurs, rubs, or gallops  ABDOMEN: Soft, non-tender, non-distended; normal bowel sounds, no organomegaly  EXTREMITIES:  2+ peripheral pulses b/l upper extremities, and RLE. Dopplerable pulses in LLE, No clubbing, cyanosis, or edema  NEUROLOGY: A&O x 3, no focal deficits  SKIN: No rashes or lesions    RELEVANT RECENT LABS/IMAGING/STUDIES:                        8.6    12.93 )-----------( 383      ( 26 Nov 2021 05:49 )             26.3     11-26    134<L>  |  98  |  26<H>  ----------------------------<  159<H>  3.5   |  23  |  1.25    Ca    8.5      26 Nov 2021 05:49  Phos  2.6     11-24  Mg     1.8     11-24    TPro  7.3  /  Alb  3.3  /  TBili  0.5  /  DBili  x   /  AST  19  /  ALT  16  /  AlkPhos  181<H>  11-24    LIVER FUNCTIONS - ( 24 Nov 2021 11:07 )  Alb: 3.3 g/dL / Pro: 7.3 g/dL / ALK PHOS: 181 U/L / ALT: 16 U/L / AST: 19 U/L / GGT: x           PTT - ( 24 Nov 2021 11:09 )  PTT:36.1 sec    CARDIAC MARKERS ( 25 Nov 2021 00:29 )  x     / x     / 120 U/L / x     / 6.1 ng/mL  CARDIAC MARKERS ( 24 Nov 2021 17:54 )  x     / x     / 145 U/L / x     / 10.4 ng/mL  CARDIAC MARKERS ( 24 Nov 2021 11:07 )  x     / x     / 127 U/L / x     / 11.7 ng/mL CARDIOLOGY CONSULT PROGRESS NOTE  DARRION MELENDEZ  MRN-[id number]    INTERVAL EVENTS:  - tele: brief runs of atach, otherwise paced rhythm 70--80s  - feeling improved from breathing perspective, still having significant constipation, cardiac ROS otherwise neg    MEDICATIONS  (STANDING):  amLODIPine   Tablet 5 milliGRAM(s) Oral daily  aspirin enteric coated 81 milliGRAM(s) Oral daily  atorvastatin 40 milliGRAM(s) Oral at bedtime  clopidogrel Tablet 75 milliGRAM(s) Oral daily  collagenase Ointment 1 Application(s) Topical two times a day  dextrose 40% Gel 15 Gram(s) Oral once  dextrose 5%. 1000 milliLiter(s) (50 mL/Hr) IV Continuous <Continuous>  dextrose 5%. 1000 milliLiter(s) (100 mL/Hr) IV Continuous <Continuous>  dextrose 50% Injectable 25 Gram(s) IV Push once  dextrose 50% Injectable 12.5 Gram(s) IV Push once  dextrose 50% Injectable 25 Gram(s) IV Push once  furosemide   Injectable 60 milliGRAM(s) IV Push two times a day  glucagon  Injectable 1 milliGRAM(s) IntraMuscular once  heparin   Injectable 5000 Unit(s) SubCutaneous every 8 hours  hydrALAZINE 50 milliGRAM(s) Oral three times a day  influenza  Vaccine (HIGH DOSE) 0.7 milliLiter(s) IntraMuscular once  insulin lispro (ADMELOG) corrective regimen sliding scale   SubCutaneous three times a day before meals  insulin lispro (ADMELOG) corrective regimen sliding scale   SubCutaneous at bedtime  insulin lispro Injectable (ADMELOG) 10 Unit(s) SubCutaneous before breakfast  insulin lispro Injectable (ADMELOG) 10 Unit(s) SubCutaneous before lunch  insulin lispro Injectable (ADMELOG) 10 Unit(s) SubCutaneous before dinner  loratadine 10 milliGRAM(s) Oral daily  metoprolol succinate  milliGRAM(s) Oral daily  pantoprazole    Tablet 40 milliGRAM(s) Oral before breakfast  senna 2 Tablet(s) Oral at bedtime    Allergies  codeine (Unknown)  penicillin (Hives)    P/E:  Vital Signs Last 24 Hrs  T(C): 36.9 (26 Nov 2021 04:18), Max: 37.1 (25 Nov 2021 20:37)  T(F): 98.5 (26 Nov 2021 04:18), Max: 98.8 (25 Nov 2021 20:37)  HR: 81 (26 Nov 2021 04:18) (74 - 103)  BP: 155/75 (26 Nov 2021 04:18) (113/68 - 155/75)  RR: 16 (26 Nov 2021 04:18) (16 - 18)  SpO2: 94% (26 Nov 2021 04:18) (94% - 99%)    I&O's Summary  24 Nov 2021 07:01  -  25 Nov 2021 07:00  --------------------------------------------------------  IN: 640 mL / OUT: 1150 mL / NET: -510 mL    25 Nov 2021 07:01  -  26 Nov 2021 06:38  --------------------------------------------------------  IN: 840 mL / OUT: 2275 mL / NET: -1435 mL    GENERAL: No acute distress, well-developed  HEAD:  Atraumatic, Normocephalic  EYES: EOMI, PERRLA, conjunctiva and sclera clear  NECK: Supple, no lymphadenopathy, no JVD  CHEST/LUNG: Bibasilar crackles bilaterally. No wheezes.   HEART: Regular rate/rhythm. Normal S1/S2. No murmurs, rubs, or gallops  ABDOMEN: Soft, non-tender, non-distended; normal bowel sounds, no organomegaly  EXTREMITIES:  2+ peripheral pulses b/l upper extremities, and RLE. Dopplerable pulses in LLE, No clubbing, cyanosis, or edema  NEUROLOGY: A&O x 3, no focal deficits  SKIN: No rashes or lesions    RELEVANT RECENT LABS/IMAGING/STUDIES:  TTE (11/24/21) -  1. Normal left ventricular systolic function.   Endocardial  visualization enhanced with intravenous injection of  Ultrasonic Enhancing Agent (Definity).  2. Severe diastolic dysfunction (Stage III).   Increased  E/e'  is consistent with elevated left ventricular filling  pressure.  3. The right ventricle is not well visualized; grossly  normal right ventricular systolic function.   A device wire  is noted in the right heart.  4. Estimated pulmonary artery systolic pressure equals 70  mm Hg, assuming right atrial pressure equals 12 mm Hg,  consistent with severe pulmonary pressures.  *** No previous Echo exam.               8.6    12.93 )-----------( 383      ( 26 Nov 2021 05:49 )             26.3     11-26    134<L>  |  98  |  26<H>  ----------------------------<  159<H>  3.5   |  23  |  1.25    Ca    8.5      26 Nov 2021 05:49  Phos  2.6     11-24  Mg     1.8     11-24    TPro  7.3  /  Alb  3.3  /  TBili  0.5  /  DBili  x   /  AST  19  /  ALT  16  /  AlkPhos  181<H>  11-24    LIVER FUNCTIONS - ( 24 Nov 2021 11:07 )  Alb: 3.3 g/dL / Pro: 7.3 g/dL / ALK PHOS: 181 U/L / ALT: 16 U/L / AST: 19 U/L / GGT: x           PTT - ( 24 Nov 2021 11:09 )  PTT:36.1 sec    CARDIAC MARKERS ( 25 Nov 2021 00:29 )  x     / x     / 120 U/L / x     / 6.1 ng/mL  CARDIAC MARKERS ( 24 Nov 2021 17:54 )  x     / x     / 145 U/L / x     / 10.4 ng/mL  CARDIAC MARKERS ( 24 Nov 2021 11:07 )  x     / x     / 127 U/L / x     / 11.7 ng/mL

## 2021-11-26 NOTE — PHYSICAL THERAPY INITIAL EVALUATION ADULT - IMPAIRMENTS CONTRIBUTING TO GAIT DEVIATIONS, PT EVAL
dec endurance, pt required intermittent standing rest breaks/impaired balance/decreased ROM/decreased strength

## 2021-11-26 NOTE — PHYSICAL THERAPY INITIAL EVALUATION ADULT - ADDITIONAL COMMENTS
Pt lives with family an apartment, 6 steps to negotiate. PTA pt was (I) with ADLs and functional mobility with a RW. Family assists with ADLs if needed.

## 2021-11-26 NOTE — PROGRESS NOTE ADULT - ASSESSMENT
74 yo F w/ PMHx afib (stopped Eliquis due to epistaxis), HTN, HLD, DM, bladder CA, hysterectomy, cholecystectomy, CAD (RCA KENDELL 2011, OM 2006, OM 2009), ICD ([place]), BIV PPM, PAD s/p LLE angioplasty during recent admission 11/18-11/20, now presenting to [facility] ED from [facility] for NSTEMI. Pt reporting symptoms of progressive SOB associated w/ N/V x 2-3 days. She denies CP, cough/congestion, and fevers. In ED at [place] trop elevated to 818. CTA chest at [place] suggestive of heart failure. Pt loaded w/ ASA, home plavix continued, and started on hep gtt. In ED at [facility] pt given additional 40 mg lasix IV w/ concern for CHF exacerbation.       ### incomplete ###    #NSTEMI  - Hx of CAD, RCA KENDELL 2011, OM 2006, OM 2009  - Troponin (downstrended) elevated to 818 at OSH, ECG w/o acute ischemic changes, BiV paced  - Loaded w/ ASA, and started on hep gtt at [place]  - Continue DAPT, heparin gtt x 48hrs total (however, in the setting of AF, will continue)  - Continue metoprolol succinate 100 mg daily, norvasc 5 mg QD, and statin  - c/w telemetry    #Atrial fibrillation s/p ICD/BIV-PPM  - CHADSVASC: 6  - Off AC due to epistaxis; If remains stable would resume.   - 100% paced on tele in ED  - c/w BB  - Monitor on tele    #HFpEF/severe pHTN  - Presentation w/ SOB to OSH  - preserved EF, stage III diastolic dysfunction, severe pHTN (PASP 70)  - subjectively improved though still w/ crackles and requiring supplemental O2; net negative 500cc; increase to IV Lasix 60 mg BID  - Trend lytes, SCr  - strict I/O's, daily standing weights; 1L fluid restriction    #PAD   - Distal pulses intact; no signs of cath site complication  - LLE warm and well perfused  - Continue DAPT and statin    Patient appears to be approaching euvolemia. Lasix was increased can continue at this dose with hopes to taper to PO in the next few days. Continue other medications for now. Will need to address anticoagulation.       César Desai MD  Cardiology Fellow - PGY 4    For all New Consults and Questions:  [email or web address]   Login: [email or web address]    *** Recommendations are preliminary until cosigned by the attending.   74 yo F w/ PMHx afib (stopped Eliquis due to epistaxis), HTN, HLD, DM, bladder CA, hysterectomy, cholecystectomy, CAD (RCA KENDELL 2011, OM 2006, OM 2009), ICD ([place]), BIV PPM, PAD s/p LLE angioplasty during recent admission 11/18-11/20, now presenting to [facility] ED from [facility] for NSTEMI. Pt reporting symptoms of progressive SOB associated w/ N/V x 2-3 days. She denies CP, cough/congestion, and fevers. In ED at [place] trop elevated to 818. CTA chest at [place] suggestive of heart failure. Pt loaded w/ ASA, home plavix continued, and started on hep gtt. In ED at [facility] pt given additional 40 mg lasix IV w/ concern for CHF exacerbation.       ### incomplete ###    #NSTEMI likly demand in the setting of decompensated HF   - Hx of CAD, RCA KENDELL 2011, OM 2006, OM 2009  - Troponin (downstrended) elevated to 818 at OSH, ECG w/o acute ischemic changes, BiV paced  - Loaded w/ ASA, and started on hep gtt at [place]  - Continue DAPT, heparin gtt x 48hrs total (however, in the setting of AF, will continue)  - Continue metoprolol succinate 100 mg daily, norvasc 5 mg QD, and statin  - c/w telemetry    #Atrial fibrillation s/p ICD/BIV-PPM  - CHADSVASC: 6  - Off AC due to epistaxis; If remains stable would resume.   - 100% paced on tele in ED  - c/w BB  - Monitor on tele    #HFpEF/severe pHTN  - Presentation w/ SOB to OSH  - preserved EF, stage III diastolic dysfunction, severe pHTN (PASP 70)  - subjectively improved though still w/ crackles and requiring supplemental O2; net negative 500cc; increase to IV Lasix 60 mg BID  - Trend lytes, SCr  - strict I/O's, daily standing weights; 1L fluid restriction    #PAD   - Distal pulses intact; no signs of cath site complication  - LLE warm and well perfused  - Continue DAPT and statin    Patient appears to be approaching euvolemia. Lasix was increased can continue at this dose with hopes to taper to PO in the next few days(by Sunday). Continue other medications for now.   continue 3 therapy for 30 days, then readdress as outpatient with her cardiologist  if further epistaxis, consult ENT     please arrange outpt cardiology fu    final cardiology recommendations above, please call with questions     César Desai MD  Cardiology Fellow - PGY 4    For all New Consults and Questions:  [email or web address]   Login: [email or web address]    *** Recommendations are preliminary until cosigned by the attending.    Patient seen and examined with the fellow, the note above has been edited to reflect my independent history, physical exam, assessment and plan.      Fred Cheng MD, PhD  Cardiology Attending  [facility]/ [facility]    For day time coverage Mon-Fri see Non-Service Consult Attending on [email or web address], password: [email or web address]; daytime weekends covered by general cardiology consult service attending.)     76 yo F w/ PMHx afib (stopped Eliquis due to epistaxis), HTN, HLD, DM, bladder CA, hysterectomy, cholecystectomy, CAD (RCA KENDELL 2011, OM 2006, OM 2009), ICD ([place]), BIV PPM, PAD s/p LLE angioplasty during recent admission 11/18-11/20, now presenting to [facility] ED from [facility] for NSTEMI. Pt reporting symptoms of progressive SOB associated w/ N/V x 2-3 days. She denies CP, cough/congestion, and fevers. In ED at [place] trop elevated to 818. CTA chest at [place] suggestive of heart failure. Pt loaded w/ ASA, home plavix continued, and started on hep gtt. In ED at [facility] pt given additional 40 mg lasix IV w/ concern for CHF exacerbation.     - NSTEMI, s/p ASA/clopidogrel loads, hep gtt was stopped i/s/o epistaxis, would resume when able  - given afib (CHADSVASC score of 6) pt would need to be on triple therapy for at least 30 days, full duration to be determined on outpt cardiology f/u w/i 1-2 weeks after discharge  - c/t tele, replete lytes, strict I/O's, daily standing weights; 1L fluid restriction  - c/t atorvastatin 40, amlodipine 5, hydralazine 50 TID, metoprolol succinate 100 daily  - approaching euvolemia, would c/t furosemide 60 IV BID today, transition back to oral maintenance likely Sat or Sun    Please arrange outpatient cardiology follow up. Final cardiology recommendations listed above, please call with any further questions    César Desai MD  Cardiology Fellow - PGY 4    For all New Consults and Questions:  [email or web address]   Login: [email or web address]    *** Recommendations are preliminary until cosigned by the attending.    ---------------  Patient appears to be approaching euvolemia. Lasix was increased can continue at this dose with hopes to taper to PO in the next few days(by Sunday). Continue other medications for now.   continue 3 therapy for 30 days, then readdress as outpatient with her cardiologist  if further epistaxis, consult ENT     please arrange outpt cardiology fu    final cardiology recommendations above, please call with questions     Patient seen and examined with the fellow, the note above has been edited to reflect my independent history, physical exam, assessment and plan.      Fred Cheng MD, PhD  Cardiology Attending  [facility]/ [facility]    For day time coverage Mon-Fri see Non-Service Consult Attending on [email or web address], password: [email or web address]; daytime weekends covered by general cardiology consult service attending.)     76 yo F w/ PMHx afib (stopped Eliquis due to epistaxis), HTN, HLD, DM, bladder CA, hysterectomy, cholecystectomy, CAD (RCA KENDELL 2011, OM 2006, OM 2009), ICD ([place]), BIV PPM, PAD s/p LLE angioplasty during recent admission 11/18-11/20, now presenting to [facility] ED from [facility] for NSTEMI. Pt reporting symptoms of progressive SOB associated w/ N/V x 2-3 days. She denies CP, cough/congestion, and fevers. In ED at [place] trop elevated to 818. CTA chest at [place] suggestive of heart failure. Pt loaded w/ ASA, home plavix continued, and started on hep gtt. In ED at [facility] pt given additional 40 mg lasix IV w/ concern for CHF exacerbation.     - NSTEMI, s/p ASA/clopidogrel loads, hep gtt was stopped i/s/o epistaxis, would resume when able  - given afib (CHADSVASC score of 6) pt would need to be on triple therapy for at least 30 days, full duration to be determined on outpt cardiology f/u w/i 1-2 weeks after discharge  - c/t tele, replete lytes, strict I/O's, daily standing weights; 1L fluid restriction  - c/t atorvastatin 40, amlodipine 5, hydralazine 50 TID, metoprolol succinate 100 daily  - approaching euvolemia, would c/t furosemide 60 IV BID today, transition back to oral maintenance likely Sat or Sun    Please arrange outpatient cardiology follow up. Final cardiology recommendations listed above, please call with any further questions.    César Desai MD  Cardiology Fellow - PGY 4    For all New Consults and Questions:  [email or web address]   Login: [email or web address]    *** Recommendations are preliminary until cosigned by the attending.    ---------------  Patient appears to be approaching euvolemia. Lasix was increased can continue at this dose with hopes to taper to PO in the next few days(by Sunday). Continue other medications for now.   continue 3 therapy for 30 days, then readdress as outpatient with her cardiologist  if further epistaxis, consult ENT     please arrange outpt cardiology fu    final cardiology recommendations above, please call with questions     Patient seen and examined with the fellow, the note above has been edited to reflect my independent history, physical exam, assessment and plan.      Fred Cheng MD, PhD  Cardiology Attending  [facility]/ [facility]    For day time coverage Mon-Fri see Non-Service Consult Attending on [email or web address], password: [email or web address]; daytime weekends covered by general cardiology consult service attending.)

## 2021-11-27 NOTE — PROVIDER CONTACT NOTE (OTHER) - ASSESSMENT
Patient denies any CP- denies any symptoms.
VS in flow sheets (2200); Patient denies any shortness of breath; palpitations; or chest pain. Does state that when she lays on her left side her heart races occasionally. Does have history of pacemaker. Usually is resolved with repositioning.
Patient was ambulating from the bathroom at the time.

## 2021-11-27 NOTE — PROVIDER CONTACT NOTE (CRITICAL VALUE NOTIFICATION) - ASSESSMENT
VSS. Pt denies CP and SOB. No signs of bleeding.
Denies any chest pain or difficulty breathing
Heparin gtt running at 6ml/hr. No s/s of bleeding.

## 2021-11-27 NOTE — PROGRESS NOTE ADULT - PROBLEM SELECTOR PLAN 1
- s/p aspirin load, continue Aspirin 81mg PO daily and Plavix 75mg PO qd  - TTE reviewed. Grade III diastolic dysfunction.   - C/W Lasix 60mg BID - change to PO tomorrow  - fluid restriction, monitor I/O's  - daily weights  - PT recs home PT  - appreciate cards consult    Elevated Troponin due to heart failure  Recent LLE angioplasty   Will need triple therapy for 30 days per Cardiology  On Heparin drip, dc tomorrow afternoon and resume Eliquis for AF

## 2021-11-27 NOTE — PROVIDER CONTACT NOTE (OTHER) - ACTION/TREATMENT ORDERED:
2300 troponin. 2000 EKG. Will endorse to night RN.
Mg and Phos to be added to AM labs
stat Labs and EKG.

## 2021-11-27 NOTE — PROGRESS NOTE ADULT - ASSESSMENT
77yo F w/ PMHx afib (Eliquis d/c due to epistaxis), HTN, HLD, IDDM, bladder CA, CAD (s/p PCI to RCA 2011, OM 2006, OM 2009), ICD ([place]), BIV PPM, PAD s/p multiple interventions (recent admission 11/2021), presents as transfer from [place] for elevated troponin concern for heart failure exacerbation.

## 2021-11-27 NOTE — PROGRESS NOTE ADULT - SUBJECTIVE AND OBJECTIVE BOX
[facility] Division of Hospital Medicine  Mallory Miranda MD  Pager (M-F, 8A-2F): [phone]  Other Times:  [phone]      SUBJECTIVE / OVERNIGHT EVENTS: Pt seen and examined at bedside. NAD.    MEDICATIONS  (STANDING):  aspirin enteric coated 81 milliGRAM(s) Oral daily  atorvastatin 40 milliGRAM(s) Oral at bedtime  clopidogrel Tablet 75 milliGRAM(s) Oral daily  collagenase Ointment 1 Application(s) Topical two times a day  dextrose 40% Gel 15 Gram(s) Oral once  dextrose 5%. 1000 milliLiter(s) (50 mL/Hr) IV Continuous <Continuous>  dextrose 5%. 1000 milliLiter(s) (100 mL/Hr) IV Continuous <Continuous>  dextrose 50% Injectable 25 Gram(s) IV Push once  dextrose 50% Injectable 12.5 Gram(s) IV Push once  dextrose 50% Injectable 25 Gram(s) IV Push once  furosemide   Injectable 60 milliGRAM(s) IV Push two times a day  glucagon  Injectable 1 milliGRAM(s) IntraMuscular once  heparin  Infusion.  Unit(s)/Hr (8 mL/Hr) IV Continuous <Continuous>  hydrALAZINE 50 milliGRAM(s) Oral three times a day  influenza  Vaccine (HIGH DOSE) 0.7 milliLiter(s) IntraMuscular once  insulin lispro (ADMELOG) corrective regimen sliding scale   SubCutaneous three times a day before meals  insulin lispro (ADMELOG) corrective regimen sliding scale   SubCutaneous at bedtime  insulin lispro Injectable (ADMELOG) 10 Unit(s) SubCutaneous before breakfast  insulin lispro Injectable (ADMELOG) 10 Unit(s) SubCutaneous before lunch  insulin lispro Injectable (ADMELOG) 10 Unit(s) SubCutaneous before dinner  loratadine 10 milliGRAM(s) Oral daily  metoprolol succinate  milliGRAM(s) Oral daily  pantoprazole    Tablet 40 milliGRAM(s) Oral before breakfast  senna 2 Tablet(s) Oral at bedtime    MEDICATIONS  (PRN):      I&O's Summary    26 Nov 2021 07:01  -  27 Nov 2021 07:00  --------------------------------------------------------  IN: 1080 mL / OUT: 1675 mL / NET: -595 mL    27 Nov 2021 07:01  -  27 Nov 2021 18:38  --------------------------------------------------------  IN: 600 mL / OUT: 400 mL / NET: 200 mL        PHYSICAL EXAM:  Vital Signs Last 24 Hrs  T(C): 36.6 (27 Nov 2021 12:38), Max: 37.1 (26 Nov 2021 21:28)  T(F): 97.9 (27 Nov 2021 12:38), Max: 98.7 (26 Nov 2021 21:28)  HR: 85 (27 Nov 2021 17:29) (75 - 85)  BP: 170/78 (27 Nov 2021 17:29) (125/66 - 170/78)  BP(mean): --  RR: 18 (27 Nov 2021 17:29) (18 - 18)  SpO2: 98% (27 Nov 2021 17:29) (98% - 100%)  GENERAL: NAD  HEAD:  Atraumatic, Normocephalic  EYES: EOMI, PERRLA, conjunctiva and sclera clear  ENT: Pharynx not erythematous  PULMONARY: Clear to auscultation bilaterally; No wheeze  CARDIOVASCULAR: Regular rate and rhythm; No murmurs, rubs, or gallops  ABDOMEN: Soft, Nontender, Nondistended; Bowel sounds present  EXTREMITIES:  2+ Peripheral Pulses, No clubbing, cyanosis, or edema  MUSCULOSKELETAL: No calf tenderness  PSYCH: AAOx3, normal affect  SKIN: warm and dry, No rashes or lesions    LABS:                        8.7    13.62 )-----------( 417      ( 27 Nov 2021 07:02 )             26.9     11-27    134<L>  |  96  |  30<H>  ----------------------------<  154<H>  4.2   |  24  |  1.35<H>    Ca    9.1      27 Nov 2021 07:02  Phos  3.0     11-26  Mg     2.2     11-27      PTT - ( 27 Nov 2021 10:18 )  PTT:128.8 sec

## 2021-11-28 NOTE — PROGRESS NOTE ADULT - PROBLEM SELECTOR PLAN 1
- s/p aspirin load, continue Aspirin 81mg PO daily and Plavix 75mg PO qd  - TTE reviewed. Grade III diastolic dysfunction.   - dc Lasix 60mg BID - change to 40mg PO daily due to BONNY  - fluid restriction, monitor I/O's  - daily weights  - PT recs home PT  - appreciate cards consult    Elevated Troponin due to heart failure  Recent LLE angioplasty   Will need triple therapy for 30 days per Cardiology  On Heparin drip, dc today and resume Eliquis for AF tonight    Acute Hypoxic Respiratory Failure due to above  Taper off Oxygen, currently on 3L  CXR clear BONNY due to Diuresis  Cr on admission 1.08  Cr today 1.4 with elevated BUN  Will decrease Lasix to 40mg PO daily and monitor BMP in the AM

## 2021-11-28 NOTE — PROGRESS NOTE ADULT - PROBLEM SELECTOR PLAN 6
- c/w metoprolol and hydralazine        Disposition: home when stable - fs controlled  - c/w home dose pre-meal insulin 10mg TID before meals and with sliding scale  - carbohydrate controlled diet  - A1c 10.3%

## 2021-11-28 NOTE — PROGRESS NOTE ADULT - PROBLEM SELECTOR PROBLEM 3
DM2 (diabetes mellitus, type 2) Acute diastolic congestive heart failure Gangrene of toe of left foot

## 2021-11-28 NOTE — PROGRESS NOTE ADULT - PROBLEM SELECTOR PLAN 4
- c/w metoprolol and hydralazine      BONNY due to Diuresis  Cr on admission 1.08  Cr today 1.4 with elevated BUN  Will decrease Lasix to 40mg PO daily and monitor BMP in the AM    Disposition: home when stable - c/w metoprolol  - dc heparin drip later today and resume Eliquis - s/p aspirin load, continue Aspirin 81mg PO daily and Plavix 75mg PO qd  - TTE reviewed. Grade III diastolic dysfunction.   - dc Lasix 60mg BID - change to 40mg PO daily due to BONNY  - fluid restriction, monitor I/O's  - daily weights  - PT recs home PT  - appreciate cards consult    Elevated Troponin due to heart failure  Recent LLE angioplasty   Will need triple therapy for 30 days per Cardiology  On Heparin drip, dc today and resume Eliquis for AF tonight    Acute Hypoxic Respiratory Failure due to above  Taper off Oxygen, currently on 3L  CXR clear

## 2021-11-28 NOTE — PROGRESS NOTE ADULT - PROBLEM SELECTOR PLAN 3
- fs controlled  - c/w home dose pre-meal insulin 10mg TID before meals and with sliding scale  - carbohydrate controlled diet  - A1c 10.3% - s/p aspirin load, continue Aspirin 81mg PO daily and Plavix 75mg PO qd  - TTE reviewed. Grade III diastolic dysfunction.   - dc Lasix 60mg BID - change to 40mg PO daily due to BONNY  - fluid restriction, monitor I/O's  - daily weights  - PT recs home PT  - appreciate cards consult    Elevated Troponin due to heart failure  Recent LLE angioplasty   Will need triple therapy for 30 days per Cardiology  On Heparin drip, dc today and resume Eliquis for AF tonight    Acute Hypoxic Respiratory Failure due to above  Taper off Oxygen, currently on 3L  CXR clear Consult Podiatry    Pt feelings off balance and complaining of right low weakness but to my exam, strength is equal and pulses are good. She is complaining of numbness. Will consult Neuro. Consult Podiatry    Pt feelings off balance and complaining of right low weakness but to my exam, strength is equal and pulses are good. She is complaining of numbness. On Aspirin, Plavix, Heparin drip. Lipitor 40mg. PT following and recommends  home PT. Will obtain CT head.

## 2021-11-28 NOTE — PROGRESS NOTE ADULT - SUBJECTIVE AND OBJECTIVE BOX
[facility] Division of Hospital Medicine  Mallory Miranda MD  Pager (DELIA-ERIC, 9F-): [phone]  Other Times:  [phone]      SUBJECTIVE / OVERNIGHT EVENTS: She is complaining of PLASENCIA, right sided weakness for 2 days and fatigue.    MEDICATIONS  (STANDING):  amLODIPine   Tablet 5 milliGRAM(s) Oral daily  aspirin enteric coated 81 milliGRAM(s) Oral daily  atorvastatin 40 milliGRAM(s) Oral at bedtime  clopidogrel Tablet 75 milliGRAM(s) Oral daily  collagenase Ointment 1 Application(s) Topical two times a day  dextrose 40% Gel 15 Gram(s) Oral once  dextrose 5%. 1000 milliLiter(s) (50 mL/Hr) IV Continuous <Continuous>  dextrose 5%. 1000 milliLiter(s) (100 mL/Hr) IV Continuous <Continuous>  dextrose 50% Injectable 25 Gram(s) IV Push once  dextrose 50% Injectable 12.5 Gram(s) IV Push once  dextrose 50% Injectable 25 Gram(s) IV Push once  furosemide    Tablet 60 milliGRAM(s) Oral two times a day  glucagon  Injectable 1 milliGRAM(s) IntraMuscular once  heparin  Infusion.  Unit(s)/Hr (8 mL/Hr) IV Continuous <Continuous>  hydrALAZINE 50 milliGRAM(s) Oral three times a day  influenza  Vaccine (HIGH DOSE) 0.7 milliLiter(s) IntraMuscular once  insulin lispro (ADMELOG) corrective regimen sliding scale   SubCutaneous three times a day before meals  insulin lispro (ADMELOG) corrective regimen sliding scale   SubCutaneous at bedtime  insulin lispro Injectable (ADMELOG) 10 Unit(s) SubCutaneous before breakfast  insulin lispro Injectable (ADMELOG) 10 Unit(s) SubCutaneous before lunch  insulin lispro Injectable (ADMELOG) 10 Unit(s) SubCutaneous before dinner  loratadine 10 milliGRAM(s) Oral daily  metoprolol tartrate 50 milliGRAM(s) Oral two times a day  pantoprazole    Tablet 40 milliGRAM(s) Oral before breakfast  senna 2 Tablet(s) Oral at bedtime    MEDICATIONS  (PRN):      I&O's Summary    27 Nov 2021 07:01  -  28 Nov 2021 07:00  --------------------------------------------------------  IN: 600 mL / OUT: 1500 mL / NET: -900 mL    28 Nov 2021 07:01  -  28 Nov 2021 15:33  --------------------------------------------------------  IN: 600 mL / OUT: 800 mL / NET: -200 mL        PHYSICAL EXAM:  Vital Signs Last 24 Hrs  T(C): 36.9 (28 Nov 2021 12:15), Max: 37.2 (27 Nov 2021 22:00)  T(F): 98.5 (28 Nov 2021 12:15), Max: 98.9 (27 Nov 2021 22:00)  HR: 78 (28 Nov 2021 12:15) (78 - 92)  BP: 119/71 (28 Nov 2021 12:15) (119/71 - 170/78)  BP(mean): --  RR: 18 (28 Nov 2021 12:15) (17 - 18)  SpO2: 100% (28 Nov 2021 12:15) (98% - 100%)  CONSTITUTIONAL: NAD, well-developed, well-groomed  EYES: PERRLA; conjunctiva and sclera clear  ENMT: Moist oral mucosa, no pharyngeal injection or exudates;   NECK: Supple, no palpable masses;  RESPIRATORY: Normal respiratory effort; lungs are clear to auscultation bilaterally  CARDIOVASCULAR: Regular rate and rhythm, normal S1 and S2, no murmur/rub/gallop; No lower extremity edema;   ABDOMEN: Nontender to palpation, normoactive bowel sounds, no rebound/guarding  MUSCULOSKELETAL:  Normal gait; no clubbing or cyanosis of digits; no joint swelling or tenderness to palpation; strength equal bilaterally, no foot drop or issues; pulses intact  PSYCH: A+O to person, place, and time; affect appropriate  NEUROLOGY: No dysarthria or aphasia, speech is clear; no gross sensory deficits   SKIN: No rashes; no palpable lesions    LABS:                        9.8    14.51 )-----------( 534      ( 28 Nov 2021 10:10 )             31.3     11-28    134<L>  |  94<L>  |  38<H>  ----------------------------<  217<H>  3.7   |  25  |  1.40<H>    Ca    9.6      28 Nov 2021 10:10  Mg     2.0     11-27      PTT - ( 28 Nov 2021 10:10 )  PTT:67.2 sec  CARDIAC MARKERS ( 28 Nov 2021 10:10 )  x     / x     / x     / x     / 1.7 ng/mL

## 2021-11-28 NOTE — PROGRESS NOTE ADULT - ASSESSMENT
77yo F w/ PMHx afib (Eliquis d/c due to epistaxis), HTN, HLD, IDDM, bladder CA, CAD (s/p PCI to RCA 2011, OM 2006, OM 2009), ICD ([place]), BIV PPM, PAD s/p multiple interventions (recent admission 11/2021), presents as transfer from [place] for elevated troponin concern for heart failure exacerbation.

## 2021-11-28 NOTE — PROGRESS NOTE ADULT - PROBLEM SELECTOR PLAN 2
- c/w metoprolol  - dc heparin drip later today and resume Eliquis Send Iron studies in the AM  likely due to Iron Deficiency from recent blood loss/epistaxis    Leukocytosis and Thrombocytosis noted likely reactive due to anemia  repeat CBC in the AM

## 2021-11-28 NOTE — PROGRESS NOTE ADULT - PROBLEM SELECTOR PLAN 5
- fs controlled  - c/w home dose pre-meal insulin 10mg TID before meals and with sliding scale  - carbohydrate controlled diet  - A1c 10.3% - c/w metoprolol  - dc heparin drip later today and resume Eliquis

## 2021-11-29 NOTE — PROGRESS NOTE ADULT - PROBLEM SELECTOR PLAN 4
- not in acute exacerbation  - c/w Aspirin 81mg PO daily and Plavix 75mg PO qd-- stop aspirin after 30 days  - c/w lasix 40mg PO daily  - fluid restriction, monitor I/O's  - daily weights  - PT recs home PT  - appreciate cards consult

## 2021-11-29 NOTE — GOALS OF CARE CONVERSATION - ADVANCED CARE PLANNING - CONVERSATION DETAILS
Spoke to patient and 2 daughters present at bedside regarding advance directives. Patient has previously completed HCP form and would like to use original copy, family will provide copies of HCP to medical team. Provided another copy of HCP form in case they cannot locate and family will file form when ready.     Patient has previously had GOC conversation with family, family said they would prefer to f/u with medical team at a later time to avoid stress for patient. Encouraged family to follow up when ready. Provided contact information and will remain available for questions.

## 2021-11-29 NOTE — PROGRESS NOTE ADULT - ASSESSMENT
75yo F w/ PMHx afib (Eliquis d/c due to epistaxis), HTN, HLD, IDDM, bladder CA, CAD (s/p PCI to RCA 2011, OM 2006, OM 2009), ICD ([place]), BIV PPM, PAD s/p multiple interventions (recent admission 11/2021), presents as transfer from [place] for elevated troponin concern for heart failure exacerbation.

## 2021-11-29 NOTE — PROGRESS NOTE ADULT - PROBLEM SELECTOR PLAN 1
- BONNY due to diuresis  - Cr on admission 1.08, Cr today 1.47 with elevated BUN  - c/w Lasix to 40mg PO daily  - repeat bmp in the am - BONNY due to diuresis  - Cr on admission 1.08, Cr today 1.47 with elevated BUN  - c/w Lasix to 40mg PO daily-- discussed with cardiology fellow on 11/29, continue with po lasix 40 mg daily at discharge  - repeat bmp in the am

## 2021-11-29 NOTE — PROGRESS NOTE ADULT - PROBLEM SELECTOR PLAN 3
- PAD s/p recent peripheral intervention two weeka go to left left lower extremity  - s/p PTA of peroneal artery, ricky atherectomy /PTA of popliteal artery, ricky atherectomy/PTA/Allison stent to distal SFA, and DCB to proximal SFA  - Small Eschar to L 2nd toe-- no need for further intervention per [facility] cards  - vascular cardiology following  - per PT, home with PT

## 2021-11-29 NOTE — PROGRESS NOTE ADULT - SUBJECTIVE AND OBJECTIVE BOX
Patient complaining of pain in her right calf which is limiting her ambulation. Otherwise, no comlaints.    GENERAL: No fevers, no chills.  EYES: No blurry vision,  No photophobia  ENT: No sore throat.  No dysphagia  Cardiovascular: No chest pain, palpitations, orthopnea  Pulmonary: No cough, no wheezing. No shortness of breath  Gastrointestinal: No abdominal pain, no diarrhea, no constipation.   Musculoskeletal: + right calf pain, right knee pain. No weakness.  No myalgias.  Dermatology:  No rashes.  Neuro: No Headache.  No vertigo.  No dizziness.  Psych: No anxiety, no depression.  Denies suicidal thoughts.    MEDICATIONS  (STANDING):  amLODIPine   Tablet 5 milliGRAM(s) Oral daily  apixaban 5 milliGRAM(s) Oral every 12 hours  aspirin enteric coated 81 milliGRAM(s) Oral daily  atorvastatin 40 milliGRAM(s) Oral at bedtime  clopidogrel Tablet 75 milliGRAM(s) Oral daily  collagenase Ointment 1 Application(s) Topical two times a day  dextrose 40% Gel 15 Gram(s) Oral once  dextrose 5%. 1000 milliLiter(s) (50 mL/Hr) IV Continuous <Continuous>  dextrose 5%. 1000 milliLiter(s) (100 mL/Hr) IV Continuous <Continuous>  dextrose 50% Injectable 25 Gram(s) IV Push once  dextrose 50% Injectable 12.5 Gram(s) IV Push once  dextrose 50% Injectable 25 Gram(s) IV Push once  furosemide    Tablet 40 milliGRAM(s) Oral daily  glucagon  Injectable 1 milliGRAM(s) IntraMuscular once  hydrALAZINE 50 milliGRAM(s) Oral three times a day  influenza  Vaccine (HIGH DOSE) 0.7 milliLiter(s) IntraMuscular once  insulin lispro (ADMELOG) corrective regimen sliding scale   SubCutaneous three times a day before meals  insulin lispro (ADMELOG) corrective regimen sliding scale   SubCutaneous at bedtime  insulin lispro Injectable (ADMELOG) 10 Unit(s) SubCutaneous before breakfast  insulin lispro Injectable (ADMELOG) 10 Unit(s) SubCutaneous before lunch  insulin lispro Injectable (ADMELOG) 10 Unit(s) SubCutaneous before dinner  loratadine 10 milliGRAM(s) Oral daily  metoprolol tartrate 50 milliGRAM(s) Oral two times a day  pantoprazole    Tablet 40 milliGRAM(s) Oral before breakfast  senna 2 Tablet(s) Oral at bedtime    MEDICATIONS  (PRN):    Vital Signs Last 24 Hrs  T(C): 36.5 (29 Nov 2021 12:00), Max: 36.8 (28 Nov 2021 20:26)  T(F): 97.7 (29 Nov 2021 12:00), Max: 98.3 (28 Nov 2021 20:26)  HR: 72 (29 Nov 2021 12:00) (70 - 80)  BP: 130/69 (29 Nov 2021 13:43) (122/71 - 144/74)  BP(mean): --  RR: 18 (29 Nov 2021 12:00) (18 - 18)  SpO2: 95% (29 Nov 2021 12:00) (95% - 99%)    CONSTITUTIONAL: NAD  EYES: PERRLA; conjunctiva and sclera clear  ENMT: Moist oral mucosa, no pharyngeal injection or exudates;   NECK: Supple, no palpable masses;  RESPIRATORY: Normal respiratory effort; lungs are clear to auscultation bilaterally  CARDIOVASCULAR: Regular rate and rhythm, normal S1 and S2, no murmur/rub/gallop; No lower extremity edema;   ABDOMEN: Nontender to palpation, normoactive bowel sounds, no rebound/guarding  MUSCULOSKELETAL:  Normal gait; no clubbing or cyanosis of digits; no joint swelling or tenderness to palpation; strength equal bilaterally, no foot drop or issues; pulses intact  PSYCH: A+O to person, place, and time; affect appropriate  NEUROLOGY: No dysarthria or aphasia, speech is clear; no gross sensory deficits   SKIN: No rashes; no palpable lesions    .  LABS:                         9.0    11.85 )-----------( 458      ( 29 Nov 2021 06:31 )             27.3     11-29    134<L>  |  95<L>  |  45<H>  ----------------------------<  144<H>  3.9   |  26  |  1.46<H>    Ca    9.5      29 Nov 2021 06:30  Mg     2.0     11-27      PTT - ( 29 Nov 2021 06:31 )  PTT:39.9 sec          RADIOLOGY, EKG & ADDITIONAL TESTS: Reviewed.

## 2021-11-30 NOTE — PROGRESS NOTE ADULT - ASSESSMENT
77yo F w/ PMHx afib (Eliquis d/c due to epistaxis), HTN, HLD, IDDM, bladder CA, CAD (s/p PCI to RCA 2011, OM 2006, OM 2009), ICD ([place]), BIV PPM, PAD s/p multiple interventions (recent admission 11/2021), presents as transfer from [place] for elevated troponin concern for heart failure exacerbation.

## 2021-11-30 NOTE — PROGRESS NOTE ADULT - SUBJECTIVE AND OBJECTIVE BOX
Patient complaining of pain in her right calf which is limiting her ambulation. Otherwise, no comlaints.    GENERAL: No fevers, no chills.  EYES: No blurry vision,  No photophobia  ENT: No sore throat.  No dysphagia  Cardiovascular: No chest pain, palpitations, orthopnea  Pulmonary: No cough, no wheezing. No shortness of breath  Gastrointestinal: No abdominal pain, no diarrhea, no constipation.   Musculoskeletal: + right calf pain, right knee pain. No weakness.  No myalgias.  Dermatology:  No rashes.  Neuro: No Headache.  No vertigo.  No dizziness.  Psych: No anxiety, no depression.  Denies suicidal thoughts.    MEDICATIONS  (STANDING):  amLODIPine   Tablet 5 milliGRAM(s) Oral daily  apixaban 5 milliGRAM(s) Oral every 12 hours  aspirin enteric coated 81 milliGRAM(s) Oral daily  atorvastatin 40 milliGRAM(s) Oral at bedtime  clopidogrel Tablet 75 milliGRAM(s) Oral daily  collagenase Ointment 1 Application(s) Topical two times a day  dextrose 40% Gel 15 Gram(s) Oral once  dextrose 5%. 1000 milliLiter(s) (50 mL/Hr) IV Continuous <Continuous>  dextrose 5%. 1000 milliLiter(s) (100 mL/Hr) IV Continuous <Continuous>  dextrose 50% Injectable 25 Gram(s) IV Push once  dextrose 50% Injectable 12.5 Gram(s) IV Push once  dextrose 50% Injectable 25 Gram(s) IV Push once  furosemide    Tablet 40 milliGRAM(s) Oral daily  glucagon  Injectable 1 milliGRAM(s) IntraMuscular once  hydrALAZINE 50 milliGRAM(s) Oral three times a day  influenza  Vaccine (HIGH DOSE) 0.7 milliLiter(s) IntraMuscular once  insulin lispro (ADMELOG) corrective regimen sliding scale   SubCutaneous three times a day before meals  insulin lispro (ADMELOG) corrective regimen sliding scale   SubCutaneous at bedtime  insulin lispro Injectable (ADMELOG) 10 Unit(s) SubCutaneous before breakfast  insulin lispro Injectable (ADMELOG) 10 Unit(s) SubCutaneous before lunch  insulin lispro Injectable (ADMELOG) 10 Unit(s) SubCutaneous before dinner  loratadine 10 milliGRAM(s) Oral daily  metoprolol tartrate 50 milliGRAM(s) Oral two times a day  pantoprazole    Tablet 40 milliGRAM(s) Oral before breakfast  senna 2 Tablet(s) Oral at bedtime    MEDICATIONS  (PRN):  sodium chloride 0.65% Nasal 1 Spray(s) Both Nostrils two times a day PRN Nasal Congestion    Vital Signs Last 24 Hrs  T(C): 36.7 (30 Nov 2021 12:20), Max: 36.8 (30 Nov 2021 04:51)  T(F): 98 (30 Nov 2021 12:20), Max: 98.3 (30 Nov 2021 04:51)  HR: 80 (30 Nov 2021 13:15) (80 - 86)  BP: 135/73 (30 Nov 2021 13:15) (125/66 - 136/73)  BP(mean): --  RR: 18 (30 Nov 2021 12:20) (18 - 18)  SpO2: 100% (30 Nov 2021 13:15) (98% - 100%)    CONSTITUTIONAL: NAD  EYES: PERRLA; conjunctiva and sclera clear  ENMT: Moist oral mucosa, no pharyngeal injection or exudates;   NECK: Supple, no palpable masses;  RESPIRATORY: Normal respiratory effort; lungs are clear to auscultation bilaterally  CARDIOVASCULAR: Regular rate and rhythm, normal S1 and S2, no murmur/rub/gallop; No lower extremity edema;   ABDOMEN: Nontender to palpation, normoactive bowel sounds, no rebound/guarding  MUSCULOSKELETAL:  Normal gait; no clubbing or cyanosis of digits; no joint swelling or tenderness to palpation; strength equal bilaterally, no foot drop or issues; pulses intact  PSYCH: A+O to person, place, and time; affect appropriate  NEUROLOGY: No dysarthria or aphasia, speech is clear; no gross sensory deficits   SKIN: No rashes; no palpable lesions    .  LABS:                         9.4    12.34 )-----------( 517      ( 30 Nov 2021 09:25 )             29.1     11-30    132<L>  |  94<L>  |  41<H>  ----------------------------<  224<H>  4.1   |  26  |  1.24    Ca    9.6      30 Nov 2021 09:25      PTT - ( 29 Nov 2021 06:31 )  PTT:39.9 sec          RADIOLOGY, EKG & ADDITIONAL TESTS: Reviewed.

## 2021-11-30 NOTE — PROGRESS NOTE ADULT - PROBLEM SELECTOR PLAN 1
- BONNY due to diuresis  - Cr on admission 1.08, Cr today 1.24 with elevated BUN  - c/w Lasix to 40mg PO daily-- discussed with cardiology fellow on 11/29, continue with po lasix 40 mg daily at discharge

## 2021-11-30 NOTE — PROGRESS NOTE ADULT - PROBLEM SELECTOR PLAN 3
- right calf pain- right extremity duplex performed, results pending  - PAD s/p recent peripheral intervention two week  ago to left left lower extremity  - s/p PTA of peroneal artery, ricky atherectomy /PTA of popliteal artery, ricky atherectomy/PTA/Allison stent to distal SFA, and DCB to proximal SFA  - Small Eschar to L 2nd toe-- no need for further intervention per [facility] cards  - vascular cardiology following  - per PT, home with PT

## 2021-12-01 NOTE — PROGRESS NOTE ADULT - SUBJECTIVE AND OBJECTIVE BOX
No complaints. Still with limited range of motion in right leg.    GENERAL: No fevers, no chills.  EYES: No blurry vision,  No photophobia  ENT: No sore throat.  No dysphagia  Cardiovascular: No chest pain, palpitations, orthopnea  Pulmonary: No cough, no wheezing. No shortness of breath  Gastrointestinal: No abdominal pain, no diarrhea, no constipation.   Musculoskeletal: + right calf pain, right knee pain. No weakness.  No myalgias.  Dermatology:  No rashes.  Neuro: No Headache.  No vertigo.  No dizziness.  Psych: No anxiety, no depression.  Denies suicidal thoughts.    MEDICATIONS  (STANDING):  amLODIPine   Tablet 5 milliGRAM(s) Oral daily  apixaban 5 milliGRAM(s) Oral every 12 hours  aspirin enteric coated 81 milliGRAM(s) Oral daily  atorvastatin 40 milliGRAM(s) Oral at bedtime  clopidogrel Tablet 75 milliGRAM(s) Oral daily  collagenase Ointment 1 Application(s) Topical two times a day  dextrose 40% Gel 15 Gram(s) Oral once  dextrose 5%. 1000 milliLiter(s) (50 mL/Hr) IV Continuous <Continuous>  dextrose 5%. 1000 milliLiter(s) (100 mL/Hr) IV Continuous <Continuous>  dextrose 50% Injectable 25 Gram(s) IV Push once  dextrose 50% Injectable 12.5 Gram(s) IV Push once  dextrose 50% Injectable 25 Gram(s) IV Push once  furosemide    Tablet 40 milliGRAM(s) Oral daily  glucagon  Injectable 1 milliGRAM(s) IntraMuscular once  hydrALAZINE 50 milliGRAM(s) Oral three times a day  influenza  Vaccine (HIGH DOSE) 0.7 milliLiter(s) IntraMuscular once  insulin lispro (ADMELOG) corrective regimen sliding scale   SubCutaneous three times a day before meals  insulin lispro (ADMELOG) corrective regimen sliding scale   SubCutaneous at bedtime  insulin lispro Injectable (ADMELOG) 10 Unit(s) SubCutaneous before breakfast  insulin lispro Injectable (ADMELOG) 10 Unit(s) SubCutaneous before lunch  insulin lispro Injectable (ADMELOG) 10 Unit(s) SubCutaneous before dinner  loratadine 10 milliGRAM(s) Oral daily  metoprolol tartrate 50 milliGRAM(s) Oral two times a day  pantoprazole    Tablet 40 milliGRAM(s) Oral before breakfast  senna 2 Tablet(s) Oral at bedtime    MEDICATIONS  (PRN):  sodium chloride 0.65% Nasal 1 Spray(s) Both Nostrils two times a day PRN Nasal Congestion    Vital Signs Last 24 Hrs  T(C): 36.7 (01 Dec 2021 11:59), Max: 36.8 (30 Nov 2021 21:52)  T(F): 98.1 (01 Dec 2021 11:59), Max: 98.2 (30 Nov 2021 21:52)  HR: 77 (01 Dec 2021 11:59) (77 - 93)  BP: 156/74 (01 Dec 2021 11:59) (125/66 - 156/74)  BP(mean): --  RR: 18 (01 Dec 2021 11:59) (18 - 18)  SpO2: 98% (01 Dec 2021 11:59) (96% - 100%)    CONSTITUTIONAL: NAD  EYES: PERRLA; conjunctiva and sclera clear  ENMT: Moist oral mucosa, no pharyngeal injection or exudates;   NECK: Supple, no palpable masses;  RESPIRATORY: Normal respiratory effort; lungs are clear to auscultation bilaterally  CARDIOVASCULAR: Regular rate and rhythm, normal S1 and S2, no murmur/rub/gallop; No lower extremity edema;   ABDOMEN: Nontender to palpation, normoactive bowel sounds, no rebound/guarding  MUSCULOSKELETAL:  Normal gai  PSYCH: A+O to person, place, and time; affect appropriate  NEUROLOGY: no gross sensory deficits   SKIN: No rashes; no palpable lesions    .  LABS:                         9.3    10.96 )-----------( 499      ( 01 Dec 2021 06:27 )             28.2     11-30    132<L>  |  94<L>  |  41<H>  ----------------------------<  224<H>  4.1   |  26  |  1.24    Ca    9.6      30 Nov 2021 09:25                RADIOLOGY, EKG & ADDITIONAL TESTS: Reviewed.

## 2021-12-01 NOTE — PROGRESS NOTE ADULT - PROBLEM SELECTOR PLAN 3
- right calf pain- right extremity duplex- NEGATIVE for acute dvt  - PAD s/p recent peripheral intervention two week  ago to left left lower extremity  - s/p PTA of peroneal artery, ricky atherectomy /PTA of popliteal artery, ricky atherectomy/PTA/Allison stent to distal SFA, and DCB to proximal SFA  - Small Eschar to L 2nd toe-- no need for further intervention per [facility] cards  - vascular cardiology following  - per PT, [facility]

## 2021-12-01 NOTE — PROGRESS NOTE ADULT - ASSESSMENT
75yo F w/ PMHx afib (Eliquis d/c due to epistaxis), HTN, HLD, IDDM, bladder CA, CAD (s/p PCI to RCA 2011, OM 2006, OM 2009), ICD ([place]), BIV PPM, PAD s/p multiple interventions (recent admission 11/2021), presents as transfer from [place] for elevated troponin concern for heart failure exacerbation.

## 2021-12-02 NOTE — PROGRESS NOTE ADULT - PROBLEM SELECTOR PLAN 3
- right calf pain- right extremity duplex- NEGATIVE for acute dvt  - PAD s/p recent peripheral intervention two week  ago to left left lower extremity  - s/p PTA of peroneal artery, ricky atherectomy /PTA of popliteal artery, ricky atherectomy/PTA/Allison stent to distal SFA, and DCB to proximal SFA  - Small Eschar to L 2nd toe-- no need for further intervention per [facility] cards  - vascular cardiology following  - per PT, SETH

## 2021-12-02 NOTE — PROGRESS NOTE ADULT - NSPROGADDITIONALINFOA_GEN_ALL_CORE
disposition: dc planning [facility] pending placement/ auth-- likely 12/2  discussed with patients daughter at bedside 11/29 and  12/2  Ramona Pierson D.O.  Hospitalist- available on MS teams
disposition: underling iv diuresis, PT pending  discussed with NP Anna Pierson D.O.  Hospitalist- available on MS teams
disposition: dc planning [facility] pending placement/ auth  discussed with patients daughter at bedside 11/29    Ramona Pierson D.O.  Hospitalist- available on MS teams
disposition: dc planning [facility] pending placement/ auth  discussed with patients daughter at bedside 11/29    Ramona Pierson D.O.  Hospitalist- available on MS teams
disposition: dc planning home with home PT likely 11/30- if no epistaxis and va duplex stable  discussed with patients daughter at bedside  discussed with NP MESSI Pierson D.O.  Hospitalist- available on MS teams

## 2021-12-02 NOTE — PROGRESS NOTE ADULT - ASSESSMENT
77yo F w/ PMHx afib (Eliquis d/c due to epistaxis), HTN, HLD, IDDM, bladder CA, CAD (s/p PCI to RCA 2011, OM 2006, OM 2009), ICD ([place]), BIV PPM, PAD s/p multiple interventions (recent admission 11/2021), presents as transfer from [place] for elevated troponin concern for heart failure exacerbation.

## 2021-12-02 NOTE — PROGRESS NOTE ADULT - SUBJECTIVE AND OBJECTIVE BOX
No complaints.     GENERAL: No fevers, no chills.  EYES: No blurry vision,  No photophobia  ENT: No sore throat.  No dysphagia  Cardiovascular: No chest pain, palpitations, orthopnea  Pulmonary: No cough, no wheezing. No shortness of breath  Gastrointestinal: No abdominal pain, no diarrhea, no constipation.   Musculoskeletal: + right calf pain, right knee pain. No weakness.  No myalgias.  Dermatology:  No rashes.  Neuro: No Headache.  No vertigo.  No dizziness.  Psych: No anxiety, no depression.  Denies suicidal thoughts.    MEDICATIONS  (STANDING):  amLODIPine   Tablet 5 milliGRAM(s) Oral daily  apixaban 5 milliGRAM(s) Oral every 12 hours  aspirin enteric coated 81 milliGRAM(s) Oral daily  atorvastatin 40 milliGRAM(s) Oral at bedtime  clopidogrel Tablet 75 milliGRAM(s) Oral daily  collagenase Ointment 1 Application(s) Topical two times a day  dextrose 40% Gel 15 Gram(s) Oral once  dextrose 5%. 1000 milliLiter(s) (50 mL/Hr) IV Continuous <Continuous>  dextrose 5%. 1000 milliLiter(s) (100 mL/Hr) IV Continuous <Continuous>  dextrose 50% Injectable 25 Gram(s) IV Push once  dextrose 50% Injectable 12.5 Gram(s) IV Push once  dextrose 50% Injectable 25 Gram(s) IV Push once  furosemide    Tablet 40 milliGRAM(s) Oral daily  glucagon  Injectable 1 milliGRAM(s) IntraMuscular once  hydrALAZINE 50 milliGRAM(s) Oral three times a day  influenza  Vaccine (HIGH DOSE) 0.7 milliLiter(s) IntraMuscular once  insulin lispro (ADMELOG) corrective regimen sliding scale   SubCutaneous three times a day before meals  insulin lispro (ADMELOG) corrective regimen sliding scale   SubCutaneous at bedtime  insulin lispro Injectable (ADMELOG) 10 Unit(s) SubCutaneous before dinner  insulin lispro Injectable (ADMELOG) 10 Unit(s) SubCutaneous before breakfast  insulin lispro Injectable (ADMELOG) 10 Unit(s) SubCutaneous before lunch  loratadine 10 milliGRAM(s) Oral daily  metoprolol tartrate 50 milliGRAM(s) Oral two times a day  pantoprazole    Tablet 40 milliGRAM(s) Oral before breakfast  senna 2 Tablet(s) Oral at bedtime    MEDICATIONS  (PRN):  sodium chloride 0.65% Nasal 1 Spray(s) Both Nostrils two times a day PRN Nasal Congestion    Vital Signs Last 24 Hrs  T(C): 36.7 (02 Dec 2021 12:58), Max: 36.9 (01 Dec 2021 21:51)  T(F): 98.1 (02 Dec 2021 12:58), Max: 98.5 (01 Dec 2021 21:51)  HR: 86 (02 Dec 2021 12:58) (73 - 90)  BP: 135/67 (02 Dec 2021 12:58) (135/67 - 157/85)  BP(mean): --  RR: 17 (02 Dec 2021 12:58) (17 - 18)  SpO2: 98% (02 Dec 2021 12:58) (95% - 99%)    CONSTITUTIONAL: NAD  EYES: PERRLA; conjunctiva and sclera clear  ENMT: Moist oral mucosa, no pharyngeal injection or exudates;   NECK: Supple, no palpable masses;  RESPIRATORY: Normal respiratory effort; lungs are clear to auscultation bilaterally  CARDIOVASCULAR: Regular rate and rhythm, normal S1 and S2, no murmur/rub/gallop; No lower extremity edema;   ABDOMEN: Nontender to palpation, normoactive bowel sounds, no rebound/guarding  MUSCULOSKELETAL:  Normal gai  PSYCH: A+O to person, place, and time; affect appropriate  NEUROLOGY: no gross sensory deficits   SKIN: No rashes; no palpable lesions    .  LABS:                         8.7    9.91  )-----------( 473      ( 02 Dec 2021 06:26 )             27.6                     RADIOLOGY, EKG & ADDITIONAL TESTS: Reviewed.

## 2021-12-02 NOTE — PROGRESS NOTE ADULT - PROBLEM SELECTOR PLAN 7
- bp stable  - c/w metoprolol 50 mg bid and hydralazine 50 mg tid and amlodipine 5 mg daily
- c/w metoprolol and hydralazine        Disposition: home when stable

## 2021-12-03 PROBLEM — I73.9 PERIPHERAL VASCULAR DISEASE, UNSPECIFIED: Chronic | Status: ACTIVE | Noted: 2021-01-01

## 2021-12-03 PROBLEM — E11.9 TYPE 2 DIABETES MELLITUS WITHOUT COMPLICATIONS: Chronic | Status: ACTIVE | Noted: 2021-01-01

## 2021-12-03 PROBLEM — I10 ESSENTIAL (PRIMARY) HYPERTENSION: Chronic | Status: ACTIVE | Noted: 2021-01-01

## 2021-12-03 PROBLEM — I48.20 CHRONIC ATRIAL FIBRILLATION, UNSPECIFIED: Chronic | Status: ACTIVE | Noted: 2021-01-01

## 2021-12-03 NOTE — DISCHARGE NOTE PROVIDER - NSDCMRMEDTOKEN_GEN_ALL_CORE_FT
amLODIPine 5 mg oral tablet: 1 tab(s) orally once a day  apixaban 5 mg oral tablet: 1 tab(s) orally every 12 hours  aspirin 81 mg oral delayed release tablet: 1 tab(s) orally once a day, stop after 30 days   atorvastatin 40 mg oral tablet: 1 tab(s) orally once a day (at bedtime)  clopidogrel 75 mg oral tablet: 1 tab(s) orally once a day  collagenase 250 units/g topical ointment: 1 application topically 2 times a day  furosemide 40 mg oral tablet: 1 tab(s) orally once a day  glimepiride 4 mg oral tablet: 1 tab(s) orally 2 times a day  hydrALAZINE 50 mg oral tablet: 1 tab(s) orally every 8 hours MDD:1  loratadine 10 mg oral tablet: 1 tab(s) orally once a day  metoprolol tartrate 50 mg oral tablet: 1 tab(s) orally 2 times a day  NovoLOG FlexPen 100 units/mL injectable solution: 10 unit(s) injectable 3 times a day with meals  pantoprazole 40 mg oral delayed release tablet: 1 tab(s) orally once a day  senna oral tablet: 2 tab(s) orally once a day (at bedtime)  sodium chloride 0.65% nasal spray: 1 dose(s) nasal once a day

## 2021-12-03 NOTE — DISCHARGE NOTE PROVIDER - HOSPITAL COURSE
77yo F w/ PMHx afib (Eliquis d/c due to epistaxis), HTN, HLD, IDDM, bladder CA, CAD (s/p PCI to RCA 2011, OM 2006, OM 2009), ICD ([place]), BIV PPM, PAD s/p multiple interventions (recent admission 11/2021), presents as transfer from [place] for elevated troponin concern for heart failure exacerbation. Found to have BONNY due to diuresis   Cr on admission 1.08, Cr today 1.24 with elevated BUN, c/w Lasix to 40mg PO daily. Anemia stable iron panel unremarkable. Gangrene of toe of left foot  right calf pain- right extremity duplex- NEGATIVE for acute dvt, PAD s/p recent peripheral intervention two week  ago to left left lower extremity, s/p PTA of peroneal artery, ricky atherectomy /PTA of popliteal artery, ricky atherectomy/PTA/Allison stent to distal SFA, and DCB to proximal SFA, Small Eschar to L 2nd toe-- no need for further intervention per vasc cards. Acute diastolic congestive heart failure not in acute exacerbation c/w Aspirin 81mg PO daily and Plavix 75mg PO qd-- stop aspirin after 30 days. Chronic atrial fibrillation  c/w metoprolol  - c/w eliquis q12h. DM2  fs controlled, c/w home dose pre-meal insulin 10mg TID before meals and with sliding scale. Hypertension c/w metoprolol 50 mg bid and hydralazine 50 mg tid and amlodipine 5 mg daily.

## 2021-12-03 NOTE — DISCHARGE NOTE PROVIDER - NSFOLLOWUPCLINICS_GEN_ALL_ED_FT
[facility]  Podiatry  [address] [place] [address] - [address] [place] [address]  Phone: [phone]  Fax:   Follow Up Time: 2 weeks    [facility] General Internal Medicine  General Internal Medicine  [address] [place] [id number]  Phone: [phone]  Fax:   Follow Up Time: 2 weeks

## 2021-12-03 NOTE — DISCHARGE NOTE NURSING/CASE MANAGEMENT/SOCIAL WORK - NSDCPEFALRISK_GEN_ALL_CORE
For information on Fall & Injury Prevention, visit: [email or web address] OR  [email or web address] OR  https://www.cdc.gov/steadi/patient.html

## 2021-12-03 NOTE — CHART NOTE - NSCHARTNOTEFT_GEN_A_CORE
Patient medically ready for discharge  Discharge today to [facility], 1 hour spent in preparation of discharge    Ramona Pierson D.O.  Hospitalist- available on MS teams
Reason for Notification:   Notified by RN and Telemetry that the above patient had a run of wide complex tachycardia on monitor for 10 beats. This the patient's first episode of wide complex     Events/History of Present Illness  Patient at documented baseline status without acute change in condition or complaints. Vitals stable. Patient was not aware of the wide complex.     Review of Systems:  Constitutional: No fever, chills, or fatigue.  Neurologic: No headache, dizziness, vision/speech changes, numbness, or weakness.  Respiratory: No cough, wheezing, dyspnea, or shortness of breath.  Cardiovascular: No chest pain, pressure, or palpitations.   GI: No abdominal pain, nausea, vomiting, diarrhea, constipation.   : No dysuria, burning, frequency, incontinence, or retention.   Skin: No itching, burning, rashes, or lesions .  Musculoskeletal: No joint pain or swelling.   Psychiatric: No depression, anxiety, or mood swings.    T(C): 37.2 (11-27-21 @ 22:00), Max: 37.2 (11-27-21 @ 22:00)  HR: 89 (11-27-21 @ 22:00) (76 - 89)  BP: 155/75 (11-27-21 @ 22:00) (144/76 - 170/78)  RR: 18 (11-27-21 @ 22:00) (17 - 18)  SpO2: 99% (11-27-21 @ 22:00) (98% - 100%)                        8.7    13.62 )-----------( 417      ( 27 Nov 2021 07:02 )             26.9       Assessment/Plan:  HPI:  75yo F w/ PMHx afib (Eliquis d/c due to epistaxis), HTN, HLD, IDDM, bladder CA, CAD (s/p PCI to RCA 2011, OM 2006, OM 2009), ICD ([place]), BIV PPM, PAD s/p left dSFA, dLeft pop, TPT trunk, and PT/plantar for left foot ulcer, recent admission to [facility] 11/18-11/20 for non healing left foot ulcer and LLE pain, now s/p PTA of peroneal artery, laser atherectomy + PTA of popliteal artery, laser atherectomy + PTA + Allison stent to distal SFA, and DCB to prox SFA, presents to [facility] ED as transfer from [facility] for elevated troponin. Patient reports that starting yesterday she began having shortness of breath worsened by exertion, she reports that anytime she lay on her right side she also had worsening SOB, she did not try taking anything for her symptoms, she developed headache and clear sputum production but denies any leg swelling, patient had a reported troponin of ~800 at [place] and was aspirin loaded and started on heparin gtt and subsequently transferred to [facility] for further management, in the ED she was mildly hypertensive but otherwise hemodynamically stable, afebrile, saturating well on 4L NC, labs were significant for troponin 187 that biju to 203, patient was seen by cardiology in the ED and admitted to general medicine for further management.  (24 Nov 2021 06:45)       - Stat BMP/Mag. Will replace as needed.   - Continue cardiac monitoring  - EKG obtained and reviewed   -  f/u with day team      Jacklyn Russ PA-C  Department of Medicine
- Elevated Trop 661 from 489 - Patient denies chest pain   - patient on Heparin gtt (Afib ? nstemi) - Discussed with House Cardiology fellow ( Nadya pederson) regarding follow up ( nothing else to add - see yesterday )   - Results of above conversation discussed with Mallory Miranda MD

## 2021-12-03 NOTE — DISCHARGE NOTE NURSING/CASE MANAGEMENT/SOCIAL WORK - PATIENT PORTAL LINK FT
You can access the FollowMyHealth Patient Portal offered by [facility] by registering at the following website: [email or web address]. By joining [employer]’s FollowMyHealth portal, you will also be able to view your health information using other applications (apps) compatible with our system.

## 2021-12-03 NOTE — PROGRESS NOTE ADULT - ATTENDING COMMENTS
Santyl applied to foot  no further vascular testing or intervention at this moment  to followup with vas cards as outpatient      [phone]
Patient appears to be approaching euvolemia. Lasix was increased can continue at this dose with hopes to taper to PO in the next few days. Continue other medications for now. Will need to address anticoagulation.

## 2021-12-03 NOTE — PROGRESS NOTE ADULT - PROVIDER SPECIALTY LIST ADULT
Cardiology
Hospitalist
Vascular Cardiology
Cardiology
Hospitalist

## 2021-12-03 NOTE — DISCHARGE NOTE PROVIDER - NSDCCPCAREPLAN_GEN_ALL_CORE_FT
PRINCIPAL DISCHARGE DIAGNOSIS  Diagnosis: NSTEMI (non-ST elevation myocardial infarction)  Assessment and Plan of Treatment: Call your doctor if you have unusual chest pain, pressure, or discomfort, shortness of breath, nausea, vomiting, burping, heartburn, tingling upper body parts, sweating, cold, clammy sking, racing heartbeat  Call 911 if you think you are having a heart attack  Take all cardiac medications as prescribed - notify your doctor if you have any side effects  Follow cardiac diet - avoid fatty & fried foods, don't eat too much red meat, eat lots of fruits & vegetables, dairy products should be low fat  Lose weight if you are overweight  Become more active with walking, gardening, or any other activity that gets you to move      SECONDARY DISCHARGE DIAGNOSES  Diagnosis: Chronic atrial fibrillation  Assessment and Plan of Treatment: Atrial fibrillation is the most common heart rhythm problem.  The condition puts you at risk for has stroke and heart attack  It helps if you control your blood pressure, not drink more than 1-2 alcohol drinks per day, cut down on caffeine, getting treatment for over active thyroid gland, and get regular exercise  Call your doctor if you feel your heart racing or beating unusually, chest tightness or pain, lightheaded, faint, shortness of breath especially with exercise  It is important to take your heart medication as prescribed  You may be on anticoagulation which is very important to take as directed - you may need blood work to monitor drug levels    Diagnosis: Gangrene of toe of left foot  Assessment and Plan of Treatment: Follow up with [facility] in 1 week    Diagnosis: DM2 (diabetes mellitus, type 2)  Assessment and Plan of Treatment: HgA1C this admission.  Make sure you get your HgA1c checked every three months.  If you take oral diabetes medications, check your blood glucose two times a day.  If you take insulin, check your blood glucose before meals and at bedtime.  It's important not to skip any meals.  Keep a log of your blood glucose results and always take it with you to your doctor appointments.  Keep a list of your current medications including injectables and over the counter medications and bring this medication list with you to all your doctor appointments.  If you have not seen your ophthalmologist this year call for appointment.  Check your feet daily for redness, sores, or openings. Do not self treat. If no improvement in two days call your primary care physician for an appointment.  Low blood sugar (hypoglycemia) is a blood sugar below 70mg/dl. Check your blood sugar if you feel signs/symptoms of hypoglycemia. If your blood sugar is below 70 take 15 grams of carbohydrates (ex 4 oz of apple juice, 3-4 glucose tablets, or 4-6 oz of regular soda) wait 15 minutes and repeat blood sugar to make sure it comes up above 70.  If your blood sugar is above 70 and you are due for a meal, have a meal.  If you are not due for a meal have a snack.  This snack helps keeps your blood sugar at a safe range.    Diagnosis: Hypertension  Assessment and Plan of Treatment: Low salt diet  Activity as tolerated.  Take all medication as prescribed.  Follow up with your medical doctor for routine blood pressure monitoring at your next visit.  Notify your doctor if you have any of the following symptoms:   Dizziness, Lightheadedness, Blurry vision, Headache, Chest pain, Shortness of breath    Diagnosis: Acute diastolic congestive heart failure  Assessment and Plan of Treatment: Weigh yourself daily.  If you gain 3lbs in 3 days, or 5lbs in a week call your Health Care Provider.  Do not eat or drink foods containing more than 2000mg of salt (sodium) in your diet every day.  Call your Health Care Provider if you have any swelling or increased swelling in your feet, ankles, and/or stomach.  Take all of your medication as directed.  If you become dizzy call your Health Care Provider.

## 2021-12-03 NOTE — PROGRESS NOTE ADULT - SUBJECTIVE AND OBJECTIVE BOX
Vascular Cardiology Consult Note    SERVICE CONSULT: [phone]              OFFICE [phone]    CC:  SOB    Interval Events:  No C/P or SOB.  Reports awaiting for ESTH for increased PT.  No L LE rest pain or claudication.  Reports R LE claudication on ambulation, no rest pain.     Allergies  codeine (Unknown)  penicillin (Hives)    MEDICATIONS  (STANDING):  amLODIPine   Tablet 5 milliGRAM(s) Oral daily  apixaban 5 milliGRAM(s) Oral every 12 hours  aspirin enteric coated 81 milliGRAM(s) Oral daily  atorvastatin 40 milliGRAM(s) Oral at bedtime  clopidogrel Tablet 75 milliGRAM(s) Oral daily  collagenase Ointment 1 Application(s) Topical two times a day  furosemide    Tablet 40 milliGRAM(s) Oral daily  glucagon  Injectable 1 milliGRAM(s) IntraMuscular once  hydrALAZINE 50 milliGRAM(s) Oral three times a day  influenza  Vaccine (HIGH DOSE) 0.7 milliLiter(s) IntraMuscular once  insulin lispro (ADMELOG) corrective regimen sliding scale   SubCutaneous three times a day before meals  insulin lispro (ADMELOG) corrective regimen sliding scale   SubCutaneous at bedtime  insulin lispro Injectable (ADMELOG) 10 Unit(s) SubCutaneous before lunch  insulin lispro Injectable (ADMELOG) 10 Unit(s) SubCutaneous before dinner  insulin lispro Injectable (ADMELOG) 10 Unit(s) SubCutaneous before breakfast  loratadine 10 milliGRAM(s) Oral daily  metoprolol tartrate 50 milliGRAM(s) Oral two times a day  pantoprazole    Tablet 40 milliGRAM(s) Oral before breakfast  senna 2 Tablet(s) Oral at bedtime    PAST MEDICAL & SURGICAL HISTORY:  Chronic atrial fibrillation  DM2 (diabetes mellitus, type 2)  PAD (peripheral artery disease)  Hypertension  S/P cholecystectomy  S/P hysterectomy  S/P arterial stent    FAMILY HISTORY:  FH: heart disease (Father, Mother)    SOCIAL HISTORY:  unchanged    REVIEW OF SYSTEMS:  CONSTITUTIONAL: No fever  EYES: No eye pain  ENT:  No throat pain  NECK: No pain  RESPIRATORY: SOB   CARDIOVASCULAR: No current C/P reported  GASTROINTESTINAL: No abdominal pain  GENITOURINARY: No hematuria  NEUROLOGICAL: No memory loss  SKIN: L toe ulcer  LYMPH Nodes: No enlarged glands noted  ENDOCRINE: No heat or cold intolerance noted  MUSCULOSKELETAL: No  extremity pain  PSYCHIATRIC: No depression, anxiety  HEME/LYMPH: No  bleeding gums  ALLERGY AND IMMUNOLOGIC: No hives    [ x] All others negative	    PHYSICAL EXAM:  Vital Signs Last 24 Hrs  T(C): 36.9 (03 Dec 2021 04:20), Max: 37.1 (02 Dec 2021 20:28)  T(F): 98.4 (03 Dec 2021 04:20), Max: 98.7 (02 Dec 2021 20:28)  HR: 75 (03 Dec 2021 04:20) (75 - 90)  BP: 151/70 (03 Dec 2021 04:20) (135/67 - 158/76)  RR: 17 (03 Dec 2021 04:20) (16 - 17)  SpO2: 99% (03 Dec 2021 04:20) (96% - 99%)    Appearance: NAD 	  HEENT:  NC/AT  Cardiovascular: RRR, S1 and S2   Respiratory: CTA B/L  Psychiatry:  AAO x 3  Gastrointestinal:  Soft, Non-tender, + BS	  Skin: No rashes ,No cyanosis	  Neurologic:  No focal deficit noted  Extremities:  no LE edema, B/L calves soft   Vascular Pulse Exam:  Right DP: audible  Left DP:  audible  Right PT: audible  Left PT:  audible    Foot Exam: small eschar to L 2nd toe and distal edge and plantar area, healing wound to L lateral area near 5th MTP, s/p L hallux amputation     LABS:	 	                          9.0    10.20 )-----------( 459      ( 03 Dec 2021 06:25 )             28.1       Assessment:  1. PAD s/p recent peripheral intervention to left LE     s/p PTA of peroneal artery, ricky atherectomy /PTA of popliteal artery, ricky atherectomy/PTA/Allison stent to distal SFA, and DCB to proximal SFA  2. CLI Swisher 5      small eschar to L 2nd toe and distal edge and plantar area, healing wound to L lateral area near 5th MTP  3. HFpEF  4. CAD  5. HTN/HLD/DM     Plan:  1. Doing very well post recent peripheral intervention.  2. Left groin stable. Leg asymptomatic.    3. Audible pulses to L DP and PT.  4. Continue ASA/Plavix and Statin.  5. Continue Santyl to L toe wound areas.   6. Euvolemic on Lasix 40 mg PO daily.      Continue low salt diet, strict I/Os and daily weights.  7. Medical management for R LE claudication and popliteal disease at this time. Audible pulses.       No rest pain or tissue loss in R LE.   8. Continue BB/Hydralazine/Norvasc.  8. Important to have close Podiatry follow-up post discharge.      Thank you  BRANDON Irizarry, MPAS  Vascular Cardiology Service    Please call with any questions:   Service Line: [phone]  Office [phone]

## 2021-12-16 PROBLEM — I99.9 VASCULAR PROBLEM: Status: ACTIVE | Noted: 2020-11-23

## 2021-12-16 PROBLEM — I10 HIGH BLOOD PRESSURE: Status: ACTIVE | Noted: 2020-07-08

## 2021-12-16 NOTE — ED PROVIDER NOTE - OBJECTIVE STATEMENT
77yo F w Afib on Eliquis HTN, HFpEF, HLD, IDDM, bladder CA, CAD (s/p PCI to RCA 2011, DFU and OM, ICD ([place]), BIV PPM, significant PAD on DAPT s/p multiple interventions most recent on 11/2021 was sent from Dr. Yo for left lower 2nd toe amputation. Pt went to podiatry today and was sent for Left 2nd toe and foot evaluation, r/u OM and possible amputation, pt has been having ongoing pain since discharge from [facility] 2 weeks ago (was admitted for fluid overload and BONNY). Pt denies fevers, chills, chest pain, palpitations, shortness of breath, cough, abd pain, N/V/D or urinary symptoms

## 2021-12-16 NOTE — PHYSICAL EXAM
[General Appearance - Well Developed] : well developed [General Appearance - Well Nourished] : well nourished [Normal Conjunctiva] : the conjunctiva exhibited no abnormalities [Normal Oral Mucosa] : normal oral mucosa [Respiration, Rhythm And Depth] : normal respiratory rhythm and effort [Auscultation Breath Sounds / Voice Sounds] : lungs were clear to auscultation bilaterally [Heart Sounds] : normal S1 and S2 [Murmurs] : no murmurs present [Abdomen Soft] : soft [Abnormal Walk] : normal gait [FreeTextEntry1] : L lateral foot ulceration. R lateral and medial foot erythema with tenderness [Skin Color & Pigmentation] : normal skin color and pigmentation [Oriented To Time, Place, And Person] : oriented to person, place, and time [Affect] : the affect was normal

## 2021-12-16 NOTE — ED ADULT NURSE NOTE - NSICDXPASTSURGICALHX_GEN_ALL_CORE_FT
PAST SURGICAL HISTORY:  Cardiac defibrillator in place 2010    H/O: hysterectomy     History of cholecystectomy     S/P arterial stent     S/P cholecystectomy     S/P hysterectomy     Status post coronary artery stent placement

## 2021-12-16 NOTE — H&P ADULT - NSHPPHYSICALEXAM_GEN_ALL_CORE
CONSTITUTIONAL: No acute distress, well-developed, well-groomed, AAOx3  HEAD: Atraumatic, normocephalic  EYES: EOM intact, PERRLA, conjunctiva and sclera clear  ENT: Supple, no masses, no thyromegaly, no bruits, no JVD; moist mucous membranes  PULMONARY: Clear to auscultation bilaterally; no wheezes, rales, or rhonchi  CARDIOVASCULAR: Regular rate and rhythm; no murmurs, rubs, or gallops  GASTROINTESTINAL: Soft, non-tender, non-distended; bowel sounds present  MUSCULOSKELETAL: 2+ peripheral pulses; no clubbing, no cyanosis, no edema  NEUROLOGY: non-focal  SKIN: No rashes or lesions; warm and dry CONSTITUTIONAL: No acute distress, well-developed, well-groomed, AAOx3  HEAD: Atraumatic, normocephalic  EYES: EOM intact, PERRLA, conjunctiva and sclera clear  ENT: Supple, no masses, no thyromegaly, no bruits, no JVD; moist mucous membranes  PULMONARY: Clear to auscultation bilaterally; no wheezes, rales, or rhonchi  CARDIOVASCULAR: Regular rate and rhythm; no murmurs, rubs, or gallops  GASTROINTESTINAL: Soft, non-tender, non-distended; bowel sounds present  MUSCULOSKELETAL: 2+ peripheral pulses; no clubbing, no cyanosis, no edema, #Left 2nd toe ulcer, one on distal tip and another one on the plantar IPJ with necrotic wound base, no bleeding or drainage  with diminished DP and PT Pulses   NEUROLOGY: non-focal  SKIN: No rashes or lesions; warm and dry

## 2021-12-16 NOTE — ED ADULT NURSE NOTE - NSICDXPASTMEDICALHX_GEN_ALL_CORE_FT
PAST MEDICAL HISTORY:  Amputation of toe of left foot L Hallux 5/20    Atrial fibrillation, unspecified type paf recently stopped eliquis s/t epistatxis    Bladder tumor     CAD (coronary artery disease) RCA KENDELL  2011 OM2 KENDELL 2006  OM1 KENDELL 2009    CHF (congestive heart failure)     Chronic atrial fibrillation     DM (diabetes mellitus) 30 yr    DM2 (diabetes mellitus, type 2)     Dyslipidemia     H/O ischemic heart disease     Hernia     History of implantable cardiac defibrillator (ICD) and pacemaker- [employer]    HTN (hypertension)     Hypertension     PAD (peripheral artery disease)     PAD (peripheral artery disease) severe s/p stent

## 2021-12-16 NOTE — ED PROVIDER NOTE - PROGRESS NOTE DETAILS
Spoke with Dr. Yo, who says to admit pt to MED. Recommends vanco for abx. Sanaz requesting vascular to be on consult -mistry

## 2021-12-16 NOTE — CONSULT NOTE ADULT - SUBJECTIVE AND OBJECTIVE BOX
Podiatry Consult Note    Subjective:  DARRION MELENDEZ is a 76y old  Female who presents with a chief complaint of Left 2nd toe ulcer and possible osteomyelitis;  HPI:  Pt seen at ED bedside; Pt seen by Dr. Yo today as an outpt, sent to ED for possible left 2nd toe amputation; eval L foot;     PAST MEDICAL & SURGICAL HISTORY:  HTN (hypertension)  CHF (congestive heart failure)  Amputation of toe of left foot  L Hallux 5/20  Hernia  Atrial fibrillation, unspecified type  paf recently stopped eliquis s/t epistatxis  CAD (coronary artery disease)  RCA KENDELL  2011 OM2 KENDELL 2006  OM1 KENDELL 2009  PAD (peripheral artery disease)  severe s/p stent  History of implantable cardiac defibrillator (ICD)  and pacemaker- [employer]  Dyslipidemia  Bladder tumor  H/O ischemic heart disease  DM (diabetes mellitus)  30 yr  Chronic atrial fibrillation  DM2 (diabetes mellitus, type 2)  PAD (peripheral artery disease)  Hypertension  History of cholecystectomy  Cardiac defibrillator in place  2010     Objective:  Vital Signs Last 24 Hrs  T(C): 36.4 (16 Dec 2021 15:52), Max: 37.1 (16 Dec 2021 14:29)  T(F): 97.6 (16 Dec 2021 15:52), Max: 98.8 (16 Dec 2021 14:29)  HR: 77 (16 Dec 2021 15:52) (77 - 83)  BP: 175/74 (16 Dec 2021 15:52) (160/70 - 175/74)  BP(mean): --  RR: 18 (16 Dec 2021 15:52) (18 - 18)  SpO2: 97% (16 Dec 2021 15:52) (97% - 98%)                        9.8    10.31 )-----------( 420      ( 16 Dec 2021 14:52 )             31.2                 12-16    140  |  103  |  32<H>  ----------------------------<  189<H>  4.8   |  20  |  1.3    Ca    9.5      16 Dec 2021 14:52    TPro  7.7  /  Alb  3.8  /  TBili  0.2  /  DBili  x   /  AST  20  /  ALT  18  /  AlkPhos  200<H>  12-16    Physical Exam - Lower Extremity Focused:   Derm:   Left 2nd toe ulcer, one on distal tip and one on plantar IPJ; necrotic wound base, no active bleeding, no drainage noted;   Vascular: DP and PT Pulses Diminished  Neuro: Protective Sensation Diminished    Assessment:  Possible left 2nd toe osteomyelitis w/ ulcer;     Plan:  Chart reviewed and Patient evaluated. All Questions and Concerns Addressed and Answered  Discussed diagnosis and treatment with patient  Wound Flushed w/ normal saline; betadine / DSD / Kerlix; q24  Local Wound Care; As Stated Above   Request FXR-L; pre-op imaging study;  Vascular on board; will f/u w/ vascular plan;   Podiatry sx intervention post vascular plan and vascular intervention; will f/u w/ vascular plan closely, and will update podiatry plan once finalize;   Request medical clearance;   Weight bearing status; WBAT BL feet;   Discussed Plan w/ Dr. Yo;     Podiatry        Podiatry Consult Note    Subjective:  DARRION MELENDEZ is a 76y old  Female who presents with a chief complaint of Left 2nd toe ulcer and possible osteomyelitis;  HPI:  Pt seen at ED bedside; Pt seen by Dr. Yo today as an outpt, sent to ED for possible left 2nd toe amputation; eval L foot;     PAST MEDICAL & SURGICAL HISTORY:  HTN (hypertension)  CHF (congestive heart failure)  Amputation of toe of left foot  L Hallux 5/20  Hernia  Atrial fibrillation, unspecified type  paf recently stopped eliquis s/t epistatxis  CAD (coronary artery disease)  RCA KENDELL  2011 OM2 KENDELL 2006  OM1 KENDELL 2009  PAD (peripheral artery disease)  severe s/p stent  History of implantable cardiac defibrillator (ICD)  and pacemaker- [employer]  Dyslipidemia  Bladder tumor  H/O ischemic heart disease  DM (diabetes mellitus)  30 yr  Chronic atrial fibrillation  DM2 (diabetes mellitus, type 2)  PAD (peripheral artery disease)  Hypertension  History of cholecystectomy  Cardiac defibrillator in place  2010     Objective:  Vital Signs Last 24 Hrs  T(C): 36.4 (16 Dec 2021 15:52), Max: 37.1 (16 Dec 2021 14:29)  T(F): 97.6 (16 Dec 2021 15:52), Max: 98.8 (16 Dec 2021 14:29)  HR: 77 (16 Dec 2021 15:52) (77 - 83)  BP: 175/74 (16 Dec 2021 15:52) (160/70 - 175/74)  BP(mean): --  RR: 18 (16 Dec 2021 15:52) (18 - 18)  SpO2: 97% (16 Dec 2021 15:52) (97% - 98%)                        9.8    10.31 )-----------( 420      ( 16 Dec 2021 14:52 )             31.2                 12-16    140  |  103  |  32<H>  ----------------------------<  189<H>  4.8   |  20  |  1.3    Ca    9.5      16 Dec 2021 14:52    TPro  7.7  /  Alb  3.8  /  TBili  0.2  /  DBili  x   /  AST  20  /  ALT  18  /  AlkPhos  200<H>  12-16    Physical Exam - Lower Extremity Focused:   Derm:   Left 2nd toe ulcer, one on distal tip and one on plantar IPJ; necrotic wound base, no active bleeding, no drainage noted;   Vascular: DP and PT Pulses Diminished  Neuro: Protective Sensation Diminished    Assessment:  Possible left 2nd toe osteomyelitis w/ ulcer;     Plan:  Chart reviewed and Patient evaluated. All Questions and Concerns Addressed and Answered  Discussed diagnosis and treatment with patient  Wound Flushed w/ normal saline; betadine / DSD / Kerlix; q24  Local Wound Care; As Stated Above   Request FXR-L; pre-op imaging study;  Vascular on board; will f/u w/ vascular plan;   Podiatry sx plan for Sat 12/18 as an add-on; will update exact sx procedure and schedule once finalize on Fri 12/17;   Weight bearing status; WBAT BL feet;   Discussed Plan w/ Dr. Yo;     Podiatry        Podiatry Consult Note    Subjective:  DARRION MELENDEZ is a 76y old  Female who presents with a chief complaint of Left 2nd toe ulcer and possible osteomyelitis;  HPI:  Pt seen at ED bedside; Pt seen by Dr. Yo today as an outpt, sent to ED for possible left 2nd toe amputation; eval L foot;     PAST MEDICAL & SURGICAL HISTORY:  HTN (hypertension)  CHF (congestive heart failure)  Amputation of toe of left foot  L Hallux 5/20  Hernia  Atrial fibrillation, unspecified type  paf recently stopped eliquis s/t epistatxis  CAD (coronary artery disease)  RCA KENDELL  2011 OM2 KENDELL 2006  OM1 KENDELL 2009  PAD (peripheral artery disease)  severe s/p stent  History of implantable cardiac defibrillator (ICD)  and pacemaker- [employer]  Dyslipidemia  Bladder tumor  H/O ischemic heart disease  DM (diabetes mellitus)  30 yr  Chronic atrial fibrillation  DM2 (diabetes mellitus, type 2)  PAD (peripheral artery disease)  Hypertension  History of cholecystectomy  Cardiac defibrillator in place  2010     Objective:  Vital Signs Last 24 Hrs  T(C): 36.4 (16 Dec 2021 15:52), Max: 37.1 (16 Dec 2021 14:29)  T(F): 97.6 (16 Dec 2021 15:52), Max: 98.8 (16 Dec 2021 14:29)  HR: 77 (16 Dec 2021 15:52) (77 - 83)  BP: 175/74 (16 Dec 2021 15:52) (160/70 - 175/74)  BP(mean): --  RR: 18 (16 Dec 2021 15:52) (18 - 18)  SpO2: 97% (16 Dec 2021 15:52) (97% - 98%)                        9.8    10.31 )-----------( 420      ( 16 Dec 2021 14:52 )             31.2                 12-16    140  |  103  |  32<H>  ----------------------------<  189<H>  4.8   |  20  |  1.3    Ca    9.5      16 Dec 2021 14:52    TPro  7.7  /  Alb  3.8  /  TBili  0.2  /  DBili  x   /  AST  20  /  ALT  18  /  AlkPhos  200<H>  12-16    Physical Exam - Lower Extremity Focused:   Derm:   Left 2nd toe ulcer, one on distal tip and one on plantar IPJ; necrotic wound base, no active bleeding, no drainage noted; ischemic changes on left 2nd toe  Vascular: DP and PT Pulses Diminished  Neuro: Protective Sensation Diminished    Assessment:  Possible left 2nd toe osteomyelitis w/ ulcer;     Plan:  Chart reviewed and Patient evaluated. All Questions and Concerns Addressed and Answered  Discussed diagnosis and treatment with patient  Wound Flushed w/ normal saline; betadine / DSD / Kerlix; q24  Local Wound Care; As Stated Above   Request FXR-L; pre-op imaging study;  Vascular on board; will f/u w/ vascular plan;   Podiatry sx plan for Sat 12/18 as an add-on; will update exact sx procedure and schedule once finalize on Fri 12/17;   Weight bearing status; WBAT BL feet;   Discussed Plan w/ Dr. Yo;     Podiatry

## 2021-12-16 NOTE — HISTORY OF PRESENT ILLNESS
[FreeTextEntry1] : 75 y/o F\par h/o tobacco abuse (quit), Afib (stopped Eliquis due to epistaxis) HTN, HLD, DM, high grade bladder cancer\par h/o CAD (RCA KENDELL 2011, om2 2006, om 2009), ICD ([place])\par h/o PAD s/p left dSFA, dLeft pop, TPT trunk and left PT/plantar for left foot ulcer. Due to incomplete healing of ulcer pt underwent another angiogram with intervention of left popliteal and peroneal arteries with PTA and KENDELL\par Presents to the office for a follow-up visit of PAD and renal artery stenosis\par Continues to have BLE pain and burning with mild-moderate exertion\par BP uncontrolled at home\par Unable to do ESTRELLA/PVR as there was a equipment malfunction on her day of appointment\par Endorses having SOB intermittently at rest and when speaks too much \par \par 5/28/21 Renal US doppler showed severe ostial stenoses of right and left renal arteries. Peak ostium systolic velocity > 400 cm/sec\par 1/7/21 NM stress test did not show any reperfusion defects\par 12/14/20 TTE LVEF 63% Mod MR, Mild TR, G2DD\par 11/6/20 ESTRELLA 0.59 Severe R popliteal A stenosis, ESTRELLA 0.67 Severe L tibial A stenosis\par Now for f/u post left peroneal, pop and prox/distal SFA intervention. Initially much improved however now with worsening pain in the second left toe with bone protruding distal tip of the toe. Pt seen podiatry today and was recommended to go to ER for admission and further management of highly suspected OM.

## 2021-12-16 NOTE — ED PROVIDER NOTE - NS ED ROS FT
Eyes:  No visual changes, eye pain or discharge.  ENMT:  No hearing changes, pain, no sore throat or runny nose, no difficulty swallowing  Cardiac:  No chest pain, SOB or edema. No chest pain with exertion.  Respiratory:  No cough or respiratory distress. No hemoptysis. No history of asthma or RAD.  GI:  No nausea, vomiting, diarrhea or abdominal pain.  :  No dysuria, frequency or burning.  MS: see HPI  Neuro:  No headache or weakness.  No LOC.  Skin:  No skin rash.   Endocrine:see hPI

## 2021-12-16 NOTE — H&P ADULT - NSHPLABSRESULTS_GEN_ALL_CORE
LABS:                        9.8    10.31 )-----------( 420      ( 16 Dec 2021 14:52 )             31.2     16 Dec 2021 14:52    140    |  103    |  32     ----------------------------<  189    4.8     |  20     |  1.3      Ca    9.5        16 Dec 2021 14:52    TPro  7.7    /  Alb  3.8    /  TBili  0.2    /  DBili  x      /  AST  20     /  ALT  18     /  AlkPhos  200    16 Dec 2021 14:52    PT/INR - ( 16 Dec 2021 14:52 )   PT: 12.20 sec;   INR: 1.06 ratio         PTT - ( 16 Dec 2021 14:52 )  PTT:41.8 sec

## 2021-12-16 NOTE — H&P ADULT - ASSESSMENT
77yo F w Afib on Eliquis HTN, HLD, IDDM, bladder CA, CAD (s/p PCI to RCA 2011, DFU and OM, ICD ([place]), BIV PPM, significant PAD on DAPT s/p multiple interventions most recent on 11/2021 was sent from Dr. Yo for   left lower amputation.       DFU and OM, ICD ([place]), BIV PPM, significant PAD on DAPT s/p multiple interventions most recent on 11/2021    Afib on Eliquis     HTN    HLD    IDDM    bladder CA    CAD (s/p PCI to RCA 2011,     #Diet:  #DVT ppo:  #GI ppo:  #Dispo:     77yo F w Afib on Eliquis HTN, HLD, HFpEF, IDDM, bladder CA, CAD (s/p PCI to RCA 2011, DFU and OM, ICD ([place]), BIV PPM, significant PAD on DAPT s/p multiple interventions most recent on 11/2021 was sent from Dr. Yo for   left lower 2nd toe amputation.       #PAD with ongoing left 2nd toe pain and 2 ulcers, s/p multiple interventions most recent on 11/2021  - podiatry for possible amputation on Sat  - not septic, hold abx, check crp and ESR  - check Xray and arterial duplex  - cont with ASA, plavix and Lipitor,  hold Eliquis per vascular    #Afib on Eliquis   - hold Eliquis per vascular cardio, chadvasc 6  - will bridge with Lovenox, as podaitry plan for procedure on Sat, will give one dose now and hold, if OR planned for later than Sat then resume Lovenox    #elevated ALP likely from possible OM  #CKDIIIa: Cr at  baseline 1.2  #HTN: c/w med  #chronic anemia  #HLD: Lipitor    IDDM: monitor FS, insulin if needed    #Hx of bladder CA    #HFpEF CAD (s/p PCI to RCA 2011)  - DAPT, BB and lipitor  - lasix    #Diet: DASH  #DVT ppo: Lovenox, then heparin if OR planned   #GI ppo: PPI  #Dispo: acute

## 2021-12-16 NOTE — ED PROVIDER NOTE - PHYSICAL EXAMINATION
CONSTITUTIONAL: Well-developed; well-nourished; in no acute distress.   SKIN: warm, dry  HEAD: Normocephalic; atraumatic.  EYES: no conjunctival injection. PERRL.   ENT: No nasal discharge; airway clear.  NECK: Supple; non tender.  CARD: S1, S2 normal; no murmurs, gallops, or rubs. Regular rate and rhythm.   RESP: No wheezes, rales or rhonchi.  ABD: soft ntnd  EXT: Normal ROM.  No clubbing, cyanosis or edema. Left: s/p hallux amputation, digit 2 with distal tip ulcer with eschar, no drainage or bleeding; ulcer on plantar IPJ, no drainage or bleeding  LYMPH: No acute cervical adenopathy.  NEURO: Alert, oriented, grossly unremarkable  PSYCH: Cooperative, appropriate.

## 2021-12-16 NOTE — CONSULT NOTE ADULT - ASSESSMENT
Assessment:  DM with PAD   DFU with probe to bone left 2nd toe   left 2nd toe pain -severe     Plan:  Chart reviewed and Patient evaluated. All Questions and Concerns Addressed and Answered  Discussed diagnosis and treatment with patient  Wound Flushed w/ normal saline; betadine / DSD / Kerlix; q24  Local Wound Care; As Stated Above   Request FXR-L; pre-op imaging study;  Vascular on board; will f/u w/ vascular plan;   Podiatry sx plan for Sat 12/18 as an add-on; will update exact sx procedure and schedule once finalize on Fri 12/17;   Weight bearing status; WBAT BL feet;   Discussed Plan w/ Dr. Yo;     Podiatry   Sanaz [phone]

## 2021-12-16 NOTE — H&P ADULT - ATTENDING COMMENTS
***My note supersedes any discrepancies that may be above in the resident's note***    75yo F w Afib on Eliquis HTN, HLD, HFpEF, IDDM, bladder CA, CAD (s/p PCI to RCA 2011, DFU and OM, ICD ([place]), BIV PPM, significant PAD on DAPT s/p multiple interventions most recent on 11/2021 was sent from Dr. Yo for left lower 2nd toe amputation.       #PAD with ongoing left 2nd toe pain and 2 ulcers  - s/p multiple interventions most recent on 11/2021  - currently afebrile and HDS  - podiatry for possible amputation on Sat  - not septic, hold abx, check crp and ESR  - check Xray and arterial duplex  - cont with ASA, plavix and Lipitor,  hold Eliquis per vascular    #Afib on Eliquis   - hold Eliquis per vascular cardio, chadvasc 6  - will bridge with Lovenox, as podaitry plan for procedure on Sat, will give one dose now and hold, if OR planned for later than Sat then resume Lovenox    #elevated ALP likely from possible OM  #CKDIIIa: Cr at  baseline 1.2  #HTN: c/w med  #chronic anemia  #HLD: Lipitor    IDDM: monitor FS, insulin if needed    #Hx of bladder CA    #HFpEF CAD (s/p PCI to RCA 2011)  - DAPT, BB and lipitor  - lasix    #Diet: DASH  #DVT ppo: Lovenox, then heparin if OR planned   #GI ppo: PPI  #Dispo: acute    #Progress Note Handoff  Pending (specify):  podiatry follow up  Family discussion: patient updated at bedside. all questions answered. in agreement  Disposition: Unknown at this time________ ***My note supersedes any discrepancies that may be above in the resident's note***    77yo F w Afib on Eliquis HTN, HLD, HFpEF, IDDM, bladder CA, CAD (s/p PCI to RCA 2011, DFU and OM, ICD ([place]), BIV PPM, significant PAD on DAPT s/p multiple interventions most recent on 11/2021 was sent from Dr. Yo for left lower 2nd toe amputation.       #PAD with ongoing left 2nd toe pain and 2 ulcers  - s/p multiple interventions most recent on 11/2021  - currently afebrile and HDS  - not septic, hold abx, check crp and ESR  - check Xray and arterial duplex  - cont with ASA, plavix and Lipitor,  hold Eliquis per vascular  - podiatry following      Wound Flushed w/ normal saline; betadine / DSD / Kerlix; q24      Local Wound Care; As Stated Above       Request FXR-L; pre-op imaging study      Vascular on board; will f/u w/ vascular plan       Podiatry sx plan for Sat 12/18 as an add-on; will update exact sx procedure and schedule once finalize on Fri 12/17      Weight bearing status; WBAT BL feet      #Afib on Eliquis   - hold Eliquis per vascular cardio, chadvasc 6  - will bridge with Lovenox, as podaitry plan for procedure on Sat, will give one dose now and hold, if OR planned for later than Sat then resume Lovenox    #elevated ALP likely from possible OM  #CKDIIIa: Cr at  baseline 1.2  #HTN: c/w med  #chronic anemia  #HLD: Lipitor    IDDM: monitor FS, insulin if needed    #Hx of bladder CA    #HFpEF CAD (s/p PCI to RCA 2011)  - DAPT, BB and lipitor  - lasix    #Diet: DASH  #DVT ppo: Lovenox, then heparin if OR planned   #GI ppo: PPI  #Dispo: acute    #Progress Note Handoff  Pending (specify):  podiatry follow up  Family discussion: patient updated at bedside. all questions answered. in agreement  Disposition: Unknown at this time________ ***My note supersedes any discrepancies that may be above in the resident's note***    77yo F w Afib on Eliquis HTN, HLD, HFpEF, IDDM, bladder CA, CAD (s/p PCI to RCA 2011, DFU and OM, ICD ([place]), BIV PPM, significant PAD on DAPT s/p multiple interventions most recent on 11/2021 was sent from Dr. Yo for left lower 2nd toe amputation.     Gen- limited exam as patient was sleeping and wanted to be left alone; NAD, non toxic  Eyes- anicteric sclera  ENT- dry MM  Chest- symmetrical chest rise, no accessory muscle use  Cardiac- non tachycardic, RRR  Abdomen- non distended, soft  Ext- wrapped in sheets and asked to let her sleep  Skin- warm, dry, intact, from what was visible above sheets      #PAD with ongoing left 2nd toe pain and 2 ulcers  - s/p multiple interventions most recent on 11/2021  - currently afebrile and HDS  - not septic, hold abx, check crp and ESR  - check Xray and arterial duplex  - cont with ASA, plavix and Lipitor,  hold Eliquis per vascular  - podiatry following      Wound Flushed w/ normal saline; betadine / DSD / Kerlix; q24      Local Wound Care; As Stated Above       Request FXR-L; pre-op imaging study      Vascular on board; will f/u w/ vascular plan       Podiatry sx plan for Sat 12/18 as an add-on; will update exact sx procedure and schedule once finalize on Fri 12/17      Weight bearing status; WBAT BL feet      #Afib on Eliquis   - hold Eliquis per vascular cardio, chadvasc 6  - will bridge with Lovenox, as podaitry plan for procedure on Sat, will give one dose now and hold, if OR planned for later than Sat then resume Lovenox    #elevated ALP likely from possible OM  #CKDIIIa: Cr at  baseline 1.2  #HTN: c/w med  #chronic anemia  #HLD: Lipitor    IDDM: monitor FS, insulin if needed    #Hx of bladder CA    #HFpEF CAD (s/p PCI to RCA 2011)  - DAPT, BB and lipitor  - lasix    #Diet: DASH  #DVT ppo: Lovenox, then heparin if OR planned   #GI ppo: PPI  #Dispo: acute    #Progress Note Handoff  Pending (specify):  podiatry follow up  Family discussion: patient updated at bedside. all questions answered. in agreement  Disposition: Unknown at this time________

## 2021-12-16 NOTE — ED PROVIDER NOTE - NSICDXPASTMEDICALHX_GEN_ALL_CORE_FT
PAST MEDICAL HISTORY:  Amputation of toe of left foot L Hallux 5/20    Atrial fibrillation, unspecified type paf recently stopped eliquis s/t epistatxis    Bladder tumor     CAD (coronary artery disease) RCA KENDELL  2011 OM2 KENDELL 2006  OM1 KENDELL 2009    CHF (congestive heart failure)     Chronic atrial fibrillation     DM (diabetes mellitus) 30 yr    DM2 (diabetes mellitus, type 2)     Dyslipidemia     H/O ischemic heart disease     Hernia     History of implantable cardiac defibrillator (ICD) and pacemaker- [employer]    HTN (hypertension)     Hypertension     PAD (peripheral artery disease)     PAD (peripheral artery disease) severe s/p stent

## 2021-12-16 NOTE — ED ADULT NURSE NOTE - NSICDXFAMILYHX_GEN_ALL_CORE_FT
FAMILY HISTORY:  Father  Still living? Unknown  Family history of atherosclerosis, Age at diagnosis: Age Unknown  FH: heart disease, Age at diagnosis: Age Unknown    Mother  Still living? Unknown  Family history of atherosclerosis, Age at diagnosis: Age Unknown  FH: heart disease, Age at diagnosis: Age Unknown

## 2021-12-16 NOTE — ED ADULT NURSE NOTE - TEMPLATE
Progress Note, Physician


History of Present Illness: 





Pt seen and examined at bedside. He is awake and appears comfortable. 





- Current Medication List


Current Medications: 


Active Medications





Acetaminophen (Tylenol -)  650 mg PO Q4H PRN


   PRN Reason: FEVER


   Last Admin: 10/09/19 12:31 Dose:  650 mg


Atorvastatin Calcium (Lipitor -)  40 mg PO HS [facility]


   Last Admin: 10/09/19 22:05 Dose:  40 mg


Clopidogrel Bisulfate (Plavix -)  75 mg PO DAILY [facility]


   Last Admin: 10/10/19 10:39 Dose:  75 mg


Donepezil HCl (Aricept -)  5 mg PO DAILY [facility]


   Last Admin: 10/10/19 10:40 Dose:  5 mg


Doxycycline Hyclate (Vibramycin -)  100 mg PO BID@1000,1800 [facility]


   Last Admin: 10/10/19 17:26 Dose:  100 mg


Ezetimibe (Zetia -)  10 mg PO HS [facility]


   Last Admin: 10/09/19 22:04 Dose:  10 mg


Escitalopram Oxalate (Lexapro -)  10 mg PO DAILY [facility]


   Stop: 10/14/19 18:11


   Last Admin: 10/10/19 10:39 Dose:  10 mg


Heparin Sodium (Porcine) (Heparin -)  5,000 unit SQ TID [facility]


   Last Admin: 10/10/19 15:06 Dose:  5,000 unit


Sodium Chloride (1/2 Normal Saline)  1,000 mls @ 83 mls/hr IV ASDIR [facility]


   Last Admin: 10/10/19 15:31 Dose:  83 mls/hr


Insulin Aspart (Novolog Vial Sliding Scale -)  1 vial SQ ACHS [facility]; Protocol


   Last Admin: 10/10/19 17:26 Dose:  4 units


Insulin Detemir (Levemir Vial)  15 units SQ BID@0700,2200 [facility]


   Last Admin: 10/10/19 06:55 Dose:  15 units


Nebivolol (Bystolic -)  2.5 mg PO HS [facility]


   Last Admin: 10/09/19 22:05 Dose:  2.5 mg


Nystatin (Nystop Powder -)  1 applic TP BID [facility]


   Last Admin: 10/10/19 15:06 Dose:  1 applic


Tamsulosin HCl (Flomax -)  0.4 mg PO DAILY@0830 [facility]


   Last Admin: 10/10/19 10:39 Dose:  0.4 mg











- Objective


Vital Signs: 


 Vital Signs











Temperature  98.9 F   10/10/19 14:42


 


Pulse Rate  74   10/10/19 14:42


 


Respiratory Rate  20   10/10/19 14:42


 


Blood Pressure  135/63   10/10/19 14:42


 


O2 Sat by Pulse Oximetry (%)  98   10/08/19 09:00











Constitutional: Yes: Calm


Eyes: Yes: Other (legaly blind)


HENT: Yes: Atraumatic


Neck: Yes: Supple


Cardiovascular: Yes: S1, S2


Respiratory: Yes: CTA Bilaterally


Gastrointestinal: Yes: Normal Bowel Sounds, Soft


Genitourinary: Yes: Mcke[place] Present


Musculoskeletal: Yes: Muscle Weakness


Edema: No


Neurological: Yes: Confusion


Labs: 


 CBC, BMP





 10/10/19 06:15 





 10/10/19 06:15 





 INR, PTT











INR  1.15  (0.83-1.09)  H  10/06/19  21:00    














Problem List





- Problems


(1) DEB (acute kidney injury)


Code(s): N17.9 - ACUTE KIDNEY FAILURE, UNSPECIFIED   





(2) DKA (diabetic ketoacidoses)


Code(s): E11.10 - TYPE 2 DIABETES MELLITUS WITH KETOACIDOSIS WITHOUT COMA   


Qualifiers: 


   Diabetes mellitus type: type 2   Diabetes mellitus complication detail: 

without coma   Qualified Code(s): E11.10 - Type 2 diabetes mellitus with 

ketoacidosis without coma   





(3) Acute renal failure


Code(s): N17.9 - ACUTE KIDNEY FAILURE, UNSPECIFIED   





(4) Hypernatremia


Code(s): E87.0 - HYPEROSMOLALITY AND HYPERNATREMIA   





Assessment/Plan


 Current Medications











Generic Name Dose Route Start Last Admin





  Trade Name Freq  PRN Reason Stop Dose Admin


 


Acetaminophen  650 mg  10/09/19 10:34  10/09/19 12:31





  Tylenol -  PO   650 mg





  Q4H PRN   Administration





  FEVER   





     





     





     


 


Atorvastatin Calcium  40 mg  10/07/19 22:00  10/09/19 22:05





  Lipitor -  PO   40 mg





  HS MARY   Administration





     





     





     





     


 


Clopidogrel Bisulfate  75 mg  10/08/19 10:00  10/10/19 10:39





  Plavix -  PO   75 mg





  DAILY MARY   Administration





     





     





     





     


 


Donepezil HCl  5 mg  10/09/19 10:00  10/10/19 10:40





  Aricept -  PO   5 mg





  DAILY MARY   Administration





     





     





     





     


 


Doxycycline Hyclate  100 mg  10/10/19 18:00  10/10/19 17:26





  Vibramycin -  PO   100 mg





  BID@1000,1800 MARY   Administration





     





     





     





     


 


Ezetimibe  10 mg  10/07/19 22:00  10/09/19 22:04





  Zetia -  PO   10 mg





  HS MARY   Administration





     





     





     





     


 


Escitalopram Oxalate  10 mg  10/09/19 10:00  10/10/19 10:39





  Lexapro -  PO  10/14/19 18:11  10 mg





  DAILY MARY   Administration





     





     





     





     


 


Heparin Sodium (Porcine)  5,000 unit  10/07/19 22:00  10/10/19 15:06





  Heparin -  SQ   5,000 unit





  TID MARY   Administration





     





     





     





     


 


Sodium Chloride  1,000 mls @ 83 mls/hr  10/09/19 10:15  10/10/19 15:31





  1/2 Normal Saline  IV   83 mls/hr





  ASDIR MARY   Administration





     





     





     





     


 


Insulin Aspart  1 vial  10/10/19 00:52  10/10/19 17:26





  Novolog Vial Sliding Scale -  SQ   4 units





  ACHS MARY   Administration





     





     





  Protocol   





     


 


Insulin Detemir  15 units  10/10/19 00:50  10/10/19 06:55





  Levemir Vial  SQ   15 units





  BID@0700,2200 MARY   Administration





     





     





     





     


 


Nebivolol  2.5 mg  10/07/19 22:00  10/09/19 22:05





  Bystolic -  PO   2.5 mg





  HS MARY   Administration





     





     





     





     


 


Nystatin  1 applic  10/10/19 11:30  10/10/19 15:06





  Nystop Powder -  TP   1 applic





  BID MARY   Administration





     





     





     





     


 


Tamsulosin HCl  0.4 mg  10/08/19 08:30  10/10/19 10:39





  Flomax -  PO   0.4 mg





  DAILY@0830 MARY   Administration





     





     





     





     











Impression


1. DEB


2. DKA


3. hypernatremia


4. DM


5. dementia


6. cad





Plan


- cont fluids


- sodium improving


- did not tolerate voiding trial


- monitor serum sodium


- renal function is improved


- DEB likely secondary to dehydration from DKA General

## 2021-12-16 NOTE — PROGRESS NOTE ADULT - ASSESSMENT
Assessment:  #PAD with CLI, [place] 5  - Worsening L foot 2nd digit ulcer with rest pain despite recent intervention  - 11/2021 s/p PTA of L peroneal artery, laser atherectomy/PTA of popliteal artery, ricky atherectomy/PTA/Allison stent to L distal SFA, and DCB to proximal L SFA  #CAD  #Renal artery stenosis s/p bilateral stenting  #HFpEF  #HTN/HLD/DM      Plan:  - Please obtain bilateral lower extremity duplex with ABIs  - Continue aspirin/Plavix/statin  - Hold apixaban  - Plan discussed with podiatrist, Dr. Sanaz Garcia MD  Vascular Cardiology Attending  Please do not hesitate to reach out to me via MS Teams with questions

## 2021-12-16 NOTE — PATIENT PROFILE ADULT - FALL HARM RISK - HARM RISK INTERVENTIONS

## 2021-12-16 NOTE — PROGRESS NOTE ADULT - SUBJECTIVE AND OBJECTIVE BOX
Vascular Cardiology Consult Note    EMAIL [email or web address]       CC:  toe ulcer    HPI:    76F with PAD complicated by L foot 2nd digit ulcer s/p multiple interventions most recently 11/2021, hxo L hallux amputation, bilateral renal artery stenosis s/p PCI 7/2021, CAD s/p prior PCI, BiV AICD, AFib on apixaban c/b epistaxis, HTN, HLD, DM, former smoker who presented with worsening pain of L foot 2nd digit with ulcer.     She denies fevers, chills, chest pain, palpitations, shortness of breath, cough. She saw her podiatrist this morning and was told to go to the ED.     L lower extremity angiogram 11/2021 s/p PTA of L peroneal artery, laser atherectomy/PTA of popliteal artery, ricky atherectomy/PTA/Allison stent to L distal SFA, and DCB to proximal L SFA    Podiatrist: Dr. Yo  Interventional cardiologist: Dr. Hammonds       Allergies    codeine (Rash)  codeine (Unknown)  penicillin (Hives)  penicillin (Rash)  penicillin (Unknown)    Intolerances    	    MEDICATIONS:  vancomycin  IVPB. 1000 milliGRAM(s) IV Intermittent once      PAST MEDICAL & SURGICAL HISTORY:  HTN (hypertension)    CHF (congestive heart failure)    Amputation of toe of left foot  L Hallux 5/20    Hernia    Atrial fibrillation, unspecified type  paf recently stopped eliquis s/t epistatxis    CAD (coronary artery disease)  RCA KENDELL  2011 OM2 KENDELL 2006  OM1 KENDELL 2009    PAD (peripheral artery disease)  severe s/p stent    History of implantable cardiac defibrillator (ICD)  and pacemaker- [employer]    Dyslipidemia    Bladder tumor    H/O ischemic heart disease    DM (diabetes mellitus)  30 yr    Chronic atrial fibrillation    DM2 (diabetes mellitus, type 2)    PAD (peripheral artery disease)    Hypertension    History of cholecystectomy    Cardiac defibrillator in place  2010    H/O: hysterectomy    Status post coronary artery stent placement    S/P cholecystectomy    S/P hysterectomy    S/P arterial stent        FAMILY HISTORY:  Family history of atherosclerosis (Father, Mother)    FH: heart disease (Father, Mother)        SOCIAL HISTORY:  unchanged    REVIEW OF SYSTEMS:  CONSTITUTIONAL: No fever, weight loss, or fatigue  EYES: No eye pain, visual disturbances, or discharge  ENMT:  No difficulty hearing, tinnitus, vertigo; No sinus or throat pain  NECK: No pain or stiffness  RESPIRATORY:  no cough, shortness of breath  CARDIOVASCULAR:  no chest pain, palpitations  GASTROINTESTINAL: No abdominal or epigastric pain. No nausea, vomiting, or hematemesis; No diarrhea or constipation. No melena or hematochezia.  GENITOURINARY: No dysuria, frequency, hematuria, or incontinence  NEUROLOGICAL: No headaches, memory loss, loss of strength, numbness, or tremors  SKIN: worsening L foot 2nd digit ulcer associated with pain and eschar  LYMPH Nodes: No enlarged glands  ENDOCRINE: No heat or cold intolerance; No hair loss  MUSCULOSKELETAL: No joint pain or swelling; No muscle, back, or extremity pain  PSYCHIATRIC: No depression, anxiety, mood swings, or difficulty sleeping  HEME/LYMPH: No easy bruising, or bleeding gums  ALLERY AND IMMUNOLOGIC: No hives or eczema	    [ x] All others negative	  [ ] Unable to obtain    PHYSICAL EXAM:  T(C): 36.4 (12-16-21 @ 15:52), Max: 37.1 (12-16-21 @ 14:29)  HR: 77 (12-16-21 @ 15:52) (77 - 83)  BP: 175/74 (12-16-21 @ 15:52) (160/70 - 175/74)  RR: 18 (12-16-21 @ 15:52) (18 - 18)  SpO2: 97% (12-16-21 @ 15:52) (97% - 98%)  Wt(kg): --  I&O's Summary      Appearance:  laying on stretcher, no acute distress	  HEENT:   Normal oral mucosa, PERRL, EOMI	  Lymphatic: No lymphadenopathy  Cardiovascular:  RRR, normal s1/s2, no m/r/g  Respiratory:  clear bilaterally, no wheezing or crackles  Psychiatry:  AAO x 3  Gastrointestinal:  Soft, Non-tender, + BS	  Skin: No rashes, No ecchymoses, No cyanosis	  Neurologic:  non-focal  Extremities:  full range of motion of b/l LEs    Vascular Pulse Exam:  Right DP: []palpable [x]non-palpable []audible      Left DP :   []palpable [x]non-palpable []audible  Right PT: []palpable [x] non-palpable []audible   Left PT:  [] palpable [x] non-palpable []audible         Foot Exam:    Left: s/p hallux amputation, digit 2 with distal tip ulcer with eschar, no drainage or bleeding; ulcer on plantar IPJ, no drainage or bleeding    LABS:	 	    CBC Full  -  ( 16 Dec 2021 14:52 )  WBC Count : 10.31 K/uL  Hemoglobin : 9.8 g/dL  Hematocrit : 31.2 %  Platelet Count - Automated : 420 K/uL  Mean Cell Volume : 90.4 fL  Mean Cell Hemoglobin : 28.4 pg  Mean Cell Hemoglobin Concentration : 31.4 g/dL  Auto Neutrophil # : 6.39 K/uL  Auto Lymphocyte # : 2.87 K/uL  Auto Monocyte # : 0.69 K/uL  Auto Eosinophil # : 0.27 K/uL  Auto Basophil # : 0.05 K/uL  Auto Neutrophil % : 62.0 %  Auto Lymphocyte % : 27.8 %  Auto Monocyte % : 6.7 %  Auto Eosinophil % : 2.6 %  Auto Basophil % : 0.5 %    12-16    140  |  103  |  32<H>  ----------------------------<  189<H>  4.8   |  20  |  1.3    Ca    9.5      16 Dec 2021 14:52    TPro  7.7  /  Alb  3.8  /  TBili  0.2  /  DBili  x   /  AST  20  /  ALT  18  /  AlkPhos  200<H>  12-16

## 2021-12-16 NOTE — ED ADULT NURSE NOTE - NSIMPLEMENTINTERV_GEN_ALL_ED
Implemented All Universal Safety Interventions:  [place] to call system. Call bell, personal items and telephone within reach. Instruct patient to call for assistance. Room bathroom lighting operational. Non-slip footwear when patient is off stretcher. Physically safe environment: no spills, clutter or unnecessary equipment. Stretcher in lowest position, wheels locked, appropriate side rails in place. Not applicable

## 2021-12-16 NOTE — H&P ADULT - HISTORY OF PRESENT ILLNESS
75yo F w Afib on Eliquis HTN, HLD, IDDM, bladder CA, CAD (s/p PCI to RCA 2011, DFU and OM, ICD ([place]), BIV PPM, significant PAD on DAPT s/p multiple interventions most recent on 11/2021 was sent from Dr. Yo for   left lower amputation.  77yo F w Afib on Eliquis HTN, HLD, IDDM, bladder CA, CAD (s/p PCI to RCA 2011, DFU and OM, ICD ([place]), BIV PPM, significant PAD on DAPT s/p multiple interventions most recent on 11/2021 was sent from Dr. Yo for   left lower amputation.     Pt went to podiatry today and was sent for Left 2nd toe and foot evaluation, r/u OM and possible amputation  pt has been having ongoing pain,   Pt denies fevers, chills, chest pain, palpitations, shortness of breath, cough, abd pain, N/V/D or urinary symptoms 75yo F w Afib on Eliquis HTN, HFpEF, HLD, IDDM, bladder CA, CAD (s/p PCI to RCA 2011, DFU and OM, ICD ([place]), BIV PPM, significant PAD on DAPT s/p multiple interventions most recent on 11/2021 was sent from Dr. Yo for   left lower 2nd toe amputation.     Pt went to podiatry today and was sent for Left 2nd toe and foot evaluation, r/u OM and possible amputation,   pt has been having ongoing pain since discharge from [facility] 2 weeks ago (was admitted for fluid overload and BONNY)  pt had L lower extremity angiogram 11/2021 s/p PTA of L peroneal artery, laser atherectomy/PTA of popliteal artery, ricky atherectomy/PTA/Allison stent to L distal SFA, and DCB to proximal L SFA  Pt denies fevers, chills, chest pain, palpitations, shortness of breath, cough, abd pain, N/V/D or urinary symptoms    in ER: labs stable, seen by podiatry and vascular teams, admitted for further eval of suspected OM and amputation of 2nd left toe

## 2021-12-16 NOTE — ED PROVIDER NOTE - CLINICAL SUMMARY MEDICAL DECISION MAKING FREE TEXT BOX
76 y.o. female sent in by Dr. Yo for possible left 2nd toe amputation. Pt has an ulcer to left 2nd toe. On exam, pt in NAD, lungs CTA B/L, CV S1S2 regular, abdomen soft/NT/ND/(+)BS, Left foot 1st toe s/p amputation, 2nd toe with redness and swelling, (+) ulcer to the 2nd toe. Labs done. Will admit.

## 2021-12-17 NOTE — PROGRESS NOTE ADULT - ATTENDING COMMENTS
75yo F w Afib on Eliquis HTN, HLD, HFpEF, IDDM, bladder CA, CAD (s/p PCI to RCA 2011, DFU and OM, ICD ([place]), BIV PPM, significant PAD on DAPT s/p multiple interventions most recent on 11/2021 was sent from Dr. Yo for left lower 2nd toe amputation.       #PAD with ongoing left 2nd toe pain and ulcers  #Possible left 2nd toe osteomyelitis w/ ulcer  - Podiatry following- plan for Sx scheduled Sat 12/18 excisional debridement of soft tissue and bone with second toe amputation left foot  - not septic, hold abx, check CRP and ESR  - Xray : no signs of osteomyelitis  - FU  arterial duplex, ESTRELLA/PVRs ( ordered)  - cont with ASA, plavix and Lipitor,  hold Eliquis per vascular  - Vascular Cardiology following   - defer to Cardiology to provide recommendations regarding any further cardiac workup prior to procedure as patient is likely moderate to high risk given history of CAD and PAD  - of note pt with V paced rhythm on EKG, and TTE with evidence of severe diastolic dysfunction and severe pulmonary pressures. Will monitor volume status closely post operatively.     #Afib on Eliquis   - hold Eliquis per vascular cardio, chadvasc 6    #HFpEF CAD (s/p PCI to RCA 2011)  - DAPT, BB and lipitor  - lasix    #Elevated ALP likely from possible OM  #CKDIIIa: Cr at  baseline   #HTN: c/w amlodipine, lasix, hydral, metoprolol   #Chronic Normocytic Anemia- stable   #HLD: Lipitor  #IDDM: monitor FS, SSI  #Hx of bladder CA    Hold AC for OR tomorrow , NPO at midnight     #Progress Note Handoff:  Pending (specify):  OR tomorrow for amputation/debridement   Family discussion: d/w pt   Disposition: Home___/SNF___/Other________/Unknown at this time____x____      Mari Pizarro,

## 2021-12-17 NOTE — PROGRESS NOTE ADULT - ASSESSMENT
Assessment:  clinical OM left 2nd toe   PAD with CLI, [place] 5  Worsening L foot 2nd digit ulcer with rest pain      Plan:  Chart reviewed    All Questions and Concerns Addressed and Answered  Discussed diagnosis and treatment with patient  Wound Flushed w/ normal saline; betadine / DSD / Kerlix; q24  Local Wound Care; As Stated Above   xray -no OM on official reading , but clinically -pt has probe to bone on the distal toe   full thickens necrotic ulcer in the tip of the toe and planta IPJ complains of severe pain in the toe    pt has chronic hx of ulcer and ischemic changes on left 2nd toe, would be beneficial for pt for L 2nd toe amputation; pt agrees;   discussed with pt and her daughter   they aware of possible risks   Vascular on board; discussed w/ Vascular; cleared podiatry 2nd toe amputation of left foot prior to vascular procedure and tests;   Sx scheduled Sat 12/18 @ add-on; surgeon available all day; excisional debridement of soft tissue and bone with second toe amputation left foot;  Request medical clearance;   Request pre-lab optimization and OR stratification prior to sx;   NPO @ [place] Fri 12/17;   Weight bearing status; WBAT BL feet;   Discussed Plan w/ Dr. Yo;     Podiatry

## 2021-12-17 NOTE — PROGRESS NOTE ADULT - SUBJECTIVE AND OBJECTIVE BOX
SUBJECTIVE  Patient is a 76y old Female who presents with a chief complaint of toe amputation (16 Dec 2021 18:00)  Currently admitted to medicine with the primary diagnosis of Necrotic eschar    Today is hospital day 1d, and this morning she is reporting foot pain and reports no overnight events.   pending xray    OBJECTIVE  PAST MEDICAL & SURGICAL HISTORY  HTN (hypertension)    CHF (congestive heart failure)    Amputation of toe of left foot  L Hallux 5/20    Hernia    Atrial fibrillation, unspecified type  paf recently stopped eliquis s/t epistatxis    CAD (coronary artery disease)  RCA KENDELL  2011 OM2 KENDELL 2006  OM1 KENDELL 2009    PAD (peripheral artery disease)  severe s/p stent    History of implantable cardiac defibrillator (ICD)  and pacemaker- [email or web address]    Dyslipidemia    Bladder tumor    H/O ischemic heart disease    DM (diabetes mellitus)  30 yr    Chronic atrial fibrillation    DM2 (diabetes mellitus, type 2)    PAD (peripheral artery disease)    Hypertension    History of cholecystectomy    Cardiac defibrillator in place  2010    H/O: hysterectomy    Status post coronary artery stent placement    S/P cholecystectomy    S/P hysterectomy    S/P arterial stent      ALLERGIES:  codeine (Rash)  codeine (Unknown)  penicillin (Hives)  penicillin (Rash)  penicillin (Unknown)    MEDICATIONS:  STANDING MEDICATIONS  amLODIPine   Tablet 5 milliGRAM(s) Oral daily  aspirin enteric coated 81 milliGRAM(s) Oral daily  atorvastatin 40 milliGRAM(s) Oral at bedtime  clopidogrel Tablet 75 milliGRAM(s) Oral daily  furosemide    Tablet 40 milliGRAM(s) Oral daily  hydrALAZINE 50 milliGRAM(s) Oral every 8 hours  metoprolol tartrate 50 milliGRAM(s) Oral two times a day  pantoprazole    Tablet 40 milliGRAM(s) Oral before breakfast  senna 2 Tablet(s) Oral at bedtime    PRN MEDICATIONS      VITAL SIGNS: Last 24 Hours  T(C): 36.7 (17 Dec 2021 05:17), Max: 37.1 (16 Dec 2021 14:29)  T(F): 98 (17 Dec 2021 05:17), Max: 98.8 (16 Dec 2021 14:29)  HR: 82 (17 Dec 2021 05:17) (77 - 84)  BP: 174/75 (17 Dec 2021 05:17) (146/69 - 175/74)  BP(mean): --  RR: 18 (17 Dec 2021 05:17) (18 - 18)  SpO2: 97% (16 Dec 2021 22:00) (97% - 98%)    LABS:                        9.8    10.31 )-----------( 420      ( 16 Dec 2021 14:52 )             31.2     12-16    140  |  103  |  32<H>  ----------------------------<  189<H>  4.8   |  20  |  1.3    Ca    9.5      16 Dec 2021 14:52    TPro  7.7  /  Alb  3.8  /  TBili  0.2  /  DBili  x   /  AST  20  /  ALT  18  /  AlkPhos  200<H>  12-16    PT/INR - ( 16 Dec 2021 14:52 )   PT: 12.20 sec;   INR: 1.06 ratio         PTT - ( 16 Dec 2021 14:52 )  PTT:41.8 sec              RADIOLOGY:      PHYSICAL EXAM:    GENERAL: NAD, well-developed, AAOx3  HEENT:  Atraumatic, Normocephalic. EOMI, PERRLA, conjunctiva and sclera clear, No JVD  PULMONARY: Clear to auscultation bilaterally; No wheeze  CARDIOVASCULAR: Regular rate and rhythm; No murmurs, rubs, or gallops  GASTROINTESTINAL: Soft, Nontender, Nondistended; Bowel sounds present  MUSCULOSKELETAL:  2+ Peripheral Pulses, No clubbing, cyanosis, or edema  NEUROLOGY: non-focal  SKIN: No rashes or lesions      ADMISSION SUMMARY  75yo F w Afib on Eliquis HTN, HLD, HFpEF, IDDM, bladder CA, CAD (s/p PCI to RCA 2011, DFU and OM, ICD ([place]), BIV PPM, significant PAD on DAPT s/p multiple interventions most recent on 11/2021 was sent from Dr. Yo for   left lower 2nd toe amputation.       #PAD with ongoing left 2nd toe pain and 2 ulcers, s/p multiple interventions most recent on 11/2021  - podiatry for possible amputation on Sat  - not septic, hold abx, check crp and ESR  - check Xray and arterial duplex  - cont with ASA, plavix and Lipitor,  hold Eliquis per vascular    #Afib on Eliquis   - hold Eliquis per vascular cardio, chadvasc 6  -Hold Lovenox  podiatry plan for procedure on Sat    #elevated ALP likely from possible OM  #CKDIIIa: Cr at  baseline 1.2  Creatinine Trend: 1.3<--, 1.24<--, 1.46<--, 1.40<--, 1.34<--, 1.35<--  #HTN: c/w med  #chronic anemia  #HLD: Lipitor    IDDM: monitor FS, insulin if needed    #Hx of bladder CA    #HFpEF CAD (s/p PCI to RCA 2011)  - DAPT, BB and lipitor  - lasix    #Diet: DASH  #DVT ppo: Lovenox, then heparin if OR planned   #GI ppo: PPI  #Dispo: acute     SUBJECTIVE  Patient is a 76y old Female who presents with a chief complaint of toe amputation (16 Dec 2021 18:00)  Currently admitted to medicine with the primary diagnosis of Necrotic eschar    Today is hospital day 1d, and this morning she is reporting mild foot pain and reports no overnight events.   pending xray    OBJECTIVE  PAST MEDICAL & SURGICAL HISTORY  HTN (hypertension)    CHF (congestive heart failure)    Amputation of toe of left foot  L Hallux 5/20    Hernia    Atrial fibrillation, unspecified type  paf recently stopped eliquis s/t epistatxis    CAD (coronary artery disease)  RCA KENDELL  2011 OM2 KENDELL 2006  OM1 KENDELL 2009    PAD (peripheral artery disease)  severe s/p stent    History of implantable cardiac defibrillator (ICD)  and pacemaker- [employer]    Dyslipidemia    Bladder tumor    H/O ischemic heart disease    DM (diabetes mellitus)  30 yr    Chronic atrial fibrillation    DM2 (diabetes mellitus, type 2)    PAD (peripheral artery disease)    Hypertension    History of cholecystectomy    Cardiac defibrillator in place  2010    H/O: hysterectomy    Status post coronary artery stent placement    S/P cholecystectomy    S/P hysterectomy    S/P arterial stent      ALLERGIES:  codeine (Rash)  codeine (Unknown)  penicillin (Hives)  penicillin (Rash)  penicillin (Unknown)    MEDICATIONS:  STANDING MEDICATIONS  amLODIPine   Tablet 5 milliGRAM(s) Oral daily  aspirin enteric coated 81 milliGRAM(s) Oral daily  atorvastatin 40 milliGRAM(s) Oral at bedtime  clopidogrel Tablet 75 milliGRAM(s) Oral daily  furosemide    Tablet 40 milliGRAM(s) Oral daily  hydrALAZINE 50 milliGRAM(s) Oral every 8 hours  metoprolol tartrate 50 milliGRAM(s) Oral two times a day  pantoprazole    Tablet 40 milliGRAM(s) Oral before breakfast  senna 2 Tablet(s) Oral at bedtime    PRN MEDICATIONS      VITAL SIGNS: Last 24 Hours  T(C): 36.7 (17 Dec 2021 05:17), Max: 37.1 (16 Dec 2021 14:29)  T(F): 98 (17 Dec 2021 05:17), Max: 98.8 (16 Dec 2021 14:29)  HR: 82 (17 Dec 2021 05:17) (77 - 84)  BP: 174/75 (17 Dec 2021 05:17) (146/69 - 175/74)  BP(mean): --  RR: 18 (17 Dec 2021 05:17) (18 - 18)  SpO2: 97% (16 Dec 2021 22:00) (97% - 98%)    LABS:                        9.8    10.31 )-----------( 420      ( 16 Dec 2021 14:52 )             31.2     12-16    140  |  103  |  32<H>  ----------------------------<  189<H>  4.8   |  20  |  1.3    Ca    9.5      16 Dec 2021 14:52    TPro  7.7  /  Alb  3.8  /  TBili  0.2  /  DBili  x   /  AST  20  /  ALT  18  /  AlkPhos  200<H>  12-16    PT/INR - ( 16 Dec 2021 14:52 )   PT: 12.20 sec;   INR: 1.06 ratio         PTT - ( 16 Dec 2021 14:52 )  PTT:41.8 sec              RADIOLOGY:    < from: Xray Toes, Left Foot (12.16.21 @ 22:42) >  IMPRESSION:    No definite radiographic evidence for osteomyelitis.    < end of copied text >    PHYSICAL EXAM:    GENERAL: NAD, well-developed, AAOx3  HEENT:  Atraumatic, Normocephalic. EOMI, PERRLA, conjunctiva and sclera clear, No JVD  PULMONARY: Clear to auscultation bilaterally; No wheeze  CARDIOVASCULAR: Regular rate and rhythm; No murmurs, rubs, or gallops  GASTROINTESTINAL: Soft, Nontender, Nondistended; Bowel sounds present  MUSCULOSKELETAL:  2+ Peripheral Pulses, No clubbing, cyanosis, or edema  NEUROLOGY: non-focal  SKIN: No rashes or lesions      ADMISSION SUMMARY  77yo F w Afib on Eliquis HTN, HLD, HFpEF, IDDM, bladder CA, CAD (s/p PCI to RCA 2011, DFU and OM, ICD ([place]), BIV PPM, significant PAD on DAPT s/p multiple interventions most recent on 11/2021 was sent from Dr. Yo for   left lower 2nd toe amputation.       #PAD with ongoing left 2nd toe pain and 2 ulcers, s/p multiple interventions most recent on 11/2021  - podiatry for possible amputation on Sat  - not septic, hold abx, check crp and ESR  - Xray : no signs of osteomyelitis  - FU  arterial duplex, ESTRELLA/PVRs ( ordered)  - cont with ASA, plavix and Lipitor,  hold Eliquis per vascular  - FU vascular cardiology    #Afib on Eliquis   - hold Eliquis per vascular cardio, chadvasc 6  - FU vascular cardiology  -Hold Lovenox  podiatry plan for procedure on Sat    #elevated ALP likely from possible OM  #CKDIIIa: Cr at  baseline 1.2  Creatinine Trend: 1.3<--, 1.24<--, 1.46<--, 1.40<--, 1.34<--, 1.35<--  #HTN: c/w med  #chronic anemia  #HLD: Lipitor    IDDM: monitor FS, insulin if needed    #Hx of bladder CA    #HFpEF CAD (s/p PCI to RCA 2011)  - DAPT, BB and lipitor  - lasix    #Diet: DASH  #DVT ppo: Lovenox, then heparin if OR planned   #GI ppo: PPI  #Dispo: acute

## 2021-12-17 NOTE — PROGRESS NOTE ADULT - SUBJECTIVE AND OBJECTIVE BOX
Vascular Cardiology  Progress note     EMAIL [email or web address]     CC:  toe ulcer    INTERVAL HISTORY:     Patient denies fevers, chills, L foot pain, chest pain, shortness of breath, palpitations.       Allergies    codeine (Rash)  codeine (Unknown)  penicillin (Hives)  penicillin (Rash)  penicillin (Unknown)    Intolerances    	    MEDICATIONS:  amLODIPine   Tablet 5 milliGRAM(s) Oral daily  aspirin enteric coated 81 milliGRAM(s) Oral daily  clopidogrel Tablet 75 milliGRAM(s) Oral daily  furosemide    Tablet 40 milliGRAM(s) Oral daily  hydrALAZINE 50 milliGRAM(s) Oral every 8 hours  metoprolol tartrate 50 milliGRAM(s) Oral two times a day  pantoprazole    Tablet 40 milliGRAM(s) Oral before breakfast  polyethylene glycol 3350 17 Gram(s) Oral daily  senna 2 Tablet(s) Oral at bedtime  atorvastatin 40 milliGRAM(s) Oral at bedtime  dextrose 40% Gel 15 Gram(s) Oral once  dextrose 50% Injectable 25 Gram(s) IV Push once  dextrose 50% Injectable 12.5 Gram(s) IV Push once  dextrose 50% Injectable 25 Gram(s) IV Push once  glucagon  Injectable 1 milliGRAM(s) IntraMuscular once  insulin lispro (ADMELOG) corrective regimen sliding scale   SubCutaneous three times a day before meals  dextrose 5%. 1000 milliLiter(s) IV Continuous <Continuous>  dextrose 5%. 1000 milliLiter(s) IV Continuous <Continuous>      PAST MEDICAL & SURGICAL HISTORY:  HTN (hypertension)    CHF (congestive heart failure)    Amputation of toe of left foot  L Hallux 5/20    Hernia    Atrial fibrillation, unspecified type  paf recently stopped eliquis s/t epistatxis    CAD (coronary artery disease)  RCA KENDELL  2011 OM2 KENDELL 2006  OM1 KENDELL 2009    PAD (peripheral artery disease)  severe s/p stent    History of implantable cardiac defibrillator (ICD)  and pacemaker- [employer]    Dyslipidemia    Bladder tumor    H/O ischemic heart disease    DM (diabetes mellitus)  30 yr    Chronic atrial fibrillation    DM2 (diabetes mellitus, type 2)    PAD (peripheral artery disease)    Hypertension    History of cholecystectomy    Cardiac defibrillator in place  2010    H/O: hysterectomy    Status post coronary artery stent placement    S/P cholecystectomy    S/P hysterectomy    S/P arterial stent        FAMILY HISTORY:  Family history of atherosclerosis (Father, Mother)    FH: heart disease (Father, Mother)        SOCIAL HISTORY:  unchanged    REVIEW OF SYSTEMS:  CONSTITUTIONAL: No fever, weight loss, or fatigue  EYES: No eye pain, visual disturbances, or discharge  ENMT:  No difficulty hearing, tinnitus, vertigo; No sinus or throat pain  NECK: No pain or stiffness  RESPIRATORY: No cough, wheezing, chills or hemoptysis; No Shortness of Breath  CARDIOVASCULAR: No chest pain, palpitations, passing out, dizziness, or leg swelling  GASTROINTESTINAL: No abdominal or epigastric pain. No nausea, vomiting, or hematemesis; No diarrhea or constipation. No melena or hematochezia.  GENITOURINARY: No dysuria, frequency, hematuria, or incontinence  NEUROLOGICAL: No headaches, memory loss, loss of strength, numbness, or tremors  SKIN: L foot 2nd digit ulcer  LYMPH Nodes: No enlarged glands  ENDOCRINE: No heat or cold intolerance; No hair loss  MUSCULOSKELETAL: No joint pain or swelling; No muscle, back, or extremity pain  PSYCHIATRIC: No depression, anxiety, mood swings, or difficulty sleeping  HEME/LYMPH: No easy bruising, or bleeding gums  ALLERY AND IMMUNOLOGIC: No hives or eczema	    [ x] All others negative	  [ ] Unable to obtain    PHYSICAL EXAM:  T(C): 36.6 (12-17-21 @ 12:10), Max: 37.1 (12-16-21 @ 14:29)  HR: 76 (12-17-21 @ 12:10) (76 - 84)  BP: 142/65 (12-17-21 @ 12:10) (130/61 - 175/74)  RR: 18 (12-17-21 @ 12:10) (18 - 18)  SpO2: 97% (12-16-21 @ 22:00) (97% - 98%)  Wt(kg): --  I&O's Summary    16 Dec 2021 07:01  -  17 Dec 2021 07:00  --------------------------------------------------------  IN: 0 mL / OUT: 400 mL / NET: -400 mL    17 Dec 2021 07:01  -  17 Dec 2021 12:29  --------------------------------------------------------  IN: 118 mL / OUT: 200 mL / NET: -82 mL        Appearance: Normal, laying in bed, non-toxic	  HEENT:   Normal oral mucosa, PERRL, EOMI	  Cardiovascular: Normal S1 S2, No JVD, No murmurs, No edema  Respiratory: Lungs clear to auscultation	  Psychiatry: A & O x 3, Mood & affect appropriate  Gastrointestinal:  Soft, Non-tender, + BS	  Skin: No rashes, No ecchymoses, No cyanosis	  Neurologic: Non-focal  Extremities: L foot bandaged  Vascular:   Right DP: non-palpable             Left DP:  non-palpable  Right PT:  non-palpable              Left PT:  non-palpable      LABS:	 	    CBC Full  -  ( 17 Dec 2021 06:57 )  WBC Count : 8.64 K/uL  Hemoglobin : 9.8 g/dL  Hematocrit : 31.0 %  Platelet Count - Automated : 391 K/uL  Mean Cell Volume : 89.1 fL  Mean Cell Hemoglobin : 28.2 pg  Mean Cell Hemoglobin Concentration : 31.6 g/dL  Auto Neutrophil # : 5.89 K/uL  Auto Lymphocyte # : 1.69 K/uL  Auto Monocyte # : 0.65 K/uL  Auto Eosinophil # : 0.35 K/uL  Auto Basophil # : 0.02 K/uL  Auto Neutrophil % : 68.1 %  Auto Lymphocyte % : 19.6 %  Auto Monocyte % : 7.5 %  Auto Eosinophil % : 4.1 %  Auto Basophil % : 0.2 %    12-17    139  |  103  |  26<H>  ----------------------------<  168<H>  4.2   |  22  |  1.0  12-16    140  |  103  |  32<H>  ----------------------------<  189<H>  4.8   |  20  |  1.3    Ca    9.3      17 Dec 2021 06:57  Ca    9.5      16 Dec 2021 14:52    TPro  7.2  /  Alb  3.5  /  TBili  0.3  /  DBili  x   /  AST  18  /  ALT  15  /  AlkPhos  175<H>  12-17  TPro  7.7  /  Alb  3.8  /  TBili  0.2  /  DBili  x   /  AST  20  /  ALT  18  /  AlkPhos  200<H>  12-16    < from: TTE with Doppler (w/Cont) (11.24.21 @ 10:18) >  Dimensions:    Normal Values:  LA:     4.1    2.0 - 4.0 cm  Ao:     2.4    2.0 - 3.8 cm  SEPTUM: 1.0    0.6 - 1.2 cm  PWT:    0.8    0.6 - 1.1 cm  LVIDd:  4.4    3.0 - 5.6 cm  LVIDs:  2.6    1.8 - 4.0 cm  Derived variables:  LVMI: 76 g/m2  RWT: 0.36  Fractional short: 41 %  EF (Hansen Rule): 57 %  Doppler Peak Velocity (m/sec):  AoV=1.4  ------------------------------------------------------------------------  Conclusions:  1. Normal left ventricular systolic function.   Endocardial  visualization enhanced with intravenous injection of  Ultrasonic Enhancing Agent (Definity).  2. Severe diastolic dysfunction (Stage III).   Increased  E/e'  is consistent with elevated left ventricular filling  pressure.  3. The right ventricle is not well visualized; grossly  normal right ventricular systolic function.   A device wire  is noted in the right heart.  4. Estimated pulmonary artery systolic pressure equals 70  mm Hg, assuming right atrial pressure equals 12 mm Hg,  consistent with severe pulmonary pressures.  *** No previous Echo exam.    < end of copied text >      EKG 12/16/21 personally reviewed: a-sensed, v-paced

## 2021-12-17 NOTE — PROGRESS NOTE ADULT - ASSESSMENT
Assessment:  #PAD with CLI, [place] 5  - Worsening L foot 2nd digit ulcer with rest pain despite recent intervention  - 11/2021 s/p PTA of L peroneal artery, laser atherectomy/PTA of popliteal artery, ricky atherectomy/PTA/Allison stent to L distal SFA, and DCB to proximal L SFA  #CAD s/p PCI to RCA and OM  #Renal artery stenosis s/p bilateral stenting  #HFpEF  #HTN/HLD/DM  #AFib  #Cardiac pre-op evaluation   - EKG and echo results above  - RCRI score 3 (Class IV risk) portends 15% 30-day risk of death, MI or cardiac arrest  - Patient without active chest pain, arrhythmias signs/symptoms of heart failure or severe valvular disease.  She recently had a HFpEF exacerbation and was treated for NSTEMI at [place]. An echocardiogram showed normal LVEF, grade 3 diastolic dysfunction, severe pHTN and no wall motion abnormalities. She was diuresed with improvement in her symptoms. She does need an ischemic evaluation of her coronary arteries, but this can be done as an outpatient.   - No further cardiac testing at this time. She is high risk for a low risk surgery and may proceed with surgery at this time.     Plan:  - Please obtain bilateral lower extremity duplex with ABIs  - Continue aspirin/Plavix/statin  - Hold apixaban perioperatively, resume therapeutic anticoagulation post-operatively when okay with surgeon   - Plan discussed with podiatrist, Dr. Sanaz Garcia MD  Vascular Cardiology Attending  Please do not hesitate to reach out to me via MS Teams with questions     Assessment:  #PAD with CLI, [place] 5  - Worsening L foot 2nd digit ulcer with rest pain despite recent intervention  - 11/2021 s/p PTA of L peroneal artery, laser atherectomy/PTA of popliteal artery, ricky atherectomy/PTA/Allison stent to L distal SFA, and DCB to proximal L SFA  #CAD s/p PCI to RCA and OM  #Renal artery stenosis s/p bilateral stenting  #HFpEF  #HTN/HLD/DM  #AFib  #Cardiac pre-op evaluation   - EKG and echo results above  - RCRI score 3 (Class IV risk) portends 15% 30-day risk of death, MI or cardiac arrest  - Patient without active chest pain, arrhythmias signs/symptoms of heart failure or severe valvular disease.  She recently had a HFpEF exacerbation and was treated for NSTEMI at [place]. An echocardiogram showed normal LVEF, grade 3 diastolic dysfunction, severe pHTN and no wall motion abnormalities. She was diuresed with improvement in her symptoms. She does need an ischemic evaluation of her coronary arteries, but this can be done as an outpatient.   - No further cardiac testing at this time. She may proceed with surgery at this time.     Plan:  - Please obtain bilateral lower extremity duplex with ABIs  - Continue aspirin/Plavix/statin  - Hold apixaban perioperatively, resume therapeutic anticoagulation post-operatively when okay with surgeon   - Plan discussed with podiatrist, Dr. Sanaz Garcia MD  Vascular Cardiology Attending  Please do not hesitate to reach out to me via MS Teams with questions

## 2021-12-17 NOTE — PROGRESS NOTE ADULT - SUBJECTIVE AND OBJECTIVE BOX
Podiatry Progress Note    Subjective:   DARRION MELENDEZ is a 76y old  Female who presents with a chief complaint of toe amputation (17 Dec 2021 07:21)  Podiatry sx plan updated;     PAST MEDICAL & SURGICAL HISTORY:  HTN (hypertension)  CHF (congestive heart failure)  Amputation of toe of left foot  L Hallux 5/20  Hernia  Atrial fibrillation, unspecified type  paf recently stopped eliquis s/t epistatxis  CAD (coronary artery disease)  RCA KENDELL  2011 OM2 KENDELL 2006  OM1 KENDELL 2009  PAD (peripheral artery disease)  severe s/p stent  History of implantable cardiac defibrillator (ICD)  and pacemaker- [employer]  Dyslipidemia  Bladder tumor  H/O ischemic heart disease  DM (diabetes mellitus)  30 yr  Chronic atrial fibrillation  DM2 (diabetes mellitus, type 2)  PAD (peripheral artery disease)  Hypertension  History of cholecystectomy  Cardiac defibrillator in place  2010     Objective:  Vital Signs Last 24 Hrs  T(C): 36.7 (17 Dec 2021 05:17), Max: 37.1 (16 Dec 2021 14:29)  T(F): 98 (17 Dec 2021 05:17), Max: 98.8 (16 Dec 2021 14:29)  HR: 80 (17 Dec 2021 07:57) (77 - 84)  BP: 130/61 (17 Dec 2021 07:57) (130/61 - 175/74)  BP(mean): --  RR: 18 (17 Dec 2021 05:17) (18 - 18)  SpO2: 97% (16 Dec 2021 22:00) (97% - 98%)                        9.8    8.64  )-----------( 391      ( 17 Dec 2021 06:57 )             31.0                 12-17    139  |  103  |  26<H>  ----------------------------<  168<H>  4.2   |  22  |  1.0    Ca    9.3      17 Dec 2021 06:57    TPro  7.2  /  Alb  3.5  /  TBili  0.3  /  DBili  x   /  AST  18  /  ALT  15  /  AlkPhos  175<H>  12-17    Assessment:  Possible left 2nd toe osteomyelitis w/ ulcer;     Plan:  Chart reviewed and Patient evaluated. All Questions and Concerns Addressed and Answered  Discussed diagnosis and treatment with patient  Wound Flushed w/ normal saline; betadine / DSD / Kerlix; q24  Local Wound Care; As Stated Above   FXR-L reviewed; no osteomyelitis noted;   Per attending; pt has chronic hx of ulcer and ischemic changes on left 2nd toe, would be beneficial for pt for L 2nd toe amputation; pt agrees;   Vascular on board; Dr. Yo discussed w/ Vascular; cleared podiatry 2nd toe amputation of left foot prior to vascular procedure and tests;   Sx scheduled Sat 12/18 @ add-on; surgeon available all day; excisional debridement of soft tissue and bone with second toe amputation left foot;  Request medical clearance;   Request pre-lab optimization and OR stratification prior to sx;   NPO @ [facility] Fri 12/17;   Weight bearing status; WBAT BL feet;   Discussed Plan w/ Dr. Yo;     Podiatry

## 2021-12-17 NOTE — CHART NOTE - NSCHARTNOTEFT_GEN_A_CORE
Podiatry;    Sx scheduled Sat 12/18 @ add-on #2, Surgeon available all day; Excisional debridement of soft tissue and bone with second toe amputation left foot;  Request medical clearance;  Request pre-lab optimization and OR stratification prior to sx;  NPO @ [facility] Fri 12/17;     Podiatry [phone] Podiatry;    Sx scheduled Sat 12/18 @ add-on #2, Surgeon available all day; Excisional debridement of soft tissue and bone with second toe amputation left foot;  Request medical clearance;  Request pre-lab optimization and OR stratification prior to sx;  NPO @ [place] Fri 12/17;     Podiatry [phone]    mm

## 2021-12-18 NOTE — PROGRESS NOTE ADULT - SUBJECTIVE AND OBJECTIVE BOX
DARRION MELENDEZ  76y, Female  Allergy: codeine (Rash)  codeine (Unknown)  penicillin (Hives)  penicillin (Rash)  penicillin (Unknown)    Hospital Day: 2d    Patient seen and examined earlier today. Feeling well, NPO for surgery later today.     PMH/PSH:  PAST MEDICAL & SURGICAL HISTORY:  HTN (hypertension)    CHF (congestive heart failure)    Amputation of toe of left foot  L Hallux 5/20    Hernia    Atrial fibrillation, unspecified type  paf recently stopped eliquis s/t epistatxis    CAD (coronary artery disease)  RCA KENDELL  2011 OM2 KENDELL 2006  OM1 KENDELL 2009    PAD (peripheral artery disease)  severe s/p stent    History of implantable cardiac defibrillator (ICD)  and pacemaker- [employer]    Dyslipidemia    Bladder tumor    H/O ischemic heart disease    DM (diabetes mellitus)  30 yr    Chronic atrial fibrillation    DM2 (diabetes mellitus, type 2)    PAD (peripheral artery disease)    Hypertension    History of cholecystectomy    Cardiac defibrillator in place  2010    H/O: hysterectomy    Status post coronary artery stent placement    S/P cholecystectomy    S/P hysterectomy    S/P arterial stent        LAST 24-Hr EVENTS:    VITALS:  T(F): 97 (12-18-21 @ 12:33), Max: 98.1 (12-17-21 @ 21:18)  HR: 68 (12-18-21 @ 12:33)  BP: 108/51 (12-18-21 @ 12:33) (108/51 - 151/65)  RR: 18 (12-18-21 @ 12:33)  SpO2: 99% (12-17-21 @ 21:18)        TESTS & MEASUREMENTS:  Weight (Kg): 72.6 (12-16-21 @ 14:29)  BMI (kg/m2): 30.3 (12-16)    12-16-21 @ 07:01  -  12-17-21 @ 07:00  --------------------------------------------------------  IN: 0 mL / OUT: 400 mL / NET: -400 mL    12-17-21 @ 07:01  -  12-18-21 @ 07:00  --------------------------------------------------------  IN: 418 mL / OUT: 200 mL / NET: 218 mL                            9.8    9.41  )-----------( 334      ( 18 Dec 2021 08:41 )             30.2     PT/INR - ( 17 Dec 2021 21:13 )   PT: 12.40 sec;   INR: 1.08 ratio         PTT - ( 17 Dec 2021 21:13 )  PTT:37.4 sec  12-18    137  |  101  |  27<H>  ----------------------------<  168<H>  3.7   |  21  |  1.0    Ca    8.9      18 Dec 2021 08:41  Mg     1.8     12-18    TPro  6.8  /  Alb  3.4<L>  /  TBili  0.3  /  DBili  x   /  AST  18  /  ALT  16  /  AlkPhos  165<H>  12-18    LIVER FUNCTIONS - ( 18 Dec 2021 08:41 )  Alb: 3.4 g/dL / Pro: 6.8 g/dL / ALK PHOS: 165 U/L / ALT: 16 U/L / AST: 18 U/L / GGT: x                           COVID-19 PCR: NotDetec (12-16-21 @ 16:56)      RADIOLOGY, ECG, & ADDITIONAL TESTS:  12 Lead ECG:   Ventricular Rate 74 BPM    Atrial Rate 74 BPM    P-R Interval 114 ms    QRS Duration 146 ms    Q-T Interval 444 ms    QTC Calculation(Bazett) 492 ms    P Axis 73 degrees    R Axis 250 degrees    T Axis 49 degrees    Diagnosis Line Atrial-sensed ventricular-paced rhythm  Abnormal ECG    Confirmed by Den Menjivar ([id number]) on 12/16/2021 7:22:17 PM (12-16-21 @ 15:46)      RECENT DIAGNOSTIC ORDERS:  Diet, NPO:   Except Medications (12-18-21 @ 06:25)  Diet, NPO after Midnight:      NPO Start Date: 17-Dec-2021,   NPO Start Time: 23:59 (12-17-21 @ 15:37)  Magnesium, Serum: Repeat From: 17-Dec-2021 14:40 To: 07-Jan-2022 04:30, Every 1 day(s) (12-17-21 @ 14:40)  Comprehensive Metabolic Panel: Repeat From: 17-Dec-2021 14:40 To: 07-Jan-2022 04:30, Every 1 day(s) (12-17-21 @ 14:40)  Complete Blood Count + Automated Diff: Repeat From: 17-Dec-2021 14:40 To: 07-Jan-2022 04:30, Every 1 day(s) (12-17-21 @ 14:40)      MEDICATIONS:  MEDICATIONS  (STANDING):  amLODIPine   Tablet 5 milliGRAM(s) Oral daily  aspirin enteric coated 81 milliGRAM(s) Oral daily  atorvastatin 40 milliGRAM(s) Oral at bedtime  clopidogrel Tablet 75 milliGRAM(s) Oral daily  dextrose 40% Gel 15 Gram(s) Oral once  dextrose 5%. 1000 milliLiter(s) (50 mL/Hr) IV Continuous <Continuous>  dextrose 5%. 1000 milliLiter(s) (100 mL/Hr) IV Continuous <Continuous>  dextrose 5%. 1000 milliLiter(s) (30 mL/Hr) IV Continuous <Continuous>  dextrose 50% Injectable 25 Gram(s) IV Push once  dextrose 50% Injectable 12.5 Gram(s) IV Push once  dextrose 50% Injectable 25 Gram(s) IV Push once  furosemide    Tablet 40 milliGRAM(s) Oral daily  glucagon  Injectable 1 milliGRAM(s) IntraMuscular once  hydrALAZINE 50 milliGRAM(s) Oral every 8 hours  insulin lispro (ADMELOG) corrective regimen sliding scale   SubCutaneous three times a day before meals  metoprolol tartrate 50 milliGRAM(s) Oral two times a day  pantoprazole    Tablet 40 milliGRAM(s) Oral before breakfast  polyethylene glycol 3350 17 Gram(s) Oral daily  senna 2 Tablet(s) Oral at bedtime    MEDICATIONS  (PRN):      HOME MEDICATIONS:  amLODIPine 5 mg oral tablet (12-03)  apixaban 5 mg oral tablet (12-03)  atorvastatin 40 mg oral tablet (11-18)  clopidogrel 75 mg oral tablet (12-03)  collagenase 250 units/g topical ointment (12-03)  furosemide 40 mg oral tablet (12-03)  glimepiride 4 mg oral tablet (11-18)  loratadine 10 mg oral tablet (12-03)  metoprolol tartrate 50 mg oral tablet (12-03)  pantoprazole 40 mg oral delayed release tablet (11-26)  senna oral tablet (12-03)      PHYSICAL EXAM:  GENERAL: NAD, well-developed, AAOx3  HEENT:  Atraumatic, Normocephalic. EOMI, PERRLA, conjunctiva and sclera clear, No JVD  PULMONARY: Clear to auscultation bilaterally; No wheeze  CARDIOVASCULAR: Regular rate and rhythm; No murmurs, rubs, or gallops  GASTROINTESTINAL: Soft, Nontender, Nondistended; Bowel sounds present  MUSCULOSKELETAL:  2+ Peripheral Pulses, No clubbing, cyanosis, or edema  NEUROLOGY: non-focal  SKIN: No rashes or lesions

## 2021-12-18 NOTE — CHART NOTE - NSCHARTNOTEFT_GEN_A_CORE
PACU ANESTHESIA ADMISSION NOTE      Procedure:   Post op diagnosis:      ____  Intubated  TV:______       Rate: ______      FiO2: ______    __X__  Patent Airway    ___X_  Full return of protective reflexes    __X__  Full recovery from anesthesia / back to baseline     Vitals:   T:  36.8         R:                  BP:                  Sat:                   P:       Mental Status:  __X__ Awake   _____ Alert   __X___ Drowsy   _____ Sedated    Nausea/Vomiting:  __X__ NO  ______Yes,   See Post - Op Orders          Pain Scale (0-10):  __0___    Treatment: ____ None    ____ See Post - Op/PCA Orders    Post - Operative Fluids:   ____ Oral   ____ See Post - Op Orders    ml     Plan: Discharge:   ____Home       _X____Floor     _____Critical Care    _____  Other:_________________    Comments: VSS NAD NOTED

## 2021-12-18 NOTE — PRE-ANESTHESIA EVALUATION ADULT - NSANSTRISK2NDMDRD_GEN_ALL_CORE
Anesthesia risk alerts addressed and discussed with second provider Attending Attestation (For Attendings USE Only)...

## 2021-12-18 NOTE — PROGRESS NOTE ADULT - ASSESSMENT
75yo F w Afib on Eliquis HTN, HLD, HFpEF, IDDM, bladder CA, CAD (s/p PCI to RCA 2011, DFU and OM, ICD ([place]), BIV PPM, significant PAD on DAPT s/p multiple interventions most recent on 11/2021 was sent from Dr. Yo for left lower 2nd toe amputation.       #PAD with ongoing left 2nd toe pain and ulcers  #Possible left 2nd toe osteomyelitis w/ ulcer  - Podiatry following- plan for Sx scheduled Sat 12/18 excisional debridement of soft tissue and bone with second toe amputation left foot  - not septic, hold abx, check CRP and ESR  - Xray : no signs of osteomyelitis  - FU  arterial duplex, ESTRELLA/PVRs ( ordered)  - cont with ASA, plavix and Lipitor,  hold Eliquis per vascular  - Vascular Cardiology:   RCRI score 3 (Class IV risk) portends 15% 30-day risk of death, MI or cardiac arrest  Patient without active chest pain, arrhythmias signs/symptoms of heart failure or severe valvular disease.  She recently had a HFpEF exacerbation and was treated for NSTEMI at [facility]. An echocardiogram showed normal LVEF, grade 3 diastolic dysfunction, severe pHTN and no wall motion abnormalities. She was diuresed with improvement in her symptoms. She does need an ischemic evaluation of her coronary arteries, but this can be done as an outpatient.   No further cardiac testing at this time. She may proceed with surgery at this time.       #Afib on Eliquis   - hold Eliquis per vascular cardio, chadvasc 6    #HFpEF CAD (s/p PCI to RCA 2011)  - DAPT, BB and lipitor  - lasix    #Elevated ALP likely from possible OM  #CKDIIIa: Cr at  baseline   #HTN: c/w amlodipine, lasix, hydral, metoprolol   #Chronic Normocytic Anemia- stable   #HLD: Lipitor  #IDDM: monitor FS, SSI  #Hx of bladder CA    Hold AC for OR    #Progress Note Handoff:  Pending (specify):  OR today for amputation/debridement   Family discussion: d/w pt   Disposition: Home___/SNF___/Other________/Unknown at this time____x____      Mari Pizarro DO .     77yo F w Afib on Eliquis HTN, HLD, HFpEF, IDDM, bladder CA, CAD (s/p PCI to RCA 2011, DFU and OM, ICD ([place]), BIV PPM, significant PAD on DAPT s/p multiple interventions most recent on 11/2021 was sent from Dr. Yo for left lower 2nd toe amputation.       #PAD with ongoing left 2nd toe pain and ulcers  #Possible left 2nd toe osteomyelitis w/ ulcer  - Podiatry following- plan for Sx scheduled Sat 12/18 excisional debridement of soft tissue and bone with second toe amputation left foot  - not septic, hold abx, check CRP and ESR  - Xray : no signs of osteomyelitis  - FU  arterial duplex, ESTRELLA/PVRs ( ordered)  - cont with ASA, plavix and Lipitor,  hold Eliquis per vascular  - Vascular Cardiology:   RCRI score 3 (Class IV risk) portends 15% 30-day risk of death, MI or cardiac arrest  Patient without active chest pain, arrhythmias signs/symptoms of heart failure or severe valvular disease.  She recently had a HFpEF exacerbation and was treated for NSTEMI at [facility]. An echocardiogram showed normal LVEF, grade 3 diastolic dysfunction, severe pHTN and no wall motion abnormalities. She was diuresed with improvement in her symptoms. She does need an ischemic evaluation of her coronary arteries, but this can be done as an outpatient.   No further cardiac testing at this time. She may proceed with surgery at this time.       #Afib on Eliquis   - hold Eliquis per vascular cardio, chadvasc 6    #HFpEF CAD (s/p PCI to RCA 2011)  - DAPT, BB and lipitor  - lasix    #Elevated ALP likely from possible OM  #CKDIIIa: Cr at  baseline   #HTN: c/w amlodipine, lasix, hydral, metoprolol   #Chronic Normocytic Anemia- stable   #HLD: Lipitor  #IDDM: monitor FS, SSI  #Hx of bladder CA    Hold AC for OR    #Progress Note Handoff:  Pending (specify):  OR today for amputation/debridement   Family discussion: d/w pt     Disposition: Home___/SNF___/Other________/Unknown at this time____x____      Mari Pizarro DO .

## 2021-12-18 NOTE — PRE-ANESTHESIA EVALUATION ADULT - NS MD HP INPLANTS MED DEV
[employer]/Automatic Implantable Cardioverter Defibrillator
[employer]/Automatic Implantable Cardioverter Defibrillator

## 2021-12-18 NOTE — PRE-ANESTHESIA EVALUATION ADULT - LAST ECHOCARDIOGRAM
normal LV function, Mild MR, severe diastolic dysfunction, EF 57%
normal LV function, Mild MR, severe diastolic dysfunction, EF 57%

## 2021-12-19 NOTE — PROGRESS NOTE ADULT - ASSESSMENT
77yo F w Afib on Eliquis HTN, HLD, HFpEF, IDDM, bladder CA, CAD (s/p PCI to RCA 2011, DFU and OM, ICD ([place]), BIV PPM, significant PAD on DAPT s/p multiple interventions most recent on 11/2021 was sent from Dr. Yo for left lower 2nd toe amputation.       #PAD with ongoing left 2nd toe pain and ulcers  #Possible left 2nd toe osteomyelitis w/ ulcer  - Podiatry following- s/p partial amputation of L 2nd toe   - not septic, CRP 9 and ESR 80  - Xray : no signs of osteomyelitis  - Arterial Duplex: bilateral LE mild to moderate stenosis , L posterior tibial artery appears to be occluded   - cont with ASA, plavix and Lipitor  - Vascular Cardiology following, appreciate reccs regarding duplex findings     #Afib on Eliquis   - restart eliquis today, chadvasc 6    #HFpEF CAD (s/p PCI to RCA 2011)  - DAPT, BB and lipitor  - lasix    #Elevated ALP likely from possible OM  #CKDIIIa: Cr at  baseline   #HTN: c/w amlodipine, lasix, hydral, metoprolol   #Chronic Normocytic Anemia- stable   #HLD: Lipitor  #IDDM: monitor FS, SSI  #Hx of bladder CA    Eliquis for dvt ppx     #Progress Note Handoff:  Pending (specify):  PT talib, vascular follow up   Family discussion: d/w pt     Disposition: Home___/SNF___/Other________/Unknown at this time____x____      Mari Pizarro DO .

## 2021-12-19 NOTE — PROGRESS NOTE ADULT - SUBJECTIVE AND OBJECTIVE BOX
Podiatry Progress Note    Subjective:  DARRION MELENDEZ is a  76y Female.   Seen bedside. Pt in no acute distress.   Patient is a 76y old  Female who presents with a chief complaint of toe amputation (19 Dec 2021 17:19)      Past Medical History and Surgical History  PAST MEDICAL & SURGICAL HISTORY:  HTN (hypertension)    CHF (congestive heart failure)    Amputation of toe of left foot  L Hallux 5/20    Hernia    Atrial fibrillation, unspecified type  paf recently stopped eliquis s/t epistatxis    CAD (coronary artery disease)  RCA KENDELL  2011 OM2 KENDELL 2006  OM1 KENDELL 2009    PAD (peripheral artery disease)  severe s/p stent    History of implantable cardiac defibrillator (ICD)  and pacemaker- [employer]    Dyslipidemia    Bladder tumor    H/O ischemic heart disease    DM (diabetes mellitus)  30 yr    Chronic atrial fibrillation    DM2 (diabetes mellitus, type 2)    PAD (peripheral artery disease)    Hypertension    History of cholecystectomy    Cardiac defibrillator in place  2010    H/O: hysterectomy    Status post coronary artery stent placement    S/P cholecystectomy    S/P hysterectomy    S/P arterial stent         Objective:  Vital Signs Last 24 Hrs  T(C): 36.2 (19 Dec 2021 20:55), Max: 36.2 (19 Dec 2021 20:55)  T(F): 97.1 (19 Dec 2021 20:55), Max: 97.1 (19 Dec 2021 20:55)  HR: 79 (19 Dec 2021 20:55) (77 - 86)  BP: 143/67 (19 Dec 2021 20:55) (120/62 - 160/70)  BP(mean): --  RR: 18 (19 Dec 2021 20:55) (18 - 18)  SpO2: 96% (19 Dec 2021 18:30) (96% - 96%)                        10.5   8.81  )-----------( 377      ( 19 Dec 2021 08:40 )             32.7                 12-19    137  |  101  |  29<H>  ----------------------------<  276<H>  4.4   |  19  |  1.2    Ca    9.4      19 Dec 2021 08:40  Mg     1.8     12-19    TPro  7.3  /  Alb  3.4<L>  /  TBili  0.2  /  DBili  x   /  AST  20  /  ALT  20  /  AlkPhos  190<H>  12-19      Physical exam: Dressing not removed per attending third toe capillary refill present.    Assessment:  Possible left 2nd toe osteomyelitis w/ ulcer; s/p 2nd digit amp 12/18.     Plan:  Chart reviewed and Patient evaluated. All Questions and Concerns Addressed and Answered  Discussed diagnosis and treatment with patient  Local Wound Care; As Stated Above   s/p 2nd digit amputation. 12/18  Will eval wound site tomorrow.   Weight bearing status; WBAT BL feet;   Discussed Plan w/ Dr. Yo;     Podiatry

## 2021-12-19 NOTE — PROGRESS NOTE ADULT - ASSESSMENT
Assessment:   left 2nd toe clinical  osteomyelitis w/ ulcer;   s/p 2nd digit amp 12/18  DM with PAD       Plan:  Chart reviewed and Patient evaluated. All Questions and Concerns Addressed and Answered  Discussed diagnosis and treatment with patient  Local Wound Care; As Stated Above   s/p 2nd digit amputation. 12/18  Will eval wound site tomorrow.   Weight bearing status; no weight bearing to left foot   [place] [phone]    Podiatry

## 2021-12-19 NOTE — PROGRESS NOTE ADULT - SUBJECTIVE AND OBJECTIVE BOX
DARRION MELENDEZ  76y, Female  Allergy: codeine (Rash)  codeine (Unknown)  penicillin (Hives)  penicillin (Rash)  penicillin (Unknown)    Hospital Day: 3d    Patient seen and examined earlier today. Feeling well, endorsing some discomfort of left foot.     PMH/PSH:  PAST MEDICAL & SURGICAL HISTORY:  HTN (hypertension)    CHF (congestive heart failure)    Amputation of toe of left foot  L Hallux 5/20    Hernia    Atrial fibrillation, unspecified type  paf recently stopped eliquis s/t epistatxis    CAD (coronary artery disease)  RCA KENDELL  2011 OM2 KENDELL 2006  OM1 KENDELL 2009    PAD (peripheral artery disease)  severe s/p stent    History of implantable cardiac defibrillator (ICD)  and pacemaker- [employer]    Dyslipidemia    Bladder tumor    H/O ischemic heart disease    DM (diabetes mellitus)  30 yr    Chronic atrial fibrillation    DM2 (diabetes mellitus, type 2)    PAD (peripheral artery disease)    Hypertension    History of cholecystectomy    Cardiac defibrillator in place  2010    H/O: hysterectomy    Status post coronary artery stent placement    S/P cholecystectomy    S/P hysterectomy    S/P arterial stent        LAST 24-Hr EVENTS:    VITALS:  T(F): 96.5 (12-19-21 @ 14:38), Max: 97.9 (12-18-21 @ 18:00)  HR: 84 (12-19-21 @ 14:38)  BP: 160/70 (12-19-21 @ 14:38) (115/56 - 168/72)  RR: 18 (12-19-21 @ 14:38)  SpO2: 97% (12-18-21 @ 18:45)        TESTS & MEASUREMENTS:  Weight (Kg): 72.6 (12-18-21 @ 16:42)  BMI (kg/m2): 30.3 (12-18)    12-17-21 @ 07:01  -  12-18-21 @ 07:00  --------------------------------------------------------  IN: 418 mL / OUT: 200 mL / NET: 218 mL    12-18-21 @ 07:01  -  12-19-21 @ 07:00  --------------------------------------------------------  IN: 352 mL / OUT: 0 mL / NET: 352 mL                            10.5   8.81  )-----------( 377      ( 19 Dec 2021 08:40 )             32.7     PT/INR - ( 17 Dec 2021 21:13 )   PT: 12.40 sec;   INR: 1.08 ratio         PTT - ( 17 Dec 2021 21:13 )  PTT:37.4 sec  12-19    137  |  101  |  29<H>  ----------------------------<  276<H>  4.4   |  19  |  1.2    Ca    9.4      19 Dec 2021 08:40  Mg     1.8     12-19    TPro  7.3  /  Alb  3.4<L>  /  TBili  0.2  /  DBili  x   /  AST  20  /  ALT  20  /  AlkPhos  190<H>  12-19    LIVER FUNCTIONS - ( 19 Dec 2021 08:40 )  Alb: 3.4 g/dL / Pro: 7.3 g/dL / ALK PHOS: 190 U/L / ALT: 20 U/L / AST: 20 U/L / GGT: x                 Culture - Tissue with Gram Stain (collected 12-18-21 @ 17:30)  Source: .Tissue None  Gram Stain (12-19-21 @ 10:52):    No polymorphonuclear leukocytes seen per low power field    No organisms seen per oil power field                COVID-19 PCR: NotDetec (12-16-21 @ 16:56)      RADIOLOGY, ECG, & ADDITIONAL TESTS:      RECENT DIAGNOSTIC ORDERS:  Diet, DASH/TLC:   Sodium & Cholesterol Restricted (12-18-21 @ 18:21)      MEDICATIONS:  MEDICATIONS  (STANDING):  amLODIPine   Tablet 5 milliGRAM(s) Oral daily  aspirin enteric coated 81 milliGRAM(s) Oral daily  atorvastatin 40 milliGRAM(s) Oral at bedtime  clopidogrel Tablet 75 milliGRAM(s) Oral daily  dextrose 40% Gel 15 Gram(s) Oral once  dextrose 5%. 1000 milliLiter(s) (50 mL/Hr) IV Continuous <Continuous>  dextrose 50% Injectable 25 Gram(s) IV Push once  furosemide    Tablet 40 milliGRAM(s) Oral daily  glucagon  Injectable 1 milliGRAM(s) IntraMuscular once  hydrALAZINE 50 milliGRAM(s) Oral every 8 hours  insulin glargine Injectable ([place]) 20 Unit(s) SubCutaneous at bedtime  insulin lispro (ADMELOG) corrective regimen sliding scale   SubCutaneous three times a day before meals  insulin lispro Injectable (ADMELOG) 8 Unit(s) SubCutaneous three times a day before meals  metoprolol tartrate 50 milliGRAM(s) Oral two times a day  pantoprazole    Tablet 40 milliGRAM(s) Oral before breakfast  polyethylene glycol 3350 17 Gram(s) Oral daily  senna 2 Tablet(s) Oral at bedtime    MEDICATIONS  (PRN):  acetaminophen     Tablet .. 650 milliGRAM(s) Oral every 6 hours PRN Moderate Pain (4 - 6)      HOME MEDICATIONS:  amLODIPine 5 mg oral tablet (12-03)  apixaban 5 mg oral tablet (12-03)  atorvastatin 40 mg oral tablet (11-18)  clopidogrel 75 mg oral tablet (12-03)  collagenase 250 units/g topical ointment (12-03)  furosemide 40 mg oral tablet (12-03)  glimepiride 4 mg oral tablet (11-18)  loratadine 10 mg oral tablet (12-03)  metoprolol tartrate 50 mg oral tablet (12-03)  pantoprazole 40 mg oral delayed release tablet (11-26)  senna oral tablet (12-03)      PHYSICAL EXAM:  GENERAL: NAD, well-developed, AAOx3  HEENT:  Atraumatic, Normocephalic. EOMI, PERRLA, conjunctiva and sclera clear, No JVD  PULMONARY: Clear to auscultation bilaterally; No wheeze  CARDIOVASCULAR: Regular rate and rhythm; No murmurs, rubs, or gallops  GASTROINTESTINAL: Soft, Nontender, Nondistended; Bowel sounds present  MUSCULOSKELETAL:  2+ Peripheral Pulses, No clubbing, cyanosis, or edema  NEUROLOGY: non-focal  SKIN: L foot wrapped in bandage

## 2021-12-20 NOTE — PHYSICAL THERAPY INITIAL EVALUATION ADULT - SPECIFY REASON(S)
Chart reviewed. Pt. currently without activity orders. WBStatus noted. PT evaluation on hold pending activity orders as appropriate. Will follow.

## 2021-12-20 NOTE — PROGRESS NOTE ADULT - ASSESSMENT
Assessment:  #PAD with CLI, [place] 5  - Worsening L foot 2nd digit ulcer with rest pain despite recent intervention  - 12/18/21 s/p partial amp of 2nd digit on L foot, Xray with no signs of osteomyelitis   - 11/2021 s/p PTA of L peroneal artery, laser atherectomy/PTA of popliteal artery, ricky atherectomy/PTA/Allison stent to L distal SFA, and DCB to proximal L SFA  #CAD s/p PCI to RCA and OM  #Renal artery stenosis s/p bilateral stenting  #HFpEF  #HTN/HLD/DM  #AFib    Plan:  - LE arterial duplex and ESTRELLA/PVR reviewed, she has 1 vessel runoff (peroneal artery) below the L knee that is improved after 11/2021 intervention  - No further vascular studies or interventions recommended at this time, she is stable for discharge from a vascular cardiology perspective  - Recommend outpatient follow-up with her cardiologist (Dr. Neil Esteban [phone]) to discuss outpatient stress testing and need for triple therapy (aspirin, Plavix, apixaban)  - Continue aspirin/Plavix/statin  - Continue apixaban for AFib, monitor for bleeding  - Plan discussed with Khoa Yo and Cassius, close follow-up with them recommended    Patricia Garcia MD  Vascular Cardiology Attending  Please do not hesitate to reach out to me via MS Teams with questions

## 2021-12-20 NOTE — DISCHARGE NOTE PROVIDER - HOSPITAL COURSE
77yo F w Afib on Eliquis HTN, HLD, HFpEF, IDDM, bladder CA, CAD (s/p PCI to RCA 2011, DFU and OM, ICD ([place]), BIV PPM, significant PAD on DAPT s/p multiple interventions most recent on 11/2021 was sent from Dr. Yo for left lower 2nd toe amputation. s/p Left 2nd digit amputation, 12/18/21    Podiatry plan:   Wound Cleansed w/ normal saline; Dressed w/ Xeroform / DSD / Kerlix; q24  Local Wound Care; As Stated Above;  Weight bearing status; WBAT BL feet;   Plan discussed w/ Dr. Yo by Podiatry;      Vascular Cardiology:   - LE arterial duplex and ESTRELLA/PVR reviewed, she has 1 vessel runoff (peroneal artery) below the L knee that is improved after 11/2021 intervention  - No further vascular studies or interventions recommended at this time, she is stable for discharge from a vascular cardiology perspective  - Recommend outpatient follow-up with her cardiologist (Dr. Neil Esteban [phone]) to discuss outpatient stress testing and need for triple therapy (aspirin, Plavix, apixaban)  - Continue aspirin/Plavix/statin  - Continue apixaban for AFib, monitor for bleeding  - Plan discussed with Khoa Yo and Cassius, close follow-up with them recommended    Seen today; Stable & agreeable for dc & OP fu;

## 2021-12-20 NOTE — PHYSICAL THERAPY INITIAL EVALUATION ADULT - GENERAL OBSERVATIONS, REHAB EVAL
1320 - 1350 Pt rec semifowler in bed with +L foot dressings, +primafit, +bed alarm, in NAD. Pt agreeable to b/s PT.

## 2021-12-20 NOTE — PHYSICAL THERAPY INITIAL EVALUATION ADULT - ADDITIONAL COMMENTS
Pt reports family is in process of obtaining transport chair; needed to be exchanged because the original chair they obtained was too large for home.

## 2021-12-20 NOTE — PHYSICAL THERAPY INITIAL EVALUATION ADULT - PERTINENT HX OF CURRENT PROBLEM, REHAB EVAL
77yo F w Afib on Eliquis HTN, HFpEF, HLD, IDDM, bladder CA, CAD (s/p PCI to RCA 2011, DFU and OM, ICD ([place]), BIV PPM, significant PAD on DAPT s/p multiple interventions most recent on 11/2021 was sent from Dr. Yo for

## 2021-12-20 NOTE — PHYSICAL THERAPY INITIAL EVALUATION ADULT - GAIT DISTANCE, PT EVAL
50 ft x 2 with stair training in between. Pt declined further amb due to fatigue after stair training.

## 2021-12-20 NOTE — DISCHARGE NOTE PROVIDER - NSDCCPCAREPLAN_GEN_ALL_CORE_FT
PRINCIPAL DISCHARGE DIAGNOSIS  Diagnosis: PAD (peripheral artery disease)  Assessment and Plan of Treatment: You were sent to ED by Dr. Yo for worsening of your left toe ulcer. Podiatry performed a Left 2nd digit amputation on 12/18/21.  You have been assessed by Vascular & Podiatry after the procedure and have cleared you for discharge and outpatient follwo up.   Please follow up with your PCP, Podiatry (Dr. Yo) & Cardiology (Dr. Neil Esteban [phone] for follow up regarding outpatient stress testing & need for aspirin/eliquis/clopidogrel)  Please follow wound care as outlined in the next page.   Please seek medical attention if you experience severe pain, fever or green discharge from your wound.

## 2021-12-20 NOTE — DISCHARGE NOTE PROVIDER - NSDCFUADDINST_GEN_ALL_CORE_FT
Wound care (as per podiatry):   Wound Cleansed w/ normal saline; Dressed w/ Xeroform / DSD / Kerlix; q24

## 2021-12-20 NOTE — DISCHARGE NOTE PROVIDER - DISCHARGE DIET
Consistent Carbohydrate Diabetic Diets
1) If EN initiated, recommend: Jevity 1.2 at goal rate 80ml/hr x 18 hrs. To provide (at goal): 1440ml, 1728kcal and 80g protein. Based on dosing wt 55.9kg, provides: 31kcal/kg and 1.4g protein/kg.

## 2021-12-20 NOTE — PROGRESS NOTE ADULT - SUBJECTIVE AND OBJECTIVE BOX
Vascular Cardiology  Progress note     EMAIL [email or web address]     CC:  foot ulcer    INTERVAL HISTORY:     s/p partial amputation of 2nd digit on L foot over the weekend. Patient with throbbing foot pain surgical site yesterday that resolved with PO meds. She denies chest pain, shortness of breath, palpitations, LE edema, rest pain.       Allergies    codeine (Rash)  codeine (Unknown)  penicillin (Hives)  penicillin (Rash)  penicillin (Unknown)    Intolerances    	    MEDICATIONS:  amLODIPine   Tablet 5 milliGRAM(s) Oral daily  apixaban 5 milliGRAM(s) Oral every 12 hours  aspirin enteric coated 81 milliGRAM(s) Oral daily  clopidogrel Tablet 75 milliGRAM(s) Oral daily  furosemide    Tablet 40 milliGRAM(s) Oral daily  hydrALAZINE 50 milliGRAM(s) Oral every 8 hours  metoprolol tartrate 50 milliGRAM(s) Oral two times a day  acetaminophen     Tablet .. 650 milliGRAM(s) Oral every 6 hours PRN  pantoprazole    Tablet 40 milliGRAM(s) Oral before breakfast  polyethylene glycol 3350 17 Gram(s) Oral daily  senna 2 Tablet(s) Oral at bedtime  atorvastatin 40 milliGRAM(s) Oral at bedtime  dextrose 40% Gel 15 Gram(s) Oral once  dextrose 50% Injectable 25 Gram(s) IV Push once  glucagon  Injectable 1 milliGRAM(s) IntraMuscular once  insulin glargine Injectable (LANTUS) 20 Unit(s) SubCutaneous at bedtime  insulin lispro (ADMELOG) corrective regimen sliding scale   SubCutaneous three times a day before meals  insulin lispro Injectable (ADMELOG) 8 Unit(s) SubCutaneous three times a day before meals  dextrose 5%. 1000 milliLiter(s) IV Continuous <Continuous>      PAST MEDICAL & SURGICAL HISTORY:  HTN (hypertension)    CHF (congestive heart failure)    Amputation of toe of left foot  L Hallux 5/20    Hernia    Atrial fibrillation, unspecified type  paf recently stopped eliquis s/t epistatxis    CAD (coronary artery disease)  RCA KENDELL  2011 OM2 KENDELL 2006  OM1 KENDELL 2009    PAD (peripheral artery disease)  severe s/p stent    History of implantable cardiac defibrillator (ICD)  and pacemaker- [employer]    Dyslipidemia    Bladder tumor    H/O ischemic heart disease    DM (diabetes mellitus)  30 yr    Chronic atrial fibrillation    DM2 (diabetes mellitus, type 2)    PAD (peripheral artery disease)    Hypertension    History of cholecystectomy    Cardiac defibrillator in place  2010    H/O: hysterectomy    Status post coronary artery stent placement    S/P cholecystectomy    S/P hysterectomy    S/P arterial stent        FAMILY HISTORY:  Family history of atherosclerosis (Father, Mother)    FH: heart disease (Father, Mother)        SOCIAL HISTORY:  unchanged    REVIEW OF SYSTEMS:  CONSTITUTIONAL: No fever, weight loss, or fatigue  EYES: No eye pain, visual disturbances, or discharge  ENMT:  No difficulty hearing, tinnitus, vertigo; No sinus or throat pain  NECK: No pain or stiffness  RESPIRATORY: No cough, wheezing, chills or hemoptysis; No Shortness of Breath  CARDIOVASCULAR: No chest pain, palpitations, passing out, dizziness, or leg swelling  GASTROINTESTINAL: No abdominal or epigastric pain. No nausea, vomiting, or hematemesis; No diarrhea or constipation. No melena or hematochezia.  GENITOURINARY: No dysuria, frequency, hematuria, or incontinence  NEUROLOGICAL: No headaches, memory loss, loss of strength, numbness, or tremors  SKIN: No itching, burning, rashes, or lesions   LYMPH Nodes: No enlarged glands  ENDOCRINE: No heat or cold intolerance; No hair loss  MUSCULOSKELETAL: L foot pain at surgical site that improved with pain meds  PSYCHIATRIC: No depression, anxiety, mood swings, or difficulty sleeping  HEME/LYMPH: No easy bruising, or bleeding gums  ALLERY AND IMMUNOLOGIC: No hives or eczema	    [ x] All others negative	  [ ] Unable to obtain    PHYSICAL EXAM:  T(C): 36 (12-20-21 @ 06:04), Max: 36.2 (12-19-21 @ 20:55)  HR: 67 (12-20-21 @ 06:04) (67 - 84)  BP: 149/66 (12-20-21 @ 06:04) (130/58 - 160/70)  RR: 18 (12-20-21 @ 06:04) (18 - 18)  SpO2: 96% (12-19-21 @ 18:30) (96% - 96%)  Wt(kg): --  I&O's Summary    19 Dec 2021 07:01  -  20 Dec 2021 07:00  --------------------------------------------------------  IN: 720 mL / OUT: 1350 mL / NET: -630 mL        Appearance: Normal, sitting up in bed talking on phone  HEENT:   Normal oral mucosa, PERRL, EOMI	  Cardiovascular: Normal S1 S2, No JVD, No murmurs, No edema  Respiratory: diminished breath sounds on R base  Psychiatry: A & O x 3, Mood & affect appropriate  Gastrointestinal:  Soft, Non-tender, + BS	  Skin: No rashes, No ecchymoses, No cyanosis	  Neurologic: Non-focal  Extremities: L foot with dressing  Vascular:   Right DP:  non-palpable              Right PT:  non-palpable                LABS:	 	    CBC Full  -  ( 20 Dec 2021 04:30 )  WBC Count : 10.70 K/uL  Hemoglobin : 9.8 g/dL  Hematocrit : 31.0 %  Platelet Count - Automated : 371 K/uL  Mean Cell Volume : 89.9 fL  Mean Cell Hemoglobin : 28.4 pg  Mean Cell Hemoglobin Concentration : 31.6 g/dL  Auto Neutrophil # : 6.05 K/uL  Auto Lymphocyte # : 3.72 K/uL  Auto Monocyte # : 0.69 K/uL  Auto Eosinophil # : 0.15 K/uL  Auto Basophil # : 0.05 K/uL  Auto Neutrophil % : 56.5 %  Auto Lymphocyte % : 34.8 %  Auto Monocyte % : 6.4 %  Auto Eosinophil % : 1.4 %  Auto Basophil % : 0.5 %    12-20    136  |  97<L>  |  36<H>  ----------------------------<  182<H>  3.9   |  21  |  1.2  12-19    137  |  101  |  29<H>  ----------------------------<  276<H>  4.4   |  19  |  1.2    Ca    9.3      20 Dec 2021 04:30  Ca    9.4      19 Dec 2021 08:40  Mg     1.9     12-20  Mg     1.8     12-19    TPro  7.5  /  Alb  3.7  /  TBili  <0.2  /  DBili  x   /  AST  19  /  ALT  20  /  AlkPhos  204<H>  12-20  TPro  7.3  /  Alb  3.4<L>  /  TBili  0.2  /  DBili  x   /  AST  20  /  ALT  20  /  AlkPhos  190<H>  12-19        < from: [facility] Duplex Lower Extrem Arterial, Bilat (12.17.21 @ 17:25) >  Right: common femoral, deep femoral, superficial femoral, popliteal,   anterior tibial, posterior tibial and peroneal arteries were visualized.   Decreased velocity waveforms noted in the distal superficial femoral   artery and decreased velocity noted in distal calf arteries as well    Left: Common femoral, deep femoral, superficial femoral, popliteal,   anterior tibial, posterior tibial and peroneal arterieswere visualized.   Popliteal stent appears to be patent. There is decreased velocity   waveform noted in the superficial femoral artery with significant   decrease in velocity in the distal calf arteries as well., Posterior   tibial artery appears dami occluded      Impression:    Bilateral lower extremity with decreased velocity velocity waveforms   consistent with mild-to-moderate stenosis in bilateral lower extremities    Left posterior tibial artery appears to be occluded      < end of copied text >      < from: [facility] Physiol Extremity Lower 3+ Level, BI (12.17.21 @ 17:14) >  INTERPRETATION:  Clinical History / Reason for exam: The patient is a   76-year-old old male with lower extremity pain.    The study wasperformed to assess the patient for peripheral occlusive   disease.    Bilateral lower extremity pulse volume recordings and pressure analyses   were obtained.    Evaluation of the bilateral lower extremities revealed normal PVR   waveform amplitudesat the high thigh, low thigh, calf, ankle, metatarsal   and digit levels. No significant pressure gradients were noted.    Ankle brachial index was  0.54 on the right & 0.55  on the left.    Impression:    Decreased flow noted in the right more than the left  Decreased ankle brachial index bilaterally    < end of copied text >

## 2021-12-20 NOTE — DISCHARGE NOTE PROVIDER - CARE PROVIDER_API CALL
Rylee Yo (DPM)  Podiatric Medicine  [address] [place] [address]  Phone: [phone]  Fax: [phone]  Follow Up Time: 1-3 days    Neil Esteban  [id number] [place] [address]  Phone: [phone]  Fax: [phone]  Follow Up Time: 1-3 days

## 2021-12-20 NOTE — DISCHARGE NOTE PROVIDER - NSDCMRMEDTOKEN_GEN_ALL_CORE_FT
amLODIPine 5 mg oral tablet: 1 tab(s) orally once a day  apixaban 5 mg oral tablet: 1 tab(s) orally every 12 hours  aspirin 81 mg oral delayed release tablet: 1 tab(s) orally once a day, stop after 30 days   atorvastatin 40 mg oral tablet: 1 tab(s) orally once a day (at bedtime)  clopidogrel 75 mg oral tablet: 1 tab(s) orally once a day  collagenase 250 units/g topical ointment: 1 application topically 2 times a day  furosemide 40 mg oral tablet: 1 tab(s) orally once a day  glimepiride 4 mg oral tablet: 1 tab(s) orally 2 times a day  hydrALAZINE 50 mg oral tablet: 1 tab(s) orally every 8 hours MDD:1  loratadine 10 mg oral tablet: 1 tab(s) orally once a day  metoprolol tartrate 50 mg oral tablet: 1 tab(s) orally 2 times a day  pantoprazole 40 mg oral delayed release tablet: 1 tab(s) orally once a day  senna oral tablet: 2 tab(s) orally once a day (at bedtime)

## 2021-12-20 NOTE — PROGRESS NOTE ADULT - SUBJECTIVE AND OBJECTIVE BOX
Podiatry Progress Note    Subjective:   DARRION MELENDEZ is a pleasant well-nourished, well developed 76y Female in no acute distress, alert awake, and oriented to person, place and time.  Patient is a 76y old  Female who presents with a chief complaint of toe amputation (19 Dec 2021 22:31). Pt seen & evaluated at bedside.      PAST MEDICAL & SURGICAL HISTORY:  HTN (hypertension)    CHF (congestive heart failure)    Amputation of toe of left foot  L Hallux 5/20    Hernia    Atrial fibrillation, unspecified type  paf recently stopped eliquis s/t epistatxis    CAD (coronary artery disease)  RCA KENDELL  2011 OM2 KENDELL 2006  OM1 KENDELL 2009    PAD (peripheral artery disease)  severe s/p stent    History of implantable cardiac defibrillator (ICD)  and pacemaker- [employer]    Dyslipidemia    Bladder tumor    H/O ischemic heart disease    DM (diabetes mellitus)  30 yr    Chronic atrial fibrillation    DM2 (diabetes mellitus, type 2)    PAD (peripheral artery disease)    Hypertension    History of cholecystectomy    Cardiac defibrillator in place  2010    H/O: hysterectomy    Status post coronary artery stent placement    S/P cholecystectomy    S/P hysterectomy    S/P arterial stent         Objective:  Vital Signs Last 24 Hrs  T(C): 36 (20 Dec 2021 06:04), Max: 36.2 (19 Dec 2021 20:55)  T(F): 96.8 (20 Dec 2021 06:04), Max: 97.1 (19 Dec 2021 20:55)  HR: 67 (20 Dec 2021 06:04) (67 - 84)  BP: 149/66 (20 Dec 2021 06:04) (130/58 - 160/70)  BP(mean): --  RR: 18 (20 Dec 2021 06:04) (18 - 18)  SpO2: 96% (19 Dec 2021 18:30) (96% - 96%)                        9.8    10.70 )-----------( 371      ( 20 Dec 2021 04:30 )             31.0                 12-20    136  |  97<L>  |  36<H>  ----------------------------<  182<H>  3.9   |  21  |  1.2    Ca    9.3      20 Dec 2021 04:30  Mg     1.9     12-20    TPro  7.5  /  Alb  3.7  /  TBili  <0.2  /  DBili  x   /  AST  19  /  ALT  20  /  AlkPhos  204<H>  12-20    Physical Exam - Lower Extremity Focused:   #Left Lower Extremity  Derm: Sutures intact; no dehiscence/drainage/malodor/erythema.     Vascular: DP and PT Pulses Diminished; Foot is Warm to Warm to the touch   Neuro: Protective Sensation Diminished / Moderately Neuropathic     Assessment:  s/p Left 2nd digit amputation, 12/18/21    Plan:  Chart reviewed and Patient evaluated. All Questions and Concerns Addressed and Answered  Discussed diagnosis and treatment with patient  Wound Cleansed w/ normal saline; Dressed w/ Xeroform / DSD / Kerlix; q24  Local Wound Care; As Stated Above;  Weight bearing status; WBAT BL feet;   Will discuss Plan w/ Dr. Yo;     Podiatry    Podiatry Progress Note    Subjective:   DARRION MELENDEZ is a pleasant well-nourished, well developed 76y Female in no acute distress, alert awake, and oriented to person, place and time.  Patient is a 76y old  Female who presents with a chief complaint of toe amputation (19 Dec 2021 22:31). Pt seen & evaluated at bedside.      PAST MEDICAL & SURGICAL HISTORY:  HTN (hypertension)    CHF (congestive heart failure)    Amputation of toe of left foot  L Hallux 5/20    Hernia    Atrial fibrillation, unspecified type  paf recently stopped eliquis s/t epistatxis    CAD (coronary artery disease)  RCA KENDELL  2011 OM2 KENDELL 2006  OM1 KENDELL 2009    PAD (peripheral artery disease)  severe s/p stent    History of implantable cardiac defibrillator (ICD)  and pacemaker- [employer]    Dyslipidemia    Bladder tumor    H/O ischemic heart disease    DM (diabetes mellitus)  30 yr    Chronic atrial fibrillation    DM2 (diabetes mellitus, type 2)    PAD (peripheral artery disease)    Hypertension    History of cholecystectomy    Cardiac defibrillator in place  2010    H/O: hysterectomy    Status post coronary artery stent placement    S/P cholecystectomy    S/P hysterectomy    S/P arterial stent         Objective:  Vital Signs Last 24 Hrs  T(C): 36 (20 Dec 2021 06:04), Max: 36.2 (19 Dec 2021 20:55)  T(F): 96.8 (20 Dec 2021 06:04), Max: 97.1 (19 Dec 2021 20:55)  HR: 67 (20 Dec 2021 06:04) (67 - 84)  BP: 149/66 (20 Dec 2021 06:04) (130/58 - 160/70)  BP(mean): --  RR: 18 (20 Dec 2021 06:04) (18 - 18)  SpO2: 96% (19 Dec 2021 18:30) (96% - 96%)                        9.8    10.70 )-----------( 371      ( 20 Dec 2021 04:30 )             31.0                 12-20    136  |  97<L>  |  36<H>  ----------------------------<  182<H>  3.9   |  21  |  1.2    Ca    9.3      20 Dec 2021 04:30  Mg     1.9     12-20    TPro  7.5  /  Alb  3.7  /  TBili  <0.2  /  DBili  x   /  AST  19  /  ALT  20  /  AlkPhos  204<H>  12-20    ---------------  ACC: [id number] EXAM: PHYSIOL EXTREM LOW 3+ LEV BI  PROCEDURE DATE: 12/17/2021  INTERPRETATION: Clinical History / Reason for exam: The patient is a 76-year-old old male with lower extremity pain.    The study was performed to assess the patient for peripheral occlusive disease.  Bilateral lower extremity pulse volume recordings and pressure analyses were obtained.  Evaluation of the bilateral lower extremities revealed normal PVR waveform amplitudes at the high thigh, low thigh, calf, ankle, metatarsal and digit levels. No significant pressure gradients were noted.  Ankle brachial index was 0.54 on the right & 0.55 on the left.    Impression:  Decreased flow noted in the right more than the left  Decreased ankle brachial index bilaterally    ICD-10: I 73.89    --- End of Report ---    COSME DEMPSEY MD; Resident Radiologist  This document has been electronically signed.  ELIZABETH GARCÍA MD; Attending Vascular Surgery  This document has been electronically signed. Dec 20 2021 8:06AM  ------------------    ACC: [id number] EXAM: DUPLEX LOW ARTERIES COMP BILAT  PROCEDURE DATE: 12/17/2021  INTERPRETATION: Clinical History / Reason for exam: This patient is a 76-year-old female with lower extremity pain. Lower extremity arterial duplex ultrasound was performed to assess for arterial occlusive disease.    Right: common femoral, deep femoral, superficial femoral, popliteal, anterior tibial, posterior tibial and peroneal arteries were visualized. Decreased velocity waveforms noted in the distal superficial femoral artery and decreased velocity noted in distal calf arteries as well    Left: Common femoral, deep femoral, superficial femoral, popliteal, anterior tibial, posterior tibial and peroneal arteries were visualized. Popliteal stent appears to be patent. There is decreased velocity waveform noted in the superficial femoral artery with significant decrease in velocity in the distal calf arteries as well., Posterior tibial artery appears to be occluded    Ipression:  Bilateral lower extremity with decreased velocity velocity waveforms consistent with mild-to-moderate stenosis in bilateral lower extremities  Left posterior tibial artery appears to be occluded  Consider vascular surgery consultation if clinically warranted    ICD-10:I 73.89    --- End of Report ---    COSME DEMPSEY MD; Resident Radiologist  This document has been electronically signed.  ELIZABETH GARCÍA MD; Attending Vascular Surgery  This document has been electronically signed. Dec 20 2021 8:07AM    ----------------    Physical Exam - Lower Extremity Focused:   #Left Lower Extremity  Derm: Sutures intact; no dehiscence/drainage/malodor/erythema.     Vascular: DP and PT Pulses Diminished; Foot is Warm to Warm to the touch   Neuro: Protective Sensation Diminished / Moderately Neuropathic     Assessment:  s/p Left 2nd digit amputation, 12/18/21    Plan:  Chart reviewed and Patient evaluated. All Questions and Concerns Addressed and Answered  Discussed diagnosis and treatment with patient  Wound Cleansed w/ normal saline; Dressed w/ Xeroform / DSD / Kerlix; q24  Local Wound Care; As Stated Above;  Weight bearing status; WBAT BL feet;   Will discuss Plan w/ Dr. Yo;     Podiatry

## 2021-12-20 NOTE — PHYSICAL THERAPY INITIAL EVALUATION ADULT - GAIT DEVIATIONS NOTED, PT EVAL
Decreased speed but steady with RW, decreased step length/height, pt reports no pain t/o session./decreased dav/decreased step length/decreased stride length

## 2021-12-20 NOTE — DISCHARGE NOTE PROVIDER - PROVIDER TOKENS
PROVIDER:[TOKEN:[[id number]],FOLLOWUP:[1-3 days]],PROVIDER:[TOKEN:[[id number]],FOLLOWUP:[1-3 days]]

## 2021-12-20 NOTE — DISCHARGE NOTE PROVIDER - ATTENDING DISCHARGE PHYSICAL EXAMINATION:
GENERAL: NAD, well-developed, AAOx3  HEENT:  Atraumatic, Normocephalic. EOMI, PERRLA, conjunctiva and sclera clear, No JVD  PULMONARY: Clear to auscultation bilaterally; No wheeze  CARDIOVASCULAR: Regular rate and rhythm; No murmurs, rubs, or gallops  GASTROINTESTINAL: Soft, Nontender, Nondistended; Bowel sounds present  MUSCULOSKELETAL:  2+ Peripheral Pulses, No clubbing, cyanosis, or edema  NEUROLOGY: non-focal

## 2021-12-21 NOTE — PROGRESS NOTE ADULT - PROVIDER SPECIALTY LIST ADULT
Internal Medicine
Internal Medicine
Podiatry
Vascular Cardiology
Vascular Cardiology
Internal Medicine
Vascular Cardiology
Vascular Cardiology

## 2021-12-21 NOTE — PROGRESS NOTE ADULT - SUBJECTIVE AND OBJECTIVE BOX
Vascular Cardiology  Progress note     EMAIL [email or web address]     CC: L foot, 2nd toe ulcer    INTERVAL HISTORY:    Endorses some surgical site pain, overall much improved. Denies fevers, chills, shortness of breath, chest pain.       Allergies    codeine (Rash)  codeine (Unknown)  penicillin (Hives)  penicillin (Rash)  penicillin (Unknown)    Intolerances    	    MEDICATIONS:  amLODIPine   Tablet 5 milliGRAM(s) Oral daily  apixaban 5 milliGRAM(s) Oral every 12 hours  aspirin enteric coated 81 milliGRAM(s) Oral daily  clopidogrel Tablet 75 milliGRAM(s) Oral daily  furosemide    Tablet 40 milliGRAM(s) Oral daily  hydrALAZINE 50 milliGRAM(s) Oral every 8 hours  metoprolol tartrate 50 milliGRAM(s) Oral two times a day        acetaminophen     Tablet .. 650 milliGRAM(s) Oral every 6 hours PRN    pantoprazole    Tablet 40 milliGRAM(s) Oral before breakfast  polyethylene glycol 3350 17 Gram(s) Oral daily  senna 2 Tablet(s) Oral at bedtime    atorvastatin 40 milliGRAM(s) Oral at bedtime  dextrose 40% Gel 15 Gram(s) Oral once  dextrose 50% Injectable 25 Gram(s) IV Push once  glucagon  Injectable 1 milliGRAM(s) IntraMuscular once  insulin glargine Injectable (LANTUS) 20 Unit(s) SubCutaneous at bedtime  insulin lispro (ADMELOG) corrective regimen sliding scale   SubCutaneous three times a day before meals  insulin lispro Injectable (ADMELOG) 8 Unit(s) SubCutaneous three times a day before meals    dextrose 5%. 1000 milliLiter(s) IV Continuous <Continuous>  sodium chloride 0.65% Nasal 1 Spray(s) Both Nostrils two times a day PRN      PAST MEDICAL & SURGICAL HISTORY:  HTN (hypertension)    CHF (congestive heart failure)    Amputation of toe of left foot  L Hallux 5/20    Hernia    Atrial fibrillation, unspecified type  paf recently stopped eliquis s/t epistatxis    CAD (coronary artery disease)  RCA KENDELL  2011 OM2 KENDELL 2006  OM1 KENDELL 2009    PAD (peripheral artery disease)  severe s/p stent    History of implantable cardiac defibrillator (ICD)  and pacemaker- Clip    Dyslipidemia    Bladder tumor    H/O ischemic heart disease    DM (diabetes mellitus)  30 yr    Chronic atrial fibrillation    DM2 (diabetes mellitus, type 2)    PAD (peripheral artery disease)    Hypertension    History of cholecystectomy    Cardiac defibrillator in place  2010    H/O: hysterectomy    Status post coronary artery stent placement    S/P cholecystectomy    S/P hysterectomy    S/P arterial stent        FAMILY HISTORY:  Family history of atherosclerosis (Father, Mother)    FH: heart disease (Father, Mother)        SOCIAL HISTORY:  unchanged    REVIEW OF SYSTEMS:  CONSTITUTIONAL: No fever, weight loss, or fatigue  EYES: No eye pain, visual disturbances, or discharge  ENMT:  No difficulty hearing, tinnitus, vertigo; No sinus or throat pain  NECK: No pain or stiffness  RESPIRATORY: No cough, wheezing, chills or hemoptysis; No Shortness of Breath  CARDIOVASCULAR: No chest pain, palpitations, passing out, dizziness, or leg swelling  GASTROINTESTINAL: No abdominal or epigastric pain. No nausea, vomiting, or hematemesis; No diarrhea or constipation. No melena or hematochezia.  GENITOURINARY: No dysuria, frequency, hematuria, or incontinence  NEUROLOGICAL: No headaches, memory loss, loss of strength, numbness, or tremors  SKIN: No itching, burning, rashes, or lesions   LYMPH Nodes: No enlarged glands  ENDOCRINE: No heat or cold intolerance; No hair loss  MUSCULOSKELETAL: L foot 2nd digit pain, improving  PSYCHIATRIC: No depression, anxiety, mood swings, or difficulty sleeping  HEME/LYMPH: No easy bruising, or bleeding gums  ALLERY AND IMMUNOLOGIC: No hives or eczema	    [ x] All others negative	  [ ] Unable to obtain    PHYSICAL EXAM:  T(C): 36.2 (12-21-21 @ 05:02), Max: 36.3 (12-20-21 @ 13:25)  HR: 71 (12-21-21 @ 05:02) (65 - 76)  BP: 145/65 (12-21-21 @ 05:02) (129/58 - 145/65)  RR: 20 (12-21-21 @ 05:02) (18 - 20)  SpO2: --  Wt(kg): --  I&O's Summary      Appearance: Normal, sitting in bed, speaking in complete sentences  HEENT:   Normal oral mucosa, PERRL, EOMI	  Cardiovascular: Normal S1 S2, No JVD, No murmurs, No edema  Respiratory: Clear bilaterally  Psychiatry: A & O x 3, Mood & affect appropriate  Gastrointestinal:  Soft, Non-tender, + BS	  Skin: No rashes, No ecchymoses, No cyanosis	  Neurologic: Non-focal  Extremities: L foot with clean dressing in place    LABS:	 	    CBC Full  -  ( 21 Dec 2021 08:36 )  WBC Count : 9.13 K/uL  Hemoglobin : 9.6 g/dL  Hematocrit : 30.2 %  Platelet Count - Automated : 380 K/uL  Mean Cell Volume : 89.1 fL  Mean Cell Hemoglobin : 28.3 pg  Mean Cell Hemoglobin Concentration : 31.8 g/dL  Auto Neutrophil # : 4.94 K/uL  Auto Lymphocyte # : 3.12 K/uL  Auto Monocyte # : 0.69 K/uL  Auto Eosinophil # : 0.30 K/uL  Auto Basophil # : 0.05 K/uL  Auto Neutrophil % : 54.1 %  Auto Lymphocyte % : 34.2 %  Auto Monocyte % : 7.6 %  Auto Eosinophil % : 3.3 %  Auto Basophil % : 0.5 %    12-20    136  |  97<L>  |  36<H>  ----------------------------<  182<H>  3.9   |  21  |  1.2    Ca    9.3      20 Dec 2021 04:30  Mg     1.9     12-20    TPro  7.5  /  Alb  3.7  /  TBili  <0.2  /  DBili  x   /  AST  19  /  ALT  20  /  AlkPhos  204<H>  12-20

## 2021-12-21 NOTE — PROGRESS NOTE ADULT - REASON FOR ADMISSION
toe amputation

## 2021-12-21 NOTE — DISCHARGE NOTE NURSING/CASE MANAGEMENT/SOCIAL WORK - PATIENT PORTAL LINK FT
You can access the FollowMyHealth Patient Portal offered by [facility] by registering at the following website: [email or web address]. By joining [employer]’s FollowMyHealth portal, you will also be able to view your health information using other applications (apps) compatible with our system.

## 2021-12-21 NOTE — PROGRESS NOTE ADULT - ASSESSMENT
Assessment:  #PAD with CLI  - Admitted with worsening L foot 2nd digit ulcer with rest pain despite recent intervention  - 12/18/21 s/p partial amp of 2nd digit on L foot, Xray with no signs of osteomyelitis   - 12/17/21 LE arterial duplex and ESTRELLA/PVR with 1 vessel runoff (peroneal artery) below the L knee that is improved after 11/2021 intervention  - 11/2021 s/p PTA of L peroneal artery, laser atherectomy/PTA of popliteal artery, ricky atherectomy/PTA/Allison stent to L distal SFA, and DCB to proximal L SFA  #CAD s/p PCI to RCA and OM  #Renal artery stenosis s/p bilateral stenting  #HFpEF  #HTN/HLD/DM  #AFib    Plan:  - No further vascular studies or interventions recommended at this time, she is stable for discharge from a vascular cardiology perspective  - Recommend outpatient follow-up with her cardiologist (Dr. Neil Esteban [phone]) to discuss outpatient stress testing and need for triple therapy (aspirin, Plavix, apixaban). I spoke with her cardiologist on 12/20/21 and updated him on her recent hospitalizations and need for outpatient stress testing  - Continue aspirin/Plavix/statin  - Continue apixaban for AFib, monitor for bleeding  - Plan discussed with Khoa Yo and Cassius, close follow-up with them recommended    Patricia Garcia MD  Vascular Cardiology Attending  Please do not hesitate to reach out to me via MS Teams with questions

## 2021-12-21 NOTE — DISCHARGE NOTE NURSING/CASE MANAGEMENT/SOCIAL WORK - NSDCPEFALRISK_GEN_ALL_CORE
For information on Fall & Injury Prevention, visit: [email or web address] OR  [email or web address] OR  https://www.cdc.gov/steadi/patient.html

## 2021-12-21 NOTE — DISCHARGE NOTE NURSING/CASE MANAGEMENT/SOCIAL WORK - NSCORESITESY/N_GEN_A_CORE_RD
Continued Stay Note   Harjinder     Patient Name: Chante Wetzel  MRN: [id number]  Today's Date: 7/21/2021    Admit Date: 7/17/2021    Discharge Plan     Row Name 07/21/21 1446       Plan    Plan  new order for increase in oxygen liters flow Called PT GEOVANY WRIGHT now  [facility] they will look at orders and studies in chart and called pt to service the increase in oxygen requirements        Discharge Codes    No documentation.       Expected Discharge Date and Time     Expected Discharge Date Expected Discharge Time    Jul 21, 2021             Jade Almeida RN     No

## 2022-01-01 ENCOUNTER — APPOINTMENT (OUTPATIENT)
Dept: CARDIOLOGY | Facility: CLINIC | Age: 77
End: 2022-01-01
Payer: MEDICARE

## 2022-01-01 ENCOUNTER — APPOINTMENT (OUTPATIENT)
Dept: CARDIOLOGY | Facility: CLINIC | Age: 77
End: 2022-01-01

## 2022-01-01 ENCOUNTER — LABORATORY RESULT (OUTPATIENT)
Age: 77
End: 2022-01-01

## 2022-01-01 ENCOUNTER — RESULT CHARGE (OUTPATIENT)
Age: 77
End: 2022-01-01

## 2022-01-01 ENCOUNTER — TRANSCRIPTION ENCOUNTER (OUTPATIENT)
Age: 77
End: 2022-01-01

## 2022-01-01 ENCOUNTER — INPATIENT (INPATIENT)
Facility: HOSPITAL | Age: 77
LOS: 0 days | Discharge: HOME | End: 2022-06-02
Attending: INTERNAL MEDICINE | Admitting: INTERNAL MEDICINE
Payer: MEDICARE

## 2022-01-01 VITALS
DIASTOLIC BLOOD PRESSURE: 70 MMHG | WEIGHT: 153 LBS | SYSTOLIC BLOOD PRESSURE: 130 MMHG | RESPIRATION RATE: 15 BRPM | TEMPERATURE: 97.5 F | HEART RATE: 68 BPM | BODY MASS INDEX: 28.89 KG/M2 | OXYGEN SATURATION: 99 % | HEIGHT: 61 IN

## 2022-01-01 VITALS
DIASTOLIC BLOOD PRESSURE: 70 MMHG | TEMPERATURE: 98.3 F | SYSTOLIC BLOOD PRESSURE: 130 MMHG | HEART RATE: 99 BPM | OXYGEN SATURATION: 94 % | WEIGHT: 154 LBS | BODY MASS INDEX: 29.07 KG/M2 | HEIGHT: 61 IN

## 2022-01-01 VITALS
SYSTOLIC BLOOD PRESSURE: 154 MMHG | WEIGHT: 154 LBS | HEIGHT: 61 IN | BODY MASS INDEX: 29.07 KG/M2 | HEART RATE: 92 BPM | DIASTOLIC BLOOD PRESSURE: 74 MMHG

## 2022-01-01 VITALS
WEIGHT: 130.07 LBS | SYSTOLIC BLOOD PRESSURE: 175 MMHG | HEART RATE: 54 BPM | OXYGEN SATURATION: 98 % | DIASTOLIC BLOOD PRESSURE: 71 MMHG | HEIGHT: 61 IN | RESPIRATION RATE: 12 BRPM

## 2022-01-01 VITALS — HEART RATE: 88 BPM | DIASTOLIC BLOOD PRESSURE: 79 MMHG | SYSTOLIC BLOOD PRESSURE: 166 MMHG

## 2022-01-01 VITALS
TEMPERATURE: 97 F | HEIGHT: 61 IN | HEART RATE: 78 BPM | SYSTOLIC BLOOD PRESSURE: 132 MMHG | OXYGEN SATURATION: 97 % | DIASTOLIC BLOOD PRESSURE: 72 MMHG

## 2022-01-01 DIAGNOSIS — I25.9 CHRONIC ISCHEMIC HEART DISEASE, UNSPECIFIED: ICD-10-CM

## 2022-01-01 DIAGNOSIS — Z90.49 ACQUIRED ABSENCE OF OTHER SPECIFIED PARTS OF DIGESTIVE TRACT: Chronic | ICD-10-CM

## 2022-01-01 DIAGNOSIS — Z90.710 ACQUIRED ABSENCE OF BOTH CERVIX AND UTERUS: Chronic | ICD-10-CM

## 2022-01-01 DIAGNOSIS — I10 ESSENTIAL (PRIMARY) HYPERTENSION: ICD-10-CM

## 2022-01-01 DIAGNOSIS — I48.91 UNSPECIFIED ATRIAL FIBRILLATION: ICD-10-CM
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
## 2022-01-01 DIAGNOSIS — L97.529 NON-PRESSURE CHRONIC ULCER OF OTHER PART OF LEFT FOOT WITH UNSPECIFIED SEVERITY: ICD-10-CM

## 2022-01-01 DIAGNOSIS — Z95.5 PRESENCE OF CORONARY ANGIOPLASTY IMPLANT AND GRAFT: ICD-10-CM

## 2022-01-01 DIAGNOSIS — E11.621 TYPE 2 DIABETES MELLITUS WITH FOOT ULCER: ICD-10-CM

## 2022-01-01 DIAGNOSIS — Z90.710 ACQUIRED ABSENCE OF BOTH CERVIX AND UTERUS: ICD-10-CM

## 2022-01-01 DIAGNOSIS — Z95.5 PRESENCE OF CORONARY ANGIOPLASTY IMPLANT AND GRAFT: Chronic | ICD-10-CM

## 2022-01-01 DIAGNOSIS — Z87.891 PERSONAL HISTORY OF NICOTINE DEPENDENCE: ICD-10-CM

## 2022-01-01 DIAGNOSIS — E11.51 TYPE 2 DIABETES MELLITUS WITH DIABETIC PERIPHERAL ANGIOPATHY WITHOUT GANGRENE: ICD-10-CM

## 2022-01-01 DIAGNOSIS — Z89.412 ACQUIRED ABSENCE OF LEFT GREAT TOE: ICD-10-CM

## 2022-01-01 DIAGNOSIS — E11.9 TYPE 2 DIABETES MELLITUS W/OUT COMPLICATIONS: ICD-10-CM

## 2022-01-01 DIAGNOSIS — I70.1 ATHEROSCLEROSIS OF RENAL ARTERY: ICD-10-CM

## 2022-01-01 DIAGNOSIS — Z85.51 PERSONAL HISTORY OF MALIGNANT NEOPLASM OF BLADDER: ICD-10-CM

## 2022-01-01 DIAGNOSIS — Z88.0 ALLERGY STATUS TO PENICILLIN: ICD-10-CM

## 2022-01-01 DIAGNOSIS — Z95.810 PRESENCE OF AUTOMATIC (IMPLANTABLE) CARDIAC DEFIBRILLATOR: Chronic | ICD-10-CM

## 2022-01-01 DIAGNOSIS — R60.0 LOCALIZED EDEMA: ICD-10-CM

## 2022-01-01 DIAGNOSIS — I25.10 ATHEROSCLEROTIC HEART DISEASE OF NATIVE CORONARY ARTERY WITHOUT ANGINA PECTORIS: ICD-10-CM

## 2022-01-01 DIAGNOSIS — I50.33 ACUTE ON CHRONIC DIASTOLIC (CONGESTIVE) HEART FAILURE: ICD-10-CM

## 2022-01-01 DIAGNOSIS — R06.02 SHORTNESS OF BREATH: ICD-10-CM

## 2022-01-01 DIAGNOSIS — T82.856A STENOSIS OF PERIPHERAL VASCULAR STENT, INITIAL ENCOUNTER: ICD-10-CM

## 2022-01-01 DIAGNOSIS — H90.3 SENSORINEURAL HEARING LOSS, BILATERAL: ICD-10-CM

## 2022-01-01 DIAGNOSIS — Z95.810 PRESENCE OF AUTOMATIC (IMPLANTABLE) CARDIAC DEFIBRILLATOR: ICD-10-CM

## 2022-01-01 DIAGNOSIS — N18.30 CHRONIC KIDNEY DISEASE, STAGE 3 UNSPECIFIED: ICD-10-CM

## 2022-01-01 DIAGNOSIS — E78.5 HYPERLIPIDEMIA, UNSPECIFIED: ICD-10-CM

## 2022-01-01 DIAGNOSIS — I73.9 PERIPHERAL VASCULAR DISEASE, UNSPECIFIED: ICD-10-CM

## 2022-01-01 DIAGNOSIS — Z88.5 ALLERGY STATUS TO NARCOTIC AGENT: ICD-10-CM

## 2022-01-01 DIAGNOSIS — Z95.9 PRESENCE OF CARDIAC AND VASCULAR IMPLANT AND GRAFT, UNSPECIFIED: Chronic | ICD-10-CM
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
## 2022-01-01 DIAGNOSIS — Z88.6 ALLERGY STATUS TO ANALGESIC AGENT: ICD-10-CM

## 2022-01-01 LAB
ALBUMIN SERPL ELPH-MCNC: 3.4 G/DL — LOW (ref 3.5–5.2)
ALBUMIN SERPL ELPH-MCNC: 3.9 G/DL
ALP BLD-CCNC: 237 U/L
ALP SERPL-CCNC: 184 U/L — HIGH (ref 30–115)
ALT FLD-CCNC: 17 U/L — SIGNIFICANT CHANGE UP (ref 0–41)
ALT SERPL-CCNC: 23 U/L
ANION GAP SERPL CALC-SCNC: 12 MMOL/L — SIGNIFICANT CHANGE UP (ref 7–14)
ANION GAP SERPL CALC-SCNC: 13 MMOL/L — SIGNIFICANT CHANGE UP (ref 7–14)
ANION GAP SERPL CALC-SCNC: 17 MMOL/L
APTT BLD: 43.2 SEC
AST SERPL-CCNC: 19 U/L
AST SERPL-CCNC: 19 U/L — SIGNIFICANT CHANGE UP (ref 0–41)
BASOPHILS # BLD AUTO: 0.02 K/UL — SIGNIFICANT CHANGE UP (ref 0–0.2)
BASOPHILS # BLD AUTO: 0.05 K/UL
BASOPHILS NFR BLD AUTO: 0.2 % — SIGNIFICANT CHANGE UP (ref 0–1)
BASOPHILS NFR BLD AUTO: 0.4 %
BILIRUB SERPL-MCNC: 0.2 MG/DL
BILIRUB SERPL-MCNC: 0.3 MG/DL — SIGNIFICANT CHANGE UP (ref 0.2–1.2)
BUN SERPL-MCNC: 30 MG/DL — HIGH (ref 10–20)
BUN SERPL-MCNC: 34 MG/DL — HIGH (ref 10–20)
BUN SERPL-MCNC: 39 MG/DL
CALCIUM SERPL-MCNC: 8.9 MG/DL — SIGNIFICANT CHANGE UP (ref 8.5–10.1)
CALCIUM SERPL-MCNC: 9.5 MG/DL
CALCIUM SERPL-MCNC: 9.9 MG/DL — SIGNIFICANT CHANGE UP (ref 8.5–10.1)
CHLORIDE SERPL-SCNC: 102 MMOL/L — SIGNIFICANT CHANGE UP (ref 98–110)
CHLORIDE SERPL-SCNC: 104 MMOL/L — SIGNIFICANT CHANGE UP (ref 98–110)
CHLORIDE SERPL-SCNC: 99 MMOL/L
CO2 SERPL-SCNC: 21 MMOL/L — SIGNIFICANT CHANGE UP (ref 17–32)
CO2 SERPL-SCNC: 23 MMOL/L
CO2 SERPL-SCNC: 24 MMOL/L — SIGNIFICANT CHANGE UP (ref 17–32)
CREAT SERPL-MCNC: 1.1 MG/DL — SIGNIFICANT CHANGE UP (ref 0.7–1.5)
CREAT SERPL-MCNC: 1.2 MG/DL — SIGNIFICANT CHANGE UP (ref 0.7–1.5)
CREAT SERPL-MCNC: 1.5 MG/DL
EGFR: 36 ML/MIN/1.73M2
EGFR: 48 ML/MIN/1.73M2 — LOW
EGFR: 53 ML/MIN/1.73M2 — LOW
EOSINOPHIL # BLD AUTO: 0.17 K/UL
EOSINOPHIL # BLD AUTO: 0.26 K/UL — SIGNIFICANT CHANGE UP (ref 0–0.7)
EOSINOPHIL NFR BLD AUTO: 1.4 %
EOSINOPHIL NFR BLD AUTO: 3 % — SIGNIFICANT CHANGE UP (ref 0–8)
GLUCOSE BLDC GLUCOMTR-MCNC: 166 MG/DL — HIGH (ref 70–99)
GLUCOSE BLDC GLUCOMTR-MCNC: 202 MG/DL — HIGH (ref 70–99)
GLUCOSE BLDC GLUCOMTR-MCNC: 211 MG/DL — HIGH (ref 70–99)
GLUCOSE SERPL-MCNC: 167 MG/DL — HIGH (ref 70–99)
GLUCOSE SERPL-MCNC: 200 MG/DL
GLUCOSE SERPL-MCNC: 231 MG/DL — HIGH (ref 70–99)
HCT VFR BLD CALC: 33.8 % — LOW (ref 37–47)
HCT VFR BLD CALC: 36.6 %
HCT VFR BLD CALC: 37.1 % — SIGNIFICANT CHANGE UP (ref 37–47)
HGB BLD-MCNC: 11 G/DL — LOW (ref 12–16)
HGB BLD-MCNC: 11.5 G/DL
HGB BLD-MCNC: 11.9 G/DL — LOW (ref 12–16)
IMM GRANULOCYTES NFR BLD AUTO: 0.3 % — SIGNIFICANT CHANGE UP (ref 0.1–0.3)
IMM GRANULOCYTES NFR BLD AUTO: 0.4 %
INR PPP: 1.34 RATIO
LYMPHOCYTES # BLD AUTO: 1.34 K/UL — SIGNIFICANT CHANGE UP (ref 1.2–3.4)
LYMPHOCYTES # BLD AUTO: 15.5 % — LOW (ref 20.5–51.1)
LYMPHOCYTES # BLD AUTO: 3.6 K/UL
LYMPHOCYTES NFR BLD AUTO: 29.4 %
MAN DIFF?: NORMAL
MCHC RBC-ENTMCNC: 27 PG — SIGNIFICANT CHANGE UP (ref 27–31)
MCHC RBC-ENTMCNC: 27.6 PG
MCHC RBC-ENTMCNC: 27.6 PG — SIGNIFICANT CHANGE UP (ref 27–31)
MCHC RBC-ENTMCNC: 31.4 G/DL
MCHC RBC-ENTMCNC: 32.1 G/DL — SIGNIFICANT CHANGE UP (ref 32–37)
MCHC RBC-ENTMCNC: 32.5 G/DL — SIGNIFICANT CHANGE UP (ref 32–37)
MCV RBC AUTO: 84.1 FL — SIGNIFICANT CHANGE UP (ref 81–99)
MCV RBC AUTO: 84.7 FL — SIGNIFICANT CHANGE UP (ref 81–99)
MCV RBC AUTO: 88 FL
MONOCYTES # BLD AUTO: 0.63 K/UL — HIGH (ref 0.1–0.6)
MONOCYTES # BLD AUTO: 0.7 K/UL
MONOCYTES NFR BLD AUTO: 5.7 %
MONOCYTES NFR BLD AUTO: 7.3 % — SIGNIFICANT CHANGE UP (ref 1.7–9.3)
NEUTROPHILS # BLD AUTO: 6.34 K/UL — SIGNIFICANT CHANGE UP (ref 1.4–6.5)
NEUTROPHILS # BLD AUTO: 7.69 K/UL
NEUTROPHILS NFR BLD AUTO: 62.7 %
NEUTROPHILS NFR BLD AUTO: 73.7 % — SIGNIFICANT CHANGE UP (ref 42.2–75.2)
NRBC # BLD: 0 /100 WBCS — SIGNIFICANT CHANGE UP (ref 0–0)
NRBC # BLD: 0 /100 WBCS — SIGNIFICANT CHANGE UP (ref 0–0)
PLATELET # BLD AUTO: 332 K/UL — SIGNIFICANT CHANGE UP (ref 130–400)
PLATELET # BLD AUTO: 440 K/UL — HIGH (ref 130–400)
PLATELET # BLD AUTO: 451 K/UL
POTASSIUM SERPL-MCNC: 4.2 MMOL/L — SIGNIFICANT CHANGE UP (ref 3.5–5)
POTASSIUM SERPL-MCNC: 4.5 MMOL/L — SIGNIFICANT CHANGE UP (ref 3.5–5)
POTASSIUM SERPL-SCNC: 4.2 MMOL/L — SIGNIFICANT CHANGE UP (ref 3.5–5)
POTASSIUM SERPL-SCNC: 4.5 MMOL/L — SIGNIFICANT CHANGE UP (ref 3.5–5)
POTASSIUM SERPL-SCNC: 4.9 MMOL/L
PROT SERPL-MCNC: 6.7 G/DL — SIGNIFICANT CHANGE UP (ref 6–8)
PROT SERPL-MCNC: 7.9 G/DL
PT BLD: 15.4 SEC
RBC # BLD: 3.99 M/UL — LOW (ref 4.2–5.4)
RBC # BLD: 4.16 M/UL
RBC # BLD: 4.41 M/UL — SIGNIFICANT CHANGE UP (ref 4.2–5.4)
RBC # FLD: 14.5 % — SIGNIFICANT CHANGE UP (ref 11.5–14.5)
RBC # FLD: 14.6 % — HIGH (ref 11.5–14.5)
RBC # FLD: 15 %
SODIUM SERPL-SCNC: 138 MMOL/L — SIGNIFICANT CHANGE UP (ref 135–146)
SODIUM SERPL-SCNC: 138 MMOL/L — SIGNIFICANT CHANGE UP (ref 135–146)
SODIUM SERPL-SCNC: 139 MMOL/L
WBC # BLD: 10.91 K/UL — HIGH (ref 4.8–10.8)
WBC # BLD: 8.62 K/UL — SIGNIFICANT CHANGE UP (ref 4.8–10.8)
WBC # FLD AUTO: 10.91 K/UL — HIGH (ref 4.8–10.8)
WBC # FLD AUTO: 12.26 K/UL
WBC # FLD AUTO: 8.62 K/UL — SIGNIFICANT CHANGE UP (ref 4.8–10.8)

## 2022-01-01 PROCEDURE — 93306 TTE W/DOPPLER COMPLETE: CPT

## 2022-01-01 PROCEDURE — 99214 OFFICE O/P EST MOD 30 MIN: CPT

## 2022-01-01 PROCEDURE — 93925 LOWER EXTREMITY STUDY: CPT

## 2022-01-01 PROCEDURE — 93923 UPR/LXTR ART STDY 3+ LVLS: CPT

## 2022-01-01 PROCEDURE — 93000 ELECTROCARDIOGRAM COMPLETE: CPT

## 2022-01-01 PROCEDURE — 99238 HOSP IP/OBS DSCHRG MGMT 30/<: CPT

## 2022-01-01 PROCEDURE — 93284 PRGRMG EVAL IMPLANTABLE DFB: CPT

## 2022-01-01 PROCEDURE — 93000 ELECTROCARDIOGRAM COMPLETE: CPT | Mod: 59

## 2022-01-01 PROCEDURE — 93290 INTERROG DEV EVAL ICPMS IP: CPT | Mod: 26

## 2022-01-01 PROCEDURE — 99205 OFFICE O/P NEW HI 60 MIN: CPT

## 2022-01-01 RX ORDER — MUPIROCIN 20 MG/G
2 OINTMENT TOPICAL
Refills: 0 | Status: ACTIVE | COMMUNITY

## 2022-01-01 RX ORDER — LOSARTAN POTASSIUM 100 MG/1
25 TABLET, FILM COATED ORAL DAILY
Refills: 0 | Status: DISCONTINUED | OUTPATIENT
Start: 2022-01-01 | End: 2022-01-01

## 2022-01-01 RX ORDER — ISOSORBIDE MONONITRATE 60 MG/1
60 TABLET, EXTENDED RELEASE ORAL DAILY
Qty: 90 | Refills: 0 | Status: DISCONTINUED | COMMUNITY
Start: 2021-06-30 | End: 2022-01-01

## 2022-01-01 RX ORDER — APIXABAN 5 MG/1
5 TABLET, FILM COATED ORAL
Refills: 0 | Status: DISCONTINUED | COMMUNITY
End: 2022-01-01

## 2022-01-01 RX ORDER — GLIMEPIRIDE 1 MG
1 TABLET ORAL
Qty: 0 | Refills: 0 | DISCHARGE

## 2022-01-01 RX ORDER — OXYCODONE AND ACETAMINOPHEN 5; 325 MG/1; MG/1
5-325 TABLET ORAL
Qty: 14 | Refills: 0 | Status: ACTIVE | COMMUNITY
Start: 2022-01-01 | End: 1900-01-01

## 2022-01-01 RX ORDER — AMLODIPINE BESYLATE 5 MG/1
5 TABLET ORAL DAILY
Refills: 0 | Status: ACTIVE | COMMUNITY

## 2022-01-01 RX ORDER — ATORVASTATIN CALCIUM 80 MG/1
1 TABLET, FILM COATED ORAL
Qty: 0 | Refills: 0 | DISCHARGE

## 2022-01-01 RX ORDER — APIXABAN 2.5 MG/1
1 TABLET, FILM COATED ORAL
Qty: 0 | Refills: 0 | DISCHARGE

## 2022-01-01 RX ORDER — ALPRAZOLAM 0.5 MG/1
0.5 TABLET, ORALLY DISINTEGRATING ORAL AT BEDTIME
Refills: 0 | Status: ACTIVE | COMMUNITY

## 2022-01-01 RX ORDER — PANTOPRAZOLE SODIUM 20 MG/1
40 TABLET, DELAYED RELEASE ORAL
Refills: 0 | Status: DISCONTINUED | OUTPATIENT
Start: 2022-01-01 | End: 2022-01-01

## 2022-01-01 RX ORDER — METOPROLOL TARTRATE 50 MG
50 TABLET ORAL
Refills: 0 | Status: DISCONTINUED | OUTPATIENT
Start: 2022-01-01 | End: 2022-01-01

## 2022-01-01 RX ORDER — TORSEMIDE 20 MG/1
20 TABLET ORAL
Qty: 30 | Refills: 1 | Status: ACTIVE | COMMUNITY
Start: 2022-01-01 | End: 1900-01-01

## 2022-01-01 RX ORDER — HYDRALAZINE HYDROCHLORIDE 50 MG/1
50 TABLET ORAL 3 TIMES DAILY
Refills: 0 | Status: ACTIVE | COMMUNITY

## 2022-01-01 RX ORDER — RIVAROXABAN 20 MG/1
20 TABLET, FILM COATED ORAL
Qty: 30 | Refills: 4 | Status: ACTIVE | COMMUNITY
Start: 2022-01-01 | End: 1900-01-01

## 2022-01-01 RX ORDER — ATORVASTATIN CALCIUM 40 MG/1
40 TABLET, FILM COATED ORAL DAILY
Refills: 0 | Status: ACTIVE | COMMUNITY

## 2022-01-01 RX ORDER — LOSARTAN POTASSIUM 50 MG/1
50 TABLET, FILM COATED ORAL DAILY
Refills: 0 | Status: ACTIVE | COMMUNITY

## 2022-01-01 RX ORDER — GABAPENTIN 100 MG/1
100 CAPSULE ORAL
Qty: 270 | Refills: 1 | Status: ACTIVE | COMMUNITY
Start: 2022-01-01 | End: 1900-01-01

## 2022-01-01 RX ORDER — CLOPIDOGREL BISULFATE 75 MG/1
75 TABLET, FILM COATED ORAL DAILY
Refills: 0 | Status: DISCONTINUED | OUTPATIENT
Start: 2022-01-01 | End: 2022-01-01

## 2022-01-01 RX ORDER — LOSARTAN POTASSIUM 100 MG/1
1 TABLET, FILM COATED ORAL
Qty: 0 | Refills: 0 | DISCHARGE

## 2022-01-01 RX ORDER — HYDRALAZINE HCL 50 MG
50 TABLET ORAL EVERY 8 HOURS
Refills: 0 | Status: DISCONTINUED | OUTPATIENT
Start: 2022-01-01 | End: 2022-01-01

## 2022-01-01 RX ORDER — SODIUM CHLORIDE 9 MG/ML
1000 INJECTION INTRAMUSCULAR; INTRAVENOUS; SUBCUTANEOUS
Refills: 0 | Status: DISCONTINUED | OUTPATIENT
Start: 2022-01-01 | End: 2022-01-01

## 2022-01-01 RX ORDER — POTASSIUM CHLORIDE 1500 MG/1
20 TABLET, FILM COATED, EXTENDED RELEASE ORAL
Qty: 30 | Refills: 1 | Status: ACTIVE | COMMUNITY
Start: 2022-01-01 | End: 1900-01-01

## 2022-01-01 RX ORDER — ACETAMINOPHEN 500 MG
650 TABLET ORAL EVERY 6 HOURS
Refills: 0 | Status: DISCONTINUED | OUTPATIENT
Start: 2022-01-01 | End: 2022-01-01

## 2022-01-01 RX ORDER — NITROFURANTOIN MACROCRYSTALS 100 MG/1
100 CAPSULE ORAL
Refills: 0 | Status: ACTIVE | COMMUNITY

## 2022-01-01 RX ORDER — MORPHINE SULFATE 50 MG/1
2 CAPSULE, EXTENDED RELEASE ORAL EVERY 6 HOURS
Refills: 0 | Status: DISCONTINUED | OUTPATIENT
Start: 2022-01-01 | End: 2022-01-01

## 2022-01-01 RX ORDER — FUROSEMIDE 40 MG
40 TABLET ORAL DAILY
Refills: 0 | Status: DISCONTINUED | OUTPATIENT
Start: 2022-01-01 | End: 2022-01-01

## 2022-01-01 RX ORDER — LISINOPRIL 40 MG/1
40 TABLET ORAL
Refills: 0 | Status: DISCONTINUED | COMMUNITY
End: 2022-01-01

## 2022-01-01 RX ORDER — PANTOPRAZOLE SODIUM 20 MG/1
1 TABLET, DELAYED RELEASE ORAL
Qty: 0 | Refills: 0 | DISCHARGE

## 2022-01-01 RX ORDER — FUROSEMIDE 40 MG/1
40 TABLET ORAL DAILY
Refills: 0 | Status: ACTIVE | COMMUNITY

## 2022-01-01 RX ORDER — ASPIRIN/CALCIUM CARB/MAGNESIUM 324 MG
81 TABLET ORAL DAILY
Refills: 0 | Status: DISCONTINUED | OUTPATIENT
Start: 2022-01-01 | End: 2022-01-01

## 2022-01-01 RX ORDER — FLUCONAZOLE 100 MG/1
100 TABLET ORAL
Qty: 6 | Refills: 0 | Status: DISCONTINUED | COMMUNITY
Start: 2021-01-01 | End: 2022-01-01

## 2022-01-01 RX ORDER — ATORVASTATIN CALCIUM 80 MG/1
40 TABLET, FILM COATED ORAL AT BEDTIME
Refills: 0 | Status: DISCONTINUED | OUTPATIENT
Start: 2022-01-01 | End: 2022-01-01

## 2022-01-01 RX ADMIN — Medication 50 MILLIGRAM(S): at 21:47

## 2022-01-01 RX ADMIN — LOSARTAN POTASSIUM 25 MILLIGRAM(S): 100 TABLET, FILM COATED ORAL at 06:13

## 2022-01-01 RX ADMIN — Medication 650 MILLIGRAM(S): at 12:00

## 2022-01-01 RX ADMIN — Medication 650 MILLIGRAM(S): at 23:05

## 2022-01-01 RX ADMIN — CLOPIDOGREL BISULFATE 75 MILLIGRAM(S): 75 TABLET, FILM COATED ORAL at 11:34

## 2022-01-01 RX ADMIN — Medication 50 MILLIGRAM(S): at 06:13

## 2022-01-01 RX ADMIN — PANTOPRAZOLE SODIUM 40 MILLIGRAM(S): 20 TABLET, DELAYED RELEASE ORAL at 06:13

## 2022-01-01 RX ADMIN — Medication 50 MILLIGRAM(S): at 06:12

## 2022-01-01 RX ADMIN — Medication 40 MILLIGRAM(S): at 06:12

## 2022-01-01 RX ADMIN — Medication 81 MILLIGRAM(S): at 11:34

## 2022-01-01 RX ADMIN — Medication 650 MILLIGRAM(S): at 11:34

## 2022-01-01 RX ADMIN — Medication 650 MILLIGRAM(S): at 22:11

## 2022-01-01 RX ADMIN — ATORVASTATIN CALCIUM 40 MILLIGRAM(S): 80 TABLET, FILM COATED ORAL at 21:47

## 2022-01-30 NOTE — CONSULT NOTE ADULT - CONSULT REQUESTED DATE/TIME
01-Dec-2020 20:01 Patient is a 84y old  Male who presents with a chief complaint of s/p cardiac arrest, covid (23 Jan 2022 10:27)      Over Night Events:  Patient seen and examined.   on vent       ROS:  See HPI    PHYSICAL EXAM    ICU Vital Signs Last 24 Hrs  T(C): 38.2 (30 Jan 2022 03:00), Max: 38.2 (29 Jan 2022 19:00)  T(F): 100.8 (30 Jan 2022 03:00), Max: 100.8 (29 Jan 2022 19:00)  HR: 126 (30 Jan 2022 03:29) (82 - 156)  BP: 107/63 (30 Jan 2022 03:29) (99/54 - 159/70)  BP(mean): 80 (30 Jan 2022 03:29) (73 - 110)  ABP: --  ABP(mean): --  RR: 33 (30 Jan 2022 03:29) (29 - 35)  SpO2: 99% (30 Jan 2022 03:29) (95% - 99%)      General: ill looking   HEENT: et tube                 Lymph Nodes: NO cervical LN   Lungs: Bilateral BS  Cardiovascular: Regular   Abdomen: Soft, Positive BS  Extremities: No clubbing   Skin: warm   Neurological: posiitve gag   Musculoskeletal: move all ext     I&O's Detail    28 Jan 2022 07:01  -  29 Jan 2022 07:00  --------------------------------------------------------  IN:    Enteral Tube Flush: 580 mL    IV PiggyBack: 1150 mL    Propofol: 661.5 mL    Vital High Protein: 960 mL  Total IN: 3351.5 mL    OUT:    Indwelling Catheter - Urethral (mL): 1475 mL  Total OUT: 1475 mL    Total NET: 1876.5 mL      29 Jan 2022 07:01  -  30 Jan 2022 06:36  --------------------------------------------------------  IN:    Enteral Tube Flush: 250 mL    IV PiggyBack: 50 mL    Propofol: 342 mL    Vital High Protein: 840 mL  Total IN: 1482 mL    OUT:    Indwelling Catheter - Urethral (mL): 2700 mL  Total OUT: 2700 mL    Total NET: -1218 mL          LABS:                          9.7    13.88 )-----------( 107      ( 29 Jan 2022 07:17 )             32.8         29 Jan 2022 07:17    144    |  106    |  32     ----------------------------<  166    4.5     |  31     |  0.5      Ca    8.0        29 Jan 2022 07:17  Mg     2.3       29 Jan 2022 07:17    TPro  4.4    /  Alb  2.8    /  TBili  0.5    /  DBili  x      /  AST  28     /  ALT  65     /  AlkPhos  72     29 Jan 2022 07:17  Amylase x     lipase x                                                                                                                                                    Culture - Blood (collected 28 Jan 2022 16:37)  Source: .Blood None  Preliminary Report (29 Jan 2022 23:02):    No growth to date.                                                   Mode: AC/ CMV (Assist Control/ Continuous Mandatory Ventilation)  RR (machine): 26  TV (machine): 450  FiO2: 60  PEEP: 12  MAP: 17  PIP: 32                                      ABG - ( 30 Jan 2022 04:14 )  pH, Arterial: 7.44  pH, Blood: x     /  pCO2: 54    /  pO2: 108   / HCO3: 37    / Base Excess: 11.0  /  SaO2: 97.8                MEDICATIONS  (STANDING):  aspirin  chewable 81 milliGRAM(s) Oral daily  cefepime   IVPB      cefepime   IVPB 1000 milliGRAM(s) IV Intermittent every 8 hours  chlorhexidine 0.12% Liquid 15 milliLiter(s) Oral Mucosa every 12 hours  chlorhexidine 4% Liquid 1 Application(s) Topical <User Schedule>  dexAMETHasone  Injectable 6 milliGRAM(s) IV Push every 12 hours  dexMEDEtomidine Infusion 0.2 MICROgram(s)/kG/Hr (3.85 mL/Hr) IV Continuous <Continuous>  dextrose 5%. 1000 milliLiter(s) (100 mL/Hr) IV Continuous <Continuous>  dextrose 5%. 1000 milliLiter(s) (50 mL/Hr) IV Continuous <Continuous>  dextrose 50% Injectable 25 Gram(s) IV Push once  dextrose 50% Injectable 12.5 Gram(s) IV Push once  dextrose 50% Injectable 25 Gram(s) IV Push once  diltiazem Infusion 5 mG/Hr (5 mL/Hr) IV Continuous <Continuous>  enoxaparin Injectable 40 milliGRAM(s) SubCutaneous at bedtime  fentaNYL   Infusion. 0.5 MICROgram(s)/kG/Hr (3.85 mL/Hr) IV Continuous <Continuous>  furosemide   Injectable 40 milliGRAM(s) IV Push two times a day  glucagon  Injectable 1 milliGRAM(s) IntraMuscular once  insulin lispro (ADMELOG) corrective regimen sliding scale   SubCutaneous every 6 hours  norepinephrine Infusion 0.05 MICROgram(s)/kG/Min (3.61 mL/Hr) IV Continuous <Continuous>  oxyCODONE    IR 10 milliGRAM(s) Oral four times a day  pantoprazole  Injectable 40 milliGRAM(s) IV Push daily  polyethylene glycol 3350 17 Gram(s) Oral daily  propofol Infusion 0.05 MICROgram(s)/kG/Min (0.02 mL/Hr) IV Continuous <Continuous>  vancomycin  IVPB 1500 milliGRAM(s) IV Intermittent every 12 hours    MEDICATIONS  (PRN):  acetaminophen     Tablet .. 650 milliGRAM(s) Oral every 6 hours PRN Temp greater or equal to 38C (100.4F), Mild Pain (1 - 3)  albuterol/ipratropium for Nebulization 3 milliLiter(s) Nebulizer every 6 hours PRN Shortness of Breath and/or Wheezing          Xrays:  TLC:  OG:  ET tube:                                                                                    b/l opacity    ECHO:  CAM ICU:

## 2022-02-25 PROBLEM — R60.0 BILATERAL EDEMA OF LOWER EXTREMITY: Status: ACTIVE | Noted: 2022-01-01

## 2022-02-25 NOTE — HISTORY OF PRESENT ILLNESS
[FreeTextEntry1] : 75 y/o F\par h/o tobacco abuse (quit), Afib (stopped Eliquis due to epistaxis) HTN, HLD, DM, high grade bladder cancer\par h/o CAD (RCA KENDELL 2011, om2 2006, om 2009), ICD ([place])\par h/o PAD s/p left dSFA, dLeft pop, TPT trunk and left PT/plantar for left foot ulcer. Due to incomplete healing of ulcer pt underwent another angiogram with intervention of left popliteal and peroneal arteries with PTA and KENDELL.\par h/o bilateral renal artery stenting. \par \par 1/7/21 NM stress test did not show any perfusion defects\par 12/14/20 TTE LVEF 63% Mod MR, Mild TR, G2DD\par 11/6/20 ESTRELLA 0.59 Severe R popliteal A stenosis, ESTRELLA 0.67 Severe L tibial A stenosis\par \par Patient was recently hospitalized for OM of the left foot and she is s/p amputation of the 1st digit. \par She feels good today, except for mild shooting pain in her right foot, radiating to the medial right calf, worse with ambulation. She also complaining of worsening bilateral LE ankle edema.  \par \par \par \par \par

## 2022-02-25 NOTE — DISCUSSION/SUMMARY
[FreeTextEntry1] : 76 F\par h/o tobacco abuse (quit), Afib (stopped Eliquis due to epistaxis) HTN, HLD, DM, high grade bladder cancer\par h/o CAD (RCA KENDELL 2011, om2 2006, om 2009), ICD ([place])\par h/o PAD s/p left dSFA, dLeft pop, TPT trunk and left PT/plantar for left foot ulcer. Due to incomplete healing of ulcer pt underwent another angiogram with intervention of left popliteal and peroneal arteries with PTA and KENDELL.\par h/o bilateral renal artery stenting. \par \par Patient was recently hospitalized for OM of the left foot and she is s/p amputation of the 1st digit. \par She feels good today, except for mild shooting pain in her right foot, radiating to the medial right calf, worse with ambulation. She also complaining of worsening bilateral LE ankle edema.  \par \par PAD s/p PTA LLE\par OM of the left foot s/p amputation- healing well\par CAD s/p PCI\par HTN- well-controlled\par Renal artery stenosis s/p bilateral stenting\par bilateral LE ankle edema\par \par Plan: \par -ESTRELLA w/ PVR and bilateral US arterial duplex today\par -c/w plavix and eliquis\par -c/w Lasix 40 mg daily, add an extra dose tonight and tomorrow qHS \par -Na and fluid restriction \par -Patient requesting to switch cardiology care at [facility]- refer to Dr. Toussaint (cardiology) and Dr. Tyler (EP)\par

## 2022-02-25 NOTE — PHYSICAL EXAM
[General Appearance - Well Developed] : well developed [Normal Appearance] : normal appearance [Well Groomed] : well groomed [General Appearance - Well Nourished] : well nourished [No Deformities] : no deformities [General Appearance - In No Acute Distress] : no acute distress [Normal Conjunctiva] : the conjunctiva exhibited no abnormalities [Eyelids - No Xanthelasma] : the eyelids demonstrated no xanthelasmas [Normal Oral Mucosa] : normal oral mucosa [No Oral Pallor] : no oral pallor [No Oral Cyanosis] : no oral cyanosis [Normal Jugular Venous A Waves Present] : normal jugular venous A waves present [Normal Jugular Venous V Waves Present] : normal jugular venous V waves present [No Jugular Venous Ruval[place]aba A Waves] : no jugular venous ruvalcaba A waves [Respiration, Rhythm And Depth] : normal respiratory rhythm and effort [Exaggerated Use Of Accessory Muscles For Inspiration] : no accessory muscle use [Auscultation Breath Sounds / Voice Sounds] : lungs were clear to auscultation bilaterally [Heart Rate And Rhythm] : heart rate and rhythm were normal [Heart Sounds] : normal S1 and S2 [Murmurs] : no murmurs present [Abdomen Soft] : soft [Abdomen Tenderness] : non-tender [] : no hepato-splenomegaly [Abdomen Mass (___ Cm)] : no abdominal mass palpated [Abnormal Walk] : normal gait [Gait - Sufficient For Exercise Testing] : the gait was sufficient for exercise testing [Nail Clubbing] : no clubbing of the fingernails [Cyanosis, Localized] : no localized cyanosis [Oriented To Time, Place, And Person] : oriented to person, place, and time [Affect] : the affect was normal [Mood] : the mood was normal [No Anxiety] : not feeling anxious [FreeTextEntry1] : Well-healed L foot 1st digit amputation, no redness, warm. Pulses no Doppler detection in the L foot, L peroneal + Doppler pulse. Right PT + Doppler.

## 2022-03-02 NOTE — PROGRESS NOTE ADULT - SUBJECTIVE AND OBJECTIVE BOX
POST-OPERATIVE NOTE    Subjective:   74y Female s/p Lt lower extremity angiogram POD #0. Denies nausea, vomiting, chest pain, sob, fevers chills. Pain is well controlled. Voiding spontaneously.    Vital Signs Last 24 Hrs  T(C): 36.8 (10 Uli 2020 04:41), Max: 37.2 (09 Jun 2020 18:43)  T(F): 98.3 (10 Uli 2020 04:41), Max: 98.9 (09 Jun 2020 18:43)  HR: 93 (10 Uli 2020 04:41) (75 - 93)  BP: 184/73 (10 Uli 2020 04:41) (152/92 - 193/80)  BP(mean): 112 (09 Jun 2020 17:05) (105 - 115)  RR: 18 (10 Uli 2020 04:41) (13 - 20)  SpO2: 99% (10 Uli 2020 04:41) (92% - 100%)  I&O's Detail    09 Jun 2020 07:01  -  10 Uli 2020 05:09  --------------------------------------------------------  IN:    Lactated Ringers IV Bolus: 500 mL    lactated ringers.: 75 mL  Total IN: 575 mL    OUT:    Estimated Blood Loss: 30 mL  Total OUT: 30 mL    Total NET: 545 mL          Physical Exam:  General: NAD, resting comfortably in bed  Pulmonary: Nonlabored breathing, no respiratory distress  Cardiovascular: NSR, S1, S2  Abdominal: soft, NT/ND,  Extremities:  dressing c/d/i, no edema, no calf tenderness,     LABS:                        9.6    13.16 )-----------( 328      ( 09 Jun 2020 06:50 )             30.3     06-09    134<L>  |  101  |  24<H>  ----------------------------<  119<H>  4.9   |  24  |  1.00    Ca    8.5      09 Jun 2020 06:50  Phos  2.9     06-09  Mg     2.1     06-09    TPro  7.0  /  Alb  2.6<L>  /  TBili  0.6  /  DBili  x   /  AST  27  /  ALT  34  /  AlkPhos  311<H>  06-09    LIVER FUNCTIONS - ( 09 Jun 2020 06:50 )  Alb: 2.6 g/dL / Pro: 7.0 g/dL / ALK PHOS: 311 U/L / ALT: 34 U/L DA / AST: 27 U/L / GGT: x             MEDICATIONS  (STANDING):  atorvastatin 40 milliGRAM(s) Oral at bedtime  Dakins Solution - 1/4 Strength 1 Application(s) Topical daily  doxycycline IVPB 100 milliGRAM(s) IV Intermittent every 12 hours  heparin  Infusion.  Unit(s)/Hr (12 mL/Hr) IV Continuous <Continuous>  hydrALAZINE 50 milliGRAM(s) Oral three times a day  insulin glargine Injectable (LANTUS) 10 Unit(s) SubCutaneous at bedtime  insulin lispro (HumaLOG) corrective regimen sliding scale   SubCutaneous Before meals and at bedtime  lactobacillus acidophilus 1 Tablet(s) Oral three times a day with meals  melatonin 5 milliGRAM(s) Oral at bedtime  meropenem  IVPB 1000 milliGRAM(s) IV Intermittent every 8 hours  metoprolol succinate ER 50 milliGRAM(s) Oral daily  pantoprazole  Injectable 40 milliGRAM(s) IV Push daily    MEDICATIONS  (PRN):  acetaminophen   Tablet .. 650 milliGRAM(s) Oral every 6 hours PRN Mild Pain (1 - 3), Moderate Pain (4 - 6)  aluminum hydroxide/magnesium hydroxide/simethicone Suspension 30 milliLiter(s) Oral every 6 hours PRN Dyspepsia  diphenhydrAMINE   Injectable 15 milliGRAM(s) IV Push every 6 hours PRN nausea or vomiting  metoprolol tartrate Injectable 5 milliGRAM(s) IV Push every 6 hours PRN HR greater than 120  ondansetron Injectable 4 milliGRAM(s) IV Push every 8 hours PRN Nausea and/or Vomiting  traMADol 25 milliGRAM(s) Oral every 6 hours PRN Severe Pain (7 - 10)      Assessment:  The patient is a 74y Female who is s/p Lt lower extremity angiogram POD #0. Stable     Plan:  - Pain control as needed  -Dressing change prn   - F/u AM labs  - DVT ppx Detail Level: Detailed

## 2022-03-16 NOTE — ASSESSMENT
[FreeTextEntry1] : ## Ischemic Cardiomyopathy s/p CRT-D ([facility], Non-dep, 11)- recovered EF. Bipolar LV lead.\par ## paroxysmal atrial fibrillation \par \par - ICD interrogation shows normally functioning CRT-D. Battery life 1 year. Optivol below threshold. Brief AF episodes- no recent episodes\par - - On Eliquis. Compliant. No bleeding issues. Patient to contact us if there is any bleeding issues, interruption or any issues with OAC. Patient to go to ER/call 911 if any major bleeding. \par - List of alternative OACs provided- cost issues\par - Remote Monitoring enrollment --> monitor will be sent home, Ordered by [facility] rep\par - Return in 6 months \par

## 2022-03-16 NOTE — PROCEDURE
[No] : not [NSR] : normal sinus rhythm [CRT-D] : Cardiac resynchronization therapy defibrillator [DDD] : DDD [Charge Time: ___ sec] : charge time was [unfilled] seconds [Longevity: ___ months] : The estimated remaining battery life is [unfilled] months [Threshold Testing Performed] : Threshold testing was performed [Lead Imp:  ___ohms] : lead impedance was [unfilled] ohms [Sensing Amplitude ___mv] : sensing amplitude was [unfilled] mv [___V @] : [unfilled] V [___ ms] : [unfilled] ms [None] : none [Counters Reset] : the counters were reset [Pace ___ %] : Pace [unfilled]% [de-identified] : [employer] [de-identified] : [id number] [de-identified] : [id number] [de-identified] : 5/4/2011 [de-identified] :  [de-identified] : Multiple NSVT episodes\par

## 2022-03-16 NOTE — HISTORY OF PRESENT ILLNESS
[de-identified] : I had a pleasure of seeing Ms. MELENDEZ for consultation for device care. She is accompanied by her daughter. They live in [place], but plan to move to [place], [place].\par \par Ms. MELENDEZ is a 76 year-year old male with history of CAD/Multiple PCI, PAD s/p multiple interventions, s/p Renal Artery stenting,  HTN, DM, DL, paroxysmal atrial fibrillation, Ischemic Cardiomyopathy s/p CRT-D ([facility], Non-dep, 15), bladder cancer is to establish care.\par \par + occ Palpitations \par \par Denies chest pain, shortness of breath, palpitation, dizziness or LOC except noted above.\par \par EKG (3/16/22): [id number]-[id number]@78\par Cardio: Khoa Hammonds/[facility]\par \par

## 2022-03-25 NOTE — ED PROVIDER NOTE - HIV OFFER
Writer called patient, stated she needs the dates entered for Part B question 1. Writer conversed with provider, form updated and faxed to the [place]. Dates entered: 2/21/22-3/11/22.    Patient notified via phone. Routed to XIOMARA Beltran as yokasta.   Previously Declined (within the last year)

## 2022-04-01 NOTE — ED ADULT NURSE NOTE - SUICIDE SCREENING QUESTION 1
No Area M Indication Text: Tumors in this location are included in Area M (cheek, forehead, scalp, neck, jawline and pretibial skin).  Mohs surgery is indicated for tumors in these anatomic locations.

## 2022-04-28 NOTE — PROGRESS NOTE ADULT - NS ED BHA TELEPSYCH PATIENT INTERVIEW PRIVATE SPACE
Patient interviewed in a private space. Acitretin Counseling:  I discussed with the patient the risks of acitretin including but not limited to hair loss, dry lips/skin/eyes, liver damage, hyperlipidemia, depression/suicidal ideation, photosensitivity.  Serious rare side effects can include but are not limited to pancreatitis, pseudotumor cerebri, bony changes, clot formation/stroke/heart attack.  Patient understands that alcohol is contraindicated since it can result in liver toxicity and significantly prolong the elimination of the drug by many years.

## 2022-05-01 NOTE — ED PROVIDER NOTE - WR ORDER NAME 1
None Xray Chest 1 View-PORTABLE IMMEDIATE Complex Repair And Rhombic Flap Text: The defect edges were debeveled with a #15 scalpel blade.  The primary defect was closed partially with a complex linear closure.  Given the location of the remaining defect, shape of the defect and the proximity to free margins a rhombic flap was deemed most appropriate for complete closure of the defect.  Using a sterile surgical marker, an appropriate advancement flap was drawn incorporating the defect and placing the expected incisions within the relaxed skin tension lines where possible.    The area thus outlined was incised deep to adipose tissue with a #15 scalpel blade.  The skin margins were undermined to an appropriate distance in all directions utilizing iris scissors.

## 2022-05-06 PROBLEM — I48.91 A-FIB: Status: ACTIVE | Noted: 2022-01-01

## 2022-05-06 PROBLEM — I70.1 RENAL ARTERY STENOSIS: Status: ACTIVE | Noted: 2021-05-21

## 2022-05-06 PROBLEM — R06.02 SHORTNESS OF BREATH: Status: ACTIVE | Noted: 2022-01-01

## 2022-05-06 PROBLEM — I73.9 PAD (PERIPHERAL ARTERY DISEASE): Status: ACTIVE | Noted: 2020-06-23

## 2022-05-06 PROBLEM — E11.9 DIABETES: Status: ACTIVE | Noted: 2020-07-08

## 2022-05-06 PROBLEM — I50.33 ACUTE ON CHRONIC DIASTOLIC CONGESTIVE HEART FAILURE: Status: ACTIVE | Noted: 2022-01-01

## 2022-05-06 PROBLEM — N18.30 STAGE 3 CHRONIC KIDNEY DISEASE, UNSPECIFIED WHETHER STAGE 3A OR 3B CKD: Status: ACTIVE | Noted: 2022-01-01

## 2022-05-06 PROBLEM — I25.9 CHRONIC ISCHEMIC HEART DISEASE, UNSPECIFIED: Status: ACTIVE | Noted: 2020-12-04

## 2022-05-06 NOTE — DISCUSSION/SUMMARY
[FreeTextEntry1] : 76 F\par h/o tobacco abuse (quit), Afib (stopped Eliquis due to epistaxis) HTN, HLD, DM, high grade bladder cancer\par h/o CAD (RCA KENDELL 2011, om2 2006, om 2009), ICD ([place])\par h/o PAD s/p left dSFA, dLeft pop, TPT trunk and left PT/plantar for left foot ulcer. Due to incomplete healing of ulcer pt underwent another angiogram with intervention of left popliteal and peroneal arteries with PTA and KENDELL.\par h/o bilateral renal artery stenting. \par \par Patient was recently hospitalized for OM of the left foot and she is s/p amputation of the 1st digit. \par She feels good today, except for mild shooting pain in her right foot, radiating to the medial right calf, worse with ambulation. She also complaining of worsening bilateral LE ankle edema.  \par \par PAD s/p PTA LLE\par OM of the left foot s/p amputation- healing well\par CAD s/p PCI\par HTN- well-controlled\par Renal artery stenosis s/p bilateral stenting\par bilateral LE ankle edema\par New left le ulcer and now rest pain \par \par Plan: \par -ESTRELLA w/ PVR and bilateral US arterial duplex today\par - Prior Cath Images reviewed - complex anatomy resks vs benefits vs possible amputatian discussed \par   with pt and daughter in great detail\par - Will discuss plan after vascular testing  \par -C/w plavix and eliquis\par -F/u with Dr. Toussaint\par

## 2022-05-06 NOTE — REVIEW OF SYSTEMS
[Blurry Vision] : no blurred vision [Dyspnea on exertion] : dyspnea during exertion [Chest Discomfort] : no chest discomfort [Lower Ext Edema] : lower extremity edema [Leg Claudication] : intermittent leg claudication [Palpitations] : no palpitations [Syncope] : no syncope [Anxiety] : no anxiety [Easy Bruising] : no tendency for easy bruising [Easy Bleeding] : no tendency for easy bleeding [Negative] : Neurological

## 2022-05-06 NOTE — ASSESSMENT
[FreeTextEntry1] : CAD, s/p PCI of RCA and OM.\par Diastolic CHF, moderate MR. Evidence of fluid overload now.\par CKD 3.\par PAD.\par PAF, in SR now.  CHADS Vasc score 5. \par DM, hyperlipidemia.\par \par Plan:\par Replace Furosemide with Torsemide 20 mg daily for 1 week.\par Add K-dur 20 meqs daily.\par Will reduce Xarelto dose if GFR < 50 again.\par 2D ECHO.\par F/u in 1 week.\par \par Scott Toussaint MD\par \par

## 2022-05-06 NOTE — HISTORY OF PRESENT ILLNESS
[FreeTextEntry1] : 77 y/o F\par h/o tobacco abuse (quit), Afib (stopped Eliquis due to epistaxis) HTN, HLD, DM, high grade bladder cancer\par h/o CAD (RCA KENDELL 2011, om2 2006, om 2009), ICD ([place])\par h/o PAD s/p left dSFA, dLeft pop, TPT trunk and left PT/plantar for left foot ulcer. Due to incomplete healing of ulcer pt underwent another angiogram with intervention of left popliteal and peroneal arteries with PTA and KENDELL.\par h/o bilateral renal artery stenting. \par \par 1/7/21 NM stress test did not show any perfusion defects\par 12/14/20 TTE LVEF 63% Mod MR, Mild TR, G2DD\par 11/6/20 ESTRELLA 0.59 Severe R popliteal A stenosis, ESTRELLA 0.67 Severe L tibial A stenosis\par \par Prior hospitalization for OM of the left foot and she is s/p amputation of the 1st digit. Now reports new left foot ulcer and now rest pain x 2 weeks. \par \par Patient also c/o bilateral leg edema and increased PLASENCIA.\par \par LDL 60\par GFR 48\par K 3.9.\par \par  4 = completely dependent

## 2022-05-06 NOTE — HISTORY OF PRESENT ILLNESS
[FreeTextEntry1] : 75 y/o F\par h/o tobacco abuse (quit), Afib (stopped Eliquis due to epistaxis) HTN, HLD, DM, high grade bladder cancer\par h/o CAD (RCA KENDELL 2011, om2 2006, om 2009), ICD ([place])\par h/o PAD s/p left dSFA, dLeft pop, TPT trunk and left PT/plantar for left foot ulcer. Due to incomplete healing of ulcer pt underwent another angiogram with intervention of left popliteal and peroneal arteries with PTA and KENDELL.\par h/o bilateral renal artery stenting. \par \par 1/7/21 NM stress test did not show any perfusion defects\par 12/14/20 TTE LVEF 63% Mod MR, Mild TR, G2DD\par 11/6/20 ESTRELLA 0.59 Severe R popliteal A stenosis, ESTRELLA 0.67 Severe L tibial A stenosis\par \par Prior hospitalization for OM of the left foot and she is s/p amputation of the 1st digit. \par Today for f/u reports new left foot ulcer and now rest pain x 2 weeks. \par \par \par

## 2022-05-06 NOTE — PHYSICAL EXAM
[Well Developed] : well developed [Well Nourished] : well nourished [No Acute Distress] : no acute distress [Normal Conjunctiva] : normal conjunctiva [Normal Venous Pressure] : normal venous pressure [No Carotid Bruit] : no carotid bruit [Normal S1, S2] : normal S1, S2 [No Rub] : no rub [S4] : S4 [Murmur] : murmur [Clear Lung Fields] : clear lung fields [Good Air Entry] : good air entry [No Respiratory Distress] : no respiratory distress  [Soft] : abdomen soft [Non Tender] : non-tender [Normal Bowel Sounds] : normal bowel sounds [Normal Gait] : normal gait [No Cyanosis] : no cyanosis [No Clubbing] : no clubbing [No Varicosities] : no varicosities [Diminished Pedal Pulses ___] : diminished pedal pulses [unfilled] [Edema ___] : edema [unfilled] [No Rash] : no rash [Moves all extremities] : moves all extremities [No Focal Deficits] : no focal deficits [Normal Speech] : normal speech [Alert and Oriented] : alert and oriented [Normal memory] : normal memory [de-identified] : soft SM at the apex

## 2022-05-06 NOTE — PHYSICAL EXAM
[General Appearance - Well Developed] : well developed [Normal Appearance] : normal appearance [Well Groomed] : well groomed [General Appearance - Well Nourished] : well nourished [No Deformities] : no deformities [General Appearance - In No Acute Distress] : no acute distress [Normal Conjunctiva] : the conjunctiva exhibited no abnormalities [Eyelids - No Xanthelasma] : the eyelids demonstrated no xanthelasmas [Normal Oral Mucosa] : normal oral mucosa [No Oral Pallor] : no oral pallor [No Oral Cyanosis] : no oral cyanosis [Normal Jugular Venous A Waves Present] : normal jugular venous A waves present [Normal Jugular Venous V Waves Present] : normal jugular venous V waves present [No Jugular Venous [place] A Waves] : no jugular venous ruvalcaba A waves [Respiration, Rhythm And Depth] : normal respiratory rhythm and effort [Exaggerated Use Of Accessory Muscles For Inspiration] : no accessory muscle use [Auscultation Breath Sounds / Voice Sounds] : lungs were clear to auscultation bilaterally [Heart Rate And Rhythm] : heart rate and rhythm were normal [Heart Sounds] : normal S1 and S2 [Murmurs] : no murmurs present [Abdomen Soft] : soft [Abdomen Tenderness] : non-tender [] : no hepato-splenomegaly [Abdomen Mass (___ Cm)] : no abdominal mass palpated [Abnormal Walk] : normal gait [Gait - Sufficient For Exercise Testing] : the gait was sufficient for exercise testing [Cyanosis, Localized] : no localized cyanosis [Nail Clubbing] : no clubbing of the fingernails [Oriented To Time, Place, And Person] : oriented to person, place, and time [Affect] : the affect was normal [Mood] : the mood was normal [No Anxiety] : not feeling anxious [FreeTextEntry1] : Well-healed L foot 1st digit amputation, no redness, warm. Pulses no Doppler detection in the L foot, L peroneal + Doppler pulse. Right PT + Doppler.

## 2022-06-01 NOTE — PATIENT PROFILE ADULT - FALL HARM RISK - HARM RISK INTERVENTIONS

## 2022-06-01 NOTE — CHART NOTE - NSCHARTNOTEFT_GEN_A_CORE
INDICATION:   LLE CLI  Leena class 5     PHYSICIAN: Dr. Hammonds   ASSISTANT/FELLOW: Dr. Castillo    ACCESS: Ultrasound Guided right  femoral artery access    VASCULAR CLOSURE DEVICE (if applicable): perclose     ANGIOGRAM:  Selective Abdominal Aorta, iliofemoral, Lower Extremity DSA angiography       DIAGNOSTIC FINDINGS    Right Common Femoral Artery: mild disease   Right SFA Artery: 100% stenosis of distal SFA  Right Profunda Artery: patent   Right Popliteal Artery: severe atherosclerosis   Right Anterior Tibial Artery: occluded   Right Peroneal Artery: patent (reconstituted by collaterals)  Right Posterior Tibial Artery: occluded     Left Common Femoral Artery: no disease   Left SFA Artery: Occluded distal SFA stent   Left Profunda Artery: patent   Left Popliteal Artery: Occluded stent   Left Tibial Peroneal Trunk Artery: Occluded stent   Left Anterior Tibial Artery: occluded   Left Peroneal Artery: Occluded stent   Left Posterior Tibial Artery: Occluded. short segment distal reconstitution via collaterals       COMPLICATION: none    Specimens removed: N/A     PERIPHERAL DIAGNOSTIC ANGIOGRAM/ INTERVENTION SUMMARY:    -Severe left lower extremity PAD. Occluded distal SFA, popliteal, TP trunk and peroneal stents. Occluded AT and PT. Left lower extremity was mainly supplied by collaterals from [employer].   -Occluded right distal SFA and severely diseased popliteal with 1 vessel runoff     -S/p successful recanalization and PTA of distal SFA, popliteal, TPT, peroneal stents followed by DCB angioplasty of distal SFA with establishment of in-line flow to left lower extremity.       RECOMMENDATIONS:  Tele monitoring overnight  monitor access site  post procedure IV hydration  cont medical therapy and risk factor modification

## 2022-06-01 NOTE — H&P CARDIOLOGY - HISTORY OF PRESENT ILLNESS
DARRION MELENDEZ is here for PTA    History of Present Illness  75 y.o female patient with PMH of tobacco abuse (quit), Afib (stopped Eliquis due to epistaxis) HTN, HLD, CAD (RCA KENDELL 2011, om2 2006, om 2009), DM, ICD ([place]), PAD s/p left dSFA, dLeft pop, TPT trunk and left PT/plantar for left foot ulcer. Due to incomplete healing of ulcer pt underwent another angiogram with intervention of left popliteal and peroneal arteries with PTA and KENDELL, high grade bladder cancer with intravesical treatment, Gemcitabine presents to the Cath Lab for PAD and renal artery stenosis evaluation.    Patient reports that her PAD symptoms significantly improved. She doesn't walk a lot but is able to go shop although she has to stop a few times because of LE pain; at rest she sometimes reports heaviness in her feet. She has an ulcer on the lateral aspect of the left foot    Patient also known to have severe ostial stenoses of right and left renal arteries; she states that her BP is controlled at home; no change in urination or other significant symptoms.     1/7/21 NM stress test did not show any perfusion defects  12/14/20 TTE LVEF 63% Mod MR, Mild TR, G2DD  11/6/20 ESTRELLA 0.59 Severe R popleteal A stenosis, ESTRELLA 0.67 Severe Left tibial A stenosis    Prior hospitalization for OM of the left foot and she is s/p amputation of the 1st digit.  Now reports new left foot ulcer and rest pain for 2 weeks.      Active Problems  Bilateral impacted cerumen (380.4) (H61.23)  Bladder cancer (188.9) (C67.9)  Bladder tumor (239.4) (D49.4)  Blood circulation, collateral (459.89) (I99.8)  Blood thinned due to long-term anticoagulant use (V58.61) (Z79.01)  Cellulitis (682.9) (L03.90)  Chronic ischemic heart disease, unspecified (414.9) (I25.9)  Coronary angioplasty status (V45.82) (Z98.61)  Diabetes (250.00) (E11.9)  Epistaxis, recurrent (784.7) (R04.0)  Hernia (553.9) (K46.9)  High blood pressure (401.9) (I10)  History of hysterectomy (V88.01) (Z90.710)  Pacemaker (V45.01) (Z95.0)  PAD (peripheral artery disease) (443.9) (I73.9)  Sensorineural hearing loss (SNHL) of both ears (389.18) (H90.3)  Toe amputation status, left  Vascular problem (459.9) (I99.9)    Surgical History  History of Gallbladder surgery  History of Hernia repair  History of Hysterectomy  History of Leg surgery  History of Toe amputation    Family History  Family history of diabetes mellitus (V18.0) (Z83.3) : Mother, Father  Family history of hypertension (V17.49) (Z82.49) : Daughter  History of heart bypass surgery : Daughter    Social History  No alcohol use  Non-smoker (V49.89) (Z78.9)  Stopped smoking (V15.82) (Z87.891)  Unemployed (V62.0) (Z56.0)    [place] [address]  Current Meds  · Normal Saline Flush 0.9 % SOLN    Allergies  codeine  Codeine Derivatives  Penicillins

## 2022-06-01 NOTE — ASU PATIENT PROFILE, ADULT - NSICDXPASTMEDICALHX_GEN_ALL_CORE_FT
PAST MEDICAL HISTORY:  Amputation of toe of left foot L [place] 5/20    Atrial fibrillation, unspecified type paf recently stopped eliquis s/t epistatxis    Bladder tumor     CAD (coronary artery disease) RCA KENDELL  2011 OM2 KENDELL 2006  OM1 KENDELL 2009    CHF (congestive heart failure)     Chronic atrial fibrillation     DM (diabetes mellitus) 30 yr    DM2 (diabetes mellitus, type 2)     Dyslipidemia     H/O ischemic heart disease     Hernia     History of implantable cardiac defibrillator (ICD) and pacemaker- [employer]    HTN (hypertension)     Hypertension     PAD (peripheral artery disease)     PAD (peripheral artery disease) severe s/p stent

## 2022-06-01 NOTE — ASU PATIENT PROFILE, ADULT - FALL HARM RISK - UNIVERSAL INTERVENTIONS
Bed in lowest position, wheels locked, appropriate side rails in place/Call bell, personal items and telephone in reach/Instruct patient to call for assistance before getting out of bed or chair/Non-slip footwear when patient is out of bed/[place] to call system/Physically safe environment - no spills, clutter or unnecessary equipment/Purposeful Proactive Rounding/Room/bathroom lighting operational, light cord in reach

## 2022-06-01 NOTE — H&P CARDIOLOGY - VASCULAR DETAILS
Well healed Left foot 1st digit amputation, no redness, warm. No doppler detection in the Left foot, Left peroneal + Doppler pulse. Right PT + Doppler

## 2022-06-02 NOTE — DISCHARGE NOTE NURSING/CASE MANAGEMENT/SOCIAL WORK - NSDCPEFALRISK_GEN_ALL_CORE
For information on Fall & Injury Prevention, visit: [email or web address] OR  [email or web address] OR  https://www.cdc.gov/steadi/patient.html

## 2022-06-02 NOTE — DISCHARGE NOTE PROVIDER - NSDCMRMEDTOKEN_GEN_ALL_CORE_FT
apixaban 5 mg oral tablet: 1 tab(s) orally every 12 hours  atorvastatin 40 mg oral tablet: 1 tab(s) orally once a day (at bedtime)  clopidogrel 75 mg oral tablet: 1 tab(s) orally once a day  collagenase 250 units/g topical ointment: 1 application topically 2 times a day  furosemide 40 mg oral tablet: 1 tab(s) orally once a day  glimepiride 4 mg oral tablet: 1 tab(s) orally 2 times a day  hydrALAZINE 50 mg oral tablet: 1 tab(s) orally every 8 hours MDD:1  loratadine 10 mg oral tablet: 1 tab(s) orally once a day  losartan 25 mg oral tablet: 1 tab(s) orally once a day  metoprolol tartrate 50 mg oral tablet: 1 tab(s) orally 2 times a day  pantoprazole 40 mg oral delayed release tablet: 1 tab(s) orally once a day  senna oral tablet: 2 tab(s) orally once a day (at bedtime)

## 2022-06-02 NOTE — DISCHARGE NOTE PROVIDER - HOSPITAL COURSE
DARRION MELENDEZ is here for PTA    History of Present Illness  75 y.o female patient with PMH of tobacco abuse (quit), Afib (stopped Eliquis due to epistaxis) HTN, HLD, CAD (RCA KENDELL 2011, om2 2006, om 2009), DM, ICD ([place]), PAD s/p left dSFA, dLeft pop, TPT trunk and left PT/plantar for left foot ulcer. Due to incomplete healing of ulcer pt underwent another angiogram with intervention of left popliteal and peroneal arteries with PTA and KENDELL, high grade bladder cancer with intravesical treatment, Gemcitabine presents to the Cath Lab for PAD and renal artery stenosis evaluation.    Patient reports that her PAD symptoms significantly improved. She doesn't walk a lot but is able to go shop although she has to stop a few times because of LE pain; at rest she sometimes reports heaviness in her feet. She has an ulcer on the lateral aspect of the left foot    Patient also known to have severe ostial stenoses of right and left renal arteries; she states that her BP is controlled at home; no change in urination or other significant symptoms.     1/7/21 NM stress test did not show any perfusion defects  12/14/20 TTE LVEF 63% Mod MR, Mild TR, G2DD  11/6/20 ESTRELLA 0.59 Severe R popliteal A stenosis, ESTRELLA 0.67 Severe Left tibial A stenosis    Prior hospitalization for OM of the left foot and she is s/p amputation of the 1st digit.  Now reports new left foot ulcer and rest pain for 2 weeks.      PERIPHERAL DIAGNOSTIC ANGIOGRAM/ INTERVENTION SUMMARY:    -Severe left lower extremity PAD. Occluded distal SFA, popliteal, TP trunk and peroneal stents. Occluded AT and PT. Left lower extremity was mainly supplied by collaterals from [employer].   -Occluded right distal SFA and severely diseased popliteal with 1 vessel runoff     -S/p successful recanalization and PTA of distal SFA, popliteal, TPT, peroneal stents followed by DCB angioplasty of distal SFA with establishment of in-line flow to left lower extremity. DARRION MELENDEZ is here for PTA    History of Present Illness  75 y.o female patient with PMH of tobacco abuse (quit), Afib (stopped Eliquis due to epistaxis) HTN, HLD, CAD (RCA KENDELL 2011, om2 2006, om 2009), DM, ICD ([place]), PAD s/p left dSFA, dLeft pop, TPT trunk and left PT/plantar for left foot ulcer. Due to incomplete healing of ulcer pt underwent another angiogram with intervention of left popliteal and peroneal arteries with PTA and KENDELL, high grade bladder cancer with intravesical treatment, Gemcitabine presents to the Cath Lab for PAD and renal artery stenosis evaluation.    Patient reports that her PAD symptoms significantly improved. She doesn't walk a lot but is able to go shop although she has to stop a few times because of LE pain; at rest she sometimes reports heaviness in her feet. She has an ulcer on the lateral aspect of the left foot    Patient also known to have severe ostial stenoses of right and left renal arteries; she states that her BP is controlled at home; no change in urination or other significant symptoms.     1/7/21 NM stress test did not show any perfusion defects  12/14/20 TTE LVEF 63% Mod MR, Mild TR, G2DD  11/6/20 ESTRELLA 0.59 Severe R popliteal A stenosis, ESTRELLA 0.67 Severe Left tibial A stenosis    Prior hospitalization for OM of the left foot and she is s/p amputation of the 1st digit.  Now reports new left foot ulcer and rest pain for 2 weeks.      PERIPHERAL DIAGNOSTIC ANGIOGRAM/ INTERVENTION SUMMARY:    -Severe left lower extremity PAD. Occluded distal SFA, popliteal, TP trunk and peroneal stents. Occluded AT and PT. Left lower extremity was mainly supplied by collaterals from [employer].   -Occluded right distal SFA and severely diseased popliteal with 1 vessel runoff     -S/p successful recanalization and PTA of distal SFA, popliteal, TPT, peroneal stents followed by DCB angioplasty of distal SFA with establishment of in-line flow to left lower extremity.

## 2022-06-02 NOTE — DISCHARGE NOTE PROVIDER - NSDCCPCAREPLAN_GEN_ALL_CORE_FT
PRINCIPAL DISCHARGE DIAGNOSIS  Diagnosis: Peripheral artery disease  Assessment and Plan of Treatment: You had an angiogram of your lower extremity/extremities.   Findings were as follows:  PERIPHERAL DIAGNOSTIC ANGIOGRAM/ INTERVENTION SUMMARY:  -Severe left lower extremity PAD. Occluded distal SFA, popliteal, TP trunk and peroneal stents. Occluded AT and PT. Left lower extremity was mainly supplied by collaterals from    -Occluded right distal SFA and severely diseased popliteal with 1 vessel runoff   -S/p successful recanalization and PTA of distal SFA, popliteal, TPT, peroneal stents followed by DCB angioplasty of distal SFA with establishment of in-line flow to left lower extremity.

## 2022-06-02 NOTE — DISCHARGE NOTE PROVIDER - NSDCFUADDINST_GEN_ALL_CORE_FT
Discharge instructions as follows:  - No strenuous activity for 3 days (routine walking permitted)  - May shower in 24 hours  - Leave dressing in place for 24-48 hours, if does not fall off may remove after 48 hours  -** follow up claudication pt x2 weeks

## 2022-06-02 NOTE — DISCHARGE NOTE NURSING/CASE MANAGEMENT/SOCIAL WORK - PATIENT PORTAL LINK FT
You can access the FollowMyHealth Patient Portal offered by [facility] by registering at the following website: [email or web address]. By joining [employer]’s FollowMyHealth portal, you will also be able to view your health information using other applications (apps) compatible with our system.

## 2022-06-02 NOTE — DISCHARGE NOTE PROVIDER - CARE PROVIDER_API CALL
Arpan Hammonds (MD)  Cardiovascular Disease; Endovascular Medicine; Interventional Cardiology; Vascular Medicine  [address] [place] [address], [place] [address]  Phone: [phone]  Fax: [phone]  Follow Up Time: 2 weeks

## 2022-06-02 NOTE — CHART NOTE - NSCHARTNOTEFT_GEN_A_CORE
Vascular Cardiology Chart Note    Patient seen and examined this morning.     S/p recanalization and PTA of distal SFA, popliteal, TPT, and peroneal stents followed by DCB angioplasty of distal SFA with establishment of in-line flow to left lower extremity.     R groin clean/dry/no hematoma.  Patient to ambulate.   Continue DAPT, statin  Stable for discharge home  Follow-up with Dr. Hammonds, our office will call her to set up an appointment.     Patricia Garcia MD  Vascular Cardiology Attending  Please text or call via MS Teams with questions

## 2022-06-03 NOTE — ED ADULT NURSE NOTE - NSSUHOSCREENINGYN_ED_ALL_ED
"General Surgery Progress Note    Post-op Day: 1    Subjective  Feels better. Tolerated lunch  Objective  Blood pressure (!) 157/93, pulse 94, temperature 98.8 Â°F (37.1 Â°C), temperature source Oral, resp. rate 18, height 5' 11"" (1.803 m), weight (!) 140.4 kg (309 lb 8.4 oz), SpO2 95 %. Physical Exam  Resting comfortably. Sclera anicteric  Chaperone present  Abdomen soft nontender nondistended. Dressings clean and dry  MARIE serous fluid.     Intake/Output Summary (Last 24 hours) at 6/3/2022 1539  Last data filed at 6/3/2022 1400  Gross per 24 hour   Intake 240 ml   Output 80 ml   Net 160 ml       Labs   Recent Results (from the past 24 hour(s))   GLUCOSE, BEDSIDE - POINT OF CARE    Collection Time: 06/02/22  5:45 PM   Result Value Ref Range    GLUCOSE, BEDSIDE - POINT OF CARE 115 (H) 70 - 99 mg/dL   GLUCOSE, BEDSIDE - POINT OF CARE    Collection Time: 06/02/22  8:38 PM   Result Value Ref Range    GLUCOSE, BEDSIDE - POINT OF CARE 133 (H) 70 - 99 mg/dL   CBC with Automated Differential (performable only)    Collection Time: 06/03/22  4:39 AM   Result Value Ref Range    WBC 10.8 4.2 - 11.0 K/mcL    RBC 5.60 4.50 - 5.90 mil/mcL    HGB 12.7 (L) 13.0 - 17.0 g/dL    HCT 41.2 39.0 - 51.0 %    MCV 73.6 (L) 78.0 - 100.0 fl    MCH 22.7 (L) 26.0 - 34.0 pg    MCHC 30.8 (L) 32.0 - 36.5 g/dL    RDW-CV 14.8 11.0 - 15.0 %    RDW-SD 38.8 (L) 39.0 - 50.0 fL     140 - 450 K/mcL    NRBC 0 <=0 /100 WBC    Neutrophil, Percent 83 %    Lymphocytes, Percent 8 %    Mono, Percent 8 %    Eosinophils, Percent 0 %    Basophils, Percent 0 %    Immature Granulocytes 1 %    Absolute Neutrophils 9.0 (H) 1.8 - 7.7 K/mcL    Absolute Lymphocytes 0.8 (L) 1.0 - 4.8 K/mcL    Absolute Monocytes 0.8 0.3 - 0.9 K/mcL    Absolute Eosinophils  0.0 0.0 - 0.5 K/mcL    Absolute Basophils 0.0 0.0 - 0.3 K/mcL    Absolute Immmature Granulocytes 0.1 0.0 - 0.2 K/mcL   GLUCOSE, BEDSIDE - POINT OF CARE    Collection Time: 06/03/22  5:51 AM   Result Value Ref Range " GLUCOSE, BEDSIDE - POINT OF CARE 112 (H) 70 - 99 mg/dL   Hepatic Function Panel    Collection Time: 06/03/22  9:54 AM   Result Value Ref Range    Albumin 3.2 (L) 3.6 - 5.1 g/dL    Bilirubin, Total 0.8 0.2 - 1.0 mg/dL    Bilirubin, Direct 0.3 (H) 0.0 - 0.2 mg/dL    Alkaline Phosphatase 121 (H) 45 - 117 Units/L    GPT/ (H) <64 Units/L    GOT/ (H) <=37 Units/L    Protein, Total 7.2 6.4 - 8.2 g/dL   Comprehensive Metabolic Panel    Collection Time: 06/03/22  9:54 AM   Result Value Ref Range    Fasting Status      Sodium 136 135 - 145 mmol/L    Potassium 4.1 3.4 - 5.1 mmol/L    Chloride 101 97 - 110 mmol/L    Carbon Dioxide 27 21 - 32 mmol/L    Anion Gap 12 7 - 19 mmol/L    Glucose 135 (H) 70 - 99 mg/dL    BUN 9 6 - 20 mg/dL    Creatinine 0.74 0.67 - 1.17 mg/dL    Glomerular Filtration Rate >90 >=60    BUN/ Creatinine Ratio 12 7 - 25    Calcium 8.9 8.4 - 10.2 mg/dL    Bilirubin, Total 0.8 0.2 - 1.0 mg/dL    GOT/ (H) <=37 Units/L    GPT/ (H) <64 Units/L    Alkaline Phosphatase 115 45 - 117 Units/L    Albumin 3.2 (L) 3.6 - 5.1 g/dL    Protein, Total 7.2 6.4 - 8.2 g/dL    Globulin 4.0 2.0 - 4.0 g/dL    A/G Ratio 0.8 (L) 1.0 - 2.4   GLUCOSE, BEDSIDE - POINT OF CARE    Collection Time: 06/03/22 11:28 AM   Result Value Ref Range    GLUCOSE, BEDSIDE - POINT OF CARE 111 (H) 70 - 99 mg/dL        Imaging  No results found. Assessment:  Problem List Items Addressed This Visit        Gastrointestinal and Abdominal    Cholelithiasis and cholecystitis without obstruction (Chronic)     6/2/2022  S/p lap lila. Relevant Orders    Surgical Pathology    * (Principal) Acute cholecystitis due to biliary calculus     6/2/2022  S/p laparoscopic cholecystectomy. On FLD per surgery. Continue to monitor. 6/3/2022  D/w GS; plan to remove drain today. LFTs slightly elevated as expected (given gb adhesion to liver) but T bili is normal. Monitor for return of bowel function. Switch to solids.  Encourage ambulation and IS. Status post laparoscopic cholecystectomy for difficult acutely inflamed gallbladder.       Plan:  Okay to [address]  Follow-up in 2 weeks    Erum Perez MD  6/3/2022 Yes - the patient is able to be screened

## 2022-07-14 NOTE — DISCHARGE NOTE PROVIDER - EXTENDED VTE YES NO FOR MLM ENOXAPARIN
, Star Wedge Flap Text: The defect edges were debeveled with a #15 scalpel blade.  Given the location of the defect, shape of the defect and the proximity to free margins a star wedge flap was deemed most appropriate.  Using a sterile surgical marker, an appropriate rotation flap was drawn incorporating the defect and placing the expected incisions within the relaxed skin tension lines where possible. The area thus outlined was incised deep to adipose tissue with a #15 scalpel blade.  The skin margins were undermined to an appropriate distance in all directions utilizing iris scissors.

## 2022-08-11 NOTE — ED ADULT NURSE NOTE - NS ED NURSE DISCH DISPOSITION
[id number]  SHOULDER MRI 8- PENDING WITH AIM, ORDER  # [id number]. CLINICALS WERE FAXED -[phone] IN TWO PARTS.
Transferred

## 2022-08-12 NOTE — ED ADULT TRIAGE NOTE - ESI TRIAGE ACUITY LEVEL, MLM
"-- DO NOT REPLY / DO NOT REPLY ALL --  -- Message is from [facility] ([facility]) --    General Patient Message      Yanyindia Montes from [address] is calling to follow up on medication Bisoprolol received a message stating that doctor never prescribed medication and they see there records as to the medication being sent on 2/10/22    Please call and assist    Caller Information       Type Contact Phone/Fax    08/12/2022 12:08 PM CDT Phone (Incoming) [address], [address] (Pharmacy) [address]        Alternative phone number: na    Can a detailed message be left? Yes    Message Turnaround:     Did the caller agree that this message can wait until the office reopens in the morning? YES - The Message Can Wait - Send a message to the provider's clinical support pool     [place]:    Please give this turnaround time to the caller: ""This message will be sent to [facility] Provider's name]. The clinical team will fulfill your request as soon as they review your message. \""                " 2

## 2022-08-26 NOTE — ED ADULT NURSE NOTE - CAS EDN DISCHARGE ASSESSMENT
"Subjective:       Patient ID: Ora Henderson is a 64 y.o. female.    Chief Complaint: Back Pain and Bleeding/Bruising    64-year-old  female patient with Patient Active Problem List:     DJD (degenerative joint disease), cervical-mild     Mixed hyperlipidemia     Hepatomegaly     Family history of cardiovascular disease     GERD (gastroesophageal reflux disease)     History of benign pituitary tumor     Hx of colonic polyp     Essential hypertension     Osteoarthritis of spine with radiculopathy, lumbar region     Severe obesity (BMI 35.0-39.9) with comorbidity     NATALIE (obstructive sleep apnea)     Lumbar radiculopathy  Here with complaint of pain to the mid back up to 5 to 6/10, has been taking Robaxin and gabapentin, reports that is helps with neck and back but not the mid back and has noted bruising for which patient has been applying topical antibiotic cream, reports ongoing pain to the mid back for past few months.  Denies any injury or trauma, chest pain or difficulty breathing with palpitations, tingling or numbness sensation to extremities    Review of Systems   Constitutional: Negative for fatigue.   Eyes: Negative for visual disturbance.   Respiratory: Negative for shortness of breath.    Cardiovascular: Negative for chest pain and leg swelling.   Gastrointestinal: Negative for abdominal pain, nausea and vomiting.   Musculoskeletal: Positive for back pain and myalgias. Negative for neck pain.   Skin: Positive for color change. Negative for rash.   Neurological: Negative for weakness, light-headedness, numbness and headaches.   Psychiatric/Behavioral: Negative for sleep disturbance.         /86 (BP Location: Left arm, Patient Position: Sitting, BP Method: Large (Manual))   Pulse 78   Temp 97.6 °F (36.4 °C) (Tympanic)   Ht 5' 3" (1.6 m)   Wt 90 kg (198 lb 6.6 oz)   LMP  (LMP Unknown)   SpO2 99%   BMI 35.15 kg/m²   Objective:      Physical Exam  Constitutional:  "      Appearance: She is well-developed.   HENT:      Head: Normocephalic and atraumatic.   Cardiovascular:      Rate and Rhythm: Normal rate and regular rhythm.      Heart sounds: Normal heart sounds. No murmur heard.  Pulmonary:      Effort: Pulmonary effort is normal.      Breath sounds: Normal breath sounds. No wheezing.   Abdominal:      General: Bowel sounds are normal.      Palpations: Abdomen is soft.      Tenderness: There is no abdominal tenderness.   Musculoskeletal:         General: Tenderness present.      Comments: Positive for tenderness in the thoracic spine area on palpation with minimal hyperpigmentation but no significant rash noted on exam today   Skin:     General: Skin is warm and dry.      Findings: No rash.   Neurological:      Mental Status: She is alert and oriented to person, place, and time.   Psychiatric:         Mood and Affect: Mood normal.           Assessment/Plan:   1. Thoracic spine pain  - X-Ray Thoracic Spine AP Lateral; Future  - meloxicam (MOBIC) 15 MG tablet; Take 1 tablet (15 mg total) by mouth daily as needed for Pain.  Dispense: 30 tablet; Refill: 0  Will get x-ray of the thoracic spine to rule out underlying arthritis  Patient was advised to start taking meloxicam as prescribed and continue Robaxin and gabapentin  If any worsening symptoms patient to see pain management or consider physical therapy    2. Osteoarthritis of spine with radiculopathy, cervical region  Reports being stable with medical management    3. Essential hypertension  Blood pressure is stable currently on amlodipine 2.5 mg and losartan hydrochlorothiazide 100/25 mg    4. Severe obesity (BMI 35.0-39.9) with comorbidity  Lifestyle modifications recommended to lose weight with BMI 35            Alert and oriented to person, place and time

## 2022-09-06 NOTE — ED PROVIDER NOTE - PSYCHIATRIC, MLM
Pt called to get earlier appt for the 28th  ,, I called pt and left msg to call back to see if we can accommodate him Alert and oriented to person, place, time/situation. normal mood and affect. no apparent risk to self or others.

## 2022-09-14 ENCOUNTER — APPOINTMENT (OUTPATIENT)
Dept: CARDIOLOGY | Facility: CLINIC | Age: 77
End: 2022-09-14

## 2022-09-26 NOTE — H&P PST ADULT - TEMPERATURE IN CELSIUS (DEGREES C)
Topical Sulfur Applications Counseling: Topical Sulfur Counseling: Patient counseled that this medication may cause skin irritation or allergic reactions.  In the event of skin irritation, the patient was advised to reduce the amount of the drug applied or use it less frequently.   The patient verbalized understanding of the proper use and possible adverse effects of topical sulfur application.  All of the patient's questions and concerns were addressed. 36.2

## 2022-09-28 NOTE — ED CDU PROVIDER DISPOSITION NOTE - NS_CDULENGTHOFSTAY_ED_A_ED
----- Message from Laura Lr DO sent at 9/28/2022  4:28 PM CDT -----  Urine culture showing bacterial growth. No changes to the current antibiotic Keflex. Finish full course of antibiotic and follow-up with PCP if symptoms recur or are not improving.     
Sent msg to [facility] per pt request  
17 Hour(s) 55 Minute(s)

## 2022-11-22 NOTE — PHYSICAL THERAPY INITIAL EVALUATION ADULT - LIVES WITH, PROFILE
[spouse/children

## 2023-01-22 NOTE — ASU PREOP CHECKLIST - BOWEL PREP
n/a Airway patent, Nasal mucosa clear. Mouth with normal mucosa. Throat has no vesicles, no oropharyngeal exudates and uvula is midline.

## 2023-03-01 NOTE — CHART NOTE - NSCHARTNOTESELECT_GEN_ALL_CORE
Here for recheck and rec low sugar diet and exercise as tolerated  HgA1C at 8 2  Rec f-up with Cardiology as directed for a-fib and HTN and f-up with Vascular Surgery for hx of thoracic aortic aneurysm and descending aortic aneurysm  Edema present and uses compression stockings  Lipids stable  No cp or sob, or ha  Prevnar 20 today and recheck in 4 months with labs  Try to avoid sugar in coffee and in diet  Type 2 Diabetes Management for Adults   AMBULATORY CARE:   Type 2 diabetes  is a disease that affects how your body uses glucose (sugar)  Either your body cannot make enough insulin, or it cannot use the insulin correctly  It is important to keep diabetes controlled to prevent damage to your heart, blood vessels, and other organs  Management will help you feel well and enjoy your daily activities  Your diabetes care team providers can help you make a plan to fit diabetes care into your schedule  Your plan can change over time to fit your needs and your family's needs  Have someone call your local emergency number (911 in the [address] [facility]) if:   You cannot be woken  You have signs of diabetic ketoacidosis:     confusion, fatigue    vomiting    rapid heartbeat    fruity smelling breath    extreme thirst    dry mouth and skin    You have any of the following signs of a heart attack:      Squeezing, pressure, or pain in your chest    You may  also have any of the following:     Discomfort or pain in your back, neck, jaw, stomach, or arm    Shortness of breath    Nausea or vomiting    Lightheadedness or a sudden cold sweat    You have any of the following signs of a stroke:      Numbness or drooping on one side of your face     Weakness in an arm or leg    Confusion or difficulty speaking    Dizziness, a severe headache, or vision loss    Call your doctor or diabetes care team provider if:   You have a sore or wound that will not heal     You have a change in the amount you urinate      Your blood sugar levels are Event Note higher than your target goals  You often have lower blood sugar levels than your target goals  Your skin is red, dry, warm, or swollen  You have trouble coping with diabetes, or you feel anxious or depressed  You have questions or concerns about your condition or care  What you need to know about high blood sugar levels:  High blood sugar levels may not cause any symptoms  You may feel more thirsty or urinate more often than usual  Over time, high blood sugar levels can damage your nerves, blood vessels, tissues, and organs  The following can increase your blood sugar levels:  Large meals or large amounts of carbohydrates at one time    Less physical activity    Stress    Illness    A lower dose of diabetes medicine or insulin, or a late dose    What you need to know about low blood sugar levels:  Symptoms include feeling shaky, dizzy, irritable, or confused  You can prevent symptoms by keeping your blood sugar levels from going too low  Treat a low blood sugar level right away:      Drink 4 ounces of juice or have 1 tube of glucose gel  Check your blood sugar level again 10 to 15 minutes later  When the level goes back to normal, eat a meal or snack to prevent another decrease  Keep glucose gel, raisins, or hard candy with you at all times to treat a low blood sugar level  Your blood sugar level can get too low if you take diabetes medicine or insulin and do not eat enough food  If you use insulin, check your blood sugar level before you exercise  If your blood sugar level is below 100 mg/dL, eat 4 crackers or 2 ounces of raisins, or drink 4 ounces of juice  Check your level every 30 minutes if you exercise longer than 1 hour  You may need a snack during or after exercise  What you can do to manage your blood sugar levels:   Check your blood sugar levels as directed and as needed  Several items are available to use to check your levels   You may need to check by testing a drop of blood in a glucose monitor  You may instead be given a continuous glucose monitoring (CGM) device  The device is worn at all times  The CGM checks your blood sugar level every 5 minutes  It sends results to an electronic device such as a smart phone  A CGM can be used with or without an insulin pump  You and your diabetes care team providers will decide on the best method for you  The goal for blood sugar levels before meals  is between 80 and 130 mg/dL and 2 hours after eating  is lower than 180 mg/dL  Make healthy food choices  Work with a dietitian to develop a meal plan that works for you and your schedule  A dietitian can help you learn how to eat the right amount of carbohydrates during your meals and snacks  Carbohydrates can raise your blood sugar level if you eat too many at one time  Examples of foods that contain carbohydrates are breads, cereals, rice, pasta, and sweets  Eat high-fiber foods as directed  Fiber helps improve blood sugar levels  Fiber also lowers your risk for heart disease and other problems diabetes can cause  Examples of high-fiber foods include vegetables, whole-grain bread, and beans such as saravia beans  Your dietitian can tell you how much fiber to have each day  Get regular physical activity  Physical activity can help you get to your target blood sugar level goal and manage your weight  Get at least 150 minutes of moderate to vigorous aerobic physical activity each week  Do not miss more than 2 days in a row  Do not sit longer than 30 minutes at a time  Your healthcare provider can help you create an activity plan  The plan can include the best activities for you and can help you build your strength and endurance  Maintain a healthy weight  Ask your team what a healthy weight is for you  A healthy weight can help you control diabetes and prevent heart disease  Ask your team to help you create a weight loss plan, if needed  Weight loss can help make a difference in managing diabetes  Your team will help you set a weight-loss goal, such as 10 to 15 pounds, or 5% of your extra weight  Together you and your team can set manageable weight loss goals  Take your diabetes medicine or insulin as directed  You may need diabetes medicine, insulin, or both to help control your blood sugar levels  Your healthcare provider will teach you how and when to take your diabetes medicine or insulin  You will also be taught about side effects oral diabetes medicine can cause  Insulin may be injected or given through a pump or pen  You and your providers will decide on the best method for you: An insulin pump  is an implanted device that gives your insulin 24 hours a day  An insulin pump prevents the need for multiple insulin injections in a day  An insulin pen  is a device prefilled with the right amount of insulin  You and your family members will be taught how to draw up and give insulin  if this is the best method for you  Your providers will also teach you how to dispose of needles and syringes  You will learn how much insulin you need  and when to give it  You will be taught when not to give insulin  You will also be taught what to do if your blood sugar level drops too low  This may happen if you take insulin and do not eat the right amount of carbohydrates  More ways to manage type 2 diabetes:   Wear medical alert identification  Wear medical alert jewelry or carry a card that says you have diabetes  Ask your provider where to get these items  Do not smoke  Nicotine and other chemicals in cigarettes and cigars can cause lung and blood vessel damage  It also makes it more difficult to manage your diabetes  Ask your provider for information if you currently smoke and need help to quit  Do not use e-cigarettes or smokeless tobacco in place of cigarettes or to help you quit  They still contain nicotine      Check your feet each day for cuts, scratches, calluses, or other wounds  Look for redness and swelling, and feel for warmth  Wear shoes that fit well  Check your shoes for rocks or other objects that can hurt your feet  Do not walk barefoot or wear shoes without socks  Wear cotton socks to help keep your feet dry  Ask about vaccines you may need  You have a higher risk for serious illness if you get the flu, pneumonia, COVID-19, or hepatitis  Ask your provider if you should get vaccines to prevent these or other diseases, and when to get the vaccines  Talk to your provider if you become stressed about diabetes care  Sometimes being able to fit diabetes care into your life can cause increased stress  The stress can cause you not to take care of yourself properly  Your care team providers can help by offering tips about self-care  Your providers may suggest you talk to a mental health provider who can listen and offer help with self-care issues  Have your A1c checked as directed  Your provider may check your A1c every 3 months, or 2 times each year if your diabetes is controlled  An A1c test shows the average amount of sugar in your blood over the past 2 to 3 months  Your provider will tell you what your A1c level should be  Have screening tests as directed  Your provider may recommend screening for complications of diabetes and other conditions that may develop  Some screenings may begin right away and some may happen within the first 5 years of diagnosis:    Examples of diabetes complications  include kidney problems, high cholesterol, high blood pressure, blood vessel problems, eye problems, and sleep apnea  You may be screened for a low vitamin B level  if you take oral diabetes medicine for a long time  Women of childbearing years may be screened  for polycystic ovarian syndrome (PCOS)  Follow up with your doctor or diabetes care team providers as directed:   You may need to have blood tests done before your follow-up visit  The test results will show if changes need to be made in your treatment or self-care  Talk to your provider if you cannot afford your medicine  Write down your questions so you remember to ask them during your visits  © Copyright Benn[employer] Calender 2022 Information is for End User's use only and may not be sold, redistributed or otherwise used for commercial purposes  The above information is an  only  It is not intended as medical advice for individual conditions or treatments  Talk to your doctor, nurse or pharmacist before following any medical regimen to see if it is safe and effective for you  Type 2 Diabetes Management for Adults   AMBULATORY CARE:   Type 2 diabetes  is a disease that affects how your body uses glucose (sugar)  Either your body cannot make enough insulin, or it cannot use the insulin correctly  It is important to keep diabetes controlled to prevent damage to your heart, blood vessels, and other organs  Management will help you feel well and enjoy your daily activities  Your diabetes care team providers can help you make a plan to fit diabetes care into your schedule  Your plan can change over time to fit your needs and your family's needs  Have someone call your local emergency number (911 in the [address]) if:   You cannot be woken      You have signs of diabetic ketoacidosis:     confusion, fatigue    vomiting    rapid heartbeat    fruity smelling breath    extreme thirst    dry mouth and skin    You have any of the following signs of a heart attack:      Squeezing, pressure, or pain in your chest    You may  also have any of the following:     Discomfort or pain in your back, neck, jaw, stomach, or arm    Shortness of breath    Nausea or vomiting    Lightheadedness or a sudden cold sweat    You have any of the following signs of a stroke:      Numbness or drooping on one side of your face     Weakness in an arm or leg    Confusion or difficulty speaking    Dizziness, a severe headache, or vision loss    Call your doctor or diabetes care team provider if:   You have a sore or wound that will not heal     You have a change in the amount you urinate  Your blood sugar levels are higher than your target goals  You often have lower blood sugar levels than your target goals  Your skin is red, dry, warm, or swollen  You have trouble coping with diabetes, or you feel anxious or depressed  You have questions or concerns about your condition or care  What you need to know about high blood sugar levels:  High blood sugar levels may not cause any symptoms  You may feel more thirsty or urinate more often than usual  Over time, high blood sugar levels can damage your nerves, blood vessels, tissues, and organs  The following can increase your blood sugar levels:  Large meals or large amounts of carbohydrates at one time    Less physical activity    Stress    Illness    A lower dose of diabetes medicine or insulin, or a late dose    What you need to know about low blood sugar levels:  Symptoms include feeling shaky, dizzy, irritable, or confused  You can prevent symptoms by keeping your blood sugar levels from going too low  Treat a low blood sugar level right away:      Drink 4 ounces of juice or have 1 tube of glucose gel  Check your blood sugar level again 10 to 15 minutes later  When the level goes back to normal, eat a meal or snack to prevent another decrease  Keep glucose gel, raisins, or hard candy with you at all times to treat a low blood sugar level  Your blood sugar level can get too low if you take diabetes medicine or insulin and do not eat enough food  If you use insulin, check your blood sugar level before you exercise  If your blood sugar level is below 100 mg/dL, eat 4 crackers or 2 ounces of raisins, or drink 4 ounces of juice      Check your level every 30 minutes if you exercise longer than 1 hour     You may need a snack during or after exercise  What you can do to manage your blood sugar levels:   Check your blood sugar levels as directed and as needed  Several items are available to use to check your levels  You may need to check by testing a drop of blood in a glucose monitor  You may instead be given a continuous glucose monitoring (CGM) device  The device is worn at all times  The CGM checks your blood sugar level every 5 minutes  It sends results to an electronic device such as a smart phone  A CGM can be used with or without an insulin pump  You and your diabetes care team providers will decide on the best method for you  The goal for blood sugar levels before meals  is between 80 and 130 mg/dL and 2 hours after eating  is lower than 180 mg/dL  Make healthy food choices  Work with a dietitian to develop a meal plan that works for you and your schedule  A dietitian can help you learn how to eat the right amount of carbohydrates during your meals and snacks  Carbohydrates can raise your blood sugar level if you eat too many at one time  Examples of foods that contain carbohydrates are breads, cereals, rice, pasta, and sweets  Eat high-fiber foods as directed  Fiber helps improve blood sugar levels  Fiber also lowers your risk for heart disease and other problems diabetes can cause  Examples of high-fiber foods include vegetables, whole-grain bread, and beans such as saravia beans  Your dietitian can tell you how much fiber to have each day  Get regular physical activity  Physical activity can help you get to your target blood sugar level goal and manage your weight  Get at least 150 minutes of moderate to vigorous aerobic physical activity each week  Do not miss more than 2 days in a row  Do not sit longer than 30 minutes at a time  Your healthcare provider can help you create an activity plan   The plan can include the best activities for you and can help you build your strength and endurance  Maintain a healthy weight  Ask your team what a healthy weight is for you  A healthy weight can help you control diabetes and prevent heart disease  Ask your team to help you create a weight loss plan, if needed  Weight loss can help make a difference in managing diabetes  Your team will help you set a weight-loss goal, such as 10 to 15 pounds, or 5% of your extra weight  Together you and your team can set manageable weight loss goals  Take your diabetes medicine or insulin as directed  You may need diabetes medicine, insulin, or both to help control your blood sugar levels  Your healthcare provider will teach you how and when to take your diabetes medicine or insulin  You will also be taught about side effects oral diabetes medicine can cause  Insulin may be injected or given through a pump or pen  You and your providers will decide on the best method for you: An insulin pump  is an implanted device that gives your insulin 24 hours a day  An insulin pump prevents the need for multiple insulin injections in a day  An insulin pen  is a device prefilled with the right amount of insulin  You and your family members will be taught how to draw up and give insulin  if this is the best method for you  Your providers will also teach you how to dispose of needles and syringes  You will learn how much insulin you need  and when to give it  You will be taught when not to give insulin  You will also be taught what to do if your blood sugar level drops too low  This may happen if you take insulin and do not eat the right amount of carbohydrates  More ways to manage type 2 diabetes:   Wear medical alert identification  Wear medical alert jewelry or carry a card that says you have diabetes  Ask your provider where to get these items  Do not smoke  Nicotine and other chemicals in cigarettes and cigars can cause lung and blood vessel damage   It also makes it more difficult to manage your diabetes  Ask your provider for information if you currently smoke and need help to quit  Do not use e-cigarettes or smokeless tobacco in place of cigarettes or to help you quit  They still contain nicotine  Check your feet each day for cuts, scratches, calluses, or other wounds  Look for redness and swelling, and feel for warmth  Wear shoes that fit well  Check your shoes for rocks or other objects that can hurt your feet  Do not walk barefoot or wear shoes without socks  Wear cotton socks to help keep your feet dry  Ask about vaccines you may need  You have a higher risk for serious illness if you get the flu, pneumonia, COVID-19, or hepatitis  Ask your provider if you should get vaccines to prevent these or other diseases, and when to get the vaccines  Talk to your provider if you become stressed about diabetes care  Sometimes being able to fit diabetes care into your life can cause increased stress  The stress can cause you not to take care of yourself properly  Your care team providers can help by offering tips about self-care  Your providers may suggest you talk to a mental health provider who can listen and offer help with self-care issues  Have your A1c checked as directed  Your provider may check your A1c every 3 months, or 2 times each year if your diabetes is controlled  An A1c test shows the average amount of sugar in your blood over the past 2 to 3 months  Your provider will tell you what your A1c level should be  Have screening tests as directed  Your provider may recommend screening for complications of diabetes and other conditions that may develop  Some screenings may begin right away and some may happen within the first 5 years of diagnosis:    Examples of diabetes complications  include kidney problems, high cholesterol, high blood pressure, blood vessel problems, eye problems, and sleep apnea      You may be screened for a low vitamin B level  if you take oral diabetes medicine for a long time  Women of childbearing years may be screened  for polycystic ovarian syndrome (PCOS)  Follow up with your doctor or diabetes care team providers as directed: You may need to have blood tests done before your follow-up visit  The test results will show if changes need to be made in your treatment or self-care  Talk to your provider if you cannot afford your medicine  Write down your questions so you remember to ask them during your visits  © Copyright Omelia Passy 2022 Information is for End User's use only and may not be sold, redistributed or otherwise used for commercial purposes  The above information is an  only  It is not intended as medical advice for individual conditions or treatments  Talk to your doctor, nurse or pharmacist before following any medical regimen to see if it is safe and effective for you

## 2023-04-12 NOTE — PHYSICAL THERAPY INITIAL EVALUATION ADULT - MUSCLE TONE ASSESSMENT, REHAB EVAL
Injection/Vaccine  Supervising Provider: dr nino  Reviewed allergies and possible side effects prior to injection. The patient was held following the injection for 10 minutes. No signs or symptoms of allergic reaction were observed. Patient tolerated procedure well. Educated patient to call office with any questions or concerns.     
bilateral upper extremities/bilateral lower extremities/normal
normal

## 2023-04-16 NOTE — DISCHARGE NOTE NURSING/CASE MANAGEMENT/SOCIAL WORK - NSPROEXTENSIONSOFSELF_GEN_A_NUR
Pt presents to ED with c/o aura similar to prior to getting a seizure. Pt states it started this morning and has not gone away. Pt states the aura is abdominal pain, L sided facial numbness, and bilateral leg cramping. Pt states he gets breakthrough seizures when the weather changes from warm to cold, like it did today. Pt states it is sometimes related to electrolyte imbalances like low sodium or low potassium. Pt requesting a trileptal level and notified that this can take multiple days to come back through lab.   
walker

## 2023-06-06 NOTE — ED ADULT NURSE NOTE - NSFALLRSKINDICATORS_ED_ALL_ED
[FreeTextEntry2] : follow up visit for right knee.  pt recently has lots of pain in knee when walking.  pt believes the pain is separate from arthritis
no

## 2023-06-15 NOTE — SWALLOW BEDSIDE ASSESSMENT ADULT - SPECIFY REASON(S)
Addended by: FREDY ALONSO on: 6/15/2023 09:52 AM     Modules accepted: Orders     Subjective assessment of current swallow function

## 2023-06-23 NOTE — PROGRESS NOTE ADULT - SUBJECTIVE AND OBJECTIVE BOX
New patient note      CC: Type 2 diabetes    History of Present Illness:   61-year-old female with type 2 diabetes since 2018, HTN, HLD, obesity BMI 33, KIM, mild intermittent asthma, vitamin D deficiency, GERD, diverticulosis of colon, chronic low back pain and abnormal TSH  She presents for evaluation of multiple medical issues including diabetes  She lost 8 lbs since 4/23  She is generally asymptomatic  Home blood glucose monitoring: Checks once or twice a day  Usually less than 200 mg/dL per    Current meds:  Metformin 1000mg BID  Jardiance 10 mg daily  Ozempic 0 5 mg weekly    Opthamology: Yes  Podiatry: No  vaccination: Yes  Dental: No  Pancreatitis: No      Ace/ARB: Lisinopril  Statin: Lipitor 20  Thyroid issues: Recent elevated TSH  Patient Active Problem List   Diagnosis   • Allergic rhinitis   • Gastroesophageal reflux disease   • Hyperlipidemia   • Essential hypertension   • Hypokalemia   • Lower back pain   • Mild intermittent asthma without complication   • Microscopic hematuria   • KIM on CPAP   • Type 2 diabetes mellitus ([facility] )   • Urinary incontinence in female   • Vitamin D deficiency   • Right knee pain   • Radicular pain of left upper extremity   • Neck pain   • Fibroids   • Diverticulosis of colon   • Olivier's syndrome   • Obesity (BMI 30-39  9)   • Abnormal TSH   • Subconjunctival hemorrhage of left eye   • Post-menopausal bleeding   • KIM (obstructive sleep apnea)   • Dyspepsia   • Bladder prolapse, female, acquired   • COVID-19   • Iliotibial band syndrome of right side   • S/P D&C (status post dilation and curettage)   • Viral infection, unspecified   • Annual physical exam   • Nonhealing nonsurgical wound     Past Medical History:   Diagnosis Date   • Asthma    • Asthmatic bronchitis with exacerbation     Last Assessed: 8/7/2014   • Diabetes mellitus ([facility] )    • GERD (gastroesophageal reflux disease)    • History of COVID-19 10/27/21    Mild Symptoms- Fever,Chills and Bodyaches BRIEF JHON NOTE:  Pt continues to bleed b/l, primarily from the L.   b/l merocels inserted, taped to cheek  bleeding terminated b/l      A/P: 76yo F w epistaxis on apixaban for AF s/p b/l merocels  - patient has b/l packing in place.  - keflex 500mg BID  - if no longer bleeding, patient can be discharged with merocels, to remove in urgent care or clinic in 2-3 days. Follow up with the ENT (Ear, Nose, Throat) department after discharge. Call [phone] for appointment.   - continue anticoagulation as needed  - plan discussed with attending  - please call ENT for further questions • Hyperlipidemia    • Hypertension    • Impaired fasting glucose     Last Assessed: 1/15/2015   • Pneumonia    • Sleep apnea     Mild      Past Surgical History:   Procedure Laterality Date   • CERVICAL CERCLAGE     • COLONOSCOPY     • HAND SURGERY Right     Wrist mass E/O   • HI ANTERIOR COLPORRAPHY RPR CYSTOCELE W/CYSTO N/A 6/15/2020    Procedure: ANTERIOR AND POSTERIOR COLPORRHAPHY;  Surgeon: Martha Jacobsen MD;  Location: [facility];  Service: UroGynecology          • HI COLPOPEXY VAGINAL EXTRAPERITONEAL APPROACH N/A 6/15/2020    Procedure: COLPOSUSPENSION VAGINAL EXTRAPERITONEAL(ENPLACE);   Surgeon: Martha Jacobsen MD;  Location[facility];  Service: UroGynecology          • HI CYSTOURETHROSCOPY N/A 6/15/2020    Procedure: Rose Bradley;  Surgeon: Martha Jacobsen MD;  [facility];  Service: UroGynecology          • HI HYSTEROSCOPY BX ENDOMETRIUM&/POLYPC W/WO D&C N/A 6/18/2021    Procedure: DILATATION AND CURETTAGE (D&C) WITH HYSTEROSCOPY;  Surgeon: Phoebe Sánchez MD;  [facility];  Service: Gynecology   • HI SLING OPERATION STRESS INCONTINENCE N/A 6/15/2020    Procedure: PUBOVAGINAL SLING;  Surgeon: Martha Jacobsen MD;  [facility];  Service: UroGynecology          • THYROIDECTOMY, PARTIAL     • TUBAL LIGATION        Family History   Problem Relation Age of Onset   • Coronary artery disease Mother    • Hypertension Mother    • Diabetes Father    • Stroke Father         Ischemic   • Diabetes Brother    • No Known Problems Sister    • No Known Problems Daughter    • No Known Problems Maternal Grandmother    • No Known Problems Maternal Grandfather    • No Known Problems Paternal Grandmother    • No Known Problems Paternal Grandfather    • No Known Problems Son    • No Known Problems Maternal Aunt    • No Known Problems Maternal Aunt    • No Known Problems Maternal Aunt    • Cancer Paternal Aunt         unknown type   • No Known Problems Paternal Aunt    • No Known Problems Paternal Aunt    • No Known Problems Paternal Aunt    • No Known Problems Paternal Aunt    • No Known Problems Paternal Aunt    • No Known Problems Paternal Aunt    • No Known Problems Paternal Aunt    • Cancer Paternal Uncle         unknown type   • Cancer Paternal Uncle         unknown type   • Breast cancer Cousin         unknown age     Social History     Tobacco Use   • Smoking status: Never   • Smokeless tobacco: Never   Substance Use Topics   • Alcohol use: No     Allergies   Allergen Reactions   • Levaquin [Levofloxacin] Rash         Current Outpatient Medications:   •  albuterol (Ventolin HFA) 90 mcg/act inhaler, Inhale 2 puffs every 4 (four) hours as needed for wheezing or shortness of breath, Disp: 8 g, Rfl: 0  •  ascorbic acid (VITAMIN C) 500 mg tablet, Take 1 tablet (500 mg total) by mouth daily, Disp: 90 tablet, Rfl: 1  •  atorvastatin (LIPITOR) 20 mg tablet, take 1 tablet by mouth once daily, Disp: 30 tablet, Rfl: 3  •  Bioflavonoid Products (VITAMIN C PLUS PO), Take by mouth, Disp: , Rfl:   •  cholecalciferol (VITAMIN D3) 1,000 units tablet, take 1 tablet by mouth once daily, Disp: 90 tablet, Rfl: 0  •  cloNIDine (CATAPRES) 0 1 mg tablet, Take 1 tablet (0 1 mg total) by mouth every 12 (twelve) hours, Disp: 60 tablet, Rfl: 5  •  Continuous Blood Gluc Sensor (FreeStyle Phil 14 Day Sensor) MISC, Use 1 each in the morning, Disp: 1 each, Rfl: 0  •  diltiazem (DILACOR XR) 120 MG 24 hr capsule, take 1 capsule by mouth every morning, Disp: 30 capsule, Rfl: 3  •  fluticasone (FLONASE) 50 mcg/act nasal spray, 1 spray into each nostril daily, Disp: 11 1 mL, Rfl: 1  •  Jardiance 10 MG TABS tablet, take 1 tablet by mouth every morning, Disp: 30 tablet, Rfl: 3  •  lisinopril-hydrochlorothiazide (PRINZIDE,ZESTORETIC) 20-25 MG per tablet, take 1 tablet by mouth once daily, Disp: 90 tablet, Rfl: 0  •  metFORMIN (GLUCOPHAGE-XR) 500 mg 24 hr tablet, Take 2 tablets (1,000 mg total) by mouth 2 (two) times a day "with meals Takes 2 500 mg Tablets = 1000 mg Twice/day, Disp: 360 tablet, Rfl: 2  •  mupirocin (BACTROBAN) 2 % ointment, Apply topically 3 (three) times a day, Disp: 22 g, Rfl: 0  •  pantoprazole (PROTONIX) 40 mg tablet, take 1 tablet by mouth once daily, Disp: 90 tablet, Rfl: 1  •  semaglutide, 0 25 or 0 5 mg/dose, (Ozempic, 0 25 or 0 5 MG/DOSE,) 2 mg/3 mL injection pen, Inject 0 38 mL (0 2533 mg total) under the skin every 7 days, Disp: 3 mL, Rfl: 5    Review of Systems   HENT: Negative  Eyes: Negative  Respiratory: Negative  Cardiovascular: Negative  Gastrointestinal: Negative  Endocrine: Negative  Musculoskeletal: Negative  Skin: Negative  Allergic/Immunologic: Negative  Neurological: Negative  Hematological: Negative  Psychiatric/Behavioral: Negative  Physical Exam:  Body mass index is 33 11 kg/m²  /78   Pulse 79   Temp 98 °F (36 7 °C)   Ht 5' 2\" (1 575 m)   Wt 82 1 kg (181 lb)   LMP 12/12/2019 (Exact Date)   SpO2 98%   BMI 33 11 kg/m²    Vitals:    06/23/23 1410   Weight: 82 1 kg (181 lb)        Physical Exam  Constitutional:       General: She is not in acute distress  Appearance: She is well-developed  She is not ill-appearing  HENT:      Head: Normocephalic and atraumatic  Nose: Nose normal       Mouth/Throat:      Pharynx: Oropharynx is clear  Eyes:      Extraocular Movements: Extraocular movements intact  Conjunctiva/sclera: Conjunctivae normal    Neck:      Thyroid: No thyromegaly  Cardiovascular:      Rate and Rhythm: Normal rate  Pulmonary:      Effort: Pulmonary effort is normal    Musculoskeletal:         General: No deformity  Cervical back: Normal range of motion  Skin:     Capillary Refill: Capillary refill takes less than 2 seconds  Coloration: Skin is not pale  Findings: No rash  Neurological:      Mental Status: She is alert and oriented to person, place, and time     Psychiatric:         Behavior: " Behavior normal          Labs:   Lab Results   Component Value Date    HGBA1C 7 2 (A) 04/25/2023       Lab Results   Component Value Date    [id number] 3 697 05/22/2023       Lab Results   Component Value Date    CREATININE 0 70 05/22/2023    CREATININE 0 78 02/15/2022    CREATININE 0 60 06/01/2020    BUN 18 05/22/2023     11/05/2015    K 3 4 (L) 05/22/2023     05/22/2023    CO2 30 05/22/2023     eGFR   Date Value Ref Range Status   05/22/2023 95 ml/min/1 73sq m Final       Lab Results   Component Value Date    ALT 41 02/15/2022    AST 29 02/15/2022    ALKPHOS 62 02/15/2022    BILITOT 0 13 (L) 11/05/2015       Lab Results   Component Value Date    CHOLESTEROL 141 05/22/2023    CHOLESTEROL 237 (H) 02/15/2022    CHOLESTEROL 139 10/15/2020     Lab Results   Component Value Date    HDL 54 05/22/2023    HDL 71 02/15/2022    HDL 59 10/15/2020     Lab Results   Component Value Date    TRIG 138 05/22/2023    TRIG 165 (H) 02/15/2022    TRIG 132 10/15/2020     Lab Results   Component Value Date    [place] 87 05/22/2023    [place] 166 02/15/2022    [place] 80 10/15/2020         Impression:  1  Type 2 diabetes mellitus with hyperglycemia, without long-term current use of insulin ([address] )    2  Vitamin D deficiency    3  Obesity (BMI 30-39 9)    4  Mixed hyperlipidemia         Plan:    Chuy Patel was seen today for diabetes mellitus  Diagnoses and all orders for this visit:    Type 2 diabetes mellitus with hyperglycemia, without long-term current use of insulin ([address] )  She is uncontrolled with an A1c of 7 2%  Goal is less than 6 5%  Today we discussed all aspects of diabetes including pathophysiology, risk factors, complications, SAGM, CGM, diet, lifestyle modifications, medical fitness training, diabetes education, goals of therapy, follow up needs and medications including insulin, metformin, Jardiance and GLP1 agonists  Advised to maintain goal blood sugars 70-180mg/dL  Today we agreed to increase Ozempic  Continue Jardiance 10 mg daily and metformin 1000 mg p o  twice daily  Advised to start [facility] for fitness training program     Use a personal CGM once prior to next visit in 3 months  -     semaglutide, 1 mg/dose, (Ozempic, 1 MG/DOSE,) 4 mg/3 mL injection pen; Inject 0 75 mL (1 mg total) under the skin every 7 days  -     Iron Panel (Includes Ferritin, Iron Sat%, Iron, and TIBC); Future  -     Hemoglobin A1C; Future  -     Albumin / creatinine urine ratio; Future  -     Ambulatory Referral to Medical Fitness Exercise Specialist; Future    Vitamin D deficiency  She is on replacement therapy  Obesity (BMI 30-39 9)  Today we discussed all aspects of obesity and metabolism including pathophysiology, risk factors, complications, goal of sustaining weight loss long term, usual propensity to regain weight with short term approaches, follow up needs and medications - efficacy and limitations  Discussed role of endocrinopathies, inflammatory conditions, sleep disorders, mental health disorders, lifestyle issues and polypharmacy and weight gain  Briefly discussed bariatric surgery  Diet: carb controlled diet <1500cal/day/ VLCD, 64oz fluids/day   lifestyle modifications: intermittent fasting and 10,000 steps/day  medical fitness training: muscle building  education: nutrition input    He already lost 8 lbs with diet and lifestyle modifications  We will maximize GLP-1 agonist     Mixed hyperlipidemia  Her ASCVD risk score is 4 5%  She is on Lipitor 20 mg nightly  I have spent 45 minutes with patient today in which greater than 50% of this time was spent in counseling/coordination of care  Discussed with the patient and all questioned fully answered  She will call me if any problems arise  Educated/ Counseled patient on diagnostic test results, prognosis, risk vs benefit of treatment options, importance of treatment compliance, healthy life and lifestyle choices        Maria Victoria Pereira Lauren

## 2023-06-23 NOTE — PHYSICAL THERAPY INITIAL EVALUATION ADULT - ADDITIONAL COMMENTS
The patient rates her pain level at 4 out of 10 post Morphine administration. Bandaid dressing removed from incision site and replaced with 4x4 gauze dressing and paper tape. Pt was indep in ambulation using RW

## 2023-10-05 NOTE — ED PROVIDER NOTE - COVID-19 RESULT DATE/TIME
"Subjective   Patient ID: Tod Macias is a 19 y.o. male who presents for Rapid Heart Rate (Mostly while laying on side at night/Sometimes makes it difficult to breathe /Tries to take deep breaths when this happens but \"it makes my whole body shake\"/[facility]).  Rapid Heart Rate    Presents with recurrent tachycardia. The current episode started 1-4 weeks ago. The problem is unchanged. Onset occurs in a sudden manner. The longest episode duration is 5 minutes. These episodes occur multiple times per day.     Associated symptoms include palpitations and shortness of breath.   Pertinent negative symptoms include no chest pain, no diaphoresis, no dizziness and no syncope.  Episode only occur at night when lying in bed  Review of Systems   Constitutional:  Negative for diaphoresis.   Respiratory:  Positive for shortness of breath.    Cardiovascular:  Positive for palpitations. Negative for chest pain and syncope.   Neurological:  Negative for dizziness.       Objective   Physical Exam  Constitutional:       Appearance: Normal appearance.   HENT:      Right Ear: Tympanic membrane normal.      Left Ear: Tympanic membrane normal.      Nose: Nose normal.   Eyes:      Conjunctiva/sclera: Conjunctivae normal.   Cardiovascular:      Rate and Rhythm: Normal rate and regular rhythm.      Pulses: Normal pulses.      Heart sounds: Normal heart sounds.   Pulmonary:      Effort: Pulmonary effort is normal.   Musculoskeletal:      Cervical back: Normal range of motion.         Assessment/Plan   Diagnoses and all orders for this visit:  Tachycardia  -     ECG 12 lead (Ancillary Performed); Future  Anxiety  Recommend return to [facility]     " 10-Nov-2020 15:39

## 2023-10-29 NOTE — PHYSICAL THERAPY INITIAL EVALUATION ADULT - LEVEL OF INDEPENDENCE: SIT/STAND, REHAB EVAL
Occupational Therapy  Daily Treatment     Patient Name: Jose Castanon  Age:  70 y.o., Sex:  male  Medical Record #: [id number]  Today's Date: 10/29/2023     Precautions  Precautions: (P) Fall Risk, Other (See Comments)  Comments: (P) monitor HR and O2, hx of BKA LLE w/ prosthetic, wound near sacrum, medial heal wound RLE, lateral foot wound RLE         Subjective    Pt pleasant and agreeable for therapy.      Objective       10/29/23 1231   OT Charge Group   OT Self Care / ADL (Units) 1   OT Therapeutic Exercise (Units) 3   OT Total Time Spent   OT Individual Total Time Spent (Mins) 60   Precautions   Precautions Fall Risk;Other (See Comments)   Comments monitor HR and O2, hx of BKA LLE w/ prosthetic, wound near sacrum, medial heal wound RLE, lateral foot wound RLE   Vitals   Pulse (!) 104  (HR monitored during session, 90-104bpm)   Pulse Oximetry 94 %   O2 Delivery Device None - Room Air   Cognition    Level of Consciousness Alert   Functional Level of Assist   Upper Body Dressing Modified Independent   Upper Body Dressing Description Increased time  (seated EOB to don [place] shirt)   Lower Body Dressing Contact Guard Assist   Lower Body Dressing Description Reacher;Increased time;Set-up of equipment;Supervision for safety  (seated EOB to don shorts and underwear-pt able to thread grace and LEs using reacher, CGA/FWW when standing to pull up over hips)   Bed, Chair, Wheelchair Transfer Contact Guard Assist   Bed Chair Wheelchair Transfer Description Adaptive equipment;Increased time;Set-up of equipment;Supervision for safety  (stand step w/ FWW)   Sitting Upper Body Exercises   Chest Press 3 sets of 10;Right ;Left;Weight (See Comments for lbs)  (6# dumbbells)   Lat Pull 3 sets of 15;Bilateral;Weight (See Comments for lbs)  (30# weighted pulleys)   Bilateral Row 3 sets of 15;Bilateral;Weight (See Comments for lbs)  (30# weighted pulleys)   Tricep Press 3 sets of 15;Bilateral;Weight (See Comments for  lbs)  (Rickshaw facing fwd w/ 30#)   Bed Mobility    Supine to Sit Standby Assist   Scooting Standby Assist   Rolling Standby Assist   Interdisciplinary Plan of Care Collaboration   Patient Position at End of Therapy Seated;Chair Alarm On;Self Releasing Lap Belt Applied;Call Light within Reach;Tray Table within Reach;Phone within Reach         Assessment    Pt tolerated OT session well with focus on progression w/ ADLs and strength. He demonstrated good safety and carryover with technique for LB dressing using reacher, including being able to manage grace through underwear/shorts. He con't to require CGA-SBA for standing balance safety. Pt's O2 sats and HR were monitored and stable throughout session as noted above.   Strengths: Able to follow instructions, Alert and oriented, Effective communication skills, Good carryover of learning, Good insight into deficits/needs, Motivated for self care and independence, Pleasant and cooperative  Barriers: Decreased endurance, Bowel incontinence, Fatigue, Generalized weakness, Impaired activity tolerance    Plan    ADLs  Strength  Functional mobility  Balance/Endurance        Occupational Therapy Goals (Active)       Problem: Bathing       Dates: Start:  10/25/23         Goal: STG-Within one week, patient will bathe with CGA and LRD       Dates: Start:  10/25/23               Problem: Dressing       Dates: Start:  10/25/23         Goal: STG-Within one week, patient will dress LB Min A and LRD       Dates: Start:  10/25/23               Problem: Functional Transfers       Dates: Start:  10/25/23         Goal: STG-Within one week, patient will transfer to toilet CGA and LRD       Dates: Start:  10/25/23            Goal: STG-Within one week, patient will transfer to step in shower with CGA and LRD       Dates: Start:  10/25/23               Problem: OT Long Term Goals       Dates: Start:  10/25/23         Goal: LTG-By discharge, patient will complete basic self care tasks SBA to  SUP       Dates: Start:  10/25/23            Goal: LTG-By discharge, patient will perform bathroom transfers SBA to SUP       Dates: Start:  10/25/23            Goal: LTG-By discharge, patient will complete basic home management SBA to SUP       Dates: Start:  10/25/23               Problem: Toileting       Dates: Start:  10/25/23         Goal: STG-Within one week, patient will complete toileting tasks with Min A and LRD       Dates: Start:  10/25/23               supervision

## 2023-11-10 NOTE — ED ADULT NURSE NOTE - PAIN RATING/NUMBER SCALE (0-10): ACTIVITY
Patient: Jet Granger    Procedure: Procedure(s):  LEFT TOTAL SHOULDER ARTHROPLASTY  RIGHT SHOULDER GLENOHUMERAL JOINT INJECTION       Diagnosis: Left shoulder pain [M25.512]  Diagnosis Additional Information: No value filed.    Anesthesia Type:   General     Note:    Oropharynx: oropharynx clear of all foreign objects and spontaneously breathing  Level of Consciousness: drowsy  Oxygen Supplementation: face mask  Level of Supplemental Oxygen (L/min / FiO2): 8  Independent Airway: airway patency satisfactory and stable  Dentition: dentition unchanged  Vital Signs Stable: post-procedure vital signs reviewed and stable  Report to RN Given: handoff report given  Patient transferred to: PACU    Handoff Report: Identifed the Patient, Identified the Reponsible Provider, Reviewed the pertinent medical history, Discussed the surgical course, Reviewed Intra-OP anesthesia mangement and issues during anesthesia, Set expectations for post-procedure period and Allowed opportunity for questions and acknowledgement of understanding      Vitals:  Vitals Value Taken Time   /72 11/10/23 1225   Temp 36.4  C (97.5  F) 11/10/23 1225   Pulse 65 11/10/23 1226   Resp 18 11/10/23 1226   SpO2 100 % 11/10/23 1226   Vitals shown include unfiled device data.    Electronically Signed By: DURGA Kee CRNA  November 10, 2023  12:28 PM  
6

## 2023-12-18 NOTE — ED ADULT TRIAGE NOTE - NS ED NURSE BANDS TYPE
12/18/2023         RE: Arturo Mejia  [address] [facility] [address]        Dear Colleague,    Thank you for referring your patient, Arturo Mejia, to the [facility]. Please see a copy of my visit note below.    PHYSICAL MEDICINE & REHABILITATION / MEDICAL SPINE        Date:  Dec 18, 2023    Name:  Arturo Mejia  YOB: 1965  MRN:  [id number]          CHART REVIEW:  On 10/16/2023 Daquan Wu DO (general surgery) performed robot assisted laparoscopic cholecystectomy for symptomatic cholelithiasis.     Reviewed 08/21/2023 note from Jennifer Glover MD (orthopedic surgery).  Mr. Arturo Mejia is a left-hand-dominant male.  In 2019, he had a right shoulder hemiarthroplasty secondary to avascular necrosis of the right shoulder.  Mr. Arturo Mejia has significant COPD and has been on chronic steroids.  He had multiple hospitalizations due to respiratory distress.  Mr. Arturo Mejia had new onset cardiac disease.  Pain was the biggest issue for his right shoulder, although he did notice some decreased range of motion.  Discussion of reverse total shoulder arthroplasty and nerve block around the shoulder.  Referral was placed for discussion of shoulder nerve blocks.     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     On 03/02/2016, Sahca Maharaj MD (orthopedic surgery) performed arthroscopic evaluation with repair of the middle glenohumeral ligament using a 2.3 Arthrex PushLock anchor followed by subacromial decompression and mini open biceps tendon tenodesis for right shoulder biceps tendinosis with detachment of anterior middle glenohumeral ligament just anterior to the bicipital  "root.      CHIEF COMPLAINT:  Right neck pain extending into the right upper extremity.        HISTORY OF PRESENT ILLNESS:  Mr. Arturo Mejia is a 57-year-old, left-hand-dominant, male.  Mr. Arturo Mejia worked as a .     Mr. Arturo Mejia denied any personal or family history of autoimmune diseases, rheumatologic diseases, gout, pseudogout.  He denied any personal or family history of neurologic disease.  Mr. Arturo Mejia denied any personal or family history of inflammatory bowel diseases.     On 10/11/2023, Mr. Arutro Mejia had radiofrequency ablations/neurotomies of the right shoulder.     Mr. Arturo Mejia denied any prior or recent injuries of his head, neck, upper back, shoulders, arms, elbows, forearms, wrist, hands, fingers.    Mr. Arturo Mejia was last seen in this clinic on 11/10/2023.  He returns to clinic today, 12/18/2023.    Mr. Arturo Mejia has constant right posterolateral neck pain extending to his right shoulder and down into his right upper extremity and into the right second finger with some extension into the right first finger.  Pain is constant and described as \"sharp, tingling.\"  Pain is currently rated as 8/10.  Pain varies from 2/10 to 10/10.  Pain is worse with movement of his right upper extremity and neck movements.  Mr. Arturo Mejia notes that he was sometimes sleep with his right hand positioned above his head and that this helps with his pain (positive Bakody sign).    Mr. Arturo Mejia notes painful crepitus in his neck and shoulder.  He is currently taking prednisone 15 mg daily.  He stopped taking gabapentin, because he states that his head did not feel right with gabapentin.  In terms of pain medications, Mr. Arturo Mejia is taking acetaminophen at 1000 mg/day.  He has increased his dose of prednisone and is currently taking prednisone 15 mg/day.    Mr. Arturo Mejia lives in [place], [place].  He felt that " [place], [place] was too far to travel for physical therapy, so he has not done physical therapy for his neck pain.        ALLERGIES:  Allergies   Allergen Reactions    Bee Venom     No Known Drug Allergy          MEDICATIONS:  Current Outpatient Medications   Medication Sig Dispense Refill    albuterol (PROVENTIL) (2.5 MG/3ML) 0.083% neb solution NEBULIZE CONTENTS OF ONE VIAL 4 TIMES DAILY 360 mL 1    apixaban ANTICOAGULANT (ELIQUIS) 5 MG tablet Take 1 tablet (5 mg) by mouth 2 times daily 180 tablet 3    atorvastatin (LIPITOR) 10 MG tablet TAKE ONE TABLET (10MG) BY MOUTH DAILY 90 tablet 0    benralizumab (FASENRA) 30 MG/ML SOAJ auto-injector pen Inject 1 mL (30 mg) Subcutaneous every 2 months for 6 doses 1 mL 5    CALCIUM PO Take 1 tablet by mouth daily      fluticasone (FLONASE) 50 MCG/ACT nasal spray Spray 1 spray into both nostrils daily 16 g 3    furosemide (LASIX) 20 MG tablet Take 1 tablet (20 mg) by mouth 2 times daily 180 tablet 3    gabapentin (NEURONTIN) 300 MG capsule Take 2 capsules (600 mg) by mouth 3 times daily for 60 days 360 capsule 0    guaiFENesin-dextromethorphan (ROBITUSSIN DM) 100-10 MG/5ML syrup Take 10 mLs by mouth every 4 hours as needed for cough      ipratropium - albuterol 0.5 mg/2.5 mg/3 mL (DUONEB) 0.5-2.5 (3) MG/3ML neb solution NEBULIZE CONTENTS OF ONE VIAL EVERY 6 HOURS AS NEEDED FOR SHORTNESS OF BREATH / DYSPNEA  OR WHEEZING 180 mL 1    levETIRAcetam (KEPPRA) 500 MG tablet Take 500 mg by mouth daily 90 tablet 0    levothyroxine (SYNTHROID/LEVOTHROID) 75 MCG tablet Take 1 tablet (75 mcg) by mouth daily 90 tablet 3    LORazepam (ATIVAN) 1 MG tablet Take 1 tablet (1 mg) by mouth every 8 hours as needed for anxiety 30 tablet 0    metoprolol succinate ER (TOPROL XL) 50 MG 24 hr tablet Take 1.5 tablets (75 mg) by mouth daily 135 tablet 3    mometasone-formoterol (DULERA) 200-5 MCG/ACT inhaler Inhale 2 puffs into the lungs 2 times daily 39 g 1    montelukast (SINGULAIR) 10 MG tablet TAKE  1 TABLET (10 MG) BY MOUTH AT BEDTIME 90 tablet 2    Multiple Vitamins-Minerals (MENS MULTIVITAMIN) TABS Take 1 tablet by mouth daily      OTHER MEDICAL SUPPLIES Oxygen 2 L during the day and 2 L at night with BiPap      oxyCODONE-acetaminophen (PERCOCET) 5-325 MG tablet Take 1-2 tablets by mouth every 4 hours as needed for pain (Patient not taking: Reported on 10/27/2023) 6 tablet 0    pramipexole (MIRAPEX) 0.5 MG tablet TAKE 1 TABLET (0.5 MG) BY MOUTH AT BEDTIME 90 tablet 1    predniSONE (DELTASONE) 5 MG tablet TAKE TWO TABLETS BY MOUTH ONCE DAILY 120 tablet 0    sacubitril-valsartan (ENTRESTO) 24-26 MG per tablet Take 1/2 pill twice a day. 90 tablet 3    tamsulosin (FLOMAX) 0.4 MG capsule TAKE 1 CAPSULE (0.4 MG) BY MOUTH DAILY 90 capsule 2    VENTOLIN  (90 Base) MCG/ACT inhaler INHALE TWO PUFFS INTO THE LUNGS EVERY 6 HOURS AS NEEDED FOR SHORTNESS OF BREATH OR WHEEZING 18 g 3    VITAMIN D, CHOLECALCIFEROL, PO Take 5,000 Units by mouth every morning            PAST MEDICAL HISTORY:  Past Medical History:   Diagnosis Date    Acute on chronic respiratory failure with hypoxia (H) 09/09/2015    Agitation 09/28/2021    Alcohol abuse, unspecified     sober since     Arthritis 05/16/2019    Asthma     as a child    Chest wall pain 10/07/2015    Community acquired bacterial pneumonia 04/19/2022    COPD (chronic obstructive pulmonary disease) (H)     COPD exacerbation 09/08/2015    Depressive disorder     Esophageal reflux     Healthcare-associated pneumonia-new RLL infiltrate 10/6 with fever/?sepsis 10/7 03/14/2016    Hypothyroidism     Mitral regurgitation     moderate to severe    Neutrophilic leukocytosis 10/02/2012    Oropharyngeal candidiasis-visualized during intubation 10/6 10/07/2021    Other convulsions     Seizure     Other, mixed, or unspecified nondependent drug abuse, unspecified     Paroxysmal ventricular tachycardia (H) 09/24/2021    History of wide complex tachycardia, possible VT found during  Name band; hospitalization 09/24/2021    Pneumonia due to infectious organism, negative cultures, but gram stain with gram positive cocci 11/04/2016    Pneumonia, organism unspecified(486) 02/01/2016    RSV infection 09/26/2021    SIRS (systemic inflammatory response syndrome) (H)-POA, new fever with concern for possible sepsis 10/7 09/25/2021    Sleep apnea     Status post coronary angiogram 02/02/2022    Status post rotator cuff surgery 3/2/2016 left 03/14/2016    Streptococcus pneumoniae pneumonia (H24) 09/10/2015    Thrombocytopenia (H24) 09/28/2021         PAST SURGICAL HISTORY:  Past Surgical History:   Procedure Laterality Date    ARTHROPLASTY SHOULDER Right 5/15/2019    Procedure: Hemiarthroplasty Of Right Shoulder, Distal Clavicle Excision;  Surgeon: Cheo Antony MD;  Location: [facility] OR    ARTHROSCOPY SHOULDER SUPERIOR LABRUM ANTERIOR TO POSTERIOR REPAIR Right 3/2/2016    Procedure: ARTHROSCOPY SHOULDER SUPERIOR LABRUM ANTERIOR TO POSTERIOR REPAIR;  Surgeon: Sacha Maharaj MD;  Location: [facility] OR    ARTHROSCOPY SHOULDER, OPEN BICEP TENODESIS REPAIR, COMBINED Right 3/2/2016    Procedure: COMBINED ARTHROSCOPY SHOULDER, OPEN BICEP TENODESIS REPAIR;  Surgeon: Sacha Maharaj MD;  Location: [facility] OR    COLONOSCOPY N/A 2/9/2018    Procedure: COMBINED COLONOSCOPY, SINGLE OR MULTIPLE BIOPSY/POLYPECTOMY BY BIOPSY;  colonoscopy with polypectomy via forcep;  Surgeon: Anthony Gonzalez MD;  Location: [facility] GI    CV CORONARY ANGIOGRAM N/A 2/2/2022    Procedure: Coronary Angiogram;  Surgeon: Alek Smith MD;  Location: [facility] CARDIAC CATH LAB    CV CORONARY ANGIOGRAM N/A 4/24/2023    Procedure: Coronary Angiogram;  Surgeon: Artie Jamil MD;  Location: [facility] CARDIAC CATH LAB    CV INTRAVASULAR ULTRASOUND N/A 2/2/2022    Procedure: Intravascular Ultrasound;  Surgeon: Alek Smith MD;  Location: [facility] CARDIAC CATH LAB    CV PCI N/A 4/24/2023    Procedure: Percutaneous Coronary  Intervention;  Surgeon: Artie Jamil MD;  Location:  [facility] CARDIAC C[facility] LAB    EP COMPREHENSIVE EP STUDY N/A 2022    Procedure: EP Comprehensive EP Study;  Surgeon: Cameron Morgan MD;  Location:  [facility] CARD[facility]AC CATH LAB    ESOPHAGOSCOPY, GASTROSCOPY, DUODENOSCOPY (EGD), COMBINED N/A 2023    Procedure: Esophagoscopy with biopsy;  Surgeon: Rajeev Maston MD;  Location: [facility]    INJECT NERVE BLOCK SUPRASCAPULAR Right 2023    Procedure: right shoulder nerve blocks;  Surgeon: Rajeev Rankin MD;  Location: [facility] OR    INJECT NERVE BLOCK SUPRASCAPULAR Right 10/11/2023    Procedure: right shoulder radiofrequency ablations;  Surgeon: Rajeev Rankin MD;  Location: [facility] OR    LAPAROSCOPIC CHOLECYSTECTOMY N/A 10/16/2023    Procedure: CHOLECYSTECTOMY, ROBOT-ASSISTED, LAPAROSCOPIC, USING DA AAYUSH XI;  Surgeon: Daquan Wu DO;  Location: [facility]    TRANSESOPHAGEAL ECHOCARDIOGRAM INTRAOPERATIVE N/A 2023    Procedure: Transesophageal echocardiogram intraoperative;  Surgeon: GENERIC ANESTHESIA PROVIDER;  Location:  OR         FAMILY HISTORY:  Family History   Problem Relation Age of Onset    Lung Cancer Father         lung    Chronic Obstructive Pulmonary Disease Paternal Grandfather     Diabetes No family hx of     Anesthesia Reaction No family hx of     Venous thrombosis No family hx of          SOCIAL HISTORY:  Social History     Socioeconomic History    Marital status:      Spouse name: Not on file    Number of children: Not on file    Years of education: Not on file    Highest education level: Not on file   Occupational History    Occupation: Disabled - [employer]   Tobacco Use    Smoking status: Former     Packs/day: 0.00     Years: 31.00     Additional pack years: 0.00     Total pack years: 0.00     Types: Cigarettes, Cigars     Quit date: 2016     Years since quittin.1     Passive exposure: Never    Smokeless tobacco: Never   Vaping Use    Vaping Use: Former    Substance and Sexual Activity    Alcohol use: Yes     Comment: 3-4 drinks 2x per month    Drug use: No    Sexual activity: Yes     Partners: Female     Birth control/protection: None   Other Topics Concern    Parent/sibling w/ CABG, MI or angioplasty before 65F 55M? No   Social History Narrative    Not on file     Social Determinants of Health     Financial Resource Strain: Low Risk  (9/20/2023)    Financial Resource Strain     Within the past 12 months, have you or your family members you live with been unable to get utilities (heat, electricity) when it was really needed?: No   Food Insecurity: High Risk (9/20/2023)    Food Insecurity     Within the past 12 months, did you worry that your food would run out before you got money to buy more?: No     Within the past 12 months, did the food you bought just not last and you didn t have money to get more?: Yes   Transportation Needs: Low Risk  (9/20/2023)    Transportation Needs     Within the past 12 months, has lack of transportation kept you from medical appointments, getting your medicines, non-medical meetings or appointments, work, or from getting things that you need?: No   Physical Activity: Inactive (12/20/2021)    Exercise Vital Sign     Days of Exercise per Week: 0 days     Minutes of Exercise per Session: 0 min   Stress: Not on file   Social Connections: Socially Isolated (12/20/2021)    Social Connection and Isolation Panel [NHANES]     Frequency of Communication with Friends and Family: Never     Frequency of Social Gatherings with Friends and Family: Never     Attends Sikh Services: Never     Active Member of Clubs or Organizations: No     Attends Club or Organization Meetings: Never     Marital Status:    Interpersonal Safety: Low Risk  (9/22/2023)    Interpersonal Safety     Do you feel physically and emotionally safe where you currently live?: Yes     Within the past 12 months, have you been hit, slapped, kicked or otherwise physically  "hurt by someone?: No     Within the past 12 months, have you been humiliated or emotionally abused in other ways by your partner or ex-partner?: No   Housing Stability: Low Risk  (9/20/2023)    Housing Stability     Do you have housing? : Yes     Are you worried about losing your housing?: No         PHYSICAL EXAMINATION:  Vitals:    12/18/23 0747   BP: 111/75   Pulse: 80   SpO2: 94%   Weight: 69.9 kg (154 lb)   Height: 1.676 m (5' 6\")       GENERAL:  No acute distress.  Pleasant and cooperative.   PSYCH:  Normal mood and affect.  HEAD:  Normocephalic.  SPEECH:  No dysarthria.  EYES:  No scleral icterus.  EARS:  Hearing is intact to spoken voice.  NOSE:  Midline, symmetric, no rhinorrhea.  LUNGS:  No respiratory distress.  No increased work of breathing.        IMAGING:  MRI OF THE CERVICAL SPINE WITHOUT CONTRAST 11/16/2023 11:39 AM     COMPARISON: Cervical spine x-ray series 11/10/2023     HISTORY: Cervical radiculopathy. Facet arthropathy, cervical. DDD (degenerative disc disease), cervical.     TECHNIQUE: Multiplanar, multisequence MRI images of the cervical spine were acquired without intravenous contrast.     FINDINGS: There is normal alignment of the cervical vertebrae. Vertebral body heights of the cervical spine are normal. Marrow signal throughout the cervical vertebrae is within normal limits. There is no evidence for fracture or pathologic bony lesion of the cervical spine.    There is loss of disc height, disc desiccation and posterior disc bulging to varying degrees at all levels of the cervical spine.       The cervical spinal cord is normal in contour. There is no abnormal signal in the cervical spinal cord. There is no evidence for intrathecal abnormality.     Level by level:     C2-C3: There is facet arthropathy bilaterally, uncinate hypertrophy bilaterally and a posterior broad-based disc-osteophyte complex. No spinal canal stenosis. No foraminal stenosis on either side.     C3-C4: There is facet " arthropathy bilaterally, uncinate hypertrophy bilaterally and a posterior broad-based disc-osteophyte complex. No spinal canal stenosis. Moderate right foraminal stenosis. Mild left foraminal narrowing.     C4-C5: There is facet arthropathy bilaterally, uncinate hypertrophy bilaterally and a posterior broad-based disc-osteophyte complex. No spinal canal stenosis. Mild left foraminal narrowing. Mild right foraminal narrowing.     C5-C6: There is facet arthropathy bilaterally, uncinate hypertrophy bilaterally and a posterior broad-based disc-osteophyte complex. No spinal canal stenosis. Moderate left foraminal stenosis. Moderate right foraminal stenosis.     C6-C7: There is facet arthropathy bilaterally, uncinate hypertrophy bilaterally and a posterior broad-based disc-osteophyte complex. No spinal canal stenosis. Moderate right foraminal stenosis. Mild left foraminal narrowing.     C7-T1: There is facet arthropathy bilaterally, uncinate hypertrophy bilaterally and a posterior broad-based disc-osteophyte complex. No spinal canal stenosis. No foraminal stenosis on either side.    IMPRESSION:  1. Diffuse degenerative change of the cervical spine as detailed above.  2. No high-grade spinal canal stenosis of the cervical spine.  3. Moderate degenerative neural foraminal stenosis on the right at C3-C4, bilaterally at C5-C6 and on the right at C6-C7.         CERVICAL SPINE COMPLETE WITH OBLIQUES AND FLEXION/EXTENSION  11/10/2023 8:13 AM      COMPARISON: CT cervical spine 9/25/2009.     HISTORY: Cervical radiculopathy. Facet arthropathy, cervical. DDD (degenerative disc disease), cervical.     IMPRESSION: Alignment of the cervical vertebrae is normal in the neutral and extended positions. In flexion, there is mild degenerative anterolisthesis of C2 upon C3 and C3 upon C4. Vertebral body heights normal. No fractures. Loss of disc space height and degenerative endplate spurring at C5-C6. Mild-to-moderate facet arthropathy  throughout cervical spine. No high-grade bony neural foraminal stenosis noted on either of the oblique views.          ASSESSMENT/PLAN:  Mr. Arturo Mejia is a 57-year-old, left-hand-dominant, male.  He has chronic right shoulder pain.  He has a right shoulder hemiarthroplasty.  Mr. Arturo Mejia has right cervical radiculopathies (C6, C7).  He is currently taking prednisone 15 mg daily for his COPD.  Mr. Arturo Mejia does have some cervical facet arthropathy and cervical degenerative disc disease.  Discussed diagnoses, further workup, and treatment options.  Discussed the options of doing nothing/living with it, physical therapy, chiropractic care, cervical epidural corticosteroid injection, nerve conduction study/electromyogram, referral to neurosurgery.  Mr. Arturo Mejia did not tolerate gabapentin, so he has discontinued that medication.  Mr. Arturo Mejia has a positive Bakody sign by history.  He would like to be referred to neurosurgery.  He is to follow-up in this clinic in 2 to 3 months.        Total Time on encounter:  38 minutes were spent on one more or more of the following:  discussion with patient, history, exam, coordinating care, treatment goals, record review, documenting clinical information, and/or data review.      Rajeev Rankin MD

## 2024-08-23 NOTE — ED ADULT NURSE NOTE - SUICIDE SCREENING QUESTION 2
Please stop at lab on second floor to have blood drawn    Medications:  Continue medications as prescribed    Going to reach out to oncologist    Future plans pending workup     Follow up if not improving      
No

## 2024-10-28 NOTE — ED ADULT NURSE NOTE - CHIEF COMPLAINT
The patient is a 75y Female complaining of nose bleed. Quality 47: Advance Care Plan: Advance Care Planning discussed and documented in the medical record; patient did not wish or was not able to name a surrogate decision maker or provide an advance care plan. Quality 226: Preventive Care And Screening: Tobacco Use: Screening And Cessation Intervention: Patient screened for tobacco use and is an ex/non-smoker Quality 431: Preventive Care And Screening: Unhealthy Alcohol Use - Screening: Patient not identified as an unhealthy alcohol user when screened for unhealthy alcohol use using a systematic screening method Detail Level: Detailed

## 2024-11-19 NOTE — HISTORY OF PRESENT ILLNESS
Pt tolerated infusion without difficulty.   [de-identified] : Patient presents today with c/o epistaxis. Patient states bleeding started about 2 weeks ago. initially on left side, now on both.  Patient was seen in the ER; and packed a few times. Last time was last week. Minimal bleeding yesterday only since last week. Blood pressure is being controlled. Breathing through the nose gets stuffy at times, both sides. She used afrin for 3 days. and uses a saline irrigation.\par Patient is on Eliquis and Plavix. \par \par Patient also complains of hearing loss and uses hearing aids at times. Left ear is also itchy.

## 2025-03-01 NOTE — PROGRESS NOTE ADULT - SUBJECTIVE AND OBJECTIVE BOX
Vascular Cardiology Follow up s/p b/l renal artery stents    DARRION MELENDEZ   75y Female  PAST MEDICAL & SURGICAL HISTORY:  HTN (hypertension)    CHF (congestive heart failure)    Amputation of toe of left foot  L Hallux 5/20    Hernia    Atrial fibrillation, unspecified type  paf recently stopped eliquis s/t epistatxis    CAD (coronary artery disease)  RCA KENDELL  2011 OM2 KENDELL 2006  OM1 KENDELL 2009    PAD (peripheral artery disease)  severe s/p stent    History of implantable cardiac defibrillator (ICD)  and pacemaker- [employer]    Dyslipidemia    Bladder tumor    H/O ischemic heart disease    DM (diabetes mellitus)  30 yr    History of cholecystectomy    Cardiac defibrillator in place  2010    H/O: hysterectomy    Status post coronary artery stent placement         HPI:  75 y/old F here for renal arteries intervention   h/o tobacco abuse(quit), A-Fib(stopped Eliquis due to epistaxis), HTN, HLD, DM, High grade cancer, CAD(RCA KENDELL 2011, OM 2006, OM 2009), ICD([place]), PAD s/p left dSFA, dLeft pop, TPT trunk and PT/plantar for left foot ulcer. Due to incomplete healing of ulcer pt underwent another angiogram with intervention of left popliteal and peroneal arteries with PTA and KENDELL. B/P uncontrolled at home. Continues to have BLE pain and burning with mild-moderate exertion.      5/28/21 Renal US doppler showed severe ostial stenoses of right and left renal arteries. Peak ostium systolic velocity > 400 cm/sec  1/7/21 NM stress test did not show any reperfusion defects  12/14/20 TTE LVEF 63% Mod MR, Mild TR, G2DD  11/6/20 ESTRELLA 0.59 Severe R popliteal A stenosis, ESTRELLA 0.67 Severe L tibial A stenosis    PMH:  Bladder Ca, CIHD, CAD, PPM, PAD, Renal artery stenosis, SNHL, Epistaxis - recurrent  PSH:  Gallbladder Sx, Hernia Sx, Hysterectomy, Vascular problem, Toe amputation - Left   FH: DM, HTN, CAD (08 Jul 2021 16:13)    Allergies    codeine (Rash)  penicillin (Rash)  penicillin (Unknown)    Intolerances      Patient without complaints. Pt ambulated without issues/symptoms  Denies LE pain and swelling,  SOB, palpitations, or dizziness  NSVT and 10-15 sec run of AF(not on AC due to recurrent epistaxis and high risk for bleeding) on  telemetry overnight    Vital Signs Last 24 Hrs  T(C): 36.7 (10 Jul 2021 05:00), Max: 36.7 (09 Jul 2021 20:53)  T(F): 98.1 (10 Jul 2021 05:00), Max: 98.1 (10 Jul 2021 05:00)  HR: 77 (10 Jul 2021 05:00) (69 - 77)  BP: 164/79 (10 Jul 2021 05:00) (149/67 - 180/79)  BP(mean): --  RR: 18 (10 Jul 2021 05:00) (18 - 18)  SpO2: 97% (10 Jul 2021 05:00) (97% - 97%)    MEDICATIONS  (STANDING):  aspirin  chewable 81 milliGRAM(s) Oral daily  atorvastatin 40 milliGRAM(s) Oral at bedtime  clopidogrel Tablet 75 milliGRAM(s) Oral daily  hydrALAZINE 50 milliGRAM(s) Oral every 8 hours  metoprolol succinate  milliGRAM(s) Oral daily  pantoprazole    Tablet 40 milliGRAM(s) Oral before breakfast  sodium chloride 0.9%. 1000 milliLiter(s) (100 mL/Hr) IV Continuous <Continuous>    MEDICATIONS  (PRN):      REVIEW OF SYSTEMS:          All negative except as mentioned in HPI    PHYSICAL EXAM:           CONSTITUTIONAL: Well-developed; well-nourished; in no acute distress  	SKIN: warm, dry  	HEAD: Normocephalic; atraumatic  	EYES: PERRL.  	ENT: No nasal discharge, airway clear, mucous membranes moist  	NECK: Supple; non tender.  	CARD: +S1, +S2, no murmurs, gallops, or rubs. Regular rate and rhythm    	RESP: No wheezes, rales or rhonchi. CTA B/L  	ABD: soft ntnd, + BS x 4 quadrants  	EXT: moves all extremities,  no clubbing, cyanosis or edema  	NEURO: Alert and oriented x3, no focal deficits          PSYCH: Cooperative, appropriate          VASCULAR:  + Rad / + PTs / + DPs          EXTREMITY:             Right Groin:  Dressing removed, site C/D/I, + pulses, , pressure dressing removed, access site soft, no hematoma, no pain, + pulses, no sign of infection, no numbness    ECG: A-sensed, V-paced                                                                                                                  LABS:                        11.3   12.03 )-----------( 302      ( 10 Jul 2021 06:09 )             34.0     07-10    134<L>  |  99  |  28<H>  ----------------------------<  193<H>  4.0   |  23  |  1.1    Ca    8.9      10 Jul 2021 06:09    REVIEW OF SYSTEMS:          All negative except as mentioned in HPI    PHYSICAL EXAM:           CONSTITUTIONAL: Well-developed; well-nourished; in no acute distress  	SKIN: warm, dry  	HEAD: Normocephalic; atraumatic  	EYES: PERRL.  	ENT: No nasal discharge, airway clear, mucous membranes moist  	NECK: Supple; non tender.  	CARD: +S1, +S2, no murmurs, gallops, or rubs. Regular rate and rhythm    	RESP: No wheezes, rales or rhonchi. CTA B/L  	ABD: soft ntnd, + BS x 4 quadrants  	EXT: moves all extremities,  no clubbing, cyanosis or edema, L hallux amputated  	NEURO: Alert and oriented x3, no focal deficits          PSYCH: Cooperative, appropriate          VASCULAR:  + Rad / + PTs / + DPs          EXTREMITY:             Right Groin:  Dressing D/C/I, access site soft, no hematoma, no pain, + pulses, no sign of infection, no numbness  	           A/P:  I discussed the case with Cardiologist Dr. Hammonds  and recommend the following:    S/P PTA 7/8:    Diagnostic Summary:  -Left renal artery: 90% prox calcified lesion   -Right renal artery: 90% prox calcified lesion     Intervention Summary:  Right renal artery: Successful PTA with balloon angioplasty Sprinter 3.5 x 20  and Express SD Renal/Biliary stent 5 x 19 mm  Left renal artery: Successful PTA with balloon angioplasty Sprinter 3.5 x 20 , cutting balloon angiosculpt 3.5 x 15 and Express SD Renal/Biliary Stent 5 x 19 mm, post stent balloon angioplasty with NC Emerge 5 x 12 mm    	     Continue antiplatelet ( Plavix 75 mg daily(no ASA per Dr. Hammonds d/t increased bleeding risk  ), B-Blocker, Statin Therapy                   hydralazine 50 mg q8                   resume lasix 20 mg po daily                   Patient given 30 day supply of (  Plavix 75 mg daily ) to take at home                   OOB with assistance                   Monitor access site/distal pulses                   No strenuous activity for 3 days (routine walking okay)                   No driving for 3 days                   No heavy lifting > 30lbs for 2 weeks                   May shower in 24 hours                   Leave dressing in place 24- 48 hours, if does not fall off in 48 hours can remove                   Patient agreeing to take antiplatelet as directed by cardiologist                    Post angiogram instructions, access site care and activity restrictions reviewed with patient                     Discussed with patient to return to hospital if experience severe lower extremity pain and site bleeding                   Aggressive risk factor modification, diet counseling, smoking cessation discussed with patient                      d/c home if ambulating without symptoms and access site stable                    pt instructed to f/u with Dr. Hammonds in 1 week, pt instructed to call and make an appt                                        01-Mar-2025 18:16

## 2025-04-17 NOTE — PHYSICAL THERAPY INITIAL EVALUATION ADULT - FUNCTIONAL LIMITATIONS, PT EVAL
Kimi'andres MORGAN request for temazepam 7.5 MG Oral Cap - placed in nurses bin.  
community/leisure/home management
self-care/home management

## 2025-05-27 NOTE — PATIENT PROFILE ADULT - NSPROGENBLOODRESTRICT_GEN_A_NUR
Lab Results   Component Value Date    EGFR 68 05/27/2025    EGFR 35 05/26/2025    EGFR 26 05/25/2025    CREATININE 0.94 05/27/2025    CREATININE 1.63 (H) 05/26/2025    CREATININE 2.05 (H) 05/25/2025      none

## 2025-06-04 NOTE — ED PROVIDER NOTE - NS ED ROS FT
Physical Exam:  Gen: NAD, non-toxic appearing, able to ambulate without assistance  Head: NCAT  HEENT: EOMI, PEERLA, normal conjunctiva, tongue midline, oral mucosa moist  Lung: CTAB, no respiratory distress, no wheezes/rhonchi/rales B/L, speaking in full sentences  CV: RRR, no murmurs, rubs or gallops, distal pulses 2+ b/l  Abd: soft, nontender, no distention, no guarding, no rigidity, no rebound tenderness  MSK: R knee healing bruising , no welling no redness no warmth at knee, mild calf swelling and tenderness mostly in popliteal fossa,  leg warm no redness , 2+ dp pulses, ambulatory  Skin: Warm, well perfused, no rash  Psych: normal affect, calm All systems were reviewed and were otherwise negative.